# Patient Record
Sex: FEMALE | Race: WHITE | NOT HISPANIC OR LATINO | URBAN - METROPOLITAN AREA
[De-identification: names, ages, dates, MRNs, and addresses within clinical notes are randomized per-mention and may not be internally consistent; named-entity substitution may affect disease eponyms.]

---

## 2017-02-06 ENCOUNTER — INPATIENT (INPATIENT)
Facility: HOSPITAL | Age: 82
LOS: 23 days | Discharge: NURSING, REHAB OR LONG-TERM CARE | DRG: 335 | End: 2017-03-02
Attending: INTERNAL MEDICINE | Admitting: INTERNAL MEDICINE
Payer: MEDICARE

## 2017-02-06 VITALS
RESPIRATION RATE: 18 BRPM | HEART RATE: 72 BPM | TEMPERATURE: 98 F | DIASTOLIC BLOOD PRESSURE: 56 MMHG | OXYGEN SATURATION: 98 % | SYSTOLIC BLOOD PRESSURE: 104 MMHG

## 2017-02-06 DIAGNOSIS — K21.9 GASTRO-ESOPHAGEAL REFLUX DISEASE WITHOUT ESOPHAGITIS: ICD-10-CM

## 2017-02-06 DIAGNOSIS — K56.5 INTESTINAL ADHESIONS [BANDS] WITH OBSTRUCTION (POSTINFECTION): ICD-10-CM

## 2017-02-06 DIAGNOSIS — E78.5 HYPERLIPIDEMIA, UNSPECIFIED: ICD-10-CM

## 2017-02-06 DIAGNOSIS — J44.9 CHRONIC OBSTRUCTIVE PULMONARY DISEASE, UNSPECIFIED: ICD-10-CM

## 2017-02-06 DIAGNOSIS — I63.40 CEREBRAL INFARCTION DUE TO EMBOLISM OF UNSPECIFIED CEREBRAL ARTERY: ICD-10-CM

## 2017-02-06 DIAGNOSIS — K56.60 UNSPECIFIED INTESTINAL OBSTRUCTION: ICD-10-CM

## 2017-02-06 LAB
ALBUMIN SERPL ELPH-MCNC: 3.7 G/DL — SIGNIFICANT CHANGE UP (ref 3.3–5)
ALP SERPL-CCNC: 70 U/L — SIGNIFICANT CHANGE UP (ref 40–120)
ALT FLD-CCNC: 20 U/L RC — SIGNIFICANT CHANGE UP (ref 10–45)
ANION GAP SERPL CALC-SCNC: 15 MMOL/L — SIGNIFICANT CHANGE UP (ref 5–17)
APPEARANCE UR: CLEAR — SIGNIFICANT CHANGE UP
AST SERPL-CCNC: 40 U/L — SIGNIFICANT CHANGE UP (ref 10–40)
BASOPHILS # BLD AUTO: 0 K/UL — SIGNIFICANT CHANGE UP (ref 0–0.2)
BASOPHILS NFR BLD AUTO: 0 % — SIGNIFICANT CHANGE UP (ref 0–2)
BILIRUB SERPL-MCNC: 2.9 MG/DL — HIGH (ref 0.2–1.2)
BILIRUB UR-MCNC: NEGATIVE — SIGNIFICANT CHANGE UP
BUN SERPL-MCNC: 20 MG/DL — SIGNIFICANT CHANGE UP (ref 7–23)
CALCIUM SERPL-MCNC: 9.3 MG/DL — SIGNIFICANT CHANGE UP (ref 8.4–10.5)
CHLORIDE SERPL-SCNC: 105 MMOL/L — SIGNIFICANT CHANGE UP (ref 96–108)
CO2 SERPL-SCNC: 20 MMOL/L — LOW (ref 22–31)
COLOR SPEC: YELLOW — SIGNIFICANT CHANGE UP
CREAT SERPL-MCNC: 0.78 MG/DL — SIGNIFICANT CHANGE UP (ref 0.5–1.3)
DIFF PNL FLD: NEGATIVE — SIGNIFICANT CHANGE UP
EOSINOPHIL # BLD AUTO: 0 K/UL — SIGNIFICANT CHANGE UP (ref 0–0.5)
EOSINOPHIL NFR BLD AUTO: 0.2 % — SIGNIFICANT CHANGE UP (ref 0–6)
GAS PNL BLDV: SIGNIFICANT CHANGE UP
GLUCOSE SERPL-MCNC: 204 MG/DL — HIGH (ref 70–99)
GLUCOSE UR QL: NEGATIVE — SIGNIFICANT CHANGE UP
HCT VFR BLD CALC: 46.5 % — HIGH (ref 34.5–45)
HGB BLD-MCNC: 16.4 G/DL — HIGH (ref 11.5–15.5)
KETONES UR-MCNC: ABNORMAL
LEUKOCYTE ESTERASE UR-ACNC: NEGATIVE — SIGNIFICANT CHANGE UP
LIDOCAIN IGE QN: 23 U/L — SIGNIFICANT CHANGE UP (ref 7–60)
LYMPHOCYTES # BLD AUTO: 0.5 K/UL — LOW (ref 1–3.3)
LYMPHOCYTES # BLD AUTO: 5 % — LOW (ref 13–44)
MCHC RBC-ENTMCNC: 34.9 PG — HIGH (ref 27–34)
MCHC RBC-ENTMCNC: 35.2 GM/DL — SIGNIFICANT CHANGE UP (ref 32–36)
MCV RBC AUTO: 99.1 FL — SIGNIFICANT CHANGE UP (ref 80–100)
MONOCYTES # BLD AUTO: 0.3 K/UL — SIGNIFICANT CHANGE UP (ref 0–0.9)
MONOCYTES NFR BLD AUTO: 2.5 % — SIGNIFICANT CHANGE UP (ref 2–14)
NEUTROPHILS # BLD AUTO: 10.1 K/UL — HIGH (ref 1.8–7.4)
NEUTROPHILS NFR BLD AUTO: 92.4 % — HIGH (ref 43–77)
NITRITE UR-MCNC: NEGATIVE — SIGNIFICANT CHANGE UP
PH UR: 6 — SIGNIFICANT CHANGE UP (ref 4.8–8)
PLATELET # BLD AUTO: 144 K/UL — LOW (ref 150–400)
POTASSIUM SERPL-MCNC: 5.6 MMOL/L — HIGH (ref 3.5–5.3)
POTASSIUM SERPL-SCNC: 5.6 MMOL/L — HIGH (ref 3.5–5.3)
PROT SERPL-MCNC: 7.2 G/DL — SIGNIFICANT CHANGE UP (ref 6–8.3)
PROT UR-MCNC: 30 MG/DL
RBC # BLD: 4.69 M/UL — SIGNIFICANT CHANGE UP (ref 3.8–5.2)
RBC # FLD: 12.9 % — SIGNIFICANT CHANGE UP (ref 10.3–14.5)
SODIUM SERPL-SCNC: 140 MMOL/L — SIGNIFICANT CHANGE UP (ref 135–145)
SP GR SPEC: 1.03 — HIGH (ref 1.01–1.02)
UROBILINOGEN FLD QL: NEGATIVE — SIGNIFICANT CHANGE UP
WBC # BLD: 11 K/UL — HIGH (ref 3.8–10.5)
WBC # FLD AUTO: 11 K/UL — HIGH (ref 3.8–10.5)

## 2017-02-06 PROCEDURE — 93010 ELECTROCARDIOGRAM REPORT: CPT

## 2017-02-06 PROCEDURE — 99291 CRITICAL CARE FIRST HOUR: CPT | Mod: 25,GC

## 2017-02-06 PROCEDURE — 99223 1ST HOSP IP/OBS HIGH 75: CPT | Mod: GC

## 2017-02-06 PROCEDURE — 74177 CT ABD & PELVIS W/CONTRAST: CPT | Mod: 26

## 2017-02-06 PROCEDURE — 71010: CPT | Mod: 26,76

## 2017-02-06 PROCEDURE — 71010: CPT | Mod: 26,77

## 2017-02-06 RX ORDER — BRIMONIDINE TARTRATE 2 MG/MG
1 SOLUTION/ DROPS OPHTHALMIC
Qty: 0 | Refills: 0 | Status: DISCONTINUED | OUTPATIENT
Start: 2017-02-06 | End: 2017-02-15

## 2017-02-06 RX ORDER — DEXAMETHASONE 0.5 MG/5ML
4 ELIXIR ORAL DAILY
Qty: 0 | Refills: 0 | Status: DISCONTINUED | OUTPATIENT
Start: 2017-02-06 | End: 2017-02-15

## 2017-02-06 RX ORDER — RIVAROXABAN 15 MG-20MG
15 KIT ORAL DAILY
Qty: 0 | Refills: 0 | Status: DISCONTINUED | OUTPATIENT
Start: 2017-02-06 | End: 2017-02-11

## 2017-02-06 RX ORDER — OCTREOTIDE ACETATE 200 UG/ML
10 INJECTION, SOLUTION INTRAVENOUS; SUBCUTANEOUS
Qty: 500 | Refills: 0 | Status: DISCONTINUED | OUTPATIENT
Start: 2017-02-06 | End: 2017-02-15

## 2017-02-06 RX ORDER — SODIUM CHLORIDE 9 MG/ML
1000 INJECTION INTRAMUSCULAR; INTRAVENOUS; SUBCUTANEOUS ONCE
Qty: 0 | Refills: 0 | Status: COMPLETED | OUTPATIENT
Start: 2017-02-06 | End: 2017-02-06

## 2017-02-06 RX ORDER — ATORVASTATIN CALCIUM 80 MG/1
40 TABLET, FILM COATED ORAL AT BEDTIME
Qty: 0 | Refills: 0 | Status: DISCONTINUED | OUTPATIENT
Start: 2017-02-06 | End: 2017-02-15

## 2017-02-06 RX ORDER — MIDAZOLAM HYDROCHLORIDE 1 MG/ML
1 INJECTION, SOLUTION INTRAMUSCULAR; INTRAVENOUS ONCE
Qty: 0 | Refills: 0 | Status: DISCONTINUED | OUTPATIENT
Start: 2017-02-06 | End: 2017-02-06

## 2017-02-06 RX ORDER — HALOPERIDOL DECANOATE 100 MG/ML
0.5 INJECTION INTRAMUSCULAR EVERY 6 HOURS
Qty: 0 | Refills: 0 | Status: DISCONTINUED | OUTPATIENT
Start: 2017-02-06 | End: 2017-02-09

## 2017-02-06 RX ORDER — ONDANSETRON 8 MG/1
4 TABLET, FILM COATED ORAL EVERY 6 HOURS
Qty: 0 | Refills: 0 | Status: DISCONTINUED | OUTPATIENT
Start: 2017-02-06 | End: 2017-02-06

## 2017-02-06 RX ORDER — HYDROMORPHONE HYDROCHLORIDE 2 MG/ML
0.2 INJECTION INTRAMUSCULAR; INTRAVENOUS; SUBCUTANEOUS EVERY 4 HOURS
Qty: 0 | Refills: 0 | Status: DISCONTINUED | OUTPATIENT
Start: 2017-02-06 | End: 2017-02-13

## 2017-02-06 RX ORDER — PANTOPRAZOLE SODIUM 20 MG/1
40 TABLET, DELAYED RELEASE ORAL ONCE
Qty: 0 | Refills: 0 | Status: COMPLETED | OUTPATIENT
Start: 2017-02-06 | End: 2017-02-06

## 2017-02-06 RX ORDER — PANTOPRAZOLE SODIUM 20 MG/1
40 TABLET, DELAYED RELEASE ORAL EVERY 24 HOURS
Qty: 0 | Refills: 0 | Status: DISCONTINUED | OUTPATIENT
Start: 2017-02-06 | End: 2017-02-15

## 2017-02-06 RX ORDER — ONDANSETRON 8 MG/1
4 TABLET, FILM COATED ORAL ONCE
Qty: 0 | Refills: 0 | Status: COMPLETED | OUTPATIENT
Start: 2017-02-06 | End: 2017-02-06

## 2017-02-06 RX ORDER — SODIUM CHLORIDE 9 MG/ML
3 INJECTION INTRAMUSCULAR; INTRAVENOUS; SUBCUTANEOUS ONCE
Qty: 0 | Refills: 0 | Status: COMPLETED | OUTPATIENT
Start: 2017-02-06 | End: 2017-02-06

## 2017-02-06 RX ORDER — SODIUM CHLORIDE 9 MG/ML
1000 INJECTION INTRAMUSCULAR; INTRAVENOUS; SUBCUTANEOUS
Qty: 0 | Refills: 0 | Status: DISCONTINUED | OUTPATIENT
Start: 2017-02-06 | End: 2017-02-11

## 2017-02-06 RX ORDER — ONDANSETRON 8 MG/1
4 TABLET, FILM COATED ORAL EVERY 6 HOURS
Qty: 0 | Refills: 0 | Status: DISCONTINUED | OUTPATIENT
Start: 2017-02-06 | End: 2017-02-15

## 2017-02-06 RX ADMIN — RIVAROXABAN 15 MILLIGRAM(S): KIT at 22:27

## 2017-02-06 RX ADMIN — ONDANSETRON 4 MILLIGRAM(S): 8 TABLET, FILM COATED ORAL at 04:10

## 2017-02-06 RX ADMIN — BRIMONIDINE TARTRATE 1 DROP(S): 2 SOLUTION/ DROPS OPHTHALMIC at 22:28

## 2017-02-06 RX ADMIN — Medication 4 MILLIGRAM(S): at 17:34

## 2017-02-06 RX ADMIN — SODIUM CHLORIDE 1000 MILLILITER(S): 9 INJECTION INTRAMUSCULAR; INTRAVENOUS; SUBCUTANEOUS at 04:08

## 2017-02-06 RX ADMIN — SODIUM CHLORIDE 100 MILLILITER(S): 9 INJECTION INTRAMUSCULAR; INTRAVENOUS; SUBCUTANEOUS at 08:24

## 2017-02-06 RX ADMIN — OCTREOTIDE ACETATE 2 MICROGRAM(S)/HR: 200 INJECTION, SOLUTION INTRAVENOUS; SUBCUTANEOUS at 18:38

## 2017-02-06 RX ADMIN — SODIUM CHLORIDE 3 MILLILITER(S): 9 INJECTION INTRAMUSCULAR; INTRAVENOUS; SUBCUTANEOUS at 04:17

## 2017-02-06 RX ADMIN — MIDAZOLAM HYDROCHLORIDE 1 MILLIGRAM(S): 1 INJECTION, SOLUTION INTRAMUSCULAR; INTRAVENOUS at 06:48

## 2017-02-06 RX ADMIN — PANTOPRAZOLE SODIUM 40 MILLIGRAM(S): 20 TABLET, DELAYED RELEASE ORAL at 04:09

## 2017-02-06 RX ADMIN — ATORVASTATIN CALCIUM 40 MILLIGRAM(S): 80 TABLET, FILM COATED ORAL at 22:28

## 2017-02-06 RX ADMIN — SODIUM CHLORIDE 100 MILLILITER(S): 9 INJECTION INTRAMUSCULAR; INTRAVENOUS; SUBCUTANEOUS at 11:45

## 2017-02-06 RX ADMIN — PANTOPRAZOLE SODIUM 40 MILLIGRAM(S): 20 TABLET, DELAYED RELEASE ORAL at 17:34

## 2017-02-06 NOTE — PATIENT PROFILE ADULT. - ABILITY TO HEAR (WITH HEARING AID OR HEARING APPLIANCE IF NORMALLY USED):
difficulty hearing from left ear./Mildly to Moderately Impaired: difficulty hearing in some environments or speaker may need to increase volume or speak distinctly

## 2017-02-06 NOTE — ED PROVIDER NOTE - CRITICAL CARE INDICATION, MLM
patient was critically ill... Patient was critically ill with a high probability of imminent or life threatening deterioration tachycardic with closed loop bowel obstruction requiring emergent surgical intervention

## 2017-02-06 NOTE — ED ADULT NURSE NOTE - OBJECTIVE STATEMENT
89 y.o. Female presents to the ED via EMS from [facility] [place] c/o abdominal pain and vomiting. pt is A&Ox1. Hx Afib and GI bleed. EMS reports pt was c/o abdominal pain in b/l upper quadrants and vomited x3. Denies any blood, diarrhea, CP, SOB, urinary/bowel complications, fever/chills. Pt is in no current distress. Comfort and safety provided. 89 y.o. Female presents to the ED via EMS from [facility] c/o abdominal pain and vomiting. pt is A&Ox1. Hx Afib and GI bleed. EMS reports pt was c/o abdominal pain in b/l upper quadrants and vomited x3. Blanchable redness noted on sacral area. Denies any blood, diarrhea, CP, SOB, urinary/bowel complications, fever/chills. Pt is in no current distress. Comfort and safety provided. 89 y.o. Female presents to the ED via EMS from [facility] c/o abdominal pain and vomiting. pt is A&Ox1. Hx Afib and GI bleed. EMS reports pt was c/o abdominal pain in b/l upper quadrants and vomited x3. Tender in lower abdomen. Blanchable redness noted on sacral area. Denies any blood, diarrhea, CP, SOB, urinary/bowel complications, fever/chills. Pt is in no current distress. Comfort and safety provided.

## 2017-02-06 NOTE — H&P ADULT. - ASSESSMENT
89yM h/o dementia (baseline oriented x2) Anemia, HTN, HLD, CAD, s/p MI, s/p PPM/AICD, DVT/PE, (previously on coumadin, now with IVC filter), COPD, BPH and colitis brought in by daughter for increased agitation yesterday and increased fatigue/low temp today. Resident of [facility]. Not compliant with pureed diet and has had recurrent aspiration PNA. Daughter reports increased cough.

## 2017-02-06 NOTE — ED ADULT NURSE REASSESSMENT NOTE - NS ED NURSE REASSESS COMMENT FT1
Received report from night RN Erica. Patient sheets changed, patient repositioned. Patient pulled out her own NG tube with tube intact. No epistaxis. No appearance of discomfort after removal of NG tube. ED resident Dr Julio aware. Patient admitted, RTM timer clicked Received report from night RN Erica. Patient sheets changed, patient repositioned. Patient pulled out her own NG tube with tube intact. No epistaxis. No appearance of discomfort after removal of NG tube. ED resident Dr Julio aware. Awaiting dispo

## 2017-02-06 NOTE — ED PROCEDURE NOTE - CPROC ED GASTRIC INTUB DETAIL1
Gastric tube connected to low continuous suction./The nasogastric tube (see size above) was inserted via the anatomic location.

## 2017-02-06 NOTE — ED ADULT NURSE NOTE - PMH
COPD (chronic obstructive pulmonary disease)    CVA (cerebral infarction)    GERD (gastroesophageal reflux disease)    GI bleed    Glaucoma    Hyperlipemia    PUD (peptic ulcer disease)

## 2017-02-06 NOTE — ED PROVIDER NOTE - OBJECTIVE STATEMENT
89yoF pmh CAD, CVA, dementia, PUD/GIB, A fib on Rivaroxaban, COPD (no home 02), GERD bibems from nursing home after noted 1x episode of +n/v and c/o of diffuse abd pain.   In Ed pt reports continued diffsue bad pan, +n.  Pt reports no f/c, no diarrhea, non bloody brown stools, no cp/sob, no h/a, no dizzy.

## 2017-02-06 NOTE — ED PROVIDER NOTE - ATTENDING CONTRIBUTION TO CARE
Patient poor historian.  From nursing home, reporting abdominal pain and nausea.  Unknown if prior surgeries.    On exam patient tachycardic VS otherwise WNL, NAD, irregularly irregular, lungs clear, abdomen diffusely TTP.    DDx:  obstruction, cholecystitis, pancreatitis, gastritis, ischemic colitis - labs, lactate, CXR, EKG, IVF, CT A/P (possible angio if significantly elevated lactate), likely admission.

## 2017-02-06 NOTE — ED PROVIDER NOTE - CRITICAL CARE PROVIDED
direct patient care (not related to procedure)/documentation/interpretation of diagnostic studies/consultation with other physicians/additional history taking

## 2017-02-07 LAB
ANION GAP SERPL CALC-SCNC: 13 MMOL/L — SIGNIFICANT CHANGE UP (ref 5–17)
BUN SERPL-MCNC: 22 MG/DL — SIGNIFICANT CHANGE UP (ref 7–23)
CALCIUM SERPL-MCNC: 8.4 MG/DL — SIGNIFICANT CHANGE UP (ref 8.4–10.5)
CHLORIDE SERPL-SCNC: 111 MMOL/L — HIGH (ref 96–108)
CO2 SERPL-SCNC: 21 MMOL/L — LOW (ref 22–31)
CREAT SERPL-MCNC: 0.61 MG/DL — SIGNIFICANT CHANGE UP (ref 0.5–1.3)
CULTURE RESULTS: NO GROWTH — SIGNIFICANT CHANGE UP
FOLATE SERPL-MCNC: 14.1 NG/ML — SIGNIFICANT CHANGE UP (ref 4.8–24.2)
GLUCOSE SERPL-MCNC: 151 MG/DL — HIGH (ref 70–99)
HCT VFR BLD CALC: 39.3 % — SIGNIFICANT CHANGE UP (ref 34.5–45)
HGB BLD-MCNC: 14 G/DL — SIGNIFICANT CHANGE UP (ref 11.5–15.5)
MAGNESIUM SERPL-MCNC: 2.2 MG/DL — SIGNIFICANT CHANGE UP (ref 1.6–2.6)
MCHC RBC-ENTMCNC: 35.5 GM/DL — SIGNIFICANT CHANGE UP (ref 32–36)
MCHC RBC-ENTMCNC: 35.6 PG — HIGH (ref 27–34)
MCV RBC AUTO: 100 FL — SIGNIFICANT CHANGE UP (ref 80–100)
PHOSPHATE SERPL-MCNC: 3.7 MG/DL — SIGNIFICANT CHANGE UP (ref 2.5–4.5)
PLATELET # BLD AUTO: 124 K/UL — LOW (ref 150–400)
POTASSIUM SERPL-MCNC: 4.3 MMOL/L — SIGNIFICANT CHANGE UP (ref 3.5–5.3)
POTASSIUM SERPL-SCNC: 4.3 MMOL/L — SIGNIFICANT CHANGE UP (ref 3.5–5.3)
RBC # BLD: 3.92 M/UL — SIGNIFICANT CHANGE UP (ref 3.8–5.2)
RBC # FLD: 12.8 % — SIGNIFICANT CHANGE UP (ref 10.3–14.5)
SODIUM SERPL-SCNC: 145 MMOL/L — SIGNIFICANT CHANGE UP (ref 135–145)
SPECIMEN SOURCE: SIGNIFICANT CHANGE UP
TSH SERPL-MCNC: 0.65 UIU/ML — SIGNIFICANT CHANGE UP (ref 0.27–4.2)
VIT B12 SERPL-MCNC: 297 PG/ML — SIGNIFICANT CHANGE UP (ref 243–894)
WBC # BLD: 8.9 K/UL — SIGNIFICANT CHANGE UP (ref 3.8–10.5)
WBC # FLD AUTO: 8.9 K/UL — SIGNIFICANT CHANGE UP (ref 3.8–10.5)

## 2017-02-07 PROCEDURE — 99233 SBSQ HOSP IP/OBS HIGH 50: CPT | Mod: GC

## 2017-02-07 PROCEDURE — 71010: CPT | Mod: 26

## 2017-02-07 RX ADMIN — BRIMONIDINE TARTRATE 1 DROP(S): 2 SOLUTION/ DROPS OPHTHALMIC at 12:24

## 2017-02-07 RX ADMIN — Medication 4 MILLIGRAM(S): at 05:24

## 2017-02-07 RX ADMIN — PANTOPRAZOLE SODIUM 40 MILLIGRAM(S): 20 TABLET, DELAYED RELEASE ORAL at 17:39

## 2017-02-07 RX ADMIN — RIVAROXABAN 15 MILLIGRAM(S): KIT at 12:24

## 2017-02-07 RX ADMIN — ATORVASTATIN CALCIUM 40 MILLIGRAM(S): 80 TABLET, FILM COATED ORAL at 23:27

## 2017-02-07 RX ADMIN — BRIMONIDINE TARTRATE 1 DROP(S): 2 SOLUTION/ DROPS OPHTHALMIC at 17:39

## 2017-02-07 RX ADMIN — SODIUM CHLORIDE 100 MILLILITER(S): 9 INJECTION INTRAMUSCULAR; INTRAVENOUS; SUBCUTANEOUS at 23:27

## 2017-02-07 NOTE — PROVIDER CONTACT NOTE (OTHER) - ACTION/TREATMENT ORDERED:
As per Alvaro Grimm NP, bladder scan patient for residual urine. pt safety maintained will continue to monitor.

## 2017-02-07 NOTE — PROVIDER CONTACT NOTE (OTHER) - ACTION/TREATMENT ORDERED:
As per Alvaro Grimm, straight cath pt for residual urine. Pt safety maintained, will continue to monitor.

## 2017-02-07 NOTE — PROVIDER CONTACT NOTE (OTHER) - ACTION/TREATMENT ORDERED:
Hernandez placed in pt, patent draining clear yellow urine, Failed attempt to place NGT.  Will try again.PP/RN

## 2017-02-07 NOTE — PROVIDER CONTACT NOTE (OTHER) - ACTION/TREATMENT ORDERED:
As per Alvaro Grimm NP, continue to monitor pt, no further interventions at this time. Pt safety maintained. Will continue to monitor.

## 2017-02-08 LAB
ANION GAP SERPL CALC-SCNC: 14 MMOL/L — SIGNIFICANT CHANGE UP (ref 5–17)
BUN SERPL-MCNC: 24 MG/DL — HIGH (ref 7–23)
CALCIUM SERPL-MCNC: 7.8 MG/DL — LOW (ref 8.4–10.5)
CHLORIDE SERPL-SCNC: 109 MMOL/L — HIGH (ref 96–108)
CK MB BLD-MCNC: 2.1 % — SIGNIFICANT CHANGE UP (ref 0–3.5)
CK MB CFR SERPL CALC: 2.1 NG/ML — SIGNIFICANT CHANGE UP (ref 0–3.8)
CK SERPL-CCNC: 100 U/L — SIGNIFICANT CHANGE UP (ref 25–170)
CO2 SERPL-SCNC: 22 MMOL/L — SIGNIFICANT CHANGE UP (ref 22–31)
CREAT SERPL-MCNC: 0.53 MG/DL — SIGNIFICANT CHANGE UP (ref 0.5–1.3)
GLUCOSE SERPL-MCNC: 134 MG/DL — HIGH (ref 70–99)
HCT VFR BLD CALC: 39.4 % — SIGNIFICANT CHANGE UP (ref 34.5–45)
HGB BLD-MCNC: 13.4 G/DL — SIGNIFICANT CHANGE UP (ref 11.5–15.5)
MAGNESIUM SERPL-MCNC: 2 MG/DL — SIGNIFICANT CHANGE UP (ref 1.6–2.6)
MAGNESIUM SERPL-MCNC: 2.1 MG/DL — SIGNIFICANT CHANGE UP (ref 1.6–2.6)
MCHC RBC-ENTMCNC: 33.9 PG — SIGNIFICANT CHANGE UP (ref 27–34)
MCHC RBC-ENTMCNC: 34 GM/DL — SIGNIFICANT CHANGE UP (ref 32–36)
MCV RBC AUTO: 99.8 FL — SIGNIFICANT CHANGE UP (ref 80–100)
PHOSPHATE SERPL-MCNC: 2.7 MG/DL — SIGNIFICANT CHANGE UP (ref 2.5–4.5)
PHOSPHATE SERPL-MCNC: 2.7 MG/DL — SIGNIFICANT CHANGE UP (ref 2.5–4.5)
PLATELET # BLD AUTO: 129 K/UL — LOW (ref 150–400)
POTASSIUM SERPL-MCNC: 3.7 MMOL/L — SIGNIFICANT CHANGE UP (ref 3.5–5.3)
POTASSIUM SERPL-SCNC: 3.7 MMOL/L — SIGNIFICANT CHANGE UP (ref 3.5–5.3)
RBC # BLD: 3.95 M/UL — SIGNIFICANT CHANGE UP (ref 3.8–5.2)
RBC # FLD: 13 % — SIGNIFICANT CHANGE UP (ref 10.3–14.5)
SODIUM SERPL-SCNC: 145 MMOL/L — SIGNIFICANT CHANGE UP (ref 135–145)
TROPONIN T SERPL-MCNC: <0.01 NG/ML — SIGNIFICANT CHANGE UP (ref 0–0.06)
WBC # BLD: 10.4 K/UL — SIGNIFICANT CHANGE UP (ref 3.8–10.5)
WBC # FLD AUTO: 10.4 K/UL — SIGNIFICANT CHANGE UP (ref 3.8–10.5)

## 2017-02-08 PROCEDURE — 99233 SBSQ HOSP IP/OBS HIGH 50: CPT | Mod: GC

## 2017-02-08 PROCEDURE — 93010 ELECTROCARDIOGRAM REPORT: CPT

## 2017-02-08 RX ORDER — METOPROLOL TARTRATE 50 MG
5 TABLET ORAL EVERY 6 HOURS
Qty: 0 | Refills: 0 | Status: DISCONTINUED | OUTPATIENT
Start: 2017-02-08 | End: 2017-02-09

## 2017-02-08 RX ORDER — METOPROLOL TARTRATE 50 MG
5 TABLET ORAL ONCE
Qty: 0 | Refills: 0 | Status: COMPLETED | OUTPATIENT
Start: 2017-02-08 | End: 2017-02-08

## 2017-02-08 RX ORDER — TOBRAMYCIN 0.3 %
1 DROPS OPHTHALMIC (EYE)
Qty: 0 | Refills: 0 | Status: DISCONTINUED | OUTPATIENT
Start: 2017-02-08 | End: 2017-02-15

## 2017-02-08 RX ORDER — PREGABALIN 225 MG/1
1000 CAPSULE ORAL DAILY
Qty: 0 | Refills: 0 | Status: COMPLETED | OUTPATIENT
Start: 2017-02-08 | End: 2017-02-10

## 2017-02-08 RX ADMIN — HALOPERIDOL DECANOATE 0.5 MILLIGRAM(S): 100 INJECTION INTRAMUSCULAR at 16:46

## 2017-02-08 RX ADMIN — Medication 1 DROP(S): at 11:49

## 2017-02-08 RX ADMIN — Medication 4 MILLIGRAM(S): at 05:30

## 2017-02-08 RX ADMIN — PREGABALIN 1000 MICROGRAM(S): 225 CAPSULE ORAL at 11:49

## 2017-02-08 RX ADMIN — OCTREOTIDE ACETATE 2 MICROGRAM(S)/HR: 200 INJECTION, SOLUTION INTRAVENOUS; SUBCUTANEOUS at 14:35

## 2017-02-08 RX ADMIN — HYDROMORPHONE HYDROCHLORIDE 0.2 MILLIGRAM(S): 2 INJECTION INTRAMUSCULAR; INTRAVENOUS; SUBCUTANEOUS at 23:24

## 2017-02-08 RX ADMIN — BRIMONIDINE TARTRATE 1 DROP(S): 2 SOLUTION/ DROPS OPHTHALMIC at 19:01

## 2017-02-08 RX ADMIN — SODIUM CHLORIDE 100 MILLILITER(S): 9 INJECTION INTRAMUSCULAR; INTRAVENOUS; SUBCUTANEOUS at 23:24

## 2017-02-08 RX ADMIN — PANTOPRAZOLE SODIUM 40 MILLIGRAM(S): 20 TABLET, DELAYED RELEASE ORAL at 16:47

## 2017-02-08 RX ADMIN — Medication 5 MILLIGRAM(S): at 19:00

## 2017-02-08 RX ADMIN — HYDROMORPHONE HYDROCHLORIDE 0.2 MILLIGRAM(S): 2 INJECTION INTRAMUSCULAR; INTRAVENOUS; SUBCUTANEOUS at 23:48

## 2017-02-08 RX ADMIN — Medication 1 DROP(S): at 19:01

## 2017-02-08 RX ADMIN — Medication 1 DROP(S): at 23:25

## 2017-02-08 RX ADMIN — RIVAROXABAN 15 MILLIGRAM(S): KIT at 11:03

## 2017-02-08 RX ADMIN — Medication 5 MILLIGRAM(S): at 23:26

## 2017-02-08 RX ADMIN — BRIMONIDINE TARTRATE 1 DROP(S): 2 SOLUTION/ DROPS OPHTHALMIC at 05:30

## 2017-02-08 RX ADMIN — Medication 5 MILLIGRAM(S): at 18:37

## 2017-02-08 NOTE — PROVIDER CONTACT NOTE (OTHER) - ACTION/TREATMENT ORDERED:
Stat EKG, Cardiac Enzymes done, Stat dose Metopolol given x 1.  Pt. to be transferred to Tele floor.  Report given to Receiving Nurse - RN/Nae. PP/RN

## 2017-02-09 LAB
ANION GAP SERPL CALC-SCNC: 15 MMOL/L — SIGNIFICANT CHANGE UP (ref 5–17)
BUN SERPL-MCNC: 22 MG/DL — SIGNIFICANT CHANGE UP (ref 7–23)
CALCIUM SERPL-MCNC: 8.1 MG/DL — LOW (ref 8.4–10.5)
CHLORIDE SERPL-SCNC: 108 MMOL/L — SIGNIFICANT CHANGE UP (ref 96–108)
CK MB BLD-MCNC: 2.4 % — SIGNIFICANT CHANGE UP (ref 0–3.5)
CK MB CFR SERPL CALC: 2.4 NG/ML — SIGNIFICANT CHANGE UP (ref 0–3.8)
CK SERPL-CCNC: 101 U/L — SIGNIFICANT CHANGE UP (ref 25–170)
CO2 SERPL-SCNC: 19 MMOL/L — LOW (ref 22–31)
CREAT SERPL-MCNC: 0.53 MG/DL — SIGNIFICANT CHANGE UP (ref 0.5–1.3)
GLUCOSE SERPL-MCNC: 87 MG/DL — SIGNIFICANT CHANGE UP (ref 70–99)
HCT VFR BLD CALC: 40.4 % — SIGNIFICANT CHANGE UP (ref 34.5–45)
HGB BLD-MCNC: 14.1 G/DL — SIGNIFICANT CHANGE UP (ref 11.5–15.5)
MAGNESIUM SERPL-MCNC: 2 MG/DL — SIGNIFICANT CHANGE UP (ref 1.6–2.6)
MCHC RBC-ENTMCNC: 34.8 GM/DL — SIGNIFICANT CHANGE UP (ref 32–36)
MCHC RBC-ENTMCNC: 35 PG — HIGH (ref 27–34)
MCV RBC AUTO: 100 FL — SIGNIFICANT CHANGE UP (ref 80–100)
PHOSPHATE SERPL-MCNC: 2.2 MG/DL — LOW (ref 2.5–4.5)
PLATELET # BLD AUTO: 138 K/UL — LOW (ref 150–400)
POTASSIUM SERPL-MCNC: 3.5 MMOL/L — SIGNIFICANT CHANGE UP (ref 3.5–5.3)
POTASSIUM SERPL-SCNC: 3.5 MMOL/L — SIGNIFICANT CHANGE UP (ref 3.5–5.3)
RBC # BLD: 4.03 M/UL — SIGNIFICANT CHANGE UP (ref 3.8–5.2)
RBC # FLD: 13.2 % — SIGNIFICANT CHANGE UP (ref 10.3–14.5)
SODIUM SERPL-SCNC: 142 MMOL/L — SIGNIFICANT CHANGE UP (ref 135–145)
TROPONIN T SERPL-MCNC: <0.01 NG/ML — SIGNIFICANT CHANGE UP (ref 0–0.06)
WBC # BLD: 7.8 K/UL — SIGNIFICANT CHANGE UP (ref 3.8–10.5)
WBC # FLD AUTO: 7.8 K/UL — SIGNIFICANT CHANGE UP (ref 3.8–10.5)

## 2017-02-09 PROCEDURE — 74000: CPT | Mod: 26

## 2017-02-09 PROCEDURE — 74176 CT ABD & PELVIS W/O CONTRAST: CPT | Mod: 26

## 2017-02-09 PROCEDURE — 71010: CPT | Mod: 26

## 2017-02-09 RX ORDER — METOPROLOL TARTRATE 50 MG
2.5 TABLET ORAL EVERY 6 HOURS
Qty: 0 | Refills: 0 | Status: DISCONTINUED | OUTPATIENT
Start: 2017-02-09 | End: 2017-02-15

## 2017-02-09 RX ORDER — HALOPERIDOL DECANOATE 100 MG/ML
0.5 INJECTION INTRAMUSCULAR EVERY 6 HOURS
Qty: 0 | Refills: 0 | Status: DISCONTINUED | OUTPATIENT
Start: 2017-02-09 | End: 2017-02-15

## 2017-02-09 RX ORDER — POTASSIUM PHOSPHATE, MONOBASIC POTASSIUM PHOSPHATE, DIBASIC 236; 224 MG/ML; MG/ML
15 INJECTION, SOLUTION INTRAVENOUS ONCE
Qty: 0 | Refills: 0 | Status: COMPLETED | OUTPATIENT
Start: 2017-02-09 | End: 2017-02-09

## 2017-02-09 RX ORDER — METOPROLOL TARTRATE 50 MG
2.5 TABLET ORAL EVERY 6 HOURS
Qty: 0 | Refills: 0 | Status: DISCONTINUED | OUTPATIENT
Start: 2017-02-09 | End: 2017-02-09

## 2017-02-09 RX ADMIN — Medication 1 DROP(S): at 17:30

## 2017-02-09 RX ADMIN — ATORVASTATIN CALCIUM 40 MILLIGRAM(S): 80 TABLET, FILM COATED ORAL at 23:07

## 2017-02-09 RX ADMIN — BRIMONIDINE TARTRATE 1 DROP(S): 2 SOLUTION/ DROPS OPHTHALMIC at 05:36

## 2017-02-09 RX ADMIN — Medication 1 DROP(S): at 05:36

## 2017-02-09 RX ADMIN — RIVAROXABAN 15 MILLIGRAM(S): KIT at 11:42

## 2017-02-09 RX ADMIN — POTASSIUM PHOSPHATE, MONOBASIC POTASSIUM PHOSPHATE, DIBASIC 63.75 MILLIMOLE(S): 236; 224 INJECTION, SOLUTION INTRAVENOUS at 11:35

## 2017-02-09 RX ADMIN — BRIMONIDINE TARTRATE 1 DROP(S): 2 SOLUTION/ DROPS OPHTHALMIC at 17:30

## 2017-02-09 RX ADMIN — PANTOPRAZOLE SODIUM 40 MILLIGRAM(S): 20 TABLET, DELAYED RELEASE ORAL at 17:30

## 2017-02-09 RX ADMIN — PREGABALIN 1000 MICROGRAM(S): 225 CAPSULE ORAL at 12:50

## 2017-02-09 RX ADMIN — Medication 1 DROP(S): at 12:50

## 2017-02-09 RX ADMIN — Medication 4 MILLIGRAM(S): at 05:36

## 2017-02-09 RX ADMIN — Medication 1 DROP(S): at 23:08

## 2017-02-09 NOTE — PHYSICAL THERAPY INITIAL EVALUATION ADULT - PHYSICAL ASSIST/NONPHYSICAL ASSIST: GAIT, REHAB EVAL
vc's and tc's to improve L sided step length/nonverbal cues (demo/gestures)/1 person assist/verbal cues

## 2017-02-09 NOTE — PHYSICAL THERAPY INITIAL EVALUATION ADULT - ADDITIONAL COMMENTS
pt unable to provide prior level of function or social hx.  as per chart pt is a resident at the [facility], independent living.

## 2017-02-10 LAB
ANION GAP SERPL CALC-SCNC: 19 MMOL/L — HIGH (ref 5–17)
BUN SERPL-MCNC: 28 MG/DL — HIGH (ref 7–23)
CALCIUM SERPL-MCNC: 7.8 MG/DL — LOW (ref 8.4–10.5)
CHLORIDE SERPL-SCNC: 110 MMOL/L — HIGH (ref 96–108)
CO2 SERPL-SCNC: 15 MMOL/L — LOW (ref 22–31)
CREAT SERPL-MCNC: 0.55 MG/DL — SIGNIFICANT CHANGE UP (ref 0.5–1.3)
GLUCOSE SERPL-MCNC: 111 MG/DL — HIGH (ref 70–99)
HCT VFR BLD CALC: 39.4 % — SIGNIFICANT CHANGE UP (ref 34.5–45)
HGB BLD-MCNC: 13.9 G/DL — SIGNIFICANT CHANGE UP (ref 11.5–15.5)
MAGNESIUM SERPL-MCNC: 2 MG/DL — SIGNIFICANT CHANGE UP (ref 1.6–2.6)
MCHC RBC-ENTMCNC: 35 PG — HIGH (ref 27–34)
MCHC RBC-ENTMCNC: 35.2 GM/DL — SIGNIFICANT CHANGE UP (ref 32–36)
MCV RBC AUTO: 99.4 FL — SIGNIFICANT CHANGE UP (ref 80–100)
PHOSPHATE SERPL-MCNC: 2.9 MG/DL — SIGNIFICANT CHANGE UP (ref 2.5–4.5)
PLATELET # BLD AUTO: 143 K/UL — LOW (ref 150–400)
POTASSIUM SERPL-MCNC: 3.7 MMOL/L — SIGNIFICANT CHANGE UP (ref 3.5–5.3)
POTASSIUM SERPL-SCNC: 3.7 MMOL/L — SIGNIFICANT CHANGE UP (ref 3.5–5.3)
RBC # BLD: 3.96 M/UL — SIGNIFICANT CHANGE UP (ref 3.8–5.2)
RBC # FLD: 12.3 % — SIGNIFICANT CHANGE UP (ref 10.3–14.5)
SODIUM SERPL-SCNC: 144 MMOL/L — SIGNIFICANT CHANGE UP (ref 135–145)
WBC # BLD: 6.9 K/UL — SIGNIFICANT CHANGE UP (ref 3.8–10.5)
WBC # FLD AUTO: 6.9 K/UL — SIGNIFICANT CHANGE UP (ref 3.8–10.5)

## 2017-02-10 PROCEDURE — 99233 SBSQ HOSP IP/OBS HIGH 50: CPT | Mod: GC

## 2017-02-10 RX ADMIN — OCTREOTIDE ACETATE 2 MICROGRAM(S)/HR: 200 INJECTION, SOLUTION INTRAVENOUS; SUBCUTANEOUS at 07:01

## 2017-02-10 RX ADMIN — Medication 1 DROP(S): at 17:32

## 2017-02-10 RX ADMIN — BRIMONIDINE TARTRATE 1 DROP(S): 2 SOLUTION/ DROPS OPHTHALMIC at 17:32

## 2017-02-10 RX ADMIN — PREGABALIN 1000 MICROGRAM(S): 225 CAPSULE ORAL at 12:00

## 2017-02-10 RX ADMIN — ATORVASTATIN CALCIUM 40 MILLIGRAM(S): 80 TABLET, FILM COATED ORAL at 22:22

## 2017-02-10 RX ADMIN — Medication 1 DROP(S): at 06:41

## 2017-02-10 RX ADMIN — RIVAROXABAN 15 MILLIGRAM(S): KIT at 12:00

## 2017-02-10 RX ADMIN — PANTOPRAZOLE SODIUM 40 MILLIGRAM(S): 20 TABLET, DELAYED RELEASE ORAL at 17:32

## 2017-02-10 RX ADMIN — OCTREOTIDE ACETATE 2 MICROGRAM(S)/HR: 200 INJECTION, SOLUTION INTRAVENOUS; SUBCUTANEOUS at 06:42

## 2017-02-10 RX ADMIN — SODIUM CHLORIDE 100 MILLILITER(S): 9 INJECTION INTRAMUSCULAR; INTRAVENOUS; SUBCUTANEOUS at 07:00

## 2017-02-10 RX ADMIN — Medication 1 DROP(S): at 12:00

## 2017-02-10 RX ADMIN — Medication 2.5 MILLIGRAM(S): at 06:38

## 2017-02-10 RX ADMIN — Medication 2.5 MILLIGRAM(S): at 11:59

## 2017-02-10 RX ADMIN — BRIMONIDINE TARTRATE 1 DROP(S): 2 SOLUTION/ DROPS OPHTHALMIC at 06:39

## 2017-02-10 RX ADMIN — SODIUM CHLORIDE 100 MILLILITER(S): 9 INJECTION INTRAMUSCULAR; INTRAVENOUS; SUBCUTANEOUS at 06:43

## 2017-02-10 RX ADMIN — Medication 1 DROP(S): at 23:55

## 2017-02-10 RX ADMIN — Medication 4 MILLIGRAM(S): at 06:40

## 2017-02-10 RX ADMIN — Medication 2.5 MILLIGRAM(S): at 17:32

## 2017-02-10 NOTE — PROVIDER CONTACT NOTE (OTHER) - ASSESSMENT
patient asymptomatic. denies cp/sob. Lopressor on hold for SB 50's on tele monitor. BP patient asymptomatic. denies cp/sob. Lopressor on hold for SB 50's on tele monitor last night. /80, HR 72, pox 96%. SB 50-70's on tele monitor the pt.

## 2017-02-11 LAB
ANION GAP SERPL CALC-SCNC: 22 MMOL/L — HIGH (ref 5–17)
BUN SERPL-MCNC: 27 MG/DL — HIGH (ref 7–23)
CALCIUM SERPL-MCNC: 7.8 MG/DL — LOW (ref 8.4–10.5)
CHLORIDE SERPL-SCNC: 110 MMOL/L — HIGH (ref 96–108)
CK MB BLD-MCNC: 2.5 % — SIGNIFICANT CHANGE UP (ref 0–3.5)
CK MB CFR SERPL CALC: 2.8 NG/ML — SIGNIFICANT CHANGE UP (ref 0–3.8)
CK SERPL-CCNC: 111 U/L — SIGNIFICANT CHANGE UP (ref 25–170)
CO2 SERPL-SCNC: 12 MMOL/L — LOW (ref 22–31)
CREAT SERPL-MCNC: 0.54 MG/DL — SIGNIFICANT CHANGE UP (ref 0.5–1.3)
GLUCOSE SERPL-MCNC: 111 MG/DL — HIGH (ref 70–99)
HCT VFR BLD CALC: 42.4 % — SIGNIFICANT CHANGE UP (ref 34.5–45)
HGB BLD-MCNC: 14.3 G/DL — SIGNIFICANT CHANGE UP (ref 11.5–15.5)
MAGNESIUM SERPL-MCNC: 1.8 MG/DL — SIGNIFICANT CHANGE UP (ref 1.6–2.6)
MCHC RBC-ENTMCNC: 33.7 GM/DL — SIGNIFICANT CHANGE UP (ref 32–36)
MCHC RBC-ENTMCNC: 33.8 PG — SIGNIFICANT CHANGE UP (ref 27–34)
MCV RBC AUTO: 100 FL — SIGNIFICANT CHANGE UP (ref 80–100)
PHOSPHATE SERPL-MCNC: 2.4 MG/DL — LOW (ref 2.5–4.5)
PLATELET # BLD AUTO: 140 K/UL — LOW (ref 150–400)
POTASSIUM SERPL-MCNC: 3.8 MMOL/L — SIGNIFICANT CHANGE UP (ref 3.5–5.3)
POTASSIUM SERPL-SCNC: 3.8 MMOL/L — SIGNIFICANT CHANGE UP (ref 3.5–5.3)
RBC # BLD: 4.22 M/UL — SIGNIFICANT CHANGE UP (ref 3.8–5.2)
RBC # FLD: 13 % — SIGNIFICANT CHANGE UP (ref 10.3–14.5)
SODIUM SERPL-SCNC: 144 MMOL/L — SIGNIFICANT CHANGE UP (ref 135–145)
TROPONIN T SERPL-MCNC: <0.01 NG/ML — SIGNIFICANT CHANGE UP (ref 0–0.06)
WBC # BLD: 3 K/UL — LOW (ref 3.8–10.5)
WBC # FLD AUTO: 3 K/UL — LOW (ref 3.8–10.5)

## 2017-02-11 PROCEDURE — 71010: CPT | Mod: 26

## 2017-02-11 PROCEDURE — 93010 ELECTROCARDIOGRAM REPORT: CPT

## 2017-02-11 RX ORDER — SODIUM CHLORIDE 9 MG/ML
1000 INJECTION INTRAMUSCULAR; INTRAVENOUS; SUBCUTANEOUS
Qty: 0 | Refills: 0 | Status: DISCONTINUED | OUTPATIENT
Start: 2017-02-11 | End: 2017-02-12

## 2017-02-11 RX ORDER — METOPROLOL TARTRATE 50 MG
5 TABLET ORAL ONCE
Qty: 0 | Refills: 0 | Status: COMPLETED | OUTPATIENT
Start: 2017-02-11 | End: 2017-02-11

## 2017-02-11 RX ORDER — ENOXAPARIN SODIUM 100 MG/ML
50 INJECTION SUBCUTANEOUS
Qty: 0 | Refills: 0 | Status: DISCONTINUED | OUTPATIENT
Start: 2017-02-11 | End: 2017-02-15

## 2017-02-11 RX ORDER — POTASSIUM PHOSPHATE, MONOBASIC POTASSIUM PHOSPHATE, DIBASIC 236; 224 MG/ML; MG/ML
15 INJECTION, SOLUTION INTRAVENOUS ONCE
Qty: 0 | Refills: 0 | Status: COMPLETED | OUTPATIENT
Start: 2017-02-11 | End: 2017-02-11

## 2017-02-11 RX ADMIN — Medication 1 DROP(S): at 17:13

## 2017-02-11 RX ADMIN — Medication 5 MILLIGRAM(S): at 04:46

## 2017-02-11 RX ADMIN — ENOXAPARIN SODIUM 50 MILLIGRAM(S): 100 INJECTION SUBCUTANEOUS at 05:54

## 2017-02-11 RX ADMIN — Medication 1 DROP(S): at 23:11

## 2017-02-11 RX ADMIN — POTASSIUM PHOSPHATE, MONOBASIC POTASSIUM PHOSPHATE, DIBASIC 63.75 MILLIMOLE(S): 236; 224 INJECTION, SOLUTION INTRAVENOUS at 09:29

## 2017-02-11 RX ADMIN — Medication 2.5 MILLIGRAM(S): at 17:13

## 2017-02-11 RX ADMIN — Medication 4 MILLIGRAM(S): at 05:39

## 2017-02-11 RX ADMIN — Medication 1 DROP(S): at 05:36

## 2017-02-11 RX ADMIN — ATORVASTATIN CALCIUM 40 MILLIGRAM(S): 80 TABLET, FILM COATED ORAL at 23:10

## 2017-02-11 RX ADMIN — PANTOPRAZOLE SODIUM 40 MILLIGRAM(S): 20 TABLET, DELAYED RELEASE ORAL at 17:14

## 2017-02-11 RX ADMIN — ENOXAPARIN SODIUM 50 MILLIGRAM(S): 100 INJECTION SUBCUTANEOUS at 17:14

## 2017-02-11 RX ADMIN — Medication 1 DROP(S): at 12:24

## 2017-02-11 RX ADMIN — Medication 2.5 MILLIGRAM(S): at 23:17

## 2017-02-11 RX ADMIN — BRIMONIDINE TARTRATE 1 DROP(S): 2 SOLUTION/ DROPS OPHTHALMIC at 17:13

## 2017-02-11 RX ADMIN — OCTREOTIDE ACETATE 2 MICROGRAM(S)/HR: 200 INJECTION, SOLUTION INTRAVENOUS; SUBCUTANEOUS at 23:10

## 2017-02-11 RX ADMIN — BRIMONIDINE TARTRATE 1 DROP(S): 2 SOLUTION/ DROPS OPHTHALMIC at 05:35

## 2017-02-11 RX ADMIN — Medication 2.5 MILLIGRAM(S): at 05:40

## 2017-02-11 RX ADMIN — Medication 2.5 MILLIGRAM(S): at 12:24

## 2017-02-11 RX ADMIN — OCTREOTIDE ACETATE 2 MICROGRAM(S)/HR: 200 INJECTION, SOLUTION INTRAVENOUS; SUBCUTANEOUS at 05:40

## 2017-02-11 RX ADMIN — SODIUM CHLORIDE 75 MILLILITER(S): 9 INJECTION INTRAMUSCULAR; INTRAVENOUS; SUBCUTANEOUS at 05:55

## 2017-02-11 RX ADMIN — SODIUM CHLORIDE 75 MILLILITER(S): 9 INJECTION INTRAMUSCULAR; INTRAVENOUS; SUBCUTANEOUS at 23:23

## 2017-02-11 NOTE — PROVIDER CONTACT NOTE (OTHER) - ASSESSMENT
patient restless at times. patient asymptomatic. patient had small BM this am. patient confuse . constant observation continued for safety.

## 2017-02-11 NOTE — PROVIDER CONTACT NOTE (OTHER) - ASSESSMENT
Pt Alert and confuse. restless at times.  patient asymptomatic. /85, , pox 94% RA, T 97.& F. Aflutter 130's on tele monitor.

## 2017-02-12 LAB
ANION GAP SERPL CALC-SCNC: 21 MMOL/L — HIGH (ref 5–17)
BUN SERPL-MCNC: 29 MG/DL — HIGH (ref 7–23)
CALCIUM SERPL-MCNC: 7.5 MG/DL — LOW (ref 8.4–10.5)
CHLORIDE SERPL-SCNC: 113 MMOL/L — HIGH (ref 96–108)
CO2 SERPL-SCNC: 13 MMOL/L — LOW (ref 22–31)
CREAT SERPL-MCNC: 0.58 MG/DL — SIGNIFICANT CHANGE UP (ref 0.5–1.3)
GLUCOSE SERPL-MCNC: 115 MG/DL — HIGH (ref 70–99)
POTASSIUM SERPL-MCNC: 3.9 MMOL/L — SIGNIFICANT CHANGE UP (ref 3.5–5.3)
POTASSIUM SERPL-SCNC: 3.9 MMOL/L — SIGNIFICANT CHANGE UP (ref 3.5–5.3)
SODIUM SERPL-SCNC: 147 MMOL/L — HIGH (ref 135–145)

## 2017-02-12 PROCEDURE — 70450 CT HEAD/BRAIN W/O DYE: CPT | Mod: 26

## 2017-02-12 PROCEDURE — 74000: CPT | Mod: 26

## 2017-02-12 RX ORDER — SODIUM CHLORIDE 9 MG/ML
1000 INJECTION, SOLUTION INTRAVENOUS
Qty: 0 | Refills: 0 | Status: DISCONTINUED | OUTPATIENT
Start: 2017-02-12 | End: 2017-02-15

## 2017-02-12 RX ADMIN — Medication 2.5 MILLIGRAM(S): at 13:40

## 2017-02-12 RX ADMIN — Medication 1 DROP(S): at 14:40

## 2017-02-12 RX ADMIN — ENOXAPARIN SODIUM 50 MILLIGRAM(S): 100 INJECTION SUBCUTANEOUS at 18:03

## 2017-02-12 RX ADMIN — Medication 2.5 MILLIGRAM(S): at 06:03

## 2017-02-12 RX ADMIN — Medication 4 MILLIGRAM(S): at 06:03

## 2017-02-12 RX ADMIN — BRIMONIDINE TARTRATE 1 DROP(S): 2 SOLUTION/ DROPS OPHTHALMIC at 18:04

## 2017-02-12 RX ADMIN — ENOXAPARIN SODIUM 50 MILLIGRAM(S): 100 INJECTION SUBCUTANEOUS at 06:33

## 2017-02-12 RX ADMIN — PANTOPRAZOLE SODIUM 40 MILLIGRAM(S): 20 TABLET, DELAYED RELEASE ORAL at 18:03

## 2017-02-12 RX ADMIN — Medication 1 DROP(S): at 06:03

## 2017-02-12 RX ADMIN — BRIMONIDINE TARTRATE 1 DROP(S): 2 SOLUTION/ DROPS OPHTHALMIC at 06:03

## 2017-02-12 RX ADMIN — Medication 1 DROP(S): at 18:03

## 2017-02-12 RX ADMIN — ATORVASTATIN CALCIUM 40 MILLIGRAM(S): 80 TABLET, FILM COATED ORAL at 22:07

## 2017-02-12 RX ADMIN — Medication 2.5 MILLIGRAM(S): at 18:03

## 2017-02-13 LAB
ALBUMIN SERPL ELPH-MCNC: 2.4 G/DL — LOW (ref 3.3–5)
ALP SERPL-CCNC: 35 U/L — LOW (ref 40–120)
ALT FLD-CCNC: 18 U/L RC — SIGNIFICANT CHANGE UP (ref 10–45)
ANION GAP SERPL CALC-SCNC: 17 MMOL/L — SIGNIFICANT CHANGE UP (ref 5–17)
AST SERPL-CCNC: 15 U/L — SIGNIFICANT CHANGE UP (ref 10–40)
BILIRUB DIRECT SERPL-MCNC: 0.8 MG/DL — HIGH (ref 0–0.2)
BILIRUB INDIRECT FLD-MCNC: 1.8 MG/DL — HIGH (ref 0.2–1)
BILIRUB SERPL-MCNC: 2.6 MG/DL — HIGH (ref 0.2–1.2)
BUN SERPL-MCNC: 30 MG/DL — HIGH (ref 7–23)
CALCIUM SERPL-MCNC: 7.5 MG/DL — LOW (ref 8.4–10.5)
CHLORIDE SERPL-SCNC: 114 MMOL/L — HIGH (ref 96–108)
CO2 SERPL-SCNC: 15 MMOL/L — LOW (ref 22–31)
CREAT SERPL-MCNC: 0.61 MG/DL — SIGNIFICANT CHANGE UP (ref 0.5–1.3)
GLUCOSE SERPL-MCNC: 108 MG/DL — HIGH (ref 70–99)
HCT VFR BLD CALC: 38.9 % — SIGNIFICANT CHANGE UP (ref 34.5–45)
HGB BLD-MCNC: 12 G/DL — SIGNIFICANT CHANGE UP (ref 11.5–15.5)
MAGNESIUM SERPL-MCNC: 2 MG/DL — SIGNIFICANT CHANGE UP (ref 1.6–2.6)
MCHC RBC-ENTMCNC: 30.8 GM/DL — LOW (ref 32–36)
MCHC RBC-ENTMCNC: 32.5 PG — SIGNIFICANT CHANGE UP (ref 27–34)
MCV RBC AUTO: 105 FL — HIGH (ref 80–100)
PHOSPHATE SERPL-MCNC: 2.6 MG/DL — SIGNIFICANT CHANGE UP (ref 2.5–4.5)
PLATELET # BLD AUTO: 135 K/UL — LOW (ref 150–400)
POTASSIUM SERPL-MCNC: 4.4 MMOL/L — SIGNIFICANT CHANGE UP (ref 3.5–5.3)
POTASSIUM SERPL-SCNC: 4.4 MMOL/L — SIGNIFICANT CHANGE UP (ref 3.5–5.3)
PROT SERPL-MCNC: 4.3 G/DL — LOW (ref 6–8.3)
RBC # BLD: 3.69 M/UL — LOW (ref 3.8–5.2)
RBC # FLD: 13.5 % — SIGNIFICANT CHANGE UP (ref 10.3–14.5)
SODIUM SERPL-SCNC: 146 MMOL/L — HIGH (ref 135–145)
WBC # BLD: 5.6 K/UL — SIGNIFICANT CHANGE UP (ref 3.8–10.5)
WBC # FLD AUTO: 5.6 K/UL — SIGNIFICANT CHANGE UP (ref 3.8–10.5)

## 2017-02-13 PROCEDURE — 99233 SBSQ HOSP IP/OBS HIGH 50: CPT

## 2017-02-13 RX ADMIN — Medication 4 MILLIGRAM(S): at 05:42

## 2017-02-13 RX ADMIN — ATORVASTATIN CALCIUM 40 MILLIGRAM(S): 80 TABLET, FILM COATED ORAL at 22:21

## 2017-02-13 RX ADMIN — OCTREOTIDE ACETATE 2 MICROGRAM(S)/HR: 200 INJECTION, SOLUTION INTRAVENOUS; SUBCUTANEOUS at 22:22

## 2017-02-13 RX ADMIN — Medication 2.5 MILLIGRAM(S): at 17:40

## 2017-02-13 RX ADMIN — PANTOPRAZOLE SODIUM 40 MILLIGRAM(S): 20 TABLET, DELAYED RELEASE ORAL at 17:40

## 2017-02-13 RX ADMIN — BRIMONIDINE TARTRATE 1 DROP(S): 2 SOLUTION/ DROPS OPHTHALMIC at 05:41

## 2017-02-13 RX ADMIN — Medication 1 DROP(S): at 14:44

## 2017-02-13 RX ADMIN — Medication 1 DROP(S): at 23:51

## 2017-02-13 RX ADMIN — Medication 1 DROP(S): at 05:42

## 2017-02-13 RX ADMIN — Medication 1 DROP(S): at 00:00

## 2017-02-13 RX ADMIN — BRIMONIDINE TARTRATE 1 DROP(S): 2 SOLUTION/ DROPS OPHTHALMIC at 17:41

## 2017-02-13 RX ADMIN — ENOXAPARIN SODIUM 50 MILLIGRAM(S): 100 INJECTION SUBCUTANEOUS at 05:42

## 2017-02-13 RX ADMIN — Medication 2.5 MILLIGRAM(S): at 05:49

## 2017-02-13 RX ADMIN — Medication 2.5 MILLIGRAM(S): at 13:44

## 2017-02-13 RX ADMIN — Medication 1 DROP(S): at 17:41

## 2017-02-13 RX ADMIN — ENOXAPARIN SODIUM 50 MILLIGRAM(S): 100 INJECTION SUBCUTANEOUS at 17:40

## 2017-02-13 RX ADMIN — SODIUM CHLORIDE 75 MILLILITER(S): 9 INJECTION, SOLUTION INTRAVENOUS at 00:01

## 2017-02-14 ENCOUNTER — RESULT REVIEW (OUTPATIENT)
Age: 82
End: 2017-02-14

## 2017-02-14 LAB
ANION GAP SERPL CALC-SCNC: 15 MMOL/L — SIGNIFICANT CHANGE UP (ref 5–17)
APTT BLD: 30.2 SEC — SIGNIFICANT CHANGE UP (ref 27.5–37.4)
BUN SERPL-MCNC: 23 MG/DL — SIGNIFICANT CHANGE UP (ref 7–23)
CALCIUM SERPL-MCNC: 7.2 MG/DL — LOW (ref 8.4–10.5)
CHLORIDE SERPL-SCNC: 111 MMOL/L — HIGH (ref 96–108)
CO2 SERPL-SCNC: 19 MMOL/L — LOW (ref 22–31)
CREAT SERPL-MCNC: 0.46 MG/DL — LOW (ref 0.5–1.3)
GLUCOSE SERPL-MCNC: 102 MG/DL — HIGH (ref 70–99)
HCT VFR BLD CALC: 37.3 % — SIGNIFICANT CHANGE UP (ref 34.5–45)
HGB BLD-MCNC: 13.2 G/DL — SIGNIFICANT CHANGE UP (ref 11.5–15.5)
INR BLD: 1.15 RATIO — SIGNIFICANT CHANGE UP (ref 0.88–1.16)
MAGNESIUM SERPL-MCNC: 1.9 MG/DL — SIGNIFICANT CHANGE UP (ref 1.6–2.6)
MCHC RBC-ENTMCNC: 34.8 PG — HIGH (ref 27–34)
MCHC RBC-ENTMCNC: 35.3 GM/DL — SIGNIFICANT CHANGE UP (ref 32–36)
MCV RBC AUTO: 98.5 FL — SIGNIFICANT CHANGE UP (ref 80–100)
PHOSPHATE SERPL-MCNC: 2.1 MG/DL — LOW (ref 2.5–4.5)
PLATELET # BLD AUTO: 164 K/UL — SIGNIFICANT CHANGE UP (ref 150–400)
POTASSIUM SERPL-MCNC: 3.3 MMOL/L — LOW (ref 3.5–5.3)
POTASSIUM SERPL-SCNC: 3.3 MMOL/L — LOW (ref 3.5–5.3)
PROTHROM AB SERPL-ACNC: 12.6 SEC — SIGNIFICANT CHANGE UP (ref 10–13.1)
RBC # BLD: 3.78 M/UL — LOW (ref 3.8–5.2)
RBC # FLD: 12.7 % — SIGNIFICANT CHANGE UP (ref 10.3–14.5)
SODIUM SERPL-SCNC: 145 MMOL/L — SIGNIFICANT CHANGE UP (ref 135–145)
WBC # BLD: 11.8 K/UL — HIGH (ref 3.8–10.5)
WBC # FLD AUTO: 11.8 K/UL — HIGH (ref 3.8–10.5)

## 2017-02-14 PROCEDURE — 74176 CT ABD & PELVIS W/O CONTRAST: CPT | Mod: 26

## 2017-02-14 RX ORDER — MAGNESIUM SULFATE 500 MG/ML
1 VIAL (ML) INJECTION ONCE
Qty: 0 | Refills: 0 | Status: COMPLETED | OUTPATIENT
Start: 2017-02-14 | End: 2017-02-14

## 2017-02-14 RX ORDER — POTASSIUM PHOSPHATE, MONOBASIC POTASSIUM PHOSPHATE, DIBASIC 236; 224 MG/ML; MG/ML
15 INJECTION, SOLUTION INTRAVENOUS ONCE
Qty: 0 | Refills: 0 | Status: COMPLETED | OUTPATIENT
Start: 2017-02-14 | End: 2017-02-14

## 2017-02-14 RX ORDER — POTASSIUM CHLORIDE 20 MEQ
10 PACKET (EA) ORAL
Qty: 0 | Refills: 0 | Status: COMPLETED | OUTPATIENT
Start: 2017-02-14 | End: 2017-02-14

## 2017-02-14 RX ADMIN — Medication 100 GRAM(S): at 16:45

## 2017-02-14 RX ADMIN — BRIMONIDINE TARTRATE 1 DROP(S): 2 SOLUTION/ DROPS OPHTHALMIC at 18:29

## 2017-02-14 RX ADMIN — Medication 2.5 MILLIGRAM(S): at 06:40

## 2017-02-14 RX ADMIN — Medication 2.5 MILLIGRAM(S): at 18:30

## 2017-02-14 RX ADMIN — ENOXAPARIN SODIUM 50 MILLIGRAM(S): 100 INJECTION SUBCUTANEOUS at 06:40

## 2017-02-14 RX ADMIN — Medication 1 DROP(S): at 18:30

## 2017-02-14 RX ADMIN — Medication 4 MILLIGRAM(S): at 06:41

## 2017-02-14 RX ADMIN — POTASSIUM PHOSPHATE, MONOBASIC POTASSIUM PHOSPHATE, DIBASIC 63.75 MILLIMOLE(S): 236; 224 INJECTION, SOLUTION INTRAVENOUS at 18:29

## 2017-02-14 RX ADMIN — Medication 1 DROP(S): at 06:42

## 2017-02-14 RX ADMIN — Medication 100 MILLIEQUIVALENT(S): at 20:10

## 2017-02-14 RX ADMIN — Medication 100 MILLIEQUIVALENT(S): at 16:44

## 2017-02-14 RX ADMIN — Medication 2.5 MILLIGRAM(S): at 23:37

## 2017-02-14 RX ADMIN — PANTOPRAZOLE SODIUM 40 MILLIGRAM(S): 20 TABLET, DELAYED RELEASE ORAL at 18:29

## 2017-02-14 RX ADMIN — Medication 100 MILLIEQUIVALENT(S): at 18:29

## 2017-02-14 RX ADMIN — ENOXAPARIN SODIUM 50 MILLIGRAM(S): 100 INJECTION SUBCUTANEOUS at 18:29

## 2017-02-14 RX ADMIN — ATORVASTATIN CALCIUM 40 MILLIGRAM(S): 80 TABLET, FILM COATED ORAL at 21:05

## 2017-02-14 RX ADMIN — Medication 1 DROP(S): at 14:32

## 2017-02-14 RX ADMIN — SODIUM CHLORIDE 75 MILLILITER(S): 9 INJECTION, SOLUTION INTRAVENOUS at 06:40

## 2017-02-14 RX ADMIN — BRIMONIDINE TARTRATE 1 DROP(S): 2 SOLUTION/ DROPS OPHTHALMIC at 06:41

## 2017-02-14 RX ADMIN — Medication 2.5 MILLIGRAM(S): at 14:31

## 2017-02-14 RX ADMIN — Medication 1 DROP(S): at 23:37

## 2017-02-14 NOTE — PROVIDER CONTACT NOTE (OTHER) - ASSESSMENT
Pt. is non verbal. VSS. No Non verbal indicator of pain/discomfort. Pt. is non verbal. VSS. Non verbal indicator of pain/discomfort not present

## 2017-02-15 ENCOUNTER — TRANSCRIPTION ENCOUNTER (OUTPATIENT)
Age: 82
End: 2017-02-15

## 2017-02-15 LAB
ANION GAP SERPL CALC-SCNC: 14 MMOL/L — SIGNIFICANT CHANGE UP (ref 5–17)
BLD GP AB SCN SERPL QL: NEGATIVE — SIGNIFICANT CHANGE UP
BUN SERPL-MCNC: 21 MG/DL — SIGNIFICANT CHANGE UP (ref 7–23)
CALCIUM SERPL-MCNC: 6.7 MG/DL — LOW (ref 8.4–10.5)
CHLORIDE SERPL-SCNC: 106 MMOL/L — SIGNIFICANT CHANGE UP (ref 96–108)
CO2 SERPL-SCNC: 15 MMOL/L — LOW (ref 22–31)
CREAT SERPL-MCNC: 0.45 MG/DL — LOW (ref 0.5–1.3)
GLUCOSE SERPL-MCNC: 92 MG/DL — SIGNIFICANT CHANGE UP (ref 70–99)
HCT VFR BLD CALC: 37.6 % — SIGNIFICANT CHANGE UP (ref 34.5–45)
HGB BLD-MCNC: 13.3 G/DL — SIGNIFICANT CHANGE UP (ref 11.5–15.5)
MAGNESIUM SERPL-MCNC: 2.1 MG/DL — SIGNIFICANT CHANGE UP (ref 1.6–2.6)
MCHC RBC-ENTMCNC: 35.1 PG — HIGH (ref 27–34)
MCHC RBC-ENTMCNC: 35.4 GM/DL — SIGNIFICANT CHANGE UP (ref 32–36)
MCV RBC AUTO: 99.1 FL — SIGNIFICANT CHANGE UP (ref 80–100)
PHOSPHATE SERPL-MCNC: 2.7 MG/DL — SIGNIFICANT CHANGE UP (ref 2.5–4.5)
PLATELET # BLD AUTO: 152 K/UL — SIGNIFICANT CHANGE UP (ref 150–400)
POTASSIUM SERPL-MCNC: 4.3 MMOL/L — SIGNIFICANT CHANGE UP (ref 3.5–5.3)
POTASSIUM SERPL-SCNC: 4.3 MMOL/L — SIGNIFICANT CHANGE UP (ref 3.5–5.3)
RBC # BLD: 3.8 M/UL — SIGNIFICANT CHANGE UP (ref 3.8–5.2)
RBC # FLD: 13.3 % — SIGNIFICANT CHANGE UP (ref 10.3–14.5)
RH IG SCN BLD-IMP: POSITIVE — SIGNIFICANT CHANGE UP
SODIUM SERPL-SCNC: 135 MMOL/L — SIGNIFICANT CHANGE UP (ref 135–145)
WBC # BLD: 6.9 K/UL — SIGNIFICANT CHANGE UP (ref 3.8–10.5)
WBC # FLD AUTO: 6.9 K/UL — SIGNIFICANT CHANGE UP (ref 3.8–10.5)

## 2017-02-15 PROCEDURE — 88302 TISSUE EXAM BY PATHOLOGIST: CPT | Mod: 26

## 2017-02-15 PROCEDURE — 99223 1ST HOSP IP/OBS HIGH 75: CPT

## 2017-02-15 RX ORDER — SODIUM CHLORIDE 9 MG/ML
1000 INJECTION, SOLUTION INTRAVENOUS
Qty: 0 | Refills: 0 | Status: DISCONTINUED | OUTPATIENT
Start: 2017-02-15 | End: 2017-02-16

## 2017-02-15 RX ORDER — PHENYLEPHRINE HYDROCHLORIDE 10 MG/ML
0.4 INJECTION INTRAVENOUS
Qty: 80 | Refills: 0 | Status: DISCONTINUED | OUTPATIENT
Start: 2017-02-15 | End: 2017-02-15

## 2017-02-15 RX ORDER — CHLORHEXIDINE GLUCONATE 213 G/1000ML
15 SOLUTION TOPICAL EVERY 8 HOURS
Qty: 0 | Refills: 0 | Status: DISCONTINUED | OUTPATIENT
Start: 2017-02-15 | End: 2017-02-16

## 2017-02-15 RX ORDER — HYDROMORPHONE HYDROCHLORIDE 2 MG/ML
0.2 INJECTION INTRAMUSCULAR; INTRAVENOUS; SUBCUTANEOUS EVERY 4 HOURS
Qty: 0 | Refills: 0 | Status: DISCONTINUED | OUTPATIENT
Start: 2017-02-15 | End: 2017-02-18

## 2017-02-15 RX ORDER — CIPROFLOXACIN LACTATE 400MG/40ML
400 VIAL (ML) INTRAVENOUS EVERY 12 HOURS
Qty: 0 | Refills: 0 | Status: COMPLETED | OUTPATIENT
Start: 2017-02-15 | End: 2017-02-16

## 2017-02-15 RX ORDER — BUDESONIDE, MICRONIZED 100 %
0.5 POWDER (GRAM) MISCELLANEOUS EVERY 12 HOURS
Qty: 0 | Refills: 0 | Status: DISCONTINUED | OUTPATIENT
Start: 2017-02-15 | End: 2017-02-19

## 2017-02-15 RX ORDER — ENOXAPARIN SODIUM 100 MG/ML
40 INJECTION SUBCUTANEOUS DAILY
Qty: 0 | Refills: 0 | Status: DISCONTINUED | OUTPATIENT
Start: 2017-02-15 | End: 2017-02-17

## 2017-02-15 RX ORDER — METRONIDAZOLE 500 MG
500 TABLET ORAL EVERY 8 HOURS
Qty: 0 | Refills: 0 | Status: COMPLETED | OUTPATIENT
Start: 2017-02-15 | End: 2017-02-16

## 2017-02-15 RX ORDER — PANTOPRAZOLE SODIUM 20 MG/1
40 TABLET, DELAYED RELEASE ORAL DAILY
Qty: 0 | Refills: 0 | Status: DISCONTINUED | OUTPATIENT
Start: 2017-02-15 | End: 2017-02-19

## 2017-02-15 RX ORDER — BRIMONIDINE TARTRATE 2 MG/MG
1 SOLUTION/ DROPS OPHTHALMIC
Qty: 0 | Refills: 0 | Status: DISCONTINUED | OUTPATIENT
Start: 2017-02-15 | End: 2017-03-02

## 2017-02-15 RX ORDER — IPRATROPIUM/ALBUTEROL SULFATE 18-103MCG
3 AEROSOL WITH ADAPTER (GRAM) INHALATION EVERY 6 HOURS
Qty: 0 | Refills: 0 | Status: DISCONTINUED | OUTPATIENT
Start: 2017-02-15 | End: 2017-02-19

## 2017-02-15 RX ADMIN — BRIMONIDINE TARTRATE 1 DROP(S): 2 SOLUTION/ DROPS OPHTHALMIC at 17:37

## 2017-02-15 RX ADMIN — ENOXAPARIN SODIUM 50 MILLIGRAM(S): 100 INJECTION SUBCUTANEOUS at 05:44

## 2017-02-15 RX ADMIN — BRIMONIDINE TARTRATE 1 DROP(S): 2 SOLUTION/ DROPS OPHTHALMIC at 05:44

## 2017-02-15 RX ADMIN — Medication 1 DROP(S): at 17:37

## 2017-02-15 RX ADMIN — Medication 0.5 MILLIGRAM(S): at 23:55

## 2017-02-15 RX ADMIN — SODIUM CHLORIDE 75 MILLILITER(S): 9 INJECTION, SOLUTION INTRAVENOUS at 09:16

## 2017-02-15 RX ADMIN — Medication 2.5 MILLIGRAM(S): at 05:43

## 2017-02-15 RX ADMIN — Medication 1 DROP(S): at 13:46

## 2017-02-15 RX ADMIN — Medication 3 MILLILITER(S): at 23:50

## 2017-02-15 RX ADMIN — Medication 4 MILLIGRAM(S): at 05:44

## 2017-02-15 RX ADMIN — SODIUM CHLORIDE 75 MILLILITER(S): 9 INJECTION, SOLUTION INTRAVENOUS at 06:04

## 2017-02-15 RX ADMIN — OCTREOTIDE ACETATE 2 MICROGRAM(S)/HR: 200 INJECTION, SOLUTION INTRAVENOUS; SUBCUTANEOUS at 09:16

## 2017-02-15 RX ADMIN — PANTOPRAZOLE SODIUM 40 MILLIGRAM(S): 20 TABLET, DELAYED RELEASE ORAL at 17:37

## 2017-02-15 RX ADMIN — Medication 2.5 MILLIGRAM(S): at 17:38

## 2017-02-15 RX ADMIN — Medication 2.5 MILLIGRAM(S): at 13:46

## 2017-02-15 RX ADMIN — Medication 1 DROP(S): at 05:44

## 2017-02-15 NOTE — DIETITIAN INITIAL EVALUATION ADULT. - OTHER INFO
Unknown wt history. In house pt admitted at 105.8 lbs (2/6), wt uptrended to 108.2lbs (2/13), weight from today 139.1lbs (2/15 ? accuracy). Pt with recurrent aspiration pneumonias, non compliant with pureed diet per H&P-no documentation of swallow eval. Pt c no complaints of N+V per 1:1, BM today, NGT output 2/14: 160 mL, 2/15: 30 mL thus far. In house pt has been NPO with NGT since 2/6 has pulled NGT several times, with 1:1 now.

## 2017-02-15 NOTE — PROVIDER CONTACT NOTE (OTHER) - ASSESSMENT
Orientation is unknown as pt refused to speak to staff. VSS. No other signs of bleeding noted. Dark green drainage was noted in collecting can prior to CT.

## 2017-02-15 NOTE — DIETITIAN INITIAL EVALUATION ADULT. - NS AS NUTRI INTERV COLLABORAT
Collaboration with other providers/Malnutrition sticker placed in chart, medical team informed of need to sign

## 2017-02-15 NOTE — DIETITIAN INITIAL EVALUATION ADULT. - ENERGY NEEDS
Pt seen for length of stay/inadequate diet in house. Pt with PMH including CVA, dementia, CAD S/P MI PPM/ AICD now admitted with N+V and diffuse abdominal pain, found to have closed loop small bowel obstruction, pending family decision regarding surgery vs comfort measures-had refused previously, per NP pt is not a candidate for TPN-surgical team and GI following.     Ht:5'1" , Wt:105.8 lbs, BMI:19.8 kg/m2, IBW:105 lbs +/- 10%, %IBW: 100%  2+ gab ankle/foot edema, Skin free of pressure ulcer

## 2017-02-15 NOTE — PROVIDER CONTACT NOTE (OTHER) - ACTION/TREATMENT ORDERED:
MD Arora recommends to reattach NGT to LWS, continue w/Protonix IVP, & irrigate NGT w/NS.MD Spoke with JAYSON Flowers in regards to his recommendations.NGT was irrigated w 50ccNS. Well tolerated.

## 2017-02-15 NOTE — DIETITIAN INITIAL EVALUATION ADULT. - NS AS NUTRI INTERV MEALS SNACK
1. Diet deferred to medical team-currently NPO c NGT, per NP pt not a candidate for TPN, 2. Monitor GOC discussions-Per NP no aggressive nutrition intervention likely appropriate at this time, 3. RD to remain available for further intervention as needed

## 2017-02-16 LAB
ALBUMIN SERPL ELPH-MCNC: 2.3 G/DL — LOW (ref 3.3–5)
ALP SERPL-CCNC: 41 U/L — SIGNIFICANT CHANGE UP (ref 40–120)
ALT FLD-CCNC: 19 U/L RC — SIGNIFICANT CHANGE UP (ref 10–45)
ANION GAP SERPL CALC-SCNC: 13 MMOL/L — SIGNIFICANT CHANGE UP (ref 5–17)
ANION GAP SERPL CALC-SCNC: 21 MMOL/L — HIGH (ref 5–17)
APTT BLD: 29.1 SEC — SIGNIFICANT CHANGE UP (ref 27.5–37.4)
AST SERPL-CCNC: 26 U/L — SIGNIFICANT CHANGE UP (ref 10–40)
BILIRUB SERPL-MCNC: 2.1 MG/DL — HIGH (ref 0.2–1.2)
BUN SERPL-MCNC: 16 MG/DL — SIGNIFICANT CHANGE UP (ref 7–23)
BUN SERPL-MCNC: 17 MG/DL — SIGNIFICANT CHANGE UP (ref 7–23)
CALCIUM SERPL-MCNC: 6.6 MG/DL — LOW (ref 8.4–10.5)
CALCIUM SERPL-MCNC: 7.2 MG/DL — LOW (ref 8.4–10.5)
CHLORIDE SERPL-SCNC: 103 MMOL/L — SIGNIFICANT CHANGE UP (ref 96–108)
CHLORIDE SERPL-SCNC: 107 MMOL/L — SIGNIFICANT CHANGE UP (ref 96–108)
CO2 SERPL-SCNC: 16 MMOL/L — LOW (ref 22–31)
CO2 SERPL-SCNC: 18 MMOL/L — LOW (ref 22–31)
CREAT SERPL-MCNC: 0.31 MG/DL — LOW (ref 0.5–1.3)
CREAT SERPL-MCNC: 0.39 MG/DL — LOW (ref 0.5–1.3)
GAS PNL BLDA: SIGNIFICANT CHANGE UP
GLUCOSE SERPL-MCNC: 124 MG/DL — HIGH (ref 70–99)
GLUCOSE SERPL-MCNC: 151 MG/DL — HIGH (ref 70–99)
HCT VFR BLD CALC: 35.5 % — SIGNIFICANT CHANGE UP (ref 34.5–45)
HCT VFR BLD CALC: 40 % — SIGNIFICANT CHANGE UP (ref 34.5–45)
HGB BLD-MCNC: 13 G/DL — SIGNIFICANT CHANGE UP (ref 11.5–15.5)
HGB BLD-MCNC: 14.3 G/DL — SIGNIFICANT CHANGE UP (ref 11.5–15.5)
INR BLD: 1.15 RATIO — SIGNIFICANT CHANGE UP (ref 0.88–1.16)
MAGNESIUM SERPL-MCNC: 2.2 MG/DL — SIGNIFICANT CHANGE UP (ref 1.6–2.6)
MCHC RBC-ENTMCNC: 35 PG — HIGH (ref 27–34)
MCHC RBC-ENTMCNC: 35.4 PG — HIGH (ref 27–34)
MCHC RBC-ENTMCNC: 35.7 GM/DL — SIGNIFICANT CHANGE UP (ref 32–36)
MCHC RBC-ENTMCNC: 36.5 GM/DL — HIGH (ref 32–36)
MCV RBC AUTO: 97 FL — SIGNIFICANT CHANGE UP (ref 80–100)
MCV RBC AUTO: 98.1 FL — SIGNIFICANT CHANGE UP (ref 80–100)
PHOSPHATE SERPL-MCNC: 3.1 MG/DL — SIGNIFICANT CHANGE UP (ref 2.5–4.5)
PLATELET # BLD AUTO: 163 K/UL — SIGNIFICANT CHANGE UP (ref 150–400)
PLATELET # BLD AUTO: 245 K/UL — SIGNIFICANT CHANGE UP (ref 150–400)
POTASSIUM SERPL-MCNC: 3.2 MMOL/L — LOW (ref 3.5–5.3)
POTASSIUM SERPL-MCNC: 4.5 MMOL/L — SIGNIFICANT CHANGE UP (ref 3.5–5.3)
POTASSIUM SERPL-SCNC: 3.2 MMOL/L — LOW (ref 3.5–5.3)
POTASSIUM SERPL-SCNC: 4.5 MMOL/L — SIGNIFICANT CHANGE UP (ref 3.5–5.3)
PROT SERPL-MCNC: 4.2 G/DL — LOW (ref 6–8.3)
PROTHROM AB SERPL-ACNC: 12.4 SEC — SIGNIFICANT CHANGE UP (ref 10–13.1)
RBC # BLD: 3.66 M/UL — LOW (ref 3.8–5.2)
RBC # BLD: 4.08 M/UL — SIGNIFICANT CHANGE UP (ref 3.8–5.2)
RBC # FLD: 12.6 % — SIGNIFICANT CHANGE UP (ref 10.3–14.5)
RBC # FLD: 13.3 % — SIGNIFICANT CHANGE UP (ref 10.3–14.5)
SODIUM SERPL-SCNC: 138 MMOL/L — SIGNIFICANT CHANGE UP (ref 135–145)
SODIUM SERPL-SCNC: 140 MMOL/L — SIGNIFICANT CHANGE UP (ref 135–145)
WBC # BLD: 11.4 K/UL — HIGH (ref 3.8–10.5)
WBC # BLD: 12 K/UL — HIGH (ref 3.8–10.5)
WBC # FLD AUTO: 11.4 K/UL — HIGH (ref 3.8–10.5)
WBC # FLD AUTO: 12 K/UL — HIGH (ref 3.8–10.5)

## 2017-02-16 PROCEDURE — 99233 SBSQ HOSP IP/OBS HIGH 50: CPT | Mod: GC

## 2017-02-16 PROCEDURE — 71010: CPT | Mod: 26

## 2017-02-16 PROCEDURE — 99291 CRITICAL CARE FIRST HOUR: CPT

## 2017-02-16 RX ORDER — POTASSIUM CHLORIDE 20 MEQ
20 PACKET (EA) ORAL
Qty: 0 | Refills: 0 | Status: COMPLETED | OUTPATIENT
Start: 2017-02-16 | End: 2017-02-16

## 2017-02-16 RX ORDER — SODIUM CHLORIDE 9 MG/ML
1000 INJECTION, SOLUTION INTRAVENOUS
Qty: 0 | Refills: 0 | Status: DISCONTINUED | OUTPATIENT
Start: 2017-02-16 | End: 2017-02-17

## 2017-02-16 RX ORDER — BRIMONIDINE TARTRATE 2 MG/MG
1 SOLUTION/ DROPS OPHTHALMIC
Qty: 0 | Refills: 0 | Status: DISCONTINUED | OUTPATIENT
Start: 2017-02-16 | End: 2017-02-16

## 2017-02-16 RX ORDER — CALCIUM GLUCONATE 100 MG/ML
2 VIAL (ML) INTRAVENOUS ONCE
Qty: 0 | Refills: 0 | Status: COMPLETED | OUTPATIENT
Start: 2017-02-16 | End: 2017-02-16

## 2017-02-16 RX ORDER — FUROSEMIDE 40 MG
20 TABLET ORAL ONCE
Qty: 0 | Refills: 0 | Status: DISCONTINUED | OUTPATIENT
Start: 2017-02-16 | End: 2017-02-16

## 2017-02-16 RX ADMIN — Medication 0.5 MILLIGRAM(S): at 17:31

## 2017-02-16 RX ADMIN — CHLORHEXIDINE GLUCONATE 15 MILLILITER(S): 213 SOLUTION TOPICAL at 06:54

## 2017-02-16 RX ADMIN — Medication 100 MILLIGRAM(S): at 22:56

## 2017-02-16 RX ADMIN — Medication 50 MILLIEQUIVALENT(S): at 03:27

## 2017-02-16 RX ADMIN — ENOXAPARIN SODIUM 40 MILLIGRAM(S): 100 INJECTION SUBCUTANEOUS at 12:15

## 2017-02-16 RX ADMIN — Medication 0.5 MILLIGRAM(S): at 06:09

## 2017-02-16 RX ADMIN — Medication 200 GRAM(S): at 04:29

## 2017-02-16 RX ADMIN — Medication 50 MILLIEQUIVALENT(S): at 04:29

## 2017-02-16 RX ADMIN — BRIMONIDINE TARTRATE 1 DROP(S): 2 SOLUTION/ DROPS OPHTHALMIC at 18:00

## 2017-02-16 RX ADMIN — Medication 50 MILLIEQUIVALENT(S): at 06:55

## 2017-02-16 RX ADMIN — Medication 100 MILLIGRAM(S): at 14:00

## 2017-02-16 RX ADMIN — Medication 3 MILLILITER(S): at 11:11

## 2017-02-16 RX ADMIN — Medication 3 MILLILITER(S): at 06:09

## 2017-02-16 RX ADMIN — PANTOPRAZOLE SODIUM 40 MILLIGRAM(S): 20 TABLET, DELAYED RELEASE ORAL at 12:14

## 2017-02-16 RX ADMIN — HYDROMORPHONE HYDROCHLORIDE 0.2 MILLIGRAM(S): 2 INJECTION INTRAMUSCULAR; INTRAVENOUS; SUBCUTANEOUS at 04:29

## 2017-02-16 RX ADMIN — Medication 3 MILLILITER(S): at 17:26

## 2017-02-16 RX ADMIN — SODIUM CHLORIDE 50 MILLILITER(S): 9 INJECTION, SOLUTION INTRAVENOUS at 12:44

## 2017-02-16 RX ADMIN — Medication 3 MILLILITER(S): at 23:37

## 2017-02-16 RX ADMIN — Medication 200 MILLIGRAM(S): at 06:54

## 2017-02-16 RX ADMIN — Medication 200 MILLIGRAM(S): at 18:00

## 2017-02-16 RX ADMIN — Medication 100 MILLIGRAM(S): at 06:56

## 2017-02-16 RX ADMIN — BRIMONIDINE TARTRATE 1 DROP(S): 2 SOLUTION/ DROPS OPHTHALMIC at 06:55

## 2017-02-16 RX ADMIN — SODIUM CHLORIDE 50 MILLILITER(S): 9 INJECTION, SOLUTION INTRAVENOUS at 11:45

## 2017-02-16 NOTE — BRIEF OPERATIVE NOTE - OPERATION/FINDINGS
The abdomen was entered from a supraumbilical incision without difficulty; however, diagnostic laparoscopy revealed extensive adhesions to the anterior abdominal wall with no free area to place a second trocar. It was decided to proceed via a laparotomy incision. Moderate serosanguinous ascites was suctioned from the abdomen, and minimal lysis of adhesions was performed. The bowel appeared viable but mildly dusky. The ligament of Treitz was identified and the small bowel was examined. A small band was encountered in the distal jejunum/prox ileum, and after lysis, the bowel appeared to become less dusky. We waited a period of several minutes, after which time, the bowel was visibly improved. Pulses were noted in the mesentery of the small bowel. The abdomen was then closed with continuous PDS suture, and the skin was stapled.

## 2017-02-17 LAB
ANION GAP SERPL CALC-SCNC: 15 MMOL/L — SIGNIFICANT CHANGE UP (ref 5–17)
ANION GAP SERPL CALC-SCNC: 18 MMOL/L — HIGH (ref 5–17)
BUN SERPL-MCNC: 12 MG/DL — SIGNIFICANT CHANGE UP (ref 7–23)
BUN SERPL-MCNC: 13 MG/DL — SIGNIFICANT CHANGE UP (ref 7–23)
CA-I BLD-SCNC: 1.04 MMOL/L — LOW (ref 1.05–1.34)
CALCIUM SERPL-MCNC: 7.1 MG/DL — LOW (ref 8.4–10.5)
CALCIUM SERPL-MCNC: 7.1 MG/DL — LOW (ref 8.4–10.5)
CHLORIDE SERPL-SCNC: 104 MMOL/L — SIGNIFICANT CHANGE UP (ref 96–108)
CHLORIDE SERPL-SCNC: 99 MMOL/L — SIGNIFICANT CHANGE UP (ref 96–108)
CO2 SERPL-SCNC: 21 MMOL/L — LOW (ref 22–31)
CO2 SERPL-SCNC: 21 MMOL/L — LOW (ref 22–31)
CREAT SERPL-MCNC: 0.4 MG/DL — LOW (ref 0.5–1.3)
CREAT SERPL-MCNC: 0.43 MG/DL — LOW (ref 0.5–1.3)
GLUCOSE SERPL-MCNC: 115 MG/DL — HIGH (ref 70–99)
GLUCOSE SERPL-MCNC: 144 MG/DL — HIGH (ref 70–99)
HCT VFR BLD CALC: 34.2 % — LOW (ref 34.5–45)
HGB BLD-MCNC: 12 G/DL — SIGNIFICANT CHANGE UP (ref 11.5–15.5)
MAGNESIUM SERPL-MCNC: 1.7 MG/DL — SIGNIFICANT CHANGE UP (ref 1.6–2.6)
MAGNESIUM SERPL-MCNC: 2.2 MG/DL — SIGNIFICANT CHANGE UP (ref 1.6–2.6)
MCHC RBC-ENTMCNC: 34.3 PG — HIGH (ref 27–34)
MCHC RBC-ENTMCNC: 35 GM/DL — SIGNIFICANT CHANGE UP (ref 32–36)
MCV RBC AUTO: 98.2 FL — SIGNIFICANT CHANGE UP (ref 80–100)
PHOSPHATE SERPL-MCNC: 1.7 MG/DL — LOW (ref 2.5–4.5)
PHOSPHATE SERPL-MCNC: 3.6 MG/DL — SIGNIFICANT CHANGE UP (ref 2.5–4.5)
PLATELET # BLD AUTO: 156 K/UL — SIGNIFICANT CHANGE UP (ref 150–400)
POTASSIUM SERPL-MCNC: 3.9 MMOL/L — SIGNIFICANT CHANGE UP (ref 3.5–5.3)
POTASSIUM SERPL-MCNC: 4.1 MMOL/L — SIGNIFICANT CHANGE UP (ref 3.5–5.3)
POTASSIUM SERPL-SCNC: 3.9 MMOL/L — SIGNIFICANT CHANGE UP (ref 3.5–5.3)
POTASSIUM SERPL-SCNC: 4.1 MMOL/L — SIGNIFICANT CHANGE UP (ref 3.5–5.3)
RBC # BLD: 3.49 M/UL — LOW (ref 3.8–5.2)
RBC # FLD: 12.9 % — SIGNIFICANT CHANGE UP (ref 10.3–14.5)
SODIUM SERPL-SCNC: 138 MMOL/L — SIGNIFICANT CHANGE UP (ref 135–145)
SODIUM SERPL-SCNC: 140 MMOL/L — SIGNIFICANT CHANGE UP (ref 135–145)
WBC # BLD: 13 K/UL — HIGH (ref 3.8–10.5)
WBC # FLD AUTO: 13 K/UL — HIGH (ref 3.8–10.5)

## 2017-02-17 PROCEDURE — 71010: CPT | Mod: 26

## 2017-02-17 PROCEDURE — 99232 SBSQ HOSP IP/OBS MODERATE 35: CPT

## 2017-02-17 RX ORDER — FUROSEMIDE 40 MG
10 TABLET ORAL ONCE
Qty: 0 | Refills: 0 | Status: COMPLETED | OUTPATIENT
Start: 2017-02-17 | End: 2017-02-17

## 2017-02-17 RX ORDER — MAGNESIUM SULFATE 500 MG/ML
2 VIAL (ML) INJECTION ONCE
Qty: 0 | Refills: 0 | Status: COMPLETED | OUTPATIENT
Start: 2017-02-17 | End: 2017-02-17

## 2017-02-17 RX ORDER — ENOXAPARIN SODIUM 100 MG/ML
50 INJECTION SUBCUTANEOUS EVERY 12 HOURS
Qty: 0 | Refills: 0 | Status: DISCONTINUED | OUTPATIENT
Start: 2017-02-17 | End: 2017-02-20

## 2017-02-17 RX ORDER — DEXTROSE MONOHYDRATE, SODIUM CHLORIDE, AND POTASSIUM CHLORIDE 50; .745; 4.5 G/1000ML; G/1000ML; G/1000ML
1000 INJECTION, SOLUTION INTRAVENOUS
Qty: 0 | Refills: 0 | Status: DISCONTINUED | OUTPATIENT
Start: 2017-02-17 | End: 2017-02-17

## 2017-02-17 RX ORDER — POTASSIUM PHOSPHATE, MONOBASIC POTASSIUM PHOSPHATE, DIBASIC 236; 224 MG/ML; MG/ML
15 INJECTION, SOLUTION INTRAVENOUS ONCE
Qty: 0 | Refills: 0 | Status: COMPLETED | OUTPATIENT
Start: 2017-02-17 | End: 2017-02-17

## 2017-02-17 RX ORDER — FUROSEMIDE 40 MG
20 TABLET ORAL ONCE
Qty: 0 | Refills: 0 | Status: DISCONTINUED | OUTPATIENT
Start: 2017-02-17 | End: 2017-02-17

## 2017-02-17 RX ADMIN — Medication 50 GRAM(S): at 08:24

## 2017-02-17 RX ADMIN — Medication 3 MILLILITER(S): at 17:49

## 2017-02-17 RX ADMIN — Medication 10 MILLIGRAM(S): at 18:02

## 2017-02-17 RX ADMIN — HYDROMORPHONE HYDROCHLORIDE 0.2 MILLIGRAM(S): 2 INJECTION INTRAMUSCULAR; INTRAVENOUS; SUBCUTANEOUS at 04:59

## 2017-02-17 RX ADMIN — Medication 255 MILLIMOLE(S): at 11:54

## 2017-02-17 RX ADMIN — Medication 3 MILLILITER(S): at 05:32

## 2017-02-17 RX ADMIN — Medication 10 MILLIGRAM(S): at 09:50

## 2017-02-17 RX ADMIN — BRIMONIDINE TARTRATE 1 DROP(S): 2 SOLUTION/ DROPS OPHTHALMIC at 06:14

## 2017-02-17 RX ADMIN — ENOXAPARIN SODIUM 40 MILLIGRAM(S): 100 INJECTION SUBCUTANEOUS at 11:54

## 2017-02-17 RX ADMIN — Medication 0.5 MILLIGRAM(S): at 17:49

## 2017-02-17 RX ADMIN — Medication 0.5 MILLIGRAM(S): at 05:32

## 2017-02-17 RX ADMIN — Medication 3 MILLILITER(S): at 11:14

## 2017-02-17 RX ADMIN — PANTOPRAZOLE SODIUM 40 MILLIGRAM(S): 20 TABLET, DELAYED RELEASE ORAL at 11:54

## 2017-02-17 RX ADMIN — POTASSIUM PHOSPHATE, MONOBASIC POTASSIUM PHOSPHATE, DIBASIC 62.5 MILLIMOLE(S): 236; 224 INJECTION, SOLUTION INTRAVENOUS at 08:24

## 2017-02-17 RX ADMIN — DEXTROSE MONOHYDRATE, SODIUM CHLORIDE, AND POTASSIUM CHLORIDE 50 MILLILITER(S): 50; .745; 4.5 INJECTION, SOLUTION INTRAVENOUS at 08:31

## 2017-02-17 RX ADMIN — BRIMONIDINE TARTRATE 1 DROP(S): 2 SOLUTION/ DROPS OPHTHALMIC at 18:02

## 2017-02-17 RX ADMIN — Medication 3 MILLILITER(S): at 23:43

## 2017-02-17 NOTE — PHYSICAL THERAPY INITIAL EVALUATION ADULT - ACTIVE RANGE OF MOTION EXAMINATION, REHAB EVAL
WFL all 4 extremities
bilateral upper extremity Active ROM was WFL (within functional limits)/bilateral  lower extremity Active ROM was WFL (within functional limits)

## 2017-02-17 NOTE — PHYSICAL THERAPY INITIAL EVALUATION ADULT - PERTINENT HX OF CURRENT PROBLEM, REHAB EVAL
Pt is a 89yo F nonverbal a/ox2 hx of dementia, afib, CVA and abdominal surgery now presents with nausea, vomiting and increased agitation.  Pt found to have a SBO secondary to adhesions. pt is not a surgical candidate.  as per chart pt pulled out her NGT x3. on 2/8 pt transferred to telemetry secondary to afib, now converted back to NSR.
Pt is a 89 y/o female nonverbal a/ox2 hx of dementia, afib, CVA and abdominal surgery admitted to [facility] On 2/6/17 presents with nausea, vomiting and increased agitation.  Pt found to have a SBO secondary to adhesions. Pt is now s/p ex-lap to open JOSE J on 2/16/17.

## 2017-02-17 NOTE — PHYSICAL THERAPY INITIAL EVALUATION ADULT - CRITERIA FOR SKILLED THERAPEUTIC INTERVENTIONS
functional limitations in following categories/impairments found
risk reduction/prevention/rehab potential/functional limitations in following categories/impairments found

## 2017-02-17 NOTE — PHYSICAL THERAPY INITIAL EVALUATION ADULT - ADDITIONAL COMMENTS
pt unable to provide prior level of function or social hx.  as per chart pt is a resident at the [facility], independent living.

## 2017-02-17 NOTE — PHYSICAL THERAPY INITIAL EVALUATION ADULT - PLANNED THERAPY INTERVENTIONS, PT EVAL
transfer training/gait training/bed mobility training
bed mobility training/ROM/balance training/transfer training/strengthening/gait training

## 2017-02-17 NOTE — PHYSICAL THERAPY INITIAL EVALUATION ADULT - IMPAIRED TRANSFERS: SIT/STAND, REHAB EVAL
impaired balance/decreased strength
decreased strength/impaired balance/impaired postural control/narrow base of support

## 2017-02-17 NOTE — PHYSICAL THERAPY INITIAL EVALUATION ADULT - IMPAIRMENTS CONTRIBUTING IMPAIRED BED MOBILITY, REHAB EVAL
decreased strength
impaired balance/cognition/impaired postural control/impaired coordination/decreased strength

## 2017-02-17 NOTE — PHYSICAL THERAPY INITIAL EVALUATION ADULT - MANUAL MUSCLE TESTING RESULTS, REHAB EVAL
grossly assessed during function to be 3+/5 t/o
grossly assessed due to/pt not fully following commands. pt moving BUE/BLE ad debra in bed

## 2017-02-17 NOTE — PHYSICAL THERAPY INITIAL EVALUATION ADULT - IMPAIRMENTS FOUND, PT EVAL
gait, locomotion, and balance/aerobic capacity/endurance
gait, locomotion, and balance/aerobic capacity/endurance/muscle strength/posture/ROM

## 2017-02-18 LAB
ANION GAP SERPL CALC-SCNC: 14 MMOL/L — SIGNIFICANT CHANGE UP (ref 5–17)
BUN SERPL-MCNC: 9 MG/DL — SIGNIFICANT CHANGE UP (ref 7–23)
CA-I BLD-SCNC: 0.97 MMOL/L — LOW (ref 1.05–1.34)
CALCIUM SERPL-MCNC: 6.6 MG/DL — LOW (ref 8.4–10.5)
CHLORIDE SERPL-SCNC: 99 MMOL/L — SIGNIFICANT CHANGE UP (ref 96–108)
CO2 SERPL-SCNC: 24 MMOL/L — SIGNIFICANT CHANGE UP (ref 22–31)
CREAT SERPL-MCNC: 0.42 MG/DL — LOW (ref 0.5–1.3)
GLUCOSE SERPL-MCNC: 114 MG/DL — HIGH (ref 70–99)
HCT VFR BLD CALC: 34 % — LOW (ref 34.5–45)
HGB BLD-MCNC: 12.2 G/DL — SIGNIFICANT CHANGE UP (ref 11.5–15.5)
MAGNESIUM SERPL-MCNC: 1.9 MG/DL — SIGNIFICANT CHANGE UP (ref 1.6–2.6)
MCHC RBC-ENTMCNC: 35 PG — HIGH (ref 27–34)
MCHC RBC-ENTMCNC: 35.8 GM/DL — SIGNIFICANT CHANGE UP (ref 32–36)
MCV RBC AUTO: 97.6 FL — SIGNIFICANT CHANGE UP (ref 80–100)
PHOSPHATE SERPL-MCNC: 2.5 MG/DL — SIGNIFICANT CHANGE UP (ref 2.5–4.5)
PLATELET # BLD AUTO: 153 K/UL — SIGNIFICANT CHANGE UP (ref 150–400)
POTASSIUM SERPL-MCNC: 3.4 MMOL/L — LOW (ref 3.5–5.3)
POTASSIUM SERPL-SCNC: 3.4 MMOL/L — LOW (ref 3.5–5.3)
RBC # BLD: 3.49 M/UL — LOW (ref 3.8–5.2)
RBC # FLD: 13.5 % — SIGNIFICANT CHANGE UP (ref 10.3–14.5)
SODIUM SERPL-SCNC: 137 MMOL/L — SIGNIFICANT CHANGE UP (ref 135–145)
WBC # BLD: 12.6 K/UL — HIGH (ref 3.8–10.5)
WBC # FLD AUTO: 12.6 K/UL — HIGH (ref 3.8–10.5)

## 2017-02-18 PROCEDURE — 99232 SBSQ HOSP IP/OBS MODERATE 35: CPT

## 2017-02-18 PROCEDURE — 71010: CPT | Mod: 26

## 2017-02-18 RX ORDER — POTASSIUM CHLORIDE 20 MEQ
40 PACKET (EA) ORAL ONCE
Qty: 0 | Refills: 0 | Status: COMPLETED | OUTPATIENT
Start: 2017-02-18 | End: 2017-02-18

## 2017-02-18 RX ORDER — FUROSEMIDE 40 MG
10 TABLET ORAL ONCE
Qty: 0 | Refills: 0 | Status: COMPLETED | OUTPATIENT
Start: 2017-02-18 | End: 2017-02-18

## 2017-02-18 RX ORDER — POTASSIUM PHOSPHATE, MONOBASIC POTASSIUM PHOSPHATE, DIBASIC 236; 224 MG/ML; MG/ML
15 INJECTION, SOLUTION INTRAVENOUS ONCE
Qty: 0 | Refills: 0 | Status: COMPLETED | OUTPATIENT
Start: 2017-02-18 | End: 2017-02-18

## 2017-02-18 RX ORDER — CALCIUM GLUCONATE 100 MG/ML
1 VIAL (ML) INTRAVENOUS ONCE
Qty: 0 | Refills: 0 | Status: COMPLETED | OUTPATIENT
Start: 2017-02-18 | End: 2017-02-18

## 2017-02-18 RX ORDER — ACETAMINOPHEN 500 MG
1000 TABLET ORAL ONCE
Qty: 0 | Refills: 0 | Status: COMPLETED | OUTPATIENT
Start: 2017-02-18 | End: 2017-02-18

## 2017-02-18 RX ORDER — POTASSIUM CHLORIDE 20 MEQ
40 PACKET (EA) ORAL ONCE
Qty: 0 | Refills: 0 | Status: DISCONTINUED | OUTPATIENT
Start: 2017-02-18 | End: 2017-02-18

## 2017-02-18 RX ORDER — POTASSIUM CHLORIDE 20 MEQ
10 PACKET (EA) ORAL
Qty: 0 | Refills: 0 | Status: COMPLETED | OUTPATIENT
Start: 2017-02-18 | End: 2017-02-18

## 2017-02-18 RX ADMIN — Medication 100 MILLIEQUIVALENT(S): at 05:00

## 2017-02-18 RX ADMIN — Medication 3 MILLILITER(S): at 11:33

## 2017-02-18 RX ADMIN — Medication 3 MILLILITER(S): at 17:27

## 2017-02-18 RX ADMIN — Medication 0.5 MILLIGRAM(S): at 17:27

## 2017-02-18 RX ADMIN — Medication 0.5 MILLIGRAM(S): at 05:22

## 2017-02-18 RX ADMIN — Medication 100 MILLIEQUIVALENT(S): at 04:18

## 2017-02-18 RX ADMIN — PANTOPRAZOLE SODIUM 40 MILLIGRAM(S): 20 TABLET, DELAYED RELEASE ORAL at 12:53

## 2017-02-18 RX ADMIN — POTASSIUM PHOSPHATE, MONOBASIC POTASSIUM PHOSPHATE, DIBASIC 62.5 MILLIMOLE(S): 236; 224 INJECTION, SOLUTION INTRAVENOUS at 06:06

## 2017-02-18 RX ADMIN — Medication 1000 MILLIGRAM(S): at 17:30

## 2017-02-18 RX ADMIN — BRIMONIDINE TARTRATE 1 DROP(S): 2 SOLUTION/ DROPS OPHTHALMIC at 18:38

## 2017-02-18 RX ADMIN — BRIMONIDINE TARTRATE 1 DROP(S): 2 SOLUTION/ DROPS OPHTHALMIC at 05:00

## 2017-02-18 RX ADMIN — Medication 3 MILLILITER(S): at 23:47

## 2017-02-18 RX ADMIN — ENOXAPARIN SODIUM 50 MILLIGRAM(S): 100 INJECTION SUBCUTANEOUS at 18:31

## 2017-02-18 RX ADMIN — Medication 200 GRAM(S): at 06:33

## 2017-02-18 RX ADMIN — ENOXAPARIN SODIUM 50 MILLIGRAM(S): 100 INJECTION SUBCUTANEOUS at 05:00

## 2017-02-18 RX ADMIN — Medication 10 MILLIGRAM(S): at 18:31

## 2017-02-18 RX ADMIN — Medication 3 MILLILITER(S): at 05:21

## 2017-02-18 RX ADMIN — Medication 400 MILLIGRAM(S): at 17:15

## 2017-02-18 RX ADMIN — Medication 10 MILLIGRAM(S): at 08:04

## 2017-02-18 RX ADMIN — Medication 100 MILLIEQUIVALENT(S): at 03:30

## 2017-02-18 RX ADMIN — Medication 40 MILLIEQUIVALENT(S): at 17:17

## 2017-02-19 LAB
ANION GAP SERPL CALC-SCNC: 12 MMOL/L — SIGNIFICANT CHANGE UP (ref 5–17)
APPEARANCE UR: ABNORMAL
BILIRUB UR-MCNC: NEGATIVE — SIGNIFICANT CHANGE UP
BUN SERPL-MCNC: 7 MG/DL — SIGNIFICANT CHANGE UP (ref 7–23)
CA-I BLD-SCNC: 1.01 MMOL/L — LOW (ref 1.05–1.34)
CALCIUM SERPL-MCNC: 6.9 MG/DL — LOW (ref 8.4–10.5)
CHLORIDE SERPL-SCNC: 98 MMOL/L — SIGNIFICANT CHANGE UP (ref 96–108)
CO2 SERPL-SCNC: 22 MMOL/L — SIGNIFICANT CHANGE UP (ref 22–31)
COLOR SPEC: YELLOW — SIGNIFICANT CHANGE UP
CREAT SERPL-MCNC: 0.36 MG/DL — LOW (ref 0.5–1.3)
DIFF PNL FLD: ABNORMAL
GLUCOSE SERPL-MCNC: 151 MG/DL — HIGH (ref 70–99)
GLUCOSE UR QL: NEGATIVE — SIGNIFICANT CHANGE UP
HCT VFR BLD CALC: 32.9 % — LOW (ref 34.5–45)
HGB BLD-MCNC: 11.4 G/DL — LOW (ref 11.5–15.5)
KETONES UR-MCNC: ABNORMAL
LEUKOCYTE ESTERASE UR-ACNC: ABNORMAL
MAGNESIUM SERPL-MCNC: 1.8 MG/DL — SIGNIFICANT CHANGE UP (ref 1.6–2.6)
MCHC RBC-ENTMCNC: 33.9 PG — SIGNIFICANT CHANGE UP (ref 27–34)
MCHC RBC-ENTMCNC: 34.6 GM/DL — SIGNIFICANT CHANGE UP (ref 32–36)
MCV RBC AUTO: 97.9 FL — SIGNIFICANT CHANGE UP (ref 80–100)
NITRITE UR-MCNC: POSITIVE
PH UR: 7.5 — SIGNIFICANT CHANGE UP (ref 4.8–8)
PHOSPHATE SERPL-MCNC: 2.4 MG/DL — LOW (ref 2.5–4.5)
PLATELET # BLD AUTO: 141 K/UL — LOW (ref 150–400)
POTASSIUM SERPL-MCNC: 4.1 MMOL/L — SIGNIFICANT CHANGE UP (ref 3.5–5.3)
POTASSIUM SERPL-SCNC: 4.1 MMOL/L — SIGNIFICANT CHANGE UP (ref 3.5–5.3)
PROT UR-MCNC: SIGNIFICANT CHANGE UP
RBC # BLD: 3.36 M/UL — LOW (ref 3.8–5.2)
RBC # FLD: 13.8 % — SIGNIFICANT CHANGE UP (ref 10.3–14.5)
SODIUM SERPL-SCNC: 132 MMOL/L — LOW (ref 135–145)
SP GR SPEC: 1.01 — SIGNIFICANT CHANGE UP (ref 1.01–1.02)
UROBILINOGEN FLD QL: NEGATIVE — SIGNIFICANT CHANGE UP
WBC # BLD: 7.9 K/UL — SIGNIFICANT CHANGE UP (ref 3.8–10.5)
WBC # FLD AUTO: 7.9 K/UL — SIGNIFICANT CHANGE UP (ref 3.8–10.5)

## 2017-02-19 PROCEDURE — 99232 SBSQ HOSP IP/OBS MODERATE 35: CPT

## 2017-02-19 PROCEDURE — 93306 TTE W/DOPPLER COMPLETE: CPT | Mod: 26

## 2017-02-19 RX ORDER — PANTOPRAZOLE SODIUM 20 MG/1
40 TABLET, DELAYED RELEASE ORAL
Qty: 0 | Refills: 0 | Status: DISCONTINUED | OUTPATIENT
Start: 2017-02-19 | End: 2017-02-19

## 2017-02-19 RX ORDER — TIMOLOL 0.5 %
1 DROPS OPHTHALMIC (EYE) EVERY 12 HOURS
Qty: 0 | Refills: 0 | Status: DISCONTINUED | OUTPATIENT
Start: 2017-02-19 | End: 2017-03-02

## 2017-02-19 RX ORDER — CIPROFLOXACIN LACTATE 400MG/40ML
VIAL (ML) INTRAVENOUS
Qty: 0 | Refills: 0 | Status: DISCONTINUED | OUTPATIENT
Start: 2017-02-19 | End: 2017-02-19

## 2017-02-19 RX ORDER — CIPROFLOXACIN LACTATE 400MG/40ML
400 VIAL (ML) INTRAVENOUS EVERY 12 HOURS
Qty: 0 | Refills: 0 | Status: DISCONTINUED | OUTPATIENT
Start: 2017-02-19 | End: 2017-02-19

## 2017-02-19 RX ORDER — FUROSEMIDE 40 MG
10 TABLET ORAL EVERY 12 HOURS
Qty: 0 | Refills: 0 | Status: DISCONTINUED | OUTPATIENT
Start: 2017-02-19 | End: 2017-02-19

## 2017-02-19 RX ORDER — ATORVASTATIN CALCIUM 80 MG/1
40 TABLET, FILM COATED ORAL AT BEDTIME
Qty: 0 | Refills: 0 | Status: DISCONTINUED | OUTPATIENT
Start: 2017-02-19 | End: 2017-03-02

## 2017-02-19 RX ORDER — MAGNESIUM SULFATE 500 MG/ML
2 VIAL (ML) INJECTION ONCE
Qty: 0 | Refills: 0 | Status: COMPLETED | OUTPATIENT
Start: 2017-02-19 | End: 2017-02-19

## 2017-02-19 RX ORDER — CALCIUM GLUCONATE 100 MG/ML
1 VIAL (ML) INTRAVENOUS ONCE
Qty: 0 | Refills: 0 | Status: COMPLETED | OUTPATIENT
Start: 2017-02-19 | End: 2017-02-19

## 2017-02-19 RX ORDER — FUROSEMIDE 40 MG
10 TABLET ORAL EVERY 12 HOURS
Qty: 0 | Refills: 0 | Status: COMPLETED | OUTPATIENT
Start: 2017-02-19 | End: 2017-02-19

## 2017-02-19 RX ORDER — CIPROFLOXACIN LACTATE 400MG/40ML
400 VIAL (ML) INTRAVENOUS ONCE
Qty: 0 | Refills: 0 | Status: COMPLETED | OUTPATIENT
Start: 2017-02-19 | End: 2017-02-19

## 2017-02-19 RX ORDER — CIPROFLOXACIN LACTATE 400MG/40ML
200 VIAL (ML) INTRAVENOUS EVERY 12 HOURS
Qty: 0 | Refills: 0 | Status: DISCONTINUED | OUTPATIENT
Start: 2017-02-19 | End: 2017-02-21

## 2017-02-19 RX ORDER — PANTOPRAZOLE SODIUM 20 MG/1
40 TABLET, DELAYED RELEASE ORAL
Qty: 0 | Refills: 0 | Status: DISCONTINUED | OUTPATIENT
Start: 2017-02-19 | End: 2017-02-20

## 2017-02-19 RX ADMIN — Medication 10 MILLIGRAM(S): at 22:46

## 2017-02-19 RX ADMIN — Medication 63.75 MILLIMOLE(S): at 14:27

## 2017-02-19 RX ADMIN — Medication 200 MILLIGRAM(S): at 02:22

## 2017-02-19 RX ADMIN — Medication 3 MILLILITER(S): at 05:18

## 2017-02-19 RX ADMIN — Medication 200 GRAM(S): at 05:34

## 2017-02-19 RX ADMIN — Medication 0.5 MILLIGRAM(S): at 05:20

## 2017-02-19 RX ADMIN — Medication 1 DROP(S): at 17:40

## 2017-02-19 RX ADMIN — BRIMONIDINE TARTRATE 1 DROP(S): 2 SOLUTION/ DROPS OPHTHALMIC at 17:40

## 2017-02-19 RX ADMIN — Medication 50 GRAM(S): at 04:48

## 2017-02-19 RX ADMIN — Medication 63.75 MILLIMOLE(S): at 05:35

## 2017-02-19 RX ADMIN — ATORVASTATIN CALCIUM 40 MILLIGRAM(S): 80 TABLET, FILM COATED ORAL at 22:46

## 2017-02-19 RX ADMIN — ENOXAPARIN SODIUM 50 MILLIGRAM(S): 100 INJECTION SUBCUTANEOUS at 05:02

## 2017-02-19 RX ADMIN — BRIMONIDINE TARTRATE 1 DROP(S): 2 SOLUTION/ DROPS OPHTHALMIC at 05:01

## 2017-02-19 RX ADMIN — Medication 10 MILLIGRAM(S): at 13:06

## 2017-02-19 RX ADMIN — ENOXAPARIN SODIUM 50 MILLIGRAM(S): 100 INJECTION SUBCUTANEOUS at 17:39

## 2017-02-20 LAB
ANION GAP SERPL CALC-SCNC: 15 MMOL/L — SIGNIFICANT CHANGE UP (ref 5–17)
BUN SERPL-MCNC: 7 MG/DL — SIGNIFICANT CHANGE UP (ref 7–23)
CA-I BLD-SCNC: 1.04 MMOL/L — LOW (ref 1.05–1.34)
CALCIUM SERPL-MCNC: 7.6 MG/DL — LOW (ref 8.4–10.5)
CHLORIDE SERPL-SCNC: 100 MMOL/L — SIGNIFICANT CHANGE UP (ref 96–108)
CO2 SERPL-SCNC: 22 MMOL/L — SIGNIFICANT CHANGE UP (ref 22–31)
CREAT SERPL-MCNC: 0.31 MG/DL — LOW (ref 0.5–1.3)
GLUCOSE SERPL-MCNC: 97 MG/DL — SIGNIFICANT CHANGE UP (ref 70–99)
HCT VFR BLD CALC: 35 % — SIGNIFICANT CHANGE UP (ref 34.5–45)
HGB BLD-MCNC: 12.2 G/DL — SIGNIFICANT CHANGE UP (ref 11.5–15.5)
MAGNESIUM SERPL-MCNC: 2.2 MG/DL — SIGNIFICANT CHANGE UP (ref 1.6–2.6)
MCHC RBC-ENTMCNC: 34.2 PG — HIGH (ref 27–34)
MCHC RBC-ENTMCNC: 35 GM/DL — SIGNIFICANT CHANGE UP (ref 32–36)
MCV RBC AUTO: 97.7 FL — SIGNIFICANT CHANGE UP (ref 80–100)
NT-PROBNP SERPL-SCNC: 484 PG/ML — HIGH (ref 0–300)
PHOSPHATE SERPL-MCNC: 3.1 MG/DL — SIGNIFICANT CHANGE UP (ref 2.5–4.5)
PLATELET # BLD AUTO: 155 K/UL — SIGNIFICANT CHANGE UP (ref 150–400)
POTASSIUM SERPL-MCNC: 3.6 MMOL/L — SIGNIFICANT CHANGE UP (ref 3.5–5.3)
POTASSIUM SERPL-SCNC: 3.6 MMOL/L — SIGNIFICANT CHANGE UP (ref 3.5–5.3)
RBC # BLD: 3.58 M/UL — LOW (ref 3.8–5.2)
RBC # FLD: 13.2 % — SIGNIFICANT CHANGE UP (ref 10.3–14.5)
SODIUM SERPL-SCNC: 137 MMOL/L — SIGNIFICANT CHANGE UP (ref 135–145)
WBC # BLD: 8.6 K/UL — SIGNIFICANT CHANGE UP (ref 3.8–10.5)
WBC # FLD AUTO: 8.6 K/UL — SIGNIFICANT CHANGE UP (ref 3.8–10.5)

## 2017-02-20 PROCEDURE — 71010: CPT | Mod: 26

## 2017-02-20 RX ORDER — DEXTROSE MONOHYDRATE, SODIUM CHLORIDE, AND POTASSIUM CHLORIDE 50; .745; 4.5 G/1000ML; G/1000ML; G/1000ML
1000 INJECTION, SOLUTION INTRAVENOUS
Qty: 0 | Refills: 0 | Status: DISCONTINUED | OUTPATIENT
Start: 2017-02-20 | End: 2017-02-20

## 2017-02-20 RX ORDER — PANTOPRAZOLE SODIUM 20 MG/1
40 TABLET, DELAYED RELEASE ORAL DAILY
Qty: 0 | Refills: 0 | Status: DISCONTINUED | OUTPATIENT
Start: 2017-02-20 | End: 2017-03-01

## 2017-02-20 RX ORDER — POTASSIUM CHLORIDE 20 MEQ
10 PACKET (EA) ORAL
Qty: 0 | Refills: 0 | Status: COMPLETED | OUTPATIENT
Start: 2017-02-20 | End: 2017-02-20

## 2017-02-20 RX ORDER — RIVAROXABAN 15 MG-20MG
15 KIT ORAL DAILY
Qty: 0 | Refills: 0 | Status: DISCONTINUED | OUTPATIENT
Start: 2017-02-20 | End: 2017-03-02

## 2017-02-20 RX ORDER — SODIUM CHLORIDE 9 MG/ML
1000 INJECTION, SOLUTION INTRAVENOUS
Qty: 0 | Refills: 0 | Status: DISCONTINUED | OUTPATIENT
Start: 2017-02-20 | End: 2017-02-21

## 2017-02-20 RX ADMIN — Medication 100 MILLIEQUIVALENT(S): at 15:31

## 2017-02-20 RX ADMIN — Medication 100 MILLIEQUIVALENT(S): at 21:29

## 2017-02-20 RX ADMIN — DEXTROSE MONOHYDRATE, SODIUM CHLORIDE, AND POTASSIUM CHLORIDE 50 MILLILITER(S): 50; .745; 4.5 INJECTION, SOLUTION INTRAVENOUS at 14:40

## 2017-02-20 RX ADMIN — PANTOPRAZOLE SODIUM 40 MILLIGRAM(S): 20 TABLET, DELAYED RELEASE ORAL at 11:39

## 2017-02-20 RX ADMIN — Medication 1 DROP(S): at 18:32

## 2017-02-20 RX ADMIN — Medication 1 DROP(S): at 05:45

## 2017-02-20 RX ADMIN — BRIMONIDINE TARTRATE 1 DROP(S): 2 SOLUTION/ DROPS OPHTHALMIC at 18:32

## 2017-02-20 RX ADMIN — Medication 100 MILLIEQUIVALENT(S): at 14:40

## 2017-02-20 RX ADMIN — RIVAROXABAN 15 MILLIGRAM(S): KIT at 18:31

## 2017-02-20 RX ADMIN — ATORVASTATIN CALCIUM 40 MILLIGRAM(S): 80 TABLET, FILM COATED ORAL at 21:22

## 2017-02-20 RX ADMIN — BRIMONIDINE TARTRATE 1 DROP(S): 2 SOLUTION/ DROPS OPHTHALMIC at 05:45

## 2017-02-20 RX ADMIN — ENOXAPARIN SODIUM 50 MILLIGRAM(S): 100 INJECTION SUBCUTANEOUS at 05:55

## 2017-02-20 RX ADMIN — Medication 100 MILLIGRAM(S): at 18:31

## 2017-02-20 RX ADMIN — Medication 100 MILLIGRAM(S): at 05:44

## 2017-02-20 NOTE — PROVIDER CONTACT NOTE (OTHER) - SITUATION
pt had 4 loose BM's today that were oderous. Dr. Suarez suggested sending [facility] sample --sent. Pt alos was DTV 2pm and at 3pm was showing 350 in bladder and had not voided yet

## 2017-02-21 LAB
-  AMIKACIN: SIGNIFICANT CHANGE UP
-  AMPICILLIN/SULBACTAM: SIGNIFICANT CHANGE UP
-  AMPICILLIN: SIGNIFICANT CHANGE UP
-  AZTREONAM: SIGNIFICANT CHANGE UP
-  CEFAZOLIN: SIGNIFICANT CHANGE UP
-  CEFEPIME: SIGNIFICANT CHANGE UP
-  CEFOXITIN: SIGNIFICANT CHANGE UP
-  CEFTAZIDIME: SIGNIFICANT CHANGE UP
-  CEFTRIAXONE: SIGNIFICANT CHANGE UP
-  CIPROFLOXACIN: SIGNIFICANT CHANGE UP
-  ERTAPENEM: SIGNIFICANT CHANGE UP
-  GENTAMICIN: SIGNIFICANT CHANGE UP
-  IMIPENEM: SIGNIFICANT CHANGE UP
-  LEVOFLOXACIN: SIGNIFICANT CHANGE UP
-  MEROPENEM: SIGNIFICANT CHANGE UP
-  NITROFURANTOIN: SIGNIFICANT CHANGE UP
-  PIPERACILLIN/TAZOBACTAM: SIGNIFICANT CHANGE UP
-  TOBRAMYCIN: SIGNIFICANT CHANGE UP
-  TRIMETHOPRIM/SULFAMETHOXAZOLE: SIGNIFICANT CHANGE UP
ANION GAP SERPL CALC-SCNC: 10 MMOL/L — SIGNIFICANT CHANGE UP (ref 5–17)
BUN SERPL-MCNC: 8 MG/DL — SIGNIFICANT CHANGE UP (ref 7–23)
C DIFF BY PCR RESULT: DETECTED
C DIFF TOX GENS STL QL NAA+PROBE: SIGNIFICANT CHANGE UP
CALCIUM SERPL-MCNC: 7.1 MG/DL — LOW (ref 8.4–10.5)
CHLORIDE SERPL-SCNC: 102 MMOL/L — SIGNIFICANT CHANGE UP (ref 96–108)
CO2 SERPL-SCNC: 22 MMOL/L — SIGNIFICANT CHANGE UP (ref 22–31)
CREAT SERPL-MCNC: 0.38 MG/DL — LOW (ref 0.5–1.3)
CULTURE RESULTS: SIGNIFICANT CHANGE UP
GLUCOSE SERPL-MCNC: 145 MG/DL — HIGH (ref 70–99)
HCT VFR BLD CALC: 30.8 % — LOW (ref 34.5–45)
HGB BLD-MCNC: 11 G/DL — LOW (ref 11.5–15.5)
MCHC RBC-ENTMCNC: 34.7 PG — HIGH (ref 27–34)
MCHC RBC-ENTMCNC: 35.7 GM/DL — SIGNIFICANT CHANGE UP (ref 32–36)
MCV RBC AUTO: 97.1 FL — SIGNIFICANT CHANGE UP (ref 80–100)
METHOD TYPE: SIGNIFICANT CHANGE UP
ORGANISM # SPEC MICROSCOPIC CNT: SIGNIFICANT CHANGE UP
ORGANISM # SPEC MICROSCOPIC CNT: SIGNIFICANT CHANGE UP
PLATELET # BLD AUTO: 118 K/UL — LOW (ref 150–400)
POTASSIUM SERPL-MCNC: 4.2 MMOL/L — SIGNIFICANT CHANGE UP (ref 3.5–5.3)
POTASSIUM SERPL-SCNC: 4.2 MMOL/L — SIGNIFICANT CHANGE UP (ref 3.5–5.3)
RBC # BLD: 3.18 M/UL — LOW (ref 3.8–5.2)
RBC # FLD: 13.7 % — SIGNIFICANT CHANGE UP (ref 10.3–14.5)
SODIUM SERPL-SCNC: 134 MMOL/L — LOW (ref 135–145)
SPECIMEN SOURCE: SIGNIFICANT CHANGE UP
WBC # BLD: 5.8 K/UL — SIGNIFICANT CHANGE UP (ref 3.8–10.5)
WBC # FLD AUTO: 5.8 K/UL — SIGNIFICANT CHANGE UP (ref 3.8–10.5)

## 2017-02-21 PROCEDURE — 99222 1ST HOSP IP/OBS MODERATE 55: CPT

## 2017-02-21 RX ORDER — VANCOMYCIN HCL 1 G
125 VIAL (EA) INTRAVENOUS EVERY 6 HOURS
Qty: 0 | Refills: 0 | Status: DISCONTINUED | OUTPATIENT
Start: 2017-02-21 | End: 2017-02-21

## 2017-02-21 RX ORDER — VANCOMYCIN HCL 1 G
125 VIAL (EA) INTRAVENOUS EVERY 6 HOURS
Qty: 0 | Refills: 0 | Status: DISCONTINUED | OUTPATIENT
Start: 2017-02-21 | End: 2017-03-02

## 2017-02-21 RX ADMIN — PANTOPRAZOLE SODIUM 40 MILLIGRAM(S): 20 TABLET, DELAYED RELEASE ORAL at 11:16

## 2017-02-21 RX ADMIN — Medication 1 DROP(S): at 16:59

## 2017-02-21 RX ADMIN — BRIMONIDINE TARTRATE 1 DROP(S): 2 SOLUTION/ DROPS OPHTHALMIC at 05:26

## 2017-02-21 RX ADMIN — Medication 125 MILLIGRAM(S): at 17:03

## 2017-02-21 RX ADMIN — Medication 100 MILLIGRAM(S): at 05:26

## 2017-02-21 RX ADMIN — Medication 1 DROP(S): at 05:26

## 2017-02-21 RX ADMIN — RIVAROXABAN 15 MILLIGRAM(S): KIT at 11:34

## 2017-02-21 RX ADMIN — Medication 125 MILLIGRAM(S): at 23:45

## 2017-02-21 RX ADMIN — Medication 125 MILLIGRAM(S): at 11:18

## 2017-02-21 RX ADMIN — BRIMONIDINE TARTRATE 1 DROP(S): 2 SOLUTION/ DROPS OPHTHALMIC at 17:00

## 2017-02-21 RX ADMIN — ATORVASTATIN CALCIUM 40 MILLIGRAM(S): 80 TABLET, FILM COATED ORAL at 22:14

## 2017-02-22 ENCOUNTER — TRANSCRIPTION ENCOUNTER (OUTPATIENT)
Age: 82
End: 2017-02-22

## 2017-02-22 LAB
ANION GAP SERPL CALC-SCNC: 10 MMOL/L — SIGNIFICANT CHANGE UP (ref 5–17)
ANION GAP SERPL CALC-SCNC: 8 MMOL/L — SIGNIFICANT CHANGE UP (ref 5–17)
BUN SERPL-MCNC: 10 MG/DL — SIGNIFICANT CHANGE UP (ref 7–23)
BUN SERPL-MCNC: 11 MG/DL — SIGNIFICANT CHANGE UP (ref 7–23)
CALCIUM SERPL-MCNC: 7.7 MG/DL — LOW (ref 8.4–10.5)
CALCIUM SERPL-MCNC: 7.8 MG/DL — LOW (ref 8.4–10.5)
CHLORIDE SERPL-SCNC: 102 MMOL/L — SIGNIFICANT CHANGE UP (ref 96–108)
CHLORIDE SERPL-SCNC: 105 MMOL/L — SIGNIFICANT CHANGE UP (ref 96–108)
CO2 SERPL-SCNC: 22 MMOL/L — SIGNIFICANT CHANGE UP (ref 22–31)
CO2 SERPL-SCNC: 25 MMOL/L — SIGNIFICANT CHANGE UP (ref 22–31)
CREAT SERPL-MCNC: 0.32 MG/DL — LOW (ref 0.5–1.3)
CREAT SERPL-MCNC: 0.43 MG/DL — LOW (ref 0.5–1.3)
GLUCOSE SERPL-MCNC: 120 MG/DL — HIGH (ref 70–99)
GLUCOSE SERPL-MCNC: 123 MG/DL — HIGH (ref 70–99)
HCT VFR BLD CALC: 32.7 % — LOW (ref 34.5–45)
HCT VFR BLD CALC: 34.7 % — SIGNIFICANT CHANGE UP (ref 34.5–45)
HGB BLD-MCNC: 11.4 G/DL — LOW (ref 11.5–15.5)
HGB BLD-MCNC: 12.5 G/DL — SIGNIFICANT CHANGE UP (ref 11.5–15.5)
MCHC RBC-ENTMCNC: 33.9 PG — SIGNIFICANT CHANGE UP (ref 27–34)
MCHC RBC-ENTMCNC: 34.9 GM/DL — SIGNIFICANT CHANGE UP (ref 32–36)
MCHC RBC-ENTMCNC: 34.9 PG — HIGH (ref 27–34)
MCHC RBC-ENTMCNC: 35.9 GM/DL — SIGNIFICANT CHANGE UP (ref 32–36)
MCV RBC AUTO: 97.1 FL — SIGNIFICANT CHANGE UP (ref 80–100)
MCV RBC AUTO: 97.3 FL — SIGNIFICANT CHANGE UP (ref 80–100)
PLATELET # BLD AUTO: 120 K/UL — LOW (ref 150–400)
PLATELET # BLD AUTO: 127 K/UL — LOW (ref 150–400)
POTASSIUM SERPL-MCNC: 4.2 MMOL/L — SIGNIFICANT CHANGE UP (ref 3.5–5.3)
POTASSIUM SERPL-MCNC: 4.3 MMOL/L — SIGNIFICANT CHANGE UP (ref 3.5–5.3)
POTASSIUM SERPL-SCNC: 4.2 MMOL/L — SIGNIFICANT CHANGE UP (ref 3.5–5.3)
POTASSIUM SERPL-SCNC: 4.3 MMOL/L — SIGNIFICANT CHANGE UP (ref 3.5–5.3)
RBC # BLD: 3.37 M/UL — LOW (ref 3.8–5.2)
RBC # BLD: 3.57 M/UL — LOW (ref 3.8–5.2)
RBC # FLD: 13.8 % — SIGNIFICANT CHANGE UP (ref 10.3–14.5)
RBC # FLD: 14.3 % — SIGNIFICANT CHANGE UP (ref 10.3–14.5)
SODIUM SERPL-SCNC: 135 MMOL/L — SIGNIFICANT CHANGE UP (ref 135–145)
SODIUM SERPL-SCNC: 137 MMOL/L — SIGNIFICANT CHANGE UP (ref 135–145)
SURGICAL PATHOLOGY STUDY: SIGNIFICANT CHANGE UP
WBC # BLD: 6.2 K/UL — SIGNIFICANT CHANGE UP (ref 3.8–10.5)
WBC # BLD: 8.4 K/UL — SIGNIFICANT CHANGE UP (ref 3.8–10.5)
WBC # FLD AUTO: 6.2 K/UL — SIGNIFICANT CHANGE UP (ref 3.8–10.5)
WBC # FLD AUTO: 8.4 K/UL — SIGNIFICANT CHANGE UP (ref 3.8–10.5)

## 2017-02-22 RX ORDER — TAMSULOSIN HYDROCHLORIDE 0.4 MG/1
0.4 CAPSULE ORAL AT BEDTIME
Qty: 0 | Refills: 0 | Status: DISCONTINUED | OUTPATIENT
Start: 2017-02-22 | End: 2017-02-26

## 2017-02-22 RX ADMIN — RIVAROXABAN 15 MILLIGRAM(S): KIT at 15:14

## 2017-02-22 RX ADMIN — Medication 125 MILLIGRAM(S): at 18:57

## 2017-02-22 RX ADMIN — BRIMONIDINE TARTRATE 1 DROP(S): 2 SOLUTION/ DROPS OPHTHALMIC at 05:44

## 2017-02-22 RX ADMIN — BRIMONIDINE TARTRATE 1 DROP(S): 2 SOLUTION/ DROPS OPHTHALMIC at 18:57

## 2017-02-22 RX ADMIN — PANTOPRAZOLE SODIUM 40 MILLIGRAM(S): 20 TABLET, DELAYED RELEASE ORAL at 15:14

## 2017-02-22 RX ADMIN — Medication 1 DROP(S): at 18:57

## 2017-02-22 RX ADMIN — Medication 1 DROP(S): at 05:44

## 2017-02-22 RX ADMIN — Medication 125 MILLIGRAM(S): at 05:44

## 2017-02-22 RX ADMIN — ATORVASTATIN CALCIUM 40 MILLIGRAM(S): 80 TABLET, FILM COATED ORAL at 21:38

## 2017-02-22 RX ADMIN — TAMSULOSIN HYDROCHLORIDE 0.4 MILLIGRAM(S): 0.4 CAPSULE ORAL at 21:38

## 2017-02-22 RX ADMIN — Medication 125 MILLIGRAM(S): at 15:15

## 2017-02-22 NOTE — DISCHARGE NOTE ADULT - MEDICATION SUMMARY - MEDICATIONS TO TAKE
I will START or STAY ON the medications listed below when I get home from the hospital:    rivaroxaban 15 mg oral tablet  -- 1 tab(s) by mouth once a day (in the evening)  -- Indication: For Atrial fibrillation, unspecified type    Lipitor 40 mg oral tablet  -- 1 tab(s) by mouth once a day (at bedtime)  -- Indication: For CAD    metoprolol  -- 12.5 milligram(s) by mouth 2 times a day  -- Indication: For Atrial fibrillation, unspecified type    vancomycin 250 mg/5 mL oral solution  -- 2.5 milliliter(s) by mouth every 6 hours  for a total of 10 days last dose is on 3/2/17  -- Indication: For C-diff    brimonidine ophthalmic 0.2% ophthalmic solution  --  to each affected eye 2 times a day  -- Indication: For Eye drops     levobunolol ophthalmic 0.5% ophthalmic solution  -- 2 drop(s) to each affected eye once a day  -- Indication: For Chronic obstructive pulmonary disease, unspecified COPD type    NexIUM 20 mg oral delayed release capsule  -- 1 cap(s) by mouth once a day  -- Indication: For GERD I will START or STAY ON the medications listed below when I get home from the hospital:    rivaroxaban 15 mg oral tablet  -- 1 tab(s) by mouth once a day (in the evening)  -- Indication: For Atrial fibrillation, unspecified type    Lipitor 40 mg oral tablet  -- 1 tab(s) by mouth once a day (at bedtime)  -- Indication: For CAD    metoprolol  -- 12.5 milligram(s) by mouth 2 times a day  -- Indication: For Atrial fibrillation, unspecified type    vancomycin 250 mg/5 mL oral solution  -- 2.5 milliliter(s) by mouth every 6 hours  for 5 more days through 3/7/17  -- Indication: For Cdiff    brimonidine ophthalmic 0.2% ophthalmic solution  --  to each affected eye 2 times a day  -- Indication: For Eye drops     levobunolol ophthalmic 0.5% ophthalmic solution  -- 2 drop(s) to each affected eye once a day  -- Indication: For Chronic obstructive pulmonary disease, unspecified COPD type    NexIUM 20 mg oral delayed release capsule  -- 1 cap(s) by mouth once a day  -- Indication: For GERD

## 2017-02-22 NOTE — DISCHARGE NOTE ADULT - CARE PROVIDERS DIRECT ADDRESSES
,[email or web address],[email or web address],DirectAddress_Unknown

## 2017-02-22 NOTE — DISCHARGE NOTE ADULT - HOSPITAL COURSE
The patient is a 89yF who presented to [facility] with diffuse abdominal pain and was found to have a closed loop bowel obstruction on CT. After subsequent discussions with the family, the decision was made for the patient to be taken to the OR where she required an exploratory laparotomy and lysis of adhesions.Post operatively, the patient was sent to the PACU, and thereafter sent to the SICU for hemodynamic monitoring. While in the SICU, the patient was extubated, and given lasix for poor saturation on nasal cannula. The pt remained hemodynamically stable and afebrile, with a normal WBC. She was advanced to a dysphagia diet and tolerated. She was subsequently transferred to the floor for further management. She was restarted on her home anticoagulation for a fib. Her hospital course was complicated by a UTI and c diff positive stool for which she was started on antibiotics. ID was consulted and advised that the UTI was most likely from colonization and therefore treatment was only continued for c difficile positive stool. During her hospital stay the patient also failed to pass multiple trial of voids after quinones removal which required reinsertion. Urology was consulted and the patient will follow up with them as an outpatient for management of quinones. The patient was seen by physical therapy, who recommended subacute rehab, for which the patient will be discharged to.     At the time of discharge, the patient was hemodynamically stable, was tolerating PO diet, was voiding urine and passing stool, was ambulating, and was comfortable with adequate pain control. The patient is a 89yF who presented to [facility] with diffuse abdominal pain and was found to have a closed loop bowel obstruction on CT. After subsequent discussions with the family, the decision was made for the patient to be taken to the OR where she required an exploratory laparotomy and lysis of adhesions.Post operatively, the patient was sent to the PACU, and thereafter sent to the SICU for hemodynamic monitoring. While in the SICU, the patient was extubated, and given lasix for poor saturation on nasal cannula. The pt remained hemodynamically stable and afebrile, with a normal WBC. She was advanced to a dysphagia diet and tolerated. She was subsequently transferred to the floor for further management. She was restarted on her home anticoagulation for a fib. Her hospital course was complicated by a UTI and c diff positive stool for which she was started on antibiotics. ID was consulted and advised that the UTI was most likely from colonization and therefore treatment was only continued for c difficile positive stool. During her hospital stay the patient also failed to pass multiple trial of voids after quinones removal which required reinsertion. Urology was consulted and the patient will follow up with them as an outpatient for management of quinones. The patient was seen by physical therapy, who recommended subacute rehab, for which the patient will be discharged to. She will follow-up with Dr. Suarez and Dr. Medina within 1-2 weeks.  She was also advised to follow-up with her PMD within 1-2 weeks.  She will complete a course of oral antibiotics as prescribed.

## 2017-02-22 NOTE — DISCHARGE NOTE ADULT - NS AS ACTIVITY OBS
No Heavy lifting/straining/Showering allowed/Do not make important decisions/Do not drive or operate machinery

## 2017-02-22 NOTE — DISCHARGE NOTE ADULT - PATIENT PORTAL LINK FT
“You can access the Follow[facility] Patient Portal, offered by [facility], by registering with the following website: [email or web address]”

## 2017-02-22 NOTE — DISCHARGE NOTE ADULT - PROVIDER TOKENS
TOKEN:'[id number]' TOKEN:'[id number]',TOKEN:'[id number]' TOKEN:'[id number]',TOKEN:'[id number]',TOKEN:'[id number]',TOKEN:'[id number]'

## 2017-02-22 NOTE — DISCHARGE NOTE ADULT - CARE PROVIDER_API CALL
Sánchez Suarez (MD), Surgery  [address]  [place] [address]  Phone: [phone]  Fax: [phone] Sánchez Suarez), Surgery  [address]  [place] [address]  Phone: [phone]  Fax: [phone]    Hoenig, David M (MD), Urology  [address] [place] [address]  Phone: [phone]  Fax: [phone] Sánchez Suarez (MD), Surgery  [address]  [place] [address]  Phone: [phone]  Fax: [phone]    Hoenig, David M (MD), Urology  [address] [place] [address]  Phone: [phone]  Fax: [phone]    Yaakov Montero (DO), Gastroenterology; Internal Medicine  [address] [place], [place] [id number]  [place] [address]  Phone: [phone]  Fax: [phone]    Tiffanie Gray), Internal Medicine  [address] [place] [address]  Phone: [phone]  Fax: [phone]

## 2017-02-22 NOTE — DISCHARGE NOTE ADULT - PLAN OF CARE
wound care WOUND CARE: May apply clean gauze and paper tape over wound site as needed.    BATHING: Please do not submerge wound underwater. You may shower and/or sponge bathe.   ACTIVITY: No heavy lifting or straining. Otherwise, you may return to your usual level of physical activity. If you are taking narcotic pain medication (such as Percocet) DO NOT drive a car, operate machinery or make important decisions.  DIET: Resume a dysphagia 1 pureed-honey consistency fluid w/ supplemental glucerna shake cans  NOTIFY YOUR SURGEON IF: You have any bleeding that does not stop, any pus draining from your wound(s), any fever (over 100.4 F) or chills, persistent nausea/vomiting, persistent diarrhea, or if your pain is not controlled on your discharge pain medications.  FOLLOW-UP: Please follow up with your primary care physician in one week regarding your hospitalization. Please follow up with Dr. Suarez within 1-2 weeks after discharge from the hospital. You may call [phone] to schedule an appointment. appropriate follow up Please follow up with Dr. Hoenig as an outpatient for management of your quinones. You may schedule an appointment at [phone] Continue current antibiotic treatment, follow up with Dr. Montero after reheb Atrial fibrillation is the most common heart rhythm problem.  The condition puts you at risk for has stroke and heart attack  It helps if you control your blood pressure, not drink more than 1-2 alcohol drinks per day, cut down on caffeine, getting treatment for over active thyroid gland, and get regular exercise  Call your doctor if you feel your heart racing or beating unusually, chest tightness or pain, lightheaded, faint, shortness of breath especially with exercise  It is important to take your heart medication as prescribed  You may be on anticoagulation which is very important to take as directed - you may need blood work to monitor drug levels Continue current diet, follow up with PMD for management Continue current antibiotic treatment, follow up with Dr. Montero after rehab. Complete 5 more days of antibiotics

## 2017-02-22 NOTE — DISCHARGE NOTE ADULT - CARE PLAN
Principal Discharge DX:	Intestinal adhesions with obstruction  Goal:	wound care  Instructions for follow-up, activity and diet:	WOUND CARE: May apply clean gauze and paper tape over wound site as needed.    BATHING: Please do not submerge wound underwater. You may shower and/or sponge bathe.   ACTIVITY: No heavy lifting or straining. Otherwise, you may return to your usual level of physical activity. If you are taking narcotic pain medication (such as Percocet) DO NOT drive a car, operate machinery or make important decisions.  DIET: Resume a dysphagia 1 pureed-honey consistency fluid w/ supplemental glucerna shake cans  NOTIFY YOUR SURGEON IF: You have any bleeding that does not stop, any pus draining from your wound(s), any fever (over 100.4 F) or chills, persistent nausea/vomiting, persistent diarrhea, or if your pain is not controlled on your discharge pain medications.  FOLLOW-UP: Please follow up with your primary care physician in one week regarding your hospitalization. Please follow up with Dr. Suarez within 1-2 weeks after discharge from the hospital. You may call [phone] to schedule an appointment. Principal Discharge DX:	Intestinal adhesions with obstruction  Goal:	wound care  Instructions for follow-up, activity and diet:	WOUND CARE: May apply clean gauze and paper tape over wound site as needed.    BATHING: Please do not submerge wound underwater. You may shower and/or sponge bathe.   ACTIVITY: No heavy lifting or straining. Otherwise, you may return to your usual level of physical activity. If you are taking narcotic pain medication (such as Percocet) DO NOT drive a car, operate machinery or make important decisions.  DIET: Resume a dysphagia 1 pureed-honey consistency fluid w/ supplemental glucerna shake cans  NOTIFY YOUR SURGEON IF: You have any bleeding that does not stop, any pus draining from your wound(s), any fever (over 100.4 F) or chills, persistent nausea/vomiting, persistent diarrhea, or if your pain is not controlled on your discharge pain medications.  FOLLOW-UP: Please follow up with your primary care physician in one week regarding your hospitalization. Please follow up with Dr. Suarez within 1-2 weeks after discharge from the hospital. You may call [phone] to schedule an appointment. Principal Discharge DX:	Intestinal adhesions with obstruction  Goal:	wound care  Instructions for follow-up, activity and diet:	WOUND CARE: May apply clean gauze and paper tape over wound site as needed.    BATHING: Please do not submerge wound underwater. You may shower and/or sponge bathe.   ACTIVITY: No heavy lifting or straining. Otherwise, you may return to your usual level of physical activity. If you are taking narcotic pain medication (such as Percocet) DO NOT drive a car, operate machinery or make important decisions.  DIET: Resume a dysphagia 1 pureed-honey consistency fluid w/ supplemental glucerna shake cans  NOTIFY YOUR SURGEON IF: You have any bleeding that does not stop, any pus draining from your wound(s), any fever (over 100.4 F) or chills, persistent nausea/vomiting, persistent diarrhea, or if your pain is not controlled on your discharge pain medications.  FOLLOW-UP: Please follow up with your primary care physician in one week regarding your hospitalization. Please follow up with Dr. Suarez within 1-2 weeks after discharge from the hospital. You may call [phone] to schedule an appointment.  Secondary Diagnosis:	Urinary retention  Goal:	appropriate follow up  Instructions for follow-up, activity and diet:	Please follow up with Dr. Hoenig as an outpatient for management of your quinones. You may schedule an appointment at [phone] Principal Discharge DX:	Intestinal adhesions with obstruction  Goal:	wound care  Instructions for follow-up, activity and diet:	WOUND CARE: May apply clean gauze and paper tape over wound site as needed.    BATHING: Please do not submerge wound underwater. You may shower and/or sponge bathe.   ACTIVITY: No heavy lifting or straining. Otherwise, you may return to your usual level of physical activity. If you are taking narcotic pain medication (such as Percocet) DO NOT drive a car, operate machinery or make important decisions.  DIET: Resume a dysphagia 1 pureed-honey consistency fluid w/ supplemental glucerna shake cans  NOTIFY YOUR SURGEON IF: You have any bleeding that does not stop, any pus draining from your wound(s), any fever (over 100.4 F) or chills, persistent nausea/vomiting, persistent diarrhea, or if your pain is not controlled on your discharge pain medications.  FOLLOW-UP: Please follow up with your primary care physician in one week regarding your hospitalization. Please follow up with Dr. Suarez within 1-2 weeks after discharge from the hospital. You may call [phone] to schedule an appointment.  Secondary Diagnosis:	Urinary retention  Goal:	appropriate follow up  Instructions for follow-up, activity and diet:	Please follow up with Dr. Hoenig as an outpatient for management of your quinones. You may schedule an appointment at [phone] Principal Discharge DX:	Intestinal adhesions with obstruction  Goal:	wound care  Instructions for follow-up, activity and diet:	WOUND CARE: May apply clean gauze and paper tape over wound site as needed.    BATHING: Please do not submerge wound underwater. You may shower and/or sponge bathe.   ACTIVITY: No heavy lifting or straining. Otherwise, you may return to your usual level of physical activity. If you are taking narcotic pain medication (such as Percocet) DO NOT drive a car, operate machinery or make important decisions.  DIET: Resume a dysphagia 1 pureed-honey consistency fluid w/ supplemental glucerna shake cans  NOTIFY YOUR SURGEON IF: You have any bleeding that does not stop, any pus draining from your wound(s), any fever (over 100.4 F) or chills, persistent nausea/vomiting, persistent diarrhea, or if your pain is not controlled on your discharge pain medications.  FOLLOW-UP: Please follow up with your primary care physician in one week regarding your hospitalization. Please follow up with Dr. Suarez within 1-2 weeks after discharge from the hospital. You may call [phone] to schedule an appointment.  Secondary Diagnosis:	Urinary retention  Goal:	appropriate follow up  Instructions for follow-up, activity and diet:	Please follow up with Dr. Hoenig as an outpatient for management of your quinones. You may schedule an appointment at [phone] Principal Discharge DX:	Intestinal adhesions with obstruction  Goal:	wound care  Instructions for follow-up, activity and diet:	WOUND CARE: May apply clean gauze and paper tape over wound site as needed.    BATHING: Please do not submerge wound underwater. You may shower and/or sponge bathe.   ACTIVITY: No heavy lifting or straining. Otherwise, you may return to your usual level of physical activity. If you are taking narcotic pain medication (such as Percocet) DO NOT drive a car, operate machinery or make important decisions.  DIET: Resume a dysphagia 1 pureed-honey consistency fluid w/ supplemental glucerna shake cans  NOTIFY YOUR SURGEON IF: You have any bleeding that does not stop, any pus draining from your wound(s), any fever (over 100.4 F) or chills, persistent nausea/vomiting, persistent diarrhea, or if your pain is not controlled on your discharge pain medications.  FOLLOW-UP: Please follow up with your primary care physician in one week regarding your hospitalization. Please follow up with Dr. Suarez within 1-2 weeks after discharge from the hospital. You may call [phone] to schedule an appointment.  Secondary Diagnosis:	Urinary retention  Goal:	appropriate follow up  Instructions for follow-up, activity and diet:	Please follow up with Dr. Hoenig as an outpatient for management of your quinones. You may schedule an appointment at [phone] Principal Discharge DX:	Intestinal adhesions with obstruction  Goal:	wound care  Instructions for follow-up, activity and diet:	WOUND CARE: May apply clean gauze and paper tape over wound site as needed.    BATHING: Please do not submerge wound underwater. You may shower and/or sponge bathe.   ACTIVITY: No heavy lifting or straining. Otherwise, you may return to your usual level of physical activity. If you are taking narcotic pain medication (such as Percocet) DO NOT drive a car, operate machinery or make important decisions.  DIET: Resume a dysphagia 1 pureed-honey consistency fluid w/ supplemental glucerna shake cans  NOTIFY YOUR SURGEON IF: You have any bleeding that does not stop, any pus draining from your wound(s), any fever (over 100.4 F) or chills, persistent nausea/vomiting, persistent diarrhea, or if your pain is not controlled on your discharge pain medications.  FOLLOW-UP: Please follow up with your primary care physician in one week regarding your hospitalization. Please follow up with Dr. Suarez within 1-2 weeks after discharge from the hospital. You may call [phone] to schedule an appointment.  Secondary Diagnosis:	Urinary retention  Goal:	appropriate follow up  Instructions for follow-up, activity and diet:	Please follow up with Dr. Hoenig as an outpatient for management of your quinones. You may schedule an appointment at [phone]  Secondary Diagnosis:	Clostridium difficile diarrhea  Instructions for follow-up, activity and diet:	Continue current antibiotic treatment, follow up with Dr. Montero after reheb  Secondary Diagnosis:	Atrial fibrillation, unspecified type  Instructions for follow-up, activity and diet:	Atrial fibrillation is the most common heart rhythm problem.  The condition puts you at risk for has stroke and heart attack  It helps if you control your blood pressure, not drink more than 1-2 alcohol drinks per day, cut down on caffeine, getting treatment for over active thyroid gland, and get regular exercise  Call your doctor if you feel your heart racing or beating unusually, chest tightness or pain, lightheaded, faint, shortness of breath especially with exercise  It is important to take your heart medication as prescribed  You may be on anticoagulation which is very important to take as directed - you may need blood work to monitor drug levels  Secondary Diagnosis:	Dysphagia  Instructions for follow-up, activity and diet:	Continue current diet, follow up with PMD for management Principal Discharge DX:	Intestinal adhesions with obstruction  Goal:	wound care  Instructions for follow-up, activity and diet:	WOUND CARE: May apply clean gauze and paper tape over wound site as needed.    BATHING: Please do not submerge wound underwater. You may shower and/or sponge bathe.   ACTIVITY: No heavy lifting or straining. Otherwise, you may return to your usual level of physical activity. If you are taking narcotic pain medication (such as Percocet) DO NOT drive a car, operate machinery or make important decisions.  DIET: Resume a dysphagia 1 pureed-honey consistency fluid w/ supplemental glucerna shake cans  NOTIFY YOUR SURGEON IF: You have any bleeding that does not stop, any pus draining from your wound(s), any fever (over 100.4 F) or chills, persistent nausea/vomiting, persistent diarrhea, or if your pain is not controlled on your discharge pain medications.  FOLLOW-UP: Please follow up with your primary care physician in one week regarding your hospitalization. Please follow up with Dr. Suarez within 1-2 weeks after discharge from the hospital. You may call [phone] to schedule an appointment.  Secondary Diagnosis:	Urinary retention  Goal:	appropriate follow up  Instructions for follow-up, activity and diet:	Please follow up with Dr. Hoenig as an outpatient for management of your quinones. You may schedule an appointment at [phone]  Secondary Diagnosis:	Clostridium difficile diarrhea  Instructions for follow-up, activity and diet:	Continue current antibiotic treatment, follow up with Dr. Montero after rehab. Complete 5 more days of antibiotics  Secondary Diagnosis:	Atrial fibrillation, unspecified type  Instructions for follow-up, activity and diet:	Atrial fibrillation is the most common heart rhythm problem.  The condition puts you at risk for has stroke and heart attack  It helps if you control your blood pressure, not drink more than 1-2 alcohol drinks per day, cut down on caffeine, getting treatment for over active thyroid gland, and get regular exercise  Call your doctor if you feel your heart racing or beating unusually, chest tightness or pain, lightheaded, faint, shortness of breath especially with exercise  It is important to take your heart medication as prescribed  You may be on anticoagulation which is very important to take as directed - you may need blood work to monitor drug levels  Secondary Diagnosis:	Dysphagia  Instructions for follow-up, activity and diet:	Continue current diet, follow up with PMD for management Principal Discharge DX:	Intestinal adhesions with obstruction  Goal:	wound care  Instructions for follow-up, activity and diet:	WOUND CARE: May apply clean gauze and paper tape over wound site as needed.    BATHING: Please do not submerge wound underwater. You may shower and/or sponge bathe.   ACTIVITY: No heavy lifting or straining. Otherwise, you may return to your usual level of physical activity. If you are taking narcotic pain medication (such as Percocet) DO NOT drive a car, operate machinery or make important decisions.  DIET: Resume a dysphagia 1 pureed-honey consistency fluid w/ supplemental glucerna shake cans  NOTIFY YOUR SURGEON IF: You have any bleeding that does not stop, any pus draining from your wound(s), any fever (over 100.4 F) or chills, persistent nausea/vomiting, persistent diarrhea, or if your pain is not controlled on your discharge pain medications.  FOLLOW-UP: Please follow up with your primary care physician in one week regarding your hospitalization. Please follow up with Dr. Suarez within 1-2 weeks after discharge from the hospital. You may call [phone] to schedule an appointment.  Secondary Diagnosis:	Urinary retention  Goal:	appropriate follow up  Instructions for follow-up, activity and diet:	Please follow up with Dr. Hoenig as an outpatient for management of your quinones. You may schedule an appointment at [phone]  Secondary Diagnosis:	Clostridium difficile diarrhea  Instructions for follow-up, activity and diet:	Continue current antibiotic treatment, follow up with Dr. Montero after rehab. Complete 5 more days of antibiotics  Secondary Diagnosis:	Atrial fibrillation, unspecified type  Instructions for follow-up, activity and diet:	Atrial fibrillation is the most common heart rhythm problem.  The condition puts you at risk for has stroke and heart attack  It helps if you control your blood pressure, not drink more than 1-2 alcohol drinks per day, cut down on caffeine, getting treatment for over active thyroid gland, and get regular exercise  Call your doctor if you feel your heart racing or beating unusually, chest tightness or pain, lightheaded, faint, shortness of breath especially with exercise  It is important to take your heart medication as prescribed  You may be on anticoagulation which is very important to take as directed - you may need blood work to monitor drug levels  Secondary Diagnosis:	Dysphagia  Instructions for follow-up, activity and diet:	Continue current diet, follow up with PMD for management

## 2017-02-22 NOTE — DISCHARGE NOTE ADULT - ADDITIONAL INSTRUCTIONS
Please follow up with Dr. Suarez within 1-2 weeks after discharge from the hospital. You may call [phone] to schedule an appointment.   Please follow up with Dr. Hoenig as an outpatient for management of your quinones. You may schedule an appointment at [phone]

## 2017-02-23 LAB
ANION GAP SERPL CALC-SCNC: 8 MMOL/L — SIGNIFICANT CHANGE UP (ref 5–17)
BUN SERPL-MCNC: 11 MG/DL — SIGNIFICANT CHANGE UP (ref 7–23)
CALCIUM SERPL-MCNC: 7.6 MG/DL — LOW (ref 8.4–10.5)
CHLORIDE SERPL-SCNC: 105 MMOL/L — SIGNIFICANT CHANGE UP (ref 96–108)
CO2 SERPL-SCNC: 25 MMOL/L — SIGNIFICANT CHANGE UP (ref 22–31)
CREAT SERPL-MCNC: 0.34 MG/DL — LOW (ref 0.5–1.3)
GLUCOSE SERPL-MCNC: 99 MG/DL — SIGNIFICANT CHANGE UP (ref 70–99)
HCT VFR BLD CALC: 30.8 % — LOW (ref 34.5–45)
HGB BLD-MCNC: 11 G/DL — LOW (ref 11.5–15.5)
MAGNESIUM SERPL-MCNC: 2 MG/DL — SIGNIFICANT CHANGE UP (ref 1.6–2.6)
MCHC RBC-ENTMCNC: 35 PG — HIGH (ref 27–34)
MCHC RBC-ENTMCNC: 35.7 GM/DL — SIGNIFICANT CHANGE UP (ref 32–36)
MCV RBC AUTO: 98 FL — SIGNIFICANT CHANGE UP (ref 80–100)
PHOSPHATE SERPL-MCNC: 2.7 MG/DL — SIGNIFICANT CHANGE UP (ref 2.5–4.5)
PLATELET # BLD AUTO: 96 K/UL — LOW (ref 150–400)
POTASSIUM SERPL-MCNC: 4.4 MMOL/L — SIGNIFICANT CHANGE UP (ref 3.5–5.3)
POTASSIUM SERPL-SCNC: 4.4 MMOL/L — SIGNIFICANT CHANGE UP (ref 3.5–5.3)
RBC # BLD: 3.14 M/UL — LOW (ref 3.8–5.2)
RBC # FLD: 13.7 % — SIGNIFICANT CHANGE UP (ref 10.3–14.5)
SODIUM SERPL-SCNC: 138 MMOL/L — SIGNIFICANT CHANGE UP (ref 135–145)
WBC # BLD: 6.7 K/UL — SIGNIFICANT CHANGE UP (ref 3.8–10.5)
WBC # FLD AUTO: 6.7 K/UL — SIGNIFICANT CHANGE UP (ref 3.8–10.5)

## 2017-02-23 RX ADMIN — Medication 125 MILLIGRAM(S): at 17:16

## 2017-02-23 RX ADMIN — ATORVASTATIN CALCIUM 40 MILLIGRAM(S): 80 TABLET, FILM COATED ORAL at 22:11

## 2017-02-23 RX ADMIN — Medication 125 MILLIGRAM(S): at 23:51

## 2017-02-23 RX ADMIN — RIVAROXABAN 15 MILLIGRAM(S): KIT at 13:04

## 2017-02-23 RX ADMIN — Medication 125 MILLIGRAM(S): at 05:09

## 2017-02-23 RX ADMIN — Medication 125 MILLIGRAM(S): at 13:04

## 2017-02-23 RX ADMIN — BRIMONIDINE TARTRATE 1 DROP(S): 2 SOLUTION/ DROPS OPHTHALMIC at 17:16

## 2017-02-23 RX ADMIN — Medication 125 MILLIGRAM(S): at 00:03

## 2017-02-23 RX ADMIN — TAMSULOSIN HYDROCHLORIDE 0.4 MILLIGRAM(S): 0.4 CAPSULE ORAL at 22:11

## 2017-02-23 RX ADMIN — PANTOPRAZOLE SODIUM 40 MILLIGRAM(S): 20 TABLET, DELAYED RELEASE ORAL at 13:04

## 2017-02-23 RX ADMIN — Medication 63.75 MILLIMOLE(S): at 13:03

## 2017-02-23 RX ADMIN — Medication 1 DROP(S): at 17:16

## 2017-02-24 LAB
ANION GAP SERPL CALC-SCNC: 14 MMOL/L — SIGNIFICANT CHANGE UP (ref 5–17)
APTT BLD: 25.8 SEC — LOW (ref 27.5–37.4)
BILIRUB DIRECT SERPL-MCNC: 0.3 MG/DL — HIGH (ref 0–0.2)
BILIRUB INDIRECT FLD-MCNC: 1.9 MG/DL — HIGH (ref 0.2–1)
BILIRUB SERPL-MCNC: 2.2 MG/DL — HIGH (ref 0.2–1.2)
BLD GP AB SCN SERPL QL: NEGATIVE — SIGNIFICANT CHANGE UP
BUN SERPL-MCNC: 9 MG/DL — SIGNIFICANT CHANGE UP (ref 7–23)
CALCIUM SERPL-MCNC: 7.9 MG/DL — LOW (ref 8.4–10.5)
CHLORIDE SERPL-SCNC: 103 MMOL/L — SIGNIFICANT CHANGE UP (ref 96–108)
CO2 SERPL-SCNC: 20 MMOL/L — LOW (ref 22–31)
CREAT SERPL-MCNC: 0.43 MG/DL — LOW (ref 0.5–1.3)
DAT POLY-SP REAG RBC QL: NEGATIVE — SIGNIFICANT CHANGE UP
FERRITIN SERPL-MCNC: 369.8 NG/ML — HIGH (ref 15–150)
FIBRINOGEN PPP-MCNC: 499 MG/DL — SIGNIFICANT CHANGE UP (ref 255–510)
GLUCOSE SERPL-MCNC: 93 MG/DL — SIGNIFICANT CHANGE UP (ref 70–99)
HAPTOGLOB SERPL-MCNC: 61 MG/DL — SIGNIFICANT CHANGE UP (ref 34–200)
HCT VFR BLD CALC: 30.4 % — LOW (ref 34.5–45)
HGB BLD-MCNC: 10.6 G/DL — LOW (ref 11.5–15.5)
INR BLD: 1.01 RATIO — SIGNIFICANT CHANGE UP (ref 0.88–1.16)
IRON SATN MFR SERPL: 51 % — HIGH (ref 14–50)
IRON SATN MFR SERPL: 89 UG/DL — SIGNIFICANT CHANGE UP (ref 30–160)
LDH SERPL L TO P-CCNC: 384 U/L — HIGH (ref 50–242)
MAGNESIUM SERPL-MCNC: 1.9 MG/DL — SIGNIFICANT CHANGE UP (ref 1.6–2.6)
MCHC RBC-ENTMCNC: 33.3 PG — SIGNIFICANT CHANGE UP (ref 27–34)
MCHC RBC-ENTMCNC: 34.9 GM/DL — SIGNIFICANT CHANGE UP (ref 32–36)
MCV RBC AUTO: 95.6 FL — SIGNIFICANT CHANGE UP (ref 80–100)
PHOSPHATE SERPL-MCNC: 3.5 MG/DL — SIGNIFICANT CHANGE UP (ref 2.5–4.5)
PLATELET # BLD AUTO: 114 K/UL — LOW (ref 150–400)
POTASSIUM SERPL-MCNC: 4 MMOL/L — SIGNIFICANT CHANGE UP (ref 3.5–5.3)
POTASSIUM SERPL-SCNC: 4 MMOL/L — SIGNIFICANT CHANGE UP (ref 3.5–5.3)
PROTHROM AB SERPL-ACNC: 11.4 SEC — SIGNIFICANT CHANGE UP (ref 10–13.1)
RBC # BLD: 3.18 M/UL — LOW (ref 3.8–5.2)
RBC # BLD: 3.18 M/UL — LOW (ref 3.8–5.2)
RBC # FLD: 15.2 % — HIGH (ref 10.3–14.5)
RETICS #: 146.6 K/UL — HIGH (ref 25–125)
RETICS/RBC NFR: 4.6 % — HIGH (ref 0.5–2.5)
RH IG SCN BLD-IMP: POSITIVE — SIGNIFICANT CHANGE UP
SODIUM SERPL-SCNC: 137 MMOL/L — SIGNIFICANT CHANGE UP (ref 135–145)
TIBC SERPL-MCNC: 173 UG/DL — LOW (ref 220–430)
UIBC SERPL-MCNC: 84 UG/DL — LOW (ref 110–370)
WBC # BLD: 7.57 K/UL — SIGNIFICANT CHANGE UP (ref 3.8–10.5)
WBC # FLD AUTO: 7.57 K/UL — SIGNIFICANT CHANGE UP (ref 3.8–10.5)

## 2017-02-24 RX ADMIN — Medication 125 MILLIGRAM(S): at 11:56

## 2017-02-24 RX ADMIN — PANTOPRAZOLE SODIUM 40 MILLIGRAM(S): 20 TABLET, DELAYED RELEASE ORAL at 11:56

## 2017-02-24 RX ADMIN — Medication 1 DROP(S): at 05:36

## 2017-02-24 RX ADMIN — Medication 1 DROP(S): at 18:39

## 2017-02-24 RX ADMIN — Medication 125 MILLIGRAM(S): at 18:39

## 2017-02-24 RX ADMIN — BRIMONIDINE TARTRATE 1 DROP(S): 2 SOLUTION/ DROPS OPHTHALMIC at 18:39

## 2017-02-24 RX ADMIN — BRIMONIDINE TARTRATE 1 DROP(S): 2 SOLUTION/ DROPS OPHTHALMIC at 05:36

## 2017-02-24 RX ADMIN — Medication 125 MILLIGRAM(S): at 05:35

## 2017-02-24 RX ADMIN — RIVAROXABAN 15 MILLIGRAM(S): KIT at 11:56

## 2017-02-24 RX ADMIN — ATORVASTATIN CALCIUM 40 MILLIGRAM(S): 80 TABLET, FILM COATED ORAL at 22:05

## 2017-02-25 LAB
ANION GAP SERPL CALC-SCNC: 13 MMOL/L — SIGNIFICANT CHANGE UP (ref 5–17)
BUN SERPL-MCNC: 10 MG/DL — SIGNIFICANT CHANGE UP (ref 7–23)
CALCIUM SERPL-MCNC: 7.9 MG/DL — LOW (ref 8.4–10.5)
CHLORIDE SERPL-SCNC: 107 MMOL/L — SIGNIFICANT CHANGE UP (ref 96–108)
CO2 SERPL-SCNC: 18 MMOL/L — LOW (ref 22–31)
CREAT SERPL-MCNC: 0.38 MG/DL — LOW (ref 0.5–1.3)
GLUCOSE SERPL-MCNC: 92 MG/DL — SIGNIFICANT CHANGE UP (ref 70–99)
HCT VFR BLD CALC: 29.7 % — LOW (ref 34.5–45)
HGB BLD-MCNC: 10 G/DL — LOW (ref 11.5–15.5)
MAGNESIUM SERPL-MCNC: 1.8 MG/DL — SIGNIFICANT CHANGE UP (ref 1.6–2.6)
MCHC RBC-ENTMCNC: 33.3 PG — SIGNIFICANT CHANGE UP (ref 27–34)
MCHC RBC-ENTMCNC: 33.7 GM/DL — SIGNIFICANT CHANGE UP (ref 32–36)
MCV RBC AUTO: 99 FL — SIGNIFICANT CHANGE UP (ref 80–100)
PHOSPHATE SERPL-MCNC: 3.2 MG/DL — SIGNIFICANT CHANGE UP (ref 2.5–4.5)
PLATELET # BLD AUTO: 103 K/UL — LOW (ref 150–400)
POTASSIUM SERPL-MCNC: 4.2 MMOL/L — SIGNIFICANT CHANGE UP (ref 3.5–5.3)
POTASSIUM SERPL-SCNC: 4.2 MMOL/L — SIGNIFICANT CHANGE UP (ref 3.5–5.3)
RBC # BLD: 3 M/UL — LOW (ref 3.8–5.2)
RBC # FLD: 16 % — HIGH (ref 10.3–14.5)
SODIUM SERPL-SCNC: 138 MMOL/L — SIGNIFICANT CHANGE UP (ref 135–145)
WBC # BLD: 10.68 K/UL — HIGH (ref 3.8–10.5)
WBC # FLD AUTO: 10.68 K/UL — HIGH (ref 3.8–10.5)

## 2017-02-25 RX ORDER — MAGNESIUM SULFATE 500 MG/ML
2 VIAL (ML) INJECTION ONCE
Qty: 0 | Refills: 0 | Status: COMPLETED | OUTPATIENT
Start: 2017-02-25 | End: 2017-02-25

## 2017-02-25 RX ADMIN — BRIMONIDINE TARTRATE 1 DROP(S): 2 SOLUTION/ DROPS OPHTHALMIC at 05:20

## 2017-02-25 RX ADMIN — RIVAROXABAN 15 MILLIGRAM(S): KIT at 12:09

## 2017-02-25 RX ADMIN — Medication 1 DROP(S): at 18:10

## 2017-02-25 RX ADMIN — Medication 50 GRAM(S): at 12:08

## 2017-02-25 RX ADMIN — Medication 125 MILLIGRAM(S): at 12:09

## 2017-02-25 RX ADMIN — Medication 125 MILLIGRAM(S): at 05:19

## 2017-02-25 RX ADMIN — ATORVASTATIN CALCIUM 40 MILLIGRAM(S): 80 TABLET, FILM COATED ORAL at 21:30

## 2017-02-25 RX ADMIN — BRIMONIDINE TARTRATE 1 DROP(S): 2 SOLUTION/ DROPS OPHTHALMIC at 18:10

## 2017-02-25 RX ADMIN — Medication 125 MILLIGRAM(S): at 18:10

## 2017-02-25 RX ADMIN — PANTOPRAZOLE SODIUM 40 MILLIGRAM(S): 20 TABLET, DELAYED RELEASE ORAL at 12:09

## 2017-02-25 RX ADMIN — Medication 125 MILLIGRAM(S): at 01:04

## 2017-02-25 RX ADMIN — Medication 1 DROP(S): at 05:19

## 2017-02-26 LAB
ANION GAP SERPL CALC-SCNC: 13 MMOL/L — SIGNIFICANT CHANGE UP (ref 5–17)
BUN SERPL-MCNC: 8 MG/DL — SIGNIFICANT CHANGE UP (ref 7–23)
CALCIUM SERPL-MCNC: 8.4 MG/DL — SIGNIFICANT CHANGE UP (ref 8.4–10.5)
CHLORIDE SERPL-SCNC: 105 MMOL/L — SIGNIFICANT CHANGE UP (ref 96–108)
CO2 SERPL-SCNC: 20 MMOL/L — LOW (ref 22–31)
CREAT SERPL-MCNC: 0.34 MG/DL — LOW (ref 0.5–1.3)
GLUCOSE SERPL-MCNC: 80 MG/DL — SIGNIFICANT CHANGE UP (ref 70–99)
HCT VFR BLD CALC: 30.8 % — LOW (ref 34.5–45)
HGB BLD-MCNC: 10.8 G/DL — LOW (ref 11.5–15.5)
MAGNESIUM SERPL-MCNC: 2 MG/DL — SIGNIFICANT CHANGE UP (ref 1.6–2.6)
MCHC RBC-ENTMCNC: 34.3 PG — HIGH (ref 27–34)
MCHC RBC-ENTMCNC: 35.1 GM/DL — SIGNIFICANT CHANGE UP (ref 32–36)
MCV RBC AUTO: 97.8 FL — SIGNIFICANT CHANGE UP (ref 80–100)
PHOSPHATE SERPL-MCNC: 3 MG/DL — SIGNIFICANT CHANGE UP (ref 2.5–4.5)
PLATELET # BLD AUTO: 107 K/UL — LOW (ref 150–400)
POTASSIUM SERPL-MCNC: 4.3 MMOL/L — SIGNIFICANT CHANGE UP (ref 3.5–5.3)
POTASSIUM SERPL-SCNC: 4.3 MMOL/L — SIGNIFICANT CHANGE UP (ref 3.5–5.3)
RBC # BLD: 3.15 M/UL — LOW (ref 3.8–5.2)
RBC # FLD: 16.2 % — HIGH (ref 10.3–14.5)
SODIUM SERPL-SCNC: 138 MMOL/L — SIGNIFICANT CHANGE UP (ref 135–145)
WBC # BLD: 8.74 K/UL — SIGNIFICANT CHANGE UP (ref 3.8–10.5)
WBC # FLD AUTO: 8.74 K/UL — SIGNIFICANT CHANGE UP (ref 3.8–10.5)

## 2017-02-26 RX ADMIN — Medication 1 DROP(S): at 17:17

## 2017-02-26 RX ADMIN — BRIMONIDINE TARTRATE 1 DROP(S): 2 SOLUTION/ DROPS OPHTHALMIC at 17:15

## 2017-02-26 RX ADMIN — Medication 125 MILLIGRAM(S): at 17:33

## 2017-02-26 RX ADMIN — PANTOPRAZOLE SODIUM 40 MILLIGRAM(S): 20 TABLET, DELAYED RELEASE ORAL at 11:43

## 2017-02-26 RX ADMIN — BRIMONIDINE TARTRATE 1 DROP(S): 2 SOLUTION/ DROPS OPHTHALMIC at 05:06

## 2017-02-26 RX ADMIN — Medication 125 MILLIGRAM(S): at 11:43

## 2017-02-26 RX ADMIN — Medication 125 MILLIGRAM(S): at 05:06

## 2017-02-26 RX ADMIN — Medication 125 MILLIGRAM(S): at 01:09

## 2017-02-26 RX ADMIN — RIVAROXABAN 15 MILLIGRAM(S): KIT at 11:43

## 2017-02-26 RX ADMIN — Medication 1 DROP(S): at 05:06

## 2017-02-26 RX ADMIN — ATORVASTATIN CALCIUM 40 MILLIGRAM(S): 80 TABLET, FILM COATED ORAL at 21:23

## 2017-02-27 LAB
ANION GAP SERPL CALC-SCNC: 11 MMOL/L — SIGNIFICANT CHANGE UP (ref 5–17)
ANION GAP SERPL CALC-SCNC: 13 MMOL/L — SIGNIFICANT CHANGE UP (ref 5–17)
BUN SERPL-MCNC: 10 MG/DL — SIGNIFICANT CHANGE UP (ref 7–23)
BUN SERPL-MCNC: 10 MG/DL — SIGNIFICANT CHANGE UP (ref 7–23)
CALCIUM SERPL-MCNC: 7.8 MG/DL — LOW (ref 8.4–10.5)
CALCIUM SERPL-MCNC: 8.1 MG/DL — LOW (ref 8.4–10.5)
CHLORIDE SERPL-SCNC: 105 MMOL/L — SIGNIFICANT CHANGE UP (ref 96–108)
CHLORIDE SERPL-SCNC: 107 MMOL/L — SIGNIFICANT CHANGE UP (ref 96–108)
CO2 SERPL-SCNC: 20 MMOL/L — LOW (ref 22–31)
CO2 SERPL-SCNC: 21 MMOL/L — LOW (ref 22–31)
CREAT SERPL-MCNC: 0.35 MG/DL — LOW (ref 0.5–1.3)
CREAT SERPL-MCNC: 0.38 MG/DL — LOW (ref 0.5–1.3)
GLUCOSE SERPL-MCNC: 101 MG/DL — HIGH (ref 70–99)
GLUCOSE SERPL-MCNC: 121 MG/DL — HIGH (ref 70–99)
HCT VFR BLD CALC: 27.3 % — LOW (ref 34.5–45)
HCT VFR BLD CALC: 30.4 % — LOW (ref 34.5–45)
HGB BLD-MCNC: 10 G/DL — LOW (ref 11.5–15.5)
HGB BLD-MCNC: 10.8 G/DL — LOW (ref 11.5–15.5)
MAGNESIUM SERPL-MCNC: 1.6 MG/DL — SIGNIFICANT CHANGE UP (ref 1.6–2.6)
MAGNESIUM SERPL-MCNC: 1.8 MG/DL — SIGNIFICANT CHANGE UP (ref 1.6–2.6)
MCHC RBC-ENTMCNC: 34.3 PG — HIGH (ref 27–34)
MCHC RBC-ENTMCNC: 35.5 GM/DL — SIGNIFICANT CHANGE UP (ref 32–36)
MCHC RBC-ENTMCNC: 35.9 PG — HIGH (ref 27–34)
MCHC RBC-ENTMCNC: 36.7 GM/DL — HIGH (ref 32–36)
MCV RBC AUTO: 96.5 FL — SIGNIFICANT CHANGE UP (ref 80–100)
MCV RBC AUTO: 97.9 FL — SIGNIFICANT CHANGE UP (ref 80–100)
PHOSPHATE SERPL-MCNC: 2.6 MG/DL — SIGNIFICANT CHANGE UP (ref 2.5–4.5)
PHOSPHATE SERPL-MCNC: 3 MG/DL — SIGNIFICANT CHANGE UP (ref 2.5–4.5)
PLATELET # BLD AUTO: 123 K/UL — LOW (ref 150–400)
PLATELET # BLD AUTO: 133 K/UL — LOW (ref 150–400)
POTASSIUM SERPL-MCNC: 3.9 MMOL/L — SIGNIFICANT CHANGE UP (ref 3.5–5.3)
POTASSIUM SERPL-MCNC: 4 MMOL/L — SIGNIFICANT CHANGE UP (ref 3.5–5.3)
POTASSIUM SERPL-SCNC: 3.9 MMOL/L — SIGNIFICANT CHANGE UP (ref 3.5–5.3)
POTASSIUM SERPL-SCNC: 4 MMOL/L — SIGNIFICANT CHANGE UP (ref 3.5–5.3)
RBC # BLD: 2.79 M/UL — LOW (ref 3.8–5.2)
RBC # BLD: 3.15 M/UL — LOW (ref 3.8–5.2)
RBC # FLD: 16.1 % — HIGH (ref 10.3–14.5)
RBC # FLD: 16.8 % — HIGH (ref 10.3–14.5)
SODIUM SERPL-SCNC: 138 MMOL/L — SIGNIFICANT CHANGE UP (ref 135–145)
SODIUM SERPL-SCNC: 139 MMOL/L — SIGNIFICANT CHANGE UP (ref 135–145)
TSH SERPL-MCNC: 2.99 UIU/ML — SIGNIFICANT CHANGE UP (ref 0.27–4.2)
WBC # BLD: 8.4 K/UL — SIGNIFICANT CHANGE UP (ref 3.8–10.5)
WBC # BLD: 8.88 K/UL — SIGNIFICANT CHANGE UP (ref 3.8–10.5)
WBC # FLD AUTO: 8.4 K/UL — SIGNIFICANT CHANGE UP (ref 3.8–10.5)
WBC # FLD AUTO: 8.88 K/UL — SIGNIFICANT CHANGE UP (ref 3.8–10.5)

## 2017-02-27 PROCEDURE — 93010 ELECTROCARDIOGRAM REPORT: CPT | Mod: 76

## 2017-02-27 RX ORDER — METOPROLOL TARTRATE 50 MG
5 TABLET ORAL ONCE
Qty: 0 | Refills: 0 | Status: COMPLETED | OUTPATIENT
Start: 2017-02-27 | End: 2017-02-27

## 2017-02-27 RX ORDER — METOPROLOL TARTRATE 50 MG
25 TABLET ORAL
Qty: 0 | Refills: 0 | Status: DISCONTINUED | OUTPATIENT
Start: 2017-02-27 | End: 2017-03-01

## 2017-02-27 RX ORDER — SODIUM CHLORIDE 9 MG/ML
1000 INJECTION, SOLUTION INTRAVENOUS ONCE
Qty: 0 | Refills: 0 | Status: COMPLETED | OUTPATIENT
Start: 2017-02-27 | End: 2017-02-27

## 2017-02-27 RX ORDER — METOPROLOL TARTRATE 50 MG
5 TABLET ORAL ONCE
Qty: 0 | Refills: 0 | Status: DISCONTINUED | OUTPATIENT
Start: 2017-02-27 | End: 2017-02-27

## 2017-02-27 RX ADMIN — Medication 5 MILLIGRAM(S): at 13:34

## 2017-02-27 RX ADMIN — Medication 125 MILLIGRAM(S): at 23:14

## 2017-02-27 RX ADMIN — Medication 125 MILLIGRAM(S): at 05:36

## 2017-02-27 RX ADMIN — Medication 125 MILLIGRAM(S): at 00:16

## 2017-02-27 RX ADMIN — RIVAROXABAN 15 MILLIGRAM(S): KIT at 12:19

## 2017-02-27 RX ADMIN — SODIUM CHLORIDE 1000 MILLILITER(S): 9 INJECTION, SOLUTION INTRAVENOUS at 12:57

## 2017-02-27 RX ADMIN — Medication 1 DROP(S): at 05:37

## 2017-02-27 RX ADMIN — Medication 125 MILLIGRAM(S): at 12:19

## 2017-02-27 RX ADMIN — Medication 25 MILLIGRAM(S): at 18:00

## 2017-02-27 RX ADMIN — PANTOPRAZOLE SODIUM 40 MILLIGRAM(S): 20 TABLET, DELAYED RELEASE ORAL at 12:19

## 2017-02-27 RX ADMIN — BRIMONIDINE TARTRATE 1 DROP(S): 2 SOLUTION/ DROPS OPHTHALMIC at 18:32

## 2017-02-27 RX ADMIN — Medication 125 MILLIGRAM(S): at 18:31

## 2017-02-27 RX ADMIN — Medication 1 DROP(S): at 18:32

## 2017-02-27 RX ADMIN — ATORVASTATIN CALCIUM 40 MILLIGRAM(S): 80 TABLET, FILM COATED ORAL at 22:21

## 2017-02-27 RX ADMIN — BRIMONIDINE TARTRATE 1 DROP(S): 2 SOLUTION/ DROPS OPHTHALMIC at 05:37

## 2017-02-27 NOTE — PROVIDER CONTACT NOTE (OTHER) - RECOMMENDATIONS
Contact provider. bladder scan?
Md to assess pt.
NGT is currently clamped. Please come evaluate pt.
Review electrolytes?
contact provider as ordered
contact provider.
none
straight cath pt and if more that 300 drains keep quinones in. 375 drained quinones left in for now
will re-evaluate for Lopressor at 6 am.

## 2017-02-28 LAB
ANION GAP SERPL CALC-SCNC: 11 MMOL/L — SIGNIFICANT CHANGE UP (ref 5–17)
BUN SERPL-MCNC: 11 MG/DL — SIGNIFICANT CHANGE UP (ref 7–23)
CALCIUM SERPL-MCNC: 7.7 MG/DL — LOW (ref 8.4–10.5)
CHLORIDE SERPL-SCNC: 108 MMOL/L — SIGNIFICANT CHANGE UP (ref 96–108)
CO2 SERPL-SCNC: 21 MMOL/L — LOW (ref 22–31)
CREAT SERPL-MCNC: 0.45 MG/DL — LOW (ref 0.5–1.3)
GLUCOSE SERPL-MCNC: 97 MG/DL — SIGNIFICANT CHANGE UP (ref 70–99)
HCT VFR BLD CALC: 26.1 % — LOW (ref 34.5–45)
HGB BLD-MCNC: 9 G/DL — LOW (ref 11.5–15.5)
MAGNESIUM SERPL-MCNC: 1.7 MG/DL — SIGNIFICANT CHANGE UP (ref 1.6–2.6)
MCHC RBC-ENTMCNC: 34 PG — SIGNIFICANT CHANGE UP (ref 27–34)
MCHC RBC-ENTMCNC: 34.5 GM/DL — SIGNIFICANT CHANGE UP (ref 32–36)
MCV RBC AUTO: 98.5 FL — SIGNIFICANT CHANGE UP (ref 80–100)
PHOSPHATE SERPL-MCNC: 3.2 MG/DL — SIGNIFICANT CHANGE UP (ref 2.5–4.5)
PLATELET # BLD AUTO: 126 K/UL — LOW (ref 150–400)
POTASSIUM SERPL-MCNC: 3.9 MMOL/L — SIGNIFICANT CHANGE UP (ref 3.5–5.3)
POTASSIUM SERPL-SCNC: 3.9 MMOL/L — SIGNIFICANT CHANGE UP (ref 3.5–5.3)
RBC # BLD: 2.65 M/UL — LOW (ref 3.8–5.2)
RBC # FLD: 17.6 % — HIGH (ref 10.3–14.5)
SODIUM SERPL-SCNC: 140 MMOL/L — SIGNIFICANT CHANGE UP (ref 135–145)
WBC # BLD: 6.28 K/UL — SIGNIFICANT CHANGE UP (ref 3.8–10.5)
WBC # FLD AUTO: 6.28 K/UL — SIGNIFICANT CHANGE UP (ref 3.8–10.5)

## 2017-02-28 RX ADMIN — Medication 25 MILLIGRAM(S): at 07:00

## 2017-02-28 RX ADMIN — Medication 1 DROP(S): at 07:00

## 2017-02-28 RX ADMIN — Medication 125 MILLIGRAM(S): at 11:34

## 2017-02-28 RX ADMIN — Medication 25 MILLIGRAM(S): at 17:45

## 2017-02-28 RX ADMIN — BRIMONIDINE TARTRATE 1 DROP(S): 2 SOLUTION/ DROPS OPHTHALMIC at 17:35

## 2017-02-28 RX ADMIN — PANTOPRAZOLE SODIUM 40 MILLIGRAM(S): 20 TABLET, DELAYED RELEASE ORAL at 11:34

## 2017-02-28 RX ADMIN — BRIMONIDINE TARTRATE 1 DROP(S): 2 SOLUTION/ DROPS OPHTHALMIC at 07:00

## 2017-02-28 RX ADMIN — Medication 125 MILLIGRAM(S): at 07:01

## 2017-02-28 RX ADMIN — Medication 125 MILLIGRAM(S): at 23:10

## 2017-02-28 RX ADMIN — RIVAROXABAN 15 MILLIGRAM(S): KIT at 11:34

## 2017-02-28 RX ADMIN — ATORVASTATIN CALCIUM 40 MILLIGRAM(S): 80 TABLET, FILM COATED ORAL at 23:10

## 2017-02-28 RX ADMIN — Medication 125 MILLIGRAM(S): at 17:36

## 2017-02-28 RX ADMIN — Medication 1 DROP(S): at 17:36

## 2017-03-01 LAB
ANION GAP SERPL CALC-SCNC: 13 MMOL/L — SIGNIFICANT CHANGE UP (ref 5–17)
BUN SERPL-MCNC: 14 MG/DL — SIGNIFICANT CHANGE UP (ref 7–23)
CALCIUM SERPL-MCNC: 8.3 MG/DL — LOW (ref 8.4–10.5)
CHLORIDE SERPL-SCNC: 105 MMOL/L — SIGNIFICANT CHANGE UP (ref 96–108)
CO2 SERPL-SCNC: 23 MMOL/L — SIGNIFICANT CHANGE UP (ref 22–31)
CREAT SERPL-MCNC: 0.46 MG/DL — LOW (ref 0.5–1.3)
GLUCOSE SERPL-MCNC: 102 MG/DL — HIGH (ref 70–99)
HCT VFR BLD CALC: 28 % — LOW (ref 34.5–45)
HGB BLD-MCNC: 9.6 G/DL — LOW (ref 11.5–15.5)
MCHC RBC-ENTMCNC: 34 PG — SIGNIFICANT CHANGE UP (ref 27–34)
MCHC RBC-ENTMCNC: 34.3 GM/DL — SIGNIFICANT CHANGE UP (ref 32–36)
MCV RBC AUTO: 99.3 FL — SIGNIFICANT CHANGE UP (ref 80–100)
METHYLMALONATE SERPL-SCNC: 79 NMOL/L — SIGNIFICANT CHANGE UP (ref 0–378)
PLATELET # BLD AUTO: 107 K/UL — LOW (ref 150–400)
POTASSIUM SERPL-MCNC: 3.7 MMOL/L — SIGNIFICANT CHANGE UP (ref 3.5–5.3)
POTASSIUM SERPL-SCNC: 3.7 MMOL/L — SIGNIFICANT CHANGE UP (ref 3.5–5.3)
RBC # BLD: 2.82 M/UL — LOW (ref 3.8–5.2)
RBC # FLD: 17.8 % — HIGH (ref 10.3–14.5)
SODIUM SERPL-SCNC: 141 MMOL/L — SIGNIFICANT CHANGE UP (ref 135–145)
WBC # BLD: 5.95 K/UL — SIGNIFICANT CHANGE UP (ref 3.8–10.5)
WBC # FLD AUTO: 5.95 K/UL — SIGNIFICANT CHANGE UP (ref 3.8–10.5)

## 2017-03-01 RX ORDER — VANCOMYCIN HCL 1 G
2.5 VIAL (EA) INTRAVENOUS
Qty: 0 | Refills: 0 | COMMUNITY
Start: 2017-03-01

## 2017-03-01 RX ORDER — METOPROLOL TARTRATE 50 MG
1 TABLET ORAL
Qty: 0 | Refills: 0 | COMMUNITY
Start: 2017-03-01

## 2017-03-01 RX ORDER — METOPROLOL TARTRATE 50 MG
12.5 TABLET ORAL
Qty: 0 | Refills: 0 | COMMUNITY
Start: 2017-03-01

## 2017-03-01 RX ORDER — PANTOPRAZOLE SODIUM 20 MG/1
40 TABLET, DELAYED RELEASE ORAL
Qty: 0 | Refills: 0 | Status: DISCONTINUED | OUTPATIENT
Start: 2017-03-01 | End: 2017-03-02

## 2017-03-01 RX ORDER — METOPROLOL TARTRATE 50 MG
12.5 TABLET ORAL
Qty: 0 | Refills: 0 | Status: DISCONTINUED | OUTPATIENT
Start: 2017-03-01 | End: 2017-03-02

## 2017-03-01 RX ORDER — ESOMEPRAZOLE MAGNESIUM 40 MG/1
1 CAPSULE, DELAYED RELEASE ORAL
Qty: 0 | Refills: 0 | COMMUNITY

## 2017-03-01 RX ADMIN — Medication 125 MILLIGRAM(S): at 18:04

## 2017-03-01 RX ADMIN — Medication 125 MILLIGRAM(S): at 06:51

## 2017-03-01 RX ADMIN — BRIMONIDINE TARTRATE 1 DROP(S): 2 SOLUTION/ DROPS OPHTHALMIC at 06:50

## 2017-03-01 RX ADMIN — ATORVASTATIN CALCIUM 40 MILLIGRAM(S): 80 TABLET, FILM COATED ORAL at 22:47

## 2017-03-01 RX ADMIN — RIVAROXABAN 15 MILLIGRAM(S): KIT at 11:21

## 2017-03-01 RX ADMIN — Medication 125 MILLIGRAM(S): at 11:21

## 2017-03-01 RX ADMIN — BRIMONIDINE TARTRATE 1 DROP(S): 2 SOLUTION/ DROPS OPHTHALMIC at 18:03

## 2017-03-01 RX ADMIN — Medication 12.5 MILLIGRAM(S): at 18:10

## 2017-03-01 RX ADMIN — Medication 1 DROP(S): at 18:03

## 2017-03-01 RX ADMIN — Medication 25 MILLIGRAM(S): at 06:51

## 2017-03-01 RX ADMIN — Medication 1 DROP(S): at 06:50

## 2017-03-01 NOTE — PROVIDER CONTACT NOTE (OTHER) - ACTION/TREATMENT ORDERED:
NP will order BUN/creatinine blood work and possibly IV fluids depending on results. No further orders at this time. Continue to monitor the pt.

## 2017-03-02 VITALS
TEMPERATURE: 98 F | RESPIRATION RATE: 18 BRPM | HEART RATE: 68 BPM | DIASTOLIC BLOOD PRESSURE: 55 MMHG | SYSTOLIC BLOOD PRESSURE: 120 MMHG | OXYGEN SATURATION: 96 %

## 2017-03-02 LAB
ANION GAP SERPL CALC-SCNC: 10 MMOL/L — SIGNIFICANT CHANGE UP (ref 5–17)
BUN SERPL-MCNC: 12 MG/DL — SIGNIFICANT CHANGE UP (ref 7–23)
CALCIUM SERPL-MCNC: 7.9 MG/DL — LOW (ref 8.4–10.5)
CHLORIDE SERPL-SCNC: 107 MMOL/L — SIGNIFICANT CHANGE UP (ref 96–108)
CO2 SERPL-SCNC: 22 MMOL/L — SIGNIFICANT CHANGE UP (ref 22–31)
CREAT SERPL-MCNC: 0.4 MG/DL — LOW (ref 0.5–1.3)
GLUCOSE SERPL-MCNC: 83 MG/DL — SIGNIFICANT CHANGE UP (ref 70–99)
HCT VFR BLD CALC: 26.3 % — LOW (ref 34.5–45)
HCT VFR BLD CALC: 28.1 % — LOW (ref 34.5–45)
HGB BLD-MCNC: 8.9 G/DL — LOW (ref 11.5–15.5)
HGB BLD-MCNC: 9.9 G/DL — LOW (ref 11.5–15.5)
MAGNESIUM SERPL-MCNC: 1.7 MG/DL — SIGNIFICANT CHANGE UP (ref 1.6–2.6)
MCHC RBC-ENTMCNC: 33.3 PG — SIGNIFICANT CHANGE UP (ref 27–34)
MCHC RBC-ENTMCNC: 33.8 GM/DL — SIGNIFICANT CHANGE UP (ref 32–36)
MCHC RBC-ENTMCNC: 35.3 GM/DL — SIGNIFICANT CHANGE UP (ref 32–36)
MCHC RBC-ENTMCNC: 35.4 PG — HIGH (ref 27–34)
MCV RBC AUTO: 100 FL — SIGNIFICANT CHANGE UP (ref 80–100)
MCV RBC AUTO: 98.5 FL — SIGNIFICANT CHANGE UP (ref 80–100)
PHOSPHATE SERPL-MCNC: 3.2 MG/DL — SIGNIFICANT CHANGE UP (ref 2.5–4.5)
PLATELET # BLD AUTO: 125 K/UL — LOW (ref 150–400)
PLATELET # BLD AUTO: 130 K/UL — LOW (ref 150–400)
POTASSIUM SERPL-MCNC: 3.8 MMOL/L — SIGNIFICANT CHANGE UP (ref 3.5–5.3)
POTASSIUM SERPL-SCNC: 3.8 MMOL/L — SIGNIFICANT CHANGE UP (ref 3.5–5.3)
RBC # BLD: 2.67 M/UL — LOW (ref 3.8–5.2)
RBC # BLD: 2.8 M/UL — LOW (ref 3.8–5.2)
RBC # FLD: 16.1 % — HIGH (ref 10.3–14.5)
RBC # FLD: 18.1 % — HIGH (ref 10.3–14.5)
SODIUM SERPL-SCNC: 139 MMOL/L — SIGNIFICANT CHANGE UP (ref 135–145)
WBC # BLD: 5.54 K/UL — SIGNIFICANT CHANGE UP (ref 3.8–10.5)
WBC # BLD: 6.2 K/UL — SIGNIFICANT CHANGE UP (ref 3.8–10.5)
WBC # FLD AUTO: 5.54 K/UL — SIGNIFICANT CHANGE UP (ref 3.8–10.5)
WBC # FLD AUTO: 6.2 K/UL — SIGNIFICANT CHANGE UP (ref 3.8–10.5)

## 2017-03-02 PROCEDURE — 74018 RADEX ABDOMEN 1 VIEW: CPT

## 2017-03-02 PROCEDURE — 85610 PROTHROMBIN TIME: CPT

## 2017-03-02 PROCEDURE — 80076 HEPATIC FUNCTION PANEL: CPT

## 2017-03-02 PROCEDURE — 82803 BLOOD GASES ANY COMBINATION: CPT

## 2017-03-02 PROCEDURE — 97116 GAIT TRAINING THERAPY: CPT

## 2017-03-02 PROCEDURE — 87493 C DIFF AMPLIFIED PROBE: CPT

## 2017-03-02 PROCEDURE — 99285 EMERGENCY DEPT VISIT HI MDM: CPT | Mod: 25

## 2017-03-02 PROCEDURE — 85014 HEMATOCRIT: CPT

## 2017-03-02 PROCEDURE — 85027 COMPLETE CBC AUTOMATED: CPT

## 2017-03-02 PROCEDURE — 93306 TTE W/DOPPLER COMPLETE: CPT

## 2017-03-02 PROCEDURE — 86901 BLOOD TYPING SEROLOGIC RH(D): CPT

## 2017-03-02 PROCEDURE — 85730 THROMBOPLASTIN TIME PARTIAL: CPT

## 2017-03-02 PROCEDURE — 86850 RBC ANTIBODY SCREEN: CPT

## 2017-03-02 PROCEDURE — 82553 CREATINE MB FRACTION: CPT

## 2017-03-02 PROCEDURE — 97530 THERAPEUTIC ACTIVITIES: CPT

## 2017-03-02 PROCEDURE — 82435 ASSAY OF BLOOD CHLORIDE: CPT

## 2017-03-02 PROCEDURE — 96375 TX/PRO/DX INJ NEW DRUG ADDON: CPT | Mod: XU

## 2017-03-02 PROCEDURE — 82947 ASSAY GLUCOSE BLOOD QUANT: CPT

## 2017-03-02 PROCEDURE — 83615 LACTATE (LD) (LDH) ENZYME: CPT

## 2017-03-02 PROCEDURE — 96374 THER/PROPH/DIAG INJ IV PUSH: CPT | Mod: XU

## 2017-03-02 PROCEDURE — 94002 VENT MGMT INPAT INIT DAY: CPT

## 2017-03-02 PROCEDURE — 97110 THERAPEUTIC EXERCISES: CPT

## 2017-03-02 PROCEDURE — 82330 ASSAY OF CALCIUM: CPT

## 2017-03-02 PROCEDURE — 87086 URINE CULTURE/COLONY COUNT: CPT

## 2017-03-02 PROCEDURE — 74177 CT ABD & PELVIS W/CONTRAST: CPT

## 2017-03-02 PROCEDURE — 81001 URINALYSIS AUTO W/SCOPE: CPT

## 2017-03-02 PROCEDURE — 80053 COMPREHEN METABOLIC PANEL: CPT

## 2017-03-02 PROCEDURE — 93005 ELECTROCARDIOGRAM TRACING: CPT

## 2017-03-02 PROCEDURE — 82728 ASSAY OF FERRITIN: CPT

## 2017-03-02 PROCEDURE — 84132 ASSAY OF SERUM POTASSIUM: CPT

## 2017-03-02 PROCEDURE — 84484 ASSAY OF TROPONIN QUANT: CPT

## 2017-03-02 PROCEDURE — 84295 ASSAY OF SERUM SODIUM: CPT

## 2017-03-02 PROCEDURE — 71045 X-RAY EXAM CHEST 1 VIEW: CPT

## 2017-03-02 PROCEDURE — 94003 VENT MGMT INPAT SUBQ DAY: CPT

## 2017-03-02 PROCEDURE — 86880 COOMBS TEST DIRECT: CPT

## 2017-03-02 PROCEDURE — 83735 ASSAY OF MAGNESIUM: CPT

## 2017-03-02 PROCEDURE — 82248 BILIRUBIN DIRECT: CPT

## 2017-03-02 PROCEDURE — 83690 ASSAY OF LIPASE: CPT

## 2017-03-02 PROCEDURE — 83921 ORGANIC ACID SINGLE QUANT: CPT

## 2017-03-02 PROCEDURE — 70450 CT HEAD/BRAIN W/O DYE: CPT

## 2017-03-02 PROCEDURE — 97162 PT EVAL MOD COMPLEX 30 MIN: CPT

## 2017-03-02 PROCEDURE — 74176 CT ABD & PELVIS W/O CONTRAST: CPT

## 2017-03-02 PROCEDURE — 87186 SC STD MICRODIL/AGAR DIL: CPT

## 2017-03-02 PROCEDURE — 86900 BLOOD TYPING SEROLOGIC ABO: CPT

## 2017-03-02 PROCEDURE — 94640 AIRWAY INHALATION TREATMENT: CPT

## 2017-03-02 PROCEDURE — 84443 ASSAY THYROID STIM HORMONE: CPT

## 2017-03-02 PROCEDURE — 84100 ASSAY OF PHOSPHORUS: CPT

## 2017-03-02 PROCEDURE — 80048 BASIC METABOLIC PNL TOTAL CA: CPT

## 2017-03-02 PROCEDURE — 82746 ASSAY OF FOLIC ACID SERUM: CPT

## 2017-03-02 PROCEDURE — 85384 FIBRINOGEN ACTIVITY: CPT

## 2017-03-02 PROCEDURE — 82550 ASSAY OF CK (CPK): CPT

## 2017-03-02 PROCEDURE — 83605 ASSAY OF LACTIC ACID: CPT

## 2017-03-02 PROCEDURE — 82607 VITAMIN B-12: CPT

## 2017-03-02 PROCEDURE — 83550 IRON BINDING TEST: CPT

## 2017-03-02 PROCEDURE — 83010 ASSAY OF HAPTOGLOBIN QUANT: CPT

## 2017-03-02 PROCEDURE — 82247 BILIRUBIN TOTAL: CPT

## 2017-03-02 PROCEDURE — 83880 ASSAY OF NATRIURETIC PEPTIDE: CPT

## 2017-03-02 PROCEDURE — 85045 AUTOMATED RETICULOCYTE COUNT: CPT

## 2017-03-02 PROCEDURE — 88302 TISSUE EXAM BY PATHOLOGIST: CPT

## 2017-03-02 RX ADMIN — Medication 125 MILLIGRAM(S): at 00:01

## 2017-03-02 RX ADMIN — Medication 125 MILLIGRAM(S): at 06:47

## 2017-03-02 RX ADMIN — Medication 125 MILLIGRAM(S): at 11:12

## 2017-03-02 RX ADMIN — BRIMONIDINE TARTRATE 1 DROP(S): 2 SOLUTION/ DROPS OPHTHALMIC at 06:58

## 2017-03-02 RX ADMIN — Medication 12.5 MILLIGRAM(S): at 06:57

## 2017-03-02 RX ADMIN — Medication 1 DROP(S): at 06:58

## 2017-03-02 RX ADMIN — PANTOPRAZOLE SODIUM 40 MILLIGRAM(S): 20 TABLET, DELAYED RELEASE ORAL at 06:57

## 2017-03-02 RX ADMIN — RIVAROXABAN 15 MILLIGRAM(S): KIT at 11:12

## 2017-03-02 NOTE — PROVIDER CONTACT NOTE (OTHER) - SITUATION
Pt had a 2.7 second pause and HR drop to 38. Pt is sinus clarita on tele. Pt HR drop to 42. Pt is sinus clarita on tele.

## 2017-03-02 NOTE — PROVIDER CONTACT NOTE (OTHER) - ACTION/TREATMENT ORDERED:
NP will discontinue Metoprolol PO 12.5mg order. No further orders at this time. Continue to monitor the pt. No further orders at this time. Continue to monitor the pt.

## 2017-03-02 NOTE — PROVIDER CONTACT NOTE (OTHER) - ASSESSMENT
Pt was asleep during time of pause and HR drop to 38. Pt is asymptomatic. HR:54. BP: 96/55. K: 3.7 (3/1). Pt received Metoprolol 12.5mg at 6am on 3/2. Pt was asleep during time of HR drop to 42. Pt is asymptomatic. HR:54. BP: 96/55. K: 3.7 (3/1). Pt received Metoprolol 12.5mg at 6am on 3/2.

## 2017-03-02 NOTE — PROVIDER CONTACT NOTE (OTHER) - BACKGROUND
2/15- exploratory lap with lysis of adhesions
2/15-exploratory lap with lysis of adhesions
2/6- admitted with abdominal pain - small bowel obstruction  2/15 exploratory lap with lysis of adhesions  2/20 Cdiff positive
Admitted with SBO.Pt is on Octreotide drip and Protonix IVP.Pt came back from CT Abdomen. NGT reconnected to low wall suction; bright red drainage noted in tube.
Pt. was admitted with intestinal obstruction, history of afib. Was on Xaralto.Pt is currently in SR on tele monitor. Metropolol 2.5 ml was held at midnight for HR of 54 as per parameter
SBO xlap  shayan, + UTI + CDIFF
SBO, rapid A fib
Several attempts made to bladder scan pt., Pt. became agitated and pulled out NGT
VS - 136/84 - , 124/71 - HR - 133
admitted for SBO
patient on 1:1 observation for restlessness/safety  admitted for SBO
pt had 2 quinones prior for retention. pt is s/p JOSE J
s/p small bowel obstruction and NGT placement
s/p small bowel obstruction. NGT placement
s/p small bowel obstruction. NGT placement
admitted for SBO and rapid Afib.

## 2017-03-21 ENCOUNTER — APPOINTMENT (OUTPATIENT)
Dept: UROLOGY | Facility: CLINIC | Age: 82
End: 2017-03-21

## 2017-03-21 VITALS — SYSTOLIC BLOOD PRESSURE: 104 MMHG | HEART RATE: 86 BPM | DIASTOLIC BLOOD PRESSURE: 64 MMHG | RESPIRATION RATE: 20 BRPM

## 2017-03-21 DIAGNOSIS — Z87.19 PERSONAL HISTORY OF OTHER DISEASES OF THE DIGESTIVE SYSTEM: ICD-10-CM

## 2017-03-21 DIAGNOSIS — I48.91 UNSPECIFIED ATRIAL FIBRILLATION: ICD-10-CM

## 2017-03-21 DIAGNOSIS — Z86.69 PERSONAL HISTORY OF OTHER DISEASES OF THE NERVOUS SYSTEM AND SENSE ORGANS: ICD-10-CM

## 2017-03-21 DIAGNOSIS — R33.9 RETENTION OF URINE, UNSPECIFIED: ICD-10-CM

## 2017-03-21 DIAGNOSIS — Z78.9 OTHER SPECIFIED HEALTH STATUS: ICD-10-CM

## 2017-03-21 RX ORDER — SILVER SULFADIAZINE 10 MG/G
1 CREAM TOPICAL
Refills: 0 | Status: ACTIVE | COMMUNITY

## 2017-03-21 RX ORDER — BRIMONIDINE TARTRATE 2 MG/MG
0.2 SOLUTION/ DROPS OPHTHALMIC
Refills: 0 | Status: ACTIVE | COMMUNITY

## 2017-03-21 RX ORDER — SULFAMETHOXAZOLE AND TRIMETHOPRIM 800; 160 MG/1; MG/1
800-160 TABLET ORAL TWICE DAILY
Qty: 6 | Refills: 0 | Status: ACTIVE | COMMUNITY
Start: 2017-03-21 | End: 1900-01-01

## 2017-03-21 RX ORDER — NYSTATIN 100000 1/G
POWDER TOPICAL
Refills: 0 | Status: ACTIVE | COMMUNITY

## 2017-03-21 RX ORDER — METOPROLOL TARTRATE 25 MG/1
25 TABLET, FILM COATED ORAL
Refills: 0 | Status: ACTIVE | COMMUNITY

## 2017-03-21 RX ORDER — LEVOBUNOLOL HYDROCHLORIDE 5 MG/ML
0.5 SOLUTION/ DROPS OPHTHALMIC
Refills: 0 | Status: ACTIVE | COMMUNITY

## 2017-03-21 RX ORDER — PANTOPRAZOLE SODIUM 40 MG/1
40 TABLET, DELAYED RELEASE ORAL
Refills: 0 | Status: ACTIVE | COMMUNITY

## 2017-04-06 ENCOUNTER — INPATIENT (INPATIENT)
Facility: HOSPITAL | Age: 82
LOS: 4 days | Discharge: HOME | DRG: 604 | End: 2017-04-11
Attending: INTERNAL MEDICINE | Admitting: INTERNAL MEDICINE
Payer: MEDICARE

## 2017-04-06 VITALS
SYSTOLIC BLOOD PRESSURE: 126 MMHG | TEMPERATURE: 98 F | HEART RATE: 76 BPM | DIASTOLIC BLOOD PRESSURE: 72 MMHG | OXYGEN SATURATION: 100 % | RESPIRATION RATE: 18 BRPM
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
## 2017-04-06 LAB
ALBUMIN SERPL ELPH-MCNC: 3 G/DL — LOW (ref 3.3–5)
ALP SERPL-CCNC: 63 U/L — SIGNIFICANT CHANGE UP (ref 40–120)
ALT FLD-CCNC: 20 U/L RC — SIGNIFICANT CHANGE UP (ref 10–45)
ANION GAP SERPL CALC-SCNC: 13 MMOL/L — SIGNIFICANT CHANGE UP (ref 5–17)
APTT BLD: 31.6 SEC — SIGNIFICANT CHANGE UP (ref 27.5–37.4)
AST SERPL-CCNC: 55 U/L — HIGH (ref 10–40)
BASOPHILS # BLD AUTO: 0.1 K/UL — SIGNIFICANT CHANGE UP (ref 0–0.2)
BASOPHILS NFR BLD AUTO: 0.9 % — SIGNIFICANT CHANGE UP (ref 0–2)
BILIRUB SERPL-MCNC: 0.8 MG/DL — SIGNIFICANT CHANGE UP (ref 0.2–1.2)
BUN SERPL-MCNC: 21 MG/DL — SIGNIFICANT CHANGE UP (ref 7–23)
CALCIUM SERPL-MCNC: 8.2 MG/DL — LOW (ref 8.4–10.5)
CHLORIDE SERPL-SCNC: 104 MMOL/L — SIGNIFICANT CHANGE UP (ref 96–108)
CO2 SERPL-SCNC: 22 MMOL/L — SIGNIFICANT CHANGE UP (ref 22–31)
CREAT SERPL-MCNC: 0.59 MG/DL — SIGNIFICANT CHANGE UP (ref 0.5–1.3)
EOSINOPHIL # BLD AUTO: 0.1 K/UL — SIGNIFICANT CHANGE UP (ref 0–0.5)
EOSINOPHIL NFR BLD AUTO: 1.4 % — SIGNIFICANT CHANGE UP (ref 0–6)
GLUCOSE SERPL-MCNC: 88 MG/DL — SIGNIFICANT CHANGE UP (ref 70–99)
HCT VFR BLD CALC: 32.3 % — LOW (ref 34.5–45)
HGB BLD-MCNC: 10.8 G/DL — LOW (ref 11.5–15.5)
INR BLD: 2 RATIO — HIGH (ref 0.88–1.16)
LYMPHOCYTES # BLD AUTO: 1.6 K/UL — SIGNIFICANT CHANGE UP (ref 1–3.3)
LYMPHOCYTES # BLD AUTO: 25.3 % — SIGNIFICANT CHANGE UP (ref 13–44)
MCHC RBC-ENTMCNC: 33.5 GM/DL — SIGNIFICANT CHANGE UP (ref 32–36)
MCHC RBC-ENTMCNC: 35.5 PG — HIGH (ref 27–34)
MCV RBC AUTO: 106 FL — HIGH (ref 80–100)
MONOCYTES # BLD AUTO: 0.5 K/UL — SIGNIFICANT CHANGE UP (ref 0–0.9)
MONOCYTES NFR BLD AUTO: 8.2 % — SIGNIFICANT CHANGE UP (ref 2–14)
NEUTROPHILS # BLD AUTO: 4 K/UL — SIGNIFICANT CHANGE UP (ref 1.8–7.4)
NEUTROPHILS NFR BLD AUTO: 64.2 % — SIGNIFICANT CHANGE UP (ref 43–77)
PLATELET # BLD AUTO: 172 K/UL — SIGNIFICANT CHANGE UP (ref 150–400)
POTASSIUM SERPL-MCNC: 6.8 MMOL/L — CRITICAL HIGH (ref 3.5–5.3)
POTASSIUM SERPL-SCNC: 6.8 MMOL/L — CRITICAL HIGH (ref 3.5–5.3)
PROT SERPL-MCNC: 5.7 G/DL — LOW (ref 6–8.3)
PROTHROM AB SERPL-ACNC: 22.1 SEC — HIGH (ref 9.8–12.7)
RBC # BLD: 3.05 M/UL — LOW (ref 3.8–5.2)
RBC # FLD: 13.8 % — SIGNIFICANT CHANGE UP (ref 10.3–14.5)
SODIUM SERPL-SCNC: 139 MMOL/L — SIGNIFICANT CHANGE UP (ref 135–145)
WBC # BLD: 6.3 K/UL — SIGNIFICANT CHANGE UP (ref 3.8–10.5)
WBC # FLD AUTO: 6.3 K/UL — SIGNIFICANT CHANGE UP (ref 3.8–10.5)

## 2017-04-06 PROCEDURE — 93010 ELECTROCARDIOGRAM REPORT: CPT

## 2017-04-06 PROCEDURE — 70450 CT HEAD/BRAIN W/O DYE: CPT | Mod: 26

## 2017-04-06 PROCEDURE — 99285 EMERGENCY DEPT VISIT HI MDM: CPT | Mod: 25,GC

## 2017-04-06 PROCEDURE — 71010: CPT | Mod: 26

## 2017-04-06 PROCEDURE — 70486 CT MAXILLOFACIAL W/O DYE: CPT | Mod: 26

## 2017-04-06 PROCEDURE — 73502 X-RAY EXAM HIP UNI 2-3 VIEWS: CPT | Mod: 26,RT

## 2017-04-06 PROCEDURE — 72100 X-RAY EXAM L-S SPINE 2/3 VWS: CPT | Mod: 26

## 2017-04-06 PROCEDURE — 72125 CT NECK SPINE W/O DYE: CPT | Mod: 26

## 2017-04-06 RX ORDER — TETANUS TOXOID, REDUCED DIPHTHERIA TOXOID AND ACELLULAR PERTUSSIS VACCINE, ADSORBED 5; 2.5; 8; 8; 2.5 [IU]/.5ML; [IU]/.5ML; UG/.5ML; UG/.5ML; UG/.5ML
0.5 SUSPENSION INTRAMUSCULAR ONCE
Qty: 0 | Refills: 0 | Status: COMPLETED | OUTPATIENT
Start: 2017-04-06 | End: 2017-04-06

## 2017-04-06 RX ADMIN — TETANUS TOXOID, REDUCED DIPHTHERIA TOXOID AND ACELLULAR PERTUSSIS VACCINE, ADSORBED 0.5 MILLILITER(S): 5; 2.5; 8; 8; 2.5 SUSPENSION INTRAMUSCULAR at 19:04

## 2017-04-06 NOTE — ED ADULT NURSE REASSESSMENT NOTE - NS ED NURSE REASSESS COMMENT FT1
1900 - report taken from [facility] Kate. Pt. A&Ox3, lac on left forehead - bleeding controlled. Hematoma on back of the head. Full ROM in all extremities, equal sensation in all extremities. Bilateral pitting edema bilaterally to the feet, +3. MD aware. Lung sounds clear bilaterally. Unable to assess pt.'s pupil size - pt. refuses. VSS. 1900 - report taken from [facility] Kate. Pt. A&Ox3, lac on left forehead - bleeding controlled. Hematoma on back of the head. Full strength and ROM in all extremities, equal sensation in all extremities. Bilateral pitting edema bilaterally to the feet, +3. MD aware. Lung sounds clear bilaterally. Unable to assess pt.'s pupil size - pt. refuses. VSS.

## 2017-04-06 NOTE — ED PROVIDER NOTE - NEUROLOGICAL, MLM
Alert and oriented, no focal deficits, no motor or sensory deficits.  Follows commands, verbal, no aphasia, moves both extremities to command.  No appreciable drift of bilateral upper extremities.

## 2017-04-06 NOTE — ED PROVIDER NOTE - HEAD, MLM
Head shape is symmetrical.  Punctate wound to L temple ~1/2 cm, bleeding well controlled, no surrounding tenderness/bruising. Head shape is symmetrical.  Punctate wound to L temple ~1/2 cm, bleeding well controlled, no surrounding tenderness/bruising.  ~5 cm boggy hematoma to posterior occiput without lacs.

## 2017-04-06 NOTE — ED PROVIDER NOTE - MUSCULOSKELETAL MINIMAL EXAM
FROM of bilateral LE's with 3+ pitting edema to ankle bilaterally, FROM bilateral hips, knees, ankles.  Mild erythema over R hip.  Pulses, motor and sensation intact in bilateral distal extremities upper and lower.

## 2017-04-06 NOTE — ED PROVIDER NOTE - PROGRESS NOTE DETAILS
[employer]:  Pt's C-spine cleared clinically and pt has ambulated with son present.  Pt is very unsteady on her feet and is a 2-person assist with inability to ambulate more than several steps.  She does not consistently recall reason for fall and there is concern for syncope at this time as she continues to change her hx regarding etiology of fall.  Son in agreement with plan to admit overnight to be seen by PT in AM, monitor and cycle troponins. [employer]:  Pt's C-spine cleared clinically and pt has ambulated with son present.  Pt is very unsteady on her feet and is a 2-person assist with inability to ambulate more than several steps.  She does not consistently recall reason for fall and there is concern for syncope at this time as she continues to change her hx regarding etiology of fall.  Son in agreement with plan to admit overnight to be seen by PT in AM, monitor and cycle troponins.  Pt also endorses lower back pain at this time however continues to have no midline TTP/stepoffs.  Lumbar XR ordered to assess for compression fx however diffuse lumbar back pain is more c/w having been in bed for 5 hours while in department.  No midline lumbar TTP/stepoffs were present on arrival.

## 2017-04-06 NOTE — ED PROVIDER NOTE - CARE PLAN
Principal Discharge DX:	Fall from standing, initial encounter Principal Discharge DX:	Fall from standing, initial encounter  Secondary Diagnosis:	Syncope and collapse

## 2017-04-06 NOTE — ED PROVIDER NOTE - PMH
Atrial fibrillation, unspecified type  on rivaroxaban  COPD (chronic obstructive pulmonary disease)    CVA (cerebral infarction)    GERD (gastroesophageal reflux disease)    GI bleed    Glaucoma    Hyperlipemia    PUD (peptic ulcer disease)

## 2017-04-06 NOTE — ED PROVIDER NOTE - OBJECTIVE STATEMENT
Pt is a 89 yr old female presenting to ED with complaint of fall. PT was walking and tripped and fell on a chair.  Unwitnessed fall but pt reports no LOC.  Pt presents from nursing home.  Pt is anticoagulated.  Pt is A & O x 3 per EMS but doesn't know the day or year in ED.  She also has inconsistent memory of what caused her fall.  Pt was found on her R side and had not been moved by staff.  Pt endorses R sided hip pain.  O2 sat was 89 and started on O2 by EMS.  Hx Afib.  Pt is alert on arrival.  Pt endorses feeling 'cold'. Rivaroxaban AC.  Allergy to penicillin and ASA.  Sent from [facility].  Able to range R leg, motor, sensory intact.  No shortening on arrival. Mildly erythematous R hip.  Small lack to L temple with well controlled bleeding.     Denies CP, abdominal pain.  Mild neck discomfort but no palpable stepoffs or focal midline TTP.  Pt is in C-collar on arrival.

## 2017-04-07 DIAGNOSIS — R55 SYNCOPE AND COLLAPSE: ICD-10-CM

## 2017-04-07 DIAGNOSIS — J44.9 CHRONIC OBSTRUCTIVE PULMONARY DISEASE, UNSPECIFIED: ICD-10-CM

## 2017-04-07 DIAGNOSIS — I48.0 PAROXYSMAL ATRIAL FIBRILLATION: ICD-10-CM

## 2017-04-07 DIAGNOSIS — H40.9 UNSPECIFIED GLAUCOMA: ICD-10-CM

## 2017-04-07 LAB
ANION GAP SERPL CALC-SCNC: 11 MMOL/L — SIGNIFICANT CHANGE UP (ref 5–17)
ANION GAP SERPL CALC-SCNC: 8 MMOL/L — SIGNIFICANT CHANGE UP (ref 5–17)
ANISOCYTOSIS BLD QL: SLIGHT — SIGNIFICANT CHANGE UP
APTT BLD: 35.5 SEC — SIGNIFICANT CHANGE UP (ref 27.5–37.4)
BASOPHILS # BLD AUTO: 0.01 K/UL — SIGNIFICANT CHANGE UP (ref 0–0.2)
BASOPHILS NFR BLD AUTO: 0.2 % — SIGNIFICANT CHANGE UP (ref 0–2)
BUN SERPL-MCNC: 17 MG/DL — SIGNIFICANT CHANGE UP (ref 7–23)
BUN SERPL-MCNC: 19 MG/DL — SIGNIFICANT CHANGE UP (ref 7–23)
CALCIUM SERPL-MCNC: 8.1 MG/DL — LOW (ref 8.4–10.5)
CALCIUM SERPL-MCNC: 8.5 MG/DL — SIGNIFICANT CHANGE UP (ref 8.4–10.5)
CHLORIDE SERPL-SCNC: 102 MMOL/L — SIGNIFICANT CHANGE UP (ref 96–108)
CHLORIDE SERPL-SCNC: 106 MMOL/L — SIGNIFICANT CHANGE UP (ref 96–108)
CK MB CFR SERPL CALC: 2.9 NG/ML — SIGNIFICANT CHANGE UP (ref 0–3.8)
CK MB CFR SERPL CALC: 3.3 NG/ML — SIGNIFICANT CHANGE UP (ref 0–3.8)
CK SERPL-CCNC: 77 U/L — SIGNIFICANT CHANGE UP (ref 25–170)
CK SERPL-CCNC: 79 U/L — SIGNIFICANT CHANGE UP (ref 25–170)
CO2 SERPL-SCNC: 23 MMOL/L — SIGNIFICANT CHANGE UP (ref 22–31)
CO2 SERPL-SCNC: 26 MMOL/L — SIGNIFICANT CHANGE UP (ref 22–31)
CREAT SERPL-MCNC: 0.57 MG/DL — SIGNIFICANT CHANGE UP (ref 0.5–1.3)
CREAT SERPL-MCNC: 0.57 MG/DL — SIGNIFICANT CHANGE UP (ref 0.5–1.3)
EOSINOPHIL # BLD AUTO: 0.04 K/UL — SIGNIFICANT CHANGE UP (ref 0–0.5)
EOSINOPHIL NFR BLD AUTO: 0.7 % — SIGNIFICANT CHANGE UP (ref 0–6)
GLUCOSE SERPL-MCNC: 102 MG/DL — HIGH (ref 70–99)
GLUCOSE SERPL-MCNC: 103 MG/DL — HIGH (ref 70–99)
HCT VFR BLD CALC: 33.1 % — LOW (ref 34.5–45)
HGB BLD-MCNC: 10.7 G/DL — LOW (ref 11.5–15.5)
HYPOCHROMIA BLD QL: SLIGHT — SIGNIFICANT CHANGE UP
IMM GRANULOCYTES NFR BLD AUTO: 0.2 % — SIGNIFICANT CHANGE UP (ref 0–1.5)
INR BLD: 1.45 RATIO — HIGH (ref 0.88–1.16)
LYMPHOCYTES # BLD AUTO: 1.21 K/UL — SIGNIFICANT CHANGE UP (ref 1–3.3)
LYMPHOCYTES # BLD AUTO: 21.9 % — SIGNIFICANT CHANGE UP (ref 13–44)
MACROCYTES BLD QL: SLIGHT — SIGNIFICANT CHANGE UP
MAGNESIUM SERPL-MCNC: 2.2 MG/DL — SIGNIFICANT CHANGE UP (ref 1.6–2.6)
MANUAL SMEAR VERIFICATION: SIGNIFICANT CHANGE UP
MCHC RBC-ENTMCNC: 32.3 GM/DL — SIGNIFICANT CHANGE UP (ref 32–36)
MCHC RBC-ENTMCNC: 34.3 PG — HIGH (ref 27–34)
MCV RBC AUTO: 106.1 FL — HIGH (ref 80–100)
MONOCYTES # BLD AUTO: 0.52 K/UL — SIGNIFICANT CHANGE UP (ref 0–0.9)
MONOCYTES NFR BLD AUTO: 9.4 % — SIGNIFICANT CHANGE UP (ref 2–14)
NEUTROPHILS # BLD AUTO: 3.73 K/UL — SIGNIFICANT CHANGE UP (ref 1.8–7.4)
NEUTROPHILS NFR BLD AUTO: 67.6 % — SIGNIFICANT CHANGE UP (ref 43–77)
PHOSPHATE SERPL-MCNC: 4 MG/DL — SIGNIFICANT CHANGE UP (ref 2.5–4.5)
PLAT MORPH BLD: NORMAL — SIGNIFICANT CHANGE UP
PLATELET # BLD AUTO: 156 K/UL — SIGNIFICANT CHANGE UP (ref 150–400)
POTASSIUM SERPL-MCNC: 4.3 MMOL/L — SIGNIFICANT CHANGE UP (ref 3.5–5.3)
POTASSIUM SERPL-MCNC: 4.5 MMOL/L — SIGNIFICANT CHANGE UP (ref 3.5–5.3)
POTASSIUM SERPL-MCNC: 8.2 MMOL/L — CRITICAL HIGH (ref 3.5–5.3)
POTASSIUM SERPL-SCNC: 4.3 MMOL/L — SIGNIFICANT CHANGE UP (ref 3.5–5.3)
POTASSIUM SERPL-SCNC: 4.5 MMOL/L — SIGNIFICANT CHANGE UP (ref 3.5–5.3)
POTASSIUM SERPL-SCNC: 8.2 MMOL/L — CRITICAL HIGH (ref 3.5–5.3)
PROTHROM AB SERPL-ACNC: 16.5 SEC — HIGH (ref 10–13.1)
RBC # BLD: 3.12 M/UL — LOW (ref 3.8–5.2)
RBC # FLD: 15.3 % — HIGH (ref 10.3–14.5)
RBC BLD AUTO: ABNORMAL
SODIUM SERPL-SCNC: 136 MMOL/L — SIGNIFICANT CHANGE UP (ref 135–145)
SODIUM SERPL-SCNC: 140 MMOL/L — SIGNIFICANT CHANGE UP (ref 135–145)
TROPONIN T SERPL-MCNC: <0.01 NG/ML — SIGNIFICANT CHANGE UP (ref 0–0.06)
TROPONIN T SERPL-MCNC: <0.01 NG/ML — SIGNIFICANT CHANGE UP (ref 0–0.06)
WBC # BLD: 5.52 K/UL — SIGNIFICANT CHANGE UP (ref 3.8–10.5)
WBC # FLD AUTO: 5.52 K/UL — SIGNIFICANT CHANGE UP (ref 3.8–10.5)

## 2017-04-07 PROCEDURE — 74176 CT ABD & PELVIS W/O CONTRAST: CPT | Mod: 26

## 2017-04-07 PROCEDURE — 71250 CT THORAX DX C-: CPT | Mod: 26

## 2017-04-07 PROCEDURE — 99223 1ST HOSP IP/OBS HIGH 75: CPT

## 2017-04-07 RX ORDER — BRIMONIDINE TARTRATE 2 MG/MG
1 SOLUTION/ DROPS OPHTHALMIC
Qty: 0 | Refills: 0 | Status: DISCONTINUED | OUTPATIENT
Start: 2017-04-07 | End: 2017-04-11

## 2017-04-07 RX ORDER — APIXABAN 2.5 MG/1
2.5 TABLET, FILM COATED ORAL
Qty: 0 | Refills: 0 | Status: DISCONTINUED | OUTPATIENT
Start: 2017-04-07 | End: 2017-04-07

## 2017-04-07 RX ORDER — PANTOPRAZOLE SODIUM 20 MG/1
40 TABLET, DELAYED RELEASE ORAL
Qty: 0 | Refills: 0 | Status: DISCONTINUED | OUTPATIENT
Start: 2017-04-07 | End: 2017-04-11

## 2017-04-07 RX ORDER — ATORVASTATIN CALCIUM 80 MG/1
40 TABLET, FILM COATED ORAL AT BEDTIME
Qty: 0 | Refills: 0 | Status: DISCONTINUED | OUTPATIENT
Start: 2017-04-07 | End: 2017-04-11

## 2017-04-07 RX ORDER — METOPROLOL TARTRATE 50 MG
12.5 TABLET ORAL
Qty: 0 | Refills: 0 | Status: DISCONTINUED | OUTPATIENT
Start: 2017-04-07 | End: 2017-04-11

## 2017-04-07 RX ORDER — LEVOBUNOLOL HCL 0.5 %
2 DROPS OPHTHALMIC (EYE) DAILY
Qty: 0 | Refills: 0 | Status: DISCONTINUED | OUTPATIENT
Start: 2017-04-07 | End: 2017-04-11

## 2017-04-07 RX ADMIN — BRIMONIDINE TARTRATE 1 DROP(S): 2 SOLUTION/ DROPS OPHTHALMIC at 17:06

## 2017-04-07 RX ADMIN — Medication 12.5 MILLIGRAM(S): at 06:34

## 2017-04-07 RX ADMIN — ATORVASTATIN CALCIUM 40 MILLIGRAM(S): 80 TABLET, FILM COATED ORAL at 22:21

## 2017-04-07 RX ADMIN — Medication 2 DROP(S): at 14:00

## 2017-04-07 RX ADMIN — Medication 12.5 MILLIGRAM(S): at 17:06

## 2017-04-07 RX ADMIN — PANTOPRAZOLE SODIUM 40 MILLIGRAM(S): 20 TABLET, DELAYED RELEASE ORAL at 06:34

## 2017-04-07 RX ADMIN — BRIMONIDINE TARTRATE 1 DROP(S): 2 SOLUTION/ DROPS OPHTHALMIC at 06:34

## 2017-04-07 NOTE — H&P ADULT. - LAB RESULTS AND INTERPRETATION
K+ hemolyzed x 2>>STAT K+ ordered.  Cr 0.5.  random glucose of 88.  Alb 3.0.  Troponin negative x 1.

## 2017-04-07 NOTE — H&P ADULT. - HISTORY OF PRESENT ILLNESS
NIGHT HOSPITALIST:  Patient UNKNOWN to me previously, assigned to me via the ER to admit this 89 y/o F--son/daughter not in attendance and could not be presently reached by phone to corroborate history nor medications (Medex from [facility] reviewed from 3/2/17)--patient with a history of NIGHT HOSPITALIST:  Patient UNKNOWN to me previously, assigned to me via the ER and Dr. Hernandez to admit this 89 y/o F--son/daughter not in attendance and could not be presently reached by phone to corroborate history nor medications (Medex from [facility] reviewed from 3/2/17)--patient with a history of PAF on Xarelto, COPD, cerebrovascular disease, reported GERD, CAD, cognitive dysfunction with an admission to [facility] in 2/6/17 for abdominal pain and s/P ex lap for a closed loop bowel obstruction with course complicated by C. difficile enteritis but discharged 3/2/17, with the patient referred to [facility] S/P unwitnessed fall with the patient by ER report with a presumed mechanical trip from a chair at home.  Patient denies LOC but is not clear on the circumstances of the fall. NIGHT HOSPITALIST:  Patient UNKNOWN to me previously, assigned to me via the ER and Dr. Hernandez to admit this 89 y/o F--son/daughter not in attendance and could not be presently reached by phone to corroborate history nor medications (Medex from [facility] reviewed from 3/2/17)--patient with a history of PAF on Xarelto, COPD, cerebrovascular disease, reported GERD, CAD, cognitive dysfunction with an admission to [facility] in 2/6/17 for abdominal pain and s/P ex lap for a closed loop bowel obstruction with course complicated by C. difficile enteritis but discharged 3/2/17, with the patient referred to [facility] S/P unwitnessed fall with the patient by ER report with a presumed mechanical trip from a chair at home.  Patient denies LOC but is not clear on the circumstances of the fall.  Patient denies HA, focal weakness.  No chest pain/pressure.  No dyspnea.  No abdominal pain, no red blood per rectum.  No back pain, no tearing back pain.  No extremity or hip pain.  Remaining review of systems not contributory. NIGHT HOSPITALIST:  Patient UNKNOWN to me previously, assigned to me via the ER and Dr. Hernandez to admit this 89 y/o F--son/daughter not in attendance and could not be presently reached by phone to corroborate history nor medications (Medex from [facility] reviewed from 3/2/17 and paperwork from [facility])--patient with a history of PAF on Xarelto, COPD, cerebrovascular disease, reported GERD, CAD, cognitive dysfunction with an admission to [facility] in 2/6/17 for abdominal pain and s/P ex lap for a closed loop bowel obstruction with course complicated by C. difficile enteritis but discharged 3/2/17, with the patient referred to [facility] S/P unwitnessed fall with the patient by ER report with a presumed mechanical trip from a chair at home.  Patient denies LOC but is not clear on the circumstances of the fall.  Patient denies HA, focal weakness.  No chest pain/pressure.  No dyspnea.  No abdominal pain, no red blood per rectum.  No back pain, no tearing back pain.  No extremity or hip pain.  Remaining review of systems not contributory.

## 2017-04-07 NOTE — DIETITIAN INITIAL EVALUATION ADULT. - FACTORS AFF FOOD INTAKE
No noted swallow evaluations, Pt currently on a Dysphagia 1 honey thick diet; will review with team./change in mental status/difficulty swallowing

## 2017-04-07 NOTE — DIETITIAN INITIAL EVALUATION ADULT. - PHYSICAL APPEARANCE
Nutrition physical exam: severe muscle wasting at temporals, clavicles, deltoids. Severe fat wasting at ribs and triceps/underweight

## 2017-04-07 NOTE — DIETITIAN INITIAL EVALUATION ADULT. - ORAL INTAKE PTA
Pt reports a good PO intake. Breakfast: juice, eggs, toast. Lunch and dinner: are hamburgers or chicken. drinks milk/good

## 2017-04-07 NOTE — DIETITIAN INITIAL EVALUATION ADULT. - ENERGY NEEDS
Ht: 5'3", Wt: 96.5lbs, BMI: 17.09kg/m2, IBW: 115lbs(+/-10%), 82%IBW  Pertinent information: Pt admitted from SNF s/p fall. Per chart, Pt with recent Ex lap for bowel obstruction c/b C-Diff. CT and X-ray all negative   +2 gab foot edema. Skin intact

## 2017-04-07 NOTE — H&P ADULT. - PROBLEM SELECTOR PLAN 2
On Xarelto but now TEMPORARILY HELD due to fall with extracranial hematoma>>CTT trunk ordered to exclude occult bleed.  Will defer to Dr. Hernandez on further considerations on resumption of AC.

## 2017-04-07 NOTE — DIETITIAN INITIAL EVALUATION ADULT. - ADHERENCE
Per SNF transfer records, Pt was following a Ground thin liquid diet with Ensure Plus x3 and Pro states x2 daily to supplements PO intake.

## 2017-04-07 NOTE — DIETITIAN INITIAL EVALUATION ADULT. - SOURCE
RN, medical record, Previous RD note from feb 2017, NP, SNF transfer records./family/significant other/other (specify)/patient

## 2017-04-07 NOTE — H&P ADULT. - GASTROINTESTINAL DETAILS
bowel sounds normal/no guarding/no rebound tenderness/soft/no bruit/nontender/no rigidity/no masses palpable/no distention

## 2017-04-07 NOTE — DIETITIAN INITIAL EVALUATION ADULT. - OTHER INFO
Nutrition consult received for BMI >18kg/m2. Pt reports a good PO intake, however lunch tray witnessed untouched. Pt denies having tried Ensure products in the past (although orded at SNF), Pt is willing to try Ensure Enlive x3 to supplement PO intake. pt denies GI distress at this time however on contact for history of C-Diff. Pt denies chewing/swallowing difficulty. Per Pt, she was taking MVI and vit B complex ( noted seen on transfer records). JENNIFERFA

## 2017-04-07 NOTE — H&P ADULT. - RADIOLOGY RESULTS AND INTERPRETATION
Head and neck CTT non-dx--extra calvarial RIGHT hematoma.  No fx, no ICH.  Neck CTT neg.  Chest radiograph reviewed with no infiltrate or effusion.  Pelvis radiograph negative. Head and neck CTT non-dx--extra calvarial RIGHT hematoma.  No fx, no ICH.  Neck CTT neg.  Chest radiograph reviewed with no infiltrate or effusion.  Pelvis radiograph negative.  CTT trunk >>negative for bleed.

## 2017-04-07 NOTE — DIETITIAN INITIAL EVALUATION ADULT. - NS AS NUTRI INTERV COLLABORAT
Collaboration with other providers/Consider swallow evaluation, Malnutrition sticker placed in chart. Discussed with NP. RD remains available to monitor PO intake, wt, labs and diet education review

## 2017-04-07 NOTE — DIETITIAN INITIAL EVALUATION ADULT. - SIGNS/SYMPTOMS
Severe fat and muscle wasting, 8% wt loss x2 months, recent Ex lap procedure Severe fat and muscle wasting, 8% wt loss x2 months, recent Ex lap procedure, BMI:17.09kg/m2

## 2017-04-07 NOTE — DIETITIAN INITIAL EVALUATION ADULT. - FEEDING SKILL
total assistance/Per son, Pt requires total feeding assistance as she has no self motivation to self feed.

## 2017-04-07 NOTE — DIETITIAN INITIAL EVALUATION ADULT. - NS AS NUTRI INTERV FEED ASSISTANCE
1. Provide food preferences as requested by Pt/family within diet restrictions  2. Encourage PO intake during meal times 3. Provide total reeding assistance/Feeding Assistance/Other (specify)

## 2017-04-07 NOTE — H&P ADULT. - RS GEN PE MLT RESP DETAILS PC
no rhonchi/no subcutaneous emphysema/no intercostal retractions/good air movement/no wheezes/breath sounds equal/respirations non-labored/no rales/airway patent/no chest wall tenderness/clear to auscultation bilaterally

## 2017-04-07 NOTE — DIETITIAN INITIAL EVALUATION ADULT. - NS FNS REASON FOR WEIGHT CHANG
suspected low PO intake PTA as well as increased nutrient needs. Per Son, Pt went as low as 89lbs since February admission. Pt was taking Marinol while helped to increase PO intake. Son states 4/6 per body Wt was 94lbs./other (specify)

## 2017-04-07 NOTE — H&P ADULT. - MENTAL STATUS
Alert to self/hospital.  Poor short term memory recall.  Interactive with examiner with occasional prompting.  Follows simple commands.

## 2017-04-07 NOTE — H&P ADULT. - SOURCE OF INFORMATION, PROFILE
No family in attendance and unable to reach son/daughter via telephone to confirm history nor medications--Medex from [place] reviewed from [facility] on 3/2/17/patient/chart(s)

## 2017-04-08 LAB
ANION GAP SERPL CALC-SCNC: 16 MMOL/L — SIGNIFICANT CHANGE UP (ref 5–17)
BASOPHILS # BLD AUTO: 0.01 K/UL — SIGNIFICANT CHANGE UP (ref 0–0.2)
BASOPHILS NFR BLD AUTO: 0.2 % — SIGNIFICANT CHANGE UP (ref 0–2)
BUN SERPL-MCNC: 13 MG/DL — SIGNIFICANT CHANGE UP (ref 7–23)
CALCIUM SERPL-MCNC: 8.5 MG/DL — SIGNIFICANT CHANGE UP (ref 8.4–10.5)
CHLORIDE SERPL-SCNC: 109 MMOL/L — HIGH (ref 96–108)
CO2 SERPL-SCNC: 19 MMOL/L — LOW (ref 22–31)
CREAT SERPL-MCNC: 0.56 MG/DL — SIGNIFICANT CHANGE UP (ref 0.5–1.3)
EOSINOPHIL # BLD AUTO: 0.12 K/UL — SIGNIFICANT CHANGE UP (ref 0–0.5)
EOSINOPHIL NFR BLD AUTO: 2.3 % — SIGNIFICANT CHANGE UP (ref 0–6)
GLUCOSE SERPL-MCNC: 88 MG/DL — SIGNIFICANT CHANGE UP (ref 70–99)
HCT VFR BLD CALC: 28.9 % — LOW (ref 34.5–45)
HGB BLD-MCNC: 9.2 G/DL — LOW (ref 11.5–15.5)
IMM GRANULOCYTES NFR BLD AUTO: 0 % — SIGNIFICANT CHANGE UP (ref 0–1.5)
LYMPHOCYTES # BLD AUTO: 1.5 K/UL — SIGNIFICANT CHANGE UP (ref 1–3.3)
LYMPHOCYTES # BLD AUTO: 29.4 % — SIGNIFICANT CHANGE UP (ref 13–44)
MCHC RBC-ENTMCNC: 31.8 GM/DL — LOW (ref 32–36)
MCHC RBC-ENTMCNC: 33.6 PG — SIGNIFICANT CHANGE UP (ref 27–34)
MCV RBC AUTO: 105.5 FL — HIGH (ref 80–100)
MONOCYTES # BLD AUTO: 0.58 K/UL — SIGNIFICANT CHANGE UP (ref 0–0.9)
MONOCYTES NFR BLD AUTO: 11.4 % — SIGNIFICANT CHANGE UP (ref 2–14)
NEUTROPHILS # BLD AUTO: 2.9 K/UL — SIGNIFICANT CHANGE UP (ref 1.8–7.4)
NEUTROPHILS NFR BLD AUTO: 56.7 % — SIGNIFICANT CHANGE UP (ref 43–77)
PLATELET # BLD AUTO: 149 K/UL — LOW (ref 150–400)
POTASSIUM SERPL-MCNC: 4.2 MMOL/L — SIGNIFICANT CHANGE UP (ref 3.5–5.3)
POTASSIUM SERPL-SCNC: 4.2 MMOL/L — SIGNIFICANT CHANGE UP (ref 3.5–5.3)
RBC # BLD: 2.74 M/UL — LOW (ref 3.8–5.2)
RBC # FLD: 15.2 % — HIGH (ref 10.3–14.5)
SODIUM SERPL-SCNC: 144 MMOL/L — SIGNIFICANT CHANGE UP (ref 135–145)
WBC # BLD: 5.11 K/UL — SIGNIFICANT CHANGE UP (ref 3.8–10.5)
WBC # FLD AUTO: 5.11 K/UL — SIGNIFICANT CHANGE UP (ref 3.8–10.5)

## 2017-04-08 RX ADMIN — BRIMONIDINE TARTRATE 1 DROP(S): 2 SOLUTION/ DROPS OPHTHALMIC at 17:37

## 2017-04-08 RX ADMIN — Medication 12.5 MILLIGRAM(S): at 06:06

## 2017-04-08 RX ADMIN — Medication 12.5 MILLIGRAM(S): at 17:39

## 2017-04-08 RX ADMIN — PANTOPRAZOLE SODIUM 40 MILLIGRAM(S): 20 TABLET, DELAYED RELEASE ORAL at 06:06

## 2017-04-08 RX ADMIN — Medication 2 DROP(S): at 14:00

## 2017-04-08 RX ADMIN — ATORVASTATIN CALCIUM 40 MILLIGRAM(S): 80 TABLET, FILM COATED ORAL at 21:36

## 2017-04-08 RX ADMIN — BRIMONIDINE TARTRATE 1 DROP(S): 2 SOLUTION/ DROPS OPHTHALMIC at 06:06

## 2017-04-08 NOTE — PHYSICAL THERAPY INITIAL EVALUATION ADULT - DISCHARGE DISPOSITION, PT EVAL
subacute rehab for strengthening, bed mob, transfer, gait and balance, endurance training/rehabilitation facility

## 2017-04-08 NOTE — PHYSICAL THERAPY INITIAL EVALUATION ADULT - ADDITIONAL COMMENTS
cont; CT chest: Small bilateral pleural effusions. CT hip: No acute fracture or dislocation. cont; CT chest: Small bilateral pleural effusions. CT hip: No acute fracture or dislocation.    pt confused, tried to called son Present, Mina [phone], not answer, called DTR Present, Kd [phone], as per daughter, pt resides in [place], need assist for ADLs, amb with RW, daughter does not know how long the pt can walk and stated have not been seen the pt for long time

## 2017-04-08 NOTE — PHYSICAL THERAPY INITIAL EVALUATION ADULT - PERTINENT HX OF CURRENT PROBLEM, REHAB EVAL
89F with PMHx of Xarelto, COPD, cerebrovascular disease, reported GERD, CAD, cognitive dysfunction with an admission to [facility] in 2/6/17 for abdominal pain and s/P ex lap for a closed loop bowel obstruction with course complicated by C. difficile enteritis but discharged 3/2/17, with the patient referred to [facility] S/P unwitnessed fall with the patient by ER report with a presumed mechanical trip from a chair at home. Result shows decreased h/h, increased INR,

## 2017-04-09 LAB
HCT VFR BLD CALC: 30.4 % — LOW (ref 34.5–45)
HGB BLD-MCNC: 9.9 G/DL — LOW (ref 11.5–15.5)
MCHC RBC-ENTMCNC: 32.6 GM/DL — SIGNIFICANT CHANGE UP (ref 32–36)
MCHC RBC-ENTMCNC: 33.8 PG — SIGNIFICANT CHANGE UP (ref 27–34)
MCV RBC AUTO: 103.8 FL — HIGH (ref 80–100)
PLATELET # BLD AUTO: 138 K/UL — LOW (ref 150–400)
RBC # BLD: 2.93 M/UL — LOW (ref 3.8–5.2)
RBC # FLD: 14.7 % — HIGH (ref 10.3–14.5)
WBC # BLD: 5.15 K/UL — SIGNIFICANT CHANGE UP (ref 3.8–10.5)
WBC # FLD AUTO: 5.15 K/UL — SIGNIFICANT CHANGE UP (ref 3.8–10.5)

## 2017-04-09 RX ADMIN — PANTOPRAZOLE SODIUM 40 MILLIGRAM(S): 20 TABLET, DELAYED RELEASE ORAL at 06:26

## 2017-04-09 RX ADMIN — BRIMONIDINE TARTRATE 1 DROP(S): 2 SOLUTION/ DROPS OPHTHALMIC at 17:05

## 2017-04-09 RX ADMIN — BRIMONIDINE TARTRATE 1 DROP(S): 2 SOLUTION/ DROPS OPHTHALMIC at 06:26

## 2017-04-09 RX ADMIN — ATORVASTATIN CALCIUM 40 MILLIGRAM(S): 80 TABLET, FILM COATED ORAL at 21:08

## 2017-04-09 RX ADMIN — Medication 12.5 MILLIGRAM(S): at 06:26

## 2017-04-09 RX ADMIN — Medication 2 DROP(S): at 11:07

## 2017-04-09 RX ADMIN — Medication 12.5 MILLIGRAM(S): at 17:12

## 2017-04-10 LAB
HCT VFR BLD CALC: 30.2 % — LOW (ref 34.5–45)
HGB BLD-MCNC: 9.9 G/DL — LOW (ref 11.5–15.5)
MCHC RBC-ENTMCNC: 32.8 GM/DL — SIGNIFICANT CHANGE UP (ref 32–36)
MCHC RBC-ENTMCNC: 33.9 PG — SIGNIFICANT CHANGE UP (ref 27–34)
MCV RBC AUTO: 103.4 FL — HIGH (ref 80–100)
PLATELET # BLD AUTO: 143 K/UL — LOW (ref 150–400)
RBC # BLD: 2.92 M/UL — LOW (ref 3.8–5.2)
RBC # FLD: 14.6 % — HIGH (ref 10.3–14.5)
WBC # BLD: 5.89 K/UL — SIGNIFICANT CHANGE UP (ref 3.8–10.5)
WBC # FLD AUTO: 5.89 K/UL — SIGNIFICANT CHANGE UP (ref 3.8–10.5)

## 2017-04-10 RX ADMIN — Medication 12.5 MILLIGRAM(S): at 17:24

## 2017-04-10 RX ADMIN — BRIMONIDINE TARTRATE 1 DROP(S): 2 SOLUTION/ DROPS OPHTHALMIC at 17:24

## 2017-04-10 RX ADMIN — Medication 12.5 MILLIGRAM(S): at 06:30

## 2017-04-10 RX ADMIN — Medication 2 DROP(S): at 12:00

## 2017-04-10 RX ADMIN — BRIMONIDINE TARTRATE 1 DROP(S): 2 SOLUTION/ DROPS OPHTHALMIC at 06:30

## 2017-04-10 RX ADMIN — PANTOPRAZOLE SODIUM 40 MILLIGRAM(S): 20 TABLET, DELAYED RELEASE ORAL at 06:30

## 2017-04-10 RX ADMIN — ATORVASTATIN CALCIUM 40 MILLIGRAM(S): 80 TABLET, FILM COATED ORAL at 21:11

## 2017-04-11 ENCOUNTER — TRANSCRIPTION ENCOUNTER (OUTPATIENT)
Age: 82
End: 2017-04-11

## 2017-04-11 VITALS
DIASTOLIC BLOOD PRESSURE: 68 MMHG | RESPIRATION RATE: 18 BRPM | HEART RATE: 68 BPM | TEMPERATURE: 97 F | SYSTOLIC BLOOD PRESSURE: 102 MMHG | OXYGEN SATURATION: 98 %

## 2017-04-11 LAB
ANION GAP SERPL CALC-SCNC: 13 MMOL/L — SIGNIFICANT CHANGE UP (ref 5–17)
APTT BLD: 25.8 SEC — LOW (ref 27.5–37.4)
BUN SERPL-MCNC: 23 MG/DL — SIGNIFICANT CHANGE UP (ref 7–23)
CALCIUM SERPL-MCNC: 8.5 MG/DL — SIGNIFICANT CHANGE UP (ref 8.4–10.5)
CHLORIDE SERPL-SCNC: 103 MMOL/L — SIGNIFICANT CHANGE UP (ref 96–108)
CO2 SERPL-SCNC: 24 MMOL/L — SIGNIFICANT CHANGE UP (ref 22–31)
CREAT SERPL-MCNC: 0.58 MG/DL — SIGNIFICANT CHANGE UP (ref 0.5–1.3)
GLUCOSE SERPL-MCNC: 80 MG/DL — SIGNIFICANT CHANGE UP (ref 70–99)
HCT VFR BLD CALC: 31.1 % — LOW (ref 34.5–45)
HGB BLD-MCNC: 10 G/DL — LOW (ref 11.5–15.5)
INR BLD: 1.1 RATIO — SIGNIFICANT CHANGE UP (ref 0.88–1.16)
MCHC RBC-ENTMCNC: 32.2 GM/DL — SIGNIFICANT CHANGE UP (ref 32–36)
MCHC RBC-ENTMCNC: 33.7 PG — SIGNIFICANT CHANGE UP (ref 27–34)
MCV RBC AUTO: 104.7 FL — HIGH (ref 80–100)
PLATELET # BLD AUTO: 152 K/UL — SIGNIFICANT CHANGE UP (ref 150–400)
POTASSIUM SERPL-MCNC: 4 MMOL/L — SIGNIFICANT CHANGE UP (ref 3.5–5.3)
POTASSIUM SERPL-SCNC: 4 MMOL/L — SIGNIFICANT CHANGE UP (ref 3.5–5.3)
PROTHROM AB SERPL-ACNC: 11.9 SEC — SIGNIFICANT CHANGE UP (ref 9.8–12.7)
RBC # BLD: 2.97 M/UL — LOW (ref 3.8–5.2)
RBC # FLD: 14.5 % — SIGNIFICANT CHANGE UP (ref 10.3–14.5)
SODIUM SERPL-SCNC: 140 MMOL/L — SIGNIFICANT CHANGE UP (ref 135–145)
WBC # BLD: 5.16 K/UL — SIGNIFICANT CHANGE UP (ref 3.8–10.5)
WBC # FLD AUTO: 5.16 K/UL — SIGNIFICANT CHANGE UP (ref 3.8–10.5)

## 2017-04-11 PROCEDURE — 71250 CT THORAX DX C-: CPT

## 2017-04-11 PROCEDURE — 97110 THERAPEUTIC EXERCISES: CPT

## 2017-04-11 PROCEDURE — 97530 THERAPEUTIC ACTIVITIES: CPT

## 2017-04-11 PROCEDURE — 72100 X-RAY EXAM L-S SPINE 2/3 VWS: CPT

## 2017-04-11 PROCEDURE — 99285 EMERGENCY DEPT VISIT HI MDM: CPT | Mod: 25

## 2017-04-11 PROCEDURE — 85610 PROTHROMBIN TIME: CPT

## 2017-04-11 PROCEDURE — 90715 TDAP VACCINE 7 YRS/> IM: CPT

## 2017-04-11 PROCEDURE — 93005 ELECTROCARDIOGRAM TRACING: CPT

## 2017-04-11 PROCEDURE — 84100 ASSAY OF PHOSPHORUS: CPT

## 2017-04-11 PROCEDURE — 80053 COMPREHEN METABOLIC PANEL: CPT

## 2017-04-11 PROCEDURE — 70450 CT HEAD/BRAIN W/O DYE: CPT

## 2017-04-11 PROCEDURE — 85730 THROMBOPLASTIN TIME PARTIAL: CPT

## 2017-04-11 PROCEDURE — 83735 ASSAY OF MAGNESIUM: CPT

## 2017-04-11 PROCEDURE — 84484 ASSAY OF TROPONIN QUANT: CPT

## 2017-04-11 PROCEDURE — 73502 X-RAY EXAM HIP UNI 2-3 VIEWS: CPT

## 2017-04-11 PROCEDURE — 82550 ASSAY OF CK (CPK): CPT

## 2017-04-11 PROCEDURE — 72125 CT NECK SPINE W/O DYE: CPT

## 2017-04-11 PROCEDURE — 80048 BASIC METABOLIC PNL TOTAL CA: CPT

## 2017-04-11 PROCEDURE — 84132 ASSAY OF SERUM POTASSIUM: CPT

## 2017-04-11 PROCEDURE — 85027 COMPLETE CBC AUTOMATED: CPT

## 2017-04-11 PROCEDURE — 97162 PT EVAL MOD COMPLEX 30 MIN: CPT

## 2017-04-11 PROCEDURE — 90471 IMMUNIZATION ADMIN: CPT

## 2017-04-11 PROCEDURE — 74176 CT ABD & PELVIS W/O CONTRAST: CPT

## 2017-04-11 PROCEDURE — 70486 CT MAXILLOFACIAL W/O DYE: CPT

## 2017-04-11 PROCEDURE — 71045 X-RAY EXAM CHEST 1 VIEW: CPT

## 2017-04-11 PROCEDURE — 82553 CREATINE MB FRACTION: CPT

## 2017-04-11 RX ORDER — ATORVASTATIN CALCIUM 80 MG/1
1 TABLET, FILM COATED ORAL
Qty: 0 | Refills: 0 | COMMUNITY
Start: 2017-04-11

## 2017-04-11 RX ORDER — METOPROLOL TARTRATE 50 MG
12.5 TABLET ORAL
Qty: 0 | Refills: 0 | COMMUNITY
Start: 2017-04-11

## 2017-04-11 RX ORDER — BRIMONIDINE TARTRATE 2 MG/MG
1 SOLUTION/ DROPS OPHTHALMIC
Qty: 0 | Refills: 0 | COMMUNITY
Start: 2017-04-11

## 2017-04-11 RX ORDER — LEVOBUNOLOL HCL 0.5 %
2 DROPS OPHTHALMIC (EYE)
Qty: 0 | Refills: 0 | COMMUNITY
Start: 2017-04-11

## 2017-04-11 RX ORDER — PANTOPRAZOLE SODIUM 20 MG/1
1 TABLET, DELAYED RELEASE ORAL
Qty: 0 | Refills: 0 | COMMUNITY
Start: 2017-04-11

## 2017-04-11 RX ADMIN — PANTOPRAZOLE SODIUM 40 MILLIGRAM(S): 20 TABLET, DELAYED RELEASE ORAL at 05:08

## 2017-04-11 RX ADMIN — Medication 12.5 MILLIGRAM(S): at 05:07

## 2017-04-11 RX ADMIN — Medication 2 DROP(S): at 11:50

## 2017-04-11 RX ADMIN — BRIMONIDINE TARTRATE 1 DROP(S): 2 SOLUTION/ DROPS OPHTHALMIC at 05:08

## 2017-04-11 NOTE — DISCHARGE NOTE ADULT - MEDICATION SUMMARY - MEDICATIONS TO STOP TAKING
I will STOP taking the medications listed below when I get home from the hospital:    rivaroxaban 15 mg oral tablet  -- 1 tab(s) by mouth once a day (in the evening)

## 2017-04-11 NOTE — DISCHARGE NOTE ADULT - CARE PLAN
Principal Discharge DX:	Fall from standing, initial encounter  Goal:	Resolved  Instructions for follow-up, activity and diet:	Causes of fall may be due to illness, change in the medicines you take, unsafe or unfamiliar setting and conditions that affect eyesight, hearing, muscle strength, or balance.                                                            Certain medicines used for sleep, anxiety, or depression can cause falls. Adding new medicines, or changing doses of some medicines, can also affect your risk of falling. Your doctor might switch you to a different medicine.                                        Prevent falls by make your home safer – To avoid falling at home, get rid of things that might make you trip or slip. This might include furniture, electrical cords, clutter, and loose rugs. Keep your home well lit to avoid falls or accidents. Avoid storing things in high places so you don't have to reach or climb.                             Wear sturdy shoes that fit well – Wearing shoes with high heels or slippery soles, or shoes that are too loose, can lead to falls.                                                                                                                       Take vitamin D pills which might lower the risk of falls in older people. This is because vitamin D helps make bones and muscles stronger.                                                                                                                   Use a cane, walker, and other safety devices as these devices help you avoid falling, too. These include grab bars or a sturdy seat for the shower, non-slip bath mats, and hand rails or treads for the stairs (to prevent slipping). If you worry that you could fall, there are also alarm buttons that let you call for help if you fall and can't get up.   It is important to tell your doctor about any times you have fallen or almost fallen.  Seek immediate help for pain or injury after a fall.  Secondary Diagnosis:	COPD (chronic obstructive pulmonary disease)  Instructions for follow-up, activity and diet:	Call your Health Care provider upon arrival home to make a follow up appointment within one week.  Take all inhalers as prescribed by your Health Care Provider.  Take steroids as prescribed by your Health Care Provider.  If your cough increases infrequency and severity and/or you have shortness of breath or increased shortness of breath call your Health Care Provider.  If you develop fever, chills, night sweats, malaise, and/or change in mental status call your Health care Provider.  Nutrition is very important.  Eat small frequent meals.  Increase your activity as tolerated.  Do not stay in bed all day  Secondary Diagnosis:	Paroxysmal atrial fibrillation  Instructions for follow-up, activity and diet:	Atrial fibrillation is the most common heart rhythm problem.  The condition puts you at risk for has stroke and heart attack  It helps if you control your blood pressure, not drink more than 1-2 alcohol drinks per day, cut down on caffeine, getting treatment for over active thyroid gland, and get regular exercise  Call your doctor if you feel your heart racing or beating unusually, chest tightness or pain, lightheaded, faint, shortness of breath especially with exercise  It is important to take your heart medication as prescribed  You may be on anticoagulation which is very important to take as directed - you may need blood work to monitor drug levels

## 2017-04-11 NOTE — DISCHARGE NOTE ADULT - MEDICATION SUMMARY - MEDICATIONS TO TAKE
I will START or STAY ON the medications listed below when I get home from the hospital:    atorvastatin 40 mg oral tablet  -- 1 tab(s) by mouth once a day (at bedtime)  -- Indication: For Antihyperlipidemic    metoprolol  -- 12.5 milligram(s) by mouth 2 times a day  -- Indication: For High Blood Pressure    brimonidine 0.2% ophthalmic solution  -- 1 drop(s) to each affected eye 2 times a day  -- Indication: For Ophthalmic    levobunolol 0.5% ophthalmic solution  -- 2 drop(s) to each affected eye once a day  -- Indication: For Ophthalmic    pantoprazole 40 mg oral delayed release tablet  -- 1 tab(s) by mouth once a day (before a meal)  -- Indication: For Gastrointestinal

## 2017-04-11 NOTE — DISCHARGE NOTE ADULT - ADDITIONAL INSTRUCTIONS
Please call and make an appointment with your primary medical doctor within a week after rehabilitation facility.  I

## 2017-04-11 NOTE — DISCHARGE NOTE ADULT - ABILITY TO HEAR (WITH HEARING AID OR HEARING APPLIANCE IF NORMALLY USED):
Mildly to Moderately Impaired: difficulty hearing in some environments or speaker may need to increase volume or speak distinctly/difficulty hearing from left ear.

## 2017-04-11 NOTE — DISCHARGE NOTE ADULT - PATIENT PORTAL LINK FT
“You can access the Follow[facility] Patient Portal, offered by [facility], by registering with the following website: [email or web address]”

## 2017-04-11 NOTE — DISCHARGE NOTE ADULT - HOSPITAL COURSE
To be completed by attending 89F with PMHx of Xarelto, COPD, cerebrovascular disease, reported GERD, CAD, cognitive dysfunction with an admission to [facility] in 2/6/17 for abdominal pain and s/P ex lap for a closed loop bowel obstruction with course complicated by C. difficile enteritis but discharged 3/2/17, with the patient referred to [facility] S/P unwitnessed fall with the patient by ER report with a presumed mechanical trip from a chair at home.      Dx:  Syncope/Fall - Right parietal extracalvarial soft tissue hematoma   PAF - AC on hold sec to hematoma - to reassess as outpt on restarting AC   COPD Exac    Head CT /Neck CT / Pelvic XR all negative  ecchymosis temporal - next to left eye from fall  - readdress resuming Xarelto in am   4/7 cardio started eliquis 2.5 mg bid (Dr. Abraham ).  04/07 Night PA: [facility] PVC on Tele. Pt asymptomatic.   - K/Mg/ PO3: 4.5/2.2/4.0.   - TTE: 02/19/17: LVEF: 71%, Mild- Moderate MR, TR.   - c/w Metoprolol 12.5mg bid.   D/C to [facility]

## 2017-04-11 NOTE — DISCHARGE NOTE ADULT - PLAN OF CARE
Resolved Causes of fall may be due to illness, change in the medicines you take, unsafe or unfamiliar setting and conditions that affect eyesight, hearing, muscle strength, or balance.                                                            Certain medicines used for sleep, anxiety, or depression can cause falls. Adding new medicines, or changing doses of some medicines, can also affect your risk of falling. Your doctor might switch you to a different medicine.                                        Prevent falls by make your home safer – To avoid falling at home, get rid of things that might make you trip or slip. This might include furniture, electrical cords, clutter, and loose rugs. Keep your home well lit to avoid falls or accidents. Avoid storing things in high places so you don't have to reach or climb.                             Wear sturdy shoes that fit well – Wearing shoes with high heels or slippery soles, or shoes that are too loose, can lead to falls.                                                                                                                       Take vitamin D pills which might lower the risk of falls in older people. This is because vitamin D helps make bones and muscles stronger.                                                                                                                   Use a cane, walker, and other safety devices as these devices help you avoid falling, too. These include grab bars or a sturdy seat for the shower, non-slip bath mats, and hand rails or treads for the stairs (to prevent slipping). If you worry that you could fall, there are also alarm buttons that let you call for help if you fall and can't get up.   It is important to tell your doctor about any times you have fallen or almost fallen.  Seek immediate help for pain or injury after a fall. Call your Health Care provider upon arrival home to make a follow up appointment within one week.  Take all inhalers as prescribed by your Health Care Provider.  Take steroids as prescribed by your Health Care Provider.  If your cough increases infrequency and severity and/or you have shortness of breath or increased shortness of breath call your Health Care Provider.  If you develop fever, chills, night sweats, malaise, and/or change in mental status call your Health care Provider.  Nutrition is very important.  Eat small frequent meals.  Increase your activity as tolerated.  Do not stay in bed all day Atrial fibrillation is the most common heart rhythm problem.  The condition puts you at risk for has stroke and heart attack  It helps if you control your blood pressure, not drink more than 1-2 alcohol drinks per day, cut down on caffeine, getting treatment for over active thyroid gland, and get regular exercise  Call your doctor if you feel your heart racing or beating unusually, chest tightness or pain, lightheaded, faint, shortness of breath especially with exercise  It is important to take your heart medication as prescribed  You may be on anticoagulation which is very important to take as directed - you may need blood work to monitor drug levels

## 2017-07-06 ENCOUNTER — EMERGENCY (EMERGENCY)
Facility: HOSPITAL | Age: 82
LOS: 1 days | Discharge: HOME | End: 2017-07-06
Attending: EMERGENCY MEDICINE | Admitting: EMERGENCY MEDICINE
Payer: MEDICARE

## 2017-07-06 VITALS
HEART RATE: 58 BPM | OXYGEN SATURATION: 99 % | DIASTOLIC BLOOD PRESSURE: 81 MMHG | RESPIRATION RATE: 16 BRPM | SYSTOLIC BLOOD PRESSURE: 149 MMHG | TEMPERATURE: 98 F

## 2017-07-06 PROCEDURE — 99283 EMERGENCY DEPT VISIT LOW MDM: CPT

## 2017-07-06 RX ADMIN — Medication 1 ENEMA: at 20:50

## 2017-07-06 NOTE — ED PROVIDER NOTE - OBJECTIVE STATEMENT
DELTA from [facility] for constipation. Pt has not had a bowel movement for 4-5 days despite bowel regimen.   Patient reported with her baseline dementia by EMS. Pt does not know why she is in the hospital. Denies any symptoms.

## 2017-07-06 NOTE — ED PROVIDER NOTE - ATTENDING CONTRIBUTION TO CARE
patient without bm x 4 days. no other concerns as per patient's son. abd soft non tender. patient disimpacted. will dc back to long-term.

## 2017-07-06 NOTE — DOWNTIME INTERRUPTION NOTE - WHICH MANUAL FORMS INITIATED?
Downtime from 7a-9p see chart for previous documentations and notes. Downtime from 7a-9p see paper chart for previous documentations and notes.

## 2017-07-06 NOTE — ED PROVIDER NOTE - PROGRESS NOTE DETAILS
Chapin Medley MD (resident): SW came to assess patient and set up ambulance ride back to care home, and called [facility] to confirm that they are able to take the pt back to their facility.

## 2017-07-06 NOTE — ED PROVIDER NOTE - MEDICAL DECISION MAKING DETAILS
Chapin Medley MD (resident): 89 F w/ AFib who was BIBEMS for constipation for 4-5 days, found to have a benign abd exam, and rectal w/ impacted stool that was disimpacted, and given Fleet enema afterwards. Stable for transfer back to retirement.

## 2017-07-06 NOTE — ED PROVIDER NOTE - PHYSICAL EXAMINATION
Physical Exam: elderly F who is in NAD, AAOx1 (herself), cooperative with examination, non-labored breathing, normal heart rate, abdomen is soft and nontender to palpation, no rigidity or guarding, normal peripheral perfusion, No focal motor or sensory deficits. Rectal (Erica Moncada RN as chaperone) with impacted stool that was disimpacted.~ Chapin Medley MD

## 2017-07-26 ENCOUNTER — EMERGENCY (EMERGENCY)
Facility: HOSPITAL | Age: 82
LOS: 1 days | Discharge: HOME | End: 2017-07-26
Attending: EMERGENCY MEDICINE | Admitting: EMERGENCY MEDICINE
Payer: MEDICARE

## 2017-07-26 VITALS
RESPIRATION RATE: 18 BRPM | TEMPERATURE: 98 F | DIASTOLIC BLOOD PRESSURE: 76 MMHG | SYSTOLIC BLOOD PRESSURE: 133 MMHG | HEART RATE: 58 BPM | OXYGEN SATURATION: 100 %

## 2017-07-26 VITALS
OXYGEN SATURATION: 100 % | SYSTOLIC BLOOD PRESSURE: 113 MMHG | RESPIRATION RATE: 15 BRPM | DIASTOLIC BLOOD PRESSURE: 54 MMHG | HEART RATE: 65 BPM

## 2017-07-26 LAB
APPEARANCE UR: ABNORMAL
BACTERIA # UR AUTO: ABNORMAL /HPF
BILIRUB UR-MCNC: NEGATIVE — SIGNIFICANT CHANGE UP
COLOR SPEC: YELLOW — SIGNIFICANT CHANGE UP
DIFF PNL FLD: ABNORMAL
GLUCOSE UR QL: NEGATIVE — SIGNIFICANT CHANGE UP
KETONES UR-MCNC: NEGATIVE — SIGNIFICANT CHANGE UP
LEUKOCYTE ESTERASE UR-ACNC: ABNORMAL
NITRITE UR-MCNC: NEGATIVE — SIGNIFICANT CHANGE UP
PH UR: 7 — SIGNIFICANT CHANGE UP (ref 5–8)
PROT UR-MCNC: 30 MG/DL
SP GR SPEC: 1.01 — SIGNIFICANT CHANGE UP (ref 1.01–1.02)
UROBILINOGEN FLD QL: NEGATIVE — SIGNIFICANT CHANGE UP
WBC UR QL: >50 /HPF (ref 0–5)

## 2017-07-26 PROCEDURE — 72125 CT NECK SPINE W/O DYE: CPT | Mod: 26

## 2017-07-26 PROCEDURE — 87086 URINE CULTURE/COLONY COUNT: CPT

## 2017-07-26 PROCEDURE — 70450 CT HEAD/BRAIN W/O DYE: CPT

## 2017-07-26 PROCEDURE — 72125 CT NECK SPINE W/O DYE: CPT

## 2017-07-26 PROCEDURE — 72170 X-RAY EXAM OF PELVIS: CPT | Mod: 26

## 2017-07-26 PROCEDURE — 72170 X-RAY EXAM OF PELVIS: CPT

## 2017-07-26 PROCEDURE — 96374 THER/PROPH/DIAG INJ IV PUSH: CPT

## 2017-07-26 PROCEDURE — 99284 EMERGENCY DEPT VISIT MOD MDM: CPT

## 2017-07-26 PROCEDURE — 70450 CT HEAD/BRAIN W/O DYE: CPT | Mod: 26

## 2017-07-26 PROCEDURE — 81001 URINALYSIS AUTO W/SCOPE: CPT

## 2017-07-26 PROCEDURE — 99284 EMERGENCY DEPT VISIT MOD MDM: CPT | Mod: 25

## 2017-07-26 PROCEDURE — 71010: CPT | Mod: 26

## 2017-07-26 PROCEDURE — 71045 X-RAY EXAM CHEST 1 VIEW: CPT

## 2017-07-26 PROCEDURE — 87186 SC STD MICRODIL/AGAR DIL: CPT

## 2017-07-26 RX ORDER — CEFPODOXIME PROXETIL 100 MG
1 TABLET ORAL
Qty: 14 | Refills: 0 | OUTPATIENT
Start: 2017-07-26 | End: 2017-08-02

## 2017-07-26 RX ORDER — SODIUM CHLORIDE 9 MG/ML
500 INJECTION INTRAMUSCULAR; INTRAVENOUS; SUBCUTANEOUS ONCE
Qty: 0 | Refills: 0 | Status: COMPLETED | OUTPATIENT
Start: 2017-07-26 | End: 2017-07-26

## 2017-07-26 RX ORDER — CEFTRIAXONE 500 MG/1
1 INJECTION, POWDER, FOR SOLUTION INTRAMUSCULAR; INTRAVENOUS ONCE
Qty: 0 | Refills: 0 | Status: COMPLETED | OUTPATIENT
Start: 2017-07-26 | End: 2017-07-26

## 2017-07-26 RX ADMIN — SODIUM CHLORIDE 500 MILLILITER(S): 9 INJECTION INTRAMUSCULAR; INTRAVENOUS; SUBCUTANEOUS at 14:24

## 2017-07-26 RX ADMIN — CEFTRIAXONE 100 GRAM(S): 500 INJECTION, POWDER, FOR SOLUTION INTRAMUSCULAR; INTRAVENOUS at 15:16

## 2017-07-26 NOTE — ED PROVIDER NOTE - CARE PLAN
Principal Discharge DX:	Fall  Instructions for follow-up, activity and diet:	1.  Stay Hydrated  2.  Take Tylenol 650mgs every 4-6 hrs as needed for pain  3.  Take cefpodoxime 200mgs every 12 hrs x 7 days  4.  Follow up with your PMD in 2-3 days  5.  Return to the ER for worsening pain, headache, fevers/chills or any other concerning symptoms  Secondary Diagnosis:	Urinary tract infection

## 2017-07-26 NOTE — ED PROVIDER NOTE - MEDICAL DECISION MAKING DETAILS
Geoffrey: 89 year old female with afib on xarelto sent from NH for s/p fall. will get ct head/c-spine, check ua, reassess.

## 2017-07-26 NOTE — ED ADULT NURSE NOTE - OBJECTIVE STATEMENT
89y female presents to the ER s/p fall. Pt is alert and oriented x 2 and speaking coherently. Pt has periods of silence and will only answer some questions. Pt states she fell walking into her room, pt states she hit her head, denies loc. Pt has "head pain"- not specifically a headache. Pt denies vi 89y female presents to the ER s/p fall. Pt is alert and oriented x 2 and speaking coherently. Pt has periods of silence and will only answer some questions. Pt states she fell walking into her room, pt states she hit her head, denies loc. fall was unwitnessed- pt was last seen at 10 this am by aides at assisted living. Pt has "head pain"- not specifically a headache. Pt denies vision changes or dizziness. No work of breathing noted. stage 1 pressure ulcer noted to sacrum. Pt denies cp,sob,n/v/d, fevers or chills. Pt is resting comfortably in bed. Will continue to reassess.

## 2017-07-26 NOTE — ED PROVIDER NOTE - PLAN OF CARE
1.  Stay Hydrated  2.  Take Tylenol 650mgs every 4-6 hrs as needed for pain  3.  Take cefpodoxime 200mgs every 12 hrs x 7 days  4.  Follow up with your PMD in 2-3 days  5.  Return to the ER for worsening pain, headache, fevers/chills or any other concerning symptoms

## 2017-07-26 NOTE — ED ADULT NURSE REASSESSMENT NOTE - NS ED NURSE REASSESS COMMENT FT1
Pt refused vs, denies any pain at this time. A&Ox2 to self & plac only. Continue monitor.
Received report from [facility]. Pt asleep on assigned stretcher shows no signs of any distress. Safety maintained, awaiting transport back to assisted living at this time. Continue monitor.

## 2017-07-26 NOTE — ED PROVIDER NOTE - OBJECTIVE STATEMENT
89 yo female with PMHx of Afib on xarelto, COPD, CVA, GERD, dementia p/w fall from NH.  The patient reports that she was walking in her facility when she tripped on the carpeting and fell.  Patient endorses head trauma, but denies LOC.  Fall was unwitnessed and patient cannot recall specifics of events.  Currently denies headache, blurry vision, CP, SOB, abd pain, NVDC, weakness, numbness/tingling.

## 2017-07-26 NOTE — ED PROVIDER NOTE - PHYSICAL EXAMINATION
Gen: AAO x 3, NAD  Skin: No rashes or lesions  HEENT: NC/AT, PERRLA, EOMI, MMM  Resp: unlabored CTAB  Cardiac: rrr s1s2, no murmurs, rubs or gallops  GI: ND, +BS, Soft, NT  Ext: no pedal edema, FROM in all extremities  MSK: pelvis stable, no snuff box TTP, no cspine ttp, compartments soft.  Neuro: no focal deficits

## 2017-10-19 NOTE — H&P ADULT. - ASSESSMENT
negative... NIGHT HOSPITALIST:  S/P presumed mechanical fall in the setting of patient with mild to moderate cognitive dysfunction>>no family in attendance and no family could be presently reached by phone--patient with a history of CAD with non-dx EKG and initial cardiac assays.  CTT trunk ordered to exclude occult bleed on Xarelto>> will HOLD AC for now and will defer resumption as such to the DAY HOSPITALIST.  Serum potassium repeated STAT.  Patient with moderately severe hypoalbuminemia in the setting of severe sarcopenia.  Will assume aspiration risk fo r now.  Prognosis guarded.  Emotional support provided to patient.

## 2017-12-02 ENCOUNTER — INPATIENT (INPATIENT)
Facility: HOSPITAL | Age: 82
LOS: 1 days | Discharge: NURSING, REHAB OR LONG-TERM CARE | DRG: 563 | End: 2017-12-04
Attending: HOSPITALIST | Admitting: INTERNAL MEDICINE
Payer: MEDICARE

## 2017-12-02 VITALS
TEMPERATURE: 98 F | OXYGEN SATURATION: 98 % | RESPIRATION RATE: 18 BRPM | DIASTOLIC BLOOD PRESSURE: 75 MMHG | HEART RATE: 49 BPM | SYSTOLIC BLOOD PRESSURE: 127 MMHG
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
## 2017-12-02 DIAGNOSIS — H40.9 UNSPECIFIED GLAUCOMA: ICD-10-CM

## 2017-12-02 DIAGNOSIS — R55 SYNCOPE AND COLLAPSE: ICD-10-CM

## 2017-12-02 DIAGNOSIS — F03.90 UNSPECIFIED DEMENTIA WITHOUT BEHAVIORAL DISTURBANCE: ICD-10-CM

## 2017-12-02 DIAGNOSIS — I48.91 UNSPECIFIED ATRIAL FIBRILLATION: ICD-10-CM

## 2017-12-02 DIAGNOSIS — K27.9 PEPTIC ULCER, SITE UNSPECIFIED, UNSPECIFIED AS ACUTE OR CHRONIC, WITHOUT HEMORRHAGE OR PERFORATION: ICD-10-CM

## 2017-12-02 DIAGNOSIS — E78.5 HYPERLIPIDEMIA, UNSPECIFIED: ICD-10-CM

## 2017-12-02 DIAGNOSIS — Z29.9 ENCOUNTER FOR PROPHYLACTIC MEASURES, UNSPECIFIED: ICD-10-CM

## 2017-12-02 LAB
APTT BLD: 26.8 SEC — LOW (ref 27.5–37.4)
BASOPHILS # BLD AUTO: 0 K/UL — SIGNIFICANT CHANGE UP (ref 0–0.2)
BASOPHILS NFR BLD AUTO: 0.6 % — SIGNIFICANT CHANGE UP (ref 0–2)
CK MB BLD-MCNC: 2.7 % — SIGNIFICANT CHANGE UP (ref 0–3.5)
CK MB CFR SERPL CALC: 1.6 NG/ML — SIGNIFICANT CHANGE UP (ref 0–3.8)
CK SERPL-CCNC: 60 U/L — SIGNIFICANT CHANGE UP (ref 25–170)
EOSINOPHIL # BLD AUTO: 0.1 K/UL — SIGNIFICANT CHANGE UP (ref 0–0.5)
EOSINOPHIL NFR BLD AUTO: 1.2 % — SIGNIFICANT CHANGE UP (ref 0–6)
GAS PNL BLDV: SIGNIFICANT CHANGE UP
HCT VFR BLD CALC: 37.4 % — SIGNIFICANT CHANGE UP (ref 34.5–45)
HGB BLD-MCNC: 12.9 G/DL — SIGNIFICANT CHANGE UP (ref 11.5–15.5)
INR BLD: 1.05 RATIO — SIGNIFICANT CHANGE UP (ref 0.88–1.16)
LYMPHOCYTES # BLD AUTO: 1.8 K/UL — SIGNIFICANT CHANGE UP (ref 1–3.3)
LYMPHOCYTES # BLD AUTO: 32.7 % — SIGNIFICANT CHANGE UP (ref 13–44)
MAGNESIUM SERPL-MCNC: 2.3 MG/DL — SIGNIFICANT CHANGE UP (ref 1.6–2.6)
MCHC RBC-ENTMCNC: 34.5 GM/DL — SIGNIFICANT CHANGE UP (ref 32–36)
MCHC RBC-ENTMCNC: 35 PG — HIGH (ref 27–34)
MCV RBC AUTO: 102 FL — HIGH (ref 80–100)
MONOCYTES # BLD AUTO: 0.3 K/UL — SIGNIFICANT CHANGE UP (ref 0–0.9)
MONOCYTES NFR BLD AUTO: 6.2 % — SIGNIFICANT CHANGE UP (ref 2–14)
NEUTROPHILS # BLD AUTO: 3.2 K/UL — SIGNIFICANT CHANGE UP (ref 1.8–7.4)
NEUTROPHILS NFR BLD AUTO: 59.2 % — SIGNIFICANT CHANGE UP (ref 43–77)
PLATELET # BLD AUTO: 125 K/UL — LOW (ref 150–400)
PROTHROM AB SERPL-ACNC: 11.3 SEC — SIGNIFICANT CHANGE UP (ref 9.8–12.7)
RBC # BLD: 3.68 M/UL — LOW (ref 3.8–5.2)
RBC # FLD: 12.7 % — SIGNIFICANT CHANGE UP (ref 10.3–14.5)
TROPONIN T SERPL-MCNC: <0.01 NG/ML — SIGNIFICANT CHANGE UP (ref 0–0.06)
WBC # BLD: 5.4 K/UL — SIGNIFICANT CHANGE UP (ref 3.8–10.5)
WBC # FLD AUTO: 5.4 K/UL — SIGNIFICANT CHANGE UP (ref 3.8–10.5)

## 2017-12-02 PROCEDURE — 70450 CT HEAD/BRAIN W/O DYE: CPT | Mod: 26

## 2017-12-02 PROCEDURE — 73080 X-RAY EXAM OF ELBOW: CPT | Mod: 26,RT

## 2017-12-02 PROCEDURE — 73502 X-RAY EXAM HIP UNI 2-3 VIEWS: CPT | Mod: 26,RT

## 2017-12-02 PROCEDURE — 73552 X-RAY EXAM OF FEMUR 2/>: CPT | Mod: 26,RT

## 2017-12-02 PROCEDURE — 93010 ELECTROCARDIOGRAM REPORT: CPT

## 2017-12-02 PROCEDURE — 99223 1ST HOSP IP/OBS HIGH 75: CPT

## 2017-12-02 PROCEDURE — 73030 X-RAY EXAM OF SHOULDER: CPT | Mod: 26,RT

## 2017-12-02 PROCEDURE — 73110 X-RAY EXAM OF WRIST: CPT | Mod: 26,RT

## 2017-12-02 PROCEDURE — 72125 CT NECK SPINE W/O DYE: CPT | Mod: 26

## 2017-12-02 PROCEDURE — 99285 EMERGENCY DEPT VISIT HI MDM: CPT | Mod: 25,GC

## 2017-12-02 PROCEDURE — 73090 X-RAY EXAM OF FOREARM: CPT | Mod: 26,LT

## 2017-12-02 PROCEDURE — 71010: CPT | Mod: 26

## 2017-12-02 RX ORDER — TIMOLOL 0.5 %
1 DROPS OPHTHALMIC (EYE)
Qty: 0 | Refills: 0 | Status: DISCONTINUED | OUTPATIENT
Start: 2017-12-02 | End: 2017-12-04

## 2017-12-02 RX ORDER — METOPROLOL TARTRATE 50 MG
12.5 TABLET ORAL ONCE
Qty: 0 | Refills: 0 | Status: COMPLETED | OUTPATIENT
Start: 2017-12-02 | End: 2017-12-02

## 2017-12-02 RX ORDER — ACETAMINOPHEN 500 MG
1000 TABLET ORAL ONCE
Qty: 0 | Refills: 0 | Status: COMPLETED | OUTPATIENT
Start: 2017-12-02 | End: 2017-12-02

## 2017-12-02 RX ORDER — BRIMONIDINE TARTRATE 2 MG/MG
1 SOLUTION/ DROPS OPHTHALMIC
Qty: 0 | Refills: 0 | Status: DISCONTINUED | OUTPATIENT
Start: 2017-12-02 | End: 2017-12-04

## 2017-12-02 RX ORDER — ACETAMINOPHEN 500 MG
650 TABLET ORAL EVERY 6 HOURS
Qty: 0 | Refills: 0 | Status: DISCONTINUED | OUTPATIENT
Start: 2017-12-02 | End: 2017-12-03

## 2017-12-02 RX ORDER — METOPROLOL TARTRATE 50 MG
12.5 TABLET ORAL
Qty: 0 | Refills: 0 | Status: DISCONTINUED | OUTPATIENT
Start: 2017-12-02 | End: 2017-12-04

## 2017-12-02 RX ORDER — ATORVASTATIN CALCIUM 80 MG/1
40 TABLET, FILM COATED ORAL AT BEDTIME
Qty: 0 | Refills: 0 | Status: DISCONTINUED | OUTPATIENT
Start: 2017-12-02 | End: 2017-12-04

## 2017-12-02 RX ORDER — DOCUSATE SODIUM 100 MG
300 CAPSULE ORAL DAILY
Qty: 0 | Refills: 0 | Status: DISCONTINUED | OUTPATIENT
Start: 2017-12-02 | End: 2017-12-04

## 2017-12-02 RX ORDER — RIVAROXABAN 15 MG-20MG
15 KIT ORAL EVERY 24 HOURS
Qty: 0 | Refills: 0 | Status: DISCONTINUED | OUTPATIENT
Start: 2017-12-02 | End: 2017-12-04

## 2017-12-02 RX ORDER — PANTOPRAZOLE SODIUM 20 MG/1
40 TABLET, DELAYED RELEASE ORAL
Qty: 0 | Refills: 0 | Status: DISCONTINUED | OUTPATIENT
Start: 2017-12-02 | End: 2017-12-04

## 2017-12-02 RX ADMIN — Medication 1 DROP(S): at 17:42

## 2017-12-02 RX ADMIN — RIVAROXABAN 15 MILLIGRAM(S): KIT at 17:42

## 2017-12-02 RX ADMIN — BRIMONIDINE TARTRATE 1 DROP(S): 2 SOLUTION/ DROPS OPHTHALMIC at 17:43

## 2017-12-02 RX ADMIN — Medication 12.5 MILLIGRAM(S): at 10:54

## 2017-12-02 RX ADMIN — ATORVASTATIN CALCIUM 40 MILLIGRAM(S): 80 TABLET, FILM COATED ORAL at 21:24

## 2017-12-02 RX ADMIN — Medication 1000 MILLIGRAM(S): at 08:09

## 2017-12-02 RX ADMIN — Medication 650 MILLIGRAM(S): at 21:24

## 2017-12-02 RX ADMIN — Medication 650 MILLIGRAM(S): at 22:13

## 2017-12-02 RX ADMIN — Medication 400 MILLIGRAM(S): at 04:32

## 2017-12-02 RX ADMIN — Medication 12.5 MILLIGRAM(S): at 21:24

## 2017-12-02 NOTE — H&P ADULT - NSHPLABSRESULTS_GEN_ALL_CORE
Labs reviewed : no leukocytosis, renal function and LFTs are within normal limits    CXR  personally reviewed : Clear lungs.  Proximal humeral fracture,    Right shoulder XR  personally reviewed : acute, minimally comminuted and impacted fracture of the   surgical neck of the humerus. There is no glenohumeral dissociation.     CT BRAIN: No acute intracranial hemorrhage or calvarial fracture.    CT CERVICAL SPINE: No acute cervical spine fracture or evidence of   traumatic malalignment.     EKG personally reviewed : NSR, bradycardia 48 bpm, no acute ischemic changes    Previous TTE FEB 2017  EF 71%  1. Mitral annular calcification, otherwise normal mitral  valve. Mild-moderate mitral regurgitation.  2. Endocardium not well visualized; grossly normal left  ventricular systolic function.  3. Mild diastolic dysfunction (Stage I).  4. Normal right ventricular size and function.  5. Normal tricuspid valve. Mild-moderate tricuspid  regurgitation.  6. Estimated pulmonary artery systolic pressure equals 38  mm Hg, assuming right atrial pressure equals 8 mm Hg,  consistent with borderline pulmonary pressures.

## 2017-12-02 NOTE — H&P ADULT - PROBLEM SELECTOR PLAN 1
mechanical fall vs syncope  Telemetry monitoring  Fall precautions  check TTE  PT evaluation  Pt's son would like her to be discharged back to the [facility] as soon as possible because it is a familiar environment. She is being transferred to the Dementia unit there.  Case management to facilitate early disposition pending PT evaluation  and TTE result.

## 2017-12-02 NOTE — ED PROVIDER NOTE - ATTENDING CONTRIBUTION TO CARE
Attending MD Boone: I personally have seen and examined this patient.  Resident note reviewed and agree on plan of care and except where noted.  See below for details.     89F with PMH including dementia, AFib on Xarelto, CVA, COPD, GERD sent in from nursing home s/p fall.  Reports that she was walking on carpeting when she fell and hit her head.  Denies LOC.  Patient unable to recall details of fall, unsure if tripped.  Details of stories are inconsistent with retelling.  Uses walker at baseline. Denies preceding dizziness, weakness, sensory changes.  Denies chest pain, shortness of breath, palpitations. Denies abdominal pain, nausea, vomiting, diarrhea, blood in stools.  Reports R shoulder pain.  On exam, NAD, head NCAT, CN 2-12 grossly intact, PERRL, FROM at neck, no tenderness to palpation or stepoffs along length of spine, lungs CTAB with good inspiratory effort, +S1S2, bradycardic, no m/r/g, abdomen soft with +BS, NT, ND, no CVAT, R shoulder tenderness to palpation, holding in adduction and elbow flexion, diffuse R sided tenderness to palpation no gross deformity, +2 radials, good and equal  strength bilaterally; A/P: 89F with fall, unclear if mechanical or syncope, now with R shoulder pain, suspected R humeral fracture, will obtain XR R humerus, XR R shoulder, CT Head, CT Cspine, CXR, EKG, pain control, monitor, admit

## 2017-12-02 NOTE — ED ADULT NURSE REASSESSMENT NOTE - NS ED NURSE REASSESS COMMENT FT1
pt resting/sleeping comfortably in stretcher. VS stable. tba for syncope and r. humerus fx. no nonverbal s/s of pain or discomfort present upon assessment. safety and fall precautions maintained. call bell at the bedside.

## 2017-12-02 NOTE — H&P ADULT - NSHPSOCIALHISTORY_GEN_ALL_CORE
Lives in [facility]  Ambulates with a walker  Denies tobacco/Etoh/Illicit drug use

## 2017-12-02 NOTE — ED ADULT TRIAGE NOTE - ARRIVAL INFO ADDITIONAL COMMENTS
pt BIBA from [facility], was found on floor. no obvious injuries, complaining of R shoulder pain at this time.

## 2017-12-02 NOTE — ED PROVIDER NOTE - MEDICAL DECISION MAKING DETAILS
Eun Mcqueen, DO: 89F with syncope vs. mechanical fall with poor story. Pt poor historian & hard of hearing. Eun Mcqueen, DO: 89F with syncope vs. mechanical fall with poor story. Pt poor historian & hard of hearing. Suspect humeral fracture.

## 2017-12-02 NOTE — ED PROVIDER NOTE - OBJECTIVE STATEMENT
Eun Mcqueen, DO: 89 yo female with PMHx of Afib on xarelto, COPD, CVA, GERD, dementia p/w fall from NH. Pt reports she was walking on the carpeting and fell.  Patient endorses head trauma, but denies LOC.  Fall was unwitnessed and patient cannot recall specifics of events & keeps changing story.

## 2017-12-02 NOTE — H&P ADULT - HISTORY OF PRESENT ILLNESS
89F  with h/o Afib on xarelto, COPD, CVA, GERD, Dementia who presents from SNF s/p fall. Pt  reports that she was walking on the carpeting when she fell. She is unsure if she tripped. She reports hitting her head but denies LOC. She denies dizziness, nausea, palpitations prior to the fall. No CP or SOB. She does report right shoulder pain after the fall.  Of note she was admitted to [facility] 4/6/17 to 4/11/17 for mechanical fall and was subsequently discharged to subacute rehab.    VS : 127/75 49  19 O2 98% on room air T 97.7%  Labs : wbc 5.4  h/h  12.5/37.4  plt 125  bun/Cr 19/0.83  trop < 0.01 CT head : negative  Right shoulder XR : acute, minimally comminuted and impacted fracture of the   surgical neck of the humerus  Treatment : Tylenol 1g IVPB x 1  Seen by Ortho, sling placed on [facility] 89F with h/o Afib on xarelto, COPD, CVA, GERD, Dementia who presents from [facility] s/p unwitnessed fall. Pt  reports that she was walking on the carpeting when she fell. She is unsure if she tripped. She reports hitting her head but denies LOC. She denies dizziness, nausea, palpitations prior to the fall. No CP or SOB. She does c/o  right shoulder pain.  Of note she was admitted to [facility] 4/6/17 to 4/11/17 for mechanical fall and was subsequently discharged to subacute rehab. She ambulates with a walker at baseline.    ED course  VS : 127/75 49  19 O2 98% on room air T 97.7%  Labs : wbc 5.4  h/h  12.5/37.4  plt 125  bun/Cr 19/0.83  trop < 0.01 CT head : negative  Right shoulder XR : acute, minimally comminuted and impacted fracture of the   surgical neck of the humerus  Treatment : Tylenol 1g IVPB x 1  Seen by Ortho, sling placed on E

## 2017-12-02 NOTE — ED PROVIDER NOTE - PHYSICAL EXAMINATION
Eun Mcqueen, DO:  Gen: Well appearing, NAD  Head: NCAT  HEENT: PERRL, MMM, normal conjunctiva, anicteric, neck supple  Lung: CTAB, no adventitious sounds  CV: RRR, no murmurs, rubs or gallops  Abd: soft, NTND, no rebound or guarding, no CVAT  MSK: significant pain with ROM of R shoulder, moving LUE freely, no gross deformities, no pain on axial loading of b/l LE  Neuro: No focal neurologic deficits. CN II-XII grossly intact. 5/5 strength and normal sensation in all extremities.  Skin: Warm and dry, no evidence of rash  Psych: normal mood and affect

## 2017-12-02 NOTE — ED ADULT NURSE NOTE - OBJECTIVE STATEMENT
89y female with hx of a-fib, glaucoma, HLD presents to the ER s/p fall. Pt is A&Ox2, pt takes long pauses to respond to nurses questions. As per EMS pt was ambulating to bathroom when she had an unwitnessed fall. unknown how long pt was down for. Pt denies loc. Pt appears calm at the bedside. Pain when palpating right shoulder. skin warm and dry, positive pulses noted x 4 extremities. Pt in NAD- respirations equal and regular. EKG completed, pt placed on CM. MD kirk completed. Safety maintained. Pt placed in nurses view. will reassess.

## 2017-12-02 NOTE — H&P ADULT - ASSESSMENT
89F with h/o Afib on xarelto, COPD, CVA, GERD, Dementia who presents from [facility] s/p unwitnessed fall. Pt denies CP,SOB ,palpitations prior to the fall. No LOC reported. Labs are unremarkable. Right shoulder XR shows an acute comminuted fracture of the surgical humeral neck. Brain CT is negative for acute intracranial hemorrhage. EKG shows no acute ischemic changes.

## 2017-12-03 DIAGNOSIS — S42.224D 2-PART NONDISPLACED FRACTURE OF SURGICAL NECK OF RIGHT HUMERUS, SUBSEQUENT ENCOUNTER FOR FRACTURE WITH ROUTINE HEALING: ICD-10-CM

## 2017-12-03 LAB
ANION GAP SERPL CALC-SCNC: 13 MMOL/L — SIGNIFICANT CHANGE UP (ref 5–17)
BUN SERPL-MCNC: 35 MG/DL — HIGH (ref 7–23)
CALCIUM SERPL-MCNC: 8.9 MG/DL — SIGNIFICANT CHANGE UP (ref 8.4–10.5)
CHLORIDE SERPL-SCNC: 104 MMOL/L — SIGNIFICANT CHANGE UP (ref 96–108)
CO2 SERPL-SCNC: 25 MMOL/L — SIGNIFICANT CHANGE UP (ref 22–31)
CREAT SERPL-MCNC: 0.84 MG/DL — SIGNIFICANT CHANGE UP (ref 0.5–1.3)
GLUCOSE SERPL-MCNC: 123 MG/DL — HIGH (ref 70–99)
HCT VFR BLD CALC: 33.1 % — LOW (ref 34.5–45)
HGB BLD-MCNC: 10.7 G/DL — LOW (ref 11.5–15.5)
MCHC RBC-ENTMCNC: 32.3 GM/DL — SIGNIFICANT CHANGE UP (ref 32–36)
MCHC RBC-ENTMCNC: 32.6 PG — SIGNIFICANT CHANGE UP (ref 27–34)
MCV RBC AUTO: 100.9 FL — HIGH (ref 80–100)
PLATELET # BLD AUTO: 116 K/UL — LOW (ref 150–400)
POTASSIUM SERPL-MCNC: 4.3 MMOL/L — SIGNIFICANT CHANGE UP (ref 3.5–5.3)
POTASSIUM SERPL-SCNC: 4.3 MMOL/L — SIGNIFICANT CHANGE UP (ref 3.5–5.3)
RBC # BLD: 3.28 M/UL — LOW (ref 3.8–5.2)
RBC # FLD: 14.2 % — SIGNIFICANT CHANGE UP (ref 10.3–14.5)
SODIUM SERPL-SCNC: 142 MMOL/L — SIGNIFICANT CHANGE UP (ref 135–145)
WBC # BLD: 8.97 K/UL — SIGNIFICANT CHANGE UP (ref 3.8–10.5)
WBC # FLD AUTO: 8.97 K/UL — SIGNIFICANT CHANGE UP (ref 3.8–10.5)

## 2017-12-03 PROCEDURE — 99233 SBSQ HOSP IP/OBS HIGH 50: CPT | Mod: GC

## 2017-12-03 RX ORDER — ACETAMINOPHEN 500 MG
1000 TABLET ORAL EVERY 6 HOURS
Qty: 0 | Refills: 0 | Status: DISCONTINUED | OUTPATIENT
Start: 2017-12-03 | End: 2017-12-04

## 2017-12-03 RX ADMIN — Medication 1000 MILLIGRAM(S): at 11:57

## 2017-12-03 RX ADMIN — BRIMONIDINE TARTRATE 1 DROP(S): 2 SOLUTION/ DROPS OPHTHALMIC at 18:18

## 2017-12-03 RX ADMIN — Medication 1000 MILLIGRAM(S): at 19:01

## 2017-12-03 RX ADMIN — Medication 12.5 MILLIGRAM(S): at 18:19

## 2017-12-03 RX ADMIN — Medication 1000 MILLIGRAM(S): at 13:00

## 2017-12-03 RX ADMIN — ATORVASTATIN CALCIUM 40 MILLIGRAM(S): 80 TABLET, FILM COATED ORAL at 23:28

## 2017-12-03 RX ADMIN — Medication 1 DROP(S): at 18:19

## 2017-12-03 RX ADMIN — Medication 1 DROP(S): at 05:57

## 2017-12-03 RX ADMIN — RIVAROXABAN 15 MILLIGRAM(S): KIT at 18:19

## 2017-12-03 RX ADMIN — Medication 300 MILLIGRAM(S): at 11:57

## 2017-12-03 RX ADMIN — BRIMONIDINE TARTRATE 1 DROP(S): 2 SOLUTION/ DROPS OPHTHALMIC at 05:56

## 2017-12-03 RX ADMIN — Medication 1000 MILLIGRAM(S): at 18:18

## 2017-12-03 RX ADMIN — Medication 1000 MILLIGRAM(S): at 23:26

## 2017-12-03 RX ADMIN — PANTOPRAZOLE SODIUM 40 MILLIGRAM(S): 20 TABLET, DELAYED RELEASE ORAL at 05:56

## 2017-12-03 NOTE — PHYSICAL THERAPY INITIAL EVALUATION ADULT - PERTINENT HX OF CURRENT PROBLEM, REHAB EVAL
89F  with h/o Afib on xarelto, COPD, CVA, GERD, Dementia who presents from SNF s/p fall. Pt  reports that she was walking on the carpeting when she fell. She is unsure if she tripped. She reports hitting her head but denies LOC. Of note she was admitted to [facility] 4/6/17 to 4/11/17 for mechanical fall and was subsequently discharged to subacute rehab.

## 2017-12-03 NOTE — PROGRESS NOTE ADULT - PROBLEM SELECTOR PLAN 1
mechanical fall vs syncope  c/w Telemetry monitoring  Fall precautions  awaiting TTE to eval for Structural Cardiac abnormalities.  PT evaluation  Pt's son would like her to be discharged back to the [facility] as soon as possible because it is a familiar environment. She is being transferred to the Dementia unit there.  Case management to facilitate early disposition pending PT evaluation  and TTE result. mechanical fall vs syncope  c/w Telemetry monitoring  Fall precautions  will check a Vitamin D  awaiting TTE to eval for Structural Cardiac abnormalities.  PT evaluation  Pt's son would like her to be discharged back to the [facility] as soon as possible because it is a familiar environment. She is being transferred to the Dementia unit there.  Case management to facilitate early disposition pending PT evaluation  and TTE result.

## 2017-12-03 NOTE — PROGRESS NOTE ADULT - PROBLEM SELECTOR PROBLEM 8
Closed 2-part nondisplaced fracture of surgical neck of right humerus with routine healing, subsequent encounter

## 2017-12-03 NOTE — PHYSICAL THERAPY INITIAL EVALUATION ADULT - ADDITIONAL COMMENTS
12/2 Xray shoulder- There is an acute, minimally comminuted and impacted fracture of the surgical neck of the humerus. There is no glenohumeral dissociation. There is moderate AC joint arthrosis. Visualized portions of the right lung are unremarkable.  12/2- Xray wrist- The bones are demineralized. There is no acute fracture of the right forearm.   12/2- Xray Pelvis w hip-No acute fracture or dislocation of the pelvis or right femur. Severe   bilateral hip joint arthrosis. Degenerative changes of the lumbar spine and bilateral sacroiliac joints noted.

## 2017-12-03 NOTE — PROGRESS NOTE ADULT - SUBJECTIVE AND OBJECTIVE BOX
Patient is a 89y old  Female who presents with a chief complaint of fall (02 Dec 2017 08:50)      SUBJECTIVE / OVERNIGHT EVENTS:    No acute overnight events.  ROS: ( - ) Fever, ( - )Chills,  ( - )Nausea/Vomiting, ( - ) Cough, ( - )Shortness of breath, ( - )Chest Pain    MEDICATIONS  (STANDING):  acetaminophen    Suspension. 1000 milliGRAM(s) Oral every 6 hours  atorvastatin 40 milliGRAM(s) Oral at bedtime  brimonidine 0.2% Ophthalmic Solution 1 Drop(s) Both EYES two times a day  docusate sodium Liquid 300 milliGRAM(s) Oral daily  metoprolol     tartrate 12.5 milliGRAM(s) Oral two times a day  pantoprazole    Tablet 40 milliGRAM(s) Oral before breakfast  rivaroxaban 15 milliGRAM(s) Oral every 24 hours  timolol 0.5% Solution 1 Drop(s) Both EYES two times a day    MEDICATIONS  (PRN):      T(C): 36.8 (12-03 @ 11:36), Max: 36.8 (12-03 @ 11:36)   HR: 61   BP: 110/68   RR: 17   SpO2: 96%    PHYSICAL EXAM:  GENERAL: NAD, well-developed  HEAD:  Atraumatic, Normocephalic  CHEST/LUNG: Clear to auscultation bilaterally; No wheeze  HEART: Regular rate and rhythm; No murmurs, rubs, or gallops  ABDOMEN: Soft, Nontender, Nondistended; Bowel sounds present  EXTREMITIES:  2+ Peripheral Pulses, No clubbing, cyanosis, or edema, Right arm with ecchymosis.   NEUROLOGY: non-focal    LABS:                        10.7   8.97  )-----------( 116      ( 03 Dec 2017 07:15 )             33.1      12-03    142  |  104  |  35<H>  ----------------------------<  123<H>  4.3   |  25  |  0.84    Ca    8.9      03 Dec 2017 07:11  Mg     2.3     12-02    TPro  6.2  /  Alb  3.6  /  TBili  1.5<H>  /  DBili  x   /  AST  24  /  ALT  15  /  AlkPhos  75  12-02       CAPILLARY BLOOD GLUCOSE      POCT Blood Glucose.: 119 mg/dL (03 Dec 2017 11:35)      RADIOLOGY & ADDITIONAL TESTS:    Imaging Personally Reviewed:  Consultant(s) Notes Reviewed:    Care Discussed with Consultants/Other Providers: Patient is a 89y old  Female who presents with a chief complaint of fall (02 Dec 2017 08:50)      SUBJECTIVE / OVERNIGHT EVENTS:    No acute overnight events.  Tele: Sinus 50-70  ROS: ( - ) Fever, ( - )Chills,  ( - )Nausea/Vomiting, ( - ) Cough, ( - )Shortness of breath, ( - )Chest Pain    MEDICATIONS  (STANDING):  acetaminophen    Suspension. 1000 milliGRAM(s) Oral every 6 hours  atorvastatin 40 milliGRAM(s) Oral at bedtime  brimonidine 0.2% Ophthalmic Solution 1 Drop(s) Both EYES two times a day  docusate sodium Liquid 300 milliGRAM(s) Oral daily  metoprolol     tartrate 12.5 milliGRAM(s) Oral two times a day  pantoprazole    Tablet 40 milliGRAM(s) Oral before breakfast  rivaroxaban 15 milliGRAM(s) Oral every 24 hours  timolol 0.5% Solution 1 Drop(s) Both EYES two times a day    MEDICATIONS  (PRN):      T(C): 36.8 (12-03 @ 11:36), Max: 36.8 (12-03 @ 11:36)   HR: 61   BP: 110/68   RR: 17   SpO2: 96%    PHYSICAL EXAM:  GENERAL: NAD, well-developed  HEAD:  Atraumatic, Normocephalic  CHEST/LUNG: Clear to auscultation bilaterally; No wheeze  HEART: Regular rate and rhythm; No murmurs, rubs, or gallops  ABDOMEN: Soft, Nontender, Nondistended; Bowel sounds present  EXTREMITIES:  2+ Peripheral Pulses, No clubbing, cyanosis, or edema, Right arm with ecchymosis.   NEUROLOGY: non-focal    LABS:                        10.7   8.97  )-----------( 116      ( 03 Dec 2017 07:15 )             33.1      12-03    142  |  104  |  35<H>  ----------------------------<  123<H>  4.3   |  25  |  0.84    Ca    8.9      03 Dec 2017 07:11  Mg     2.3     12-02    TPro  6.2  /  Alb  3.6  /  TBili  1.5<H>  /  DBili  x   /  AST  24  /  ALT  15  /  AlkPhos  75  12-02       CAPILLARY BLOOD GLUCOSE      POCT Blood Glucose.: 119 mg/dL (03 Dec 2017 11:35)      RADIOLOGY & ADDITIONAL TESTS:    Imaging Personally Reviewed:  Consultant(s) Notes Reviewed:    Care Discussed with Consultants/Other Providers:

## 2017-12-04 ENCOUNTER — TRANSCRIPTION ENCOUNTER (OUTPATIENT)
Age: 82
End: 2017-12-04

## 2017-12-04 VITALS
OXYGEN SATURATION: 99 % | DIASTOLIC BLOOD PRESSURE: 70 MMHG | SYSTOLIC BLOOD PRESSURE: 115 MMHG | TEMPERATURE: 98 F | RESPIRATION RATE: 18 BRPM | HEART RATE: 60 BPM

## 2017-12-04 LAB
24R-OH-CALCIDIOL SERPL-MCNC: 20.5 NG/ML — LOW (ref 30–80)
ANION GAP SERPL CALC-SCNC: 15 MMOL/L — SIGNIFICANT CHANGE UP (ref 5–17)
BUN SERPL-MCNC: 40 MG/DL — HIGH (ref 7–23)
CALCIUM SERPL-MCNC: 8.7 MG/DL — SIGNIFICANT CHANGE UP (ref 8.4–10.5)
CHLORIDE SERPL-SCNC: 105 MMOL/L — SIGNIFICANT CHANGE UP (ref 96–108)
CO2 SERPL-SCNC: 23 MMOL/L — SIGNIFICANT CHANGE UP (ref 22–31)
CREAT SERPL-MCNC: 0.81 MG/DL — SIGNIFICANT CHANGE UP (ref 0.5–1.3)
GLUCOSE SERPL-MCNC: 89 MG/DL — SIGNIFICANT CHANGE UP (ref 70–99)
HCT VFR BLD CALC: 30.3 % — LOW (ref 34.5–45)
HGB BLD-MCNC: 10 G/DL — LOW (ref 11.5–15.5)
MCHC RBC-ENTMCNC: 33 GM/DL — SIGNIFICANT CHANGE UP (ref 32–36)
MCHC RBC-ENTMCNC: 33.2 PG — SIGNIFICANT CHANGE UP (ref 27–34)
MCV RBC AUTO: 100.7 FL — HIGH (ref 80–100)
PLATELET # BLD AUTO: 98 K/UL — LOW (ref 150–400)
POTASSIUM SERPL-MCNC: 4.3 MMOL/L — SIGNIFICANT CHANGE UP (ref 3.5–5.3)
POTASSIUM SERPL-SCNC: 4.3 MMOL/L — SIGNIFICANT CHANGE UP (ref 3.5–5.3)
RBC # BLD: 3.01 M/UL — LOW (ref 3.8–5.2)
RBC # FLD: 14.3 % — SIGNIFICANT CHANGE UP (ref 10.3–14.5)
SODIUM SERPL-SCNC: 143 MMOL/L — SIGNIFICANT CHANGE UP (ref 135–145)
WBC # BLD: 7.83 K/UL — SIGNIFICANT CHANGE UP (ref 3.8–10.5)
WBC # FLD AUTO: 7.83 K/UL — SIGNIFICANT CHANGE UP (ref 3.8–10.5)

## 2017-12-04 PROCEDURE — 99239 HOSP IP/OBS DSCHRG MGMT >30: CPT

## 2017-12-04 RX ORDER — METOPROLOL TARTRATE 50 MG
0.5 TABLET ORAL
Qty: 30 | Refills: 0 | OUTPATIENT
Start: 2017-12-04 | End: 2018-01-03

## 2017-12-04 RX ORDER — METOPROLOL TARTRATE 50 MG
0.5 TABLET ORAL
Qty: 30 | Refills: 0 | OUTPATIENT
Start: 2017-12-04

## 2017-12-04 RX ORDER — ACETAMINOPHEN 500 MG
31.25 TABLET ORAL
Qty: 0 | Refills: 0 | COMMUNITY
Start: 2017-12-04

## 2017-12-04 RX ORDER — ACETAMINOPHEN 500 MG
2 TABLET ORAL
Qty: 0 | Refills: 0 | COMMUNITY
Start: 2017-12-04

## 2017-12-04 RX ADMIN — Medication 1000 MILLIGRAM(S): at 05:57

## 2017-12-04 RX ADMIN — BRIMONIDINE TARTRATE 1 DROP(S): 2 SOLUTION/ DROPS OPHTHALMIC at 05:55

## 2017-12-04 RX ADMIN — Medication 12.5 MILLIGRAM(S): at 17:02

## 2017-12-04 RX ADMIN — Medication 1 DROP(S): at 05:57

## 2017-12-04 RX ADMIN — Medication 1000 MILLIGRAM(S): at 17:02

## 2017-12-04 RX ADMIN — BRIMONIDINE TARTRATE 1 DROP(S): 2 SOLUTION/ DROPS OPHTHALMIC at 17:02

## 2017-12-04 RX ADMIN — RIVAROXABAN 15 MILLIGRAM(S): KIT at 17:02

## 2017-12-04 RX ADMIN — Medication 12.5 MILLIGRAM(S): at 05:57

## 2017-12-04 RX ADMIN — Medication 1000 MILLIGRAM(S): at 18:00

## 2017-12-04 RX ADMIN — PANTOPRAZOLE SODIUM 40 MILLIGRAM(S): 20 TABLET, DELAYED RELEASE ORAL at 05:57

## 2017-12-04 RX ADMIN — Medication 1000 MILLIGRAM(S): at 11:06

## 2017-12-04 RX ADMIN — Medication 300 MILLIGRAM(S): at 11:06

## 2017-12-04 RX ADMIN — Medication 1 DROP(S): at 17:02

## 2017-12-04 RX ADMIN — Medication 1000 MILLIGRAM(S): at 12:00

## 2017-12-04 RX ADMIN — Medication 1000 MILLIGRAM(S): at 07:00

## 2017-12-04 RX ADMIN — Medication 1000 MILLIGRAM(S): at 00:40

## 2017-12-04 NOTE — CHART NOTE - NSCHARTNOTEFT_GEN_A_CORE
Upon Nutritional Assessment by the Registered Dietitian your patient was determined to meet criteria / has evidence of the following diagnosis/diagnoses:          [ ]  Mild Protein Calorie Malnutrition        [ ]  Moderate Protein Calorie Malnutrition        [ X] Severe Protein Calorie Malnutrition        [ ] Unspecified Protein Calorie Malnutrition        [ ] Underweight / BMI <19        [ ] Morbid Obesity / BMI > 40      Findings as based on:  [ X] Comprehensive nutrition assessment: Poor PO intake in house   [ X] Nutrition Focused Physical Exam: Severe fat and muscle wasting  [ X] Other: Stage 1 sacral pressure ulcer       Nutrition Plan/Recommendations:      Continue Ensure enlive x3 (thickened appropriately) and No carb  Pro source x1   Consider swallow evaluation     PROVIDER Section:     By signing this assessment you are acknowledging and agree with the diagnosis/diagnoses assigned by the Registered Dietitian    Comments:

## 2017-12-04 NOTE — DISCHARGE NOTE ADULT - MEDICATION SUMMARY - MEDICATIONS TO TAKE
I will START or STAY ON the medications listed below when I get home from the hospital:    acetaminophen 160 mg/5 mL oral suspension  -- 31.25 milliliter(s) by mouth every 6 hours  -- Indication: For Discomfort    Xarelto 15 mg oral tablet  -- 1 tab(s) by mouth once a day (in the evening)  -- Indication: For Anticoagulation    atorvastatin 40 mg oral tablet  -- 1 tab(s) by mouth once a day (at bedtime)  -- Indication: For Cholesterol management    metoprolol tartrate 25 mg oral tablet  -- 0.5 tab(s) (12.5 mg) by mouth 2 times a day   -- It is very important that you take or use this exactly as directed.  Do not skip doses or discontinue unless directed by your doctor.  May cause drowsiness.  Alcohol may intensify this effect.  Use care when operating dangerous machinery.  Some non-prescription drugs may aggravate your condition.  Read all labels carefully.  If a warning appears, check with your doctor before taking.  Take with food or milk.  This drug may impair the ability to drive or operate machinery.  Use care until you become familiar with its effects.    -- Indication: For Atrial fibrillation, unspecified type    Colace 10 mg/mL oral liquid  -- 30 milliliter(s) by mouth once a day (in the evening)  -- Indication: For laxative    timolol maleate 0.5% ophthalmic gel forming solution  -- 1 drop(s) to each eye 2 times a day  -- Indication: For eye drops    brimonidine 0.2% ophthalmic solution  -- 1 drop(s) to each eye 2 times a day  -- Indication: For eye drops

## 2017-12-04 NOTE — DIETITIAN INITIAL EVALUATION ADULT. - ENERGY NEEDS
Ht: 5'2", Wt: 111.1lbs, BMI: 20.3kg/m2, IBW: 110lbs(+/-10%), 100%IBW  Pertinent information: Pt admitted s/p fall with non displaced Rf of right humerus. Pt with dementia, Afib.   +1 liberalized and +4 right upper extremity, Stage 1 sacral pressure ulcer

## 2017-12-04 NOTE — PROGRESS NOTE ADULT - ATTENDING COMMENTS
D/c planning back to assisted living if accepted.   PT rec rehab but Son would like her back at [facility].   to contact [facility].   D/c time 40 mins

## 2017-12-04 NOTE — PROGRESS NOTE ADULT - PROBLEM SELECTOR PLAN 1
mechanical fall vs syncope  No events on tele.   Fall precautions  PT evaluation noted for rehab.   d/w Son over the phone and in person. Wants to get her back to [facility] assisted [facility] to dementia unit. understands patients now new balance issues with right humeral fracture. will d/w assisted living re: future care.  D/w  re:discharge planning

## 2017-12-04 NOTE — PROGRESS NOTE ADULT - PROBLEM SELECTOR PROBLEM 6
Glaucoma, unspecified glaucoma type, unspecified laterality
Glaucoma, unspecified glaucoma type, unspecified laterality

## 2017-12-04 NOTE — PROGRESS NOTE ADULT - ASSESSMENT
89F with h/o Afib on xarelto, COPD, CVA, GERD, Dementia who presents from [facility] s/p unwitnessed fall. Pt denies CP,SOB ,palpitations prior to the fall. No LOC reported. Labs are unremarkable. Right shoulder XR shows an acute comminuted fracture of the surgical humeral neck. Brain CT is negative for acute intracranial hemorrhage. EKG shows no acute ischemic changes.
89F with h/o Afib on xarelto, COPD, CVA, GERD, Dementia who presents from [facility] s/p unwitnessed fall. Pt denies CP,SOB ,palpitations prior to the fall. No LOC reported. Labs are unremarkable. Right shoulder XR shows an acute comminuted fracture of the surgical humeral neck. Brain CT is negative for acute intracranial hemorrhage. EKG shows no acute ischemic changes.

## 2017-12-04 NOTE — DIETITIAN INITIAL EVALUATION ADULT. - NS AS NUTRI INTERV MEDICAL AND FOOD SUPPLEMENTS
Commercial beverage/Recommend to continue Ensure enlive x3(thickened appropriately) and 1 no carb pro source daily

## 2017-12-04 NOTE — DIETITIAN INITIAL EVALUATION ADULT. - NUTRITION INTERVENTION
Meals and Snack/Collaboration and Referral of Nutrition Care/Medical Food Supplements/Feeding Assistance

## 2017-12-04 NOTE — DIETITIAN INITIAL EVALUATION ADULT. - DIET TYPE
supplement (specify)/Ensure enlive x3 and 1 no carb pro source/dysphagia 1, pureed, honey consistency fluid

## 2017-12-04 NOTE — DISCHARGE NOTE ADULT - PLAN OF CARE
no further incidents physical therapy  assistance  maintain safe environment healed Follow up with Dr Greer in 1-2 weeks  Wear arm sling for comfort

## 2017-12-04 NOTE — DISCHARGE NOTE ADULT - CARE PLAN
Principal Discharge DX:	Fall  Goal:	no further incidents  Instructions for follow-up, activity and diet:	physical therapy  assistance  maintain safe environment  Secondary Diagnosis:	Closed 2-part nondisplaced fracture of surgical neck of right humerus with routine healing, subsequent encounter  Goal:	healed  Instructions for follow-up, activity and diet:	Follow up with Dr Greer in 1-2 weeks  Wear arm sling for comfort

## 2017-12-04 NOTE — DISCHARGE NOTE ADULT - HOSPITAL COURSE
89F with h/o Afib on Xarelto, COPD, CVA, GERD, Dementia who presents from [facility] following an unwitnessed fall. Pt denies Chest pain, shortness of breath or palpitations prior to the fall. No Loss of consciousness was reported. Labs were unremarkable. Right shoulder xray showed an acute comminuted fracture of the surgical humeral neck. Brain CT is negative for acute intracranial hemorrhage. EKG shows no acute ischemic   Ortho contacted for shoulder injury - recommended sling and out- patient follow up.  All other work - up was negative  Cleared for discharge to subacute rehab - Family prefers back to assisted living facility with home assistance and home physical therapy 89F with h/o Afib on Xarelto, COPD, CVA, GERD, Dementia who presents from [facility] following an unwitnessed fall. Pt denies Chest pain, shortness of breath or palpitations prior to the fall. No Loss of consciousness was reported. Labs were unremarkable. Right shoulder xray showed an acute comminuted fracture of the surgical humeral neck. Brain CT is negative for acute intracranial hemorrhage. EKG shows no acute ischemic   Ortho contacted for shoulder injury - recommended sling and out- patient follow up. All other work - up was negative  Physical therapy recommended [facility] however due to patient's overall mental status/dementia son requested patient to be discharged back to assisted living facility (dementia unit). Care coordinated with assisted living facility who accepted patient back with current physical dysfunction. d/w son at length about risk and benefits who understood.

## 2017-12-04 NOTE — DIETITIAN INITIAL EVALUATION ADULT. - PHYSICAL APPEARANCE
underweight/other (specify)/Limited nutrition physical focused exam in the setting of right arm Fx. Pt noted wit severe muscle wasting at clavicles, temporals, interosseus region. Severe fat wasting at ribs and orbitals.

## 2017-12-04 NOTE — DIETITIAN INITIAL EVALUATION ADULT. - NS AS NUTRI INTERV FEED ASSISTANCE
Feeding Assistance/1. Provide food preferences as requested by Pt/family within diet restrictions  2. Encourage PO intake during meal times 3. Provide feeding assistance/Other (specify)

## 2017-12-04 NOTE — DISCHARGE NOTE ADULT - ADDITIONAL INSTRUCTIONS
Make appointments to follow up with your physicians  See the orthopedic doctor (Dr Greer) 1 - 2 weeks after discharge  Bring all discharge paperwork including discharge medication list to follow up appointments

## 2017-12-04 NOTE — DIETITIAN INITIAL EVALUATION ADULT. - PT NOT SOURCE
Pt with dementia, non communicative at time of RD interview. No family at bedside, limited subjective information collected at this time./other (specify)

## 2017-12-04 NOTE — PROGRESS NOTE ADULT - PROBLEM SELECTOR PLAN 2
c/w Xarelto 15 mg PO qpm  c/w Metoprolol 12.5 mg PO bid  Telemetry monitoring
c/w Xarelto 15 mg PO qpm  c/w Metoprolol 12.5 mg PO bid  Telemetry monitoring

## 2017-12-04 NOTE — DISCHARGE NOTE ADULT - PATIENT PORTAL LINK FT
“You can access the Follow[facility] Patient Portal, offered by [facility], by registering with the following website: [email or web address]”

## 2017-12-04 NOTE — DIETITIAN INITIAL EVALUATION ADULT. - OTHER INFO
Nutrition consult received for BMI >18kg/m2; however BMI is documented at 20.2kg/m2. Pt seen siting in bed drinking honey thick Ensure Enlive. Per RN, he mixed in no carb pro source. RN states Pt has limited food acceptance and prefers to sip on Ensure enlive daily. Pt currently ordered for Dysphagia 1 honey thick diet, however no documentation of swallowing difficulty; will review with team. Per RN, no GI distress, no micronutrient supplementation PTA. NKFA

## 2017-12-04 NOTE — DIETITIAN INITIAL EVALUATION ADULT. - NS FNS REASON FOR WEIGHT CHANG
other (specify)/unknown. Per previous RD note Pt was 96.5lbs as a results of a 9lb wt loss x2 months. Pt is admitted now at 111lbs. ? positive weight gain vs accuracy of weights obtained.

## 2017-12-04 NOTE — DIETITIAN INITIAL EVALUATION ADULT. - NS AS NUTRI INTERV COLLABORAT
Malnutrition sticker placed in chart, consider swallow evaluation. RD remains available to monitor PO intake, wt, labs/Collaboration with other providers

## 2017-12-04 NOTE — PROGRESS NOTE ADULT - PROBLEM SELECTOR PLAN 8
Awaiting ortho input. As per med documentation rec : sling, immobilization but no consult. will contact for official eval and follow up plans.

## 2017-12-04 NOTE — PROGRESS NOTE ADULT - SUBJECTIVE AND OBJECTIVE BOX
Patient is a 89y old  Female who presents with a chief complaint of fall (02 Dec 2017 08:50)      SUBJECTIVE / OVERNIGHT EVENTS:  Arousable but minimally verbal. no acute issues overnight.   Tele, SR 40-80s.     MEDICATIONS  (STANDING):  acetaminophen    Suspension. 1000 milliGRAM(s) Oral every 6 hours  atorvastatin 40 milliGRAM(s) Oral at bedtime  brimonidine 0.2% Ophthalmic Solution 1 Drop(s) Both EYES two times a day  docusate sodium Liquid 300 milliGRAM(s) Oral daily  metoprolol     tartrate 12.5 milliGRAM(s) Oral two times a day  pantoprazole    Tablet 40 milliGRAM(s) Oral before breakfast  rivaroxaban 15 milliGRAM(s) Oral every 24 hours  timolol 0.5% Solution 1 Drop(s) Both EYES two times a day    MEDICATIONS  (PRN):      Vital Signs Last 24 Hrs  T(C): 36.6 (04 Dec 2017 11:41), Max: 36.9 (03 Dec 2017 17:09)  T(F): 97.9 (04 Dec 2017 11:41), Max: 98.5 (03 Dec 2017 17:09)  HR: 58 (04 Dec 2017 11:41) (58 - 61)  BP: 111/68 (04 Dec 2017 11:41) (108/62 - 139/66)  BP(mean): --  RR: 18 (04 Dec 2017 11:41) (17 - 18)  SpO2: 100% (04 Dec 2017 11:41) (97% - 100%)  CAPILLARY BLOOD GLUCOSE        I&O's Summary    03 Dec 2017 07:01  -  04 Dec 2017 07:00  --------------------------------------------------------  IN: 700 mL / OUT: 0 mL / NET: 700 mL          PHYSICAL EXAM  GENERAL: NAD, well-developed  HEAD:  Atraumatic, Normocephalic  EYES: EOMI, PERRLA, conjunctiva and sclera clear  NECK: Supple, No JVD  CHEST/LUNG: Clear to auscultation bilaterally; No wheeze  HEART: Regular rate and rhythm, no systolic murmur  ABDOMEN: Soft, mild tenderness on deep palpation, Nondistended; Bowel sounds present  EXTREMITIES:  2+ Peripheral Pulses, No clubbing, cyanosis, or edema. right arm ecchymosis,swelling. sling.   PSYCH: AAOx0  SKIN: No rashes or lesions    LABS:                        10.0   7.83  )-----------( 98       ( 04 Dec 2017 07:19 )             30.3     12-04    143  |  105  |  40<H>  ----------------------------<  89  4.3   |  23  |  0.81    Ca    8.7      04 Dec 2017 07:27                  RADIOLOGY & ADDITIONAL TESTS:    Imaging Personally Reviewed:  Consultant(s) Notes Reviewed:    Care Discussed with Consultants/Other Providers:

## 2017-12-04 NOTE — DISCHARGE NOTE ADULT - CARE PROVIDER_API CALL
Brandan Beal), Orthopaedic Surgery  [address] [place] [address]  Phone: [phone]  Fax: [phone]

## 2017-12-19 PROCEDURE — 84132 ASSAY OF SERUM POTASSIUM: CPT

## 2017-12-19 PROCEDURE — 73030 X-RAY EXAM OF SHOULDER: CPT

## 2017-12-19 PROCEDURE — 73110 X-RAY EXAM OF WRIST: CPT

## 2017-12-19 PROCEDURE — 73080 X-RAY EXAM OF ELBOW: CPT

## 2017-12-19 PROCEDURE — 82435 ASSAY OF BLOOD CHLORIDE: CPT

## 2017-12-19 PROCEDURE — 82330 ASSAY OF CALCIUM: CPT

## 2017-12-19 PROCEDURE — 72125 CT NECK SPINE W/O DYE: CPT

## 2017-12-19 PROCEDURE — 85027 COMPLETE CBC AUTOMATED: CPT

## 2017-12-19 PROCEDURE — 85014 HEMATOCRIT: CPT

## 2017-12-19 PROCEDURE — 83605 ASSAY OF LACTIC ACID: CPT

## 2017-12-19 PROCEDURE — 97530 THERAPEUTIC ACTIVITIES: CPT

## 2017-12-19 PROCEDURE — 82962 GLUCOSE BLOOD TEST: CPT

## 2017-12-19 PROCEDURE — 70450 CT HEAD/BRAIN W/O DYE: CPT

## 2017-12-19 PROCEDURE — 82947 ASSAY GLUCOSE BLOOD QUANT: CPT

## 2017-12-19 PROCEDURE — 80053 COMPREHEN METABOLIC PANEL: CPT

## 2017-12-19 PROCEDURE — 82306 VITAMIN D 25 HYDROXY: CPT

## 2017-12-19 PROCEDURE — 80048 BASIC METABOLIC PNL TOTAL CA: CPT

## 2017-12-19 PROCEDURE — 73090 X-RAY EXAM OF FOREARM: CPT

## 2017-12-19 PROCEDURE — 84295 ASSAY OF SERUM SODIUM: CPT

## 2017-12-19 PROCEDURE — 85730 THROMBOPLASTIN TIME PARTIAL: CPT

## 2017-12-19 PROCEDURE — 71045 X-RAY EXAM CHEST 1 VIEW: CPT

## 2017-12-19 PROCEDURE — 82553 CREATINE MB FRACTION: CPT

## 2017-12-19 PROCEDURE — 84484 ASSAY OF TROPONIN QUANT: CPT

## 2017-12-19 PROCEDURE — 96374 THER/PROPH/DIAG INJ IV PUSH: CPT

## 2017-12-19 PROCEDURE — 82803 BLOOD GASES ANY COMBINATION: CPT

## 2017-12-19 PROCEDURE — 93005 ELECTROCARDIOGRAM TRACING: CPT

## 2017-12-19 PROCEDURE — 99285 EMERGENCY DEPT VISIT HI MDM: CPT | Mod: 25

## 2017-12-19 PROCEDURE — 83735 ASSAY OF MAGNESIUM: CPT

## 2017-12-19 PROCEDURE — 82550 ASSAY OF CK (CPK): CPT

## 2017-12-19 PROCEDURE — 85610 PROTHROMBIN TIME: CPT

## 2017-12-19 PROCEDURE — 73502 X-RAY EXAM HIP UNI 2-3 VIEWS: CPT

## 2017-12-19 PROCEDURE — 97163 PT EVAL HIGH COMPLEX 45 MIN: CPT

## 2017-12-19 PROCEDURE — 97116 GAIT TRAINING THERAPY: CPT

## 2017-12-19 PROCEDURE — 73552 X-RAY EXAM OF FEMUR 2/>: CPT

## 2018-02-08 ENCOUNTER — INPATIENT (INPATIENT)
Facility: HOSPITAL | Age: 83
LOS: 13 days | Discharge: NURSING, REHAB OR LONG-TERM CARE | DRG: 469 | End: 2018-02-22
Attending: ORTHOPAEDIC SURGERY | Admitting: ORTHOPAEDIC SURGERY
Payer: MEDICARE

## 2018-02-08 VITALS
DIASTOLIC BLOOD PRESSURE: 82 MMHG | HEART RATE: 62 BPM | SYSTOLIC BLOOD PRESSURE: 177 MMHG | OXYGEN SATURATION: 95 % | RESPIRATION RATE: 19 BRPM

## 2018-02-08 DIAGNOSIS — J44.9 CHRONIC OBSTRUCTIVE PULMONARY DISEASE, UNSPECIFIED: ICD-10-CM

## 2018-02-08 DIAGNOSIS — S72.002A FRACTURE OF UNSPECIFIED PART OF NECK OF LEFT FEMUR, INITIAL ENCOUNTER FOR CLOSED FRACTURE: ICD-10-CM

## 2018-02-08 DIAGNOSIS — I48.91 UNSPECIFIED ATRIAL FIBRILLATION: ICD-10-CM

## 2018-02-08 DIAGNOSIS — I63.9 CEREBRAL INFARCTION, UNSPECIFIED: ICD-10-CM

## 2018-02-08 DIAGNOSIS — K21.9 GASTRO-ESOPHAGEAL REFLUX DISEASE WITHOUT ESOPHAGITIS: ICD-10-CM

## 2018-02-08 LAB
ALBUMIN SERPL ELPH-MCNC: 3.7 G/DL — SIGNIFICANT CHANGE UP (ref 3.3–5)
ALP SERPL-CCNC: 83 U/L — SIGNIFICANT CHANGE UP (ref 40–120)
ALT FLD-CCNC: 28 U/L RC — SIGNIFICANT CHANGE UP (ref 10–45)
ANION GAP SERPL CALC-SCNC: 12 MMOL/L — SIGNIFICANT CHANGE UP (ref 5–17)
ANION GAP SERPL CALC-SCNC: 19 MMOL/L — HIGH (ref 5–17)
APPEARANCE UR: CLEAR — SIGNIFICANT CHANGE UP
APTT BLD: 29 SEC — SIGNIFICANT CHANGE UP (ref 27.5–37.4)
AST SERPL-CCNC: 57 U/L — HIGH (ref 10–40)
BASOPHILS # BLD AUTO: 0 K/UL — SIGNIFICANT CHANGE UP (ref 0–0.2)
BASOPHILS NFR BLD AUTO: 0.2 % — SIGNIFICANT CHANGE UP (ref 0–2)
BILIRUB SERPL-MCNC: 1.3 MG/DL — HIGH (ref 0.2–1.2)
BILIRUB UR-MCNC: NEGATIVE — SIGNIFICANT CHANGE UP
BLD GP AB SCN SERPL QL: NEGATIVE — SIGNIFICANT CHANGE UP
BUN SERPL-MCNC: 18 MG/DL — SIGNIFICANT CHANGE UP (ref 7–23)
BUN SERPL-MCNC: 20 MG/DL — SIGNIFICANT CHANGE UP (ref 7–23)
CALCIUM SERPL-MCNC: 8.6 MG/DL — SIGNIFICANT CHANGE UP (ref 8.4–10.5)
CALCIUM SERPL-MCNC: 9 MG/DL — SIGNIFICANT CHANGE UP (ref 8.4–10.5)
CHLORIDE SERPL-SCNC: 103 MMOL/L — SIGNIFICANT CHANGE UP (ref 96–108)
CHLORIDE SERPL-SCNC: 98 MMOL/L — SIGNIFICANT CHANGE UP (ref 96–108)
CO2 SERPL-SCNC: 21 MMOL/L — LOW (ref 22–31)
CO2 SERPL-SCNC: 23 MMOL/L — SIGNIFICANT CHANGE UP (ref 22–31)
COLOR SPEC: SIGNIFICANT CHANGE UP
CREAT SERPL-MCNC: 0.73 MG/DL — SIGNIFICANT CHANGE UP (ref 0.5–1.3)
CREAT SERPL-MCNC: 0.78 MG/DL — SIGNIFICANT CHANGE UP (ref 0.5–1.3)
DIFF PNL FLD: ABNORMAL
EOSINOPHIL # BLD AUTO: 0.1 K/UL — SIGNIFICANT CHANGE UP (ref 0–0.5)
EOSINOPHIL NFR BLD AUTO: 1.3 % — SIGNIFICANT CHANGE UP (ref 0–6)
EPI CELLS # UR: SIGNIFICANT CHANGE UP /HPF
GLUCOSE SERPL-MCNC: 123 MG/DL — HIGH (ref 70–99)
GLUCOSE SERPL-MCNC: 143 MG/DL — HIGH (ref 70–99)
GLUCOSE UR QL: NEGATIVE — SIGNIFICANT CHANGE UP
HCT VFR BLD CALC: 36 % — SIGNIFICANT CHANGE UP (ref 34.5–45)
HGB BLD-MCNC: 13 G/DL — SIGNIFICANT CHANGE UP (ref 11.5–15.5)
INR BLD: 1.01 RATIO — SIGNIFICANT CHANGE UP (ref 0.88–1.16)
KETONES UR-MCNC: NEGATIVE — SIGNIFICANT CHANGE UP
LEUKOCYTE ESTERASE UR-ACNC: NEGATIVE — SIGNIFICANT CHANGE UP
LYMPHOCYTES # BLD AUTO: 1.1 K/UL — SIGNIFICANT CHANGE UP (ref 1–3.3)
LYMPHOCYTES # BLD AUTO: 16.8 % — SIGNIFICANT CHANGE UP (ref 13–44)
MCHC RBC-ENTMCNC: 36.2 GM/DL — HIGH (ref 32–36)
MCHC RBC-ENTMCNC: 37.4 PG — HIGH (ref 27–34)
MCV RBC AUTO: 103 FL — HIGH (ref 80–100)
MONOCYTES # BLD AUTO: 0.3 K/UL — SIGNIFICANT CHANGE UP (ref 0–0.9)
MONOCYTES NFR BLD AUTO: 5.1 % — SIGNIFICANT CHANGE UP (ref 2–14)
NEUTROPHILS # BLD AUTO: 5.2 K/UL — SIGNIFICANT CHANGE UP (ref 1.8–7.4)
NEUTROPHILS NFR BLD AUTO: 76.7 % — SIGNIFICANT CHANGE UP (ref 43–77)
NITRITE UR-MCNC: NEGATIVE — SIGNIFICANT CHANGE UP
PH UR: 7 — SIGNIFICANT CHANGE UP (ref 5–8)
PLATELET # BLD AUTO: 103 K/UL — LOW (ref 150–400)
POTASSIUM SERPL-MCNC: 4.2 MMOL/L — SIGNIFICANT CHANGE UP (ref 3.5–5.3)
POTASSIUM SERPL-MCNC: 6 MMOL/L — HIGH (ref 3.5–5.3)
POTASSIUM SERPL-SCNC: 4.2 MMOL/L — SIGNIFICANT CHANGE UP (ref 3.5–5.3)
POTASSIUM SERPL-SCNC: 6 MMOL/L — HIGH (ref 3.5–5.3)
PROT SERPL-MCNC: 7.5 G/DL — SIGNIFICANT CHANGE UP (ref 6–8.3)
PROT UR-MCNC: NEGATIVE — SIGNIFICANT CHANGE UP
PROTHROM AB SERPL-ACNC: 11 SEC — SIGNIFICANT CHANGE UP (ref 9.8–12.7)
RBC # BLD: 3.49 M/UL — LOW (ref 3.8–5.2)
RBC # FLD: 12.9 % — SIGNIFICANT CHANGE UP (ref 10.3–14.5)
RBC CASTS # UR COMP ASSIST: SIGNIFICANT CHANGE UP /HPF (ref 0–2)
RH IG SCN BLD-IMP: POSITIVE — SIGNIFICANT CHANGE UP
SODIUM SERPL-SCNC: 138 MMOL/L — SIGNIFICANT CHANGE UP (ref 135–145)
SODIUM SERPL-SCNC: 138 MMOL/L — SIGNIFICANT CHANGE UP (ref 135–145)
SP GR SPEC: 1.01 — SIGNIFICANT CHANGE UP (ref 1.01–1.02)
UROBILINOGEN FLD QL: NEGATIVE — SIGNIFICANT CHANGE UP
WBC # BLD: 6.8 K/UL — SIGNIFICANT CHANGE UP (ref 3.8–10.5)
WBC # FLD AUTO: 6.8 K/UL — SIGNIFICANT CHANGE UP (ref 3.8–10.5)
WBC UR QL: SIGNIFICANT CHANGE UP /HPF (ref 0–5)

## 2018-02-08 PROCEDURE — 99285 EMERGENCY DEPT VISIT HI MDM: CPT | Mod: 25

## 2018-02-08 PROCEDURE — 73552 X-RAY EXAM OF FEMUR 2/>: CPT | Mod: 26,LT

## 2018-02-08 PROCEDURE — 71045 X-RAY EXAM CHEST 1 VIEW: CPT | Mod: 26

## 2018-02-08 PROCEDURE — 93010 ELECTROCARDIOGRAM REPORT: CPT

## 2018-02-08 PROCEDURE — 73502 X-RAY EXAM HIP UNI 2-3 VIEWS: CPT | Mod: 26,LT

## 2018-02-08 RX ORDER — ACETAMINOPHEN 500 MG
650 TABLET ORAL EVERY 6 HOURS
Qty: 0 | Refills: 0 | Status: DISCONTINUED | OUTPATIENT
Start: 2018-02-08 | End: 2018-02-09

## 2018-02-08 RX ORDER — SODIUM CHLORIDE 9 MG/ML
1000 INJECTION INTRAMUSCULAR; INTRAVENOUS; SUBCUTANEOUS ONCE
Qty: 0 | Refills: 0 | Status: COMPLETED | OUTPATIENT
Start: 2018-02-08 | End: 2018-02-08

## 2018-02-08 RX ORDER — SODIUM CHLORIDE 9 MG/ML
1000 INJECTION INTRAMUSCULAR; INTRAVENOUS; SUBCUTANEOUS
Qty: 0 | Refills: 0 | Status: DISCONTINUED | OUTPATIENT
Start: 2018-02-08 | End: 2018-02-09

## 2018-02-08 RX ORDER — OXYCODONE HYDROCHLORIDE 5 MG/1
5 TABLET ORAL EVERY 4 HOURS
Qty: 0 | Refills: 0 | Status: DISCONTINUED | OUTPATIENT
Start: 2018-02-08 | End: 2018-02-09

## 2018-02-08 RX ORDER — BRIMONIDINE TARTRATE 2 MG/MG
1 SOLUTION/ DROPS OPHTHALMIC
Qty: 0 | Refills: 0 | Status: DISCONTINUED | OUTPATIENT
Start: 2018-02-08 | End: 2018-02-09

## 2018-02-08 RX ORDER — TIMOLOL 0.5 %
1 DROPS OPHTHALMIC (EYE)
Qty: 0 | Refills: 0 | Status: DISCONTINUED | OUTPATIENT
Start: 2018-02-08 | End: 2018-02-09

## 2018-02-08 RX ORDER — MORPHINE SULFATE 50 MG/1
4 CAPSULE, EXTENDED RELEASE ORAL EVERY 4 HOURS
Qty: 0 | Refills: 0 | Status: DISCONTINUED | OUTPATIENT
Start: 2018-02-08 | End: 2018-02-09

## 2018-02-08 RX ORDER — MORPHINE SULFATE 50 MG/1
2 CAPSULE, EXTENDED RELEASE ORAL ONCE
Qty: 0 | Refills: 0 | Status: DISCONTINUED | OUTPATIENT
Start: 2018-02-08 | End: 2018-02-08

## 2018-02-08 RX ORDER — ONDANSETRON 8 MG/1
4 TABLET, FILM COATED ORAL ONCE
Qty: 0 | Refills: 0 | Status: COMPLETED | OUTPATIENT
Start: 2018-02-08 | End: 2018-02-08

## 2018-02-08 RX ORDER — OXYCODONE HYDROCHLORIDE 5 MG/1
10 TABLET ORAL EVERY 4 HOURS
Qty: 0 | Refills: 0 | Status: DISCONTINUED | OUTPATIENT
Start: 2018-02-08 | End: 2018-02-09

## 2018-02-08 RX ORDER — DOCUSATE SODIUM 100 MG
100 CAPSULE ORAL THREE TIMES A DAY
Qty: 0 | Refills: 0 | Status: DISCONTINUED | OUTPATIENT
Start: 2018-02-08 | End: 2018-02-09

## 2018-02-08 RX ORDER — SODIUM CHLORIDE 9 MG/ML
1000 INJECTION, SOLUTION INTRAVENOUS
Qty: 0 | Refills: 0 | Status: DISCONTINUED | OUTPATIENT
Start: 2018-02-08 | End: 2018-02-08

## 2018-02-08 RX ORDER — METOPROLOL TARTRATE 50 MG
12.5 TABLET ORAL
Qty: 0 | Refills: 0 | Status: DISCONTINUED | OUTPATIENT
Start: 2018-02-08 | End: 2018-02-09

## 2018-02-08 RX ORDER — SODIUM CHLORIDE 9 MG/ML
3 INJECTION INTRAMUSCULAR; INTRAVENOUS; SUBCUTANEOUS ONCE
Qty: 0 | Refills: 0 | Status: COMPLETED | OUTPATIENT
Start: 2018-02-08 | End: 2018-02-08

## 2018-02-08 RX ORDER — ACETAMINOPHEN 500 MG
1000 TABLET ORAL ONCE
Qty: 0 | Refills: 0 | Status: COMPLETED | OUTPATIENT
Start: 2018-02-08 | End: 2018-02-08

## 2018-02-08 RX ORDER — ATORVASTATIN CALCIUM 80 MG/1
40 TABLET, FILM COATED ORAL AT BEDTIME
Qty: 0 | Refills: 0 | Status: DISCONTINUED | OUTPATIENT
Start: 2018-02-08 | End: 2018-02-09

## 2018-02-08 RX ADMIN — MORPHINE SULFATE 2 MILLIGRAM(S): 50 CAPSULE, EXTENDED RELEASE ORAL at 21:18

## 2018-02-08 RX ADMIN — Medication 1000 MILLIGRAM(S): at 18:30

## 2018-02-08 RX ADMIN — ONDANSETRON 4 MILLIGRAM(S): 8 TABLET, FILM COATED ORAL at 18:06

## 2018-02-08 RX ADMIN — Medication 400 MILLIGRAM(S): at 18:05

## 2018-02-08 RX ADMIN — MORPHINE SULFATE 2 MILLIGRAM(S): 50 CAPSULE, EXTENDED RELEASE ORAL at 21:48

## 2018-02-08 RX ADMIN — SODIUM CHLORIDE 3 MILLILITER(S): 9 INJECTION INTRAMUSCULAR; INTRAVENOUS; SUBCUTANEOUS at 18:06

## 2018-02-08 RX ADMIN — SODIUM CHLORIDE 1000 MILLILITER(S): 9 INJECTION INTRAMUSCULAR; INTRAVENOUS; SUBCUTANEOUS at 18:06

## 2018-02-08 NOTE — ED PROVIDER NOTE - OBJECTIVE STATEMENT
89 year old female with pmhx of Afib, COPD, CVA, GERD, GI Bleed, glaucoma, hyperlipidemia, PUD and pshx of abdominal surgery presents s/p mechanical trip and fall today. Per EMS, patient was getting up from her wheelchair to get her walker and tripped. Patient now complains of pain to left hip and nausea.  No LOC. No head injury.  PMD- Joanne 89 year old female with pmhx of Afib, COPD, CVA, GERD, GI Bleed, glaucoma, hyperlipidemia, PUD and pshx of abdominal surgery presents s/p mechanical trip and fall today. Per EMS, patient was getting up from her wheelchair to get her walker and tripped. Patient now complains of pain to left hip and nausea.  No LOC. No head injury.  PMD- hati (admit to Hospitalist)

## 2018-02-08 NOTE — CHART NOTE - NSCHARTNOTEFT_GEN_A_CORE
Ortho Preop Note    Dx: L femoral neck fracture  Tx: [facility]  MD: Patricia    Labs:                         13.0   6.8   )-----------( 103      ( 08 Feb 2018 18:11 )             36.0     02-08    138  |  98  |  20  ----------------------------<  123<H>  6.0<H>   |  21<L>  |  0.78    Ca    9.0      08 Feb 2018 18:11    TPro  7.5  /  Alb  3.7  /  TBili  1.3<H>  /  DBili  x   /  AST  57<H>  /  ALT  28  /  AlkPhos  83  02-08    PT/INR - ( 08 Feb 2018 18:11 )   PT: 11.0 sec;   INR: 1.01 ratio         PTT - ( 08 Feb 2018 18:11 )  PTT:29.0 sec    T&S: Done  UA: neg  ECG: in chart  CXR: clear lungs    Plan:  89F with L femoral neck fx  - NWB  -NPO/IVF  - Preop labs  - OR 2/9  - Med clearance

## 2018-02-08 NOTE — H&P ADULT - ASSESSMENT
89F with L femoral neck fracture  - Admit to ortho Dr. Gomez  - SHUKRI LLE  - Hernandez  - Bedrest  - Pain control  - NPOpMN/IVF  - Hold chemical DVT ppx  - OR 2/9  - Medical clearance  - Preop labs

## 2018-02-08 NOTE — ED PROVIDER NOTE - MEDICAL DECISION MAKING DETAILS
89 year old female s/p mechanical trip and fall today c/o tenderness to left hip. Plan: XR to r/o hip, pelvic, femur fracture.

## 2018-02-08 NOTE — ED PROVIDER NOTE - SHIFT CHANGE DETAILS
Breana Shafer MD - Attending Physician: Pt here with fall at [facility]. Pt with hip pain, unable to ambulate. Awaiting imaging for dispo.

## 2018-02-08 NOTE — ED PROVIDER NOTE - PROGRESS NOTE DETAILS
D/w Son over phone ([phone]). Pt at baseline unsteady on her feet, uses a walker to walk but requires assistance getting up and down. Has significant memory issues at baseline. Son to come to ED later tonight I Corbin Fitzpatrick attest that this documentation has been prepared under the direction and in the presence of Doctor Shafer. Spoke with Ortho. Admit to Ortho. Repeat BMP, NPO after midnight

## 2018-02-08 NOTE — ED PROVIDER NOTE - SECONDARY DIAGNOSIS.
Cerebral infarction, unspecified mechanism Gastroesophageal reflux disease, esophagitis presence not specified

## 2018-02-08 NOTE — ED PROVIDER NOTE - CARE PLAN
Principal Discharge DX:	Closed fracture of neck of left femur, initial encounter  Secondary Diagnosis:	Cerebral infarction, unspecified mechanism  Secondary Diagnosis:	Gastroesophageal reflux disease, esophagitis presence not specified

## 2018-02-08 NOTE — H&P ADULT - HISTORY OF PRESENT ILLNESS
89F hx of dementia, afib (Xarelto), COPD, CVA, GERD, GIB, glaucoma, HLD, PUD, s/p mech fall with L hip pain. She states she was getting up from wheelchair to get her walker and tripped. falling onto her left side. She was unable to ambulate afterwards. No other apparent injuries. Denies headstrike or LOC. No n/v, no numnbess/tingling.

## 2018-02-08 NOTE — CONSULT NOTE ADULT - SUBJECTIVE AND OBJECTIVE BOX
Patient is a 89y old  Female who presents with a chief complaint of L hip pain . As per the patients daughter, Patient is a resident at the [facility] with a hx of dementia. Patient was getting up froma table when she slipped annd fell. Patient could not stand or ambulate. Brought to [facility] where she was found to have a left hip Fx. Patient scheduled for ORIF.       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 1000 milliLiter(s) (75 mL/Hr) IV Continuous <Continuous>  timolol 0.5% Solution 1 Drop(s) Both EYES two times a day    MEDICATIONS  (PRN):  acetaminophen   Tablet 650 milliGRAM(s) Oral every 6 hours PRN For Temp greater than 38 C (100.4 F)  acetaminophen   Tablet. 650 milliGRAM(s) Oral every 6 hours PRN Mild Pain (1 - 3)  morphine  - Injectable 4 milliGRAM(s) IV Push every 4 hours PRN Breakthrough pain  oxyCODONE    IR 10 milliGRAM(s) Oral every 4 hours PRN Severe Pain (7 - 10)  oxyCODONE    IR 5 milliGRAM(s) Oral every 4 hours PRN Moderate Pain (4 - 6)    SOC Hx:  Tobacco Neg  ETOH: Neg  Drugs: Neg    Family Hx: Non contributory as per Pts daughter    ROS  Unable    INTERVAL HPI/OVERNIGHT EVENTS:  T(C): 37.1 (18 @ 22:39), Max: 37.1 (18 @ 22:39)  HR: 65 (18 @ 22:39) (62 - 84)  BP: 139/64 (18 @ 22:39) (135/78 - 177/82)  RR: 18 (18 @ 22:39) (17 - 19)  SpO2: 96% (18 @ 22:39) (92% - 97%)  Wt(kg): --  I&O's Summary      PHYSICAL EXAM:  GENERAL: NAD, well-groomed, well-developed  HEAD:  Atraumatic, Normocephalic  EYES: EOMI, PERRLA, conjunctiva and sclera clear  ENMT: No tonsillar erythema, exudates, or enlargement; Moist mucous membranes, Good dentition, No lesions  NECK: Supple, No JVD, Normal thyroid  NERVOUS SYSTEM: confused    CHEST/LUNG: Clear to percussion bilaterally; No rales, rhonchi, wheezing, or rubs  HEART: Regular rate and rhythm; No murmurs, rubs, or gallops  ABDOMEN: Soft, Nontender, Nondistended; Bowel sounds present  EXTREMITIES:  2+ Peripheral Pulses, No clubbing, cyanosis, or edema  LYMPH: No lymphadenopathy noted  SKIN: No rashes or lesions        LABS:                        13.0   6.8   )-----------( 103      ( 2018 18:11 )             36.0     02-    138  |  103  |  18  ----------------------------<  143<H>  4.2   |  23  |  0.73    Ca    8.6      2018 22:12    TPro  7.5  /  Alb  3.7  /  TBili  1.3<H>  /  DBili  x   /  AST  57<H>  /  ALT  28  /  AlkPhos  83  02-08    PT/INR - ( 2018 18:11 )   PT: 11.0 sec;   INR: 1.01 ratio         PTT - ( 2018 18:11 )  PTT:29.0 sec  Urinalysis Basic - ( 2018 20:55 )    Color: PL Yellow / Appearance: Clear / S.010 / pH: x  Gluc: x / Ketone: Negative  / Bili: Negative / Urobili: Negative   Blood: x / Protein: Negative / Nitrite: Negative   Leuk Esterase: Negative / RBC: 3-5 /HPF / WBC 0-2 /HPF   Sq Epi: x / Non Sq Epi: OCC /HPF / Bacteria: x      CAPILLARY BLOOD GLUCOSE            Urinalysis Basic - ( 2018 20:55 )    Color: PL Yellow / Appearance: Clear / S.010 / pH: x  Gluc: x / Ketone: Negative  / Bili: Negative / Urobili: Negative   Blood: x / Protein: Negative / Nitrite: Negative   Leuk Esterase: Negative / RBC: 3-5 /HPF / WBC 0-2 /HPF   Sq Epi: x / Non Sq Epi: OCC /HPF / Bacteria: x        Radiology reports: Patient is a 89y old  Female who presents with a chief complaint of L hip pain . As per the patients daughter, Patient is a resident at the [facility] with a hx of dementia. Patient was getting up froma table when she slipped annd fell. Patient could not stand or ambulate. Brought to [facility] where she was found to have a left hip Fx. Patient scheduled for ORIF.       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 1000 milliLiter(s) (75 mL/Hr) IV Continuous <Continuous>  timolol 0.5% Solution 1 Drop(s) Both EYES two times a day    MEDICATIONS  (PRN):  acetaminophen   Tablet 650 milliGRAM(s) Oral every 6 hours PRN For Temp greater than 38 C (100.4 F)  acetaminophen   Tablet. 650 milliGRAM(s) Oral every 6 hours PRN Mild Pain (1 - 3)  morphine  - Injectable 4 milliGRAM(s) IV Push every 4 hours PRN Breakthrough pain  oxyCODONE    IR 10 milliGRAM(s) Oral every 4 hours PRN Severe Pain (7 - 10)  oxyCODONE    IR 5 milliGRAM(s) Oral every 4 hours PRN Moderate Pain (4 - 6)    SOC Hx:  Tobacco Neg  ETOH: Neg  Drugs: Neg    Family Hx: Non contributory as per Pts daughter    ROS  Unable    INTERVAL HPI/OVERNIGHT EVENTS:  T(C): 37.1 (18 @ 22:39), Max: 37.1 (18 @ 22:39)  HR: 65 (18 @ 22:39) (62 - 84)  BP: 139/64 (18 @ 22:39) (135/78 - 177/82)  RR: 18 (18 @ 22:39) (17 - 19)  SpO2: 96% (18 @ 22:39) (92% - 97%)  Wt(kg): --  I&O's Summary      PHYSICAL EXAM:  GENERAL: NAD, well-groomed, well-developed  HEAD:  Atraumatic, Normocephalic  EYES: EOMI, PERRLA, conjunctiva and sclera clear  ENMT: No tonsillar erythema, exudates, or enlargement; Moist mucous membranes, Good dentition, No lesions  NECK: Supple, No JVD, Normal thyroid  NERVOUS SYSTEM: confused    CHEST/LUNG: Clear to percussion bilaterally; No rales, rhonchi, wheezing, or rubs  HEART: Regular rate and rhythm; No murmurs, rubs, or gallops  ABDOMEN: Soft, Nontender, Nondistended; Bowel sounds present  EXTREMITIES:  2+ Peripheral Pulses, No clubbing, cyanosis, or edema  LYMPH: No lymphadenopathy noted  SKIN: No rashes or lesions        LABS:                        13.0   6.8   )-----------( 103      ( 2018 18:11 )             36.0     02-    138  |  103  |  18  ----------------------------<  143<H>  4.2   |  23  |  0.73    Ca    8.6      2018 22:12    TPro  7.5  /  Alb  3.7  /  TBili  1.3<H>  /  DBili  x   /  AST  57<H>  /  ALT  28  /  AlkPhos  83  02-08    PT/INR - ( 2018 18:11 )   PT: 11.0 sec;   INR: 1.01 ratio         PTT - ( 2018 18:11 )  PTT:29.0 sec  Urinalysis Basic - ( 2018 20:55 )    Color: PL Yellow / Appearance: Clear / S.010 / pH: x  Gluc: x / Ketone: Negative  / Bili: Negative / Urobili: Negative   Blood: x / Protein: Negative / Nitrite: Negative   Leuk Esterase: Negative / RBC: 3-5 /HPF / WBC 0-2 /HPF   Sq Epi: x / Non Sq Epi: OCC /HPF / Bacteria: x      EKG: NSR @ 77             Urinalysis Basic - ( 2018 20:55 )    Color: PL Yellow / Appearance: Clear / S.010 / pH: x  Gluc: x / Ketone: Negative  / Bili: Negative / Urobili: Negative   Blood: x / Protein: Negative / Nitrite: Negative   Leuk Esterase: Negative / RBC: 3-5 /HPF / WBC 0-2 /HPF   Sq Epi: x / Non Sq Epi: OCC /HPF / Bacteria: x        Radiology reports:

## 2018-02-08 NOTE — ED PROVIDER NOTE - MUSCULOSKELETAL MINIMAL EXAM
full ROM of R hip, L hip in flex position, pain on attempt on ROM, tenderness to palpation to the L greater trochanter

## 2018-02-08 NOTE — ED ADULT NURSE NOTE - OBJECTIVE STATEMENT
89 y/o female [facility] c/o mechanical trip and fall. Per EMS, patient was getting out of chair to walker and tripped on chair. Patient c/o L hip pain s/p fall. Per EMS, fall was witnessed and patient had no LOC and did not hit head. Patient's L hip tender to touch with limited ROM, no visible deformities or abrasions. Patient A&Ox2, breathing spontaneously, airway patent, b/l clear lungs, +pulses, cap refill <2seconds, abdomen soft and round. Patient resting in bed. Side rails up, call bell within reach.

## 2018-02-09 ENCOUNTER — RESULT REVIEW (OUTPATIENT)
Age: 83
End: 2018-02-09

## 2018-02-09 LAB
ANION GAP SERPL CALC-SCNC: 13 MMOL/L — SIGNIFICANT CHANGE UP (ref 5–17)
ANION GAP SERPL CALC-SCNC: 14 MMOL/L — SIGNIFICANT CHANGE UP (ref 5–17)
BLD GP AB SCN SERPL QL: NEGATIVE — SIGNIFICANT CHANGE UP
BUN SERPL-MCNC: 15 MG/DL — SIGNIFICANT CHANGE UP (ref 7–23)
BUN SERPL-MCNC: 16 MG/DL — SIGNIFICANT CHANGE UP (ref 7–23)
CALCIUM SERPL-MCNC: 7.4 MG/DL — LOW (ref 8.4–10.5)
CALCIUM SERPL-MCNC: 8.7 MG/DL — SIGNIFICANT CHANGE UP (ref 8.4–10.5)
CHLORIDE SERPL-SCNC: 103 MMOL/L — SIGNIFICANT CHANGE UP (ref 96–108)
CHLORIDE SERPL-SCNC: 99 MMOL/L — SIGNIFICANT CHANGE UP (ref 96–108)
CO2 SERPL-SCNC: 20 MMOL/L — LOW (ref 22–31)
CO2 SERPL-SCNC: 24 MMOL/L — SIGNIFICANT CHANGE UP (ref 22–31)
CREAT SERPL-MCNC: 0.57 MG/DL — SIGNIFICANT CHANGE UP (ref 0.5–1.3)
CREAT SERPL-MCNC: 0.67 MG/DL — SIGNIFICANT CHANGE UP (ref 0.5–1.3)
GAS PNL BLDA: SIGNIFICANT CHANGE UP
GAS PNL BLDA: SIGNIFICANT CHANGE UP
GLUCOSE SERPL-MCNC: 149 MG/DL — HIGH (ref 70–99)
GLUCOSE SERPL-MCNC: 163 MG/DL — HIGH (ref 70–99)
HCT VFR BLD CALC: 26.9 % — LOW (ref 34.5–45)
HCT VFR BLD CALC: 28.2 % — LOW (ref 34.5–45)
HCT VFR BLD CALC: 29 % — LOW (ref 34.5–45)
HCT VFR BLD CALC: 29.2 % — LOW (ref 34.5–45)
HCT VFR BLD CALC: 32.8 % — LOW (ref 34.5–45)
HCT VFR BLD CALC: 38 % — SIGNIFICANT CHANGE UP (ref 34.5–45)
HGB BLD-MCNC: 10.1 G/DL — LOW (ref 11.5–15.5)
HGB BLD-MCNC: 10.6 G/DL — LOW (ref 11.5–15.5)
HGB BLD-MCNC: 10.7 G/DL — LOW (ref 11.5–15.5)
HGB BLD-MCNC: 11.7 G/DL — SIGNIFICANT CHANGE UP (ref 11.5–15.5)
HGB BLD-MCNC: 13.4 G/DL — SIGNIFICANT CHANGE UP (ref 11.5–15.5)
HGB BLD-MCNC: 9.4 G/DL — LOW (ref 11.5–15.5)
MCHC RBC-ENTMCNC: 35 PG — HIGH (ref 27–34)
MCHC RBC-ENTMCNC: 35.1 GM/DL — SIGNIFICANT CHANGE UP (ref 32–36)
MCHC RBC-ENTMCNC: 35.3 GM/DL — SIGNIFICANT CHANGE UP (ref 32–36)
MCHC RBC-ENTMCNC: 35.6 PG — HIGH (ref 27–34)
MCHC RBC-ENTMCNC: 35.7 GM/DL — SIGNIFICANT CHANGE UP (ref 32–36)
MCHC RBC-ENTMCNC: 35.7 PG — HIGH (ref 27–34)
MCHC RBC-ENTMCNC: 35.8 GM/DL — SIGNIFICANT CHANGE UP (ref 32–36)
MCHC RBC-ENTMCNC: 36.2 PG — HIGH (ref 27–34)
MCHC RBC-ENTMCNC: 36.2 PG — HIGH (ref 27–34)
MCHC RBC-ENTMCNC: 36.5 GM/DL — HIGH (ref 32–36)
MCHC RBC-ENTMCNC: 36.6 GM/DL — HIGH (ref 32–36)
MCHC RBC-ENTMCNC: 36.7 PG — HIGH (ref 27–34)
MCV RBC AUTO: 103 FL — HIGH (ref 80–100)
MCV RBC AUTO: 97.6 FL — SIGNIFICANT CHANGE UP (ref 80–100)
MCV RBC AUTO: 97.7 FL — SIGNIFICANT CHANGE UP (ref 80–100)
MCV RBC AUTO: 98 FL — SIGNIFICANT CHANGE UP (ref 80–100)
PLATELET # BLD AUTO: 70 K/UL — LOW (ref 150–400)
PLATELET # BLD AUTO: 71 K/UL — LOW (ref 150–400)
PLATELET # BLD AUTO: 87 K/UL — LOW (ref 150–400)
PLATELET # BLD AUTO: 90 K/UL — LOW (ref 150–400)
PLATELET # BLD AUTO: 96 K/UL — LOW (ref 150–400)
PLATELET # BLD AUTO: 99 K/UL — LOW (ref 150–400)
POTASSIUM SERPL-MCNC: 4.2 MMOL/L — SIGNIFICANT CHANGE UP (ref 3.5–5.3)
POTASSIUM SERPL-MCNC: 4.7 MMOL/L — SIGNIFICANT CHANGE UP (ref 3.5–5.3)
POTASSIUM SERPL-SCNC: 4.2 MMOL/L — SIGNIFICANT CHANGE UP (ref 3.5–5.3)
POTASSIUM SERPL-SCNC: 4.7 MMOL/L — SIGNIFICANT CHANGE UP (ref 3.5–5.3)
RBC # BLD: 2.61 M/UL — LOW (ref 3.8–5.2)
RBC # BLD: 2.74 M/UL — LOW (ref 3.8–5.2)
RBC # BLD: 2.97 M/UL — LOW (ref 3.8–5.2)
RBC # BLD: 2.99 M/UL — LOW (ref 3.8–5.2)
RBC # BLD: 3.35 M/UL — LOW (ref 3.8–5.2)
RBC # BLD: 3.7 M/UL — LOW (ref 3.8–5.2)
RBC # FLD: 12.5 % — SIGNIFICANT CHANGE UP (ref 10.3–14.5)
RBC # FLD: 12.7 % — SIGNIFICANT CHANGE UP (ref 10.3–14.5)
RBC # FLD: 12.7 % — SIGNIFICANT CHANGE UP (ref 10.3–14.5)
RBC # FLD: 14.1 % — SIGNIFICANT CHANGE UP (ref 10.3–14.5)
RBC # FLD: 14.3 % — SIGNIFICANT CHANGE UP (ref 10.3–14.5)
RBC # FLD: 14.4 % — SIGNIFICANT CHANGE UP (ref 10.3–14.5)
RH IG SCN BLD-IMP: POSITIVE — SIGNIFICANT CHANGE UP
SODIUM SERPL-SCNC: 136 MMOL/L — SIGNIFICANT CHANGE UP (ref 135–145)
SODIUM SERPL-SCNC: 137 MMOL/L — SIGNIFICANT CHANGE UP (ref 135–145)
WBC # BLD: 11 K/UL — HIGH (ref 3.8–10.5)
WBC # BLD: 12.2 K/UL — HIGH (ref 3.8–10.5)
WBC # BLD: 12.4 K/UL — HIGH (ref 3.8–10.5)
WBC # BLD: 13.2 K/UL — HIGH (ref 3.8–10.5)
WBC # BLD: 14.8 K/UL — HIGH (ref 3.8–10.5)
WBC # BLD: 16.8 K/UL — HIGH (ref 3.8–10.5)
WBC # FLD AUTO: 11 K/UL — HIGH (ref 3.8–10.5)
WBC # FLD AUTO: 12.2 K/UL — HIGH (ref 3.8–10.5)
WBC # FLD AUTO: 12.4 K/UL — HIGH (ref 3.8–10.5)
WBC # FLD AUTO: 13.2 K/UL — HIGH (ref 3.8–10.5)
WBC # FLD AUTO: 14.8 K/UL — HIGH (ref 3.8–10.5)
WBC # FLD AUTO: 16.8 K/UL — HIGH (ref 3.8–10.5)

## 2018-02-09 PROCEDURE — 73502 X-RAY EXAM HIP UNI 2-3 VIEWS: CPT | Mod: 26,LT

## 2018-02-09 PROCEDURE — 88311 DECALCIFY TISSUE: CPT | Mod: 26

## 2018-02-09 PROCEDURE — 88305 TISSUE EXAM BY PATHOLOGIST: CPT | Mod: 26

## 2018-02-09 RX ORDER — ONDANSETRON 8 MG/1
4 TABLET, FILM COATED ORAL EVERY 4 HOURS
Qty: 0 | Refills: 0 | Status: DISCONTINUED | OUTPATIENT
Start: 2018-02-09 | End: 2018-02-22

## 2018-02-09 RX ORDER — ACETAMINOPHEN 500 MG
650 TABLET ORAL EVERY 6 HOURS
Qty: 0 | Refills: 0 | Status: DISCONTINUED | OUTPATIENT
Start: 2018-02-09 | End: 2018-02-10

## 2018-02-09 RX ORDER — MAGNESIUM HYDROXIDE 400 MG/1
30 TABLET, CHEWABLE ORAL DAILY
Qty: 0 | Refills: 0 | Status: DISCONTINUED | OUTPATIENT
Start: 2018-02-09 | End: 2018-02-10

## 2018-02-09 RX ORDER — DIPHENHYDRAMINE HCL 50 MG
25 CAPSULE ORAL EVERY 4 HOURS
Qty: 0 | Refills: 0 | Status: DISCONTINUED | OUTPATIENT
Start: 2018-02-09 | End: 2018-02-09

## 2018-02-09 RX ORDER — METOPROLOL TARTRATE 50 MG
12.5 TABLET ORAL
Qty: 0 | Refills: 0 | Status: DISCONTINUED | OUTPATIENT
Start: 2018-02-09 | End: 2018-02-09

## 2018-02-09 RX ORDER — PHENYLEPHRINE HYDROCHLORIDE 10 MG/ML
0.3 INJECTION INTRAVENOUS
Qty: 80 | Refills: 0 | Status: DISCONTINUED | OUTPATIENT
Start: 2018-02-09 | End: 2018-02-10

## 2018-02-09 RX ORDER — CALCIUM GLUCONATE 100 MG/ML
1 VIAL (ML) INTRAVENOUS ONCE
Qty: 0 | Refills: 0 | Status: COMPLETED | OUTPATIENT
Start: 2018-02-09 | End: 2018-02-09

## 2018-02-09 RX ORDER — SODIUM CHLORIDE 9 MG/ML
500 INJECTION, SOLUTION INTRAVENOUS ONCE
Qty: 0 | Refills: 0 | Status: COMPLETED | OUTPATIENT
Start: 2018-02-09 | End: 2018-02-09

## 2018-02-09 RX ORDER — PANTOPRAZOLE SODIUM 20 MG/1
40 TABLET, DELAYED RELEASE ORAL
Qty: 0 | Refills: 0 | Status: DISCONTINUED | OUTPATIENT
Start: 2018-02-09 | End: 2018-02-09

## 2018-02-09 RX ORDER — SIMETHICONE 80 MG/1
80 TABLET, CHEWABLE ORAL EVERY 8 HOURS
Qty: 0 | Refills: 0 | Status: DISCONTINUED | OUTPATIENT
Start: 2018-02-09 | End: 2018-02-10

## 2018-02-09 RX ORDER — FOLIC ACID 0.8 MG
1 TABLET ORAL DAILY
Qty: 0 | Refills: 0 | Status: DISCONTINUED | OUTPATIENT
Start: 2018-02-09 | End: 2018-02-10

## 2018-02-09 RX ORDER — SENNA PLUS 8.6 MG/1
2 TABLET ORAL AT BEDTIME
Qty: 0 | Refills: 0 | Status: DISCONTINUED | OUTPATIENT
Start: 2018-02-09 | End: 2018-02-10

## 2018-02-09 RX ORDER — DOCUSATE SODIUM 100 MG
100 CAPSULE ORAL THREE TIMES A DAY
Qty: 0 | Refills: 0 | Status: DISCONTINUED | OUTPATIENT
Start: 2018-02-09 | End: 2018-02-12

## 2018-02-09 RX ORDER — ONDANSETRON 8 MG/1
4 TABLET, FILM COATED ORAL ONCE
Qty: 0 | Refills: 0 | Status: DISCONTINUED | OUTPATIENT
Start: 2018-02-09 | End: 2018-02-10

## 2018-02-09 RX ORDER — FERROUS SULFATE 325(65) MG
325 TABLET ORAL
Qty: 0 | Refills: 0 | Status: DISCONTINUED | OUTPATIENT
Start: 2018-02-09 | End: 2018-02-10

## 2018-02-09 RX ORDER — RIVAROXABAN 15 MG-20MG
15 KIT ORAL EVERY 24 HOURS
Qty: 0 | Refills: 0 | Status: DISCONTINUED | OUTPATIENT
Start: 2018-02-10 | End: 2018-02-10

## 2018-02-09 RX ORDER — SODIUM CHLORIDE 9 MG/ML
1000 INJECTION, SOLUTION INTRAVENOUS
Qty: 0 | Refills: 0 | Status: DISCONTINUED | OUTPATIENT
Start: 2018-02-09 | End: 2018-02-11

## 2018-02-09 RX ORDER — OXYCODONE HYDROCHLORIDE 5 MG/1
5 TABLET ORAL EVERY 4 HOURS
Qty: 0 | Refills: 0 | Status: DISCONTINUED | OUTPATIENT
Start: 2018-02-09 | End: 2018-02-10

## 2018-02-09 RX ORDER — ATORVASTATIN CALCIUM 80 MG/1
40 TABLET, FILM COATED ORAL AT BEDTIME
Qty: 0 | Refills: 0 | Status: DISCONTINUED | OUTPATIENT
Start: 2018-02-09 | End: 2018-02-12

## 2018-02-09 RX ORDER — OXYCODONE HYDROCHLORIDE 5 MG/1
10 TABLET ORAL EVERY 4 HOURS
Qty: 0 | Refills: 0 | Status: DISCONTINUED | OUTPATIENT
Start: 2018-02-09 | End: 2018-02-09

## 2018-02-09 RX ORDER — ALBUMIN HUMAN 25 %
250 VIAL (ML) INTRAVENOUS ONCE
Qty: 0 | Refills: 0 | Status: COMPLETED | OUTPATIENT
Start: 2018-02-09 | End: 2018-02-10

## 2018-02-09 RX ORDER — POLYETHYLENE GLYCOL 3350 17 G/17G
17 POWDER, FOR SOLUTION ORAL DAILY
Qty: 0 | Refills: 0 | Status: DISCONTINUED | OUTPATIENT
Start: 2018-02-09 | End: 2018-02-12

## 2018-02-09 RX ORDER — HYDROMORPHONE HYDROCHLORIDE 2 MG/ML
0.25 INJECTION INTRAMUSCULAR; INTRAVENOUS; SUBCUTANEOUS
Qty: 0 | Refills: 0 | Status: DISCONTINUED | OUTPATIENT
Start: 2018-02-09 | End: 2018-02-10

## 2018-02-09 RX ORDER — PANTOPRAZOLE SODIUM 20 MG/1
40 TABLET, DELAYED RELEASE ORAL DAILY
Qty: 0 | Refills: 0 | Status: DISCONTINUED | OUTPATIENT
Start: 2018-02-09 | End: 2018-02-12

## 2018-02-09 RX ORDER — DIPHENHYDRAMINE HCL 50 MG
25 CAPSULE ORAL AT BEDTIME
Qty: 0 | Refills: 0 | Status: DISCONTINUED | OUTPATIENT
Start: 2018-02-09 | End: 2018-02-09

## 2018-02-09 RX ORDER — BRIMONIDINE TARTRATE 2 MG/MG
1 SOLUTION/ DROPS OPHTHALMIC
Qty: 0 | Refills: 0 | Status: DISCONTINUED | OUTPATIENT
Start: 2018-02-09 | End: 2018-02-22

## 2018-02-09 RX ORDER — TRAMADOL HYDROCHLORIDE 50 MG/1
50 TABLET ORAL EVERY 6 HOURS
Qty: 0 | Refills: 0 | Status: DISCONTINUED | OUTPATIENT
Start: 2018-02-09 | End: 2018-02-09

## 2018-02-09 RX ORDER — ASCORBIC ACID 60 MG
500 TABLET,CHEWABLE ORAL
Qty: 0 | Refills: 0 | Status: DISCONTINUED | OUTPATIENT
Start: 2018-02-09 | End: 2018-02-10

## 2018-02-09 RX ORDER — VANCOMYCIN HCL 1 G
500 VIAL (EA) INTRAVENOUS ONCE
Qty: 0 | Refills: 0 | Status: COMPLETED | OUTPATIENT
Start: 2018-02-09 | End: 2018-02-09

## 2018-02-09 RX ORDER — BENZOCAINE AND MENTHOL 5; 1 G/100ML; G/100ML
1 LIQUID ORAL
Qty: 0 | Refills: 0 | Status: DISCONTINUED | OUTPATIENT
Start: 2018-02-09 | End: 2018-02-10

## 2018-02-09 RX ORDER — TIMOLOL 0.5 %
1 DROPS OPHTHALMIC (EYE)
Qty: 0 | Refills: 0 | Status: DISCONTINUED | OUTPATIENT
Start: 2018-02-09 | End: 2018-02-15

## 2018-02-09 RX ADMIN — Medication 100 MILLIGRAM(S): at 05:23

## 2018-02-09 RX ADMIN — SODIUM CHLORIDE 9999 MILLILITER(S): 9 INJECTION, SOLUTION INTRAVENOUS at 19:24

## 2018-02-09 RX ADMIN — SODIUM CHLORIDE 75 MILLILITER(S): 9 INJECTION INTRAMUSCULAR; INTRAVENOUS; SUBCUTANEOUS at 05:33

## 2018-02-09 RX ADMIN — SODIUM CHLORIDE 75 MILLILITER(S): 9 INJECTION, SOLUTION INTRAVENOUS at 11:54

## 2018-02-09 RX ADMIN — Medication 100 MILLIGRAM(S): at 23:30

## 2018-02-09 RX ADMIN — Medication 200 GRAM(S): at 14:17

## 2018-02-09 RX ADMIN — SODIUM CHLORIDE 1000 MILLILITER(S): 9 INJECTION, SOLUTION INTRAVENOUS at 14:56

## 2018-02-09 RX ADMIN — SODIUM CHLORIDE 3000 MILLILITER(S): 9 INJECTION, SOLUTION INTRAVENOUS at 22:20

## 2018-02-09 RX ADMIN — Medication 200 GRAM(S): at 18:40

## 2018-02-09 RX ADMIN — PANTOPRAZOLE SODIUM 40 MILLIGRAM(S): 20 TABLET, DELAYED RELEASE ORAL at 05:24

## 2018-02-09 RX ADMIN — PHENYLEPHRINE HYDROCHLORIDE 5.36 MICROGRAM(S)/KG/MIN: 10 INJECTION INTRAVENOUS at 11:29

## 2018-02-09 RX ADMIN — BRIMONIDINE TARTRATE 1 DROP(S): 2 SOLUTION/ DROPS OPHTHALMIC at 05:23

## 2018-02-09 RX ADMIN — Medication 12.5 MILLIGRAM(S): at 05:23

## 2018-02-09 RX ADMIN — Medication 1 DROP(S): at 05:23

## 2018-02-09 NOTE — BRIEF OPERATIVE NOTE - PROCEDURE
<<-----Click on this checkbox to enter Procedure Bipolar hemiarthroplasty of left hip  02/09/2018    Active  ESTAPLETON

## 2018-02-09 NOTE — CHART NOTE - NSCHARTNOTEFT_GEN_A_CORE
02-09-18 @ 15:07    Pt comfortable.  Pain well controlled.  Pt confused, but follows some commands.    T(C): 36.5 (02-09-18 @ 10:45), Max: 37.1 (02-08-18 @ 22:39)  HR: 88 (02-09-18 @ 14:30) (62 - 88)  BP: 121/56 (02-09-18 @ 14:30) (87/45 - 177/82)  RR: 16 (02-09-18 @ 14:30) (15 - 19)  SpO2: 96% (02-09-18 @ 14:30) (92% - 100%)    Left hip dressing clean/dry/intact.  +DF/PF.  +Distal Pulses.   Motor/Sensory function grossly intact.  Calves soft/NT with venodynes  intact.                            9.4    16.8  )-----------( 99       ( 09 Feb 2018 13:46 )             26.9   Currently receiving 1U PRBCs for acute postop blood loss anemia.    02-09    137  |  103  |  15  ----------------------------<  163<H>  4.2   |  20<L>  |  0.57    Ca    7.4<L>      09 Feb 2018 11:39    TPro  7.5  /  Alb  3.7  /  TBili  1.3<H>  /  DBili  x   /  AST  57<H>  /  ALT  28  /  AlkPhos  83  02-08    Pt s/p L Hip Hemiarthroplasty  -Analgesia  -Cont to Monitor  -DVT Prophylaxis  -Watch BPs - On Pressors Now  -FU Post Transfusion Hgb/Hct    CIARA Rivera PA-C  Orthopaedic Surgery  [id number]

## 2018-02-09 NOTE — CONSULT NOTE ADULT - ASSESSMENT
89 yo woman hx of fall with left hip fx scheduled for ORIF
ASSESSMENT:  89y Female s/p left hip hemiarthroplasty    PLAN:   Neurologic: Postoperative pain.  - Pain control as needed    Respiratory: No acute issues     Cardiovascular: BP requiring minimal doses of miroslava.   - Maintain MAP > 65  - Wean miroslava  - Given  1L NS, will give additional 250 5% albumin     Gastrointestinal/Nutrition: No acute issues.   - Will keep NPO for now. May restart diet once cleared for floors     Renal/Genitourinary: No acute issues. Making appropriate urine.     Hematologic: Appeared to have responded to 1 unit of pRBCs. Low suspicion for hemorrhagic shock.  - Next CBC is AM labs  - Transfuse PRN    Infectious Disease: No acute issues    Lines/Tubes: PIV, arterial line    Endocrine: No acute issues    Disposition: PACU. Stable for floor once off pressors.

## 2018-02-09 NOTE — BRIEF OPERATIVE NOTE - OPERATION/FINDINGS
left hip femoral neck fracture, bipolar hip hemiarthroplasty caleb, posterior approach capsule repair

## 2018-02-09 NOTE — CONSULT NOTE ADULT - SUBJECTIVE AND OBJECTIVE BOX
SICU Consultation Note  =====================================================  HPI: 89y female with dementia, afib (Xarelto), COPD, CVA, GERD, GIB, glaucoma, HLD, PUD, s/p mech fall with L hip fracture, now s/p bipolar hemiarthroplasty. SICU consulted for hypotension requiring pressors in the PACU. Patient was given 500 mL plasmalyte boluses x 2 and 1 unit pRBCs before SICU evaluation. Her lactate was 3. Hematocrit before the transfusion was 27, and increased to 33, then 29. 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    Home Meds:   Allergies: aspirin (Unknown)  penicillin (Unknown)    Soc:   Advanced Directives: Presumed Full Code     ROS:    General: Non-Contributory  Skin/Breast: Non-Contributory  Ophthalmologic: Non-Contributory  ENMT: Non-Contributory  Respiratory and Thorax: Non-Contributory  Cardiovascular: Non-Contributory  Gastrointestinal: Non-Contributory  Genitourinary: Non-Contributory  Musculoskeletal: Non-Contributory  Neurological: Non-Contributory  Psychiatric: Non-Contributory  Hematology/Lymphatics: Non-Contributory  Endocrine: Non-Contributory  Allergic/Immunologic: Non-Contributory    CURRENT MEDICATIONS:   --------------------------------------------------------------------------------------  Neurologic Medications  acetaminophen   Tablet 650 milliGRAM(s) Oral every 6 hours PRN For Temp greater than 38 C (100.4 F)  acetaminophen   Tablet. 650 milliGRAM(s) Oral every 6 hours PRN headache  HYDROmorphone  Injectable 0.25 milliGRAM(s) IV Push every 10 minutes PRN Moderate Pain (4 - 6)  ondansetron Injectable 4 milliGRAM(s) IV Push once PRN Nausea and/or Vomiting  ondansetron Injectable 4 milliGRAM(s) IV Push every 4 hours PRN Nausea and/or Vomiting  oxyCODONE    IR 5 milliGRAM(s) Oral every 4 hours PRN Mild Pain    Respiratory Medications    Cardiovascular Medications  phenylephrine    Infusion 0.3 MICROgram(s)/kG/Min IV Continuous <Continuous>    Gastrointestinal Medications  albumin human  5% IVPB 250 milliLiter(s) IV Intermittent once  aluminum hydroxide/magnesium hydroxide/simethicone Suspension 30 milliLiter(s) Oral four times a day PRN Indigestion  ascorbic acid 500 milliGRAM(s) Oral two times a day  calcium carbonate 1250 mG + Vitamin D (OsCal 500 + D) 1 Tablet(s) Oral three times a day  docusate sodium 100 milliGRAM(s) Oral three times a day  ferrous    sulfate 325 milliGRAM(s) Oral three times a day with meals  folic acid 1 milliGRAM(s) Oral daily  lactated ringers. 1000 milliLiter(s) IV Continuous <Continuous>  magnesium hydroxide Suspension 30 milliLiter(s) Oral daily PRN Constipation  multivitamin 1 Tablet(s) Oral daily  pantoprazole    Tablet 40 milliGRAM(s) Oral daily  polyethylene glycol 3350 17 Gram(s) Oral daily  senna 2 Tablet(s) Oral at bedtime PRN Constipation  simethicone 80 milliGRAM(s) Chew every 8 hours PRN gas/indigestion    Genitourinary Medications    Hematologic/Oncologic Medications    Antimicrobial/Immunologic Medications    Endocrine/Metabolic Medications  atorvastatin 40 milliGRAM(s) Oral at bedtime    Topical/Other Medications  benzocaine 15 mG/menthol 3.6 mG Lozenge 1 Lozenge Oral every 3 hours PRN Sore Throat  brimonidine 0.2% Ophthalmic Solution 1 Drop(s) Both EYES two times a day  timolol 0.5% Solution 1 Drop(s) Both EYES two times a day    --------------------------------------------------------------------------------------    VITAL SIGNS, INS/OUTS (last 24 hours):  --------------------------------------------------------------------------------------  ((Insert SICU Vitals / Is+Os here)) ***  --------------------------------------------------------------------------------------    EXAM:  General/Neuro  RASS:   GCS:   Exam: Normal, NAD, alert, oriented x 3, no focal deficits. PERRLA  ***    Respiratory  Exam: Lungs clear to auscultation, Normal expansion/effort.  ***  [] Tracheostomy   [] Intubated  Mechanical Ventilation:     Cardiovascular  Exam: S1, S2.  Regular rate and rhythm.  Peripheral edema  ***  Cardiac Rhythm: Normal Sinus Rhythm  ECHO:     GI  Exam: Abdomen soft, Non-tender, Non-distended.  Gastrostomy / Jejunostomy tube in place.  Nasogastric tube in place.  Colostomy / Ileostomy.  ***  Wound:   ***  Current Diet:  NPO***      Tubes/Lines/Drains  ***  [x] Peripheral IV  [] Central Venous Line     	[] R	[] L	[] IJ	[] Fem	[] SC        Type:	    Date Placed:   [] Arterial Line		[] R	[] L	[] Fem	[] Rad	[] Ax	Date Placed:   [] PICC:         	[] Midline		[] Mediport           [] Urinary Catheter		Date Placed:     Extremities  Exam: Extremities warm, pink, well-perfused.        Derm:  Exam: Good skin turgor, no skin breakdown.      :   Exam: Hernandez catheter in place.     LABS  --------------------------------------------------------------------------------------  ((Insert SICU Labs here))***  --------------------------------------------------------------------------------------    OTHER LABS    IMAGING RESULTS      ASSESSMENT:  89y Female ***    PLAN:   Neurologic:   Respiratory:   Cardiovascular:   Gastrointestinal/Nutrition:   Renal/Genitourinary:   Hematologic:   Infectious Disease:   Lines/Tubes:  Endocrine:   Disposition:     --------------------------------------------------------------------------------------    Critical Care Diagnoses: SICU Consultation Note  =====================================================  HPI: 89y female with dementia, afib (Xarelto), last ECHO 2017 EF 71%, COPD, CVA, GERD, GIB, glaucoma, HLD, PUD, s/p mech fall with L hip fracture, now s/p bipolar hemiarthroplasty. SICU consulted for hypotension requiring pressors in the PACU. Patient was given 500 mL plasmalyte boluses x 2 and 1 unit pRBCs before SICU evaluation. Her lactate was 3 after the resuscitation. Hematocrit before the transfusion was 27, and increased to 33, then 29. Patient was able to be weaned down to 0.1 of miroslava, but has required pressors to maintain her BP during her 10-hour stay in the PACU.     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    Home Meds:   · 	metoprolol tartrate 25 mg oral tablet: 0.5 tab(s) (12.5 mg) orally 2 times a day   · 	acetaminophen 160 mg/5 mL oral suspension: 31.25 milliliter(s) orally every 6 hours  · 	atorvastatin 40 mg oral tablet: 1 tab(s) orally once a day (at bedtime)  · 	brimonidine 0.2% ophthalmic solution: 1 drop(s) to each eye 2 times a day  · 	Colace 10 mg/mL oral liquid: 30 milliliter(s) orally once a day (in the evening)  · 	timolol maleate 0.5% ophthalmic gel forming solution: 1 drop(s) to each eye 2 times a day          · 	Xarelto 15 mg oral tablet: 1 tab(s) orally once a day (in the evening)    Allergies: aspirin (Unknown)  penicillin (Unknown)    Soc: Denies smoking, ETOH use.  Advanced Directives: Presumed Full Code     ROS:    General: Non-Contributory  Skin/Breast: Non-Contributory  Ophthalmologic: Non-Contributory  ENMT: Non-Contributory  Respiratory and Thorax: Non-Contributory  Cardiovascular: Non-Contributory  Gastrointestinal: Non-Contributory  Genitourinary: Non-Contributory  Musculoskeletal: Non-Contributory  Neurological: Non-Contributory  Psychiatric: Non-Contributory  Hematology/Lymphatics: Non-Contributory  Endocrine: Non-Contributory  Allergic/Immunologic: Non-Contributory    CURRENT MEDICATIONS:   --------------------------------------------------------------------------------------  Neurologic Medications  acetaminophen   Tablet 650 milliGRAM(s) Oral every 6 hours PRN For Temp greater than 38 C (100.4 F)  acetaminophen   Tablet. 650 milliGRAM(s) Oral every 6 hours PRN headache  HYDROmorphone  Injectable 0.25 milliGRAM(s) IV Push every 10 minutes PRN Moderate Pain (4 - 6)  ondansetron Injectable 4 milliGRAM(s) IV Push once PRN Nausea and/or Vomiting  ondansetron Injectable 4 milliGRAM(s) IV Push every 4 hours PRN Nausea and/or Vomiting  oxyCODONE    IR 5 milliGRAM(s) Oral every 4 hours PRN Mild Pain    Respiratory Medications    Cardiovascular Medications  phenylephrine    Infusion 0.3 MICROgram(s)/kG/Min IV Continuous <Continuous>    Gastrointestinal Medications  albumin human  5% IVPB 250 milliLiter(s) IV Intermittent once  aluminum hydroxide/magnesium hydroxide/simethicone Suspension 30 milliLiter(s) Oral four times a day PRN Indigestion  ascorbic acid 500 milliGRAM(s) Oral two times a day  calcium carbonate 1250 mG + Vitamin D (OsCal 500 + D) 1 Tablet(s) Oral three times a day  docusate sodium 100 milliGRAM(s) Oral three times a day  ferrous    sulfate 325 milliGRAM(s) Oral three times a day with meals  folic acid 1 milliGRAM(s) Oral daily  lactated ringers. 1000 milliLiter(s) IV Continuous <Continuous>  magnesium hydroxide Suspension 30 milliLiter(s) Oral daily PRN Constipation  multivitamin 1 Tablet(s) Oral daily  pantoprazole    Tablet 40 milliGRAM(s) Oral daily  polyethylene glycol 3350 17 Gram(s) Oral daily  senna 2 Tablet(s) Oral at bedtime PRN Constipation  simethicone 80 milliGRAM(s) Chew every 8 hours PRN gas/indigestion    Genitourinary Medications    Hematologic/Oncologic Medications    Antimicrobial/Immunologic Medications    Endocrine/Metabolic Medications  atorvastatin 40 milliGRAM(s) Oral at bedtime    Topical/Other Medications  benzocaine 15 mG/menthol 3.6 mG Lozenge 1 Lozenge Oral every 3 hours PRN Sore Throat  brimonidine 0.2% Ophthalmic Solution 1 Drop(s) Both EYES two times a day  timolol 0.5% Solution 1 Drop(s) Both EYES two times a day    --------------------------------------------------------------------------------------    VITAL SIGNS, INS/OUTS (last 24 hours):  --------------------------------------------------------------------------------------  Vital Signs Last 24 Hrs  T(C): 37 (2018 23:00), Max: 37 (2018 23:00)  T(F): 98.6 (2018 23:00), Max: 98.6 (2018 23:00)  HR: 84 (2018 23:00) (70 - 99)  BP: 100/49 (2018 23:00) (87/45 - 156/68)  BP(mean): 70 (2018 23:00) (62 - 90)  RR: 16 (:00) (15 - 18)  SpO2: 100% (:00) (90% - 100%)  --------------------------------------------------------------------------------------    EXAM:  General/Neuro  Exam: Normal, NAD, alert, oriented x 1, no focal deficits.     Respiratory  Exam: Lungs clear to auscultation, Normal expansion/effort.      Cardiovascular  Exam: S1, S2.  Regular rate and rhythm.   Cardiac Rhythm: Normal Sinus Rhythm    GI  Exam: Abdomen soft, Non-tender, Non-distended.      Tubes/Lines/Drains    [x] Peripheral IV  [] Central Venous Line     	[] R	[] L	[] IJ	[] Fem	[] SC        Type:	    Date Placed:   [x] Arterial Line		[] R	[] L	[] Fem	[] Rad	[] Ax	Date Placed:   [] PICC:         	[] Midline		[] Mediport           [x] Urinary Catheter		Date Placed:     Extremities  Exam: Extremities warm, pink, well-perfused.      Derm:  Exam: Good skin turgor, no skin breakdown.      :   Exam: Hernandez catheter in place.     LABS  --------------------------------------------------------------------------------------  CBC Full  -  ( 2018 21:16 )  WBC Count : 11.0 K/uL  Hemoglobin : 10.6 g/dL  Hematocrit : 29.0 %  Platelet Count - Automated : 70 K/uL  Mean Cell Volume : 97.6 fl  Mean Cell Hemoglobin : 35.7 pg  Mean Cell Hemoglobin Concentration : 36.6 gm/dL        137  |  103  |  15  ----------------------------<  163<H>  4.2   |  20<L>  |  0.57    Ca    7.4<L>      2018 11:39    TPro  7.5  /  Alb  3.7  /  TBili  1.3<H>  /  DBili  x   /  AST  57<H>  /  ALT  28  /  AlkPhos  83  02-08    LIVER FUNCTIONS - ( 2018 18:11 )  Alb: 3.7 g/dL / Pro: 7.5 g/dL / ALK PHOS: 83 U/L / ALT: 28 U/L RC / AST: 57 U/L / GGT: x           PT/INR - ( 2018 18:11 )   PT: 11.0 sec;   INR: 1.01 ratio         PTT - ( 2018 18:11 )  PTT:29.0 sec  Urinalysis Basic - ( 2018 20:55 )    Color: PL Yellow / Appearance: Clear / S.010 / pH: x  Gluc: x / Ketone: Negative  / Bili: Negative / Urobili: Negative   Blood: x / Protein: Negative / Nitrite: Negative   Leuk Esterase: Negative / RBC: 3-5 /HPF / WBC 0-2 /HPF   Sq Epi: x / Non Sq Epi: OCC /HPF / Bacteria: x      --------------------------------------------------------------------------------------

## 2018-02-09 NOTE — PROGRESS NOTE ADULT - SUBJECTIVE AND OBJECTIVE BOX
Patient is a 89y old  Female who presents with a chief complaint of L hip pain (08 Feb 2018 22:22)      HPI:  Patient seen in PACU  Afebrile. Pain 4/10 No sob or chest pain.    Diagnosis:    Pre-Op Diagnosis:  Femoral neck fracture  02/09/2018    Active  Mark Romero.     Post-Op Dx:  Femoral neck fracture  02/09/2018    Active  aMrk Romero.    Procedure:    Procedure:  Bipolar hemiarthroplasty of left hip  02/09/2018    Active  ESTAPLETON.       Operative Findings:  · Operative Findings	left hip femoral neck fracture, bipolar hip hemiarthroplasty caleb, posterior approach capsule repair	      MEDICATIONS  (STANDING):  ascorbic acid 500 milliGRAM(s) Oral two times a day  atorvastatin 40 milliGRAM(s) Oral at bedtime  brimonidine 0.2% Ophthalmic Solution 1 Drop(s) Both EYES two times a day  calcium carbonate 1250 mG + Vitamin D (OsCal 500 + D) 1 Tablet(s) Oral three times a day  docusate sodium 100 milliGRAM(s) Oral three times a day  ferrous    sulfate 325 milliGRAM(s) Oral three times a day with meals  folic acid 1 milliGRAM(s) Oral daily  lactated ringers. 1000 milliLiter(s) (75 mL/Hr) IV Continuous <Continuous>  metoprolol     tartrate 12.5 milliGRAM(s) Oral two times a day  multivitamin 1 Tablet(s) Oral daily  pantoprazole    Tablet 40 milliGRAM(s) Oral daily  phenylephrine    Infusion 0.3 MICROgram(s)/kG/Min (5.358 mL/Hr) IV Continuous <Continuous>  polyethylene glycol 3350 17 Gram(s) Oral daily  timolol 0.5% Solution 1 Drop(s) Both EYES two times a day  vancomycin  IVPB 500 milliGRAM(s) IV Intermittent once    MEDICATIONS  (PRN):  acetaminophen   Tablet 650 milliGRAM(s) Oral every 6 hours PRN For Temp greater than 38 C (100.4 F)  acetaminophen   Tablet. 650 milliGRAM(s) Oral every 6 hours PRN headache  aluminum hydroxide/magnesium hydroxide/simethicone Suspension 30 milliLiter(s) Oral four times a day PRN Indigestion  benzocaine 15 mG/menthol 3.6 mG Lozenge 1 Lozenge Oral every 3 hours PRN Sore Throat  diphenhydrAMINE   Capsule 25 milliGRAM(s) Oral every 4 hours PRN Rash and/or Itching  diphenhydrAMINE   Capsule 25 milliGRAM(s) Oral at bedtime PRN Insomnia  HYDROmorphone  Injectable 0.25 milliGRAM(s) IV Push every 10 minutes PRN Moderate Pain (4 - 6)  magnesium hydroxide Suspension 30 milliLiter(s) Oral daily PRN Constipation  ondansetron Injectable 4 milliGRAM(s) IV Push once PRN Nausea and/or Vomiting  ondansetron Injectable 4 milliGRAM(s) IV Push every 4 hours PRN Nausea and/or Vomiting  oxyCODONE    IR 5 milliGRAM(s) Oral every 4 hours PRN Mild Pain  oxyCODONE    IR 10 milliGRAM(s) Oral every 4 hours PRN Moderate Pain  senna 2 Tablet(s) Oral at bedtime PRN Constipation  simethicone 80 milliGRAM(s) Chew every 8 hours PRN gas/indigestion  traMADol 50 milliGRAM(s) Oral every 6 hours PRN Severe Pain (7 - 10)      Allergies    aspirin (Unknown)  penicillin (Unknown)    Intolerances      VITALS:   T(C): 36.4 (02-09-18 @ 16:30), Max: 37.1 (02-08-18 @ 22:39)  HR: 84 (02-09-18 @ 17:30) (65 - 88)  BP: 99/56 (02-09-18 @ 17:30) (87/45 - 169/57)  RR: 16 (02-09-18 @ 17:30) (15 - 18)  SpO2: 100% (02-09-18 @ 17:30) (93% - 100%)  Wt(kg): --    02-08 @ 07:01  -  02-09 @ 07:00  --------------------------------------------------------  IN: 0 mL / OUT: 400 mL / NET: -400 mL    02-09 @ 07:01  -  02-09 @ 17:59  --------------------------------------------------------  IN: 1347 mL / OUT: 300 mL / NET: 1047 mL        PHYSICAL EXAM:  GENERAL: NAD, well nourished and conversant  HEAD:  Atraumatic  EYES: EOM, PERRLA, conjunctiva pink and sclera white  ENT: No tonsillar erythema, exudates, or enlargement, moist mucous membranes, good dentition, no lesions  NECK: Supple, No JVD, normal thyroid, carotids with normal upstrokes and no bruits  CHEST/LUNG: Clear to auscultation bilaterally, No rales, rhonchi, wheezing, or rubs  HEART: Regular rate and rhythm, No murmurs, rubs, or gallops  ABDOMEN: Soft, nondistended, no masses, guarding, tenderness or rebound, bowel sounds present  EXTREMITIES:  2+ Peripheral Pulses, No clubbing, cyanosis, or edema.  (+) Bipolar hemiarthroplasty  LYMPH: No lymphadenopathy noted  SKIN: No rashes or lesions    LABS:                          11.7   14.8  )-----------( 90       ( 09 Feb 2018 16:17 )             32.8     02-09    137  |  103  |  15  ----------------------------<  163<H>  4.2   |  20<L>  |  0.57  02-09    136  |  99  |  16  ----------------------------<  149<H>  4.7   |  24  |  0.67  02-08    138  |  103  |  18  ----------------------------<  143<H>  4.2   |  23  |  0.73    Ca    7.4<L>      09 Feb 2018 11:39  Ca    8.7      09 Feb 2018 05:04  Ca    8.6      08 Feb 2018 22:12    TPro  7.5  /  Alb  3.7  /  TBili  1.3<H>  /  DBili  x   /  AST  57<H>  /  ALT  28  /  AlkPhos  83  02-08    CAPILLARY BLOOD GLUCOSE          RADIOLOGY & ADDITIONAL TESTS:      Consultant(s):    Care Discussed with Consultants/Other Providers [ ] YES  [ ] NO

## 2018-02-10 ENCOUNTER — TRANSCRIPTION ENCOUNTER (OUTPATIENT)
Age: 83
End: 2018-02-10

## 2018-02-10 LAB
ANION GAP SERPL CALC-SCNC: 11 MMOL/L — SIGNIFICANT CHANGE UP (ref 5–17)
BUN SERPL-MCNC: 13 MG/DL — SIGNIFICANT CHANGE UP (ref 7–23)
CALCIUM SERPL-MCNC: 8.6 MG/DL — SIGNIFICANT CHANGE UP (ref 8.4–10.5)
CHLORIDE SERPL-SCNC: 102 MMOL/L — SIGNIFICANT CHANGE UP (ref 96–108)
CO2 SERPL-SCNC: 24 MMOL/L — SIGNIFICANT CHANGE UP (ref 22–31)
CREAT SERPL-MCNC: 0.62 MG/DL — SIGNIFICANT CHANGE UP (ref 0.5–1.3)
GAS PNL BLDA: SIGNIFICANT CHANGE UP
GLUCOSE SERPL-MCNC: 128 MG/DL — HIGH (ref 70–99)
HCT VFR BLD CALC: 23.5 % — LOW (ref 34.5–45)
HCT VFR BLD CALC: 29 % — LOW (ref 34.5–45)
HCT VFR BLD CALC: 29 % — LOW (ref 34.5–45)
HCT VFR BLD CALC: 29.8 % — LOW (ref 34.5–45)
HGB BLD-MCNC: 10.4 G/DL — LOW (ref 11.5–15.5)
HGB BLD-MCNC: 10.6 G/DL — LOW (ref 11.5–15.5)
HGB BLD-MCNC: 10.8 G/DL — LOW (ref 11.5–15.5)
HGB BLD-MCNC: 8.6 G/DL — LOW (ref 11.5–15.5)
MCHC RBC-ENTMCNC: 34.4 PG — HIGH (ref 27–34)
MCHC RBC-ENTMCNC: 34.5 PG — HIGH (ref 27–34)
MCHC RBC-ENTMCNC: 34.6 PG — HIGH (ref 27–34)
MCHC RBC-ENTMCNC: 35.6 PG — HIGH (ref 27–34)
MCHC RBC-ENTMCNC: 36 GM/DL — SIGNIFICANT CHANGE UP (ref 32–36)
MCHC RBC-ENTMCNC: 36.2 GM/DL — HIGH (ref 32–36)
MCHC RBC-ENTMCNC: 36.5 GM/DL — HIGH (ref 32–36)
MCHC RBC-ENTMCNC: 36.5 GM/DL — HIGH (ref 32–36)
MCV RBC AUTO: 94.9 FL — SIGNIFICANT CHANGE UP (ref 80–100)
MCV RBC AUTO: 95 FL — SIGNIFICANT CHANGE UP (ref 80–100)
MCV RBC AUTO: 95.9 FL — SIGNIFICANT CHANGE UP (ref 80–100)
MCV RBC AUTO: 97.6 FL — SIGNIFICANT CHANGE UP (ref 80–100)
PLATELET # BLD AUTO: 46 K/UL — LOW (ref 150–400)
PLATELET # BLD AUTO: 50 K/UL — LOW (ref 150–400)
PLATELET # BLD AUTO: 57 K/UL — LOW (ref 150–400)
PLATELET # BLD AUTO: 64 K/UL — LOW (ref 150–400)
POTASSIUM SERPL-MCNC: 4.3 MMOL/L — SIGNIFICANT CHANGE UP (ref 3.5–5.3)
POTASSIUM SERPL-SCNC: 4.3 MMOL/L — SIGNIFICANT CHANGE UP (ref 3.5–5.3)
RBC # BLD: 2.41 M/UL — LOW (ref 3.8–5.2)
RBC # BLD: 3.02 M/UL — LOW (ref 3.8–5.2)
RBC # BLD: 3.05 M/UL — LOW (ref 3.8–5.2)
RBC # BLD: 3.13 M/UL — LOW (ref 3.8–5.2)
RBC # FLD: 14.4 % — SIGNIFICANT CHANGE UP (ref 10.3–14.5)
RBC # FLD: 14.6 % — HIGH (ref 10.3–14.5)
RBC # FLD: 14.6 % — HIGH (ref 10.3–14.5)
RBC # FLD: 14.8 % — HIGH (ref 10.3–14.5)
SODIUM SERPL-SCNC: 137 MMOL/L — SIGNIFICANT CHANGE UP (ref 135–145)
WBC # BLD: 10.5 K/UL — SIGNIFICANT CHANGE UP (ref 3.8–10.5)
WBC # BLD: 7.8 K/UL — SIGNIFICANT CHANGE UP (ref 3.8–10.5)
WBC # BLD: 8.2 K/UL — SIGNIFICANT CHANGE UP (ref 3.8–10.5)
WBC # BLD: 9.7 K/UL — SIGNIFICANT CHANGE UP (ref 3.8–10.5)
WBC # FLD AUTO: 10.5 K/UL — SIGNIFICANT CHANGE UP (ref 3.8–10.5)
WBC # FLD AUTO: 7.8 K/UL — SIGNIFICANT CHANGE UP (ref 3.8–10.5)
WBC # FLD AUTO: 8.2 K/UL — SIGNIFICANT CHANGE UP (ref 3.8–10.5)
WBC # FLD AUTO: 9.7 K/UL — SIGNIFICANT CHANGE UP (ref 3.8–10.5)

## 2018-02-10 PROCEDURE — 93010 ELECTROCARDIOGRAM REPORT: CPT | Mod: 76

## 2018-02-10 RX ORDER — DIGOXIN 250 MCG
0.5 TABLET ORAL ONCE
Qty: 0 | Refills: 0 | Status: DISCONTINUED | OUTPATIENT
Start: 2018-02-10 | End: 2018-02-10

## 2018-02-10 RX ORDER — HYDROMORPHONE HYDROCHLORIDE 2 MG/ML
0.25 INJECTION INTRAMUSCULAR; INTRAVENOUS; SUBCUTANEOUS
Qty: 0 | Refills: 0 | Status: DISCONTINUED | OUTPATIENT
Start: 2018-02-10 | End: 2018-02-10

## 2018-02-10 RX ORDER — METOPROLOL TARTRATE 50 MG
25 TABLET ORAL
Qty: 0 | Refills: 0 | Status: DISCONTINUED | OUTPATIENT
Start: 2018-02-10 | End: 2018-02-10

## 2018-02-10 RX ORDER — SODIUM CHLORIDE 9 MG/ML
1000 INJECTION INTRAMUSCULAR; INTRAVENOUS; SUBCUTANEOUS ONCE
Qty: 0 | Refills: 0 | Status: COMPLETED | OUTPATIENT
Start: 2018-02-10 | End: 2018-02-10

## 2018-02-10 RX ORDER — METOPROLOL TARTRATE 50 MG
2.5 TABLET ORAL EVERY 6 HOURS
Qty: 0 | Refills: 0 | Status: DISCONTINUED | OUTPATIENT
Start: 2018-02-10 | End: 2018-02-10

## 2018-02-10 RX ORDER — OXYCODONE HYDROCHLORIDE 5 MG/1
10 TABLET ORAL EVERY 4 HOURS
Qty: 0 | Refills: 0 | Status: DISCONTINUED | OUTPATIENT
Start: 2018-02-10 | End: 2018-02-11

## 2018-02-10 RX ORDER — METOPROLOL TARTRATE 50 MG
12.5 TABLET ORAL EVERY 12 HOURS
Qty: 0 | Refills: 0 | Status: DISCONTINUED | OUTPATIENT
Start: 2018-02-10 | End: 2018-02-10

## 2018-02-10 RX ORDER — MAGNESIUM SULFATE 500 MG/ML
2 VIAL (ML) INJECTION ONCE
Qty: 0 | Refills: 0 | Status: COMPLETED | OUTPATIENT
Start: 2018-02-10 | End: 2018-02-10

## 2018-02-10 RX ORDER — HYDROMORPHONE HYDROCHLORIDE 2 MG/ML
0.25 INJECTION INTRAMUSCULAR; INTRAVENOUS; SUBCUTANEOUS
Qty: 0 | Refills: 0 | Status: DISCONTINUED | OUTPATIENT
Start: 2018-02-10 | End: 2018-02-11

## 2018-02-10 RX ORDER — CALCIUM GLUCONATE 100 MG/ML
2 VIAL (ML) INTRAVENOUS ONCE
Qty: 0 | Refills: 0 | Status: COMPLETED | OUTPATIENT
Start: 2018-02-10 | End: 2018-02-10

## 2018-02-10 RX ORDER — SENNA PLUS 8.6 MG/1
2 TABLET ORAL AT BEDTIME
Qty: 0 | Refills: 0 | Status: DISCONTINUED | OUTPATIENT
Start: 2018-02-10 | End: 2018-02-12

## 2018-02-10 RX ORDER — ACETAMINOPHEN 500 MG
975 TABLET ORAL EVERY 6 HOURS
Qty: 0 | Refills: 0 | Status: DISCONTINUED | OUTPATIENT
Start: 2018-02-10 | End: 2018-02-12

## 2018-02-10 RX ORDER — OXYCODONE HYDROCHLORIDE 5 MG/1
5 TABLET ORAL EVERY 4 HOURS
Qty: 0 | Refills: 0 | Status: DISCONTINUED | OUTPATIENT
Start: 2018-02-10 | End: 2018-02-11

## 2018-02-10 RX ORDER — METOPROLOL TARTRATE 50 MG
2.5 TABLET ORAL ONCE
Qty: 0 | Refills: 0 | Status: COMPLETED | OUTPATIENT
Start: 2018-02-10 | End: 2018-02-10

## 2018-02-10 RX ORDER — ACETAMINOPHEN 500 MG
750 TABLET ORAL ONCE
Qty: 0 | Refills: 0 | Status: DISCONTINUED | OUTPATIENT
Start: 2018-02-10 | End: 2018-02-10

## 2018-02-10 RX ORDER — METOPROLOL TARTRATE 50 MG
2.5 TABLET ORAL EVERY 4 HOURS
Qty: 0 | Refills: 0 | Status: DISCONTINUED | OUTPATIENT
Start: 2018-02-10 | End: 2018-02-11

## 2018-02-10 RX ORDER — ENOXAPARIN SODIUM 100 MG/ML
40 INJECTION SUBCUTANEOUS DAILY
Qty: 0 | Refills: 0 | Status: DISCONTINUED | OUTPATIENT
Start: 2018-02-10 | End: 2018-02-12

## 2018-02-10 RX ORDER — PHENYLEPHRINE HYDROCHLORIDE 10 MG/ML
0.02 INJECTION INTRAVENOUS
Qty: 80 | Refills: 0 | Status: DISCONTINUED | OUTPATIENT
Start: 2018-02-10 | End: 2018-02-11

## 2018-02-10 RX ADMIN — PHENYLEPHRINE HYDROCHLORIDE 0.36 MICROGRAM(S)/KG/MIN: 10 INJECTION INTRAVENOUS at 23:03

## 2018-02-10 RX ADMIN — OXYCODONE HYDROCHLORIDE 5 MILLIGRAM(S): 5 TABLET ORAL at 23:20

## 2018-02-10 RX ADMIN — Medication 125 MILLILITER(S): at 00:45

## 2018-02-10 RX ADMIN — SODIUM CHLORIDE 75 MILLILITER(S): 9 INJECTION, SOLUTION INTRAVENOUS at 01:30

## 2018-02-10 RX ADMIN — ONDANSETRON 4 MILLIGRAM(S): 8 TABLET, FILM COATED ORAL at 23:09

## 2018-02-10 RX ADMIN — HYDROMORPHONE HYDROCHLORIDE 0.25 MILLIGRAM(S): 2 INJECTION INTRAMUSCULAR; INTRAVENOUS; SUBCUTANEOUS at 10:45

## 2018-02-10 RX ADMIN — Medication 1 DROP(S): at 05:14

## 2018-02-10 RX ADMIN — PHENYLEPHRINE HYDROCHLORIDE 0.36 MICROGRAM(S)/KG/MIN: 10 INJECTION INTRAVENOUS at 17:26

## 2018-02-10 RX ADMIN — SODIUM CHLORIDE 30 MILLILITER(S): 9 INJECTION, SOLUTION INTRAVENOUS at 23:03

## 2018-02-10 RX ADMIN — Medication 1 DROP(S): at 01:30

## 2018-02-10 RX ADMIN — SODIUM CHLORIDE 75 MILLILITER(S): 9 INJECTION, SOLUTION INTRAVENOUS at 17:24

## 2018-02-10 RX ADMIN — Medication 1 DROP(S): at 17:24

## 2018-02-10 RX ADMIN — BRIMONIDINE TARTRATE 1 DROP(S): 2 SOLUTION/ DROPS OPHTHALMIC at 17:24

## 2018-02-10 RX ADMIN — Medication 325 MILLIGRAM(S): at 11:56

## 2018-02-10 RX ADMIN — Medication 50 GRAM(S): at 18:07

## 2018-02-10 RX ADMIN — Medication 2.5 MILLIGRAM(S): at 18:55

## 2018-02-10 RX ADMIN — SODIUM CHLORIDE 75 MILLILITER(S): 9 INJECTION, SOLUTION INTRAVENOUS at 20:43

## 2018-02-10 RX ADMIN — Medication 200 GRAM(S): at 04:20

## 2018-02-10 RX ADMIN — ATORVASTATIN CALCIUM 40 MILLIGRAM(S): 80 TABLET, FILM COATED ORAL at 23:02

## 2018-02-10 RX ADMIN — OXYCODONE HYDROCHLORIDE 5 MILLIGRAM(S): 5 TABLET ORAL at 10:46

## 2018-02-10 RX ADMIN — SODIUM CHLORIDE 4000 MILLILITER(S): 9 INJECTION INTRAMUSCULAR; INTRAVENOUS; SUBCUTANEOUS at 17:25

## 2018-02-10 RX ADMIN — Medication 12.5 MILLIGRAM(S): at 16:00

## 2018-02-10 RX ADMIN — OXYCODONE HYDROCHLORIDE 5 MILLIGRAM(S): 5 TABLET ORAL at 22:50

## 2018-02-10 RX ADMIN — HYDROMORPHONE HYDROCHLORIDE 0.25 MILLIGRAM(S): 2 INJECTION INTRAMUSCULAR; INTRAVENOUS; SUBCUTANEOUS at 10:15

## 2018-02-10 RX ADMIN — OXYCODONE HYDROCHLORIDE 5 MILLIGRAM(S): 5 TABLET ORAL at 10:51

## 2018-02-10 RX ADMIN — SODIUM CHLORIDE 75 MILLILITER(S): 9 INJECTION, SOLUTION INTRAVENOUS at 11:56

## 2018-02-10 RX ADMIN — Medication 2.5 MILLIGRAM(S): at 20:44

## 2018-02-10 RX ADMIN — BRIMONIDINE TARTRATE 1 DROP(S): 2 SOLUTION/ DROPS OPHTHALMIC at 06:34

## 2018-02-10 RX ADMIN — Medication 1 MILLIGRAM(S): at 11:57

## 2018-02-10 RX ADMIN — SENNA PLUS 2 TABLET(S): 8.6 TABLET ORAL at 23:02

## 2018-02-10 NOTE — PROGRESS NOTE ADULT - SUBJECTIVE AND OBJECTIVE BOX
HISTORY OF PRESENT ILLNESS  89y female with dementia, afib (Xarelto), last ECHO 2/2017 EF 71%, COPD, CVA, GERD, GIB, glaucoma, HLD, PUD, s/p mech fall with L hip fracture, now s/p bipolar hemiarthroplasty. SICU consulted for hypotension requiring pressors in the PACU. Patient was given 500 mL plasmalyte boluses x 2 and 1 unit pRBCs before SICU evaluation. Her lactate was 3 after the resuscitation. Hematocrit before the transfusion was 27, and increased to 33, then 29. Patient was able to be weaned down to 0.1 of miroslava, but has required pressors to maintain her BP during her 10-hour stay in the PACU.    24 HOUR EVENTS:  Patient went into atrial fibrillation with RVR so she was given Cardizem 5 mg IV x1 with response in HR to the 80s.    HISTORY  89y Female    24 HOUR EVENTS:    SUBJECTIVE/ROS:  [ ] A ten-point review of systems was otherwise negative except as noted.  [ ] Due to altered mental status/intubation, subjective information were not able to be obtained from the patient. History was obtained, to the extent possible, from review of the chart and collateral sources of information.      NEURO  RASS:     GCS:     CAM ICU:  Exam:   Meds: acetaminophen   Tablet. 975 milliGRAM(s) Oral every 6 hours PRN Mild Pain (1 - 3)  HYDROmorphone  Injectable 0.25 milliGRAM(s) IV Push every 3 hours PRN severe breakthrough pain  ondansetron Injectable 4 milliGRAM(s) IV Push every 4 hours PRN Nausea and/or Vomiting  oxyCODONE    IR 5 milliGRAM(s) Oral every 4 hours PRN Moderate Pain (4 - 6)  oxyCODONE    IR 10 milliGRAM(s) Oral every 4 hours PRN Severe Pain (7 - 10)    [x] Adequacy of sedation and pain control has been assessed and adjusted      RESPIRATORY  RR: 18 (02-10-18 @ 15:00) (14 - 23)  SpO2: 100% (02-10-18 @ 15:00) (90% - 100%)  Wt(kg): --  Exam:   Mechanical Ventilation:   ABG - ( 10 Feb 2018 10:35 )  pH: 7.42  /  pCO2: 40    /  pO2: 112   / HCO3: 26    / Base Excess: 1.4   /  SaO2: 99      Lactate: x                [ ] Extubation Readiness Assessed  Meds:       CARDIOVASCULAR  HR: 123 (02-10-18 @ 15:00) (71 - 128)  BP: 113/64 (02-10-18 @ 12:45) (86/42 - 141/63)  BP(mean): 86 (02-10-18 @ 12:45) (61 - 90)  ABP: 106/59 (02-10-18 @ 15:00) (73/35 - 155/232)  ABP(mean): 78 (02-10-18 @ 15:00) (48 - 250)  Wt(kg): --  CVP(cm H2O): --      Exam:  Cardiac Rhythm:  Perfusion     [ ]Adequate   [ ]Inadequate  Mentation   [ ]Normal       [ ]Reduced  Extremities  [ ]Warm         [ ]Cool  Volume Status [ ]Hypervolemic [ ]Euvolemic [ ]Hypovolemic  Meds: metoprolol     tartrate 12.5 milliGRAM(s) Oral every 12 hours  phenylephrine    Infusion 0.02 MICROgram(s)/kG/Min IV Continuous <Continuous>        GI/NUTRITION  Exam:  Diet:  Meds: docusate sodium 100 milliGRAM(s) Oral three times a day  pantoprazole    Tablet 40 milliGRAM(s) Oral daily  polyethylene glycol 3350 17 Gram(s) Oral daily  senna 2 Tablet(s) Oral at bedtime      GENITOURINARY  I&O's Detail    02-09 @ 07:01  -  02-10 @ 07:00  --------------------------------------------------------  IN:    IV PiggyBack: 1600 mL    lactated ringers.: 1500 mL    Packed Red Blood Cells: 250 mL    phenylephrine   Infusion: 67.8 mL  Total IN: 3417.8 mL    OUT:    Indwelling Catheter - Urethral: 1225 mL  Total OUT: 1225 mL    Total NET: 2192.8 mL      02-10 @ 07:01  -  02-10 @ 16:12  --------------------------------------------------------  IN:    lactated ringers.: 600 mL    phenylephrine   Infusion: 32.1 mL  Total IN: 632.1 mL    OUT:    Indwelling Catheter - Urethral: 830 mL  Total OUT: 830 mL    Total NET: -197.9 mL          02-10    137  |  102  |  13  ----------------------------<  128<H>  4.3   |  24  |  0.62    Ca    8.6      10 Feb 2018 06:08    TPro  7.5  /  Alb  3.7  /  TBili  1.3<H>  /  DBili  x   /  AST  57<H>  /  ALT  28  /  AlkPhos  83  02-08    [ ] Hernandez catheter, indication:   Meds: lactated ringers. 1000 milliLiter(s) IV Continuous <Continuous>  multivitamin 1 Tablet(s) Oral daily        HEMATOLOGIC  Meds:   [x] VTE Prophylaxis                        10.8   9.7   )-----------( 57       ( 10 Feb 2018 10:50 )             29.8     PT/INR - ( 08 Feb 2018 18:11 )   PT: 11.0 sec;   INR: 1.01 ratio         PTT - ( 08 Feb 2018 18:11 )  PTT:29.0 sec  Transfusion     [ ] PRBC   [ ] Platelets   [ ] FFP   [ ] Cryoprecipitate      INFECTIOUS DISEASES  T(C): 36.7 (02-10-18 @ 12:45), Max: 37.1 (02-10-18 @ 02:45)  Wt(kg): --  WBC Count: 9.7 K/uL (02-10 @ 10:50)  WBC Count: 7.8 K/uL (02-10 @ 06:08)  WBC Count: 8.2 K/uL (02-10 @ 01:28)  WBC Count: 11.0 K/uL (02-09 @ 21:16)  WBC Count: 12.2 K/uL (02-09 @ 18:22)  WBC Count: 14.8 K/uL (02-09 @ 16:17)    Recent Cultures:    Meds:       ENDOCRINE  Capillary Blood Glucose    Meds: atorvastatin 40 milliGRAM(s) Oral at bedtime        ACCESS DEVICES:  [ ] Peripheral IV  [ ] Central Venous Line	[ ] R	[ ] L	[ ] IJ	[ ] Fem	[ ] SC	Placed:   [ ] Arterial Line		[ ] R	[ ] L	[ ] Fem	[ ] Rad	[ ] Ax	Placed:   [ ] PICC:					[ ] Mediport  [ ] Urinary Catheter, Date Placed:   [ ] Necessity of urinary, arterial, and venous catheters discussed    OTHER MEDICATIONS:  brimonidine 0.2% Ophthalmic Solution 1 Drop(s) Both EYES two times a day  timolol 0.5% Solution 1 Drop(s) Both EYES two times a day      CODE STATUS:     IMAGING: HISTORY OF PRESENT ILLNESS  89y female with dementia, afib (Xarelto), last ECHO 2/2017 EF 71%, COPD, CVA, GERD, GIB, glaucoma, HLD, PUD, s/p mech fall with L hip fracture, now s/p bipolar hemiarthroplasty. SICU consulted for hypotension requiring pressors in the PACU. Patient was given 500 mL plasmalyte boluses x 2 and 1 unit pRBCs before SICU evaluation. Her lactate was 3 after the resuscitation. Hematocrit before the transfusion was 27, and increased to 33, then 29. Patient was able to be weaned down to 0.1 of miroslava, but has required pressors to maintain her BP during her 10-hour stay in the PACU.    24 HOUR EVENTS:  Patient went into atrial fibrillation with RVR so she was given Cardizem 5 mg IV x1 with response in HR to the 80s.    SUBJECTIVE/ROS:  [ ] A ten-point review of systems was otherwise negative except as noted.  [x] Due to altered mental status/intubation, subjective information were not able to be obtained from the patient. History was obtained, to the extent possible, from review of the chart and collateral sources of information.    NEURO  CAM ICU: unable to assess  Exam: awake, only responds "no" to all questions  Meds:  ·	acetaminophen   Tablet. 975 milliGRAM(s) Oral every 6 hours PRN Mild Pain (1 - 3)  ·	HYDROmorphone  Injectable 0.25 milliGRAM(s) IV Push every 3 hours PRN severe breakthrough pain  ·	ondansetron Injectable 4 milliGRAM(s) IV Push every 4 hours PRN Nausea and/or Vomiting  ·	oxyCODONE    IR 5 milliGRAM(s) Oral every 4 hours PRN Moderate Pain (4 - 6)  ·	oxyCODONE    IR 10 milliGRAM(s) Oral every 4 hours PRN Severe Pain (7 - 10)  [x] Adequacy of sedation and pain control has been assessed and adjusted    RESPIRATORY  RR: 18 (02-10-18 @ 15:00) (14 - 23)  SpO2: 100% (02-10-18 @ 15:00) (90% - 100%)  Exam: clear to auscultation bilaterally  Mechanical Ventilation: no  [N/A] Extubation Readiness Assessed  ABG - ( 10 Feb 2018 10:35 )  pH: 7.42  /  pCO2: 40    /  pO2: 112   / HCO3: 26    / Base Excess: 1.4   /  SaO2: 99    /     Lactate: 1.1  Meds: none    CARDIOVASCULAR  HR: 123 (02-10-18 @ 15:00) (71 - 128)  BP: 113/64 (02-10-18 @ 12:45) (86/42 - 141/63)  BP(mean): 86 (02-10-18 @ 12:45) (61 - 90)  ABP: 106/59 (02-10-18 @ 15:00) (73/35 - 155/232)  ABP(mean): 78 (02-10-18 @ 15:00) (48 - 250)  Exam: irregularly irregular  Cardiac Rhythm: atrial fibrillation  Perfusion    [x]Adequate   [ ]Inadequate  Mentation   [ ]Normal       [x]Reduced  Extremities  [x]Warm         [ ]Cool  Volume Status [ ]Hypervolemic [x]Euvolemic [ ]Hypovolemic  Meds:  ·	metoprolol     tartrate 12.5 milliGRAM(s) Oral every 12 hours  ·	phenylephrine    Infusion 0.02 MICROgram(s)/kG/Min IV Continuous <Continuous>  ·	atorvastatin 40 milliGRAM(s) Oral at bedtime    GI/NUTRITION  Exam: soft, nontender, nondistended  Diet: mechanical soft  Meds:  ·	docusate sodium 100 milliGRAM(s) Oral three times a day  ·	pantoprazole    Tablet 40 milliGRAM(s) Oral daily  ·	polyethylene glycol 3350 17 Gram(s) Oral daily  ·	senna 2 Tablet(s) Oral at bedtime    GENITOURINARY  I&O's Detail    02-09 @ 07:01  -  02-10 @ 07:00  --------------------------------------------------------  IN:    IV PiggyBack: 1600 mL    lactated ringers.: 1500 mL    Packed Red Blood Cells: 250 mL    phenylephrine   Infusion: 67.8 mL  Total IN: 3417.8 mL    OUT:    Indwelling Catheter - Urethral: 1225 mL  Total OUT: 1225 mL    Total NET: 2192.8 mL    137  |  102  |  13  ----------------------------<  128<H>  4.3   |  24  |  0.62    Ca    8.6      10 Feb 2018 06:08  TPro  7.5  /  Alb  3.7  /  TBili  1.3<H>  /  DBili  x   /  AST  57<H>  /  ALT  28  /  AlkPhos  83  02-08    [x] Hernandez catheter, indication: urine output monitoring in the critically ill  Meds:  ·	lactated ringers infuse at 75 mL/hr  ·	multivitamin 1 Tablet(s) Oral daily    HEMATOLOGIC  Meds: none  [x] VTE Prophylaxis                        10.8   9.7   )-----------( 57       ( 10 Feb 2018 10:50 )             29.8     INFECTIOUS DISEASES  T(C): 36.7 (02-10-18 @ 12:45), Max: 37.1 (02-10-18 @ 02:45)  WBC Count: 9.7 K/uL (02-10 @ 10:50)  WBC Count: 7.8 K/uL (02-10 @ 06:08)  WBC Count: 8.2 K/uL (02-10 @ 01:28)  WBC Count: 11.0 K/uL (02-09 @ 21:16)  WBC Count: 12.2 K/uL (02-09 @ 18:22)  WBC Count: 14.8 K/uL (02-09 @ 16:17)    Recent Cultures: none  Meds: none    ENDOCRINE  Capillary Blood Glucose: none  Meds: none    ACCESS DEVICES:  [x] Peripheral IV  [ ] Central Venous Line	[ ] R	[ ] L	[ ] IJ	[ ] Fem	[ ] SC	Placed:   [x] Arterial Line		[ ] R	[ ] L	[ ] Fem	[ ] Rad	[ ] Ax	Placed:   [ ] PICC:					[ ] Mediport  [x] Urinary Catheter, Date Placed:   [x] Necessity of urinary, arterial, and venous catheters discussed    OTHER MEDICATIONS:  brimonidine 0.2% Ophthalmic Solution 1 Drop(s) Both EYES two times a day  timolol 0.5% Solution 1 Drop(s) Both EYES two times a day    CODE STATUS: Full code.    IMAGING:

## 2018-02-10 NOTE — PHYSICAL THERAPY INITIAL EVALUATION ADULT - PERTINENT HX OF CURRENT PROBLEM, REHAB EVAL
89F hx of dementia, afib (Xarelto), COPD, CVA, GERD, GIB, glaucoma, HLD, PUD, s/p mech fall with L hip pain. She states she was getting up from wheelchair to get her walker and tripped. falling onto her left side. 89F hx of dementia, afib (Xarelto), COPD, CVA, GERD, GIB, glaucoma, HLD, PUD, s/p mech fall with L hip pain. She states she was getting up from wheelchair to get her walker and tripped. falling onto her left side. X-ray L femur (2/8): mildly displaced L femoral neck fx. CXR (2/8): partially visualized deformity R proximal humerus likely chronic

## 2018-02-10 NOTE — PHYSICAL THERAPY INITIAL EVALUATION ADULT - ADDITIONAL COMMENTS
Social hx obtained from  documentation: Patients lives at [facility] in the dementia unit. PTA, ambulating with device and assist.

## 2018-02-10 NOTE — PROGRESS NOTE ADULT - ASSESSMENT
ASSESSMENT:  89y Female s/p left hip hemiarthroplasty    PLAN:   Neurologic: Postoperative pain.  - Pain control as needed    Respiratory: No acute issues     Cardiovascular: BP requiring minimal doses of miroslava.   - Maintain MAP > 65  - Wean miroslava  - Given  1L NS, will give additional 250 5% albumin     Gastrointestinal/Nutrition: No acute issues.   - Will keep NPO for now. May restart diet once cleared for floors     Renal/Genitourinary: No acute issues. Making appropriate urine.     Hematologic: Appeared to have responded to 1 unit of pRBCs. Low suspicion for hemorrhagic shock.  - q4hr CBC  - Transfuse as needed  - Holding chemical VTE prophylaxis    Infectious Disease: No acute issues    Lines/Tubes: PIV, arterial line    Endocrine: No acute issues

## 2018-02-10 NOTE — PHYSICAL THERAPY INITIAL EVALUATION ADULT - ACTIVE RANGE OF MOTION EXAMINATION, REHAB EVAL
bilateral upper extremity Active ROM was WFL (within functional limits)/Right LE Active ROM was WFL (within functional limits)/flexes left knee in supine to approx 30 deg

## 2018-02-10 NOTE — PROGRESS NOTE ADULT - SUBJECTIVE AND OBJECTIVE BOX
Patient is a 89y old  Female s/p orif of hip hx of dementia. admitted to the SICU due to hypotension and rapid afib        MEDICATIONS  (STANDING):  atorvastatin 40 milliGRAM(s) Oral at bedtime  brimonidine 0.2% Ophthalmic Solution 1 Drop(s) Both EYES two times a day  docusate sodium 100 milliGRAM(s) Oral three times a day  enoxaparin Injectable 40 milliGRAM(s) SubCutaneous daily  lactated ringers. 1000 milliLiter(s) (30 mL/Hr) IV Continuous <Continuous>  metoprolol    tartrate Injectable 2.5 milliGRAM(s) IV Push every 4 hours  multivitamin 1 Tablet(s) Oral daily  pantoprazole    Tablet 40 milliGRAM(s) Oral daily  phenylephrine    Infusion 0.02 MICROgram(s)/kG/Min (0.357 mL/Hr) IV Continuous <Continuous>  polyethylene glycol 3350 17 Gram(s) Oral daily  senna 2 Tablet(s) Oral at bedtime  timolol 0.5% Solution 1 Drop(s) Both EYES two times a day    MEDICATIONS  (PRN):  acetaminophen   Tablet. 975 milliGRAM(s) Oral every 6 hours PRN Mild Pain (1 - 3)  HYDROmorphone  Injectable 0.25 milliGRAM(s) IV Push every 3 hours PRN severe breakthrough pain  ondansetron Injectable 4 milliGRAM(s) IV Push every 4 hours PRN Nausea and/or Vomiting  oxyCODONE    IR 5 milliGRAM(s) Oral every 4 hours PRN Moderate Pain (4 - 6)  oxyCODONE    IR 10 milliGRAM(s) Oral every 4 hours PRN Severe Pain (7 - 10)          VITALS:   T(C): 37.1 (02-10-18 @ 23:00), Max: 37.3 (02-10-18 @ 17:00)  HR: 69 (02-11-18 @ 01:00) (69 - 132)  BP: 103/55 (02-10-18 @ 19:00) (86/42 - 132/62)  RR: 16 (02-11-18 @ 01:00) (14 - 24)  SpO2: 100% (02-11-18 @ 01:00) (85% - 100%)  Wt(kg): --    PHYSICAL EXAM:  GENERAL: NAD, well nourished and conversant  HEAD:  Atraumatic  EYES: EOM, PERRLA, conjunctiva pink and sclera white  ENT: No tonsillar erythema, exudates, or enlargement, moist mucous membranes, good dentition, no lesions  NECK: Supple, No JVD, normal thyroid, carotids with normal upstrokes and no bruits  CHEST/LUNG: Clear to auscultation bilaterally, No rales, rhonchi, wheezing, or rubs  HEART: Regular rate and rhythm, No murmurs, rubs, or gallops  ABDOMEN: Soft, nondistended, no masses, guarding, tenderness or rebound, bowel sounds present  EXTREMITIES:  2+ Peripheral Pulses, No clubbing, cyanosis, or edema.  (+) Bipolar hemiarthroplasty  LYMPH: No lymphadenopathy noted  SKIN: No rashes or lesions    LABS:  ABG - ( 10 Feb 2018 10:35 )  pH: 7.42  /  pCO2: 40    /  pO2: 112   / HCO3: 26    / Base Excess: 1.4   /  SaO2: 99                    CBC Full  -  ( 10 Feb 2018 18:01 )  WBC Count : 10.5 K/uL  Hemoglobin : 10.4 g/dL  Hematocrit : 29.0 %  Platelet Count - Automated : 64 K/uL  Mean Cell Volume : 95.9 fl  Mean Cell Hemoglobin : 34.5 pg  Mean Cell Hemoglobin Concentration : 36.0 gm/dL  Auto Neutrophil # : x  Auto Lymphocyte # : x  Auto Monocyte # : x  Auto Eosinophil # : x  Auto Basophil # : x  Auto Neutrophil % : x  Auto Lymphocyte % : x  Auto Monocyte % : x  Auto Eosinophil % : x  Auto Basophil % : x    02-10    137  |  102  |  13  ----------------------------<  128<H>  4.3   |  24  |  0.62    Ca    8.6      10 Feb 2018 06:08            CAPILLARY BLOOD GLUCOSE          RADIOLOGY & ADDITIONAL TESTS: Patient is a 89y old  Female s/p orif of hip hx of dementia. admitted to the SICU due to hypotension and rapid afib        MEDICATIONS  (STANDING):  atorvastatin 40 milliGRAM(s) Oral at bedtime  brimonidine 0.2% Ophthalmic Solution 1 Drop(s) Both EYES two times a day  docusate sodium 100 milliGRAM(s) Oral three times a day  enoxaparin Injectable 40 milliGRAM(s) SubCutaneous daily  lactated ringers. 1000 milliLiter(s) (30 mL/Hr) IV Continuous <Continuous>  metoprolol    tartrate Injectable 2.5 milliGRAM(s) IV Push every 4 hours  multivitamin 1 Tablet(s) Oral daily  pantoprazole    Tablet 40 milliGRAM(s) Oral daily  phenylephrine    Infusion 0.02 MICROgram(s)/kG/Min (0.357 mL/Hr) IV Continuous <Continuous>  polyethylene glycol 3350 17 Gram(s) Oral daily  senna 2 Tablet(s) Oral at bedtime  timolol 0.5% Solution 1 Drop(s) Both EYES two times a day    MEDICATIONS  (PRN):  acetaminophen   Tablet. 975 milliGRAM(s) Oral every 6 hours PRN Mild Pain (1 - 3)  HYDROmorphone  Injectable 0.25 milliGRAM(s) IV Push every 3 hours PRN severe breakthrough pain  ondansetron Injectable 4 milliGRAM(s) IV Push every 4 hours PRN Nausea and/or Vomiting  oxyCODONE    IR 5 milliGRAM(s) Oral every 4 hours PRN Moderate Pain (4 - 6)  oxyCODONE    IR 10 milliGRAM(s) Oral every 4 hours PRN Severe Pain (7 - 10)          VITALS:   T(C): 37.1 (02-10-18 @ 23:00), Max: 37.3 (02-10-18 @ 17:00)  HR: 69 (02-11-18 @ 01:00) (69 - 132)  BP: 103/55 (02-10-18 @ 19:00) (86/42 - 132/62)  RR: 16 (02-11-18 @ 01:00) (14 - 24)  SpO2: 100% (02-11-18 @ 01:00) (85% - 100%)  Wt(kg): --    PHYSICAL EXAM:  GENERAL: NAD, well nourished  HEAD:  Atraumatic  EYES: EOM, PERRLA, conjunctiva pink and sclera white  ENT: No tonsillar erythema, exudates, or enlargement, moist mucous membranes, good dentition, no lesions  NECK: Supple, No JVD, normal thyroid, carotids with normal upstrokes and no bruits  CHEST/LUNG: Clear to auscultation bilaterally, No rales, rhonchi, wheezing, or rubs  HEART: Regular rate and rhythm, No murmurs, rubs, or gallops  ABDOMEN: Soft, nondistended, no masses, guarding, tenderness or rebound, bowel sounds present  EXTREMITIES:  2+ Peripheral Pulses, No clubbing, cyanosis, or edema.  (+) Bipolar hemiarthroplasty  LYMPH: No lymphadenopathy noted  SKIN: No rashes or lesions    LABS:  ABG - ( 10 Feb 2018 10:35 )  pH: 7.42  /  pCO2: 40    /  pO2: 112   / HCO3: 26    / Base Excess: 1.4   /  SaO2: 99                    CBC Full  -  ( 10 Feb 2018 18:01 )  WBC Count : 10.5 K/uL  Hemoglobin : 10.4 g/dL  Hematocrit : 29.0 %  Platelet Count - Automated : 64 K/uL  Mean Cell Volume : 95.9 fl  Mean Cell Hemoglobin : 34.5 pg  Mean Cell Hemoglobin Concentration : 36.0 gm/dL  Auto Neutrophil # : x  Auto Lymphocyte # : x  Auto Monocyte # : x  Auto Eosinophil # : x  Auto Basophil # : x  Auto Neutrophil % : x  Auto Lymphocyte % : x  Auto Monocyte % : x  Auto Eosinophil % : x  Auto Basophil % : x    02-10    137  |  102  |  13  ----------------------------<  128<H>  4.3   |  24  |  0.62    Ca    8.6      10 Feb 2018 06:08            CAPILLARY BLOOD GLUCOSE          RADIOLOGY & ADDITIONAL TESTS:

## 2018-02-11 LAB
ANION GAP SERPL CALC-SCNC: 10 MMOL/L — SIGNIFICANT CHANGE UP (ref 5–17)
ANION GAP SERPL CALC-SCNC: 13 MMOL/L — SIGNIFICANT CHANGE UP (ref 5–17)
APTT BLD: 26 SEC — LOW (ref 27.5–37.4)
BUN SERPL-MCNC: 13 MG/DL — SIGNIFICANT CHANGE UP (ref 7–23)
BUN SERPL-MCNC: 16 MG/DL — SIGNIFICANT CHANGE UP (ref 7–23)
CA-I BLD-SCNC: 1.08 MMOL/L — LOW (ref 1.12–1.3)
CALCIUM SERPL-MCNC: 7.4 MG/DL — LOW (ref 8.4–10.5)
CALCIUM SERPL-MCNC: 7.7 MG/DL — LOW (ref 8.4–10.5)
CHLORIDE SERPL-SCNC: 106 MMOL/L — SIGNIFICANT CHANGE UP (ref 96–108)
CHLORIDE SERPL-SCNC: 107 MMOL/L — SIGNIFICANT CHANGE UP (ref 96–108)
CK MB BLD-MCNC: 0.9 % — SIGNIFICANT CHANGE UP (ref 0–3.5)
CK MB BLD-MCNC: 1.2 % — SIGNIFICANT CHANGE UP (ref 0–3.5)
CK MB CFR SERPL CALC: 1.2 NG/ML — SIGNIFICANT CHANGE UP (ref 0–3.8)
CK MB CFR SERPL CALC: 1.3 NG/ML — SIGNIFICANT CHANGE UP (ref 0–3.8)
CK SERPL-CCNC: 109 U/L — SIGNIFICANT CHANGE UP (ref 25–170)
CK SERPL-CCNC: 136 U/L — SIGNIFICANT CHANGE UP (ref 25–170)
CO2 SERPL-SCNC: 22 MMOL/L — SIGNIFICANT CHANGE UP (ref 22–31)
CO2 SERPL-SCNC: 24 MMOL/L — SIGNIFICANT CHANGE UP (ref 22–31)
CREAT SERPL-MCNC: 0.63 MG/DL — SIGNIFICANT CHANGE UP (ref 0.5–1.3)
CREAT SERPL-MCNC: 0.67 MG/DL — SIGNIFICANT CHANGE UP (ref 0.5–1.3)
GAS PNL BLDA: SIGNIFICANT CHANGE UP
GAS PNL BLDA: SIGNIFICANT CHANGE UP
GLUCOSE SERPL-MCNC: 100 MG/DL — HIGH (ref 70–99)
GLUCOSE SERPL-MCNC: 96 MG/DL — SIGNIFICANT CHANGE UP (ref 70–99)
HCT VFR BLD CALC: 28 % — LOW (ref 34.5–45)
HGB BLD-MCNC: 9.8 G/DL — LOW (ref 11.5–15.5)
INR BLD: 1.11 RATIO — SIGNIFICANT CHANGE UP (ref 0.88–1.16)
MAGNESIUM SERPL-MCNC: 2.1 MG/DL — SIGNIFICANT CHANGE UP (ref 1.6–2.6)
MAGNESIUM SERPL-MCNC: 2.2 MG/DL — SIGNIFICANT CHANGE UP (ref 1.6–2.6)
MCHC RBC-ENTMCNC: 33.8 PG — SIGNIFICANT CHANGE UP (ref 27–34)
MCHC RBC-ENTMCNC: 35.1 GM/DL — SIGNIFICANT CHANGE UP (ref 32–36)
MCV RBC AUTO: 96 FL — SIGNIFICANT CHANGE UP (ref 80–100)
PHOSPHATE SERPL-MCNC: 1.8 MG/DL — LOW (ref 2.5–4.5)
PHOSPHATE SERPL-MCNC: 3.7 MG/DL — SIGNIFICANT CHANGE UP (ref 2.5–4.5)
PLATELET # BLD AUTO: 75 K/UL — LOW (ref 150–400)
POTASSIUM SERPL-MCNC: 3.8 MMOL/L — SIGNIFICANT CHANGE UP (ref 3.5–5.3)
POTASSIUM SERPL-MCNC: 4.1 MMOL/L — SIGNIFICANT CHANGE UP (ref 3.5–5.3)
POTASSIUM SERPL-SCNC: 3.8 MMOL/L — SIGNIFICANT CHANGE UP (ref 3.5–5.3)
POTASSIUM SERPL-SCNC: 4.1 MMOL/L — SIGNIFICANT CHANGE UP (ref 3.5–5.3)
PROTHROM AB SERPL-ACNC: 12.1 SEC — SIGNIFICANT CHANGE UP (ref 9.8–12.7)
RBC # BLD: 2.92 M/UL — LOW (ref 3.8–5.2)
RBC # FLD: 14.8 % — HIGH (ref 10.3–14.5)
SODIUM SERPL-SCNC: 141 MMOL/L — SIGNIFICANT CHANGE UP (ref 135–145)
SODIUM SERPL-SCNC: 141 MMOL/L — SIGNIFICANT CHANGE UP (ref 135–145)
TROPONIN T SERPL-MCNC: 0.02 NG/ML — SIGNIFICANT CHANGE UP (ref 0–0.06)
TROPONIN T SERPL-MCNC: <0.01 NG/ML — SIGNIFICANT CHANGE UP (ref 0–0.06)
WBC # BLD: 10 K/UL — SIGNIFICANT CHANGE UP (ref 3.8–10.5)
WBC # FLD AUTO: 10 K/UL — SIGNIFICANT CHANGE UP (ref 3.8–10.5)

## 2018-02-11 PROCEDURE — 71045 X-RAY EXAM CHEST 1 VIEW: CPT | Mod: 26

## 2018-02-11 PROCEDURE — 93306 TTE W/DOPPLER COMPLETE: CPT | Mod: 26

## 2018-02-11 PROCEDURE — 93010 ELECTROCARDIOGRAM REPORT: CPT

## 2018-02-11 PROCEDURE — 70450 CT HEAD/BRAIN W/O DYE: CPT | Mod: 26

## 2018-02-11 PROCEDURE — 99291 CRITICAL CARE FIRST HOUR: CPT

## 2018-02-11 RX ORDER — ACETAMINOPHEN 500 MG
750 TABLET ORAL ONCE
Qty: 0 | Refills: 0 | Status: COMPLETED | OUTPATIENT
Start: 2018-02-11 | End: 2018-02-11

## 2018-02-11 RX ORDER — POTASSIUM PHOSPHATE, MONOBASIC POTASSIUM PHOSPHATE, DIBASIC 236; 224 MG/ML; MG/ML
15 INJECTION, SOLUTION INTRAVENOUS ONCE
Qty: 0 | Refills: 0 | Status: COMPLETED | OUTPATIENT
Start: 2018-02-11 | End: 2018-02-11

## 2018-02-11 RX ORDER — DOPAMINE HYDROCHLORIDE 40 MG/ML
5 INJECTION, SOLUTION, CONCENTRATE INTRAVENOUS
Qty: 400 | Refills: 0 | Status: DISCONTINUED | OUTPATIENT
Start: 2018-02-11 | End: 2018-02-12

## 2018-02-11 RX ORDER — CALCIUM GLUCONATE 100 MG/ML
2 VIAL (ML) INTRAVENOUS ONCE
Qty: 0 | Refills: 0 | Status: COMPLETED | OUTPATIENT
Start: 2018-02-11 | End: 2018-02-11

## 2018-02-11 RX ORDER — IPRATROPIUM/ALBUTEROL SULFATE 18-103MCG
3 AEROSOL WITH ADAPTER (GRAM) INHALATION EVERY 6 HOURS
Qty: 0 | Refills: 0 | Status: DISCONTINUED | OUTPATIENT
Start: 2018-02-11 | End: 2018-02-22

## 2018-02-11 RX ORDER — MAGNESIUM SULFATE 500 MG/ML
2 VIAL (ML) INJECTION ONCE
Qty: 0 | Refills: 0 | Status: COMPLETED | OUTPATIENT
Start: 2018-02-11 | End: 2018-02-11

## 2018-02-11 RX ORDER — FUROSEMIDE 40 MG
10 TABLET ORAL ONCE
Qty: 0 | Refills: 0 | Status: COMPLETED | OUTPATIENT
Start: 2018-02-11 | End: 2018-02-11

## 2018-02-11 RX ADMIN — BRIMONIDINE TARTRATE 1 DROP(S): 2 SOLUTION/ DROPS OPHTHALMIC at 05:41

## 2018-02-11 RX ADMIN — Medication 300 MILLIGRAM(S): at 22:30

## 2018-02-11 RX ADMIN — PANTOPRAZOLE SODIUM 40 MILLIGRAM(S): 20 TABLET, DELAYED RELEASE ORAL at 13:25

## 2018-02-11 RX ADMIN — Medication 1 DROP(S): at 05:41

## 2018-02-11 RX ADMIN — Medication 50 GRAM(S): at 17:56

## 2018-02-11 RX ADMIN — DOPAMINE HYDROCHLORIDE 0.89 MICROGRAM(S)/KG/MIN: 40 INJECTION, SOLUTION, CONCENTRATE INTRAVENOUS at 16:52

## 2018-02-11 RX ADMIN — Medication 2.5 MILLIGRAM(S): at 10:57

## 2018-02-11 RX ADMIN — Medication 2.5 MILLIGRAM(S): at 05:41

## 2018-02-11 RX ADMIN — Medication 100 MILLIGRAM(S): at 13:26

## 2018-02-11 RX ADMIN — BRIMONIDINE TARTRATE 1 DROP(S): 2 SOLUTION/ DROPS OPHTHALMIC at 13:26

## 2018-02-11 RX ADMIN — POLYETHYLENE GLYCOL 3350 17 GRAM(S): 17 POWDER, FOR SOLUTION ORAL at 13:25

## 2018-02-11 RX ADMIN — BRIMONIDINE TARTRATE 1 DROP(S): 2 SOLUTION/ DROPS OPHTHALMIC at 17:38

## 2018-02-11 RX ADMIN — Medication 750 MILLIGRAM(S): at 22:50

## 2018-02-11 RX ADMIN — POTASSIUM PHOSPHATE, MONOBASIC POTASSIUM PHOSPHATE, DIBASIC 62.5 MILLIMOLE(S): 236; 224 INJECTION, SOLUTION INTRAVENOUS at 10:42

## 2018-02-11 RX ADMIN — Medication 1 TABLET(S): at 13:25

## 2018-02-11 RX ADMIN — POTASSIUM PHOSPHATE, MONOBASIC POTASSIUM PHOSPHATE, DIBASIC 62.5 MILLIMOLE(S): 236; 224 INJECTION, SOLUTION INTRAVENOUS at 05:41

## 2018-02-11 RX ADMIN — Medication 1 DROP(S): at 17:38

## 2018-02-11 RX ADMIN — Medication 400 GRAM(S): at 22:30

## 2018-02-11 RX ADMIN — Medication 10 MILLIGRAM(S): at 10:42

## 2018-02-11 RX ADMIN — ENOXAPARIN SODIUM 40 MILLIGRAM(S): 100 INJECTION SUBCUTANEOUS at 13:25

## 2018-02-11 NOTE — PROGRESS NOTE ADULT - SUBJECTIVE AND OBJECTIVE BOX
Pt S/E at bedside, no acute events overnight, pain controlled, patient remains on pressors                          9.8    10.0  )-----------( 75       ( 11 Feb 2018 03:35 )             28.0     11 Feb 2018 03:30    141    |  107    |  13     ----------------------------<  100    3.8     |  24     |  0.67     Ca    7.7        11 Feb 2018 03:30  Phos  1.8       11 Feb 2018 03:30  Mg     2.2       11 Feb 2018 03:30      PT/INR - ( 11 Feb 2018 03:23 )   PT: 12.1 sec;   INR: 1.11 ratio         PTT - ( 11 Feb 2018 03:23 )  PTT:26.0 sec  Vital Signs Last 24 Hrs  T(C): 37 (02-11-18 @ 03:00), Max: 37.3 (02-10-18 @ 17:00)  T(F): 98.6 (02-11-18 @ 03:00), Max: 99.2 (02-10-18 @ 17:00)  HR: 67 (02-11-18 @ 06:15) (65 - 132)  BP: 103/55 (02-10-18 @ 19:00) (86/42 - 132/62)  BP(mean): 76 (02-10-18 @ 19:00) (61 - 89)  RR: 17 (02-11-18 @ 06:15) (14 - 28)  SpO2: 94% (02-11-18 @ 06:15) (85% - 100%)    Gen: NAD,     Left Lower Extremity:  Dressing clean dry intact  +EHL/FHL/TA/GS  SILT L3-S1  +DP/PT Pulses  Compartments soft  No calf TTP B/L

## 2018-02-11 NOTE — PROGRESS NOTE ADULT - ATTENDING COMMENTS
Patient seen and examined and agree with above. Patient is POD #2 after left hip femoral neck fracture s/p bipolar hip hemiarthroplasty caleb.   Mental status remains poor as patient appears lethargic.   Patient with chronic atrial fibrillation- controlled with iv metoprolol.   Patient with continued hypotension and appears adequately resuscitated. Unclear etiology of hypotension as does not appear to be hypovolemic or cardiogenic in shock. Patient has been having bradycardic episodes and will stop phenyephrine and start dopamine.   Thrombocytopenia stable and will recheck; no active signs of bleeding  Hypophosphatemia repleted and will repeat.   CXR with bilateral pleural effusions- will give lasix 10 mg.     Patient needs continued monitoring.  CC time: 32 min

## 2018-02-11 NOTE — PROGRESS NOTE ADULT - ASSESSMENT
89 yo woman hx of fall with left hip bipolar hemiarthroplasty.  episode of tachycardia and hypotension post op

## 2018-02-11 NOTE — PROGRESS NOTE ADULT - SUBJECTIVE AND OBJECTIVE BOX
Patient is a 89y old  Female s/p orif of hip hx of dementia. admitted to the SICU due to hypotension and rapid afib    MEDICATIONS  (STANDING):  atorvastatin 40 milliGRAM(s) Oral at bedtime  brimonidine 0.2% Ophthalmic Solution 1 Drop(s) Both EYES two times a day  calcium gluconate IVPB 2 Gram(s) IV Intermittent once  docusate sodium 100 milliGRAM(s) Oral three times a day  DOPamine Infusion 0.5 MICROgram(s)/kG/Min (0.893 mL/Hr) IV Continuous <Continuous>  enoxaparin Injectable 40 milliGRAM(s) SubCutaneous daily  multivitamin 1 Tablet(s) Oral daily  pantoprazole    Tablet 40 milliGRAM(s) Oral daily  polyethylene glycol 3350 17 Gram(s) Oral daily  senna 2 Tablet(s) Oral at bedtime  timolol 0.5% Solution 1 Drop(s) Both EYES two times a day    MEDICATIONS  (PRN):  acetaminophen   Tablet. 975 milliGRAM(s) Oral every 6 hours PRN Mild Pain (1 - 3)  ALBUTerol/ipratropium for Nebulization 3 milliLiter(s) Nebulizer every 6 hours PRN Shortness of Breath and/or Wheezing  ondansetron Injectable 4 milliGRAM(s) IV Push every 4 hours PRN Nausea and/or Vomiting          VITALS:   T(C): 36.6 (02-11-18 @ 15:00), Max: 37.1 (02-10-18 @ 23:00)  HR: 78 (02-11-18 @ 20:00) (48 - 111)  BP: 114/57 (02-11-18 @ 15:30) (114/57 - 114/57)  RR: 20 (02-11-18 @ 20:00) (14 - 30)  SpO2: 95% (02-11-18 @ 20:00) (90% - 100%)  Wt(kg): --    PHYSICAL EXAM:  GENERAL: NAD, well nourished and conversant  HEAD:  Atraumatic  EYES: EOM, PERRLA, conjunctiva pink and sclera white  ENT: No tonsillar erythema, exudates, or enlargement, moist mucous membranes, good dentition, no lesions  NECK: Supple, No JVD, normal thyroid, carotids with normal upstrokes and no bruits  CHEST/LUNG: Clear to auscultation bilaterally, No rales, rhonchi, wheezing, or rubs  HEART: Regular rate and rhythm, No murmurs, rubs, or gallops  ABDOMEN: Soft, nondistended, no masses, guarding, tenderness or rebound, bowel sounds present  EXTREMITIES:  2+ Peripheral Pulses, No clubbing, cyanosis, or edema.  (+) Bipolar hemiarthroplasty  LYMPH: No lymphadenopathy noted  SKIN: No rashes or lesions    LABS:  ABG - ( 11 Feb 2018 20:37 )  pH: 7.42  /  pCO2: 36    /  pO2: 112   / HCO3: 23    / Base Excess: -.4   /  SaO2: 99                CARDIAC MARKERS ( 11 Feb 2018 16:27 )  x     / <0.01 ng/mL / 109 U/L / x     / 1.3 ng/mL  CARDIAC MARKERS ( 11 Feb 2018 03:30 )  x     / 0.02 ng/mL / 136 U/L / x     / 1.2 ng/mL      CBC Full  -  ( 11 Feb 2018 03:35 )  WBC Count : 10.0 K/uL  Hemoglobin : 9.8 g/dL  Hematocrit : 28.0 %  Platelet Count - Automated : 75 K/uL  Mean Cell Volume : 96.0 fl  Mean Cell Hemoglobin : 33.8 pg  Mean Cell Hemoglobin Concentration : 35.1 gm/dL  Auto Neutrophil # : x  Auto Lymphocyte # : x  Auto Monocyte # : x  Auto Eosinophil # : x  Auto Basophil # : x  Auto Neutrophil % : x  Auto Lymphocyte % : x  Auto Monocyte % : x  Auto Eosinophil % : x  Auto Basophil % : x    02-11    141  |  106  |  16  ----------------------------<  96  4.1   |  22  |  0.63    Ca    7.4<L>      11 Feb 2018 16:27  Phos  3.7     02-11  Mg     2.1     02-11        PT/INR - ( 11 Feb 2018 03:23 )   PT: 12.1 sec;   INR: 1.11 ratio         PTT - ( 11 Feb 2018 03:23 )  PTT:26.0 sec    CAPILLARY BLOOD GLUCOSE          RADIOLOGY & ADDITIONAL TESTS: Patient is a 89y old  Female s/p orif of hip hx of dementia. admitted to the SICU due to hypotension and rapid afib    MEDICATIONS  (STANDING):  atorvastatin 40 milliGRAM(s) Oral at bedtime  brimonidine 0.2% Ophthalmic Solution 1 Drop(s) Both EYES two times a day  calcium gluconate IVPB 2 Gram(s) IV Intermittent once  docusate sodium 100 milliGRAM(s) Oral three times a day  DOPamine Infusion 0.5 MICROgram(s)/kG/Min (0.893 mL/Hr) IV Continuous <Continuous>  enoxaparin Injectable 40 milliGRAM(s) SubCutaneous daily  multivitamin 1 Tablet(s) Oral daily  pantoprazole    Tablet 40 milliGRAM(s) Oral daily  polyethylene glycol 3350 17 Gram(s) Oral daily  senna 2 Tablet(s) Oral at bedtime  timolol 0.5% Solution 1 Drop(s) Both EYES two times a day    MEDICATIONS  (PRN):  acetaminophen   Tablet. 975 milliGRAM(s) Oral every 6 hours PRN Mild Pain (1 - 3)  ALBUTerol/ipratropium for Nebulization 3 milliLiter(s) Nebulizer every 6 hours PRN Shortness of Breath and/or Wheezing  ondansetron Injectable 4 milliGRAM(s) IV Push every 4 hours PRN Nausea and/or Vomiting          VITALS:   T(C): 36.6 (02-11-18 @ 15:00), Max: 37.1 (02-10-18 @ 23:00)  HR: 78 (02-11-18 @ 20:00) (48 - 111)  BP: 114/57 (02-11-18 @ 15:30) (114/57 - 114/57)  RR: 20 (02-11-18 @ 20:00) (14 - 30)  SpO2: 95% (02-11-18 @ 20:00) (90% - 100%)  Wt(kg): --    PHYSICAL EXAM:  GENERAL: NAD, well nourished   HEAD:  Atraumatic  EYES: EOM, PERRLA, conjunctiva pink and sclera white  ENT: No tonsillar erythema, exudates, or enlargement, moist mucous membranes, good dentition, no lesions  NECK: Supple, No JVD, normal thyroid, carotids with normal upstrokes and no bruits  CHEST/LUNG: Clear to auscultation bilaterally, No rales, rhonchi, wheezing, or rubs  HEART: Regular rate and rhythm, No murmurs, rubs, or gallops  ABDOMEN: Soft, nondistended, no masses, guarding, tenderness or rebound, bowel sounds present  EXTREMITIES:  2+ Peripheral Pulses, No clubbing, cyanosis, or edema.  (+) Bipolar hemiarthroplasty  LYMPH: No lymphadenopathy noted  SKIN: No rashes or lesions    LABS:  ABG - ( 11 Feb 2018 20:37 )  pH: 7.42  /  pCO2: 36    /  pO2: 112   / HCO3: 23    / Base Excess: -.4   /  SaO2: 99                CARDIAC MARKERS ( 11 Feb 2018 16:27 )  x     / <0.01 ng/mL / 109 U/L / x     / 1.3 ng/mL  CARDIAC MARKERS ( 11 Feb 2018 03:30 )  x     / 0.02 ng/mL / 136 U/L / x     / 1.2 ng/mL      CBC Full  -  ( 11 Feb 2018 03:35 )  WBC Count : 10.0 K/uL  Hemoglobin : 9.8 g/dL  Hematocrit : 28.0 %  Platelet Count - Automated : 75 K/uL  Mean Cell Volume : 96.0 fl  Mean Cell Hemoglobin : 33.8 pg  Mean Cell Hemoglobin Concentration : 35.1 gm/dL  Auto Neutrophil # : x  Auto Lymphocyte # : x  Auto Monocyte # : x  Auto Eosinophil # : x  Auto Basophil # : x  Auto Neutrophil % : x  Auto Lymphocyte % : x  Auto Monocyte % : x  Auto Eosinophil % : x  Auto Basophil % : x    02-11    141  |  106  |  16  ----------------------------<  96  4.1   |  22  |  0.63    Ca    7.4<L>      11 Feb 2018 16:27  Phos  3.7     02-11  Mg     2.1     02-11        PT/INR - ( 11 Feb 2018 03:23 )   PT: 12.1 sec;   INR: 1.11 ratio         PTT - ( 11 Feb 2018 03:23 )  PTT:26.0 sec    CAPILLARY BLOOD GLUCOSE          RADIOLOGY & ADDITIONAL TESTS:

## 2018-02-11 NOTE — PROGRESS NOTE ADULT - ASSESSMENT
A/P: 89F s/p L Hip Hemiarthroplasty POD 2  Pain Control  DVT ppx  WBAT  PT/OT  Incentive Spirometry  appreciate continued SICU management  DC planning

## 2018-02-11 NOTE — PROGRESS NOTE ADULT - ASSESSMENT
89y Female s/p left hip hemiarthroplasty    PLAN:   Neurologic: Postoperative pain.  - Pain control as needed  -demented at baseline     Respiratory: No acute issues     Cardiovascular: BP requiring minimal doses of miroslava.   - Maintain MAP > 65  - Wean miroslava  -continue with metoprolol pending HR     Gastrointestinal/Nutrition: No acute issues.   - mechanical soft diet      Renal/Genitourinary: No acute issues. Making appropriate urine.     Hematologic: Appeared to have responded to 1 unit of pRBCs. Low suspicion for hemorrhagic shock.  - Transfuse as needed  - started DVT prophylaxis  -consider restarting therapeutic a/c in am     Infectious Disease: No acute issues    Lines/Tubes: PIV, arterial line    Endocrine: No acute issues

## 2018-02-11 NOTE — PROGRESS NOTE ADULT - SUBJECTIVE AND OBJECTIVE BOX
HISTORY  89y female with dementia, afib (Xarelto), last ECHO 2/2017 EF 71%, COPD, CVA, GERD, GIB, glaucoma, HLD, PUD, s/p mech fall with L hip fracture, now s/p bipolar hemiarthroplasty. SICU consulted for hypotension requiring pressors in the PACU. Patient was given 500 mL plasmalyte boluses x 2 and 1 unit pRBCs before SICU evaluation. Her lactate was 3 after the resuscitation. Hematocrit before the transfusion was 27, and increased to 33, then 29. Patient was able to be weaned down to 0.1 of miroslava, but has required pressors to maintain her BP during her 10-hour stay in the PACU.    24 HOUR EVENTS: Patient had an episode of atrial fibrillation with RVR in the PACU yesterday am and was given 5 of cardizem. She subsequently required steady dose of phenylephrine that could not be weaned, and so was brought to the SICU. Home metoprolol 12.5 PO was started, but patient was still tachycardic, and so transitioned to 2.5 IV metoprolol q 4 hours.  Later in the night, she seemed to transition to sinus tachycardia. Required .7 of phenylephrine throughout the night.     SUBJECTIVE/ROS:  [ ] A ten-point review of systems was otherwise negative except as noted.  [x ] Due to altered mental status/intubation, subjective information were not able to be obtained from the patient. History was obtained, to the extent possible, from review of the chart and collateral sources of information.      NEURO  RASS:     GCS:     CAM ICU:  Exam: awake, opens eyes to voice  Meds: acetaminophen   Tablet. 975 milliGRAM(s) Oral every 6 hours PRN Mild Pain (1 - 3)  HYDROmorphone  Injectable 0.25 milliGRAM(s) IV Push every 3 hours PRN severe breakthrough pain  ondansetron Injectable 4 milliGRAM(s) IV Push every 4 hours PRN Nausea and/or Vomiting  oxyCODONE    IR 5 milliGRAM(s) Oral every 4 hours PRN Moderate Pain (4 - 6)  oxyCODONE    IR 10 milliGRAM(s) Oral every 4 hours PRN Severe Pain (7 - 10)    [x] Adequacy of sedation and pain control has been assessed and adjusted      RESPIRATORY  RR: 15 (02-11-18 @ 00:15) (14 - 24)  SpO2: 100% (02-11-18 @ 00:15) (85% - 100%)  Wt(kg): --  Exam: unlabored, clear to auscultation bilaterally  Mechanical Ventilation: no  ABG - ( 10 Feb 2018 10:35 )  pH: 7.42  /  pCO2: 40    /  pO2: 112   / HCO3: 26    / Base Excess: 1.4   /  SaO2: 99      Lactate: x              CARDIOVASCULAR  HR: 71 (02-11-18 @ 00:15) (71 - 132)  BP: 103/55 (02-10-18 @ 19:00) (86/42 - 132/62)  BP(mean): 76 (02-10-18 @ 19:00) (61 - 89)  ABP: 97/46 (02-11-18 @ 00:15) (73/35 - 143/53)  ABP(mean): 66 (02-11-18 @ 00:15) (48 - 91)  Wt(kg): --  CVP(cm H2O): --      Exam:  Cardiac Rhythm:  Perfusion     [x ]Adequate   [ ]Inadequate  Mentation   [ ]Normal       [x ]Reduced  Extremities  [x ]Warm         [ ]Cool  Volume Status [ ]Hypervolemic [x ]Euvolemic [ ]Hypovolemic  Meds: metoprolol    tartrate Injectable 2.5 milliGRAM(s) IV Push every 4 hours  phenylephrine    Infusion 0.02 MICROgram(s)/kG/Min IV Continuous <Continuous>        GI/NUTRITION  Exam: soft, NT, ND  Diet: mechanical soft  Meds: docusate sodium 100 milliGRAM(s) Oral three times a day  pantoprazole    Tablet 40 milliGRAM(s) Oral daily  polyethylene glycol 3350 17 Gram(s) Oral daily  senna 2 Tablet(s) Oral at bedtime      GENITOURINARY  I&O's Detail    02-09 @ 07:01  -  02-10 @ 07:00  --------------------------------------------------------  IN:    IV PiggyBack: 1600 mL    lactated ringers.: 1500 mL    Packed Red Blood Cells: 250 mL    phenylephrine   Infusion: 67.8 mL  Total IN: 3417.8 mL    OUT:    Indwelling Catheter - Urethral: 1225 mL  Total OUT: 1225 mL    Total NET: 2192.8 mL      02-10 @ 07:01  -  02-11 @ 00:27  --------------------------------------------------------  IN:    lactated ringers.: 1185 mL    phenylephrine   Infusion: 135.6 mL    Sodium Chloride 0.9% IV Bolus: 1000 mL  Total IN: 2320.6 mL    OUT:    Indwelling Catheter - Urethral: 1565 mL  Total OUT: 1565 mL    Total NET: 755.6 mL          02-10    137  |  102  |  13  ----------------------------<  128<H>  4.3   |  24  |  0.62    Ca    8.6      10 Feb 2018 06:08      [ ] Hernandez catheter, indication: N/A  Meds: lactated ringers. 1000 milliLiter(s) IV Continuous <Continuous>  multivitamin 1 Tablet(s) Oral daily        HEMATOLOGIC  Meds: enoxaparin Injectable 40 milliGRAM(s) SubCutaneous daily    [x] VTE Prophylaxis                        10.4   10.5  )-----------( 64       ( 10 Feb 2018 18:01 )             29.0       Transfusion     [ ] PRBC   [ ] Platelets   [ ] FFP   [ ] Cryoprecipitate      INFECTIOUS DISEASES  T(C): 37.1 (02-10-18 @ 23:00), Max: 37.3 (02-10-18 @ 17:00)  Wt(kg): --  WBC Count: 10.5 K/uL (02-10 @ 18:01)  WBC Count: 9.7 K/uL (02-10 @ 10:50)  WBC Count: 7.8 K/uL (02-10 @ 06:08)  WBC Count: 8.2 K/uL (02-10 @ 01:28)    Recent Cultures:    Meds:       ENDOCRINE  Capillary Blood Glucose    Meds: atorvastatin 40 milliGRAM(s) Oral at bedtime        ACCESS DEVICES:  [x ] Peripheral IV  [ ] Central Venous Line	[ ] R	[ ] L	[ ] IJ	[ ] Fem	[ ] SC	Placed:   [x ] Arterial Line		[ ] R	[ ] L	[ ] Fem	[ ] Rad	[ ] Ax	Placed:   [ ] PICC:					[ ] Mediport  [x ] Urinary Catheter, Date Placed:   [ ] Necessity of urinary, arterial, and venous catheters discussed    OTHER MEDICATIONS:  brimonidine 0.2% Ophthalmic Solution 1 Drop(s) Both EYES two times a day  timolol 0.5% Solution 1 Drop(s) Both EYES two times a day      CODE STATUS:     IMAGING:

## 2018-02-12 LAB
ALBUMIN SERPL ELPH-MCNC: 2.4 G/DL — LOW (ref 3.3–5)
ALP SERPL-CCNC: 60 U/L — SIGNIFICANT CHANGE UP (ref 40–120)
ALT FLD-CCNC: 16 U/L RC — SIGNIFICANT CHANGE UP (ref 10–45)
ANION GAP SERPL CALC-SCNC: 12 MMOL/L — SIGNIFICANT CHANGE UP (ref 5–17)
AST SERPL-CCNC: 18 U/L — SIGNIFICANT CHANGE UP (ref 10–40)
BILIRUB DIRECT SERPL-MCNC: 0.4 MG/DL — HIGH (ref 0–0.2)
BILIRUB INDIRECT FLD-MCNC: 2.6 MG/DL — HIGH (ref 0.2–1)
BILIRUB SERPL-MCNC: 3 MG/DL — HIGH (ref 0.2–1.2)
BUN SERPL-MCNC: 18 MG/DL — SIGNIFICANT CHANGE UP (ref 7–23)
CALCIUM SERPL-MCNC: 7.4 MG/DL — LOW (ref 8.4–10.5)
CHLORIDE SERPL-SCNC: 105 MMOL/L — SIGNIFICANT CHANGE UP (ref 96–108)
CO2 SERPL-SCNC: 23 MMOL/L — SIGNIFICANT CHANGE UP (ref 22–31)
CREAT SERPL-MCNC: 0.58 MG/DL — SIGNIFICANT CHANGE UP (ref 0.5–1.3)
GLUCOSE SERPL-MCNC: 103 MG/DL — HIGH (ref 70–99)
HCT VFR BLD CALC: 25.3 % — LOW (ref 34.5–45)
HGB BLD-MCNC: 9.1 G/DL — LOW (ref 11.5–15.5)
MAGNESIUM SERPL-MCNC: 2.4 MG/DL — SIGNIFICANT CHANGE UP (ref 1.6–2.6)
MCHC RBC-ENTMCNC: 34.2 PG — HIGH (ref 27–34)
MCHC RBC-ENTMCNC: 35.8 GM/DL — SIGNIFICANT CHANGE UP (ref 32–36)
MCV RBC AUTO: 95.4 FL — SIGNIFICANT CHANGE UP (ref 80–100)
NT-PROBNP SERPL-SCNC: 4308 PG/ML — HIGH (ref 0–300)
PHOSPHATE SERPL-MCNC: 2.9 MG/DL — SIGNIFICANT CHANGE UP (ref 2.5–4.5)
PLATELET # BLD AUTO: 65 K/UL — LOW (ref 150–400)
POTASSIUM SERPL-MCNC: 3.8 MMOL/L — SIGNIFICANT CHANGE UP (ref 3.5–5.3)
POTASSIUM SERPL-SCNC: 3.8 MMOL/L — SIGNIFICANT CHANGE UP (ref 3.5–5.3)
PROT SERPL-MCNC: 4.7 G/DL — LOW (ref 6–8.3)
RBC # BLD: 2.65 M/UL — LOW (ref 3.8–5.2)
RBC # FLD: 14.3 % — SIGNIFICANT CHANGE UP (ref 10.3–14.5)
SODIUM SERPL-SCNC: 140 MMOL/L — SIGNIFICANT CHANGE UP (ref 135–145)
WBC # BLD: 7 K/UL — SIGNIFICANT CHANGE UP (ref 3.8–10.5)
WBC # FLD AUTO: 7 K/UL — SIGNIFICANT CHANGE UP (ref 3.8–10.5)

## 2018-02-12 PROCEDURE — 93010 ELECTROCARDIOGRAM REPORT: CPT

## 2018-02-12 PROCEDURE — 99291 CRITICAL CARE FIRST HOUR: CPT

## 2018-02-12 PROCEDURE — 71045 X-RAY EXAM CHEST 1 VIEW: CPT | Mod: 26

## 2018-02-12 RX ORDER — AMIODARONE HYDROCHLORIDE 400 MG/1
1 TABLET ORAL
Qty: 900 | Refills: 0 | Status: DISCONTINUED | OUTPATIENT
Start: 2018-02-12 | End: 2018-02-13

## 2018-02-12 RX ORDER — AMIODARONE HYDROCHLORIDE 400 MG/1
0.5 TABLET ORAL
Qty: 900 | Refills: 0 | Status: DISCONTINUED | OUTPATIENT
Start: 2018-02-13 | End: 2018-02-13

## 2018-02-12 RX ORDER — FUROSEMIDE 40 MG
10 TABLET ORAL ONCE
Qty: 0 | Refills: 0 | Status: COMPLETED | OUTPATIENT
Start: 2018-02-12 | End: 2018-02-12

## 2018-02-12 RX ORDER — POTASSIUM CHLORIDE 20 MEQ
20 PACKET (EA) ORAL ONCE
Qty: 0 | Refills: 0 | Status: COMPLETED | OUTPATIENT
Start: 2018-02-12 | End: 2018-02-12

## 2018-02-12 RX ORDER — AMIODARONE HYDROCHLORIDE 400 MG/1
150 TABLET ORAL ONCE
Qty: 0 | Refills: 0 | Status: COMPLETED | OUTPATIENT
Start: 2018-02-12 | End: 2018-02-12

## 2018-02-12 RX ORDER — RIVAROXABAN 15 MG-20MG
20 KIT ORAL EVERY 24 HOURS
Qty: 0 | Refills: 0 | Status: DISCONTINUED | OUTPATIENT
Start: 2018-02-12 | End: 2018-02-12

## 2018-02-12 RX ORDER — POTASSIUM PHOSPHATE, MONOBASIC POTASSIUM PHOSPHATE, DIBASIC 236; 224 MG/ML; MG/ML
15 INJECTION, SOLUTION INTRAVENOUS ONCE
Qty: 0 | Refills: 0 | Status: COMPLETED | OUTPATIENT
Start: 2018-02-12 | End: 2018-02-12

## 2018-02-12 RX ORDER — ENOXAPARIN SODIUM 100 MG/ML
50 INJECTION SUBCUTANEOUS EVERY 12 HOURS
Qty: 0 | Refills: 0 | Status: DISCONTINUED | OUTPATIENT
Start: 2018-02-12 | End: 2018-02-14

## 2018-02-12 RX ORDER — PANTOPRAZOLE SODIUM 20 MG/1
40 TABLET, DELAYED RELEASE ORAL EVERY 24 HOURS
Qty: 0 | Refills: 0 | Status: DISCONTINUED | OUTPATIENT
Start: 2018-02-12 | End: 2018-02-13

## 2018-02-12 RX ORDER — PHENYLEPHRINE HYDROCHLORIDE 10 MG/ML
0.04 INJECTION INTRAVENOUS
Qty: 80 | Refills: 0 | Status: DISCONTINUED | OUTPATIENT
Start: 2018-02-12 | End: 2018-02-13

## 2018-02-12 RX ADMIN — ENOXAPARIN SODIUM 50 MILLIGRAM(S): 100 INJECTION SUBCUTANEOUS at 17:29

## 2018-02-12 RX ADMIN — Medication 10 MILLIGRAM(S): at 16:30

## 2018-02-12 RX ADMIN — PANTOPRAZOLE SODIUM 40 MILLIGRAM(S): 20 TABLET, DELAYED RELEASE ORAL at 13:23

## 2018-02-12 RX ADMIN — Medication 1 DROP(S): at 17:30

## 2018-02-12 RX ADMIN — AMIODARONE HYDROCHLORIDE 33.33 MG/MIN: 400 TABLET ORAL at 20:12

## 2018-02-12 RX ADMIN — BRIMONIDINE TARTRATE 1 DROP(S): 2 SOLUTION/ DROPS OPHTHALMIC at 07:26

## 2018-02-12 RX ADMIN — Medication 20 MILLIEQUIVALENT(S): at 13:25

## 2018-02-12 RX ADMIN — POTASSIUM PHOSPHATE, MONOBASIC POTASSIUM PHOSPHATE, DIBASIC 62.5 MILLIMOLE(S): 236; 224 INJECTION, SOLUTION INTRAVENOUS at 07:26

## 2018-02-12 RX ADMIN — Medication 1 DROP(S): at 07:27

## 2018-02-12 RX ADMIN — AMIODARONE HYDROCHLORIDE 600 MILLIGRAM(S): 400 TABLET ORAL at 19:36

## 2018-02-12 RX ADMIN — Medication 1 TABLET(S): at 13:23

## 2018-02-12 RX ADMIN — POLYETHYLENE GLYCOL 3350 17 GRAM(S): 17 POWDER, FOR SOLUTION ORAL at 13:23

## 2018-02-12 RX ADMIN — BRIMONIDINE TARTRATE 1 DROP(S): 2 SOLUTION/ DROPS OPHTHALMIC at 17:30

## 2018-02-12 RX ADMIN — Medication 100 MILLIGRAM(S): at 13:25

## 2018-02-12 NOTE — DIETITIAN INITIAL EVALUATION ADULT. - FACTORS AFF FOOD INTAKE
change in mental status/Pt observed to be asleep/unarousable with breakfast tray untouched. Nursing flow sheets note pt didn't feel well encough to eat. Fecal incontinence noted 2/10.

## 2018-02-12 NOTE — PROGRESS NOTE ADULT - SUBJECTIVE AND OBJECTIVE BOX
SICU Daily Progress Note  =====================================================  Interval/Overnight Events:  Continues to have lethargy and minimal response to painful stimulus. Labile heart rate and blood pressure with little improvement after beta blockage and phenylephrine. Patient responded well to dopamine. Received head CT for continuing somnolence despite no narcotic or sedation.    POD #      4    	SICU Day #    3    HPI:  female with dementia, afib (Xarelto), last ECHO 2017 EF 71%, COPD, CVA, GERD, GIB, glaucoma, HLD, PUD, s/p mech fall with L hip fracture, now s/p bipolar hemiarthroplasty. SICU consulted for hypotension requiring pressors in the PACU. Patient was given 500 mL plasmalyte boluses x 2 and 1 unit pRBCs before SICU evaluation. Her lactate was 3 after the resuscitation. Hematocrit before the transfusion was 27, and increased to 33, then 29. Patient was able to be weaned down to 0.1 of miroslava, but has required pressors to maintain her BP during her 10-hour stay in the PACU. She was transferred to SICU for continued hypotension.     PMH:     Allergies: aspirin (Unknown)  penicillin (Unknown)      MEDICATIONS:   --------------------------------------------------------------------------------------  Neurologic Medications  acetaminophen   Tablet. 975 milliGRAM(s) Oral every 6 hours PRN Mild Pain (1 - 3)  ondansetron Injectable 4 milliGRAM(s) IV Push every 4 hours PRN Nausea and/or Vomiting    Respiratory Medications  ALBUTerol/ipratropium for Nebulization 3 milliLiter(s) Nebulizer every 6 hours PRN Shortness of Breath and/or Wheezing    Cardiovascular Medications  DOPamine Infusion 5 MICROgram(s)/kG/Min IV Continuous <Continuous>    Gastrointestinal Medications  docusate sodium 100 milliGRAM(s) Oral three times a day  multivitamin 1 Tablet(s) Oral daily  pantoprazole    Tablet 40 milliGRAM(s) Oral daily  polyethylene glycol 3350 17 Gram(s) Oral daily  potassium chloride   Solution 20 milliEquivalent(s) Oral once  potassium phosphate IVPB 15 milliMole(s) IV Intermittent once  senna 2 Tablet(s) Oral at bedtime    Genitourinary Medications    Hematologic/Oncologic Medications  enoxaparin Injectable 40 milliGRAM(s) SubCutaneous daily    Antimicrobial/Immunologic Medications    Endocrine/Metabolic Medications  atorvastatin 40 milliGRAM(s) Oral at bedtime    Topical/Other Medications  brimonidine 0.2% Ophthalmic Solution 1 Drop(s) Both EYES two times a day  timolol 0.5% Solution 1 Drop(s) Both EYES two times a day    --------------------------------------------------------------------------------------    VITAL SIGNS, INS/OUTS (last 24 hours):  --------------------------------------------------------------------------------------  T(C): 36.6 (18 @ 15:00), Max: 37 (18 @ 11:00)  HR: 76 (18 @ 01:45) (48 - 86)  BP: 114/57 (18 @ 15:30) (114/57 - 114/57)  ABP: 109/51 (18 @ 01:45) (83/42 - 157/65)  ABP(mean): 73 (18 @ 01:45) (51 - 102)  RR: 12 (18 01:45) (6 - 30)  SpO2: 100% (18 @ 01:45) (90% - 100%)  Wt(kg): --  CVP(mm Hg): --      02-10 @ 07: @ 07:00  --------------------------------------------------------  IN:    IV PiggyBack: 125 mL    lactated ringers.: 1545 mL    phenylephrine   Infusion: 221.3 mL    Sodium Chloride 0.9% IV Bolus: 1000 mL  Total IN: 2891.3 mL    OUT:    Indwelling Catheter - Urethral: 1695 mL  Total OUT: 1695 mL    Total NET: 1196.3 mL       @ 07: @ 05:39  --------------------------------------------------------  IN:    DOPamine Infusion: 75 mL    DOPamine Infusion: 46.4 mL    IV PiggyBack: 425 mL    lactated ringers.: 150 mL    phenylephrine   Infusion: 98.4 mL  Total IN: 794.8 mL    OUT:    Indwelling Catheter - Urethral: 1570 mL  Total OUT: 1570 mL    Total NET: -775.2 mL        --------------------------------------------------------------------------------------    EXAM  NEUROLOGY  RASS: -2  	GCS:    Exam: requires significant painful stimuli for arousal. demonstrates no alertness or orientation     HEENT  Exam: Normocephalic, atraumatic, EOMI.      RESPIRATORY  Exam: Minimal rales at bases      CARDIOVASCULAR  Exam: S1, S2.  Regular rate and rhythm.      GI/NUTRITION  Exam: Abdomen soft, Non-tender, Non-distended.   Wound:   Current Diet:  NPO    VASCULAR  Exam: Extremities warm, pink, well-perfused.    MUSCULOSKELETAL  Exam: All extremities moving spontaneously without limitations.     SKIN  Exam: Good skin turgor, no skin breakdown.     METABOLIC/FLUIDS/ELECTROLYTES  multivitamin 1 Tablet(s) Oral daily  potassium chloride   Solution 20 milliEquivalent(s) Oral once  potassium phosphate IVPB 15 milliMole(s) IV Intermittent once      HEMATOLOGIC  [x] VTE Prophylaxis: enoxaparin Injectable 40 milliGRAM(s) SubCutaneous daily    Transfusions:	[] PRBC	[] Platelets		[] FFP	[] Cryoprecipitate    INFECTIOUS DISEASE  Antimicrobials/Immunologic Medications: None    Tubes/Lines/Drains   [x] Peripheral IV  [] Central Venous Line     	[] R	[] L	[] IJ	[] Fem	[] SC	Date Placed:   [] Arterial Line		[] R	[] L	[] Fem	[] Rad	[] Ax	Date Placed:   [] PICC		[] Midline		[] Mediport  [] Urinary Catheter		Date Placed:   [x] Necessity of urinary, arterial, and venous catheters discussed    LABS  --------------------------------------------------------------------------------------  CBC ( @ 03:01)                          9.1<L>                   7.0     )--------------(  65<L>      --    % Neuts, --    % Lymphs, ANC: --                              25.3<L>  CBC ( @ 03:35)                          9.8<L>                   10.0    )--------------(  75<L>      --    % Neuts, --    % Lymphs, ANC: --                              28.0<L>    BMP ( @ 03:01)       140     |  105     |  18    			Ca++ --      Ca 7.4<L>       ---------------------------------( 103<H>		Mg 2.4          3.8     |  23      |  0.58  			Ph 2.9     BMP ( @ 16:28)       --      |  --      |  --    			Ca++ 1.08<L>  Ca --           ---------------------------------( --    		Mg --           --      |  --      |  --    			Ph --          Coags ( @ 03:23)  aPTT 26.0<L> / INR 1.11 / PT 12.1    Cardiac Markers ( @ 16:27)     Trop: <0.01 -- / CKMB: -- / CK: 109  Cardiac Markers ( @ 03:30)     Trop: 0.02 -- / CKMB: -- / CK: 136    ABG ( @ 20:37)     7.42 / 36 / 112<H> / 23 / -.4 / 99<H>%     Lactate:    ABG ( @ 03:22)     7.42 / 39 / 88 / 25 / .9 / 98<H>%     Lactate:        Urinalysis ( @ 20:55):     Color: PL Yellow / Appearance: Clear / S.010 / pH: 7.0 / Gluc: Negative / Ketones: Negative / Bili: Negative / Urobili: Negative / Protein :Negative / Nitrites: Negative / Leuk.Est: Negative / RBC: 3-5 / WBC: 0-2 / Sq Epi:  / Non Sq Epi: OCC / Bacteria          --------------------------------------------------------------------------------------    OTHER LABORATORY:     IMAGING STUDIES:   CXR:     ASSESSMENT:  89y Female s/p left hip hemiarthroplasty with altered mental status and labile hemodynamics responsive to dopamine    PLAN:   Neurologic:  -Must clarify with family baseline mental status to ensure status change to guide further intervention  Respiratory: No acute issues  Cardiovascular: continue dopamine for now, will continue to workup cardiac status. may need EP intervention  Gastrointestinal/Nutrition: Mech soft diet  Renal/Genitourinary: No acute issues  Hematologic: No concern for bleeding at this time, ppx lovenox  Infectious Disease: NTD  Tubes/Lines/Drains:   Endocrine: NTD  Disposition: SICU    --------------------------------------------------------------------------------------    Critical Care Diagnoses:

## 2018-02-12 NOTE — PROGRESS NOTE ADULT - SUBJECTIVE AND OBJECTIVE BOX
Patient is a 89y old  Female s/p orif of hip hx of dementia. admitted to the SICU due to hypotension and rapid afib          MEDICATIONS  (STANDING):  amiodarone Infusion 1 mG/Min (33.333 mL/Hr) IV Continuous <Continuous>  brimonidine 0.2% Ophthalmic Solution 1 Drop(s) Both EYES two times a day  enoxaparin Injectable 50 milliGRAM(s) SubCutaneous every 12 hours  pantoprazole  Injectable 40 milliGRAM(s) IV Push every 24 hours  phenylephrine    Infusion 0.04 MICROgram(s)/kG/Min (0.714 mL/Hr) IV Continuous <Continuous>  timolol 0.5% Solution 1 Drop(s) Both EYES two times a day    MEDICATIONS  (PRN):  ALBUTerol/ipratropium for Nebulization 3 milliLiter(s) Nebulizer every 6 hours PRN Shortness of Breath and/or Wheezing  ondansetron Injectable 4 milliGRAM(s) IV Push every 4 hours PRN Nausea and/or Vomiting          VITALS:   T(C): 36.6 (02-12-18 @ 23:00), Max: 36.9 (02-12-18 @ 15:00)  HR: 90 (02-12-18 @ 23:45) (70 - 137)  BP: 98/54 (02-12-18 @ 20:00) (98/54 - 98/65)  RR: 21 (02-12-18 @ 23:45) (4 - 30)  SpO2: 100% (02-12-18 @ 23:45) (82% - 100%)  Wt(kg): --      PHYSICAL EXAM:   HEAD:  Atraumatic  EYES: EOM, PERRLA, conjunctiva pink and sclera white  ENT: No tonsillar erythema, exudates, or enlargement, moist mucous membranes, good dentition, no lesions  NECK: Supple, No JVD, normal thyroid, carotids with normal upstrokes and no bruits  CHEST/LUNG: Clear to auscultation bilaterally, No rales, rhonchi, wheezing, or rubs  HEART: Regular rate and rhythm, No murmurs, rubs, or gallops  ABDOMEN: Soft, nondistended, no masses, guarding, tenderness or rebound, bowel sounds present  EXTREMITIES:  2+ Peripheral Pulses, No clubbing, cyanosis, or edema.  (+) Bipolar hemiarthroplasty  LYMPH: No lymphadenopathy noted  SKIN: No rashes or lesions      LABS:  ABG - ( 11 Feb 2018 20:37 )  pH: 7.42  /  pCO2: 36    /  pO2: 112   / HCO3: 23    / Base Excess: -.4   /  SaO2: 99                CARDIAC MARKERS ( 11 Feb 2018 16:27 )  x     / <0.01 ng/mL / 109 U/L / x     / 1.3 ng/mL  CARDIAC MARKERS ( 11 Feb 2018 03:30 )  x     / 0.02 ng/mL / 136 U/L / x     / 1.2 ng/mL      CBC Full  -  ( 12 Feb 2018 03:01 )  WBC Count : 7.0 K/uL  Hemoglobin : 9.1 g/dL  Hematocrit : 25.3 %  Platelet Count - Automated : 65 K/uL  Mean Cell Volume : 95.4 fl  Mean Cell Hemoglobin : 34.2 pg  Mean Cell Hemoglobin Concentration : 35.8 gm/dL  Auto Neutrophil # : x  Auto Lymphocyte # : x  Auto Monocyte # : x  Auto Eosinophil # : x  Auto Basophil # : x  Auto Neutrophil % : x  Auto Lymphocyte % : x  Auto Monocyte % : x  Auto Eosinophil % : x  Auto Basophil % : x    02-12    140  |  105  |  18  ----------------------------<  103<H>  3.8   |  23  |  0.58    Ca    7.4<L>      12 Feb 2018 03:01  Phos  2.9     02-12  Mg     2.4     02-12    TPro  4.7<L>  /  Alb  2.4<L>  /  TBili  3.0<H>  /  DBili  0.4<H>  /  AST  18  /  ALT  16  /  AlkPhos  60  02-12    LIVER FUNCTIONS - ( 12 Feb 2018 03:01 )  Alb: 2.4 g/dL / Pro: 4.7 g/dL / ALK PHOS: 60 U/L / ALT: 16 U/L RC / AST: 18 U/L / GGT: x           PT/INR - ( 11 Feb 2018 03:23 )   PT: 12.1 sec;   INR: 1.11 ratio         PTT - ( 11 Feb 2018 03:23 )  PTT:26.0 sec    CAPILLARY BLOOD GLUCOSE          RADIOLOGY & ADDITIONAL TESTS:

## 2018-02-12 NOTE — DIETITIAN INITIAL EVALUATION ADULT. - ETIOLOGY
inability to consume sufficient protein-energy secondary to dementia, injury and increased needs for illness

## 2018-02-12 NOTE — DIETITIAN INITIAL EVALUATION ADULT. - NS FNS WEIGHT CHANGE REASON
Pt noted with h/o weight loss and regain last year: 106 pounds on 2/15/17, 96 pounds on 4/7/17, 111 pounds on 12/4/17. Pt currently at 119 pounds with 1+ edema vs dosing wt 105 pounds is comparable to wt 1 year ago./unintentional

## 2018-02-12 NOTE — CHART NOTE - NSCHARTNOTEFT_GEN_A_CORE
Upon Nutritional Assessment by the Registered Dietitian your patient was determined to meet criteria / has evidence of the following diagnosis/diagnoses:          [ ]  Mild Protein Calorie Malnutrition        [X ]  Moderate Protein Calorie Malnutrition        [ ] Severe Protein Calorie Malnutrition        [ ] Unspecified Protein Calorie Malnutrition        [ X] Underweight / BMI <19        [ ] Morbid Obesity / BMI > 40      Findings as based on:  [X ] Comprehensive nutrition assessment  [ X] Nutrition Focused Physical Exam; muscle depletion of temples, calves  [X ] Other: BMI<19,  h/o chronic poor po intake x 1 year, po intake <50% x 5 days      Nutrition Plan/Recommendations:    1. Continue Mechanical Soft diet with feeding assistance; monitor for signs of chewing/swallowing difficulty  2. Add Ensure Enlive (provides 350cal, 20Gm protein per 8oz serving) bid      PROVIDER Section:     By signing this assessment you are acknowledging and agree with the diagnosis/diagnoses assigned by the Registered Dietitian    Comments:

## 2018-02-12 NOTE — DIETITIAN INITIAL EVALUATION ADULT. - NS AS NUTRI INTERV MEDICAL AND FOOD SUPPLEMENTS
Modified food/Recommend add Ensure Enlive (provides 350cal, 20Gm protein per 8oz serving) bid. RD will add Magic Cup to meal trays to help meet nutrient needs/Commercial beverage

## 2018-02-12 NOTE — PROGRESS NOTE ADULT - ASSESSMENT
89F s/p L hip hemiarthroplasty POD 3  Analgesia  DVT ppx  WBAT  PT/OT  Incentive spirometry  Cont med management per SICU  DC planning

## 2018-02-12 NOTE — PROGRESS NOTE ADULT - SUBJECTIVE AND OBJECTIVE BOX
Pt S/E at bedside, no acute events overnight, pain controlled. Pt still on pressors in SICU.                           9.1    7.0   )-----------( 65       ( 12 Feb 2018 03:01 )             25.3     02-12    140  |  105  |  18  ----------------------------<  103<H>  3.8   |  23  |  0.58    Ca    7.4<L>      12 Feb 2018 03:01  Phos  2.9     02-12  Mg     2.4     02-12    Vital Signs Last 24 Hrs  T(C): 36.6 (11 Feb 2018 15:00), Max: 37 (11 Feb 2018 11:00)  T(F): 97.9 (11 Feb 2018 15:00), Max: 98.6 (11 Feb 2018 11:00)  HR: 76 (12 Feb 2018 01:45) (48 - 86)  BP: 114/57 (11 Feb 2018 15:30) (114/57 - 114/57)  BP(mean): 82 (11 Feb 2018 15:30) (82 - 82)  RR: 12 (12 Feb 2018 01:45) (6 - 30)  SpO2: 100% (12 Feb 2018 01:45) (90% - 100%)    Gen: NAD, lethargic, arousable    Left Lower Extremity:  Dressing clean dry intact  +EHL/FHL/TA/GS  SILT L3-S1  +DP/PT Pulses  Compartments soft  No calf TTP B/L

## 2018-02-12 NOTE — DIETITIAN INITIAL EVALUATION ADULT. - OTHER INFO
Nutrition Assessment warranted for length of stay in SICU. Per chart, pt noted with dementia, afib, COPD, CVA, GERD, GIB, glaucoma, HLD, PUD, S/P mech fall with L hip fracture, now S/P bipolar hemiarthroplasty on 2/9.

## 2018-02-12 NOTE — DIETITIAN INITIAL EVALUATION ADULT. - NS AS NUTRI INTERV MEALS SNACK
Texture-modified diet/Continue Mechanical Soft diet; provide feeding assistance and monitor for chewing/swallowing difficulty

## 2018-02-12 NOTE — DIETITIAN INITIAL EVALUATION ADULT. - PHYSICAL APPEARANCE
BMI 18.6Kg/m2 based on dosing wt 47.6Kg. Unable to perform full Nutrition Focused Physical Assessment however severe muscle depletion noted in temples and lower limbs; unclear etiology of advanced age vs malnutrition. Pt noted with h/o malnutiriton on past admits, however weight has remained within prior noted ranges./underweight/other (specify)

## 2018-02-12 NOTE — PROGRESS NOTE ADULT - ATTENDING COMMENTS
More awake, CT head neg for bleed or infarct  Hypotensive bradycardiac on Dopamine, flipped to a fib with RVR, will change to Norsynephrine again  CXR pul edema pattern, small dose diuretic as tolerated  Full dose AC for a fib, HCT stable  Not on abx

## 2018-02-12 NOTE — DIETITIAN INITIAL EVALUATION ADULT. - ENERGY NEEDS
ht: 5 feet 3 inches, dosing wt: 105 pounds, BMI: 18.6 Kg/m2, IBW: 115 pounds (+/- 10%), 91% IBW. Edema: 1+ generalized (2/11); Skin: no pressure injuries.

## 2018-02-12 NOTE — DIETITIAN INITIAL EVALUATION ADULT. - ORAL INTAKE PTA
Pt resides at [facility]. Per prior RD notes, pt was ordered for DYS1HC diet two months ago, however swallowing difficulty is not confirmed currently. Family previously reported pt with poor po intake, preferring to drink several Ensure daily. Per chart review, pt takes no nutrition supplements, reports NKFA.

## 2018-02-13 LAB
ALBUMIN SERPL ELPH-MCNC: 2.4 G/DL — LOW (ref 3.3–5)
ALP SERPL-CCNC: 67 U/L — SIGNIFICANT CHANGE UP (ref 40–120)
ALT FLD-CCNC: 16 U/L RC — SIGNIFICANT CHANGE UP (ref 10–45)
ANION GAP SERPL CALC-SCNC: 11 MMOL/L — SIGNIFICANT CHANGE UP (ref 5–17)
AST SERPL-CCNC: 17 U/L — SIGNIFICANT CHANGE UP (ref 10–40)
BILIRUB DIRECT SERPL-MCNC: 0.7 MG/DL — HIGH (ref 0–0.2)
BILIRUB INDIRECT FLD-MCNC: 2.8 MG/DL — HIGH (ref 0.2–1)
BILIRUB SERPL-MCNC: 3.5 MG/DL — HIGH (ref 0.2–1.2)
BUN SERPL-MCNC: 17 MG/DL — SIGNIFICANT CHANGE UP (ref 7–23)
CALCIUM SERPL-MCNC: 7.9 MG/DL — LOW (ref 8.4–10.5)
CHLORIDE SERPL-SCNC: 103 MMOL/L — SIGNIFICANT CHANGE UP (ref 96–108)
CO2 SERPL-SCNC: 26 MMOL/L — SIGNIFICANT CHANGE UP (ref 22–31)
CREAT SERPL-MCNC: 0.56 MG/DL — SIGNIFICANT CHANGE UP (ref 0.5–1.3)
GLUCOSE SERPL-MCNC: 120 MG/DL — HIGH (ref 70–99)
HAPTOGLOB SERPL-MCNC: 186 MG/DL — SIGNIFICANT CHANGE UP (ref 34–200)
HCT VFR BLD CALC: 27.7 % — LOW (ref 34.5–45)
HGB BLD-MCNC: 9.8 G/DL — LOW (ref 11.5–15.5)
LDH SERPL L TO P-CCNC: 223 U/L — SIGNIFICANT CHANGE UP (ref 50–242)
MAGNESIUM SERPL-MCNC: 1.9 MG/DL — SIGNIFICANT CHANGE UP (ref 1.6–2.6)
MCHC RBC-ENTMCNC: 33.9 PG — SIGNIFICANT CHANGE UP (ref 27–34)
MCHC RBC-ENTMCNC: 35.4 GM/DL — SIGNIFICANT CHANGE UP (ref 32–36)
MCV RBC AUTO: 95.9 FL — SIGNIFICANT CHANGE UP (ref 80–100)
NT-PROBNP SERPL-SCNC: 6935 PG/ML — HIGH (ref 0–300)
PHOSPHATE SERPL-MCNC: 2.8 MG/DL — SIGNIFICANT CHANGE UP (ref 2.5–4.5)
PLATELET # BLD AUTO: 96 K/UL — LOW (ref 150–400)
POTASSIUM SERPL-MCNC: 3.8 MMOL/L — SIGNIFICANT CHANGE UP (ref 3.5–5.3)
POTASSIUM SERPL-SCNC: 3.8 MMOL/L — SIGNIFICANT CHANGE UP (ref 3.5–5.3)
PROT SERPL-MCNC: 5.5 G/DL — LOW (ref 6–8.3)
RBC # BLD: 2.89 M/UL — LOW (ref 3.8–5.2)
RBC # FLD: 14.8 % — HIGH (ref 10.3–14.5)
SODIUM SERPL-SCNC: 140 MMOL/L — SIGNIFICANT CHANGE UP (ref 135–145)
WBC # BLD: 7.6 K/UL — SIGNIFICANT CHANGE UP (ref 3.8–10.5)
WBC # FLD AUTO: 7.6 K/UL — SIGNIFICANT CHANGE UP (ref 3.8–10.5)

## 2018-02-13 PROCEDURE — 99291 CRITICAL CARE FIRST HOUR: CPT

## 2018-02-13 PROCEDURE — 71045 X-RAY EXAM CHEST 1 VIEW: CPT | Mod: 26

## 2018-02-13 RX ORDER — POTASSIUM PHOSPHATE, MONOBASIC POTASSIUM PHOSPHATE, DIBASIC 236; 224 MG/ML; MG/ML
15 INJECTION, SOLUTION INTRAVENOUS ONCE
Qty: 0 | Refills: 0 | Status: COMPLETED | OUTPATIENT
Start: 2018-02-13 | End: 2018-02-13

## 2018-02-13 RX ORDER — DOPAMINE HYDROCHLORIDE 40 MG/ML
5 INJECTION, SOLUTION, CONCENTRATE INTRAVENOUS
Qty: 400 | Refills: 0 | Status: DISCONTINUED | OUTPATIENT
Start: 2018-02-13 | End: 2018-02-15

## 2018-02-13 RX ORDER — SODIUM CHLORIDE 9 MG/ML
250 INJECTION INTRAMUSCULAR; INTRAVENOUS; SUBCUTANEOUS ONCE
Qty: 0 | Refills: 0 | Status: COMPLETED | OUTPATIENT
Start: 2018-02-13 | End: 2018-02-13

## 2018-02-13 RX ORDER — PANTOPRAZOLE SODIUM 20 MG/1
40 TABLET, DELAYED RELEASE ORAL
Qty: 0 | Refills: 0 | Status: DISCONTINUED | OUTPATIENT
Start: 2018-02-13 | End: 2018-02-13

## 2018-02-13 RX ORDER — MAGNESIUM SULFATE 500 MG/ML
2 VIAL (ML) INJECTION ONCE
Qty: 0 | Refills: 0 | Status: COMPLETED | OUTPATIENT
Start: 2018-02-13 | End: 2018-02-13

## 2018-02-13 RX ORDER — SODIUM CHLORIDE 9 MG/ML
1000 INJECTION, SOLUTION INTRAVENOUS
Qty: 0 | Refills: 0 | Status: DISCONTINUED | OUTPATIENT
Start: 2018-02-13 | End: 2018-02-13

## 2018-02-13 RX ORDER — POTASSIUM CHLORIDE 20 MEQ
10 PACKET (EA) ORAL ONCE
Qty: 0 | Refills: 0 | Status: COMPLETED | OUTPATIENT
Start: 2018-02-13 | End: 2018-02-13

## 2018-02-13 RX ADMIN — Medication 100 MILLIEQUIVALENT(S): at 04:52

## 2018-02-13 RX ADMIN — POTASSIUM PHOSPHATE, MONOBASIC POTASSIUM PHOSPHATE, DIBASIC 62.5 MILLIMOLE(S): 236; 224 INJECTION, SOLUTION INTRAVENOUS at 07:04

## 2018-02-13 RX ADMIN — BRIMONIDINE TARTRATE 1 DROP(S): 2 SOLUTION/ DROPS OPHTHALMIC at 05:03

## 2018-02-13 RX ADMIN — BRIMONIDINE TARTRATE 1 DROP(S): 2 SOLUTION/ DROPS OPHTHALMIC at 18:40

## 2018-02-13 RX ADMIN — PANTOPRAZOLE SODIUM 40 MILLIGRAM(S): 20 TABLET, DELAYED RELEASE ORAL at 05:02

## 2018-02-13 RX ADMIN — DOPAMINE HYDROCHLORIDE 8.93 MICROGRAM(S)/KG/MIN: 40 INJECTION, SOLUTION, CONCENTRATE INTRAVENOUS at 05:04

## 2018-02-13 RX ADMIN — ENOXAPARIN SODIUM 50 MILLIGRAM(S): 100 INJECTION SUBCUTANEOUS at 18:39

## 2018-02-13 RX ADMIN — Medication 1 DROP(S): at 18:40

## 2018-02-13 RX ADMIN — Medication 1 DROP(S): at 05:04

## 2018-02-13 RX ADMIN — Medication 50 GRAM(S): at 03:48

## 2018-02-13 RX ADMIN — SODIUM CHLORIDE 1500 MILLILITER(S): 9 INJECTION INTRAMUSCULAR; INTRAVENOUS; SUBCUTANEOUS at 09:30

## 2018-02-13 RX ADMIN — AMIODARONE HYDROCHLORIDE 16.67 MG/MIN: 400 TABLET ORAL at 05:05

## 2018-02-13 RX ADMIN — ENOXAPARIN SODIUM 50 MILLIGRAM(S): 100 INJECTION SUBCUTANEOUS at 05:02

## 2018-02-13 NOTE — PROGRESS NOTE ADULT - ASSESSMENT
89 yo woman hx of fall with left hip bipolar hemiarthroplasty.  Episode of tachycardia and hypotension post op. More alert today, but remains dopamine dependent.

## 2018-02-13 NOTE — PROGRESS NOTE ADULT - SUBJECTIVE AND OBJECTIVE BOX
HISTORY  89yF w/ dementia, afib (Xarelto), last ECHO 2/2017 EF 71%, COPD, CVA, GERD, GIB, glaucoma, HLD, PUD, s/p mech fall with L hip fracture, now s/p bipolar hemiarthroplasty. SICU consulted for hypotension requiring pressors in the PACU. Patient was given 500 mL plasmalyte boluses x 2 and 1 unit pRBCs before SICU evaluation. Her lactate was 3 after the resuscitation. Hematocrit before the transfusion was 27, and increased to 33, then 29. Patient was able to be weaned down to 0.1 of elvis, but required pressors to maintain her BP during her 10-hour stay in the PACU. She was transferred to SICU for continued hypotension. Was placed on dopamine gtt, but then became tachycardic so switched to elvis gtt again. Started on amio gtt for Afib, but again bradycardic so dopamine gtt restarted.    24 HOUR EVENTS: Dopamine gtt stopped in AM and switched to Elvis gtt. Lasix 10mg IVP once. Started on therapeutic Lovenox for A fib. Amio load and then Amio gtt started. Pt became bradycardic so Elvis held and dopamine gtt restarted. Mentation greatly improved. Able to state her name, birth year, and knew she was in hospital. Per son, much closer to baseline.    SUBJECTIVE/ROS:  [ ] A ten-point review of systems was otherwise negative except as noted.  [x] Due to altered mental status/intubation, subjective information were not able to be obtained from the patient. History was obtained, to the extent possible, from review of the chart and collateral sources of information.      NEURO  RASS: 0    GCS: 14   Exam: Alert and oriented to self and location  Meds: ondansetron Injectable 4 milliGRAM(s) IV Push every 4 hours PRN Nausea and/or Vomiting    [x] Adequacy of sedation and pain control has been assessed and adjusted      RESPIRATORY  RR: 18 (02-13-18 @ 00:45) (4 - 30)  SpO2: 98% (02-13-18 @ 00:45) (82% - 100%)  Wt(kg): --  Exam: unlabored, clear to auscultation bilaterally  ABG - ( 11 Feb 2018 20:37 )  pH: 7.42  /  pCO2: 36    /  pO2: 112   / HCO3: 23    / Base Excess: -.4   /  SaO2: 99      Lactate: x            Meds: ALBUTerol/ipratropium for Nebulization 3 milliLiter(s) Nebulizer every 6 hours PRN Shortness of Breath and/or Wheezing        CARDIOVASCULAR  HR: 83 (02-13-18 @ 00:45) (47 - 137)  BP: 88/44 (02-13-18 @ 00:00) (88/44 - 98/65)  BP(mean): 63 (02-13-18 @ 00:00) (63 - 78)  ABP: 125/52 (02-13-18 @ 00:45) (77/47 - 158/84)  ABP(mean): 80 (02-13-18 @ 00:45) (50 - 114)  Wt(kg): --  CVP(cm H2O): --      Exam: Irregularly irregular, S1/S2 present  Cardiac Rhythm: A fib  Perfusion     [x]Adequate   [ ]Inadequate  Mentation   [ ]Normal       [x]Reduced  Extremities  [x]Warm         [ ]Cool  Volume Status [ ]Hypervolemic [x]Euvolemic [ ]Hypovolemic  Meds: amiodarone Infusion 1 mG/Min IV Continuous <Continuous>  amiodarone Infusion 0.5 mG/Min IV Continuous <Continuous>  DOPamine Infusion 5 MICROgram(s)/kG/Min IV Continuous <Continuous>  phenylephrine    Infusion 0.04 MICROgram(s)/kG/Min IV Continuous <Continuous>        GI/NUTRITION  Exam: soft, nontender, nondistended, incision C/D/I  Diet: Soft  Meds: pantoprazole  Injectable 40 milliGRAM(s) IV Push every 24 hours      GENITOURINARY  I&O's Detail    02-11 @ 07:01  -  02-12 @ 07:00  --------------------------------------------------------  IN:    DOPamine Infusion: 75 mL    DOPamine Infusion: 87.9 mL    IV PiggyBack: 425 mL    lactated ringers.: 150 mL    phenylephrine   Infusion: 98.4 mL  Total IN: 836.3 mL    OUT:    Indwelling Catheter - Urethral: 1885 mL  Total OUT: 1885 mL    Total NET: -1048.7 mL      02-12 @ 07:01  -  02-13 @ 01:10  --------------------------------------------------------  IN:    amiodarone Infusion: 199.8 mL    DOPamine Infusion: 24 mL    phenylephrine   Infusion: 123.2 mL  Total IN: 347 mL    OUT:    Indwelling Catheter - Urethral: 1320 mL  Total OUT: 1320 mL    Total NET: -973 mL          02-12    140  |  105  |  18  ----------------------------<  103<H>  3.8   |  23  |  0.58    Ca    7.4<L>      12 Feb 2018 03:01  Phos  2.9     02-12  Mg     2.4     02-12    TPro  4.7<L>  /  Alb  2.4<L>  /  TBili  3.0<H>  /  DBili  0.4<H>  /  AST  18  /  ALT  16  /  AlkPhos  60  02-12    [ ] Hernandez catheter, indication: N/A  Meds:       HEMATOLOGIC  Meds: enoxaparin Injectable 50 milliGRAM(s) SubCutaneous every 12 hours                         9.1    7.0   )-----------( 65       ( 12 Feb 2018 03:01 )             25.3     PT/INR - ( 11 Feb 2018 03:23 )   PT: 12.1 sec;   INR: 1.11 ratio         PTT - ( 11 Feb 2018 03:23 )  PTT:26.0 sec  Transfusion     [ ] PRBC   [ ] Platelets   [ ] FFP   [ ] Cryoprecipitate      INFECTIOUS DISEASES  WBC Count: 7.0 K/uL (02-12 @ 03:01)    RECENT CULTURES:    Meds:       ENDOCRINE  CAPILLARY BLOOD GLUCOSE        Meds:       ACCESS DEVICES:  [x] Peripheral IV  [ ] Central Venous Line	[ ] R	[ ] L	[ ] IJ	[ ] Fem	[ ] SC	Placed:   [ ] Arterial Line		[ ] R	[ ] L	[ ] Fem	[ ] Rad	[ ] Ax	Placed:   [ ] PICC:					[ ] Mediport  [x] Urinary Catheter, Date Placed:   [x] Necessity of urinary, arterial, and venous catheters discussed    OTHER MEDICATIONS:  brimonidine 0.2% Ophthalmic Solution 1 Drop(s) Both EYES two times a day  timolol 0.5% Solution 1 Drop(s) Both EYES two times a day      CODE STATUS:  Full code    IMAGING:

## 2018-02-13 NOTE — PROGRESS NOTE ADULT - ASSESSMENT
89yF s/p left hip hemiarthroplasty with altered mental status and labile hemodynamics    PLAN:   Neurologic:  - Monitor mental staus  - Not c/o pain    Respiratory:  - Monitor resp status  - On 2L NC  - Duoneb PRN    Cardiovascular:   - Highly labile HR and BP  - C/w Amio gtt  - Dopamine gtt for bradycardia and hypotension    Gastrointestinal/Nutrition:  - Mech soft diet  - Protonix for GERD    Renal/Genitourinary:  - Hernandez cath for UOP monitoring  - Monitor BMP  - Replete lytes PRN    Hematologic:  - T lovenox for A fib  - Monitor CBC    Infectious Disease:  - No acute infectious concerns    Endocrine:  - No active issues  - Monitor BG on BMP    Disposition:  SICU    Code Status:  Full code

## 2018-02-13 NOTE — PROGRESS NOTE ADULT - SUBJECTIVE AND OBJECTIVE BOX
Patient is a 89y old  Female s/p orif of hip hx of dementia.  Admitted to the SICU due to hypotension and rapid afib. Now out of bed to chair and talking. Still on Dopamine for BP support with no tachy or bradycardia. No pain post op ORIF hip fracture.    MEDICATIONS  (STANDING):  brimonidine 0.2% Ophthalmic Solution 1 Drop(s) Both EYES two times a day  DOPamine Infusion 5 MICROgram(s)/kG/Min (8.93 mL/Hr) IV Continuous <Continuous>  enoxaparin Injectable 50 milliGRAM(s) SubCutaneous every 12 hours  timolol 0.5% Solution 1 Drop(s) Both EYES two times a day    MEDICATIONS  (PRN):  ALBUTerol/ipratropium for Nebulization 3 milliLiter(s) Nebulizer every 6 hours PRN Shortness of Breath and/or Wheezing  ondansetron Injectable 4 milliGRAM(s) IV Push every 4 hours PRN Nausea and/or Vomiting    ICU Vital Signs Last 24 Hrs  T(C): 36.9 (13 Feb 2018 11:00), Max: 36.9 (13 Feb 2018 11:00)  T(F): 98.4 (13 Feb 2018 11:00), Max: 98.4 (13 Feb 2018 11:00)  HR: 78 (13 Feb 2018 16:15) (47 - 130)  BP: 78/41 (13 Feb 2018 08:45) (78/41 - 98/54)  BP(mean): 56 (13 Feb 2018 08:45) (56 - 71)  ABP: 130/52 (13 Feb 2018 16:15) (69/37 - 163/87)  ABP(mean): 82 (13 Feb 2018 16:15) (50 - 114)  RR: 17 (13 Feb 2018 16:15) (14 - 25)  SpO2: 100% (13 Feb 2018 16:15) (63% - 100%)      PHYSICAL EXAM:   HEAD:  Atraumatic  EYES: EOM, PERRLA, conjunctiva pink and sclera white  ENT: No tonsillar erythema, exudates, or enlargement, moist mucous membranes, good dentition, no lesions  NECK: Supple, No JVD, normal thyroid, carotids with normal upstrokes and no bruits  CHEST/LUNG: Clear to auscultation bilaterally, No rales, rhonchi, wheezing, or rubs  HEART: Regular rate and rhythm, No murmurs, rubs, or gallops  ABDOMEN: Soft, nondistended, no masses, guarding, tenderness or rebound, bowel sounds present  EXTREMITIES:  Left hip with + edema.  (+) Bipolar hemiarthroplasty  LYMPH: No lymphadenopathy noted  SKIN: No rashes or lesions    ABG - ( 11 Feb 2018 20:37 )  pH: 7.42  /  pCO2: 36    /  pO2: 112   / HCO3: 23    / Base Excess: -.4   /  SaO2: 99          CBC Full  -  ( 13 Feb 2018 02:47 )  WBC Count : 7.6 K/uL  Hemoglobin : 9.8 g/dL  Hematocrit : 27.7 %  Platelet Count - Automated : 96 K/uL  Mean Cell Volume : 95.9 fl  Mean Cell Hemoglobin : 33.9 pg  Mean Cell Hemoglobin Concentration : 35.4 gm/dL  Auto Neutrophil # : x  Auto Lymphocyte # : x  Auto Monocyte # : x  Auto Eosinophil # : x  Auto Basophil # : x  Auto Neutrophil % : x  Auto Lymphocyte % : x  Auto Monocyte % : x  Auto Eosinophil % : x  Auto Basophil % : x    02-13    140  |  103  |  17  ----------------------------<  120<H>  3.8   |  26  |  0.56    Ca    7.9<L>      13 Feb 2018 02:47  Phos  2.8     02-13  Mg     1.9     02-13    TPro  5.5<L>  /  Alb  2.4<L>  /  TBili  3.5<H>  /  DBili  0.7<H>  /  AST  17  /  ALT  16  /  AlkPhos  67  02-13    LIVER FUNCTIONS - ( 13 Feb 2018 02:47 )  Alb: 2.4 g/dL / Pro: 5.5 g/dL / ALK PHOS: 67 U/L / ALT: 16 U/L RC / AST: 17 U/L / GGT: x                       RADIOLOGY & ADDITIONAL TESTS:

## 2018-02-13 NOTE — PROGRESS NOTE ADULT - ATTENDING COMMENTS
Pt evaluated in AM round, continued to be evaluated  Bradycardia hypotension on high dose of Dopamine  DC Amiodarone, small fluid bolus, wean down dopamine, hold lasix  Dc IVF  Po with assistance  Mobilization

## 2018-02-13 NOTE — PROGRESS NOTE ADULT - ASSESSMENT
89F s/p L hip hemiarthroplasty POD 4    Analgesia  DVT ppx  WBAT  PT/OT  Incentive spirometry  Cont med management per SICU  DC planning

## 2018-02-13 NOTE — PROGRESS NOTE ADULT - SUBJECTIVE AND OBJECTIVE BOX
Pt S/E at bedside, no acute events overnight, pain controlled. Pt still on pressors in SICU.                CBC Full  -  ( 13 Feb 2018 02:47 )  WBC Count : 7.6 K/uL  Hemoglobin : 9.8 g/dL  Hematocrit : 27.7 %  Platelet Count - Automated : 96 K/uL  Mean Cell Volume : 95.9 fl  Mean Cell Hemoglobin : 33.9 pg  Mean Cell Hemoglobin Concentration : 35.4 gm/dL  Auto Neutrophil # : x  Auto Lymphocyte # : x  Auto Monocyte # : x  Auto Eosinophil # : x  Auto Basophil # : x  Auto Neutrophil % : x  Auto Lymphocyte % : x  Auto Monocyte % : x  Auto Eosinophil % : x  Auto Basophil % : x    02-13    140  |  103  |  17  ----------------------------<  120<H>  3.8   |  26  |  0.56    Ca    7.9<L>      13 Feb 2018 02:47  Phos  2.8     02-13  Mg     1.9     02-13    TPro  5.5<L>  /  Alb  2.4<L>  /  TBili  3.5<H>  /  DBili  0.7<H>  /  AST  17  /  ALT  16  /  AlkPhos  67  02-13    Vital Signs Last 24 Hrs  T(C): 36.7 (02-13-18 @ 03:00), Max: 36.9 (02-12-18 @ 15:00)  T(F): 98.1 (02-13-18 @ 03:00), Max: 98.4 (02-12-18 @ 15:00)  HR: 69 (02-13-18 @ 06:45) (47 - 137)  BP: 93/46 (02-13-18 @ 04:30) (88/44 - 98/65)  BP(mean): 67 (02-13-18 @ 04:30) (63 - 78)  RR: 15 (02-13-18 @ 06:45) (9 - 30)  SpO2: 99% (02-13-18 @ 06:45) (82% - 100%)    Gen: NAD, lethargic, arousable    Left Lower Extremity:  Dressing clean dry intact  +EHL/FHL/TA/GS  SILT L3-S1  +DP/PT Pulses  Compartments soft  No calf TTP B/L

## 2018-02-13 NOTE — PROGRESS NOTE ADULT - ATTENDING COMMENTS
No medical complications post-op to date and to proceed with physical therapy, as tolerated. Continues pulmonary toilet to lessen atelectasis, leg exercises as taught to lessen the risk of DVT and supervised pain medications for post-op pain control. The patient continues to require around the clock cardiopulmonary and neurologic monitoring for critical illness, as cited above.

## 2018-02-14 LAB
ALBUMIN SERPL ELPH-MCNC: 2.3 G/DL — LOW (ref 3.3–5)
ALP SERPL-CCNC: 58 U/L — SIGNIFICANT CHANGE UP (ref 40–120)
ALT FLD-CCNC: 10 U/L RC — SIGNIFICANT CHANGE UP (ref 10–45)
ANION GAP SERPL CALC-SCNC: 12 MMOL/L — SIGNIFICANT CHANGE UP (ref 5–17)
APTT BLD: 28.3 SEC — SIGNIFICANT CHANGE UP (ref 27.5–37.4)
AST SERPL-CCNC: 13 U/L — SIGNIFICANT CHANGE UP (ref 10–40)
BILIRUB DIRECT SERPL-MCNC: 0.5 MG/DL — HIGH (ref 0–0.2)
BILIRUB INDIRECT FLD-MCNC: 2.7 MG/DL — HIGH (ref 0.2–1)
BILIRUB SERPL-MCNC: 3.2 MG/DL — HIGH (ref 0.2–1.2)
BLD GP AB SCN SERPL QL: NEGATIVE — SIGNIFICANT CHANGE UP
BUN SERPL-MCNC: 19 MG/DL — SIGNIFICANT CHANGE UP (ref 7–23)
CA-I BLD-SCNC: 1.08 MMOL/L — LOW (ref 1.12–1.3)
CALCIUM SERPL-MCNC: 7.5 MG/DL — LOW (ref 8.4–10.5)
CHLORIDE SERPL-SCNC: 104 MMOL/L — SIGNIFICANT CHANGE UP (ref 96–108)
CO2 SERPL-SCNC: 23 MMOL/L — SIGNIFICANT CHANGE UP (ref 22–31)
CREAT SERPL-MCNC: 0.54 MG/DL — SIGNIFICANT CHANGE UP (ref 0.5–1.3)
GAS PNL BLDA: SIGNIFICANT CHANGE UP
GLUCOSE SERPL-MCNC: 113 MG/DL — HIGH (ref 70–99)
HCT VFR BLD CALC: 22.7 % — LOW (ref 34.5–45)
HCT VFR BLD CALC: 23.4 % — LOW (ref 34.5–45)
HGB BLD-MCNC: 8 G/DL — LOW (ref 11.5–15.5)
HGB BLD-MCNC: 8.4 G/DL — LOW (ref 11.5–15.5)
INR BLD: 1.09 RATIO — SIGNIFICANT CHANGE UP (ref 0.88–1.16)
MAGNESIUM SERPL-MCNC: 2.1 MG/DL — SIGNIFICANT CHANGE UP (ref 1.6–2.6)
MCHC RBC-ENTMCNC: 33.8 PG — SIGNIFICANT CHANGE UP (ref 27–34)
MCHC RBC-ENTMCNC: 34.4 PG — HIGH (ref 27–34)
MCHC RBC-ENTMCNC: 35.2 GM/DL — SIGNIFICANT CHANGE UP (ref 32–36)
MCHC RBC-ENTMCNC: 36 GM/DL — SIGNIFICANT CHANGE UP (ref 32–36)
MCV RBC AUTO: 95.5 FL — SIGNIFICANT CHANGE UP (ref 80–100)
MCV RBC AUTO: 96.1 FL — SIGNIFICANT CHANGE UP (ref 80–100)
PHOSPHATE SERPL-MCNC: 2.4 MG/DL — LOW (ref 2.5–4.5)
PLATELET # BLD AUTO: 110 K/UL — LOW (ref 150–400)
PLATELET # BLD AUTO: 96 K/UL — LOW (ref 150–400)
POTASSIUM SERPL-MCNC: 4.1 MMOL/L — SIGNIFICANT CHANGE UP (ref 3.5–5.3)
POTASSIUM SERPL-SCNC: 4.1 MMOL/L — SIGNIFICANT CHANGE UP (ref 3.5–5.3)
PROT SERPL-MCNC: 5 G/DL — LOW (ref 6–8.3)
PROTHROM AB SERPL-ACNC: 11.8 SEC — SIGNIFICANT CHANGE UP (ref 9.8–12.7)
RBC # BLD: 2.36 M/UL — LOW (ref 3.8–5.2)
RBC # BLD: 2.45 M/UL — LOW (ref 3.8–5.2)
RBC # FLD: 14.7 % — HIGH (ref 10.3–14.5)
RBC # FLD: 14.9 % — HIGH (ref 10.3–14.5)
RH IG SCN BLD-IMP: POSITIVE — SIGNIFICANT CHANGE UP
SODIUM SERPL-SCNC: 139 MMOL/L — SIGNIFICANT CHANGE UP (ref 135–145)
WBC # BLD: 5.7 K/UL — SIGNIFICANT CHANGE UP (ref 3.8–10.5)
WBC # BLD: 6.9 K/UL — SIGNIFICANT CHANGE UP (ref 3.8–10.5)
WBC # FLD AUTO: 5.7 K/UL — SIGNIFICANT CHANGE UP (ref 3.8–10.5)
WBC # FLD AUTO: 6.9 K/UL — SIGNIFICANT CHANGE UP (ref 3.8–10.5)

## 2018-02-14 PROCEDURE — 99291 CRITICAL CARE FIRST HOUR: CPT

## 2018-02-14 PROCEDURE — 71045 X-RAY EXAM CHEST 1 VIEW: CPT | Mod: 26

## 2018-02-14 RX ORDER — DOCUSATE SODIUM 100 MG
200 CAPSULE ORAL
Qty: 0 | Refills: 0 | Status: DISCONTINUED | OUTPATIENT
Start: 2018-02-14 | End: 2018-02-22

## 2018-02-14 RX ORDER — LACTULOSE 10 G/15ML
20 SOLUTION ORAL EVERY 12 HOURS
Qty: 0 | Refills: 0 | Status: DISCONTINUED | OUTPATIENT
Start: 2018-02-14 | End: 2018-02-18

## 2018-02-14 RX ORDER — SENNA PLUS 8.6 MG/1
5 TABLET ORAL DAILY
Qty: 0 | Refills: 0 | Status: DISCONTINUED | OUTPATIENT
Start: 2018-02-14 | End: 2018-02-22

## 2018-02-14 RX ORDER — MIDODRINE HYDROCHLORIDE 2.5 MG/1
5 TABLET ORAL EVERY 8 HOURS
Qty: 0 | Refills: 0 | Status: DISCONTINUED | OUTPATIENT
Start: 2018-02-14 | End: 2018-02-16

## 2018-02-14 RX ORDER — CALCIUM GLUCONATE 100 MG/ML
2 VIAL (ML) INTRAVENOUS ONCE
Qty: 0 | Refills: 0 | Status: COMPLETED | OUTPATIENT
Start: 2018-02-14 | End: 2018-02-14

## 2018-02-14 RX ORDER — RIVAROXABAN 15 MG-20MG
20 KIT ORAL EVERY 24 HOURS
Qty: 0 | Refills: 0 | Status: DISCONTINUED | OUTPATIENT
Start: 2018-02-14 | End: 2018-02-14

## 2018-02-14 RX ORDER — RIVAROXABAN 15 MG-20MG
15 KIT ORAL EVERY 24 HOURS
Qty: 0 | Refills: 0 | Status: DISCONTINUED | OUTPATIENT
Start: 2018-02-14 | End: 2018-02-22

## 2018-02-14 RX ADMIN — MIDODRINE HYDROCHLORIDE 5 MILLIGRAM(S): 2.5 TABLET ORAL at 18:43

## 2018-02-14 RX ADMIN — Medication 1 DROP(S): at 18:44

## 2018-02-14 RX ADMIN — LACTULOSE 20 GRAM(S): 10 SOLUTION ORAL at 22:58

## 2018-02-14 RX ADMIN — ENOXAPARIN SODIUM 50 MILLIGRAM(S): 100 INJECTION SUBCUTANEOUS at 06:10

## 2018-02-14 RX ADMIN — RIVAROXABAN 15 MILLIGRAM(S): KIT at 18:43

## 2018-02-14 RX ADMIN — BRIMONIDINE TARTRATE 1 DROP(S): 2 SOLUTION/ DROPS OPHTHALMIC at 06:11

## 2018-02-14 RX ADMIN — BRIMONIDINE TARTRATE 1 DROP(S): 2 SOLUTION/ DROPS OPHTHALMIC at 18:44

## 2018-02-14 RX ADMIN — DOPAMINE HYDROCHLORIDE 8.93 MICROGRAM(S)/KG/MIN: 40 INJECTION, SOLUTION, CONCENTRATE INTRAVENOUS at 06:27

## 2018-02-14 RX ADMIN — DOPAMINE HYDROCHLORIDE 8.93 MICROGRAM(S)/KG/MIN: 40 INJECTION, SOLUTION, CONCENTRATE INTRAVENOUS at 22:58

## 2018-02-14 RX ADMIN — Medication 1 DROP(S): at 06:11

## 2018-02-14 NOTE — PROGRESS NOTE ADULT - SUBJECTIVE AND OBJECTIVE BOX
Orthopaedic Surgery Progress Note    Subjective:   Patient seen and examined. No acute events overnight. Amiodarone gtt was stopped and dopamine weaned 0.4. Tolerating a soft diet, pain controlled    Objective:  T(C): 36.5 (02-14-18 @ 03:00), Max: 36.9 (02-13-18 @ 11:00)  HR: 81 (02-14-18 @ 06:15) (63 - 92)  BP: 135/59 (02-13-18 @ 19:30) (78/41 - 135/59)  RR: 22 (02-14-18 @ 06:15) (14 - 33)  SpO2: 95% (02-14-18 @ 06:15) (63% - 100%)  Wt(kg): --    02-12 @ 07:01  -  02-13 @ 07:00  --------------------------------------------------------  IN: 541 mL / OUT: 1665 mL / NET: -1124 mL    02-13 @ 07:01  -  02-14 @ 06:19  --------------------------------------------------------  IN: 635.6 mL / OUT: 910 mL / NET: -274.4 mL        PE    Left Lower Extremity:  Dressing clean dry intact  +EHL/FHL/TA/GS  SILT L3-S1  +DP/PT Pulses  Compartments soft  No calf TTP B/L                         8.0    5.7   )-----------( 96       ( 14 Feb 2018 02:56 )             22.7     02-14    139  |  104  |  19  ----------------------------<  113<H>  4.1   |  23  |  0.54    Ca    7.5<L>      14 Feb 2018 02:56  Phos  2.4     02-14  Mg     2.1     02-14    TPro  5.0<L>  /  Alb  2.3<L>  /  TBili  3.2<H>  /  DBili  0.5<H>  /  AST  13  /  ALT  10  /  AlkPhos  58  02-14    PT/INR - ( 14 Feb 2018 02:56 )   PT: 11.8 sec;   INR: 1.09 ratio         PTT - ( 14 Feb 2018 02:56 )  PTT:28.3 sec      89F s/p L hip hemiarthroplasty POD 5    Analgesia  DVT ppx  WBAT  PT/OT  Incentive spirometry  Cont med management per SICU  DC planning

## 2018-02-14 NOTE — PROGRESS NOTE ADULT - ASSESSMENT
89yF s/p left hip hemiarthroplasty with altered mental status and labile hemodynamics    PLAN:   Neurologic:  - Monitor mental staus  - Not c/o pain    Respiratory:  - Monitor resp status  - On 2L NC  - Duoneb PRN    Cardiovascular:   - Highly labile HR and BP  - Dopamine gtt for bradycardia and hypotension, wean as tolerated    Gastrointestinal/Nutrition:  - Mech soft diet    Renal/Genitourinary:  - Hernandez cath for UOP monitoring  - Monitor BMP  - Replete lytes PRN    Hematologic:  - T lovenox for A fib  - Monitor CBC    Infectious Disease:  - No acute infectious concerns    Endocrine:  - No active issues  - Monitor BG on BMP    Disposition:  SICU    Code Status: Full 89yF s/p left hip hemiarthroplasty with altered mental status and labile hemodynamics    PLAN:   Neurologic:  - Monitor mental staus  - Not c/o pain    Respiratory:  - Monitor resp status  - Off NC  - Duoneb PRN    Cardiovascular:   - Highly labile HR and BP  - Dopamine gtt for bradycardia and hypotension, wean as tolerated  - Midodrine started    Gastrointestinal/Nutrition:  - Mech soft diet  - Colace, Senna, Miralax    Renal/Genitourinary:  - Hernandez cath for UOP monitoring  - Monitor BMP  - Replete lytes PRN      Hematologic:  - restart xarelto  - Monitor CBC    Infectious Disease:  - No acute infectious concerns    Endocrine:  - No active issues  - Monitor BG on BMP    Disposition:  SICU    Code Status: Full 89yF s/p left hip hemiarthroplasty with altered mental status and labile hemodynamics    PLAN:   Neurologic:  - Monitor mental staus  - Tylenol for pain control    Respiratory:  - Monitor resp status  - Off NC  - Duoneb PRN    Cardiovascular:   - Highly labile HR and BP  - Dopamine gtt for bradycardia and hypotension, wean as tolerated  - Midodrine started    Gastrointestinal/Nutrition:  - [facility] soft diet  - Start lactulose  - Colace, Senna, Miralax    Renal/Genitourinary:  - D/C quinones and TOV  - IV locked  - Monitor BMP  - Replete lytes PRN      Hematologic:  - restart xarelto 15 mg today  - Monitor CBC    Infectious Disease:  - No acute infectious concerns    Endocrine:  - No active issues  - Monitor BG on BMP    Disposition:  SICU    Code Status: Full

## 2018-02-14 NOTE — PROGRESS NOTE ADULT - ATTENDING COMMENTS
More awake  Resolving  hypotension, wean off dopamine, HR stable, will add midodrine to stabilize  CXR not in fluid overload, will hold off Lasix, decrease IVF rate  Drop in HCT, no active bleed, will repeat, on [id number] mg  for A fib  PT  Po with assistance

## 2018-02-14 NOTE — PROGRESS NOTE ADULT - SUBJECTIVE AND OBJECTIVE BOX
Patient is a 89y old  Female who presents with a chief complaint of L hip pain (08 Feb 2018 22:22)      HPI:  89F hx of dementia, afib (Xarelto), COPD, CVA, GERD, GIB, glaucoma, HLD, PUD, s/p mech fall with L hip pain. She states she was getting up from wheelchair to get her walker and tripped. falling onto her left side. She was unable to ambulate afterwards. No other apparent injuries. Denies headstrike or LOC. No n/v, no numbness and  tingling.   89 yo woman hx of fall with left hip bipolar hemiarthroplasty.  Episode of tachycardia and hypotension post op. More alert today, but remains dopamine dependent on and off.      MEDICATIONS  (STANDING):  brimonidine 0.2% Ophthalmic Solution 1 Drop(s) Both EYES two times a day  calcium gluconate IVPB 2 Gram(s) IV Intermittent once  docusate sodium Liquid 200 milliGRAM(s) Oral two times a day  DOPamine Infusion 5 MICROgram(s)/kG/Min (8.93 mL/Hr) IV Continuous <Continuous>  lactulose Syrup 20 Gram(s) Oral every 12 hours  midodrine 5 milliGRAM(s) Oral every 8 hours  rivaroxaban 15 milliGRAM(s) Oral every 24 hours  senna Syrup 5 milliLiter(s) Oral daily  sodium phosphate IVPB 30 milliMole(s) IV Intermittent once  timolol 0.5% Solution 1 Drop(s) Both EYES two times a day    MEDICATIONS  (PRN):  ALBUTerol/ipratropium for Nebulization 3 milliLiter(s) Nebulizer every 6 hours PRN Shortness of Breath and/or Wheezing  ondansetron Injectable 4 milliGRAM(s) IV Push every 4 hours PRN Nausea and/or Vomiting      Allergies    aspirin (Unknown)  penicillin (Unknown)    Intolerances      PAST MEDICAL HISTORY:  Atrial fibrillation, unspecified type  GERD (gastroesophageal reflux disease)  COPD (chronic obstructive pulmonary disease)  GI bleed  Hyperlipemia  PUD (peptic ulcer disease)  CVA (cerebral infarction)  Glaucoma    PAST SURGICAL HISTORY:  H/O abdominal surgery          VITALS:  Vital Signs Last 24 Hrs  T(C): 37.7 (14 Feb 2018 19:00), Max: 37.7 (14 Feb 2018 19:00)  T(F): 99.9 (14 Feb 2018 19:00), Max: 99.9 (14 Feb 2018 19:00)  HR: 77 (14 Feb 2018 20:00) (76 - 90)  BP: 98/48 (14 Feb 2018 19:00) (98/48 - 154/67)  BP(mean): 69 (14 Feb 2018 19:00) (69 - 97)  RR: 20 (14 Feb 2018 20:00) (17 - 39)  SpO2: 95% (14 Feb 2018 20:00) (91% - 99%)  I&O's Summary    13 Feb 2018 07:01  -  14 Feb 2018 07:00  --------------------------------------------------------  IN: 643.6 mL / OUT: 960 mL / NET: -316.4 mL    14 Feb 2018 07:01  -  14 Feb 2018 20:05  --------------------------------------------------------  IN: 149.5 mL / OUT: 335 mL / NET: -185.5 mL        PHYSICAL EXAM:  GENERAL: NAD, well nourished and conversant  HEAD:  Atraumatic  EYES: EOM, PERRLA, conjunctiva pink and sclera white  ENT: No tonsillar erythema, exudates, or enlargement, moist mucous membranes, good dentition, no lesions  NECK: Supple, No JVD, normal thyroid, carotids with normal upstrokes and no bruits  CHEST/LUNG: Clear to auscultation bilaterally, No rales, rhonchi, wheezing, or rubs  HEART: Regular rate and rhythm, No murmurs, rubs, or gallops  ABDOMEN: Soft, nondistended, no masses, guarding, tenderness or rebound, bowel sounds present  EXTREMITIES:  2+ Peripheral Pulses, No clubbing, cyanosis, or edema.  (+) LEFT BIPOLAR HEMIARATHOPLASTY  LYMPH: No lymphadenopathy noted  SKIN: No rashes or lesions  NERVOUS SYSTEM:  Alert & Oriented X3, normal cognitive function. Motor Strength 5/5 right upper and right lower.  5/5 left upper and left lower extremities, DTRs 2+ intact and symmetric    LABS:  ABG - ( 14 Feb 2018 02:40 )  pH: 7.47  /  pCO2: 34    /  pO2: 68    / HCO3: 24    / Base Excess: 1.4   /  SaO2: 96                    CBC Full  -  ( 14 Feb 2018 10:17 )  WBC Count : 6.9 K/uL  Hemoglobin : 8.4 g/dL  Hematocrit : 23.4 %  Platelet Count - Automated : 110 K/uL  Mean Cell Volume : 95.5 fl  Mean Cell Hemoglobin : 34.4 pg  Mean Cell Hemoglobin Concentration : 36.0 gm/dL  Auto Neutrophil # : x  Auto Lymphocyte # : x  Auto Monocyte # : x  Auto Eosinophil # : x  Auto Basophil # : x  Auto Neutrophil % : x  Auto Lymphocyte % : x  Auto Monocyte % : x  Auto Eosinophil % : x  Auto Basophil % : x    02-14    139  |  104  |  19  ----------------------------<  113<H>  4.1   |  23  |  0.54    Ca    7.5<L>      14 Feb 2018 02:56  Phos  2.4     02-14  Mg     2.1     02-14    TPro  5.0<L>  /  Alb  2.3<L>  /  TBili  3.2<H>  /  DBili  0.5<H>  /  AST  13  /  ALT  10  /  AlkPhos  58  02-14    LIVER FUNCTIONS - ( 14 Feb 2018 02:56 )  Alb: 2.3 g/dL / Pro: 5.0 g/dL / ALK PHOS: 58 U/L / ALT: 10 U/L RC / AST: 13 U/L / GGT: x           PT/INR - ( 14 Feb 2018 02:56 )   PT: 11.8 sec;   INR: 1.09 ratio         PTT - ( 14 Feb 2018 02:56 )  PTT:28.3 sec

## 2018-02-14 NOTE — PROGRESS NOTE ADULT - SUBJECTIVE AND OBJECTIVE BOX
HISTORY  89yF w/ dementia, afib (Xarelto), last ECHO 2/2017 EF 71%, COPD, CVA, GERD, GIB, glaucoma, HLD, PUD, s/p mech fall with L hip fracture, now s/p bipolar hemiarthroplasty. SICU consulted for hypotension requiring pressors in the PACU. Patient was given 500 mL plasmalyte boluses x 2 and 1 unit pRBCs before SICU evaluation. Her lactate was 3 after the resuscitation. Hematocrit before the transfusion was 27, and increased to 33, then 29. Patient was able to be weaned down to 0.1 of miroslava, but required pressors to maintain her BP during her 10-hour stay in the PACU. She was transferred to SICU for continued hypotension. Was placed on dopamine gtt, but then became tachycardic so switched to miroslava gtt again. Started on amio gtt for Afib, but again bradycardic so dopamine gtt restarted.  24 HOUR EVENTS:    SUBJECTIVE/ROS:  [ ] A ten-point review of systems was otherwise negative except as noted.  [ ] Due to altered mental status/intubation, subjective information were not able to be obtained from the patient. History was obtained, to the extent possible, from review of the chart and collateral sources of information.      NEURO  Exam:   Meds: ondansetron Injectable 4 milliGRAM(s) IV Push every 4 hours PRN Nausea and/or Vomiting    [x] Adequacy of sedation and pain control has been assessed and adjusted      RESPIRATORY  RR: 20 (02-14-18 @ 04:00) (14 - 33)  SpO2: 97% (02-14-18 @ 04:00) (63% - 100%)  Wt(kg): --  Exam: unlabored, clear to auscultation bilaterally  Mechanical Ventilation:   ABG - ( 14 Feb 2018 02:40 )  pH: 7.47  /  pCO2: 34    /  pO2: 68    / HCO3: 24    / Base Excess: 1.4   /  SaO2: 96      Lactate: x                [ ] Extubation Readiness Assessed  Meds: ALBUTerol/ipratropium for Nebulization 3 milliLiter(s) Nebulizer every 6 hours PRN Shortness of Breath and/or Wheezing        CARDIOVASCULAR  HR: 81 (02-14-18 @ 04:00) (63 - 92)  BP: 135/59 (02-13-18 @ 19:30) (78/41 - 135/59)  BP(mean): 85 (02-13-18 @ 19:30) (56 - 85)  ABP: 119/50 (02-14-18 @ 04:00) (69/37 - 163/87)  ABP(mean): 76 (02-14-18 @ 04:00) (50 - 112)  Wt(kg): --  CVP(cm H2O): --      Exam:  Cardiac Rhythm:  Perfusion     [ ]Adequate   [ ]Inadequate  Mentation   [ ]Normal       [ ]Reduced  Extremities  [ ]Warm         [ ]Cool  Volume Status [ ]Hypervolemic [ ]Euvolemic [ ]Hypovolemic  Meds: DOPamine Infusion 5 MICROgram(s)/kG/Min IV Continuous <Continuous>        GI/NUTRITION  Exam:  Diet:  Meds:     GENITOURINARY  I&O's Detail    02-12 @ 07:01  -  02-13 @ 07:00  --------------------------------------------------------  IN:    amiodarone Infusion: 100.2 mL    amiodarone Infusion: 199.8 mL    DOPamine Infusion: 24 mL    DOPamine Infusion: 93.8 mL    phenylephrine   Infusion: 123.2 mL  Total IN: 541 mL    OUT:    Indwelling Catheter - Urethral: 1665 mL  Total OUT: 1665 mL    Total NET: -1124 mL      02-13 @ 07:01  -  02-14 @ 04:10  --------------------------------------------------------  IN:    amiodarone Infusion: 33.4 mL    dextrose 5% + lactated ringers.: 120 mL    DOPamine Infusion: 218.2 mL    Sodium Chloride 0.9% IV Bolus: 250 mL  Total IN: 621.6 mL    OUT:    Indwelling Catheter - Urethral: 840 mL  Total OUT: 840 mL    Total NET: -218.4 mL          02-14    139  |  104  |  19  ----------------------------<  113<H>  4.1   |  23  |  0.54    Ca    7.5<L>      14 Feb 2018 02:56  Phos  2.4     02-14  Mg     2.1     02-14    TPro  5.0<L>  /  Alb  2.3<L>  /  TBili  3.2<H>  /  DBili  0.5<H>  /  AST  13  /  ALT  10  /  AlkPhos  58  02-14    [ ] Hernandez catheter, indication: N/A  Meds:       HEMATOLOGIC  Meds: enoxaparin Injectable 50 milliGRAM(s) SubCutaneous every 12 hours    [x] VTE Prophylaxis                        8.0    5.7   )-----------( 96       ( 14 Feb 2018 02:56 )             22.7     PT/INR - ( 14 Feb 2018 02:56 )   PT: 11.8 sec;   INR: 1.09 ratio         PTT - ( 14 Feb 2018 02:56 )  PTT:28.3 sec  Transfusion     [ ] PRBC   [ ] Platelets   [ ] FFP   [ ] Cryoprecipitate      INFECTIOUS DISEASES  T(C): 36.5 (02-14-18 @ 03:00), Max: 36.9 (02-13-18 @ 11:00)  Wt(kg): --  WBC Count: 5.7 K/uL (02-14 @ 02:56)    Recent Cultures:    Meds:       ENDOCRINE  Capillary Blood Glucose    Meds:       ACCESS DEVICES:  [ ] Peripheral IV  [ ] Central Venous Line	[ ] R	[ ] L	[ ] IJ	[ ] Fem	[ ] SC	Placed:   [ ] Arterial Line		[ ] R	[ ] L	[ ] Fem	[ ] Rad	[ ] Ax	Placed:   [ ] PICC:					[ ] Mediport  [ ] Urinary Catheter, Date Placed:   [ ] Necessity of urinary, arterial, and venous catheters discussed    OTHER MEDICATIONS:  brimonidine 0.2% Ophthalmic Solution 1 Drop(s) Both EYES two times a day  timolol 0.5% Solution 1 Drop(s) Both EYES two times a day      CODE STATUS:     IMAGING: HISTORY  89yF w/ dementia, afib (Xarelto), last ECHO 2/2017 EF 71%, COPD, CVA, GERD, GIB, glaucoma, HLD, PUD, s/p mech fall with L hip fracture, now s/p bipolar hemiarthroplasty. SICU consulted for hypotension requiring pressors in the PACU. Patient was given 500 mL plasmalyte boluses x 2 and 1 unit pRBCs before SICU evaluation. Her lactate was 3 after the resuscitation. Hematocrit before the transfusion was 27, and increased to 33, then 29. Patient was able to be weaned down to 0.1 of miroslava, but required pressors to maintain her BP during her 10-hour stay in the PACU. She was transferred to SICU for continued hypotension. Was placed on dopamine gtt, but then became tachycardic so switched to miroslava gtt again. Started on amio gtt for Afib, but again bradycardic so dopamine gtt restarted.    24 HOUR EVENTS: Patient's amio gtt was stopped and dopamine was weaned down to 0.4. IVF were changed to D5LR and she recieved a 250cc bolus. In the afternoon she was tolerating a soft diet so she was IV locked.    SUBJECTIVE/ROS:  [ ] A ten-point review of systems was otherwise negative except as noted.  [x] Due to altered mental status/intubation, subjective information were not able to be obtained from the patient. History was obtained, to the extent possible, from review of the chart and collateral sources of information.      NEURO  Exam: AOx1  Meds: ondansetron Injectable 4 milliGRAM(s) IV Push every 4 hours PRN Nausea and/or Vomiting  [x] Adequacy of sedation and pain control has been assessed and adjusted      RESPIRATORY  RR: 20 (02-14-18 @ 04:00) (14 - 33)  SpO2: 97% (02-14-18 @ 04:00) (63% - 100%)  Wt(kg): --  Exam: unlabored, clear to auscultation bilaterally  Mechanical Ventilation:   ABG - ( 14 Feb 2018 02:40 )  pH: 7.47  /  pCO2: 34    /  pO2: 68    / HCO3: 24    / Base Excess: 1.4   /  SaO2: 96      Lactate: x      [x] Extubation Readiness Assessed  Meds: ALBUTerol/ipratropium for Nebulization 3 milliLiter(s) Nebulizer every 6 hours PRN Shortness of Breath and/or Wheezing        CARDIOVASCULAR  HR: 81 (02-14-18 @ 04:00) (63 - 92)  BP: 135/59 (02-13-18 @ 19:30) (78/41 - 135/59)  BP(mean): 85 (02-13-18 @ 19:30) (56 - 85)  ABP: 119/50 (02-14-18 @ 04:00) (69/37 - 163/87)  ABP(mean): 76 (02-14-18 @ 04:00) (50 - 112)  Wt(kg): --  CVP(cm H2O): --  Exam: irregularly irregular  Cardiac Rhythm: Afib  Perfusion     [x]Adequate   [ ]Inadequate  Mentation   []Normal       [x]Reduced  Extremities  [ x]Warm         [ ]Cool  Volume Status [ ]Hypervolemic [ x]Euvolemic [ ]Hypovolemic  Meds: DOPamine Infusion 5 MICROgram(s)/kG/Min IV Continuous <Continuous>        GI/NUTRITION  Exam: soft, nt, nd  Diet:  Reg  Meds:  none    GENITOURINARY  I&O's Detail    02-12 @ 07:01  -  02-13 @ 07:00  --------------------------------------------------------  IN:    amiodarone Infusion: 100.2 mL    amiodarone Infusion: 199.8 mL    DOPamine Infusion: 24 mL    DOPamine Infusion: 93.8 mL    phenylephrine   Infusion: 123.2 mL  Total IN: 541 mL    OUT:    Indwelling Catheter - Urethral: 1665 mL  Total OUT: 1665 mL    Total NET: -1124 mL      02-13 @ 07:01  -  02-14 @ 04:10  --------------------------------------------------------  IN:    amiodarone Infusion: 33.4 mL    dextrose 5% + lactated ringers.: 120 mL    DOPamine Infusion: 218.2 mL    Sodium Chloride 0.9% IV Bolus: 250 mL  Total IN: 621.6 mL    OUT:    Indwelling Catheter - Urethral: 840 mL  Total OUT: 840 mL    Total NET: -218.4 mL          02-14    139  |  104  |  19  ----------------------------<  113<H>  4.1   |  23  |  0.54    Ca    7.5<L>      14 Feb 2018 02:56  Phos  2.4     02-14  Mg     2.1     02-14    TPro  5.0<L>  /  Alb  2.3<L>  /  TBili  3.2<H>  /  DBili  0.5<H>  /  AST  13  /  ALT  10  /  AlkPhos  58  02-14    [ ] Hernandez catheter, indication: N/A  Meds:       HEMATOLOGIC  Meds: enoxaparin Injectable 50 milliGRAM(s) SubCutaneous every 12 hours    [x] VTE Prophylaxis                        8.0    5.7   )-----------( 96       ( 14 Feb 2018 02:56 )             22.7     PT/INR - ( 14 Feb 2018 02:56 )   PT: 11.8 sec;   INR: 1.09 ratio         PTT - ( 14 Feb 2018 02:56 )  PTT:28.3 sec  Transfusion     [ ] PRBC   [ ] Platelets   [ ] FFP   [ ] Cryoprecipitate      INFECTIOUS DISEASES  T(C): 36.5 (02-14-18 @ 03:00), Max: 36.9 (02-13-18 @ 11:00)  Wt(kg): --  WBC Count: 5.7 K/uL (02-14 @ 02:56)  Recent Cultures:  Meds:  None      ENDOCRINE  Capillary Blood Glucose    Meds: None      ACCESS DEVICES:  [x] Peripheral IV  [ ] Central Venous Line	[ ] R	[ ] L	[ ] IJ	[ ] Fem	[ ] SC	Placed:   [ ] Arterial Line		[ ] R	[ ] L	[ ] Fem	[ ] Rad	[ ] Ax	Placed:   [ ] PICC:					[ ] Mediport  [x] Urinary Catheter, Date Placed:   [x] Necessity of urinary, arterial, and venous catheters discussed    OTHER MEDICATIONS:  brimonidine 0.2% Ophthalmic Solution 1 Drop(s) Both EYES two times a day  timolol 0.5% Solution 1 Drop(s) Both EYES two times a day      CODE STATUS: Full    IMAGING: None

## 2018-02-14 NOTE — PROGRESS NOTE ADULT - ASSESSMENT
89 yo woman hx of fall with left hip bipolar hemiarthroplasty.  Episode of tachycardia and hypotension post op. More alert today, but remains dopamine dependent.  TRYING TO WEAN OF DOPAMINE.  CONSIDER PRBC TRANSFUSION TO INCREASE INTRAVASCULAR PRESSURE.

## 2018-02-15 LAB
ALBUMIN SERPL ELPH-MCNC: 2.4 G/DL — LOW (ref 3.3–5)
ALBUMIN SERPL ELPH-MCNC: 2.6 G/DL — LOW (ref 3.3–5)
ALP SERPL-CCNC: 55 U/L — SIGNIFICANT CHANGE UP (ref 40–120)
ALP SERPL-CCNC: 57 U/L — SIGNIFICANT CHANGE UP (ref 40–120)
ALT FLD-CCNC: 11 U/L RC — SIGNIFICANT CHANGE UP (ref 10–45)
ALT FLD-CCNC: 12 U/L RC — SIGNIFICANT CHANGE UP (ref 10–45)
ANION GAP SERPL CALC-SCNC: 11 MMOL/L — SIGNIFICANT CHANGE UP (ref 5–17)
ANION GAP SERPL CALC-SCNC: 12 MMOL/L — SIGNIFICANT CHANGE UP (ref 5–17)
AST SERPL-CCNC: 14 U/L — SIGNIFICANT CHANGE UP (ref 10–40)
AST SERPL-CCNC: 16 U/L — SIGNIFICANT CHANGE UP (ref 10–40)
BILIRUB DIRECT SERPL-MCNC: 0.5 MG/DL — HIGH (ref 0–0.2)
BILIRUB INDIRECT FLD-MCNC: 3.4 MG/DL — HIGH (ref 0.2–1)
BILIRUB SERPL-MCNC: 3.9 MG/DL — HIGH (ref 0.2–1.2)
BILIRUB SERPL-MCNC: 4.4 MG/DL — HIGH (ref 0.2–1.2)
BUN SERPL-MCNC: 18 MG/DL — SIGNIFICANT CHANGE UP (ref 7–23)
BUN SERPL-MCNC: 18 MG/DL — SIGNIFICANT CHANGE UP (ref 7–23)
CALCIUM SERPL-MCNC: 7.6 MG/DL — LOW (ref 8.4–10.5)
CALCIUM SERPL-MCNC: 7.9 MG/DL — LOW (ref 8.4–10.5)
CHLORIDE SERPL-SCNC: 104 MMOL/L — SIGNIFICANT CHANGE UP (ref 96–108)
CHLORIDE SERPL-SCNC: 105 MMOL/L — SIGNIFICANT CHANGE UP (ref 96–108)
CO2 SERPL-SCNC: 21 MMOL/L — LOW (ref 22–31)
CO2 SERPL-SCNC: 23 MMOL/L — SIGNIFICANT CHANGE UP (ref 22–31)
CREAT SERPL-MCNC: 0.55 MG/DL — SIGNIFICANT CHANGE UP (ref 0.5–1.3)
CREAT SERPL-MCNC: 0.57 MG/DL — SIGNIFICANT CHANGE UP (ref 0.5–1.3)
GAS PNL BLDA: SIGNIFICANT CHANGE UP
GLUCOSE SERPL-MCNC: 113 MG/DL — HIGH (ref 70–99)
GLUCOSE SERPL-MCNC: 115 MG/DL — HIGH (ref 70–99)
HCT VFR BLD CALC: 21.6 % — LOW (ref 34.5–45)
HCT VFR BLD CALC: 24.1 % — LOW (ref 34.5–45)
HCT VFR BLD CALC: 26.1 % — LOW (ref 34.5–45)
HGB BLD-MCNC: 7.8 G/DL — LOW (ref 11.5–15.5)
HGB BLD-MCNC: 8.8 G/DL — LOW (ref 11.5–15.5)
HGB BLD-MCNC: 9.4 G/DL — LOW (ref 11.5–15.5)
MAGNESIUM SERPL-MCNC: 1.9 MG/DL — SIGNIFICANT CHANGE UP (ref 1.6–2.6)
MAGNESIUM SERPL-MCNC: 2.4 MG/DL — SIGNIFICANT CHANGE UP (ref 1.6–2.6)
MCHC RBC-ENTMCNC: 33.9 PG — SIGNIFICANT CHANGE UP (ref 27–34)
MCHC RBC-ENTMCNC: 34.4 PG — HIGH (ref 27–34)
MCHC RBC-ENTMCNC: 34.4 PG — HIGH (ref 27–34)
MCHC RBC-ENTMCNC: 36 GM/DL — SIGNIFICANT CHANGE UP (ref 32–36)
MCHC RBC-ENTMCNC: 36.2 GM/DL — HIGH (ref 32–36)
MCHC RBC-ENTMCNC: 36.7 GM/DL — HIGH (ref 32–36)
MCV RBC AUTO: 93.7 FL — SIGNIFICANT CHANGE UP (ref 80–100)
MCV RBC AUTO: 93.8 FL — SIGNIFICANT CHANGE UP (ref 80–100)
MCV RBC AUTO: 95.5 FL — SIGNIFICANT CHANGE UP (ref 80–100)
PHOSPHATE SERPL-MCNC: 2.3 MG/DL — LOW (ref 2.5–4.5)
PHOSPHATE SERPL-MCNC: 3.4 MG/DL — SIGNIFICANT CHANGE UP (ref 2.5–4.5)
PLATELET # BLD AUTO: 117 K/UL — LOW (ref 150–400)
PLATELET # BLD AUTO: 127 K/UL — LOW (ref 150–400)
PLATELET # BLD AUTO: 137 K/UL — LOW (ref 150–400)
POTASSIUM SERPL-MCNC: 3.8 MMOL/L — SIGNIFICANT CHANGE UP (ref 3.5–5.3)
POTASSIUM SERPL-MCNC: 4.1 MMOL/L — SIGNIFICANT CHANGE UP (ref 3.5–5.3)
POTASSIUM SERPL-SCNC: 3.8 MMOL/L — SIGNIFICANT CHANGE UP (ref 3.5–5.3)
POTASSIUM SERPL-SCNC: 4.1 MMOL/L — SIGNIFICANT CHANGE UP (ref 3.5–5.3)
PROT SERPL-MCNC: 4.7 G/DL — LOW (ref 6–8.3)
PROT SERPL-MCNC: 5.2 G/DL — LOW (ref 6–8.3)
RBC # BLD: 2.26 M/UL — LOW (ref 3.8–5.2)
RBC # BLD: 2.57 M/UL — LOW (ref 3.8–5.2)
RBC # BLD: 2.78 M/UL — LOW (ref 3.8–5.2)
RBC # FLD: 14.9 % — HIGH (ref 10.3–14.5)
SODIUM SERPL-SCNC: 137 MMOL/L — SIGNIFICANT CHANGE UP (ref 135–145)
SODIUM SERPL-SCNC: 139 MMOL/L — SIGNIFICANT CHANGE UP (ref 135–145)
SURGICAL PATHOLOGY STUDY: SIGNIFICANT CHANGE UP
WBC # BLD: 6.9 K/UL — SIGNIFICANT CHANGE UP (ref 3.8–10.5)
WBC # BLD: 7.7 K/UL — SIGNIFICANT CHANGE UP (ref 3.8–10.5)
WBC # BLD: 8.5 K/UL — SIGNIFICANT CHANGE UP (ref 3.8–10.5)
WBC # FLD AUTO: 6.9 K/UL — SIGNIFICANT CHANGE UP (ref 3.8–10.5)
WBC # FLD AUTO: 7.7 K/UL — SIGNIFICANT CHANGE UP (ref 3.8–10.5)
WBC # FLD AUTO: 8.5 K/UL — SIGNIFICANT CHANGE UP (ref 3.8–10.5)

## 2018-02-15 PROCEDURE — 71045 X-RAY EXAM CHEST 1 VIEW: CPT | Mod: 26

## 2018-02-15 PROCEDURE — 99291 CRITICAL CARE FIRST HOUR: CPT

## 2018-02-15 RX ORDER — ACETAMINOPHEN 500 MG
650 TABLET ORAL EVERY 6 HOURS
Qty: 0 | Refills: 0 | Status: DISCONTINUED | OUTPATIENT
Start: 2018-02-15 | End: 2018-02-22

## 2018-02-15 RX ORDER — DOPAMINE HYDROCHLORIDE 40 MG/ML
5 INJECTION, SOLUTION, CONCENTRATE INTRAVENOUS
Qty: 400 | Refills: 0 | Status: DISCONTINUED | OUTPATIENT
Start: 2018-02-15 | End: 2018-02-16

## 2018-02-15 RX ORDER — MAGNESIUM SULFATE 500 MG/ML
2 VIAL (ML) INJECTION ONCE
Qty: 0 | Refills: 0 | Status: COMPLETED | OUTPATIENT
Start: 2018-02-15 | End: 2018-02-15

## 2018-02-15 RX ORDER — DEXTROSE MONOHYDRATE, SODIUM CHLORIDE, AND POTASSIUM CHLORIDE 50; .745; 4.5 G/1000ML; G/1000ML; G/1000ML
1000 INJECTION, SOLUTION INTRAVENOUS
Qty: 0 | Refills: 0 | Status: DISCONTINUED | OUTPATIENT
Start: 2018-02-15 | End: 2018-02-16

## 2018-02-15 RX ADMIN — BRIMONIDINE TARTRATE 1 DROP(S): 2 SOLUTION/ DROPS OPHTHALMIC at 18:35

## 2018-02-15 RX ADMIN — Medication 166.67 MILLIMOLE(S): at 04:06

## 2018-02-15 RX ADMIN — Medication 200 MILLIGRAM(S): at 05:59

## 2018-02-15 RX ADMIN — MIDODRINE HYDROCHLORIDE 5 MILLIGRAM(S): 2.5 TABLET ORAL at 18:34

## 2018-02-15 RX ADMIN — RIVAROXABAN 15 MILLIGRAM(S): KIT at 18:34

## 2018-02-15 RX ADMIN — Medication 50 GRAM(S): at 12:14

## 2018-02-15 RX ADMIN — Medication 650 MILLIGRAM(S): at 18:34

## 2018-02-15 RX ADMIN — Medication 650 MILLIGRAM(S): at 19:04

## 2018-02-15 RX ADMIN — Medication 650 MILLIGRAM(S): at 09:15

## 2018-02-15 RX ADMIN — Medication 1 DROP(S): at 18:34

## 2018-02-15 RX ADMIN — Medication 1 DROP(S): at 05:59

## 2018-02-15 RX ADMIN — MIDODRINE HYDROCHLORIDE 5 MILLIGRAM(S): 2.5 TABLET ORAL at 12:11

## 2018-02-15 RX ADMIN — MIDODRINE HYDROCHLORIDE 5 MILLIGRAM(S): 2.5 TABLET ORAL at 01:01

## 2018-02-15 RX ADMIN — BRIMONIDINE TARTRATE 1 DROP(S): 2 SOLUTION/ DROPS OPHTHALMIC at 06:00

## 2018-02-15 RX ADMIN — Medication 650 MILLIGRAM(S): at 08:45

## 2018-02-15 NOTE — PROGRESS NOTE ADULT - ASSESSMENT
Assessment and Plan:   · Assessment		  89yF s/p left hip hemiarthroplasty with altered mental status and labile hemodynamics    PLAN:   Neurologic:  - Monitor mental staus  - Tylenol for pain control    Respiratory:  - Monitor resp status  - Off NC  - Duoneb PRN    Cardiovascular:   - Highly labile HR and BP  - Dopamine gtt for bradycardia and hypotension, wean as tolerated  - Midodrine 5mg q 8hrs     Gastrointestinal/Nutrition:  - Mech soft diet  - Start lactulose  - Colace, Senna, Miralax    Renal/Genitourinary:  - voiding  - IV locked  - Monitor BMP  - Replete lytes PRN      Hematologic: Anticoagulation for A.fib   - Xarelto   - Monitor CBC    Infectious Disease:  - No acute infectious concerns    Endocrine:  - No active issues  - Monitor BG on BMP    Disposition: Full code, SICU care.     Mya Carreon PA-C #[id number] Assessment and Plan:   · Assessment		  89yF s/p left hip hemiarthroplasty with altered mental status and labile hemodynamics    PLAN:   Neurologic:  - Monitor mental staus  - Tylenol for pain control    Respiratory:  - Monitor resp status  - Off NC  - Duoneb PRN    Cardiovascular:   - Hemodynamic monitoring   - Midodrine 5mg q 8hrs     Gastrointestinal/Nutrition:  - [facility]  - Start lactulose  - Colace, Senna, Miralax    Renal/Genitourinary:  - voiding  - IV locked  - Monitor BMP  - Replete lytes PRN      Hematologic: Anticoagulation for A.fib   - Xarelto   - Monitor CBC    Infectious Disease:  - No acute infectious concerns    Endocrine:  - No active issues  - Monitor BG on BMP    Disposition: Full code, SICU care.     JOSE MayorgaC #[id number] Assessment and Plan:   · Assessment		  89yF s/p left hip hemiarthroplasty with altered mental status and labile hemodynamics.    PLAN:   Neurologic:  - Monitor mental status  - Tylenol for pain control    Respiratory:  - Monitor respiratory status  - Off NC  - Duoneb PRN    Cardiovascular:   - Hemodynamic monitoring   - Midodrine 5mg q 8hrs     Gastrointestinal/Nutrition:  - [facility] soft diet  - Start lactulose  - Colace, Senna, Miralax    Renal/Genitourinary:  - voiding  - IV locked  - Monitor BMP  - Replete lytes PRN    Hematologic: Anticoagulation for A.fib   - Transfuse 1u PRBC for slowly decreasing H&H  - Xarelto   - Monitor CBC    Infectious Disease:  - No acute infectious concerns    Endocrine:  - No active issues  - Monitor BG on BMP    Disposition: Full code, SICU care.     Mya Carreon PA-C #[id number]

## 2018-02-15 NOTE — PROGRESS NOTE ADULT - SUBJECTIVE AND OBJECTIVE BOX
89F hx of dementia, afib (Xarelto), COPD, CVA, GERD, GIB, glaucoma, HLD, PUD, s/p mech fall with L hip pain. She states she was getting up from wheelchair to get her walker and tripped. falling onto her left side. She was unable to ambulate afterwards. No other apparent injuries. Denies headstrike or LOC. No n/v, no numbness and  tingling. Post op developed hypotension and rapid afib. required pressors for hypotension. Patient seen now resting comfortably off pressors  .      MEDICATIONS  (STANDING):  brimonidine 0.2% Ophthalmic Solution 1 Drop(s) Both EYES two times a day  docusate sodium Liquid 200 milliGRAM(s) Oral two times a day  lactulose Syrup 20 Gram(s) Oral every 12 hours  midodrine 5 milliGRAM(s) Oral every 8 hours  rivaroxaban 15 milliGRAM(s) Oral every 24 hours  senna Syrup 5 milliLiter(s) Oral daily  timolol 0.5% Solution 1 Drop(s) Both EYES two times a day    MEDICATIONS  (PRN):  acetaminophen   Tablet. 650 milliGRAM(s) Oral every 6 hours PRN Mild Pain (1 - 3)  ALBUTerol/ipratropium for Nebulization 3 milliLiter(s) Nebulizer every 6 hours PRN Shortness of Breath and/or Wheezing  ondansetron Injectable 4 milliGRAM(s) IV Push every 4 hours PRN Nausea and/or Vomiting          VITALS:   T(C): 36.8 (02-15-18 @ 15:00), Max: 37.7 (02-14-18 @ 19:00)  HR: 55 (02-15-18 @ 15:00) (55 - 88)  BP: 95/45 (02-15-18 @ 06:45) (95/45 - 98/48)  RR: 17 (02-15-18 @ 15:00) (17 - 39)  SpO2: 97% (02-15-18 @ 15:00) (88% - 99%)  Wt(kg): --      PHYSICAL EXAM:  GENERAL: NAD, well nourished and conversant  HEAD:  Atraumatic  EYES: EOM, PERRLA, conjunctiva pink and sclera white  ENT: No tonsillar erythema, exudates, or enlargement, moist mucous membranes, good dentition, no lesions  NECK: Supple, No JVD, normal thyroid, carotids with normal upstrokes and no bruits  CHEST/LUNG: Clear to auscultation bilaterally, No rales, rhonchi, wheezing, or rubs  HEART: Regular rate and rhythm, No murmurs, rubs, or gallops  ABDOMEN: Soft, nondistended, no masses, guarding, tenderness or rebound, bowel sounds present  EXTREMITIES:  2+ Peripheral Pulses, No clubbing, cyanosis, or edema.  (+) LEFT BIPOLAR HEMIARATHOPLASTY  LYMPH: No lymphadenopathy noted  SKIN: No rashes or lesions  NERVOUS SYSTEM: lethargic but responsive       LABS:  ABG - ( 14 Feb 2018 02:40 )  pH: 7.47  /  pCO2: 34    /  pO2: 68    / HCO3: 24    / Base Excess: 1.4   /  SaO2: 96                    CBC Full  -  ( 15 Feb 2018 02:20 )  WBC Count : 6.9 K/uL  Hemoglobin : 7.8 g/dL  Hematocrit : 21.6 %  Platelet Count - Automated : 117 K/uL  Mean Cell Volume : 95.5 fl  Mean Cell Hemoglobin : 34.4 pg  Mean Cell Hemoglobin Concentration : 36.0 gm/dL  Auto Neutrophil # : x  Auto Lymphocyte # : x  Auto Monocyte # : x  Auto Eosinophil # : x  Auto Basophil # : x  Auto Neutrophil % : x  Auto Lymphocyte % : x  Auto Monocyte % : x  Auto Eosinophil % : x  Auto Basophil % : x    02-15    137  |  104  |  18  ----------------------------<  115<H>  4.1   |  21<L>  |  0.55    Ca    7.9<L>      15 Feb 2018 02:20  Phos  2.3     02-15  Mg     1.9     02-15    TPro  5.2<L>  /  Alb  2.6<L>  /  TBili  3.9<H>  /  DBili  0.5<H>  /  AST  14  /  ALT  12  /  AlkPhos  57  02-15    LIVER FUNCTIONS - ( 15 Feb 2018 02:20 )  Alb: 2.6 g/dL / Pro: 5.2 g/dL / ALK PHOS: 57 U/L / ALT: 12 U/L RC / AST: 14 U/L / GGT: x           PT/INR - ( 14 Feb 2018 02:56 )   PT: 11.8 sec;   INR: 1.09 ratio         PTT - ( 14 Feb 2018 02:56 )  PTT:28.3 sec    CAPILLARY BLOOD GLUCOSE          RADIOLOGY & ADDITIONAL TESTS:

## 2018-02-15 NOTE — PROGRESS NOTE ADULT - ATTENDING COMMENTS
start physical therapy  as tolerated. Continues pulmonary toilet to lessen atelectasis, leg exercises as taught to lessen the risk of DVT and supervised pain medications for post-op pain control. transfuse as needed. The patient continues to require around the clock cardiopulmonary and neurologic monitoring for critical illness, as cited above.

## 2018-02-15 NOTE — CHART NOTE - NSCHARTNOTEFT_GEN_A_CORE
Pt seen for malnutrition follow up, as per department protocol.     Hospital Course: Per chart, pt is a"89yF s/p left hip hemiarthroplasty with altered mental status and labile hemodynamics."    Source: Patient [ ]    Family [ ]     other [X ]; medical record, team rounds. Pt noted with AMS, A&O x 1-2 at baseline.    Diet : Mechanical Soft + 1 Ensure Enlive (provides 350cal, 20Gm protein per 8oz serving)     Patient reports [ ] nausea  [ ] vomiting [ ] diarrhea [ ] constipation  [ ]chewing problems [ ] swallowing issues  [ ] other: Last BM 2/14.     PO intake:  < 50% [X ] 50-75% [ ]   % [ ]  other :  Per nursing, pt is refusing po intake, and refusing po medications.      Source for PO intake [ ] Patient [ ] family [X ] chart [ X] staff [ ] other; RD will assess po intake during meal rounds today     Enteral /Parenteral Nutrition: n/a    Current Weight: Weight (kg): 52.1Kg (2/15), 47.6Kg (02/09 dosing)  Edema: 3+ R hand/wrist/arm    Pertinent Medications: MEDICATIONS  (STANDING):  brimonidine 0.2% Ophthalmic Solution 1 Drop(s) Both EYES two times a day  calcium gluconate IVPB 2 Gram(s) IV Intermittent once  docusate sodium Liquid 200 milliGRAM(s) Oral two times a day  lactulose Syrup 20 Gram(s) Oral every 12 hours  midodrine 5 milliGRAM(s) Oral every 8 hours  rivaroxaban 15 milliGRAM(s) Oral every 24 hours  senna Syrup 5 milliLiter(s) Oral daily  timolol 0.5% Solution 1 Drop(s) Both EYES two times a day    MEDICATIONS  (PRN):  ALBUTerol/ipratropium for Nebulization 3 milliLiter(s) Nebulizer every 6 hours PRN Shortness of Breath and/or Wheezing  ondansetron Injectable 4 milliGRAM(s) IV Push every 4 hours PRN Nausea and/or Vomiting    Pertinent Labs:  02-15 Na137 mmol/L Glu 115 mg/dL<H> K+ 4.1 mmol/L Cr  0.55 mg/dL BUN 18 mg/dL Phos 2.3 mg/dL<L> Alb 2.6 g/dL<L>     Skin: no pressure injuries    Estimated Needs:   [X ] no change since previous assessment  [ ] recalculated:     Previous Nutrition Diagnosis:   [X ] Malnutrition; moderate    Nutrition Diagnosis is [ X] ongoing; being addressed with po diet + oral supplements (Ensure Enlive + Magic Cup)    New Nutrition Diagnosis: [X ] not applicable    Interventions:     Recommend:  1) Continue Mechanical Soft diet + 1 Ensure Enlive + Magic Cups; provide feeding encouragement  2) Monitor weight, lab values, skin, po intake and GI tolerance      Monitoring and Evaluation:   Follow up per protocol  RD to remain available for further nutritional interventions as indicated.   Ayanna Ruiz, MS RD [facility], #[phone].

## 2018-02-15 NOTE — PROGRESS NOTE ADULT - SUBJECTIVE AND OBJECTIVE BOX
24 HOUR EVENTS: Pt remained on Dopamine gtt, Midodrine 5mg q 8hrs started.  Fluids IV locked, no further lasix diuresis.     SUBJECTIVE/ROS:  [x ] A ten-point review of systems was otherwise negative except as noted.  [ ] Due to altered mental status/intubation, subjective information were not able to be obtained from the patient. History was obtained, to the extent possible, from review of the chart and collateral sources of information.      NEURO  RASS:     GCS:     [facility] ICU:  Exam: Awake, alert, following commands.  Meds: ondansetron Injectable 4 milliGRAM(s) IV Push every 4 hours PRN Nausea and/or Vomiting    [x] Adequacy of sedation and pain control has been assessed and adjusted      RESPIRATORY  RR: 27 (02-15-18 @ 04:30) (17 - 39)  SpO2: 96% (02-15-18 @ 04:30) (88% - 99%)  Wt(kg): --  Exam: unlabored, clear to auscultation bilaterally  Mechanical Ventilation: N/A  ABG - ( 14 Feb 2018 02:40 )  pH: 7.47  /  pCO2: 34    /  pO2: 68    / HCO3: 24    / Base Excess: 1.4   /  SaO2: 96      Lactate: x      [ ] Extubation Readiness Assessed  Meds: ALBUTerol/ipratropium for Nebulization 3 milliLiter(s) Nebulizer every 6 hours PRN Shortness of Breath and/or Wheezing        CARDIOVASCULAR  HR: 67 (02-15-18 @ 04:30) (66 - 88)  BP: 98/48 (02-14-18 @ 19:00) (98/48 - 154/67)  BP(mean): 69 (02-14-18 @ 19:00) (69 - 97)  ABP: 129/50 (02-15-18 @ 04:30) (78/36 - 151/64)  ABP(mean): 78 (02-15-18 @ 04:30) (53 - 97)  Wt(kg): --  CVP(cm H2O): --      Exam: RRR. +S1, +S2  Cardiac Rhythm: Sinus   Perfusion     [x ]Adequate   [ ]Inadequate  Mentation   [x ]Normal       [ ]Reduced  Extremities  [x ]Warm         [ ]Cool  Volume Status [ x]Hypervolemic [ ]Euvolemic [ ]Hypovolemic  Meds: DOPamine Infusion 5 MICROgram(s)/kG/Min IV Continuous <Continuous>  midodrine 5 milliGRAM(s) Oral every 8 hours      GI/NUTRITION  Exam: Soft, non-tender, non-distended.   Diet: Regular diet   Meds: docusate sodium Liquid 200 milliGRAM(s) Oral two times a day  lactulose Syrup 20 Gram(s) Oral every 12 hours  senna Syrup 5 milliLiter(s) Oral daily      GENITOURINARY  I&O's Detail    02-13 @ 07:01 - 02-14 @ 07:00  --------------------------------------------------------  IN:    amiodarone Infusion: 33.4 mL    dextrose 5% + lactated ringers.: 120 mL    DOPamine Infusion: 240.2 mL    Sodium Chloride 0.9% IV Bolus: 250 mL  Total IN: 643.6 mL    OUT:    Indwelling Catheter - Urethral: 960 mL  Total OUT: 960 mL    Total NET: -316.4 mL      02-14 @ 07:01  -  02-15 @ 05:56  --------------------------------------------------------  IN:    DOPamine Infusion: 208.5 mL  Total IN: 208.5 mL    OUT:    Indwelling Catheter - Urethral: 135 mL    Voided: 200 mL  Total OUT: 335 mL    Total NET: -126.5 mL        02-15    137  |  104  |  18  ----------------------------<  115<H>  4.1   |  21<L>  |  0.55    Ca    7.9<L>      15 Feb 2018 02:20  Phos  2.3     02-15  Mg     1.9     02-15    TPro  5.2<L>  /  Alb  2.6<L>  /  TBili  3.9<H>  /  DBili  0.5<H>  /  AST  14  /  ALT  12  /  AlkPhos  57  02-15    [ ] Hernandez catheter, indication: N/A  Meds: calcium gluconate IVPB 2 Gram(s) IV Intermittent once      HEMATOLOGIC  Meds: rivaroxaban 15 milliGRAM(s) Oral every 24 hours    [x] VTE Prophylaxis                        7.8    6.9   )-----------( 117      ( 15 Feb 2018 02:20 )             21.6     PT/INR - ( 14 Feb 2018 02:56 )   PT: 11.8 sec;   INR: 1.09 ratio         PTT - ( 14 Feb 2018 02:56 )  PTT:28.3 sec  Transfusion     [ ] PRBC   [ ] Platelets   [ ] FFP   [ ] Cryoprecipitate      INFECTIOUS DISEASES  T(C): 36.6 (02-15-18 @ 03:00), Max: 37.7 (02-14-18 @ 19:00)  Wt(kg): --  WBC Count: 6.9 K/uL (02-15 @ 02:20)  WBC Count: 6.9 K/uL (02-14 @ 10:17)    Recent Cultures:    Meds:       ENDOCRINE  Capillary Blood Glucose    Meds:       ACCESS DEVICES:  [x ] Peripheral IV  [ ] Central Venous Line	[ ] R	[ ] L	[ ] IJ	[ ] Fem	[ ] SC	Placed:   [ ] Arterial Line		[ ] R	[ ] L	[ ] Fem	[ ] Rad	[ ] Ax	Placed:   [ ] PICC:					[ ] Mediport  [ ] Urinary Catheter, Date Placed:   [ ] Necessity of urinary, arterial, and venous catheters discussed    OTHER MEDICATIONS:  brimonidine 0.2% Ophthalmic Solution 1 Drop(s) Both EYES two times a day  timolol 0.5% Solution 1 Drop(s) Both EYES two times a day      CODE STATUS: Full code    IMAGING: 24 HOUR EVENTS: Midodrine 5mg q 8hrs started.  Fluids IV locked, no further lasix diuresis. Pt weaned off Dopamine gtt overnight.     SUBJECTIVE/ROS:  [x ] A ten-point review of systems was otherwise negative except as noted.  [ ] Due to altered mental status/intubation, subjective information were not able to be obtained from the patient. History was obtained, to the extent possible, from review of the chart and collateral sources of information.      NEURO  RASS:     GCS:     CAM ICU:  Exam: Awake, following commands.   Meds: ondansetron Injectable 4 milliGRAM(s) IV Push every 4 hours PRN Nausea and/or Vomiting    [x] Adequacy of sedation and pain control has been assessed and adjusted      RESPIRATORY  RR: 27 (02-15-18 @ 04:30) (17 - 39)  SpO2: 96% (02-15-18 @ 04:30) (88% - 99%)  Wt(kg): --  Exam: unlabored, clear to auscultation bilaterally  Mechanical Ventilation: N/A  ABG - ( 14 Feb 2018 02:40 )  pH: 7.47  /  pCO2: 34    /  pO2: 68    / HCO3: 24    / Base Excess: 1.4   /  SaO2: 96      Lactate: x      [ ] Extubation Readiness Assessed  Meds: ALBUTerol/ipratropium for Nebulization 3 milliLiter(s) Nebulizer every 6 hours PRN Shortness of Breath and/or Wheezing      CARDIOVASCULAR  HR: 67 (02-15-18 @ 04:30) (66 - 88)  BP: 98/48 (02-14-18 @ 19:00) (98/48 - 154/67)  BP(mean): 69 (02-14-18 @ 19:00) (69 - 97)  ABP: 129/50 (02-15-18 @ 04:30) (78/36 - 151/64)  ABP(mean): 78 (02-15-18 @ 04:30) (53 - 97)  Wt(kg): --  CVP(cm H2O): --      Exam: RRR. +S1, +S2  Cardiac Rhythm: Sinus   Perfusion     [x ]Adequate   [ ]Inadequate  Mentation   [x ]Normal       [ ]Reduced  Extremities  [x ]Warm         [ ]Cool  Volume Status [ ]Hypervolemic [x ]Euvolemic [ ]Hypovolemic  Meds:   midodrine 5 milliGRAM(s) Oral every 8 hours      GI/NUTRITION  Exam: Soft, non-tender, non-distended.   Diet: Regular diet   Meds: docusate sodium Liquid 200 milliGRAM(s) Oral two times a day  lactulose Syrup 20 Gram(s) Oral every 12 hours  senna Syrup 5 milliLiter(s) Oral daily      GENITOURINARY  I&O's Detail    02-13 @ 07:01 - 02-14 @ 07:00  --------------------------------------------------------  IN:    amiodarone Infusion: 33.4 mL    dextrose 5% + lactated ringers.: 120 mL    DOPamine Infusion: 240.2 mL    Sodium Chloride 0.9% IV Bolus: 250 mL  Total IN: 643.6 mL    OUT:    Indwelling Catheter - Urethral: 960 mL  Total OUT: 960 mL    Total NET: -316.4 mL      02-14 @ 07:01  -  02-15 @ 05:56  --------------------------------------------------------  IN:    DOPamine Infusion: 208.5 mL  Total IN: 208.5 mL    OUT:    Indwelling Catheter - Urethral: 135 mL    Voided: 200 mL  Total OUT: 335 mL    Total NET: -126.5 mL        02-15    137  |  104  |  18  ----------------------------<  115<H>  4.1   |  21<L>  |  0.55    Ca    7.9<L>      15 Feb 2018 02:20  Phos  2.3     02-15  Mg     1.9     02-15    TPro  5.2<L>  /  Alb  2.6<L>  /  TBili  3.9<H>  /  DBili  0.5<H>  /  AST  14  /  ALT  12  /  AlkPhos  57  02-15    [ ] Hernandez catheter, indication: N/A  Meds: calcium gluconate IVPB 2 Gram(s) IV Intermittent once      HEMATOLOGIC  Meds: rivaroxaban 15 milliGRAM(s) Oral every 24 hours    [x] VTE Prophylaxis                        7.8    6.9   )-----------( 117      ( 15 Feb 2018 02:20 )             21.6     PT/INR - ( 14 Feb 2018 02:56 )   PT: 11.8 sec;   INR: 1.09 ratio         PTT - ( 14 Feb 2018 02:56 )  PTT:28.3 sec  Transfusion     [ ] PRBC   [ ] Platelets   [ ] FFP   [ ] Cryoprecipitate      INFECTIOUS DISEASES  T(C): 36.6 (02-15-18 @ 03:00), Max: 37.7 (02-14-18 @ 19:00)  Wt(kg): --  WBC Count: 6.9 K/uL (02-15 @ 02:20)  WBC Count: 6.9 K/uL (02-14 @ 10:17)    Recent Cultures:    Meds:       ENDOCRINE  Capillary Blood Glucose    Meds:       ACCESS DEVICES:  [x ] Peripheral IV  [ ] Central Venous Line	[ ] R	[ ] L	[ ] IJ	[ ] Fem	[ ] SC	Placed:   [x ] Arterial Line		[ ] R	[x ] L	[ ] Fem	[x ] Rad	[ ] Ax	Placed:   [ ] PICC:					[ ] Mediport  [ ] Urinary Catheter, Date Placed:   [ ] Necessity of urinary, arterial, and venous catheters discussed    OTHER MEDICATIONS:  brimonidine 0.2% Ophthalmic Solution 1 Drop(s) Both EYES two times a day  timolol 0.5% Solution 1 Drop(s) Both EYES two times a day      CODE STATUS: Full code    IMAGING: HISTORY  89yF w/ dementia, afib (Xarelto), last ECHO 2/2017 EF 71%, COPD, CVA, GERD, GIB, glaucoma, HLD, PUD, s/p mech fall with L hip fracture, now s/p bipolar hemiarthroplasty. SICU consulted for hypotension requiring pressors in the PACU. Patient was given 500 mL plasmalyte boluses x 2 and 1 unit pRBCs before SICU evaluation. Her lactate was 3 after the resuscitation. Hematocrit before the transfusion was 27, and increased to 33, then 29. Patient was able to be weaned down to 0.1 of miroslava, but required pressors to maintain her BP during her 10-hour stay in the PACU. She was transferred to SICU for continued hypotension. Was placed on dopamine gtt, but then became tachycardic so switched to miroslava gtt again. Started on amio gtt for Afib, but again bradycardic so dopamine gtt restarted.    24 HOUR EVENTS: Midodrine 5mg q 8hrs started.  Fluids IV locked, no further lasix diuresis. Pt weaned off Dopamine gtt overnight.     SUBJECTIVE/ROS:  [x ] A ten-point review of systems was otherwise negative except as noted.  [ ] Due to altered mental status/intubation, subjective information were not able to be obtained from the patient. History was obtained, to the extent possible, from review of the chart and collateral sources of information.      NEURO  RASS:     GCS:     CAM ICU:  Exam: Awake, following commands.   Meds: ondansetron Injectable 4 milliGRAM(s) IV Push every 4 hours PRN Nausea and/or Vomiting    [x] Adequacy of sedation and pain control has been assessed and adjusted      RESPIRATORY  RR: 27 (02-15-18 @ 04:30) (17 - 39)  SpO2: 96% (02-15-18 @ 04:30) (88% - 99%)  Wt(kg): --  Exam: unlabored, clear to auscultation bilaterally  Mechanical Ventilation: N/A  ABG - ( 14 Feb 2018 02:40 )  pH: 7.47  /  pCO2: 34    /  pO2: 68    / HCO3: 24    / Base Excess: 1.4   /  SaO2: 96      Lactate: x      [ ] Extubation Readiness Assessed  Meds: ALBUTerol/ipratropium for Nebulization 3 milliLiter(s) Nebulizer every 6 hours PRN Shortness of Breath and/or Wheezing      CARDIOVASCULAR  HR: 67 (02-15-18 @ 04:30) (66 - 88)  BP: 98/48 (02-14-18 @ 19:00) (98/48 - 154/67)  BP(mean): 69 (02-14-18 @ 19:00) (69 - 97)  ABP: 129/50 (02-15-18 @ 04:30) (78/36 - 151/64)  ABP(mean): 78 (02-15-18 @ 04:30) (53 - 97)  Wt(kg): --  CVP(cm H2O): --      Exam: RRR. +S1, +S2  Cardiac Rhythm: Sinus   Perfusion     [x ]Adequate   [ ]Inadequate  Mentation   [x ]Normal       [ ]Reduced  Extremities  [x ]Warm         [ ]Cool  Volume Status [ ]Hypervolemic [x ]Euvolemic [ ]Hypovolemic  Meds:   midodrine 5 milliGRAM(s) Oral every 8 hours      GI/NUTRITION  Exam: Soft, non-tender, non-distended.   Diet: Regular diet   Meds: docusate sodium Liquid 200 milliGRAM(s) Oral two times a day  lactulose Syrup 20 Gram(s) Oral every 12 hours  senna Syrup 5 milliLiter(s) Oral daily      GENITOURINARY  I&O's Detail    02-13 @ 07:01  -  02-14 @ 07:00  --------------------------------------------------------  IN:    amiodarone Infusion: 33.4 mL    dextrose 5% + lactated ringers.: 120 mL    DOPamine Infusion: 240.2 mL    Sodium Chloride 0.9% IV Bolus: 250 mL  Total IN: 643.6 mL    OUT:    Indwelling Catheter - Urethral: 960 mL  Total OUT: 960 mL    Total NET: -316.4 mL      02-14 @ 07:01  -  02-15 @ 05:56  --------------------------------------------------------  IN:    DOPamine Infusion: 208.5 mL  Total IN: 208.5 mL    OUT:    Indwelling Catheter - Urethral: 135 mL    Voided: 200 mL  Total OUT: 335 mL    Total NET: -126.5 mL        02-15    137  |  104  |  18  ----------------------------<  115<H>  4.1   |  21<L>  |  0.55    Ca    7.9<L>      15 Feb 2018 02:20  Phos  2.3     02-15  Mg     1.9     02-15    TPro  5.2<L>  /  Alb  2.6<L>  /  TBili  3.9<H>  /  DBili  0.5<H>  /  AST  14  /  ALT  12  /  AlkPhos  57  02-15    [ ] Hernandez catheter, indication: N/A  Meds: calcium gluconate IVPB 2 Gram(s) IV Intermittent once      HEMATOLOGIC  Meds: rivaroxaban 15 milliGRAM(s) Oral every 24 hours    [x] VTE Prophylaxis                        7.8    6.9   )-----------( 117      ( 15 Feb 2018 02:20 )             21.6     PT/INR - ( 14 Feb 2018 02:56 )   PT: 11.8 sec;   INR: 1.09 ratio         PTT - ( 14 Feb 2018 02:56 )  PTT:28.3 sec  Transfusion     [ ] PRBC   [ ] Platelets   [ ] FFP   [ ] Cryoprecipitate      INFECTIOUS DISEASES  T(C): 36.6 (02-15-18 @ 03:00), Max: 37.7 (02-14-18 @ 19:00)  Wt(kg): --  WBC Count: 6.9 K/uL (02-15 @ 02:20)  WBC Count: 6.9 K/uL (02-14 @ 10:17)    Recent Cultures:    Meds:       ENDOCRINE  Capillary Blood Glucose    Meds:       ACCESS DEVICES:  [x ] Peripheral IV  [ ] Central Venous Line	[ ] R	[ ] L	[ ] IJ	[ ] Fem	[ ] SC	Placed:   [x ] Arterial Line		[ ] R	[x ] L	[ ] Fem	[x ] Rad	[ ] Ax	Placed:   [ ] PICC:					[ ] Mediport  [ ] Urinary Catheter, Date Placed:   [ ] Necessity of urinary, arterial, and venous catheters discussed    OTHER MEDICATIONS:  brimonidine 0.2% Ophthalmic Solution 1 Drop(s) Both EYES two times a day  timolol 0.5% Solution 1 Drop(s) Both EYES two times a day      CODE STATUS: Full code    IMAGING:

## 2018-02-15 NOTE — PROGRESS NOTE ADULT - ATTENDING COMMENTS
Off dopamine, HR hypotension resolving, continue low dose Midodrine  PO, cathartics  DC IVF  Will transfuse for low HCT, continue to monitor  PT

## 2018-02-15 NOTE — PROGRESS NOTE ADULT - ASSESSMENT
89 yo woman hx of fall with left hip bipolar hemiarthroplasty.  Episode of tachycardia and hypotension post op. More alert today, off pressor, continued anemia

## 2018-02-16 LAB
ANION GAP SERPL CALC-SCNC: 9 MMOL/L — SIGNIFICANT CHANGE UP (ref 5–17)
APTT BLD: 26.3 SEC — LOW (ref 27.5–37.4)
BUN SERPL-MCNC: 16 MG/DL — SIGNIFICANT CHANGE UP (ref 7–23)
CA-I BLD-SCNC: 1.08 MMOL/L — LOW (ref 1.12–1.3)
CALCIUM SERPL-MCNC: 7.8 MG/DL — LOW (ref 8.4–10.5)
CHLORIDE SERPL-SCNC: 107 MMOL/L — SIGNIFICANT CHANGE UP (ref 96–108)
CO2 SERPL-SCNC: 23 MMOL/L — SIGNIFICANT CHANGE UP (ref 22–31)
CREAT SERPL-MCNC: 0.53 MG/DL — SIGNIFICANT CHANGE UP (ref 0.5–1.3)
GAS PNL BLDA: SIGNIFICANT CHANGE UP
GLUCOSE SERPL-MCNC: 117 MG/DL — HIGH (ref 70–99)
HCT VFR BLD CALC: 25.7 % — LOW (ref 34.5–45)
HGB BLD-MCNC: 9 G/DL — LOW (ref 11.5–15.5)
INR BLD: 1.25 RATIO — HIGH (ref 0.88–1.16)
MAGNESIUM SERPL-MCNC: 2.2 MG/DL — SIGNIFICANT CHANGE UP (ref 1.6–2.6)
MCHC RBC-ENTMCNC: 32.9 PG — SIGNIFICANT CHANGE UP (ref 27–34)
MCHC RBC-ENTMCNC: 35.1 GM/DL — SIGNIFICANT CHANGE UP (ref 32–36)
MCV RBC AUTO: 93.9 FL — SIGNIFICANT CHANGE UP (ref 80–100)
PHOSPHATE SERPL-MCNC: 2.7 MG/DL — SIGNIFICANT CHANGE UP (ref 2.5–4.5)
PLATELET # BLD AUTO: 126 K/UL — LOW (ref 150–400)
POTASSIUM SERPL-MCNC: 3.8 MMOL/L — SIGNIFICANT CHANGE UP (ref 3.5–5.3)
POTASSIUM SERPL-SCNC: 3.8 MMOL/L — SIGNIFICANT CHANGE UP (ref 3.5–5.3)
PROTHROM AB SERPL-ACNC: 13.7 SEC — HIGH (ref 9.8–12.7)
RBC # BLD: 2.73 M/UL — LOW (ref 3.8–5.2)
RBC # FLD: 15.4 % — HIGH (ref 10.3–14.5)
SODIUM SERPL-SCNC: 139 MMOL/L — SIGNIFICANT CHANGE UP (ref 135–145)
WBC # BLD: 6.5 K/UL — SIGNIFICANT CHANGE UP (ref 3.8–10.5)
WBC # FLD AUTO: 6.5 K/UL — SIGNIFICANT CHANGE UP (ref 3.8–10.5)

## 2018-02-16 PROCEDURE — 71045 X-RAY EXAM CHEST 1 VIEW: CPT | Mod: 26

## 2018-02-16 PROCEDURE — 99233 SBSQ HOSP IP/OBS HIGH 50: CPT

## 2018-02-16 RX ORDER — ACETAMINOPHEN 500 MG
700 TABLET ORAL ONCE
Qty: 0 | Refills: 0 | Status: COMPLETED | OUTPATIENT
Start: 2018-02-16 | End: 2018-02-16

## 2018-02-16 RX ORDER — TIMOLOL 0.5 %
1 DROPS OPHTHALMIC (EYE) EVERY 12 HOURS
Qty: 0 | Refills: 0 | Status: DISCONTINUED | OUTPATIENT
Start: 2018-02-16 | End: 2018-02-22

## 2018-02-16 RX ORDER — CALCIUM GLUCONATE 100 MG/ML
1 VIAL (ML) INTRAVENOUS ONCE
Qty: 0 | Refills: 0 | Status: COMPLETED | OUTPATIENT
Start: 2018-02-16 | End: 2018-02-16

## 2018-02-16 RX ORDER — ACETAMINOPHEN 500 MG
750 TABLET ORAL ONCE
Qty: 0 | Refills: 0 | Status: COMPLETED | OUTPATIENT
Start: 2018-02-16 | End: 2018-02-16

## 2018-02-16 RX ADMIN — DOPAMINE HYDROCHLORIDE 8.93 MICROGRAM(S)/KG/MIN: 40 INJECTION, SOLUTION, CONCENTRATE INTRAVENOUS at 05:49

## 2018-02-16 RX ADMIN — Medication 750 MILLIGRAM(S): at 15:15

## 2018-02-16 RX ADMIN — Medication 300 MILLIGRAM(S): at 14:59

## 2018-02-16 RX ADMIN — Medication 200 GRAM(S): at 05:49

## 2018-02-16 RX ADMIN — DEXTROSE MONOHYDRATE, SODIUM CHLORIDE, AND POTASSIUM CHLORIDE 50 MILLILITER(S): 50; .745; 4.5 INJECTION, SOLUTION INTRAVENOUS at 05:49

## 2018-02-16 RX ADMIN — Medication 700 MILLIGRAM(S): at 19:00

## 2018-02-16 RX ADMIN — Medication 280 MILLIGRAM(S): at 19:00

## 2018-02-16 RX ADMIN — Medication 650 MILLIGRAM(S): at 23:56

## 2018-02-16 NOTE — PROGRESS NOTE ADULT - ATTENDING COMMENTS
Episode of bradycardia requiring dopamine for about 6 hrs last night  Will DC Midodrine, DC IVF  PT  Fluctuating neurological function  Low HCT stable  PT

## 2018-02-16 NOTE — PROGRESS NOTE ADULT - ATTENDING COMMENTS
start physical therapy  as tolerated. Continues pulmonary toilet to lessen atelectasis, leg exercises as taught to lessen the risk of DVT and supervised pain medications for post-op pain control. transfuse as needed. The patient continues to require around the clock cardiopulmonary and neurologic monitoring for critical illness, as cited above. Continues cardiac monitoring in SICU for chronic atrial fibrillation with bradycardia. Now off all medications. suggest cardiology consultation and follow up care. The patient continues to require around the clock cardiopulmonary and neurologic monitoring for critical illness, as cited above.

## 2018-02-16 NOTE — PROGRESS NOTE ADULT - ASSESSMENT
89 yo woman hx of fall with left hip bipolar hemiarthroplasty.  Episode of tachycardia and hypotension post op. More alert today, off pressor, continued anemia 89 yo woman hx of fall with left hip bipolar hemiarthroplasty.  Episode of tachycardia and hypotension post op. More alert today, off pressor, continued anemia. bradycardic last night.

## 2018-02-16 NOTE — PROGRESS NOTE ADULT - SUBJECTIVE AND OBJECTIVE BOX
89F hx of dementia, afib (Xarelto), COPD, CVA, GERD, GIB, glaucoma, HLD, PUD, s/p mech fall with L hip pain. She states she was getting up from wheelchair to get her walker and tripped. falling onto her left side. She was unable to ambulate afterwards. No other apparent injuries. Denies headstrike or LOC. No n/v, no numbness and  tingling. Post op developed hypotension and rapid afib. required pressors for hypotension. Patient seen now resting comfortably off pressors    MEDICATIONS  (STANDING):  brimonidine 0.2% Ophthalmic Solution 1 Drop(s) Both EYES two times a day  docusate sodium Liquid 200 milliGRAM(s) Oral two times a day  lactulose Syrup 20 Gram(s) Oral every 12 hours  rivaroxaban 15 milliGRAM(s) Oral every 24 hours  senna Syrup 5 milliLiter(s) Oral daily  timolol 0.5% Solution 1 Drop(s) Both EYES every 12 hours    MEDICATIONS  (PRN):  acetaminophen   Tablet. 650 milliGRAM(s) Oral every 6 hours PRN Mild Pain (1 - 3)  ALBUTerol/ipratropium for Nebulization 3 milliLiter(s) Nebulizer every 6 hours PRN Shortness of Breath and/or Wheezing  ondansetron Injectable 4 milliGRAM(s) IV Push every 4 hours PRN Nausea and/or Vomiting    ICU Vital Signs Last 24 Hrs  T(C): 36.8 (16 Feb 2018 11:00), Max: 36.9 (15 Feb 2018 19:45)  T(F): 98.3 (16 Feb 2018 11:00), Max: 98.5 (15 Feb 2018 19:45)  HR: 56 (16 Feb 2018 11:00) (49 - 74)  BP: 151/68 (16 Feb 2018 07:15) (100/46 - 151/68)  BP(mean): 98 (16 Feb 2018 07:15) (67 - 98)  ABP: 118/37 (16 Feb 2018 11:00) (83/25 - 159/58)  ABP(mean): 65 (16 Feb 2018 11:00) (44 - 94)  RR: 22 (16 Feb 2018 11:00) (13 - 28)  SpO2: 100% (16 Feb 2018 11:00) (93% - 100%)          PHYSICAL EXAM:  GENERAL: NAD, well nourished and conversant  HEAD:  Atraumatic  EYES: EOM, PERRLA, conjunctiva pink and sclera white  ENT: No tonsillar erythema, exudates, or enlargement, moist mucous membranes, good dentition, no lesions  NECK: Supple, No JVD, normal thyroid, carotids with normal upstrokes and no bruits  CHEST/LUNG: Clear to auscultation bilaterally, No rales, rhonchi, wheezing, or rubs  HEART: Regular rate and rhythm, No murmurs, rubs, or gallops  ABDOMEN: Soft, nondistended, no masses, guarding, tenderness or rebound, bowel sounds present  EXTREMITIES:  2+ Peripheral Pulses, No clubbing, cyanosis, or edema.  (+) LEFT BIPOLAR HEMIARATHOPLASTY  LYMPH: No lymphadenopathy noted  SKIN: No rashes or lesions  NERVOUS SYSTEM: lethargic but responsive       ABG - ( 16 Feb 2018 02:25 )  pH: 7.48  /  pCO2: 31    /  pO2: 78    / HCO3: 23    / Base Excess: .4    /  SaO2: 97                    CBC Full  -  ( 16 Feb 2018 02:28 )  WBC Count : 6.5 K/uL  Hemoglobin : 9.0 g/dL  Hematocrit : 25.7 %  Platelet Count - Automated : 126 K/uL  Mean Cell Volume : 93.9 fl  Mean Cell Hemoglobin : 32.9 pg  Mean Cell Hemoglobin Concentration : 35.1 gm/dL  Auto Neutrophil # : x  Auto Lymphocyte # : x  Auto Monocyte # : x  Auto Eosinophil # : x  Auto Basophil # : x  Auto Neutrophil % : x  Auto Lymphocyte % : x  Auto Monocyte % : x  Auto Eosinophil % : x  Auto Basophil % : x    02-16    139  |  107  |  16  ----------------------------<  117<H>  3.8   |  23  |  0.53    Ca    7.8<L>      16 Feb 2018 02:28  Phos  2.7     02-16  Mg     2.2     02-16    TPro  4.7<L>  /  Alb  2.4<L>  /  TBili  4.4<H>  /  DBili  x   /  AST  16  /  ALT  11  /  AlkPhos  55  02-15    LIVER FUNCTIONS - ( 15 Feb 2018 20:11 )  Alb: 2.4 g/dL / Pro: 4.7 g/dL / ALK PHOS: 55 U/L / ALT: 11 U/L RC / AST: 16 U/L / GGT: x           PT/INR - ( 16 Feb 2018 02:28 )   PT: 13.7 sec;   INR: 1.25 ratio         PTT - ( 16 Feb 2018 02:28 )  PTT:26.3 sec 89F hx of dementia, afib (Xarelto), COPD, CVA, GERD, GIB, glaucoma, HLD, PUD, s/p mech fall with L hip pain. She states she was getting up from wheelchair to get her walker and tripped. falling onto her left side. She was unable to ambulate afterwards. No other apparent injuries. Denies headstrike or LOC. No n/v, no numbness and  tingling. Post op developed hypotension and rapid afib. required pressors for hypotension. Patient seen now resting comfortably off pressors. Chronic atrial fibrillation off antiarrhythmics on monitoring, with episodic clarita and tachycardia.    MEDICATIONS  (STANDING):  brimonidine 0.2% Ophthalmic Solution 1 Drop(s) Both EYES two times a day  docusate sodium Liquid 200 milliGRAM(s) Oral two times a day  lactulose Syrup 20 Gram(s) Oral every 12 hours  rivaroxaban 15 milliGRAM(s) Oral every 24 hours  senna Syrup 5 milliLiter(s) Oral daily  timolol 0.5% Solution 1 Drop(s) Both EYES every 12 hours    MEDICATIONS  (PRN):  acetaminophen   Tablet. 650 milliGRAM(s) Oral every 6 hours PRN Mild Pain (1 - 3)  ALBUTerol/ipratropium for Nebulization 3 milliLiter(s) Nebulizer every 6 hours PRN Shortness of Breath and/or Wheezing  ondansetron Injectable 4 milliGRAM(s) IV Push every 4 hours PRN Nausea and/or Vomiting    ICU Vital Signs Last 24 Hrs  T(C): 36.8 (16 Feb 2018 11:00), Max: 36.9 (15 Feb 2018 19:45)  T(F): 98.3 (16 Feb 2018 11:00), Max: 98.5 (15 Feb 2018 19:45)  HR: 56 (16 Feb 2018 11:00) (49 - 74)  BP: 151/68 (16 Feb 2018 07:15) (100/46 - 151/68)  BP(mean): 98 (16 Feb 2018 07:15) (67 - 98)  ABP: 118/37 (16 Feb 2018 11:00) (83/25 - 159/58)  ABP(mean): 65 (16 Feb 2018 11:00) (44 - 94)  RR: 22 (16 Feb 2018 11:00) (13 - 28)  SpO2: 100% (16 Feb 2018 11:00) (93% - 100%)          PHYSICAL EXAM:  GENERAL: NAD, well nourished and conversant  HEAD:  Atraumatic  EYES: EOM, PERRLA, conjunctiva pink and sclera white  ENT: No tonsillar erythema, exudates, or enlargement, moist mucous membranes, good dentition, no lesions  NECK: Supple, No JVD, normal thyroid, carotids with normal upstrokes and no bruits  CHEST/LUNG: Clear to auscultation bilaterally, No rales, rhonchi, wheezing, or rubs  HEART: Regular rate and rhythm, No murmurs, rubs, or gallops  ABDOMEN: Soft, nondistended, no masses, guarding, tenderness or rebound, bowel sounds present  EXTREMITIES:  2+ Peripheral Pulses, No clubbing, cyanosis, or edema.  (+) LEFT BIPOLAR HEMIARATHOPLASTY  LYMPH: No lymphadenopathy noted  SKIN: No rashes or lesions  NERVOUS SYSTEM: lethargic but responsive       ABG - ( 16 Feb 2018 02:25 )  pH: 7.48  /  pCO2: 31    /  pO2: 78    / HCO3: 23    / Base Excess: .4    /  SaO2: 97                    CBC Full  -  ( 16 Feb 2018 02:28 )  WBC Count : 6.5 K/uL  Hemoglobin : 9.0 g/dL  Hematocrit : 25.7 %  Platelet Count - Automated : 126 K/uL  Mean Cell Volume : 93.9 fl  Mean Cell Hemoglobin : 32.9 pg  Mean Cell Hemoglobin Concentration : 35.1 gm/dL  Auto Neutrophil # : x  Auto Lymphocyte # : x  Auto Monocyte # : x  Auto Eosinophil # : x  Auto Basophil # : x  Auto Neutrophil % : x  Auto Lymphocyte % : x  Auto Monocyte % : x  Auto Eosinophil % : x  Auto Basophil % : x    02-16    139  |  107  |  16  ----------------------------<  117<H>  3.8   |  23  |  0.53    Ca    7.8<L>      16 Feb 2018 02:28  Phos  2.7     02-16  Mg     2.2     02-16    TPro  4.7<L>  /  Alb  2.4<L>  /  TBili  4.4<H>  /  DBili  x   /  AST  16  /  ALT  11  /  AlkPhos  55  02-15    LIVER FUNCTIONS - ( 15 Feb 2018 20:11 )  Alb: 2.4 g/dL / Pro: 4.7 g/dL / ALK PHOS: 55 U/L / ALT: 11 U/L RC / AST: 16 U/L / GGT: x           PT/INR - ( 16 Feb 2018 02:28 )   PT: 13.7 sec;   INR: 1.25 ratio         PTT - ( 16 Feb 2018 02:28 )  PTT:26.3 sec

## 2018-02-16 NOTE — PROGRESS NOTE ADULT - ASSESSMENT
89yF s/p left hip hemiarthroplasty with altered mental status and labile hemodynamics.    PLAN:   Neurologic:  - Monitor mental status  - Tylenol for pain control    Respiratory:  - Monitor respiratory status  - Off NC  - Duoneb PRN    Cardiovascular:   - Hemodynamic monitoring   - Midodrine 5mg q 8hrs  - Wean dopamine gtt as tolerated    Gastrointestinal/Nutrition:  - Mech soft diet  - Start lactulose  - Colace, Senna, Miralax  - Elevated bilirubin (mostly indirect), likely secondary to absorption of thigh hematoma, continue to trend LFTs    Renal/Genitourinary:  - voiding  - D5 1/2 NS + 20 KCl @ 50 cc/hr  - Monitor BMP  - Replete lytes PRN    Hematologic: Anticoagulation for A.fib   - Xarelto   - Monitor CBC    Infectious Disease:  - No acute infectious concerns    Endocrine:  - No active issues  - Monitor BG on BMP    Disposition: Full code, SICU care.

## 2018-02-16 NOTE — PROGRESS NOTE ADULT - SUBJECTIVE AND OBJECTIVE BOX
Pt S/E at bedside, no acute events overnight, pain controlled. Pressors held this morning.    Vital Signs Last 24 Hrs  T(C): 36.8 (16 Feb 2018 03:00), Max: 36.9 (15 Feb 2018 19:45)  T(F): 98.2 (16 Feb 2018 03:00), Max: 98.5 (15 Feb 2018 19:45)  HR: 59 (16 Feb 2018 06:15) (49 - 74)  BP: 100/49 (15 Feb 2018 19:45) (95/45 - 100/49)  BP(mean): 70 (15 Feb 2018 19:45) (65 - 70)  RR: 19 (16 Feb 2018 06:15) (13 - 29)  SpO2: 100% (16 Feb 2018 06:15) (92% - 100%)    Gen: NAD, lethargic but arousable    Left Lower Extremity:  Dressing clean dry intact  +EHL/FHL/TA/GS  SILT L3-S1  +DP/PT Pulses  Compartments soft  No calf TTP B/L

## 2018-02-16 NOTE — PROGRESS NOTE ADULT - SUBJECTIVE AND OBJECTIVE BOX
HISTORY  89yF w/ dementia, afib (Xarelto), last ECHO 2/2017 EF 71%, COPD, CVA, GERD, GIB, glaucoma, HLD, PUD, s/p mech fall with L hip fracture, now s/p bipolar hemiarthroplasty. SICU consulted for hypotension requiring pressors in the PACU. Patient was given 500 mL plasmalyte boluses x 2 and 1 unit pRBCs before SICU evaluation. Her lactate was 3 after the resuscitation. Hematocrit before the transfusion was 27, and increased to 33, then 29. Patient was able to be weaned down to 0.1 of miroslava, but required pressors to maintain her BP during her 10-hour stay in the PACU. She was transferred to SICU for continued hypotension. Was placed on dopamine gtt, but then became tachycardic so switched to miroslava gtt again. Started on amio gtt for Afib, but again bradycardic so dopamine gtt restarted.    24 HOUR EVENTS:  Transfused 1U PRBC. Timolol d/c'd for concern for systemic absorption and contributing to bradycardia.    SUBJECTIVE/ROS:  [x] A ten-point review of systems was otherwise negative except as noted.  [ ] Due to altered mental status/intubation, subjective information were not able to be obtained from the patient. History was obtained, to the extent possible, from review of the chart and collateral sources of information.      NEURO  RASS: -1    GCS: 14  Exam: Arousable, responds to commands  Meds: acetaminophen   Tablet. 650 milliGRAM(s) Oral every 6 hours PRN Mild Pain (1 - 3)  ondansetron Injectable 4 milliGRAM(s) IV Push every 4 hours PRN Nausea and/or Vomiting    [x] Adequacy of sedation and pain control has been assessed and adjusted      RESPIRATORY  RR: 18 (02-16-18 @ 02:00) (13 - 34)  SpO2: 99% (02-16-18 @ 02:00) (92% - 100%)  Wt(kg): --  Exam: unlabored, clear to auscultation bilaterally  Mechanical Ventilation:   ABG - ( 15 Feb 2018 20:01 )  pH: 7.50  /  pCO2: 31    /  pO2: 79    / HCO3: 24    / Base Excess: 1.4   /  SaO2: 98      Lactate: x          [N/A] Extubation Readiness Assessed  Meds: ALBUTerol/ipratropium for Nebulization 3 milliLiter(s) Nebulizer every 6 hours PRN Shortness of Breath and/or Wheezing      CARDIOVASCULAR  HR: 65 (02-16-18 @ 02:00) (49 - 74)  BP: 100/49 (02-15-18 @ 19:45) (95/45 - 100/49)  BP(mean): 70 (02-15-18 @ 19:45) (65 - 70)  ABP: 125/43 (02-16-18 @ 02:00) (83/25 - 159/58)  ABP(mean): 72 (02-16-18 @ 02:00) (44 - 95)  Wt(kg): --  CVP(cm H2O): --      Exam: Irregularly irregular, bradycardic  Cardiac Rhythm: A fib  Perfusion     [x]Adequate   [ ]Inadequate  Mentation   [ ]Normal       [x]Reduced  Extremities  [x]Warm         [ ]Cool  Volume Status [ ]Hypervolemic [x]Euvolemic [ ]Hypovolemic  Meds: DOPamine Infusion 5 MICROgram(s)/kG/Min IV Continuous <Continuous>  midodrine 5 milliGRAM(s) Oral every 8 hours        GI/NUTRITION  Exam: soft, nontender, nondistended, incision C/D/I  Diet: Regular  Meds: docusate sodium Liquid 200 milliGRAM(s) Oral two times a day  lactulose Syrup 20 Gram(s) Oral every 12 hours  senna Syrup 5 milliLiter(s) Oral daily      GENITOURINARY  I&O's Detail    02-14 @ 07:01  -  02-15 @ 07:00  --------------------------------------------------------  IN:    DOPamine Infusion: 222.6 mL  Total IN: 222.6 mL    OUT:    Indwelling Catheter - Urethral: 135 mL    Voided: 200 mL  Total OUT: 335 mL    Total NET: -112.4 mL      02-15 @ 07:01  -  02-16 @ 02:23  --------------------------------------------------------  IN:    dextrose 5% + sodium chloride 0.45% with potassium chloride 20 mEq/L: 300 mL    DOPamine Infusion: 58.9 mL    Packed Red Blood Cells: 250 mL  Total IN: 608.9 mL    OUT:  Total OUT: 0 mL    Total NET: 608.9 mL          02-15    139  |  105  |  18  ----------------------------<  113<H>  3.8   |  23  |  0.57    Ca    7.6<L>      15 Feb 2018 20:11  Phos  3.4     02-15  Mg     2.4     02-15    TPro  4.7<L>  /  Alb  2.4<L>  /  TBili  4.4<H>  /  DBili  x   /  AST  16  /  ALT  11  /  AlkPhos  55  02-15      Meds: dextrose 5% + sodium chloride 0.45% with potassium chloride 20 mEq/L 1000 milliLiter(s) IV Continuous <Continuous>      HEMATOLOGIC  Meds: rivaroxaban 15 milliGRAM(s) Oral every 24 hours    [x] VTE Prophylaxis                        8.8    8.5   )-----------( 137      ( 15 Feb 2018 20:11 )             24.1     PT/INR - ( 14 Feb 2018 02:56 )   PT: 11.8 sec;   INR: 1.09 ratio         PTT - ( 14 Feb 2018 02:56 )  PTT:28.3 sec  Transfusion     [ ] PRBC   [ ] Platelets   [ ] FFP   [ ] Cryoprecipitate      INFECTIOUS DISEASES  WBC Count: 8.5 K/uL (02-15 @ 20:11)  WBC Count: 7.7 K/uL (02-15 @ 18:18)    RECENT CULTURES:    Meds:       ENDOCRINE  CAPILLARY BLOOD GLUCOSE        Meds:       ACCESS DEVICES:  [x] Peripheral IV  [ ] Central Venous Line	[ ] R	[ ] L	[ ] IJ	[ ] Fem	[ ] SC	Placed:   [ ] Arterial Line		[ ] R	[ ] L	[ ] Fem	[ ] Rad	[ ] Ax	Placed:   [ ] PICC:					[ ] Mediport  [ ] Urinary Catheter, Date Placed:   [x] Necessity of urinary, arterial, and venous catheters discussed    OTHER MEDICATIONS:  brimonidine 0.2% Ophthalmic Solution 1 Drop(s) Both EYES two times a day      CODE STATUS:  Full code      IMAGING:

## 2018-02-16 NOTE — PROGRESS NOTE ADULT - ASSESSMENT
89F s/p L hip hemiarthroplasty POD 7  Analgesia  DVT ppx  WBAT LLE with posterior hip precautions  PT/OT  Incentive spirometry  Cont SICU management   DC planning

## 2018-02-17 LAB
ANION GAP SERPL CALC-SCNC: 13 MMOL/L — SIGNIFICANT CHANGE UP (ref 5–17)
BUN SERPL-MCNC: 18 MG/DL — SIGNIFICANT CHANGE UP (ref 7–23)
CALCIUM SERPL-MCNC: 7.9 MG/DL — LOW (ref 8.4–10.5)
CHLORIDE SERPL-SCNC: 103 MMOL/L — SIGNIFICANT CHANGE UP (ref 96–108)
CO2 SERPL-SCNC: 19 MMOL/L — LOW (ref 22–31)
CREAT SERPL-MCNC: 0.58 MG/DL — SIGNIFICANT CHANGE UP (ref 0.5–1.3)
GLUCOSE SERPL-MCNC: 101 MG/DL — HIGH (ref 70–99)
HCT VFR BLD CALC: 25.8 % — LOW (ref 34.5–45)
HGB BLD-MCNC: 9 G/DL — LOW (ref 11.5–15.5)
MAGNESIUM SERPL-MCNC: 1.9 MG/DL — SIGNIFICANT CHANGE UP (ref 1.6–2.6)
MCHC RBC-ENTMCNC: 33.4 PG — SIGNIFICANT CHANGE UP (ref 27–34)
MCHC RBC-ENTMCNC: 35.1 GM/DL — SIGNIFICANT CHANGE UP (ref 32–36)
MCV RBC AUTO: 95.3 FL — SIGNIFICANT CHANGE UP (ref 80–100)
PHOSPHATE SERPL-MCNC: 2.6 MG/DL — SIGNIFICANT CHANGE UP (ref 2.5–4.5)
PLATELET # BLD AUTO: 155 K/UL — SIGNIFICANT CHANGE UP (ref 150–400)
POTASSIUM SERPL-MCNC: 3.9 MMOL/L — SIGNIFICANT CHANGE UP (ref 3.5–5.3)
POTASSIUM SERPL-SCNC: 3.9 MMOL/L — SIGNIFICANT CHANGE UP (ref 3.5–5.3)
RBC # BLD: 2.71 M/UL — LOW (ref 3.8–5.2)
RBC # FLD: 16 % — HIGH (ref 10.3–14.5)
SODIUM SERPL-SCNC: 135 MMOL/L — SIGNIFICANT CHANGE UP (ref 135–145)
WBC # BLD: 9.8 K/UL — SIGNIFICANT CHANGE UP (ref 3.8–10.5)
WBC # FLD AUTO: 9.8 K/UL — SIGNIFICANT CHANGE UP (ref 3.8–10.5)

## 2018-02-17 PROCEDURE — 99233 SBSQ HOSP IP/OBS HIGH 50: CPT

## 2018-02-17 RX ORDER — POTASSIUM PHOSPHATE, MONOBASIC POTASSIUM PHOSPHATE, DIBASIC 236; 224 MG/ML; MG/ML
15 INJECTION, SOLUTION INTRAVENOUS ONCE
Qty: 0 | Refills: 0 | Status: COMPLETED | OUTPATIENT
Start: 2018-02-17 | End: 2018-02-17

## 2018-02-17 RX ORDER — MAGNESIUM SULFATE 500 MG/ML
1 VIAL (ML) INJECTION ONCE
Qty: 0 | Refills: 0 | Status: COMPLETED | OUTPATIENT
Start: 2018-02-17 | End: 2018-02-17

## 2018-02-17 RX ADMIN — POTASSIUM PHOSPHATE, MONOBASIC POTASSIUM PHOSPHATE, DIBASIC 62.5 MILLIMOLE(S): 236; 224 INJECTION, SOLUTION INTRAVENOUS at 07:30

## 2018-02-17 RX ADMIN — BRIMONIDINE TARTRATE 1 DROP(S): 2 SOLUTION/ DROPS OPHTHALMIC at 06:00

## 2018-02-17 RX ADMIN — BRIMONIDINE TARTRATE 1 DROP(S): 2 SOLUTION/ DROPS OPHTHALMIC at 18:00

## 2018-02-17 RX ADMIN — Medication 1 DROP(S): at 18:00

## 2018-02-17 RX ADMIN — Medication 100 GRAM(S): at 06:20

## 2018-02-17 RX ADMIN — Medication 650 MILLIGRAM(S): at 21:42

## 2018-02-17 RX ADMIN — RIVAROXABAN 15 MILLIGRAM(S): KIT at 17:48

## 2018-02-17 RX ADMIN — LACTULOSE 20 GRAM(S): 10 SOLUTION ORAL at 21:49

## 2018-02-17 RX ADMIN — Medication 1 DROP(S): at 06:00

## 2018-02-17 NOTE — PROGRESS NOTE ADULT - SUBJECTIVE AND OBJECTIVE BOX
24 HOUR EVENTS: Midodrine and IVF d/jesenia.    SUBJECTIVE/ROS:  [ ] A ten-point review of systems was otherwise negative except as noted.  [ ] Due to altered mental status/intubation, subjective information were not able to be obtained from the patient. History was obtained, to the extent possible, from review of the chart and collateral sources of information.      NEURO  Exam: Aox3  Meds: acetaminophen   Tablet. 650 milliGRAM(s) Oral every 6 hours PRN Mild Pain (1 - 3)  ondansetron Injectable 4 milliGRAM(s) IV Push every 4 hours PRN Nausea and/or Vomiting    [x] Adequacy of sedation and pain control has been assessed and adjusted      RESPIRATORY  RR: 21 (02-17-18 @ 09:00) (19 - 46)  SpO2: 100% (02-17-18 @ 09:00) (100% - 100%)  Wt(kg): --  Exam: unlabored, clear to auscultation bilaterally  Mechanical Ventilation:   ABG - ( 16 Feb 2018 02:25 )  pH: 7.48  /  pCO2: 31    /  pO2: 78    / HCO3: 23    / Base Excess: .4    /  SaO2: 97      Lactate: x                [NA ] Extubation Readiness Assessed  Meds: ALBUTerol/ipratropium for Nebulization 3 milliLiter(s) Nebulizer every 6 hours PRN Shortness of Breath and/or Wheezing        CARDIOVASCULAR  HR: 69 (02-17-18 @ 09:00) (56 - 83)  BP: 123/53 (02-17-18 @ 09:00) (109/52 - 139/56)  BP(mean): 77 (02-17-18 @ 09:00) (67 - 81)  ABP: 129/46 (02-17-18 @ 05:00) (100/39 - 169/58)  ABP(mean): 77 (02-17-18 @ 05:00) (61 - 112)  Wt(kg): --  CVP(cm H2O): --      Exam: RRR  Cardiac Rhythm: Ns   Perfusion     [x ]Adequate   [ ]Inadequate  Mentation   [ x]Normal       [ ]Reduced  Extremities  [ x]Warm         [ ]Cool  Volume Status [ ]Hypervolemic [x ]Euvolemic [ ]Hypovolemic  Meds:       GI/NUTRITION  Exam: soft, nt, nd  Diet: Reg diet  Meds: docusate sodium Liquid 200 milliGRAM(s) Oral two times a day  lactulose Syrup 20 Gram(s) Oral every 12 hours  senna Syrup 5 milliLiter(s) Oral daily      GENITOURINARY  I&O's Detail    02-16 @ 07:01  -  02-17 @ 07:00  --------------------------------------------------------  IN:    dextrose 5% + sodium chloride 0.45% with potassium chloride 20 mEq/L: 100 mL    IV PiggyBack: 125 mL  Total IN: 225 mL    OUT:    Stool: 1 mL  Total OUT: 1 mL    Total NET: 224 mL      02-17 @ 07:01  -  02-17 @ 09:25  --------------------------------------------------------  IN:    IV PiggyBack: 125 mL  Total IN: 125 mL    OUT:  Total OUT: 0 mL    Total NET: 125 mL          02-17    135  |  103  |  18  ----------------------------<  101<H>  3.9   |  19<L>  |  0.58    Ca    7.9<L>      17 Feb 2018 03:50  Phos  2.6     02-17  Mg     1.9     02-17    TPro  4.7<L>  /  Alb  2.4<L>  /  TBili  4.4<H>  /  DBili  x   /  AST  16  /  ALT  11  /  AlkPhos  55  02-15    [ ] Hernandez catheter, indication: N/A  Meds:       HEMATOLOGIC  Meds: rivaroxaban 15 milliGRAM(s) Oral every 24 hours    [x] VTE Prophylaxis                        9.0    9.8   )-----------( 155      ( 17 Feb 2018 03:50 )             25.8     PT/INR - ( 16 Feb 2018 02:28 )   PT: 13.7 sec;   INR: 1.25 ratio         PTT - ( 16 Feb 2018 02:28 )  PTT:26.3 sec  Transfusion     [ ] PRBC   [ ] Platelets   [ ] FFP   [ ] Cryoprecipitate      INFECTIOUS DISEASES  T(C): 36.9 (02-17-18 @ 08:00), Max: 37.1 (02-16-18 @ 15:00)  Wt(kg): --  WBC Count: 9.8 K/uL (02-17 @ 03:50)    Recent Cultures: None    Meds: None      ENDOCRINE    Meds:       ACCESS DEVICES:  [x ] Peripheral IV  [ ] Central Venous Line	[ ] R	[ ] L	[ ] IJ	[ ] Fem	[ ] SC	Placed:   [ ] Arterial Line		[ ] R	[ ] L	[ ] Fem	[ ] Rad	[ ] Ax	Placed:   [ ] PICC:					[ ] Mediport  [ ] Urinary Catheter, Date Placed:   [ ] Necessity of urinary, arterial, and venous catheters discussed    OTHER MEDICATIONS:  brimonidine 0.2% Ophthalmic Solution 1 Drop(s) Both EYES two times a day  timolol 0.5% Solution 1 Drop(s) Both EYES every 12 hours      CODE STATUS: Full    IMAGING: 24 HOUR EVENTS: Midodrine and IVF d/jesenia.  More awake and alert but is not eating much.    SUBJECTIVE/ROS:  [ ] A ten-point review of systems was otherwise negative except as noted.  [ ] Due to altered mental status/intubation, subjective information were not able to be obtained from the patient. History was obtained, to the extent possible, from review of the chart and collateral sources of information.    NEURO  Exam: Aox3  Meds: acetaminophen   Tablet. 650 milliGRAM(s) Oral every 6 hours PRN Mild Pain (1 - 3)  ondansetron Injectable 4 milliGRAM(s) IV Push every 4 hours PRN Nausea and/or Vomiting    [x] Adequacy of sedation and pain control has been assessed and adjusted      RESPIRATORY  RR: 21 (02-17-18 @ 09:00) (19 - 46)  SpO2: 100% (02-17-18 @ 09:00) (100% - 100%)  Wt(kg): --  Exam: unlabored, clear to auscultation bilaterally  Mechanical Ventilation:   ABG - ( 16 Feb 2018 02:25 )  pH: 7.48  /  pCO2: 31    /  pO2: 78    / HCO3: 23    / Base Excess: .4    /  SaO2: 97      Lactate: x                [NA ] Extubation Readiness Assessed  Meds: ALBUTerol/ipratropium for Nebulization 3 milliLiter(s) Nebulizer every 6 hours PRN Shortness of Breath and/or Wheezing        CARDIOVASCULAR  HR: 69 (02-17-18 @ 09:00) (56 - 83)  BP: 123/53 (02-17-18 @ 09:00) (109/52 - 139/56)  BP(mean): 77 (02-17-18 @ 09:00) (67 - 81)  ABP: 129/46 (02-17-18 @ 05:00) (100/39 - 169/58)  ABP(mean): 77 (02-17-18 @ 05:00) (61 - 112)  Wt(kg): --  CVP(cm H2O): --      Exam: RRR  Cardiac Rhythm: Ns   Perfusion     [x ]Adequate   [ ]Inadequate  Mentation   [ x]Normal       [ ]Reduced  Extremities  [ x]Warm         [ ]Cool  Volume Status [ ]Hypervolemic [x ]Euvolemic [ ]Hypovolemic  Meds:       GI/NUTRITION  Exam: soft, nt, nd  Diet: Reg diet  Meds: docusate sodium Liquid 200 milliGRAM(s) Oral two times a day  lactulose Syrup 20 Gram(s) Oral every 12 hours  senna Syrup 5 milliLiter(s) Oral daily      GENITOURINARY  I&O's Detail    02-16 @ 07:01  -  02-17 @ 07:00  --------------------------------------------------------  IN:    dextrose 5% + sodium chloride 0.45% with potassium chloride 20 mEq/L: 100 mL    IV PiggyBack: 125 mL  Total IN: 225 mL    OUT:    Stool: 1 mL  Total OUT: 1 mL    Total NET: 224 mL      02-17 @ 07:01  -  02-17 @ 09:25  --------------------------------------------------------  IN:    IV PiggyBack: 125 mL  Total IN: 125 mL    OUT:  Total OUT: 0 mL    Total NET: 125 mL          02-17    135  |  103  |  18  ----------------------------<  101<H>  3.9   |  19<L>  |  0.58    Ca    7.9<L>      17 Feb 2018 03:50  Phos  2.6     02-17  Mg     1.9     02-17    TPro  4.7<L>  /  Alb  2.4<L>  /  TBili  4.4<H>  /  DBili  x   /  AST  16  /  ALT  11  /  AlkPhos  55  02-15    [ ] Hernandez catheter, indication: N/A  Meds:       HEMATOLOGIC  Meds: rivaroxaban 15 milliGRAM(s) Oral every 24 hours    [x] VTE Prophylaxis                        9.0    9.8   )-----------( 155      ( 17 Feb 2018 03:50 )             25.8     PT/INR - ( 16 Feb 2018 02:28 )   PT: 13.7 sec;   INR: 1.25 ratio         PTT - ( 16 Feb 2018 02:28 )  PTT:26.3 sec  Transfusion     [ ] PRBC   [ ] Platelets   [ ] FFP   [ ] Cryoprecipitate      INFECTIOUS DISEASES  T(C): 36.9 (02-17-18 @ 08:00), Max: 37.1 (02-16-18 @ 15:00)  Wt(kg): --  WBC Count: 9.8 K/uL (02-17 @ 03:50)    Recent Cultures: None    Meds: None      ENDOCRINE    Meds:       ACCESS DEVICES:  [x ] Peripheral IV  [ ] Central Venous Line	[ ] R	[ ] L	[ ] IJ	[ ] Fem	[ ] SC	Placed:   [ ] Arterial Line		[ ] R	[ ] L	[ ] Fem	[ ] Rad	[ ] Ax	Placed:   [ ] PICC:					[ ] Mediport  [ ] Urinary Catheter, Date Placed:   [ ] Necessity of urinary, arterial, and venous catheters discussed    OTHER MEDICATIONS:  brimonidine 0.2% Ophthalmic Solution 1 Drop(s) Both EYES two times a day  timolol 0.5% Solution 1 Drop(s) Both EYES every 12 hours      CODE STATUS: Full    IMAGING:

## 2018-02-17 NOTE — PROGRESS NOTE ADULT - ASSESSMENT
89yF s/p left hip hemiarthroplasty with altered mental status and labile hemodynamics, now largely resolved    PLAN:   Neurologic:  - Monitor mental status  - Tylenol for pain control    Respiratory:  - Monitor respiratory status  - Off NC  - Duoneb PRN    Cardiovascular:   - Hemodynamic monitoring       Gastrointestinal/Nutrition:  - Mech soft diet  - Start lactulose  - Colace, Senna, Miralax  - Elevated bilirubin (mostly indirect), likely secondary to absorption of thigh hematoma, continue to trend LFTs    Renal/Genitourinary:  - voiding  - Monitor BMP  - Replete lytes PRN    Hematologic: Anticoagulation for A.fib   - Xarelto   - Monitor CBC    Infectious Disease:  - No acute infectious concerns    Endocrine:  - No active issues  - Monitor BG on BMP    MARTHA Arenas MD PGY 2   Spectra: [id number] 89yF s/p left hip hemiarthroplasty with altered mental status and labile hemodynamics, now largely resolved    PLAN:   Neurologic:  - Monitor mental status  - Tylenol for pain control    Respiratory:  - Monitor respiratory status  - Off NC  - Duoneb PRN    Cardiovascular:   - Hemodynamic monitoring     Gastrointestinal/Nutrition:  - Mech soft diet  - Start lactulose  - Colace, Senna, Miralax  - Elevated bilirubin (mostly indirect), likely secondary to absorption of thigh hematoma, continue to trend LFTs    Renal/Genitourinary:  - voiding  - Monitor BMP  - Replete lytes PRN    Hematologic: Anticoagulation for A.fib   - Xarelto   - Monitor CBC    Infectious Disease:  - No acute infectious concerns    Endocrine:  - No active issues  - Monitor BG on BMP    MARTHA Arenas MD PGY 2   Spectra: [id number]

## 2018-02-17 NOTE — PROGRESS NOTE ADULT - ATTENDING COMMENTS
Hemodynamically improved, off dopamine, no arrhythmia in last 24 hrs  HCT stable, continue Rivaroxaban  PT  Transfer to tele

## 2018-02-17 NOTE — PROGRESS NOTE ADULT - SUBJECTIVE AND OBJECTIVE BOX
89F hx of dementia, afib (Xarelto), COPD, CVA, GERD, GIB, glaucoma, HLD, PUD, s/p mech fall with L hip pain. She states she was getting up from wheelchair to get her walker and tripped. falling onto her left side. She was unable to ambulate afterwards. No other apparent injuries. Denies headstrike or LOC. No n/v, no numbness and  tingling. Post op developed hypotension and rapid afib. required pressors for hypotension. Patient seen now resting comfortably off pressors. Chronic atrial fibrillation off antiarrhythmics on monitoring, with episodic clarita and tachycardia. Now off pressors and more alert. Still with poor oral intake.    MEDICATIONS  (STANDING):  brimonidine 0.2% Ophthalmic Solution 1 Drop(s) Both EYES two times a day  docusate sodium Liquid 200 milliGRAM(s) Oral two times a day  lactulose Syrup 20 Gram(s) Oral every 12 hours  rivaroxaban 15 milliGRAM(s) Oral every 24 hours  senna Syrup 5 milliLiter(s) Oral daily  timolol 0.5% Solution 1 Drop(s) Both EYES every 12 hours    MEDICATIONS  (PRN):  acetaminophen   Tablet. 650 milliGRAM(s) Oral every 6 hours PRN Mild Pain (1 - 3)  ALBUTerol/ipratropium for Nebulization 3 milliLiter(s) Nebulizer every 6 hours PRN Shortness of Breath and/or Wheezing  ondansetron Injectable 4 milliGRAM(s) IV Push every 4 hours PRN Nausea and/or Vomiting    ICU Vital Signs Last 24 Hrs  T(C): 36.8 (17 Feb 2018 12:00), Max: 37.1 (16 Feb 2018 15:00)  T(F): 98.2 (17 Feb 2018 12:00), Max: 98.8 (16 Feb 2018 19:00)  HR: 77 (17 Feb 2018 12:00) (64 - 83)  BP: 118/52 (17 Feb 2018 12:00) (109/52 - 139/56)  BP(mean): 75 (17 Feb 2018 12:00) (66 - 92)  ABP: 129/46 (17 Feb 2018 05:00) (100/39 - 169/58)  ABP(mean): 77 (17 Feb 2018 05:00) (61 - 112)  RR: 22 (17 Feb 2018 12:00) (19 - 46)  SpO2: 100% (17 Feb 2018 12:00) (100% - 100%)            PHYSICAL EXAM:  GENERAL: NAD, well nourished and conversant  HEAD:  Atraumatic  EYES: EOM, PERRLA, conjunctiva pink and sclera white  ENT: No tonsillar erythema, exudates, or enlargement, moist mucous membranes, good dentition, no lesions  NECK: Supple, No JVD, normal thyroid, carotids with normal upstrokes and no bruits  CHEST/LUNG: Clear to auscultation bilaterally, No rales, rhonchi, wheezing, or rubs  HEART: Irregular rate and rhythm, No murmurs, rubs, or gallops  ABDOMEN: Soft, nondistended, no masses, guarding, tenderness or rebound, bowel sounds present  EXTREMITIES:  2+ Peripheral Pulses, No clubbing, cyanosis, or edema.  (+) LEFT BIPOLAR HEMIARATHOPLASTY  LYMPH: No lymphadenopathy noted  SKIN: No rashes or lesions  NERVOUS SYSTEM: lethargic but responsive     LABS  ABG - ( 16 Feb 2018 02:25 )  pH: 7.48  /  pCO2: 31    /  pO2: 78    / HCO3: 23    / Base Excess: .4    /  SaO2: 97        ABG - ( 16 Feb 2018 02:25 )  pH: 7.48  /  pCO2: 31    /  pO2: 78    / HCO3: 23    / Base Excess: .4    /  SaO2: 97        CBC Full  -  ( 17 Feb 2018 03:50 )  WBC Count : 9.8 K/uL  Hemoglobin : 9.0 g/dL  Hematocrit : 25.8 %  Platelet Count - Automated : 155 K/uL  Mean Cell Volume : 95.3 fl  Mean Cell Hemoglobin : 33.4 pg  Mean Cell Hemoglobin Concentration : 35.1 gm/dL  Auto Neutrophil # : x  Auto Lymphocyte # : x  Auto Monocyte # : x  Auto Eosinophil # : x  Auto Basophil # : x  Auto Neutrophil % : x  Auto Lymphocyte % : x  Auto Monocyte % : x  Auto Eosinophil % : x  Auto Basophil % : x    02-17    135  |  103  |  18  ----------------------------<  101<H>  3.9   |  19<L>  |  0.58    Ca    7.9<L>      17 Feb 2018 03:50  Phos  2.6     02-17  Mg     1.9     02-17    TPro  4.7<L>  /  Alb  2.4<L>  /  TBili  4.4<H>  /  DBili  x   /  AST  16  /  ALT  11  /  AlkPhos  55  02-15    LIVER FUNCTIONS - ( 15 Feb 2018 20:11 )  Alb: 2.4 g/dL / Pro: 4.7 g/dL / ALK PHOS: 55 U/L / ALT: 11 U/L RC / AST: 16 U/L / GGT: x           PT/INR - ( 16 Feb 2018 02:28 )   PT: 13.7 sec;   INR: 1.25 ratio         PTT - ( 16 Feb 2018 02:28 )  PTT:26.3 sec

## 2018-02-17 NOTE — PROGRESS NOTE ADULT - ATTENDING COMMENTS
Continues cardiac monitoring in SICU for chronic atrial fibrillation with bradycardia. Now off all medications, including pressors. Suggest cardiology consultation for atrial fibrillation with tachy and bradycardia. Awaiting transfer from SICU to orthopedic service for routine post-op care.

## 2018-02-17 NOTE — PROGRESS NOTE ADULT - ASSESSMENT
89 yo woman hx of fall with left hip bipolar hemiarthroplasty.  Episode of tachycardia and hypotension post op. More alert today, off pressors, continued anemia, no bradycardia last night.

## 2018-02-18 LAB
ANION GAP SERPL CALC-SCNC: 14 MMOL/L — SIGNIFICANT CHANGE UP (ref 5–17)
BUN SERPL-MCNC: 20 MG/DL — SIGNIFICANT CHANGE UP (ref 7–23)
CALCIUM SERPL-MCNC: 7.9 MG/DL — LOW (ref 8.4–10.5)
CHLORIDE SERPL-SCNC: 108 MMOL/L — SIGNIFICANT CHANGE UP (ref 96–108)
CO2 SERPL-SCNC: 19 MMOL/L — LOW (ref 22–31)
CREAT SERPL-MCNC: 0.7 MG/DL — SIGNIFICANT CHANGE UP (ref 0.5–1.3)
GLUCOSE SERPL-MCNC: 113 MG/DL — HIGH (ref 70–99)
HCT VFR BLD CALC: 26.1 % — LOW (ref 34.5–45)
HGB BLD-MCNC: 9.4 G/DL — LOW (ref 11.5–15.5)
MAGNESIUM SERPL-MCNC: 2.1 MG/DL — SIGNIFICANT CHANGE UP (ref 1.6–2.6)
MCHC RBC-ENTMCNC: 34.7 PG — HIGH (ref 27–34)
MCHC RBC-ENTMCNC: 36 GM/DL — SIGNIFICANT CHANGE UP (ref 32–36)
MCV RBC AUTO: 96.5 FL — SIGNIFICANT CHANGE UP (ref 80–100)
PHOSPHATE SERPL-MCNC: 4.3 MG/DL — SIGNIFICANT CHANGE UP (ref 2.5–4.5)
PLATELET # BLD AUTO: 224 K/UL — SIGNIFICANT CHANGE UP (ref 150–400)
POTASSIUM SERPL-MCNC: 4.9 MMOL/L — SIGNIFICANT CHANGE UP (ref 3.5–5.3)
POTASSIUM SERPL-SCNC: 4.9 MMOL/L — SIGNIFICANT CHANGE UP (ref 3.5–5.3)
RBC # BLD: 2.71 M/UL — LOW (ref 3.8–5.2)
RBC # FLD: 16.6 % — HIGH (ref 10.3–14.5)
SODIUM SERPL-SCNC: 141 MMOL/L — SIGNIFICANT CHANGE UP (ref 135–145)
WBC # BLD: 8.6 K/UL — SIGNIFICANT CHANGE UP (ref 3.8–10.5)
WBC # FLD AUTO: 8.6 K/UL — SIGNIFICANT CHANGE UP (ref 3.8–10.5)

## 2018-02-18 PROCEDURE — 99233 SBSQ HOSP IP/OBS HIGH 50: CPT

## 2018-02-18 RX ORDER — MEGESTROL ACETATE 40 MG/ML
400 SUSPENSION ORAL DAILY
Qty: 0 | Refills: 0 | Status: DISCONTINUED | OUTPATIENT
Start: 2018-02-18 | End: 2018-02-22

## 2018-02-18 RX ADMIN — MEGESTROL ACETATE 400 MILLIGRAM(S): 40 SUSPENSION ORAL at 13:13

## 2018-02-18 RX ADMIN — BRIMONIDINE TARTRATE 1 DROP(S): 2 SOLUTION/ DROPS OPHTHALMIC at 06:17

## 2018-02-18 RX ADMIN — Medication 1 DROP(S): at 06:18

## 2018-02-18 RX ADMIN — RIVAROXABAN 15 MILLIGRAM(S): KIT at 18:16

## 2018-02-18 RX ADMIN — Medication 1 DROP(S): at 18:06

## 2018-02-18 RX ADMIN — BRIMONIDINE TARTRATE 1 DROP(S): 2 SOLUTION/ DROPS OPHTHALMIC at 18:06

## 2018-02-18 NOTE — PROGRESS NOTE ADULT - SUBJECTIVE AND OBJECTIVE BOX
HISTORY  89y Female PMHX dementia, afib (Xarelto), last ECHO 2/2017 EF 71%, COPD, CVA, GERD, GIB, glaucoma, HLD, PUD, s/p mech fall with L hip fracture, now s/p bipolar hemiarthroplasty. SICU consulted for hypotension requiring pressors in the PACU. Patient was given 500 mL plasmalyte boluses x 2 and 1 unit pRBCs before SICU evaluation. Her lactate was 3 after the resuscitation. Hematocrit before the transfusion was 27, and increased to 33, then 29. Patient was able to be weaned down to 0.1 of miroslava, but required pressors to maintain her BP during her 10-hour stay in the PACU. She was transferred to SICU for continued hypotension. Was placed on dopamine gtt, but then became tachycardic so switched to miroslava gtt again. Started on amio gtt for Afib, but again bradycardic so dopamine gtt restarted.        24 HOUR EVENTS: Patient off of vasopressors sating well on room air tolerating a mechanical soft diet. Patient listed for telemetry bed. No acute overnight events.     SUBJECTIVE/ROS:  [ ] A ten-point review of systems was otherwise negative except as noted.  [ ] Due to altered mental status/intubation, subjective information were not able to be obtained from the patient. History was obtained, to the extent possible, from review of the chart and collateral sources of information.      NEURO  Exam: awake, alert  Meds: acetaminophen   Tablet. 650 milliGRAM(s) Oral every 6 hours PRN Mild Pain (1 - 3)  ondansetron Injectable 4 milliGRAM(s) IV Push every 4 hours PRN Nausea and/or Vomiting    [x] Adequacy of sedation and pain control has been assessed and adjusted      RESPIRATORY  RR: 24 (02-18-18 @ 00:00) (19 - 28)  SpO2: 96% (02-18-18 @ 00:00) (96% - 100%)  Exam: unlabored, clear to auscultation bilaterally  Mechanical Ventilation: none    [N/A] Extubation Readiness Assessed  Meds: ALBUTerol/ipratropium for Nebulization 3 milliLiter(s) Nebulizer every 6 hours PRN Shortness of Breath and/or Wheezing        CARDIOVASCULAR  HR: 83 (02-18-18 @ 00:00) (65 - 83)  BP: 119/55 (02-18-18 @ 00:00) (109/52 - 137/60)  BP(mean): 79 (02-18-18 @ 00:00) (66 - 92)  ABP: 129/46 (02-17-18 @ 05:00) (114/47 - 129/46)  ABP(mean): 77 (02-17-18 @ 05:00) (73 - 77)  Exam: regular rate and rhythm  Cardiac Rhythm: sinus  Perfusion     [x]Adequate   [ ]Inadequate  Mentation   [x]Normal       [ ]Reduced  Extremities  [x]Warm         [ ]Cool  Volume Status [ ]Hypervolemic [x]Euvolemic [ ]Hypovolemic  Meds: none      GI/NUTRITION  Exam: soft, nontender, nondistended, incision C/D/I  Diet: mechanical soft   Meds: docusate sodium Liquid 200 milliGRAM(s) Oral two times a day  lactulose Syrup 20 Gram(s) Oral every 12 hours  senna Syrup 5 milliLiter(s) Oral daily      GENITOURINARY  I&O's Detail    02-16 @ 07:01  -  02-17 @ 07:00  --------------------------------------------------------  IN:    dextrose 5% + sodium chloride 0.45% with potassium chloride 20 mEq/L: 100 mL    IV PiggyBack: 125 mL  Total IN: 225 mL    OUT:    Stool: 1 mL  Total OUT: 1 mL    Total NET: 224 mL      02-17 @ 07:01  -  02-18 @ 03:48  --------------------------------------------------------  IN:    IV PiggyBack: 250 mL    Oral Fluid: 190 mL  Total IN: 440 mL    OUT:    Stool: 2 mL  Total OUT: 2 mL    Total NET: 438 mL          02-18    141  |  108  |  20  ----------------------------<  113<H>  4.9   |  19<L>  |  0.70    Ca    7.9<L>      18 Feb 2018 03:04  Phos  4.3     02-18  Mg     2.1     02-18      [ ] Hernandez catheter, indication: N/A  Meds: none      HEMATOLOGIC  Meds: rivaroxaban 15 milliGRAM(s) Oral every 24 hours    [x] VTE Prophylaxis                        9.4    8.6   )-----------( 224      ( 18 Feb 2018 03:04 )             26.1       Transfusion     [ ] PRBC   [ ] Platelets   [ ] FFP   [ ] Cryoprecipitate      INFECTIOUS DISEASES  WBC Count: 8.6 K/uL (02-18 @ 03:04)  WBC Count: 9.8 K/uL (02-17 @ 03:50)    RECENT CULTURES: none    Meds: none      ENDOCRINE  CAPILLARY BLOOD GLUCOSE        Meds: none      ACCESS DEVICES:  [ ] Peripheral IV  [ ] Central Venous Line	[ ] R	[ ] L	[ ] IJ	[ ] Fem	[ ] SC	Placed:   [ ] Arterial Line		[ ] R	[ ] L	[ ] Fem	[ ] Rad	[ ] Ax	Placed:   [ ] PICC:					[ ] Mediport  [ ] Urinary Catheter, Date Placed:   [x] Necessity of urinary, arterial, and venous catheters discussed    OTHER MEDICATIONS:  brimonidine 0.2% Ophthalmic Solution 1 Drop(s) Both EYES two times a day  timolol 0.5% Solution 1 Drop(s) Both EYES every 12 hours      CODE STATUS: full code       IMAGING: < from: CT Head No Cont (02.11.18 @ 20:24) >  FINDINGS:  There is no hydrocephalus, mass effect, midline shift, or   acute intracranial hemorrhage.  There is no CT evidence of acute   territorial infarct.  Redemonstration of chronic right superior temporal   gyrus infarct and tiny right superior cerebellar hemisphere infarct.   Redemonstration of mild to moderate white matter microvascular ischemic   disease.    The visualized paranasal sinuses and mastoid air cells are clear.    IMPRESSION: No acute intracranial hemorrhage, mass effect, or evidence of   acute territorial infarct.    Redemonstration of chronic right superior temporal gyrus and tiny right   superior cerebellar hemisphere infarcts.    < end of copied text >

## 2018-02-18 NOTE — PROGRESS NOTE ADULT - ASSESSMENT
89yF s/p left hip hemiarthroplasty with altered mental status and labile hemodynamics.    PLAN:   Neurologic:  - Monitor mental status  - Tylenol for pain control    Respiratory:  - Monitor respiratory status  - Off NC  - Duoneb PRN    Cardiovascular:   - Hemodynamic monitoring   - Midodrine 5mg q 8hrs    Gastrointestinal/Nutrition:  - Mech soft diet  - Start lactulose  - Colace, Senna, Miralax  - Elevated bilirubin (mostly indirect), likely secondary to absorption of thigh hematoma, continue to trend LFTs    Renal/Genitourinary:  - voiding  - D5 1/2 NS + 20 KCl @ 50 cc/hr  - Monitor BMP  - Replete lytes PRN    Hematologic: Anticoagulation for A.fib   - Xarelto   - Monitor CBC    Infectious Disease:  - No acute infectious concerns    Endocrine:  - No active issues  - Monitor BG on BMP    Disposition: Full code, Slisted for telemetry bed.     -Todd Barber PA-C

## 2018-02-18 NOTE — PROGRESS NOTE ADULT - ATTENDING COMMENTS
Neurologically unchanged  No arrhythmia  Encourage PO, add megace  PT  Transfer Neurologically unchanged  No arrhythmia  post operative acute pulmonary edema resolved  Encourage PO, add megace  PT  Transfer

## 2018-02-18 NOTE — PROGRESS NOTE ADULT - ASSESSMENT
89F s/p L hip hemiarthroplasty POD 9  Analgesia  DVT ppx  WBAT with posterior hip precautions  PT/OOB  Incentive spirometry  FU transfer to floor  DC planning

## 2018-02-18 NOTE — PROGRESS NOTE ADULT - ASSESSMENT
89 yo woman hx of fall with left hip bipolar hemiarthroplasty.  Episode of tachycardia and hypotension post op. More alert today, off pressors, continued anemia, no bradycardia last night. Now in normal sinus rhythm.

## 2018-02-18 NOTE — PROGRESS NOTE ADULT - SUBJECTIVE AND OBJECTIVE BOX
89F hx of dementia, afib (Xarelto), COPD, CVA, GERD, GIB, glaucoma, HLD, PUD, s/p mech fall with L hip pain. She states she was getting up from wheelchair to get her walker and tripped. falling onto her left side. She was unable to ambulate afterwards. No other apparent injuries. Denies headstrike or LOC. No n/v, no numbness and  tingling. Post op developed hypotension and rapid afib. required pressors for hypotension. Patient seen now resting comfortably off pressors. Now in NSR off antiarrhythmics on monitoring, with no episodic clarita or tachycardia. Now off pressors and more alert. Still with poor oral intake.    MEDICATIONS  (STANDING):  brimonidine 0.2% Ophthalmic Solution 1 Drop(s) Both EYES two times a day  docusate sodium Liquid 200 milliGRAM(s) Oral two times a day  megestrol Suspension 400 milliGRAM(s) Oral daily  rivaroxaban 15 milliGRAM(s) Oral every 24 hours  senna Syrup 5 milliLiter(s) Oral daily  timolol 0.5% Solution 1 Drop(s) Both EYES every 12 hours    MEDICATIONS  (PRN):  acetaminophen   Tablet. 650 milliGRAM(s) Oral every 6 hours PRN Mild Pain (1 - 3)  ALBUTerol/ipratropium for Nebulization 3 milliLiter(s) Nebulizer every 6 hours PRN Shortness of Breath and/or Wheezing  ondansetron Injectable 4 milliGRAM(s) IV Push every 4 hours PRN Nausea and/or Vomiting    ICU Vital Signs Last 24 Hrs  T(C): 37 (18 Feb 2018 12:00), Max: 37 (18 Feb 2018 12:00)  T(F): 98.6 (18 Feb 2018 12:00), Max: 98.6 (18 Feb 2018 12:00)  HR: 63 (18 Feb 2018 12:00) (56 - 83)  BP: 96/46 (18 Feb 2018 12:00) (96/46 - 137/60)  BP(mean): 66 (18 Feb 2018 12:00) (66 - 88)  ABP: --  ABP(mean): --  RR: 21 (18 Feb 2018 12:00) (19 - 37)  SpO2: 100% (18 Feb 2018 12:00) (96% - 100%)      PHYSICAL EXAM:  GENERAL: NAD, cachectic and conversant  HEAD:  Atraumatic  EYES: EOM, PERRLA, conjunctiva pink and sclera white  ENT: No tonsillar erythema, exudates, or enlargement, moist mucous membranes, good dentition, no lesions  NECK: Supple, No JVD, normal thyroid, carotids with normal upstrokes and no bruits  CHEST/LUNG: Clear to auscultation bilaterally, No rales, rhonchi, wheezing, or rubs  HEART: Irregular rate and rhythm, No murmurs, rubs, or gallops  ABDOMEN: Soft, nondistended, no masses, guarding, tenderness or rebound, bowel sounds present  EXTREMITIES:  2+ Peripheral Pulses, No clubbing, cyanosis, or edema.  (+) LEFT BIPOLAR HEMIARATHOPLASTY  LYMPH: No lymphadenopathy noted  SKIN: No rashes or lesions  NERVOUS SYSTEM: lethargic but responsive     LABS        CBC Full  -  ( 18 Feb 2018 03:04 )  WBC Count : 8.6 K/uL  Hemoglobin : 9.4 g/dL  Hematocrit : 26.1 %  Platelet Count - Automated : 224 K/uL  Mean Cell Volume : 96.5 fl  Mean Cell Hemoglobin : 34.7 pg  Mean Cell Hemoglobin Concentration : 36.0 gm/dL  Auto Neutrophil # : x  Auto Lymphocyte # : x  Auto Monocyte # : x  Auto Eosinophil # : x  Auto Basophil # : x  Auto Neutrophil % : x  Auto Lymphocyte % : x  Auto Monocyte % : x  Auto Eosinophil % : x  Auto Basophil % : x    02-18    141  |  108  |  20  ----------------------------<  113<H>  4.9   |  19<L>  |  0.70    Ca    7.9<L>      18 Feb 2018 03:04  Phos  4.3     02-18  Mg     2.1     02-18

## 2018-02-18 NOTE — PROGRESS NOTE ADULT - ATTENDING COMMENTS
Continues cardiac monitoring in SICU for chronic atrial fibrillation with bradycardia. now in NSR and off all medications, including pressors. Awaiting transfer from SICU to orthopedic service for routine post-op care.

## 2018-02-18 NOTE — PROGRESS NOTE ADULT - SUBJECTIVE AND OBJECTIVE BOX
Pt S/E at bedside, no acute events overnight, pain controlled. No complaints this AM. Pt listed for transfer to floor.    Vital Signs Last 24 Hrs  T(C): 36.7 (17 Feb 2018 16:00), Max: 36.9 (17 Feb 2018 08:00)  T(F): 98.1 (17 Feb 2018 16:00), Max: 98.4 (17 Feb 2018 08:00)  HR: 71 (18 Feb 2018 04:00) (65 - 83)  BP: 124/61 (18 Feb 2018 04:00) (109/52 - 137/60)  BP(mean): 88 (18 Feb 2018 04:00) (66 - 92)  RR: 37 (18 Feb 2018 04:00) (19 - 37)  SpO2: 97% (18 Feb 2018 04:00) (96% - 100%)    Gen: NAD, AAOx3    Left Lower Extremity:  Dressing clean dry intact, +abd pillow  +EHL/FHL/TA/GS  SILT L3-S1  +DP/PT Pulses  Compartments soft  No calf TTP B/L

## 2018-02-19 LAB
ALBUMIN SERPL ELPH-MCNC: 2.8 G/DL — LOW (ref 3.3–5)
ALP SERPL-CCNC: 105 U/L — SIGNIFICANT CHANGE UP (ref 40–120)
ALT FLD-CCNC: 10 U/L — SIGNIFICANT CHANGE UP (ref 10–45)
ANION GAP SERPL CALC-SCNC: 12 MMOL/L — SIGNIFICANT CHANGE UP (ref 5–17)
AST SERPL-CCNC: 15 U/L — SIGNIFICANT CHANGE UP (ref 10–40)
BILIRUB DIRECT SERPL-MCNC: 0.7 MG/DL — HIGH (ref 0–0.2)
BILIRUB INDIRECT FLD-MCNC: 2.1 MG/DL — HIGH (ref 0.2–1)
BILIRUB SERPL-MCNC: 2.8 MG/DL — HIGH (ref 0.2–1.2)
BUN SERPL-MCNC: 29 MG/DL — HIGH (ref 7–23)
CALCIUM SERPL-MCNC: 8.5 MG/DL — SIGNIFICANT CHANGE UP (ref 8.4–10.5)
CHLORIDE SERPL-SCNC: 109 MMOL/L — HIGH (ref 96–108)
CO2 SERPL-SCNC: 21 MMOL/L — LOW (ref 22–31)
CREAT SERPL-MCNC: 0.76 MG/DL — SIGNIFICANT CHANGE UP (ref 0.5–1.3)
GLUCOSE SERPL-MCNC: 101 MG/DL — HIGH (ref 70–99)
HCT VFR BLD CALC: 28.5 % — LOW (ref 34.5–45)
HGB BLD-MCNC: 9.2 G/DL — LOW (ref 11.5–15.5)
MAGNESIUM SERPL-MCNC: 2.2 MG/DL — SIGNIFICANT CHANGE UP (ref 1.6–2.6)
MCHC RBC-ENTMCNC: 32.3 GM/DL — SIGNIFICANT CHANGE UP (ref 32–36)
MCHC RBC-ENTMCNC: 32.3 PG — SIGNIFICANT CHANGE UP (ref 27–34)
MCV RBC AUTO: 100 FL — SIGNIFICANT CHANGE UP (ref 80–100)
PHOSPHATE SERPL-MCNC: 2.6 MG/DL — SIGNIFICANT CHANGE UP (ref 2.5–4.5)
PLATELET # BLD AUTO: 215 K/UL — SIGNIFICANT CHANGE UP (ref 150–400)
POTASSIUM SERPL-MCNC: 4.1 MMOL/L — SIGNIFICANT CHANGE UP (ref 3.5–5.3)
POTASSIUM SERPL-SCNC: 4.1 MMOL/L — SIGNIFICANT CHANGE UP (ref 3.5–5.3)
PROT SERPL-MCNC: 5.3 G/DL — LOW (ref 6–8.3)
RBC # BLD: 2.85 M/UL — LOW (ref 3.8–5.2)
RBC # FLD: 19.4 % — HIGH (ref 10.3–14.5)
SODIUM SERPL-SCNC: 142 MMOL/L — SIGNIFICANT CHANGE UP (ref 135–145)
WBC # BLD: 8.37 K/UL — SIGNIFICANT CHANGE UP (ref 3.8–10.5)
WBC # FLD AUTO: 8.37 K/UL — SIGNIFICANT CHANGE UP (ref 3.8–10.5)

## 2018-02-19 RX ADMIN — BRIMONIDINE TARTRATE 1 DROP(S): 2 SOLUTION/ DROPS OPHTHALMIC at 07:25

## 2018-02-19 RX ADMIN — Medication 1 DROP(S): at 17:11

## 2018-02-19 RX ADMIN — MEGESTROL ACETATE 400 MILLIGRAM(S): 40 SUSPENSION ORAL at 11:41

## 2018-02-19 RX ADMIN — Medication 1 DROP(S): at 07:25

## 2018-02-19 RX ADMIN — RIVAROXABAN 15 MILLIGRAM(S): KIT at 17:12

## 2018-02-19 RX ADMIN — BRIMONIDINE TARTRATE 1 DROP(S): 2 SOLUTION/ DROPS OPHTHALMIC at 17:11

## 2018-02-19 NOTE — PROGRESS NOTE ADULT - ATTENDING COMMENTS
Continues floor monitoring  with history of paroxysmal atrial fibrillation with bradycardia, now in NSR and off all medications, including pressors. Out of bed to chair and doing well. No medical complications post-op to date and to proceed with physical therapy, as tolerated. Continues pulmonary toilet to lessen atelectasis, leg exercises as taught to lessen the risk of DVT and supervised pain medications for post-op pain control. I anticipate transfer to rehabilitation to follow when cleared by orthopedics.

## 2018-02-19 NOTE — PROGRESS NOTE ADULT - ASSESSMENT
89 yo woman hx of fall with left hip bipolar hemiarthroplasty.  Episode of tachycardia and hypotension post op. More alert today, off pressors, continued anemia, no bradycardia last night. Now in normal sinus rhythm.

## 2018-02-19 NOTE — PROGRESS NOTE ADULT - SUBJECTIVE AND OBJECTIVE BOX
89F hx of dementia, afib (Xarelto), COPD, CVA, GERD, GIB, glaucoma, HLD, PUD, s/p mech fall with L hip pain. She states she was getting up from wheelchair to get her walker and tripped. falling onto her left side. She was unable to ambulate afterwards. No other apparent injuries. Denies headstrike or LOC. No n/v, no numbness and  tingling. Post op developed hypotension and rapid afib. required pressors for hypotension. Patient seen now resting comfortably off pressors. Now in NSR off antiarrhythmics on monitoring, with no episodic clarita or tachycardia. Transferred  to floor on telemetry.  To monitor for recurrent sick sinus syndrome. Now off pressors and more alert. Still with poor oral intake.    MEDICATIONS  (STANDING):  brimonidine 0.2% Ophthalmic Solution 1 Drop(s) Both EYES two times a day  docusate sodium Liquid 200 milliGRAM(s) Oral two times a day  megestrol Suspension 400 milliGRAM(s) Oral daily  rivaroxaban 15 milliGRAM(s) Oral every 24 hours  senna Syrup 5 milliLiter(s) Oral daily  timolol 0.5% Solution 1 Drop(s) Both EYES every 12 hours    MEDICATIONS  (PRN):  acetaminophen   Tablet. 650 milliGRAM(s) Oral every 6 hours PRN Mild Pain (1 - 3)  ALBUTerol/ipratropium for Nebulization 3 milliLiter(s) Nebulizer every 6 hours PRN Shortness of Breath and/or Wheezing  ondansetron Injectable 4 milliGRAM(s) IV Push every 4 hours PRN Nausea and/or Vomiting    Vital Signs Last 24 Hrs  T(C): 37 (19 Feb 2018 12:01), Max: 37 (19 Feb 2018 12:01)  T(F): 98.6 (19 Feb 2018 12:01), Max: 98.6 (19 Feb 2018 12:01)  HR: 61 (19 Feb 2018 12:01) (53 - 64)  BP: 111/70 (19 Feb 2018 12:01) (95/59 - 137/72)  BP(mean): 68 (18 Feb 2018 20:00) (68 - 68)  RR: 16 (19 Feb 2018 12:01) (15 - 17)  SpO2: 97% (19 Feb 2018 12:01) (96% - 99%)      PHYSICAL EXAM:  GENERAL: NAD, cachectic and conversant  HEAD:  Atraumatic  EYES: EOM, PERRLA, conjunctiva pink and sclera white  ENT: No tonsillar erythema, exudates, or enlargement, moist mucous membranes, good dentition, no lesions  NECK: Supple, No JVD, normal thyroid, carotids with normal upstrokes and no bruits  CHEST/LUNG: Clear to auscultation bilaterally, No rales, rhonchi, wheezing, or rubs  HEART: Irregular rate and rhythm, No murmurs, rubs, or gallops  ABDOMEN: Soft, nondistended, no masses, guarding, tenderness or rebound, bowel sounds present  EXTREMITIES:  2+ Peripheral Pulses, No clubbing, cyanosis, or edema.  (+) LEFT BIPOLAR HEMIARATHOPLASTY  LYMPH: No lymphadenopathy noted  SKIN: No rashes or lesions  NERVOUS SYSTEM: lethargic but responsive     LABS      CBC Full  -  ( 19 Feb 2018 07:50 )  WBC Count : 8.37 K/uL  Hemoglobin : 9.2 g/dL  Hematocrit : 28.5 %  Platelet Count - Automated : 215 K/uL  Mean Cell Volume : 100.0 fl  Mean Cell Hemoglobin : 32.3 pg  Mean Cell Hemoglobin Concentration : 32.3 gm/dL  Auto Neutrophil # : x  Auto Lymphocyte # : x  Auto Monocyte # : x  Auto Eosinophil # : x  Auto Basophil # : x  Auto Neutrophil % : x  Auto Lymphocyte % : x  Auto Monocyte % : x  Auto Eosinophil % : x  Auto Basophil % : x    02-19    142  |  109<H>  |  29<H>  ----------------------------<  101<H>  4.1   |  21<L>  |  0.76    Ca    8.5      19 Feb 2018 07:38  Phos  2.6     02-19  Mg     2.2     02-19    TPro  5.3<L>  /  Alb  2.8<L>  /  TBili  2.8<H>  /  DBili  0.7<H>  /  AST  15  /  ALT  10  /  AlkPhos  105  02-19    LIVER FUNCTIONS - ( 19 Feb 2018 07:38 )  Alb: 2.8 g/dL / Pro: 5.3 g/dL / ALK PHOS: 105 U/L / ALT: 10 U/L / AST: 15 U/L / GGT: x

## 2018-02-19 NOTE — PROGRESS NOTE ADULT - ASSESSMENT
89F s/p L hip hemiarthroplasty POD 10  Analgesia  DVT ppx  WBAT with posterior hip precautions  PT/OOB  Incentive spirometry  DC planning to rehab when bed available

## 2018-02-20 ENCOUNTER — TRANSCRIPTION ENCOUNTER (OUTPATIENT)
Age: 83
End: 2018-02-20

## 2018-02-20 RX ORDER — OXYCODONE AND ACETAMINOPHEN 5; 325 MG/1; MG/1
1 TABLET ORAL
Qty: 30 | Refills: 0
Start: 2018-02-20 | End: 2018-02-24

## 2018-02-20 RX ORDER — MEGESTROL ACETATE 40 MG/ML
10 SUSPENSION ORAL
Qty: 0 | Refills: 0 | COMMUNITY
Start: 2018-02-20

## 2018-02-20 RX ADMIN — Medication 200 MILLIGRAM(S): at 18:17

## 2018-02-20 RX ADMIN — MEGESTROL ACETATE 400 MILLIGRAM(S): 40 SUSPENSION ORAL at 13:48

## 2018-02-20 RX ADMIN — RIVAROXABAN 15 MILLIGRAM(S): KIT at 18:17

## 2018-02-20 RX ADMIN — Medication 1 DROP(S): at 18:17

## 2018-02-20 RX ADMIN — BRIMONIDINE TARTRATE 1 DROP(S): 2 SOLUTION/ DROPS OPHTHALMIC at 18:17

## 2018-02-20 RX ADMIN — SENNA PLUS 5 MILLILITER(S): 8.6 TABLET ORAL at 21:36

## 2018-02-20 NOTE — PROGRESS NOTE ADULT - SUBJECTIVE AND OBJECTIVE BOX
Pt seen and examined.  No complaints this am.  Pain controlled.  No acute events overnight.     Vital Signs Last 24 Hrs  T(C): 36.3 (20 Feb 2018 04:24), Max: 37 (19 Feb 2018 12:01)  T(F): 97.3 (20 Feb 2018 04:24), Max: 98.6 (19 Feb 2018 12:01)  HR: 60 (20 Feb 2018 04:24) (60 - 61)  BP: 129/78 (20 Feb 2018 04:24) (111/70 - 129/78)  BP(mean): --  RR: 16 (20 Feb 2018 04:24) (16 - 16)  SpO2: 99% (20 Feb 2018 04:24) (97% - 99%)    Gen: NAD, AAOx3  Left Lower Extremity:  Dressing clean dry intact, +abd pillow  motor and sensation grossly intact  +DP/PT Pulses  Compartments soft  No calf TTP B/L

## 2018-02-20 NOTE — DISCHARGE NOTE ADULT - MEDICATION SUMMARY - MEDICATIONS TO TAKE
I will START or STAY ON the medications listed below when I get home from the hospital:    acetaminophen 160 mg/5 mL oral suspension  -- 31.25 milliliter(s) by mouth every 6 hours  -- Indication: For Home med    acetaminophen-oxyCODONE 325 mg-5 mg oral tablet  -- 1 tab(s) by mouth every 4 hours, As Needed MDD:6 tabs  -- Caution federal law prohibits the transfer of this drug to any person other  than the person for whom it was prescribed.  May cause drowsiness.  Alcohol may intensify this effect.  Use care when operating dangerous machinery.  This prescription cannot be refilled.  This product contains acetaminophen.  Do not use  with any other product containing acetaminophen to prevent possible liver damage.  Using more of this medication than prescribed may cause serious breathing problems.    -- Indication: For Pain    Xarelto 15 mg oral tablet  -- 1 tab(s) by mouth once a day (in the evening)  -- Indication: For Home med    atorvastatin 40 mg oral tablet  -- 1 tab(s) by mouth once a day (at bedtime)  -- Indication: For Home med    megestrol 40 mg/mL oral suspension  -- 10 milliliter(s) by mouth once a day  -- Indication: For Appetite    metoprolol tartrate 25 mg oral tablet  -- 0.5 tab(s) (12.5 mg) by mouth 2 times a day   -- It is very important that you take or use this exactly as directed.  Do not skip doses or discontinue unless directed by your doctor.  May cause drowsiness.  Alcohol may intensify this effect.  Use care when operating dangerous machinery.  Some non-prescription drugs may aggravate your condition.  Read all labels carefully.  If a warning appears, check with your doctor before taking.  Take with food or milk.  This drug may impair the ability to drive or operate machinery.  Use care until you become familiar with its effects.    -- Indication: For Hypertension    Colace 10 mg/mL oral liquid  -- 30 milliliter(s) by mouth once a day (in the evening)  -- Indication: For Home med    timolol maleate 0.5% ophthalmic gel forming solution  -- 1 drop(s) to each eye 2 times a day  -- Indication: For Home med    brimonidine 0.2% ophthalmic solution  -- 1 drop(s) to each eye 2 times a day  -- Indication: For Home med

## 2018-02-20 NOTE — PROGRESS NOTE ADULT - ATTENDING COMMENTS
Continues floor monitoring  with history of paroxysmal atrial fibrillation with bradycardia, now in NSR and off all medications, including pressors. Out of bed to chair and doing well. More alert and conversant today. No medical complications post-op to date and to proceed with physical therapy, as tolerated. Continues pulmonary toilet to lessen atelectasis, leg exercises as taught to lessen the risk of DVT and supervised pain medications for post-op pain control. I anticipate transfer to rehabilitation to follow when bed is available.

## 2018-02-20 NOTE — DISCHARGE NOTE ADULT - CARE PLAN
Principal Discharge DX:	Closed fracture of neck of left femur, initial encounter  Goal:	Recovery from surgery  Assessment and plan of treatment:	You underwent a partial hip replacement for your left hip fracture. You are weightbearing as tolerated. Please observe posterior hip precautions, as instructed. Avoid flexion and internal rotation of your left hip.   Follow-up with Dr. Gomez 1-2 weeks after discharge, call for an appointment.

## 2018-02-20 NOTE — DISCHARGE NOTE ADULT - PATIENT PORTAL LINK FT
You can access the [facility] Patient Portal, offered by [facility], by registering with the following website: [email or web address]

## 2018-02-20 NOTE — CHART NOTE - NSCHARTNOTEFT_GEN_A_CORE
Source: Patient [ X]    Family [ ]     other [ X] RN, Medical record.    Pt seen for malnutrition follow up. pt seen laying in bed with breakfast tray untouched. Pt has limited participation in RD interview. RD offered to provide feeding assistance to Pt, pt refused all food but accepted Ensure enlive x1. Pt accepted 1 full ensure during interview. Pt requesting more. Per RN, no GI distress at this time     Per chart, Pt admitted s/p fall with left hip pain. Pt s/p left hip hemiarthroplasty POD #11. Pt with AMS.     Diet :  Mechanical soft, Ensure enlive x1       Patient reports no GI distress.      PO intake: 0% intake      Source for PO intake [ ] Patient [ ] family [ X] chart [ ] staff [ ] other      Current Weight: 121.4lbs. increase noted. ? accuracy as Pt with poor PO intake   % Weight Change    Pertinent Medications: MEDICATIONS  (STANDING):  brimonidine 0.2% Ophthalmic Solution 1 Drop(s) Both EYES two times a day  docusate sodium Liquid 200 milliGRAM(s) Oral two times a day  megestrol Suspension 400 milliGRAM(s) Oral daily  rivaroxaban 15 milliGRAM(s) Oral every 24 hours  senna Syrup 5 milliLiter(s) Oral daily  timolol 0.5% Solution 1 Drop(s) Both EYES every 12 hours    MEDICATIONS  (PRN):  acetaminophen   Tablet. 650 milliGRAM(s) Oral every 6 hours PRN Mild Pain (1 - 3)  ALBUTerol/ipratropium for Nebulization 3 milliLiter(s) Nebulizer every 6 hours PRN Shortness of Breath and/or Wheezing  ondansetron Injectable 4 milliGRAM(s) IV Push every 4 hours PRN Nausea and/or Vomiting    Pertinent Labs:  BUN: 29, Cr: 101, Bili: 2.8    Skin: intact, no edema at this time.     Estimated Needs:   [X ] no change since previous assessment  [ ] recalculated:       Previous Nutrition Diagnosis:   X ] Malnutrition          Nutrition Diagnosis is [X ] ongoing         New Nutrition Diagnosis: [X ] not applicable       Recommend    1. recommend to continue mechanical soft diet   2. recommend to increase Ensure enlive to 2x daily to further supplement PO intake   3. Provide meal time feeding assistance   4. Encourage adequate PO intake at all meal times     Monitoring and Evaluation:     [X ] PO intake [X ] Tolerance to diet prescription [ X] weights [X ] follow up per protocol    [X ] other: RD remains available Sarah Siegler RD. Pager #[phone]

## 2018-02-20 NOTE — DISCHARGE NOTE ADULT - CARE PROVIDER_API CALL
Sanjeev Gomez (MD), Orthopaedic Surgery  [address] [place] [id number]  Phone: [phone]  Fax: [phone]

## 2018-02-20 NOTE — PROGRESS NOTE ADULT - ASSESSMENT
89 yo woman hx of fall with left hip bipolar hemiarthroplasty.  Episode of tachycardia and hypotension post op. More alert today, off pressors, continued anemia, no bradycardia last night. Now in normal sinus rhythm. Alert, responsive and conversant today.

## 2018-02-20 NOTE — PROGRESS NOTE ADULT - ASSESSMENT
89F s/p L hip hemiarthroplasty POD 11  Analgesia  DVT ppx  WBAT with posterior hip precautions  PT/OOB  Incentive spirometry  DC planning to rehab when bed available

## 2018-02-20 NOTE — PROGRESS NOTE ADULT - SUBJECTIVE AND OBJECTIVE BOX
89F hx of dementia, afib (Xarelto), COPD, CVA, GERD, GIB, glaucoma, HLD, PUD, s/p mech fall with L hip pain. She states she was getting up from wheelchair to get her walker and tripped. falling onto her left side. She was unable to ambulate afterwards. No other apparent injuries. Denies headstrike or LOC. No n/v, no numbness and  tingling. Post op developed hypotension and rapid afib. required pressors for hypotension. Patient seen now resting comfortably off pressors. Now in NSR off antiarrhythmics on monitoring, with no episodic clarita or tachycardia. Transferred  to floor on telemetry and telemetry is normal. No recurrent sick sinus syndrome. Now off pressors and more alert. Still with poor oral intake, unless fed.    MEDICATIONS  (STANDING):  brimonidine 0.2% Ophthalmic Solution 1 Drop(s) Both EYES two times a day  docusate sodium Liquid 200 milliGRAM(s) Oral two times a day  megestrol Suspension 400 milliGRAM(s) Oral daily  rivaroxaban 15 milliGRAM(s) Oral every 24 hours  senna Syrup 5 milliLiter(s) Oral daily  timolol 0.5% Solution 1 Drop(s) Both EYES every 12 hours    MEDICATIONS  (PRN):  acetaminophen   Tablet. 650 milliGRAM(s) Oral every 6 hours PRN Mild Pain (1 - 3)  ALBUTerol/ipratropium for Nebulization 3 milliLiter(s) Nebulizer every 6 hours PRN Shortness of Breath and/or Wheezing  ondansetron Injectable 4 milliGRAM(s) IV Push every 4 hours PRN Nausea and/or Vomiting    Vital Signs Last 24 Hrs  T(C): 36.4 (20 Feb 2018 11:35), Max: 36.9 (19 Feb 2018 20:59)  T(F): 97.6 (20 Feb 2018 11:35), Max: 98.4 (19 Feb 2018 20:59)  HR: 65 (20 Feb 2018 11:35) (60 - 65)  BP: 147/50 (20 Feb 2018 11:35) (114/52 - 147/50)  BP(mean): --  RR: 16 (20 Feb 2018 11:35) (16 - 16)  SpO2: 100% (20 Feb 2018 11:35) (98% - 100%)      PHYSICAL EXAM:  GENERAL: NAD, cachectic and conversant  HEAD:  Atraumatic  EYES: EOM, PERRLA, conjunctiva pink and sclera white  ENT: No tonsillar erythema, exudates, or enlargement, moist mucous membranes, good dentition, no lesions  NECK: Supple, No JVD, normal thyroid, carotids with normal upstrokes and no bruits  CHEST/LUNG: Clear to auscultation bilaterally, No rales, rhonchi, wheezing, or rubs  HEART: Irregular rate and rhythm, No murmurs, rubs, or gallops  ABDOMEN: Soft, nondistended, no masses, guarding, tenderness or rebound, bowel sounds present  EXTREMITIES:  2+ Peripheral Pulses, No clubbing, cyanosis, or edema.  (+) LEFT BIPOLAR HEMIARATHOPLASTY  LYMPH: No lymphadenopathy noted  SKIN: No rashes or lesions  NERVOUS SYSTEM: lethargic but responsive     LABS          CBC Full  -  ( 19 Feb 2018 07:50 )  WBC Count : 8.37 K/uL  Hemoglobin : 9.2 g/dL  Hematocrit : 28.5 %  Platelet Count - Automated : 215 K/uL  Mean Cell Volume : 100.0 fl  Mean Cell Hemoglobin : 32.3 pg  Mean Cell Hemoglobin Concentration : 32.3 gm/dL  Auto Neutrophil # : x  Auto Lymphocyte # : x  Auto Monocyte # : x  Auto Eosinophil # : x  Auto Basophil # : x  Auto Neutrophil % : x  Auto Lymphocyte % : x  Auto Monocyte % : x  Auto Eosinophil % : x  Auto Basophil % : x    02-19    142  |  109<H>  |  29<H>  ----------------------------<  101<H>  4.1   |  21<L>  |  0.76    Ca    8.5      19 Feb 2018 07:38  Phos  2.6     02-19  Mg     2.2     02-19    TPro  5.3<L>  /  Alb  2.8<L>  /  TBili  2.8<H>  /  DBili  0.7<H>  /  AST  15  /  ALT  10  /  AlkPhos  105  02-19    LIVER FUNCTIONS - ( 19 Feb 2018 07:38 )  Alb: 2.8 g/dL / Pro: 5.3 g/dL / ALK PHOS: 105 U/L / ALT: 10 U/L / AST: 15 U/L / GGT: x

## 2018-02-20 NOTE — DISCHARGE NOTE ADULT - HOSPITAL COURSE
89F hx of dementia, afib (Xarelto), COPD, CVA, GERD, GIB, glaucoma, HLD, PUD, s/p mech fall with L hip pain. She states she was getting up from wheelchair to get her walker and tripped. falling onto her left side. She was unable to ambulate afterwards. No other apparent injuries. She was admitted to orthopedic surgery, evaluated by medicine and cleared for surgery. She was taken to the OR on 2/9 for L hip hemiarthroplasty for a displaced left femoral neck fracture. No intraoperative complications. Postoperative course complicated by hypotension requiring pressor support. SICU was consulted and patient was transferred to the SICU for resuscitation. Patient was given 500 mL plasmalyte boluses x 2 and 1 unit pRBCs before SICU evaluation. Her lactate was 3 after the resuscitation. Hematocrit before the transfusion was 27, and increased to 33, then 29. Patient was able to be weaned down to 0.1 of miroslava, but required pressors to maintain her BP during her 10-hour stay in the PACU. She was transferred to SICU for continued hypotension. Was placed on dopamine gtt, but then became tachycardic so switched to miroslava gtt again. Started on amio gtt for Afib, but again bradycardic so dopamine gtt restarted. Patient was subsequently weaned off pressors and stabilized. On 2/18, patient was deemed stable and was transferred to the floor. Patient did well on the floor. Physical therapy worked with the patient throughout her stay and recommended that she be discharged to rehab. Patient is instructed to follow-up with Dr. Gomez 1-2 weeks after discharge.

## 2018-02-20 NOTE — DISCHARGE NOTE ADULT - PLAN OF CARE
Recovery from surgery You underwent a partial hip replacement for your left hip fracture. You are weightbearing as tolerated. Please observe posterior hip precautions, as instructed. Avoid flexion and internal rotation of your left hip.   Follow-up with Dr. Gomez 1-2 weeks after discharge, call for an appointment.

## 2018-02-21 RX ADMIN — Medication 1 DROP(S): at 21:18

## 2018-02-21 RX ADMIN — RIVAROXABAN 15 MILLIGRAM(S): KIT at 21:18

## 2018-02-21 RX ADMIN — BRIMONIDINE TARTRATE 1 DROP(S): 2 SOLUTION/ DROPS OPHTHALMIC at 21:19

## 2018-02-21 RX ADMIN — Medication 200 MILLIGRAM(S): at 21:19

## 2018-02-21 RX ADMIN — Medication 200 MILLIGRAM(S): at 06:32

## 2018-02-21 RX ADMIN — SENNA PLUS 5 MILLILITER(S): 8.6 TABLET ORAL at 21:18

## 2018-02-21 RX ADMIN — BRIMONIDINE TARTRATE 1 DROP(S): 2 SOLUTION/ DROPS OPHTHALMIC at 06:32

## 2018-02-21 RX ADMIN — Medication 1 DROP(S): at 06:32

## 2018-02-21 RX ADMIN — MEGESTROL ACETATE 400 MILLIGRAM(S): 40 SUSPENSION ORAL at 12:48

## 2018-02-21 NOTE — PROGRESS NOTE ADULT - SUBJECTIVE AND OBJECTIVE BOX
Patient is a 89y old  Female who presents with a chief complaint of L hip fracture (20 Feb 2018 06:34)      HPI:   89F hx of dementia, afib (Xarelto), COPD, CVA, GERD, GIB, glaucoma, HLD, PUD, s/p mech fall with L hip pain. She states she was getting up from wheelchair to get her walker and tripped. falling onto her left side. She was unable to ambulate afterwards. No other apparent injuries. Denies headstrike or LOC. No n/v, no numbness and  tingling. Post op developed hypotension and rapid afib. required pressors for hypotension. Patient seen now resting comfortably off pressors. Now in NSR off antiarrhythmics on monitoring, with no episodic clarita or tachycardia. Transferred  to floor on telemetry and telemetry is normal. No recurrent sick sinus syndrome. Now off pressors and more alert. Appetuite poor but trying to eat more when encouraged.    MEDICATIONS  (STANDING):  brimonidine 0.2% Ophthalmic Solution 1 Drop(s) Both EYES two times a day  docusate sodium Liquid 200 milliGRAM(s) Oral two times a day  megestrol Suspension 400 milliGRAM(s) Oral daily  rivaroxaban 15 milliGRAM(s) Oral every 24 hours  senna Syrup 5 milliLiter(s) Oral daily  timolol 0.5% Solution 1 Drop(s) Both EYES every 12 hours    MEDICATIONS  (PRN):  acetaminophen   Tablet. 650 milliGRAM(s) Oral every 6 hours PRN Mild Pain (1 - 3)  ALBUTerol/ipratropium for Nebulization 3 milliLiter(s) Nebulizer every 6 hours PRN Shortness of Breath and/or Wheezing  ondansetron Injectable 4 milliGRAM(s) IV Push every 4 hours PRN Nausea and/or Vomiting      Allergies    aspirin (Unknown)  penicillin (Unknown)    Intolerances        VITALS:   T(C): 36.2 (02-21-18 @ 11:44), Max: 36.8 (02-21-18 @ 04:13)  HR: 79 (02-21-18 @ 11:44) (62 - 79)  BP: 119/53 (02-21-18 @ 11:44) (119/53 - 151/66)  RR: 18 (02-21-18 @ 11:44) (17 - 18)  SpO2: 98% (02-21-18 @ 11:44) (98% - 100%)  Wt(kg): --    02-20 @ 07:01  -  02-21 @ 07:00  --------------------------------------------------------  IN: 210 mL / OUT: 0 mL / NET: 210 mL    02-21 @ 07:01  -  02-21 @ 17:32  --------------------------------------------------------  IN: 600 mL / OUT: 0 mL / NET: 600 mL        PHYSICAL EXAM:    GENERAL: NAD, cachectic and conversant  HEAD:  Atraumatic  EYES: EOM, PERRLA, conjunctiva pink and sclera white  ENT: No tonsillar erythema, exudates, or enlargement, moist mucous membranes, good dentition, no lesions  NECK: Supple, No JVD, normal thyroid, carotids with normal upstrokes and no bruits  CHEST/LUNG: Clear to auscultation bilaterally, No rales, rhonchi, wheezing, or rubs  HEART: Irregular rate and rhythm, No murmurs, rubs, or gallops  ABDOMEN: Soft, nondistended, no masses, guarding, tenderness or rebound, bowel sounds present  EXTREMITIES:  2+ Peripheral Pulses, No clubbing, cyanosis, or edema.  (+) LEFT BIPOLAR Hemiarthroplasty  LYMPH: No lymphadenopathy noted  SKIN: No rashes or lesions  NERVOUS SYSTEM: confused , slightly more animated today  LABS:      02-19    142  |  109<H>  |  29<H>  ----------------------------<  101<H>  4.1   |  21<L>  |  0.76    Ca    8.5      19 Feb 2018 07:38  Phos  2.6     02-19  Mg     2.2     02-19    TPro  5.3<L>  /  Alb  2.8<L>  /  TBili  2.8<H>  /  DBili  0.7<H>  /  AST  15  /  ALT  10  /  AlkPhos  105  02-19    CAPILLARY BLOOD GLUCOSE          RADIOLOGY & ADDITIONAL TESTS:      Consultant(s):    Care Discussed with Consultants/Other Providers [ ] YES  [ ] NO

## 2018-02-21 NOTE — PROGRESS NOTE ADULT - ASSESSMENT
89F s/p L hip hemiarthroplasty POD 12    Analgesia  DVT ppx  WBAT with posterior hip precautions  PT/OOB  Incentive spirometry  DC planning to rehab when bed available

## 2018-02-21 NOTE — PROGRESS NOTE ADULT - ASSESSMENT
89 yo woman hx of fall with left hip bipolar hemiarthroplasty.  Episode of tachycardia and hypotension post op. More alert today, off pressors, continued anemia, no bradycardia last night. Now in normal sinus rhythm. Alert, responsive and conversant today. Appetite slightly better

## 2018-02-21 NOTE — PROGRESS NOTE ADULT - SUBJECTIVE AND OBJECTIVE BOX
Pt seen and examined.  No complaints this am.  Pain controlled.  No acute events overnight.     ICU Vital Signs Last 24 Hrs  T(C): 36.8 (21 Feb 2018 04:13), Max: 36.8 (21 Feb 2018 04:13)  T(F): 98.2 (21 Feb 2018 04:13), Max: 98.2 (21 Feb 2018 04:13)  HR: 62 (21 Feb 2018 04:13) (62 - 65)  BP: 151/66 (21 Feb 2018 04:13) (119/65 - 151/66)  BP(mean): --  ABP: --  ABP(mean): --  RR: 17 (21 Feb 2018 04:13) (16 - 17)  SpO2: 100% (21 Feb 2018 04:13) (100% - 100%)      Gen: NAD, AAOx3  Left Lower Extremity:  Dressing clean dry intact, +abd pillow  motor and sensation grossly intact  +DP/PT Pulses  Compartments soft  No calf TTP B/L

## 2018-02-22 VITALS
DIASTOLIC BLOOD PRESSURE: 84 MMHG | SYSTOLIC BLOOD PRESSURE: 145 MMHG | TEMPERATURE: 99 F | HEART RATE: 72 BPM | OXYGEN SATURATION: 100 % | RESPIRATION RATE: 18 BRPM

## 2018-02-22 PROCEDURE — C1776: CPT

## 2018-02-22 PROCEDURE — 88305 TISSUE EXAM BY PATHOLOGIST: CPT

## 2018-02-22 PROCEDURE — 86900 BLOOD TYPING SEROLOGIC ABO: CPT

## 2018-02-22 PROCEDURE — 85027 COMPLETE CBC AUTOMATED: CPT

## 2018-02-22 PROCEDURE — 93005 ELECTROCARDIOGRAM TRACING: CPT

## 2018-02-22 PROCEDURE — 82435 ASSAY OF BLOOD CHLORIDE: CPT

## 2018-02-22 PROCEDURE — 96375 TX/PRO/DX INJ NEW DRUG ADDON: CPT

## 2018-02-22 PROCEDURE — 80076 HEPATIC FUNCTION PANEL: CPT

## 2018-02-22 PROCEDURE — 82330 ASSAY OF CALCIUM: CPT

## 2018-02-22 PROCEDURE — 86901 BLOOD TYPING SEROLOGIC RH(D): CPT

## 2018-02-22 PROCEDURE — 80048 BASIC METABOLIC PNL TOTAL CA: CPT

## 2018-02-22 PROCEDURE — 84100 ASSAY OF PHOSPHORUS: CPT

## 2018-02-22 PROCEDURE — 73502 X-RAY EXAM HIP UNI 2-3 VIEWS: CPT

## 2018-02-22 PROCEDURE — 83010 ASSAY OF HAPTOGLOBIN QUANT: CPT

## 2018-02-22 PROCEDURE — 81001 URINALYSIS AUTO W/SCOPE: CPT

## 2018-02-22 PROCEDURE — 82550 ASSAY OF CK (CPK): CPT

## 2018-02-22 PROCEDURE — 97530 THERAPEUTIC ACTIVITIES: CPT

## 2018-02-22 PROCEDURE — 96374 THER/PROPH/DIAG INJ IV PUSH: CPT

## 2018-02-22 PROCEDURE — 85730 THROMBOPLASTIN TIME PARTIAL: CPT

## 2018-02-22 PROCEDURE — P9016: CPT

## 2018-02-22 PROCEDURE — 73552 X-RAY EXAM OF FEMUR 2/>: CPT

## 2018-02-22 PROCEDURE — 93306 TTE W/DOPPLER COMPLETE: CPT

## 2018-02-22 PROCEDURE — 82803 BLOOD GASES ANY COMBINATION: CPT

## 2018-02-22 PROCEDURE — 83735 ASSAY OF MAGNESIUM: CPT

## 2018-02-22 PROCEDURE — 97162 PT EVAL MOD COMPLEX 30 MIN: CPT

## 2018-02-22 PROCEDURE — 97110 THERAPEUTIC EXERCISES: CPT

## 2018-02-22 PROCEDURE — 84295 ASSAY OF SERUM SODIUM: CPT

## 2018-02-22 PROCEDURE — 83605 ASSAY OF LACTIC ACID: CPT

## 2018-02-22 PROCEDURE — 85610 PROTHROMBIN TIME: CPT

## 2018-02-22 PROCEDURE — 80053 COMPREHEN METABOLIC PANEL: CPT

## 2018-02-22 PROCEDURE — 70450 CT HEAD/BRAIN W/O DYE: CPT

## 2018-02-22 PROCEDURE — 82553 CREATINE MB FRACTION: CPT

## 2018-02-22 PROCEDURE — 88311 DECALCIFY TISSUE: CPT

## 2018-02-22 PROCEDURE — 83615 LACTATE (LD) (LDH) ENZYME: CPT

## 2018-02-22 PROCEDURE — 86923 COMPATIBILITY TEST ELECTRIC: CPT

## 2018-02-22 PROCEDURE — 84484 ASSAY OF TROPONIN QUANT: CPT

## 2018-02-22 PROCEDURE — 99285 EMERGENCY DEPT VISIT HI MDM: CPT | Mod: 25

## 2018-02-22 PROCEDURE — 82565 ASSAY OF CREATININE: CPT

## 2018-02-22 PROCEDURE — 83880 ASSAY OF NATRIURETIC PEPTIDE: CPT

## 2018-02-22 PROCEDURE — 71045 X-RAY EXAM CHEST 1 VIEW: CPT

## 2018-02-22 PROCEDURE — 85014 HEMATOCRIT: CPT

## 2018-02-22 PROCEDURE — 36430 TRANSFUSION BLD/BLD COMPNT: CPT

## 2018-02-22 PROCEDURE — 84132 ASSAY OF SERUM POTASSIUM: CPT

## 2018-02-22 PROCEDURE — P9045: CPT

## 2018-02-22 PROCEDURE — 86850 RBC ANTIBODY SCREEN: CPT

## 2018-02-22 PROCEDURE — 82947 ASSAY GLUCOSE BLOOD QUANT: CPT

## 2018-02-22 RX ADMIN — Medication 200 MILLIGRAM(S): at 06:23

## 2018-02-22 RX ADMIN — Medication 1 DROP(S): at 17:15

## 2018-02-22 RX ADMIN — BRIMONIDINE TARTRATE 1 DROP(S): 2 SOLUTION/ DROPS OPHTHALMIC at 06:23

## 2018-02-22 RX ADMIN — Medication 1 DROP(S): at 06:23

## 2018-02-22 RX ADMIN — MEGESTROL ACETATE 400 MILLIGRAM(S): 40 SUSPENSION ORAL at 12:35

## 2018-02-22 RX ADMIN — BRIMONIDINE TARTRATE 1 DROP(S): 2 SOLUTION/ DROPS OPHTHALMIC at 17:15

## 2018-02-22 RX ADMIN — RIVAROXABAN 15 MILLIGRAM(S): KIT at 17:14

## 2018-02-22 RX ADMIN — Medication 200 MILLIGRAM(S): at 17:15

## 2018-02-22 NOTE — PROGRESS NOTE ADULT - PROBLEM SELECTOR PROBLEM 4
Gastroesophageal reflux disease, esophagitis presence not specified

## 2018-02-22 NOTE — PROGRESS NOTE ADULT - PROBLEM SELECTOR PLAN 4
[place] diet .  Omeprazole am and Zantac pm  encourage PO
[place] diet .  Omeprazole am and Zantac pm  encourage PO
[place] diet .  Omeprazole am and Zantac pm  encourage PO
[place] diet .  Omeprazole am and Zantac pm  encourage PO
[place] diet .  Omeprazole am and Zantac pm  encourage PO
[place] diet .  Omeprazole am and Zantac pm  encourage PO
[place] diet .  Omeprazole am and Zantac pm  encourage PO
[place] diet .  Omeprazole am and Zantac pm
[place] diet .  Omeprazole am and Zantac pm  encourage PO
[place] diet .  Omeprazole am and Zantac pm  encourage PO
[place] diet .  Omeprazole am and Zantac pm  encourage PO
[place] diet .  Omeprazole am and Zantac pm
[place] diet .  Omeprazole am and Zantac pm  encourage PO
[place] diet .  Omeprazole am and Zantac pm  encourage PO

## 2018-02-22 NOTE — PROGRESS NOTE ADULT - PROBLEM SELECTOR PROBLEM 3
Chronic obstructive pulmonary disease, unspecified COPD type

## 2018-02-22 NOTE — PROGRESS NOTE ADULT - ASSESSMENT
89 yo woman hx of fall with left hip bipolar hemiarthroplasty.  Episode of tachycardia and hypotension post op. More alert today, off pressors, continued anemia, no bradycardia last night. Now in normal sinus rhythm. Alert, responsive and conversant today. Appetite slightly better. Now off telemetry.

## 2018-02-22 NOTE — PROGRESS NOTE ADULT - PROBLEM SELECTOR PLAN 2
continue rate control
continue rate control  BP should improve with better rate control
continue rate control  started on a small [place]ster of dobutamine for bp managment
stable rate controlled
continue rate control  pressors as needed
continue rate control
continue rate control  pressors as needed
continue rate control
continue rate control  pressors as needed

## 2018-02-22 NOTE — PROGRESS NOTE ADULT - PROBLEM SELECTOR PLAN 1
s/p bipolar hemiarthroplasty  pain meds as needed  DVT and GI prophylaxis  Transfuse if Patient remains hypotensive
s/p bipolar hemiarthroplasty
s/p bipolar hemiarthroplasty  pain meds as needed  DVT and GI prophylaxis
s/p bipolar hemiarthroplasty  pain meds as needed  DVT and GI prophylaxis
s/p bipolar hemiarthroplasty  pain meds as needed  DVT and GI prophylaxis  Transfuse if Patient remains hypotensive
s/p bipolar hemiarthroplasty  pain meds as needed  DVT and GI prophylaxis
s/p bipolar hemiarthroplasty  pain meds as needed  DVT and GI prophylaxis  Transfuse if Patient remains hypotensive
s/p bipolar hemiarthroplasty  pain meds as needed  DVT and GI prophylaxis
s/p bipolar hemiarthroplasty  pain meds as needed  DVT and GI prophylaxis  Transfuse if Patient remains hypotensive
s/p bipolar hemiarthroplasty  pain meds as needed  DVT and GI prophylaxis

## 2018-02-22 NOTE — PROGRESS NOTE ADULT - SUBJECTIVE AND OBJECTIVE BOX
Patient is a 89y old  Female who presents with a chief complaint of L hip fracture (20 Feb 2018 06:34)      HPI:   89F hx of dementia, afib (Xarelto), COPD, CVA, GERD, GIB, glaucoma, HLD, PUD, s/p mech fall with L hip pain. She states she was getting up from wheelchair to get her walker and tripped. falling onto her left side. She was unable to ambulate afterwards. No other apparent injuries. Denies headstrike or LOC. No n/v, no numbness and  tingling. Post op developed hypotension and rapid afib. required pressors for hypotension. Patient seen now resting comfortably off pressors. Now in NSR off antiarrhythmics on monitoring, with no episodic clarita or tachycardia. Transferred  to floor on telemetry and telemetry is normal. No recurrent sick sinus syndrome. Now off pressors and more alert. Appetite poor but trying to eat more when encouraged. Remains in NSR and telemetry now discontinued.    MEDICATIONS  (STANDING):  brimonidine 0.2% Ophthalmic Solution 1 Drop(s) Both EYES two times a day  docusate sodium Liquid 200 milliGRAM(s) Oral two times a day  megestrol Suspension 400 milliGRAM(s) Oral daily  rivaroxaban 15 milliGRAM(s) Oral every 24 hours  senna Syrup 5 milliLiter(s) Oral daily  timolol 0.5% Solution 1 Drop(s) Both EYES every 12 hours    MEDICATIONS  (PRN):  acetaminophen   Tablet. 650 milliGRAM(s) Oral every 6 hours PRN Mild Pain (1 - 3)  ALBUTerol/ipratropium for Nebulization 3 milliLiter(s) Nebulizer every 6 hours PRN Shortness of Breath and/or Wheezing  ondansetron Injectable 4 milliGRAM(s) IV Push every 4 hours PRN Nausea and/or Vomiting      Allergies    aspirin (Unknown)  penicillin (Unknown)    Vital Signs Last 24 Hrs  T(C): 36.8 (22 Feb 2018 06:05), Max: 37.2 (21 Feb 2018 21:01)  T(F): 98.2 (22 Feb 2018 06:05), Max: 98.9 (21 Feb 2018 21:01)  HR: 71 (22 Feb 2018 06:05) (63 - 71)  BP: 148/77 (22 Feb 2018 06:05) (113/67 - 148/77)  BP(mean): --  RR: 18 (22 Feb 2018 06:05) (18 - 18)  SpO2: 100% (22 Feb 2018 06:05) (96% - 100%)    PHYSICAL EXAM:    GENERAL: NAD, cachectic and conversant  HEAD:  Atraumatic  EYES: EOM, PERRLA, conjunctiva pink and sclera white  ENT: No tonsillar erythema, exudates, or enlargement, moist mucous membranes, good dentition, no lesions  NECK: Supple, No JVD, normal thyroid, carotids with normal upstrokes and no bruits  CHEST/LUNG: Clear to auscultation bilaterally, No rales, rhonchi, wheezing, or rubs  HEART: Irregular rate and rhythm, No murmurs, rubs, or gallops  ABDOMEN: Soft, nondistended, no masses, guarding, tenderness or rebound, bowel sounds present  EXTREMITIES:  2+ Peripheral Pulses, No clubbing, cyanosis, or edema.  (+) LEFT BIPOLAR Hemiarthroplasty  LYMPH: No lymphadenopathy noted  SKIN: No rashes or lesions  NERVOUS SYSTEM: confused , slightly more animated today      LABS: No labs obtained today        Consultant(s):    Care Discussed with Consultants/Other Providers [ ] YES  [ ] NO

## 2018-02-22 NOTE — PROGRESS NOTE ADULT - NSHPATTENDINGPLANDISCUSS_GEN_ALL_CORE
ortho staff
ortho staff and SICU resident
son, surgery
ortho staff
son, ortho
son, surgery
son
ortho staff
son, surgery
son, surgery
ortho staff and SICU resident
son
ortho staff
ortho staff and family.
ortho staff

## 2018-02-22 NOTE — PROGRESS NOTE ADULT - PROVIDER SPECIALTY LIST ADULT
Critical Care
Critical Care
Internal Medicine
Orthopedics
SICU
Internal Medicine

## 2018-02-22 NOTE — PROGRESS NOTE ADULT - PROBLEM SELECTOR PLAN 3
Incentive spirometry Bronchodilator therapy prn with albuterol

## 2018-02-22 NOTE — PROGRESS NOTE ADULT - PROBLEM SELECTOR PROBLEM 5
Cerebral infarction, unspecified mechanism

## 2018-02-22 NOTE — PROGRESS NOTE ADULT - PROBLEM SELECTOR PROBLEM 2
Atrial fibrillation, unspecified type

## 2018-02-22 NOTE — PROGRESS NOTE ADULT - PROBLEM SELECTOR PLAN 5
Stable post op

## 2018-02-22 NOTE — PROGRESS NOTE ADULT - PROBLEM SELECTOR PROBLEM 1
Closed fracture of neck of left femur, initial encounter

## 2018-06-03 NOTE — H&P ADULT. - ATTENDING COMMENTS
CVICU PT Note:    Patient unavailable for PT session at this time. RN, Stephanie reports pt recently finished activity.  Will continue efforts as schedule allows.    NIGHT HOSPITALIST:  Family not in attendance and unable to reach family at present.  Prognosis long term is guarded.  Emotional support provided to patient.  Care reviewed with covering NP.  Care assumed by Dr. Hernandez.

## 2018-06-26 ENCOUNTER — EMERGENCY (EMERGENCY)
Facility: HOSPITAL | Age: 83
LOS: 1 days | Discharge: HOME | End: 2018-06-26
Attending: EMERGENCY MEDICINE
Payer: MEDICARE

## 2018-06-26 VITALS
SYSTOLIC BLOOD PRESSURE: 128 MMHG | RESPIRATION RATE: 18 BRPM | DIASTOLIC BLOOD PRESSURE: 72 MMHG | WEIGHT: 139.99 LBS | HEART RATE: 56 BPM | TEMPERATURE: 98 F | OXYGEN SATURATION: 97 %

## 2018-06-26 LAB
ALBUMIN SERPL ELPH-MCNC: 3.7 G/DL — SIGNIFICANT CHANGE UP (ref 3.3–5)
ALP SERPL-CCNC: 96 U/L — SIGNIFICANT CHANGE UP (ref 40–120)
ALT FLD-CCNC: 15 U/L — SIGNIFICANT CHANGE UP (ref 10–45)
ANION GAP SERPL CALC-SCNC: 13 MMOL/L — SIGNIFICANT CHANGE UP (ref 5–17)
APPEARANCE UR: ABNORMAL
APTT BLD: 30.7 SEC — SIGNIFICANT CHANGE UP (ref 27.5–37.4)
AST SERPL-CCNC: 19 U/L — SIGNIFICANT CHANGE UP (ref 10–40)
BASOPHILS # BLD AUTO: 0 K/UL — SIGNIFICANT CHANGE UP (ref 0–0.2)
BASOPHILS NFR BLD AUTO: 0.1 % — SIGNIFICANT CHANGE UP (ref 0–2)
BILIRUB SERPL-MCNC: 2.9 MG/DL — HIGH (ref 0.2–1.2)
BILIRUB UR-MCNC: NEGATIVE — SIGNIFICANT CHANGE UP
BUN SERPL-MCNC: 15 MG/DL — SIGNIFICANT CHANGE UP (ref 7–23)
CALCIUM SERPL-MCNC: 9.4 MG/DL — SIGNIFICANT CHANGE UP (ref 8.4–10.5)
CHLORIDE SERPL-SCNC: 104 MMOL/L — SIGNIFICANT CHANGE UP (ref 96–108)
CO2 SERPL-SCNC: 25 MMOL/L — SIGNIFICANT CHANGE UP (ref 22–31)
COLOR SPEC: YELLOW — SIGNIFICANT CHANGE UP
CREAT SERPL-MCNC: 0.92 MG/DL — SIGNIFICANT CHANGE UP (ref 0.5–1.3)
DIFF PNL FLD: ABNORMAL
EOSINOPHIL # BLD AUTO: 0 K/UL — SIGNIFICANT CHANGE UP (ref 0–0.5)
EOSINOPHIL NFR BLD AUTO: 0.2 % — SIGNIFICANT CHANGE UP (ref 0–6)
EPI CELLS # UR: SIGNIFICANT CHANGE UP /HPF
GAS PNL BLDV: SIGNIFICANT CHANGE UP
GLUCOSE SERPL-MCNC: 105 MG/DL — HIGH (ref 70–99)
GLUCOSE UR QL: NEGATIVE — SIGNIFICANT CHANGE UP
HCT VFR BLD CALC: 35.8 % — SIGNIFICANT CHANGE UP (ref 34.5–45)
HGB BLD-MCNC: 12.3 G/DL — SIGNIFICANT CHANGE UP (ref 11.5–15.5)
INR BLD: 1.88 RATIO — HIGH (ref 0.88–1.16)
KETONES UR-MCNC: NEGATIVE — SIGNIFICANT CHANGE UP
LEUKOCYTE ESTERASE UR-ACNC: ABNORMAL
LYMPHOCYTES # BLD AUTO: 1.7 K/UL — SIGNIFICANT CHANGE UP (ref 1–3.3)
LYMPHOCYTES # BLD AUTO: 18.7 % — SIGNIFICANT CHANGE UP (ref 13–44)
MCHC RBC-ENTMCNC: 34.3 GM/DL — SIGNIFICANT CHANGE UP (ref 32–36)
MCHC RBC-ENTMCNC: 35.8 PG — HIGH (ref 27–34)
MCV RBC AUTO: 104 FL — HIGH (ref 80–100)
MONOCYTES # BLD AUTO: 0.5 K/UL — SIGNIFICANT CHANGE UP (ref 0–0.9)
MONOCYTES NFR BLD AUTO: 6.1 % — SIGNIFICANT CHANGE UP (ref 2–14)
NEUTROPHILS # BLD AUTO: 6.7 K/UL — SIGNIFICANT CHANGE UP (ref 1.8–7.4)
NEUTROPHILS NFR BLD AUTO: 74.9 % — SIGNIFICANT CHANGE UP (ref 43–77)
NITRITE UR-MCNC: NEGATIVE — SIGNIFICANT CHANGE UP
PH UR: 7.5 — SIGNIFICANT CHANGE UP (ref 5–8)
PLATELET # BLD AUTO: 154 K/UL — SIGNIFICANT CHANGE UP (ref 150–400)
POTASSIUM SERPL-MCNC: 4.2 MMOL/L — SIGNIFICANT CHANGE UP (ref 3.5–5.3)
POTASSIUM SERPL-SCNC: 4.2 MMOL/L — SIGNIFICANT CHANGE UP (ref 3.5–5.3)
PROT SERPL-MCNC: 7 G/DL — SIGNIFICANT CHANGE UP (ref 6–8.3)
PROT UR-MCNC: 30 MG/DL
PROTHROM AB SERPL-ACNC: 20.8 SEC — HIGH (ref 9.8–12.7)
RBC # BLD: 3.43 M/UL — LOW (ref 3.8–5.2)
RBC # FLD: 12.5 % — SIGNIFICANT CHANGE UP (ref 10.3–14.5)
RBC CASTS # UR COMP ASSIST: SIGNIFICANT CHANGE UP /HPF (ref 0–2)
SODIUM SERPL-SCNC: 142 MMOL/L — SIGNIFICANT CHANGE UP (ref 135–145)
SP GR SPEC: 1.02 — SIGNIFICANT CHANGE UP (ref 1.01–1.02)
TROPONIN T, HIGH SENSITIVITY RESULT: 22 NG/L — SIGNIFICANT CHANGE UP (ref 0–51)
UROBILINOGEN FLD QL: NEGATIVE — SIGNIFICANT CHANGE UP
WBC # BLD: 8.9 K/UL — SIGNIFICANT CHANGE UP (ref 3.8–10.5)
WBC # FLD AUTO: 8.9 K/UL — SIGNIFICANT CHANGE UP (ref 3.8–10.5)
WBC UR QL: >50 /HPF (ref 0–5)

## 2018-06-26 PROCEDURE — 51701 INSERT BLADDER CATHETER: CPT

## 2018-06-26 PROCEDURE — 80053 COMPREHEN METABOLIC PANEL: CPT

## 2018-06-26 PROCEDURE — 93005 ELECTROCARDIOGRAM TRACING: CPT | Mod: XU

## 2018-06-26 PROCEDURE — 84484 ASSAY OF TROPONIN QUANT: CPT

## 2018-06-26 PROCEDURE — 82803 BLOOD GASES ANY COMBINATION: CPT

## 2018-06-26 PROCEDURE — 85610 PROTHROMBIN TIME: CPT

## 2018-06-26 PROCEDURE — 74177 CT ABD & PELVIS W/CONTRAST: CPT

## 2018-06-26 PROCEDURE — 82435 ASSAY OF BLOOD CHLORIDE: CPT

## 2018-06-26 PROCEDURE — 84295 ASSAY OF SERUM SODIUM: CPT

## 2018-06-26 PROCEDURE — 70450 CT HEAD/BRAIN W/O DYE: CPT | Mod: 26

## 2018-06-26 PROCEDURE — 70450 CT HEAD/BRAIN W/O DYE: CPT

## 2018-06-26 PROCEDURE — 71260 CT THORAX DX C+: CPT | Mod: 26

## 2018-06-26 PROCEDURE — 74177 CT ABD & PELVIS W/CONTRAST: CPT | Mod: 26

## 2018-06-26 PROCEDURE — 99284 EMERGENCY DEPT VISIT MOD MDM: CPT | Mod: 25

## 2018-06-26 PROCEDURE — 72131 CT LUMBAR SPINE W/O DYE: CPT | Mod: 26

## 2018-06-26 PROCEDURE — 83605 ASSAY OF LACTIC ACID: CPT

## 2018-06-26 PROCEDURE — 87186 SC STD MICRODIL/AGAR DIL: CPT

## 2018-06-26 PROCEDURE — 81001 URINALYSIS AUTO W/SCOPE: CPT

## 2018-06-26 PROCEDURE — 85027 COMPLETE CBC AUTOMATED: CPT

## 2018-06-26 PROCEDURE — 85014 HEMATOCRIT: CPT

## 2018-06-26 PROCEDURE — 82550 ASSAY OF CK (CPK): CPT

## 2018-06-26 PROCEDURE — 82330 ASSAY OF CALCIUM: CPT

## 2018-06-26 PROCEDURE — 72125 CT NECK SPINE W/O DYE: CPT

## 2018-06-26 PROCEDURE — 73522 X-RAY EXAM HIPS BI 3-4 VIEWS: CPT

## 2018-06-26 PROCEDURE — 99284 EMERGENCY DEPT VISIT MOD MDM: CPT

## 2018-06-26 PROCEDURE — 96374 THER/PROPH/DIAG INJ IV PUSH: CPT | Mod: XU

## 2018-06-26 PROCEDURE — 71260 CT THORAX DX C+: CPT

## 2018-06-26 PROCEDURE — 84132 ASSAY OF SERUM POTASSIUM: CPT

## 2018-06-26 PROCEDURE — 82947 ASSAY GLUCOSE BLOOD QUANT: CPT

## 2018-06-26 PROCEDURE — 72125 CT NECK SPINE W/O DYE: CPT | Mod: 26

## 2018-06-26 PROCEDURE — 87086 URINE CULTURE/COLONY COUNT: CPT

## 2018-06-26 PROCEDURE — 85730 THROMBOPLASTIN TIME PARTIAL: CPT

## 2018-06-26 PROCEDURE — 72128 CT CHEST SPINE W/O DYE: CPT | Mod: 26

## 2018-06-26 PROCEDURE — 73522 X-RAY EXAM HIPS BI 3-4 VIEWS: CPT | Mod: 26

## 2018-06-26 RX ORDER — CEFTRIAXONE 500 MG/1
1 INJECTION, POWDER, FOR SOLUTION INTRAMUSCULAR; INTRAVENOUS ONCE
Qty: 0 | Refills: 0 | Status: COMPLETED | OUTPATIENT
Start: 2018-06-26 | End: 2018-06-26

## 2018-06-26 RX ADMIN — CEFTRIAXONE 100 GRAM(S): 500 INJECTION, POWDER, FOR SOLUTION INTRAMUSCULAR; INTRAVENOUS at 23:57

## 2018-06-26 NOTE — ED ADULT NURSE REASSESSMENT NOTE - NS ED NURSE REASSESS COMMENT FT1
Straight catheterization performed using sterile technique, 2 RNs at the bedside.  Comfort care measures performed, safety maintained.  Urine specimen sent to the lab as ordered for U/A and urine culture.

## 2018-06-26 NOTE — ED PROVIDER NOTE - OBJECTIVE STATEMENT
89F hx of dementia, afib (Xarelto), COPD, CVA, GERD, GIB, glaucoma, HLD, PUD, s/p mech fall 89F hx of dementia, afib (Xarelto), COPD, CVA, GERD, GIB, glaucoma, HLD, PUD, brought in by EMS from [facility] s/p unwitnessed fall today. Patient was found sitting up and c/o back and buttock pain. Unknown how long patient was on the floor

## 2018-06-26 NOTE — ED PROVIDER NOTE - PLAN OF CARE
Have the patient take her antibiotics until they are fully completed.  Follow up with your medical doctor in 2-3 days or call our clinic at [phone] and state you were seen in the Emergency Department and would like to be seen in clinic.   Take Tylenol 1g every six hours and supplement with ibuprofen 600mg, with food, every six hours which can be taken three hours apart from the Tylenol to have a layered effect.  Drink at least 2 Liters or 64 Ounces of water each day.  Return for any persistent, worsening symptoms, or ANY concerns at all.

## 2018-06-26 NOTE — ED PROVIDER NOTE - PROGRESS NOTE DETAILS
Steve Hsieh MD FACEP patient with uti and treated, spoke with radiology and all imaging without acute pathology including Thoracic spine reformatted imaging, patient without acute complaint, no neck stiffness.  The patient’s cervical collar was clinically cleared: there is no focal neurologic deficit, no midline cervical spine tenderness is present.  There is a normal level of consciousness, the patient is not intoxicated, and no distracting injury is present. Steve Hsieh MD, FACEP Mina, the son and proxy was informed of the findings of the imaging and of the need for patient to complete a course of antibiotics and that the patient will have the antibiotics at [facility] and family aware.

## 2018-06-26 NOTE — ED PROVIDER NOTE - CARE PLAN
Principal Discharge DX:	Urinary tract infection  Assessment and plan of treatment:	Have the patient take her antibiotics until they are fully completed.  Follow up with your medical doctor in 2-3 days or call our clinic at [phone] and state you were seen in the Emergency Department and would like to be seen in clinic.   Take Tylenol 1g every six hours and supplement with ibuprofen 600mg, with food, every six hours which can be taken three hours apart from the Tylenol to have a layered effect.  Drink at least 2 Liters or 64 Ounces of water each day.  Return for any persistent, worsening symptoms, or ANY concerns at all.  Secondary Diagnosis:	Fall

## 2018-06-26 NOTE — ED PROVIDER NOTE - MEDICAL DECISION MAKING DETAILS
Patient with fall, unwitnessed, no complaints AxOx2, at baseline per peter Enriquez. Will get iv, labs, ua, urine culture, ct head/neck/c/a/p and reformat spine  Will follow up on labs, analgesia, reassess and disposition as clinically indicated.

## 2018-06-26 NOTE — ED PROVIDER NOTE - ATTENDING CONTRIBUTION TO CARE
Patient with fall and complaining of back pain on xarelto.  See MDM above.  The patient was re-examined after interventions and is feeling much better.  The patient will follow up with their primary physician this week.  No immediate life threatening issues present on history or clinical exam. Patient is a safe disposition home, family has capacity and insight into their condition, non-ambulatory at baseline and will follow up with their doctor(s) this week. The family understands anticipatory guidance and was given strict return and follow up precautions.  The family has been informed of all concerning signs and symptoms to return to Emergency Department, the necessity to follow up with PMD/Clinic/follow up provided within 2 days was explained, and the family reports understanding of above with capacity and insight.

## 2018-06-26 NOTE — ED ADULT NURSE NOTE - OBJECTIVE STATEMENT
Patient with history of a-fib (Xarelto), dementia and COPD presents to the ED with c/o Patient with history of a-fib (Xarelto), dementia, contractures and COPD presents to the ED from the [facility] with c/o unwitnessed fall out of wheelchair.  Patient verbalizes pain to back and abdomen.  C-collar placed in ED.  Vital signs Patient with history of a-fib (Xarelto), dementia, contractures and COPD presents to the ED from the [facility] with c/o unwitnessed fall out of wheelchair.  Patient verbalizes pain to back and abdomen.  C-collar placed in ED.  Per EMS, patient found sitting up after fall.  No wounds noted or areas of ecchymosis.

## 2018-06-26 NOTE — ED PROVIDER NOTE - DIAGNOSIS COUNSELING, MDM
conducted a detailed discussion... I had a detailed discussion with the guardian regarding the historical points, exam findings, and any diagnostic results supporting the discharge/admit diagnosis.

## 2018-06-27 VITALS
DIASTOLIC BLOOD PRESSURE: 66 MMHG | HEART RATE: 70 BPM | SYSTOLIC BLOOD PRESSURE: 122 MMHG | OXYGEN SATURATION: 98 % | RESPIRATION RATE: 18 BRPM

## 2018-06-27 RX ORDER — CEFPODOXIME PROXETIL 100 MG
1 TABLET ORAL
Qty: 20 | Refills: 0 | OUTPATIENT
Start: 2018-06-27 | End: 2018-07-06

## 2018-06-28 LAB
-  AMIKACIN: SIGNIFICANT CHANGE UP
-  AMOXICILLIN/CLAVULANIC ACID: SIGNIFICANT CHANGE UP
-  AMPICILLIN/SULBACTAM: SIGNIFICANT CHANGE UP
-  AMPICILLIN: SIGNIFICANT CHANGE UP
-  AZTREONAM: SIGNIFICANT CHANGE UP
-  CEFAZOLIN: SIGNIFICANT CHANGE UP
-  CEFEPIME: SIGNIFICANT CHANGE UP
-  CEFOXITIN: SIGNIFICANT CHANGE UP
-  CEFTRIAXONE: SIGNIFICANT CHANGE UP
-  CIPROFLOXACIN: SIGNIFICANT CHANGE UP
-  ERTAPENEM: SIGNIFICANT CHANGE UP
-  GENTAMICIN: SIGNIFICANT CHANGE UP
-  LEVOFLOXACIN: SIGNIFICANT CHANGE UP
-  MEROPENEM: SIGNIFICANT CHANGE UP
-  NITROFURANTOIN: SIGNIFICANT CHANGE UP
-  PIPERACILLIN/TAZOBACTAM: SIGNIFICANT CHANGE UP
-  TOBRAMYCIN: SIGNIFICANT CHANGE UP
-  TRIMETHOPRIM/SULFAMETHOXAZOLE: SIGNIFICANT CHANGE UP
CULTURE RESULTS: SIGNIFICANT CHANGE UP
METHOD TYPE: SIGNIFICANT CHANGE UP
ORGANISM # SPEC MICROSCOPIC CNT: SIGNIFICANT CHANGE UP
ORGANISM # SPEC MICROSCOPIC CNT: SIGNIFICANT CHANGE UP
SPECIMEN SOURCE: SIGNIFICANT CHANGE UP

## 2018-06-28 NOTE — ED POST DISCHARGE NOTE - OTHER COMMUNICATION
pts pharmacy called stating they need the frequency/time for cefpodoxime erx. reviewed chart rx is for 1 tab BID x 10 days. pharmacy requesting sig on fax, fax completed and sent back to pharmacy. - LAKESHIA Hernandez.

## 2018-07-07 ENCOUNTER — EMERGENCY (EMERGENCY)
Facility: HOSPITAL | Age: 83
LOS: 1 days | Discharge: HOME | End: 2018-07-07
Attending: EMERGENCY MEDICINE
Payer: MEDICARE

## 2018-07-07 VITALS
OXYGEN SATURATION: 100 % | HEART RATE: 58 BPM | SYSTOLIC BLOOD PRESSURE: 144 MMHG | RESPIRATION RATE: 16 BRPM | DIASTOLIC BLOOD PRESSURE: 51 MMHG | TEMPERATURE: 98 F

## 2018-07-07 PROCEDURE — 99284 EMERGENCY DEPT VISIT MOD MDM: CPT | Mod: GC

## 2018-07-07 NOTE — ED PROVIDER NOTE - PLAN OF CARE
ATTENDING ADDENDUM (Dr. Gonzalo Sethi): Goals of care include resolution of emergent/urgent symptoms and concerns, and restoration to baseline level of homeostasis. ATTENDING ADDENDUM (Dr. Gonzalo Sethi): (1) anticipatory guidance provided  (2) rest  (3) outpatient follow-up with your primary care physician/provider (4) return if symptoms worsen, persist, or do not resolve (5) medications, if indicated, as prescribed

## 2018-07-07 NOTE — ED PROVIDER NOTE - PHYSICAL EXAMINATION
**ATTENDING ADDENDUM (Dr. Gonzalo Sethi): I have reviewed and substantially contributed to the elements of the PE as documented above. I have directly performed an examination of this patient in conjunction with the other members (EM resident/PA/NP) of the patient care team.

## 2018-07-07 NOTE — ED PROVIDER NOTE - OBJECTIVE STATEMENT
89 F h/o dementia, afib on xarelto, COPD, CVA, brought in from [facility] for fall. Patient AOx2, states she slipped and fell. Has no complaints currently. Unknown if witnessed fall. 89 F h/o dementia, afib on xarelto, COPD, CVA, brought in from [facility] for fall. Patient AOx2, states she slipped and fell. Has no complaints currently. Unknown if witnessed fall. Left message for collateral at [facility] with no response. 89 F h/o dementia, afib on xarelto, COPD, CVA, brought in from [facility] for fall. Patient AOx2, states she slipped and fell. Has no complaints currently. Unknown if witnessed fall. Left message for collateral at [facility] with no response.  **ATTENDING ADDENDUM (Dr. Gonzalo Sethi): I attest that I have directly and personally interviewed and examined this patient and elicited a comparable history of present illness and review of systems as documented, along with my EM resident. I attest that I have made significant contributions to the documentation where necessary and as noted in the EMR.

## 2018-07-07 NOTE — ED PROVIDER NOTE - MEDICAL DECISION MAKING DETAILS
**ATTENDING MEDICAL DECISION MAKING/SYNTHESIS (Dr. Gonzalo Sethi): I have reviewed the Chief Complaint, the HPI, the ROS, and have directly performed and confirmed the findings on the Physical Examination. I have reviewed the medical decision making with all providers, as applicable. The PROBLEM REPRESENTATION at this time is: 89-year-old woman with history of dementia (poor historian) and medical history as per transfer paperwork, s/p transfer from skilled nursing facility/assisted living facility ([facility]) s/p mechanical fall with occipital hematoma. On DOAC according to EMS. NO obvious focal or generalized weakness or change in baseline behavior/mental status. NO loss of consciousness or seizure noted. The MOST LIKELY DIAGNOSIS, and the LIST OF DIFFERENTIAL DIAGNOSES, includes (but is not limited to) the following: intracerebral hemorrhage (considered), cord/spine injury (considered, less likely), other trauma e.g. intra-thoracic hemorrhage (hemothorax), rib fracture(s) or flail chest, intra-abdominal hemorrhage (hemoperitoneum), pelvic or hip fracture/dislocation, or extremity fracture (considered, NO evidence), serious bacterial infection or sepsis/severe sepsis e.g. urinary tract infection, pneumonia or equivalent (as cause for fall, reportedly mechanical, less likely), dehydration, electrolyte-metabolic-endocrine derangements (less likely), contusion, sprain/strain, concussion. The likelihood of each of these diagnoses has been appropriately considered in the context of this patient's presentation and my evaluation. PLAN: as described in EMR, including diagnostics, therapeutics and consultation as clinically warranted. I will continue to reevaluate the patient, including the results of all testing, and monitor response to therapy throughout the patient's course in the ED.

## 2018-07-07 NOTE — ED PROVIDER NOTE - PROGRESS NOTE DETAILS
**ATTENDING ADDENDUM (Dr. Gonzalo Sethi): ED diganostics reviewed and noted. Awaiting completion of ED evaluation. Likely discharge back to skilled nursing facility. Will continue to observe and monitor closely. **ATTENDING ADDENDUM (Dr. Gonzalo Sethi): patient serially evaluated throughout ED course. NO acute deterioration up to this time in the ED. Agree with discharge back to skilled nursing facility/assisted living facility with head injury precautions. Anticipatory guidance provided by ED team.

## 2018-07-07 NOTE — ED PROVIDER NOTE - UNABLE TO OBTAIN
**ATTENDING ADDENDUM (Dr. Gonzalo Sethi): Exploration of CC, HPI and review of systems limited by patient's acuity and chronic dementia. Dementia

## 2018-07-07 NOTE — ED PROVIDER NOTE - CRANIAL NERVE AND PUPILLARY EXAM
cranial nerves 2-12 intact/tongue is midline/extra-ocular movements intact cranial nerves 2-12 intact/extra-ocular movements intact/gag reflex intact/tongue is midline

## 2018-07-07 NOTE — ED ADULT NURSE NOTE - OBJECTIVE STATEMENT
89 y/o female with PMH dementia, CVA, afib on xarelto, GERD, Glaucoma arrives to ED by EMS from [facility] s/p mechanical fall.   EMS reports patient tripped while walking hitting head on hardwood floor, patient endorses same story. No LOC. Patient at baseline mental status upon arrival to [facility]. No obvious signs of trauma or bleeding, Patient denies pain, no trauma to BL shoulders, chest wall, abdomen, pelvis, or LE.   Patient is awake, talking to staff, pupills 2mm brisk reactive, denies visual changes, dizziness. Neurologically intact. Patient reports "I feel tired". 89 y/o female with PMH dementia, CVA, afib on xarelto, GERD, Glaucoma arrives to ED by EMS from [facility] s/p mechanical fall.   EMS reports patient tripped while walking hitting head on hardwood floor, patient endorses same story. No LOC. Patient at baseline mental status as per [facility]. a/ox2. No obvious signs of trauma or bleeding, Patient denies pain, no trauma to BL shoulders, chest wall, abdomen, pelvis, or LE.   Patient is awake, talking to staff, pupills 2mm brisk reactive, denies visual changes, dizziness. Neurologically intact. Patient reports "I feel tired".

## 2018-07-07 NOTE — ED PROVIDER NOTE - ATTENDING CONTRIBUTION TO CARE
**ATTENDING ADDENDUM (Dr. Gonzalo Sethi): I attest that I have directly examined this patient and reviewed and formulated the diagnostic and therapeutic management plan in collaboration with the EM resident. Please see MDM note and remainder of EMR for findings from CC, HPI, ROS, and PE. (Morad)

## 2018-07-07 NOTE — ED ADULT NURSE NOTE - CHPI ED SYMPTOMS NEG
no bleeding/no fever/no numbness/no tingling/no weakness/no loss of consciousness/no deformity/no abrasion/no confusion/no vomiting

## 2018-07-07 NOTE — ED PROVIDER NOTE - CARE PLAN
Principal Discharge DX:	Fall Principal Discharge DX:	Fall  Goal:	ATTENDING ADDENDUM (Dr. Gonzalo Sethi): Goals of care include resolution of emergent/urgent symptoms and concerns, and restoration to baseline level of homeostasis.  Assessment and plan of treatment:	ATTENDING ADDENDUM (Dr. Gonzalo Sethi): (1) anticipatory guidance provided  (2) rest  (3) outpatient follow-up with your primary care physician/provider (4) return if symptoms worsen, persist, or do not resolve (5) medications, if indicated, as prescribed Principal Discharge DX:	Fall  Goal:	ATTENDING ADDENDUM (Dr. Gonzalo Sethi): Goals of care include resolution of emergent/urgent symptoms and concerns, and restoration to baseline level of homeostasis.  Assessment and plan of treatment:	ATTENDING ADDENDUM (Dr. Gonzalo Sethi): (1) anticipatory guidance provided  (2) rest  (3) outpatient follow-up with your primary care physician/provider (4) return if symptoms worsen, persist, or do not resolve (5) medications, if indicated, as prescribed  Secondary Diagnosis:	Contusion  Secondary Diagnosis:	Blunt trauma

## 2018-07-07 NOTE — ED PROVIDER NOTE - GASTROINTESTINAL, MLM
Abdomen soft, non-tender, no guarding. Abdomen soft, non-tender **ATTENDING ADDENDUM (Dr. Gonzalo Sethi): non-distended. NO guarding, rebound, or rigidity. NO pulsatile or non-pulsatile masses. NO hernias. NO obvious hepatosplenomegaly.

## 2018-07-07 NOTE — ED PROVIDER NOTE - CONDUCTED A DETAILED DISCUSSION WITH PATIENT AND/OR GUARDIAN REGARDING, MDM
radiology results/return to ED if symptoms worsen, persist or questions arise/lab results/**ATTENDING ADDENDUM (Dr. Gonzalo Sethi): Anticipatory guidance provided./need for outpatient follow-up

## 2018-07-07 NOTE — ED PROVIDER NOTE - ENMT, MLM
Airway patent, Nasal mucosa clear. Mouth with normal mucosa. Throat has no vesicles, no oropharyngeal exudates and uvula is midline. **ATTENDING ADDENDUM (Dr. Gonzalo Sethi): AIRWAY CLEAR. NO pooling of secretions, POSITIVE gag reflex, NO debris, ABLE TO SELF-PROTECT AIRWAY.

## 2018-07-07 NOTE — ED PROVIDER NOTE - EYES, MLM
Clear bilaterally, pupils equal, round and reactive to light. **ATTENDING ADDENDUM (Dr. Gonzalo Sethi): Extraocular muscle movements intact. Clear corneas bilaterally, pupils equal and round.

## 2018-07-07 NOTE — ED PROVIDER NOTE - RESPIRATORY, MLM
Breath sounds clear and equal bilaterally. Breath sounds clear and equal bilaterally. **ATTENDING ADDENDUM (Dr. Gonzalo Sethi): BREATHING CLEAR. POSITIVE BILATERAL BREATH SOUNDS auscultated. NO stridor, drooling, tripoding, or wheezing. POSITIVE bilateral chest wall expansion WITHOUT crepitus, tenderness, or deformity. POSITIVE midline trachea.

## 2018-07-08 VITALS
RESPIRATION RATE: 16 BRPM | HEART RATE: 61 BPM | TEMPERATURE: 98 F | SYSTOLIC BLOOD PRESSURE: 155 MMHG | DIASTOLIC BLOOD PRESSURE: 66 MMHG | OXYGEN SATURATION: 97 %

## 2018-07-08 LAB
ALBUMIN SERPL ELPH-MCNC: 3.6 G/DL — SIGNIFICANT CHANGE UP (ref 3.3–5)
ALP SERPL-CCNC: 115 U/L — SIGNIFICANT CHANGE UP (ref 40–120)
ALT FLD-CCNC: 12 U/L — SIGNIFICANT CHANGE UP (ref 10–45)
ANION GAP SERPL CALC-SCNC: 14 MMOL/L — SIGNIFICANT CHANGE UP (ref 5–17)
AST SERPL-CCNC: 19 U/L — SIGNIFICANT CHANGE UP (ref 10–40)
BASOPHILS # BLD AUTO: 0 K/UL — SIGNIFICANT CHANGE UP (ref 0–0.2)
BASOPHILS NFR BLD AUTO: 0.1 % — SIGNIFICANT CHANGE UP (ref 0–2)
BILIRUB SERPL-MCNC: 2.2 MG/DL — HIGH (ref 0.2–1.2)
BUN SERPL-MCNC: 19 MG/DL — SIGNIFICANT CHANGE UP (ref 7–23)
CALCIUM SERPL-MCNC: 8.8 MG/DL — SIGNIFICANT CHANGE UP (ref 8.4–10.5)
CHLORIDE SERPL-SCNC: 105 MMOL/L — SIGNIFICANT CHANGE UP (ref 96–108)
CK SERPL-CCNC: 62 U/L — SIGNIFICANT CHANGE UP (ref 25–170)
CO2 SERPL-SCNC: 23 MMOL/L — SIGNIFICANT CHANGE UP (ref 22–31)
CREAT SERPL-MCNC: 0.79 MG/DL — SIGNIFICANT CHANGE UP (ref 0.5–1.3)
EOSINOPHIL # BLD AUTO: 0 K/UL — SIGNIFICANT CHANGE UP (ref 0–0.5)
EOSINOPHIL NFR BLD AUTO: 0.3 % — SIGNIFICANT CHANGE UP (ref 0–6)
GLUCOSE SERPL-MCNC: 102 MG/DL — HIGH (ref 70–99)
HCT VFR BLD CALC: 32.9 % — LOW (ref 34.5–45)
HGB BLD-MCNC: 11.7 G/DL — SIGNIFICANT CHANGE UP (ref 11.5–15.5)
LYMPHOCYTES # BLD AUTO: 1.6 K/UL — SIGNIFICANT CHANGE UP (ref 1–3.3)
LYMPHOCYTES # BLD AUTO: 20.7 % — SIGNIFICANT CHANGE UP (ref 13–44)
MCHC RBC-ENTMCNC: 35.6 GM/DL — SIGNIFICANT CHANGE UP (ref 32–36)
MCHC RBC-ENTMCNC: 37.2 PG — HIGH (ref 27–34)
MCV RBC AUTO: 104 FL — HIGH (ref 80–100)
MONOCYTES # BLD AUTO: 0.4 K/UL — SIGNIFICANT CHANGE UP (ref 0–0.9)
MONOCYTES NFR BLD AUTO: 5.6 % — SIGNIFICANT CHANGE UP (ref 2–14)
NEUTROPHILS # BLD AUTO: 5.6 K/UL — SIGNIFICANT CHANGE UP (ref 1.8–7.4)
NEUTROPHILS NFR BLD AUTO: 73.3 % — SIGNIFICANT CHANGE UP (ref 43–77)
PLATELET # BLD AUTO: 183 K/UL — SIGNIFICANT CHANGE UP (ref 150–400)
POTASSIUM SERPL-MCNC: 4.3 MMOL/L — SIGNIFICANT CHANGE UP (ref 3.5–5.3)
POTASSIUM SERPL-SCNC: 4.3 MMOL/L — SIGNIFICANT CHANGE UP (ref 3.5–5.3)
PROT SERPL-MCNC: 6.1 G/DL — SIGNIFICANT CHANGE UP (ref 6–8.3)
RBC # BLD: 3.15 M/UL — LOW (ref 3.8–5.2)
RBC # FLD: 12.9 % — SIGNIFICANT CHANGE UP (ref 10.3–14.5)
SODIUM SERPL-SCNC: 142 MMOL/L — SIGNIFICANT CHANGE UP (ref 135–145)
WBC # BLD: 7.6 K/UL — SIGNIFICANT CHANGE UP (ref 3.8–10.5)
WBC # FLD AUTO: 7.6 K/UL — SIGNIFICANT CHANGE UP (ref 3.8–10.5)

## 2018-07-08 PROCEDURE — 72170 X-RAY EXAM OF PELVIS: CPT

## 2018-07-08 PROCEDURE — 85027 COMPLETE CBC AUTOMATED: CPT

## 2018-07-08 PROCEDURE — 72170 X-RAY EXAM OF PELVIS: CPT | Mod: 26

## 2018-07-08 PROCEDURE — 70450 CT HEAD/BRAIN W/O DYE: CPT | Mod: 26

## 2018-07-08 PROCEDURE — 82550 ASSAY OF CK (CPK): CPT

## 2018-07-08 PROCEDURE — 71045 X-RAY EXAM CHEST 1 VIEW: CPT

## 2018-07-08 PROCEDURE — 70450 CT HEAD/BRAIN W/O DYE: CPT

## 2018-07-08 PROCEDURE — 71045 X-RAY EXAM CHEST 1 VIEW: CPT | Mod: 26

## 2018-07-08 PROCEDURE — 80053 COMPREHEN METABOLIC PANEL: CPT

## 2018-07-08 PROCEDURE — 99284 EMERGENCY DEPT VISIT MOD MDM: CPT | Mod: 25

## 2018-07-27 NOTE — DIETITIAN INITIAL EVALUATION ADULT. - WEIGHT IN KG
Patient intake completed from [facility] yesterday.  Patient very happy with [facility] and helped her review and also understand many of her mediations she is currently taking.  Denied any new and or acute complaints at this time.  RNCC noted that PT ordered but had not yet contacted.  Patient resting and still feels weak and trying to get strength again.    RNCC did verify upcoming appointment with PCP on 8/1 and will be in attendance at that time.    Current Signs and Symptoms Assessed this encounter: Yes   [x] No new or worsening symptoms present  Symptoms present:    Cardiac/Pulmonary   [] Chest Pain    [] Cough   [] Increased Sputum   [] Orthopnea   [] Palpitations   [] Shortness of breath with activity   [] Shortness of breath at rest   [] Swelling    [] Wheezing  Activity   [] ADL impairment   [] Dizziness   [] Increased Fatigue   [] Lightheadedness    [] New Fear of Falling or Recent Fall    [x] Weakness  Mood    [] Angry   [] Anxious   [] Crying   [] Depressed   [x] Withdrawn  General   [] Acute Weight Concern   [] Appetite Concern   [] Chills    [] Confusion   [] Constipation   [] Diaphoresis    [] Diarrhea   [] Increased Thirst   [] Inadequate support    [x] Lethargy   [] Nausea   [] Numbness   [] Sleep Concern   []Tingling    [] Urinary Concern   [] Visual Changes   [] Vomiting    [] Wound   [] Other  Acute Pain Assessment    Acute Pain:  Yes  Acute Pain Scale:  6  Acute Pain Narrative:  Patient experiencing acute/chronic pain due to previous back surgeries.  Patient has spine stimulator that controls with remote       Chronic Pain Assessment    Chronic Pain:  Yes  Chronic Pain Narrative:  Chronic lower back pain        Labs/Diagnostics Reviewed: Not applicable    Symptom Trends:      Diabetes Symptom Course   Stable  Activity Status  Stable  Other known Chronic Condition Symptom Course      Utilization Since last  contact:   None     Medication:  New RX since Last Contact: No   New Medication Concerns (See  Patient Level Medication section for baseline adherence assessment): No    Home Monitoring:   [facility] intake 7/26   47.9

## 2018-08-23 NOTE — ED ADULT NURSE NOTE - EXTENSIONS OF SELF_ADULT
Berenice RAJIV Chaudhari   8/23/2018 1:00 PM   Anticoagulation Therapy Visit    Description:  76 year old female   Provider:  [facility]   Department:  [facility]           INR as of 8/23/2018     Today's INR 2.1 (8/22/2018)      Anticoagulation Summary as of 8/23/2018     INR goal 2.0-3.0   Today's INR 2.1 (8/22/2018)   Full warfarin instructions 5 mg on Mon, Wed, Fri; 2.5 mg all other days   Next INR check 9/6/2018    Indications   Long-term (current) use of anticoagulants [Z79.01] [Z79.01]  Atrial flutter with rapid ventricular response (H) [I48.92]         Contact Numbers     [facility]  Please call  [phone] to cancel and/or reschedule your appointment   Please call  [phone] with any problems or questions regarding your therapy.        August 2018 Details    Sun Mon Tue Wed Thu Fri Sat        1               2               3               4                 5               6               7               8               9               10               11                 12               13               14               15               16               17               18                 19               20               21               22               23      2.5 mg   See details      24      5 mg         25      2.5 mg           26      2.5 mg         27      5 mg         28      2.5 mg         29      5 mg         30      2.5 mg         31      5 mg           Date Details   08/23 This INR check               How to take your warfarin dose     To take:  2.5 mg Take 1 of the 2.5 mg tablets.    To take:  5 mg Take 2 of the 2.5 mg tablets.           September 2018 Details    Sun Mon Tue Wed Thu Fri Sat           1      2.5 mg           2      2.5 mg         3      5 mg         4      2.5 mg         5      5 mg         6            7               8                 9               10               11               12               13               14               15                 16                17               18               19               20               21               22                 23               24               25               26               27               28               29                 30                      Date Details   No additional details    Date of next INR:  9/6/2018         How to take your warfarin dose     To take:  2.5 mg Take 1 of the 2.5 mg tablets.    To take:  5 mg Take 2 of the 2.5 mg tablets.            None

## 2018-10-22 ENCOUNTER — INPATIENT (INPATIENT)
Facility: HOSPITAL | Age: 83
LOS: 0 days | Discharge: HOME | DRG: 552 | End: 2018-10-23
Attending: INTERNAL MEDICINE | Admitting: INTERNAL MEDICINE
Payer: COMMERCIAL

## 2018-10-22 VITALS
HEART RATE: 51 BPM | OXYGEN SATURATION: 100 % | TEMPERATURE: 97 F | RESPIRATION RATE: 16 BRPM | DIASTOLIC BLOOD PRESSURE: 76 MMHG | SYSTOLIC BLOOD PRESSURE: 137 MMHG | HEIGHT: 62 IN | WEIGHT: 110.23 LBS
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
## 2018-10-22 LAB
ALBUMIN SERPL ELPH-MCNC: 3.9 G/DL — SIGNIFICANT CHANGE UP (ref 3.3–5)
ALP SERPL-CCNC: 107 U/L — SIGNIFICANT CHANGE UP (ref 40–120)
ALT FLD-CCNC: 13 U/L — SIGNIFICANT CHANGE UP (ref 10–45)
ANION GAP SERPL CALC-SCNC: 14 MMOL/L — SIGNIFICANT CHANGE UP (ref 5–17)
APTT BLD: 32 SEC — SIGNIFICANT CHANGE UP (ref 27.5–37.4)
AST SERPL-CCNC: 18 U/L — SIGNIFICANT CHANGE UP (ref 10–40)
BASOPHILS # BLD AUTO: 0.1 K/UL — SIGNIFICANT CHANGE UP (ref 0–0.2)
BASOPHILS NFR BLD AUTO: 0.6 % — SIGNIFICANT CHANGE UP (ref 0–2)
BILIRUB SERPL-MCNC: 1.2 MG/DL — SIGNIFICANT CHANGE UP (ref 0.2–1.2)
BUN SERPL-MCNC: 21 MG/DL — SIGNIFICANT CHANGE UP (ref 7–23)
CALCIUM SERPL-MCNC: 8.9 MG/DL — SIGNIFICANT CHANGE UP (ref 8.4–10.5)
CHLORIDE SERPL-SCNC: 106 MMOL/L — SIGNIFICANT CHANGE UP (ref 96–108)
CO2 SERPL-SCNC: 21 MMOL/L — LOW (ref 22–31)
CREAT SERPL-MCNC: 0.76 MG/DL — SIGNIFICANT CHANGE UP (ref 0.5–1.3)
EOSINOPHIL # BLD AUTO: 0 K/UL — SIGNIFICANT CHANGE UP (ref 0–0.5)
EOSINOPHIL NFR BLD AUTO: 0.2 % — SIGNIFICANT CHANGE UP (ref 0–6)
GLUCOSE SERPL-MCNC: 78 MG/DL — SIGNIFICANT CHANGE UP (ref 70–99)
HCT VFR BLD CALC: 36 % — SIGNIFICANT CHANGE UP (ref 34.5–45)
HGB BLD-MCNC: 11.7 G/DL — SIGNIFICANT CHANGE UP (ref 11.5–15.5)
INR BLD: 1.57 RATIO — HIGH (ref 0.88–1.16)
LYMPHOCYTES # BLD AUTO: 1.4 K/UL — SIGNIFICANT CHANGE UP (ref 1–3.3)
LYMPHOCYTES # BLD AUTO: 15.6 % — SIGNIFICANT CHANGE UP (ref 13–44)
MCHC RBC-ENTMCNC: 32.4 PG — SIGNIFICANT CHANGE UP (ref 27–34)
MCHC RBC-ENTMCNC: 32.6 GM/DL — SIGNIFICANT CHANGE UP (ref 32–36)
MCV RBC AUTO: 99.6 FL — SIGNIFICANT CHANGE UP (ref 80–100)
MONOCYTES # BLD AUTO: 0.5 K/UL — SIGNIFICANT CHANGE UP (ref 0–0.9)
MONOCYTES NFR BLD AUTO: 5.4 % — SIGNIFICANT CHANGE UP (ref 2–14)
NEUTROPHILS # BLD AUTO: 6.8 K/UL — SIGNIFICANT CHANGE UP (ref 1.8–7.4)
NEUTROPHILS NFR BLD AUTO: 78.2 % — HIGH (ref 43–77)
PLATELET # BLD AUTO: 163 K/UL — SIGNIFICANT CHANGE UP (ref 150–400)
POTASSIUM SERPL-MCNC: 4.6 MMOL/L — SIGNIFICANT CHANGE UP (ref 3.5–5.3)
POTASSIUM SERPL-SCNC: 4.6 MMOL/L — SIGNIFICANT CHANGE UP (ref 3.5–5.3)
PROT SERPL-MCNC: 6.8 G/DL — SIGNIFICANT CHANGE UP (ref 6–8.3)
PROTHROM AB SERPL-ACNC: 17.3 SEC — HIGH (ref 9.8–12.7)
RBC # BLD: 3.62 M/UL — LOW (ref 3.8–5.2)
RBC # FLD: 12.5 % — SIGNIFICANT CHANGE UP (ref 10.3–14.5)
SODIUM SERPL-SCNC: 141 MMOL/L — SIGNIFICANT CHANGE UP (ref 135–145)
WBC # BLD: 8.8 K/UL — SIGNIFICANT CHANGE UP (ref 3.8–10.5)
WBC # FLD AUTO: 8.8 K/UL — SIGNIFICANT CHANGE UP (ref 3.8–10.5)

## 2018-10-22 PROCEDURE — 72128 CT CHEST SPINE W/O DYE: CPT

## 2018-10-22 PROCEDURE — 73080 X-RAY EXAM OF ELBOW: CPT | Mod: 26,RT

## 2018-10-22 PROCEDURE — 85730 THROMBOPLASTIN TIME PARTIAL: CPT

## 2018-10-22 PROCEDURE — 85027 COMPLETE CBC AUTOMATED: CPT

## 2018-10-22 PROCEDURE — 72170 X-RAY EXAM OF PELVIS: CPT | Mod: 26

## 2018-10-22 PROCEDURE — 99285 EMERGENCY DEPT VISIT HI MDM: CPT

## 2018-10-22 PROCEDURE — 72125 CT NECK SPINE W/O DYE: CPT

## 2018-10-22 PROCEDURE — 72131 CT LUMBAR SPINE W/O DYE: CPT

## 2018-10-22 PROCEDURE — 85610 PROTHROMBIN TIME: CPT

## 2018-10-22 PROCEDURE — 71045 X-RAY EXAM CHEST 1 VIEW: CPT

## 2018-10-22 PROCEDURE — 80053 COMPREHEN METABOLIC PANEL: CPT

## 2018-10-22 PROCEDURE — 82962 GLUCOSE BLOOD TEST: CPT

## 2018-10-22 PROCEDURE — 71045 X-RAY EXAM CHEST 1 VIEW: CPT | Mod: 26

## 2018-10-22 PROCEDURE — 72170 X-RAY EXAM OF PELVIS: CPT

## 2018-10-22 PROCEDURE — 70450 CT HEAD/BRAIN W/O DYE: CPT

## 2018-10-22 PROCEDURE — 73080 X-RAY EXAM OF ELBOW: CPT

## 2018-10-22 RX ORDER — DEXTROSE 50 % IN WATER 50 %
25 SYRINGE (ML) INTRAVENOUS ONCE
Qty: 0 | Refills: 0 | Status: DISCONTINUED | OUTPATIENT
Start: 2018-10-22 | End: 2018-10-22

## 2018-10-22 NOTE — ED PROVIDER NOTE - PROGRESS NOTE DETAILS
Spoke with patient's emergency contact son Umair Present (HCP) he confirms that pt's current mental status is her normal baseline, she baseline ambulates with a walker and with supervision/assistance from staff at the [facility]. He will be in the ED later tonight. -Nakul Morgan PA-C Patient more alert at reevaluation, stood and was able to bear weight on transfer to the bathroom by nursing staff. -Nakul Morgan PA-C Benjamin:  pt ambulated in the ED with assistance from 2 staff members.  Pt is on a NOAC and at high risk for fall in the dementia unit which is not supervised.  Dr. Mathis called and requested pt be admitted to [facility].  Discussed with Dr. Ferro and will admit pt. Acerra:  pt's son came to ED.  states pt is at her baseline and her really wants her to be discharged back to the [facility].  Pt will not be admitted and will discharge her back to the [facility] per family  request.  Son states pt does not do well in hospital and is doing well ther.

## 2018-10-22 NOTE — ED PROVIDER NOTE - PHYSICAL EXAMINATION
GEN: Pt in NAD, pt with eyes closed, few verbal responses, responds to painful stimuli occasional response to verbal stimuli, when pt does respond responses are appropriate, unable to assess orientation.  EYES: Pt resist eye opening unable to asses pupils.   ENT: Head NCAT, no obvious bruises or hematomas. Nose without deformity, turbinates without edema or erythema, no DC. No auricular TTP. Mouth and pharynx without erythema or exudates, uvula midline. No neck TTP, trachea midline, no midline spinal neck TTP.  RESP: No chest wall tenderness, CTA b/l, no wheezes, rales, or rhonchi.   CARDIAC: RRR, clear distinct S1, S2, no murmurs, gallops, or rubs.   ABD: Abdomen without bruising, soft, non-tender.  VASC: 2+ carotid, radial, and dorsalis pedis pulses b/l. No edema or tenderness of the lower extremities.  SKIN: No rashes on the trunk. Stage 1 pressure ulcer across sacrum. GEN: Pt in NAD, pt lethargic with eyes closed, few verbal responses, responds to painful stimuli occasional response to verbal stimuli, when pt does respond responses are appropriate, unable to assess orientation.  EYES: Pt resist eye opening unable to asses pupils.   ENT: Head NCAT, no obvious bruises or hematomas. Nose without deformity, turbinates without edema or erythema, no DC. No auricular TTP. Mouth and pharynx without erythema or exudates, uvula midline. No neck TTP, trachea midline, no midline spinal neck TTP.  RESP: No chest wall tenderness, CTA b/l, no wheezes, rales, or rhonchi.   CARDIAC: RRR, clear distinct S1, S2, no murmurs, gallops, or rubs.   ABD: Abdomen without bruising, soft, non-tender.  VASC: 2+ carotid, radial, and dorsalis pedis pulses b/l. No edema or tenderness of the lower extremities.  SKIN: No rashes on the trunk. Stage 1 pressure ulcer across sacrum. GEN: Pt in NAD, pt lethargic with eyes closed, few verbal responses, responds to painful stimuli occasional response to verbal stimuli, when pt does respond responses are appropriate, unable to assess orientation.  EYES: Pt resist eye opening unable to asses pupils.   ENT: Head NCAT, no obvious bruises or hematomas. Nose without deformity, turbinates without edema or erythema, no DC. No auricular TTP. Mouth and pharynx without erythema or exudates, uvula midline. No neck TTP, trachea midline, no midline spinal neck TTP.  RESP: Poor inspiratory effort. No chest wall tenderness, CTA b/l, no wheezes, rales, or rhonchi.   CARDIAC: Irregular  rhythm bradycardic, clear distinct S1, S2, no murmurs, gallops, or rubs.   ABD: Abdomen without bruising, soft, non-tender.  VASC: 2+ carotid, radial, and dorsalis pedis pulses b/l. No edema or tenderness of the lower extremities.  SKIN: No rashes on the trunk. Stage 1 pressure ulcer across sacrum. GEN: Pt in NAD, pt lethargic with eyes closed, few verbal responses, responds to painful stimuli occasional response to verbal stimuli, when pt does respond responses are appropriate, unable to assess orientation.  EYES: Pt resist eye opening unable to asses pupils.   ENT: Head NCAT, no obvious bruises or hematomas. Nose without deformity, turbinates without edema or erythema, no DC. No auricular TTP. Mouth and pharynx without erythema or exudates, uvula midline. No neck TTP, trachea midline, no midline spinal neck TTP.  RESP: Poor inspiratory effort. No chest wall tenderness, CTA b/l, no wheezes, rales, or rhonchi.   CARDIAC: Irregular rhythm bradycardic, clear distinct S1, S2, no murmurs, gallops, or rubs.   ABD: Abdomen without bruising, soft, non-tender.  MSK: No spinal deformity, midline spinal TTP thoracic area, no midline cervical or lumbar spinal tenderness. R elbow TTP, no gross deformity. No large or expanding hematomas, no joint erythema or gross deformity, no other tenderness of trunk or extremity.  VASC: 2+ carotid, radial, and dorsalis pedis pulses b/l. No edema or tenderness of the lower extremities.  SKIN: No rashes on the trunk. Stage 1 pressure ulcer across sacrum.

## 2018-10-22 NOTE — ED PROVIDER NOTE - OBJECTIVE STATEMENT
89 y/o F with h/o Afib on Xarelto, GERD, PUD, prior GI bleed, COPD, HLD, CVA, and glaucoma BIBEMS from [facility] for unwitnessed fall. As per EMS pt found on the ground, c/o pain in either her L or R side. Pt with baseline dementia, responds to few verbal commands, states she is having R elbow and back pain unsure that she firsts states is on the right then on the left. 89 y/o F with h/o Afib on Xarelto, GERD, PUD, prior GI bleed, COPD, HLD, CVA, and glaucoma BIBEMS from [facility] for unwitnessed fall. As per EMS pt found on the ground, c/o pain in either her L or R side. Pt with baseline dementia, responds to few verbal commands, states she is having R elbow and back pain unsure that she firsts states is on the right then on the left.    Attendinyo female with dementia presents s/p trip and fall at the [facility].  pt stays in the dementia unit there.  pt is at her baseline mental status.      called by dr. hairston, pt's pcp.  states pt does walk at baseline.  is oriented x1 or x2 at baseline depending on the day.  states to admit to [facility] if pt requires hospital admission.

## 2018-10-23 VITALS
DIASTOLIC BLOOD PRESSURE: 78 MMHG | HEART RATE: 76 BPM | SYSTOLIC BLOOD PRESSURE: 166 MMHG | RESPIRATION RATE: 16 BRPM | OXYGEN SATURATION: 97 %

## 2018-10-23 PROBLEM — I48.91 UNSPECIFIED ATRIAL FIBRILLATION: Chronic | Status: ACTIVE | Noted: 2017-02-15

## 2018-10-23 NOTE — ED ADULT NURSE REASSESSMENT NOTE - NS ED NURSE REASSESS COMMENT FT1
Pt currently comfortable, no obvious distress. VSS. Will cont to monitor.
Pt noted to have removed own IV, catheter on bed in tact, no obvious bleeding present.
Report received from [facility]. Patient sleeping, pending non emergent transport back to [facility].
Report received from [facility]. Pt resting comfortably in no obvious distress. Awaiting radiology and results. Safety maintained at all times, bed in lowest position, call bell in reach. Will continue to monitor closely.

## 2018-10-25 ENCOUNTER — INPATIENT (INPATIENT)
Facility: HOSPITAL | Age: 83
LOS: 11 days | Discharge: HOME | DRG: 308 | End: 2018-11-06
Attending: INTERNAL MEDICINE | Admitting: INTERNAL MEDICINE
Payer: MEDICARE

## 2018-10-25 VITALS
HEART RATE: 62 BPM | HEIGHT: 62 IN | WEIGHT: 110.01 LBS | OXYGEN SATURATION: 96 % | DIASTOLIC BLOOD PRESSURE: 64 MMHG | SYSTOLIC BLOOD PRESSURE: 108 MMHG

## 2018-10-25 DIAGNOSIS — R29.6 REPEATED FALLS: ICD-10-CM

## 2018-10-25 LAB
ALBUMIN SERPL ELPH-MCNC: 4.2 G/DL — SIGNIFICANT CHANGE UP (ref 3.3–5)
ALP SERPL-CCNC: 98 U/L — SIGNIFICANT CHANGE UP (ref 40–120)
ALT FLD-CCNC: 7 U/L — LOW (ref 10–45)
ANION GAP SERPL CALC-SCNC: 15 MMOL/L — SIGNIFICANT CHANGE UP (ref 5–17)
APPEARANCE UR: CLEAR — SIGNIFICANT CHANGE UP
APTT BLD: 33 SEC — SIGNIFICANT CHANGE UP (ref 27.5–37.4)
AST SERPL-CCNC: 13 U/L — SIGNIFICANT CHANGE UP (ref 10–40)
BACTERIA # UR AUTO: NEGATIVE — SIGNIFICANT CHANGE UP
BASOPHILS # BLD AUTO: 0 K/UL — SIGNIFICANT CHANGE UP (ref 0–0.2)
BASOPHILS NFR BLD AUTO: 0.1 % — SIGNIFICANT CHANGE UP (ref 0–2)
BILIRUB SERPL-MCNC: 1.8 MG/DL — HIGH (ref 0.2–1.2)
BILIRUB UR-MCNC: NEGATIVE — SIGNIFICANT CHANGE UP
BLD GP AB SCN SERPL QL: NEGATIVE — SIGNIFICANT CHANGE UP
BUN SERPL-MCNC: 19 MG/DL — SIGNIFICANT CHANGE UP (ref 7–23)
CALCIUM SERPL-MCNC: 9.3 MG/DL — SIGNIFICANT CHANGE UP (ref 8.4–10.5)
CHLORIDE SERPL-SCNC: 105 MMOL/L — SIGNIFICANT CHANGE UP (ref 96–108)
CK SERPL-CCNC: 49 U/L — SIGNIFICANT CHANGE UP (ref 25–170)
CO2 SERPL-SCNC: 23 MMOL/L — SIGNIFICANT CHANGE UP (ref 22–31)
COLOR SPEC: YELLOW — SIGNIFICANT CHANGE UP
CREAT SERPL-MCNC: 0.86 MG/DL — SIGNIFICANT CHANGE UP (ref 0.5–1.3)
DIFF PNL FLD: NEGATIVE — SIGNIFICANT CHANGE UP
EOSINOPHIL # BLD AUTO: 0 K/UL — SIGNIFICANT CHANGE UP (ref 0–0.5)
EOSINOPHIL NFR BLD AUTO: 0.4 % — SIGNIFICANT CHANGE UP (ref 0–6)
EPI CELLS # UR: 2 /HPF — SIGNIFICANT CHANGE UP
GAS PNL BLDV: SIGNIFICANT CHANGE UP
GLUCOSE SERPL-MCNC: 156 MG/DL — HIGH (ref 70–99)
GLUCOSE UR QL: NEGATIVE — SIGNIFICANT CHANGE UP
HCT VFR BLD CALC: 35.8 % — SIGNIFICANT CHANGE UP (ref 34.5–45)
HGB BLD-MCNC: 12.4 G/DL — SIGNIFICANT CHANGE UP (ref 11.5–15.5)
HYALINE CASTS # UR AUTO: 6 /LPF — HIGH (ref 0–2)
INR BLD: 1.64 RATIO — HIGH (ref 0.88–1.16)
KETONES UR-MCNC: NEGATIVE — SIGNIFICANT CHANGE UP
LEUKOCYTE ESTERASE UR-ACNC: NEGATIVE — SIGNIFICANT CHANGE UP
LYMPHOCYTES # BLD AUTO: 1 K/UL — SIGNIFICANT CHANGE UP (ref 1–3.3)
LYMPHOCYTES # BLD AUTO: 13.8 % — SIGNIFICANT CHANGE UP (ref 13–44)
MCHC RBC-ENTMCNC: 34.5 PG — HIGH (ref 27–34)
MCHC RBC-ENTMCNC: 34.6 GM/DL — SIGNIFICANT CHANGE UP (ref 32–36)
MCV RBC AUTO: 99.8 FL — SIGNIFICANT CHANGE UP (ref 80–100)
MONOCYTES # BLD AUTO: 0.4 K/UL — SIGNIFICANT CHANGE UP (ref 0–0.9)
MONOCYTES NFR BLD AUTO: 5.4 % — SIGNIFICANT CHANGE UP (ref 2–14)
NEUTROPHILS # BLD AUTO: 6 K/UL — SIGNIFICANT CHANGE UP (ref 1.8–7.4)
NEUTROPHILS NFR BLD AUTO: 80.3 % — HIGH (ref 43–77)
NITRITE UR-MCNC: NEGATIVE — SIGNIFICANT CHANGE UP
PH UR: 5.5 — SIGNIFICANT CHANGE UP (ref 5–8)
PLATELET # BLD AUTO: 136 K/UL — LOW (ref 150–400)
POTASSIUM SERPL-MCNC: 4.7 MMOL/L — SIGNIFICANT CHANGE UP (ref 3.5–5.3)
POTASSIUM SERPL-SCNC: 4.7 MMOL/L — SIGNIFICANT CHANGE UP (ref 3.5–5.3)
PROT SERPL-MCNC: 7.3 G/DL — SIGNIFICANT CHANGE UP (ref 6–8.3)
PROT UR-MCNC: ABNORMAL
PROTHROM AB SERPL-ACNC: 17.9 SEC — HIGH (ref 9.8–12.7)
RBC # BLD: 3.58 M/UL — LOW (ref 3.8–5.2)
RBC # FLD: 12.5 % — SIGNIFICANT CHANGE UP (ref 10.3–14.5)
RBC CASTS # UR COMP ASSIST: 7 /HPF — HIGH (ref 0–4)
RH IG SCN BLD-IMP: POSITIVE — SIGNIFICANT CHANGE UP
SODIUM SERPL-SCNC: 143 MMOL/L — SIGNIFICANT CHANGE UP (ref 135–145)
SP GR SPEC: 1.02 — SIGNIFICANT CHANGE UP (ref 1.01–1.02)
TROPONIN T, HIGH SENSITIVITY RESULT: 19 NG/L — SIGNIFICANT CHANGE UP (ref 0–51)
TROPONIN T, HIGH SENSITIVITY RESULT: 23 NG/L — SIGNIFICANT CHANGE UP (ref 0–51)
UROBILINOGEN FLD QL: NEGATIVE — SIGNIFICANT CHANGE UP
WBC # BLD: 7.5 K/UL — SIGNIFICANT CHANGE UP (ref 3.8–10.5)
WBC # FLD AUTO: 7.5 K/UL — SIGNIFICANT CHANGE UP (ref 3.8–10.5)
WBC UR QL: 2 /HPF — SIGNIFICANT CHANGE UP (ref 0–5)

## 2018-10-25 PROCEDURE — 76937 US GUIDE VASCULAR ACCESS: CPT | Mod: 26

## 2018-10-25 PROCEDURE — 93010 ELECTROCARDIOGRAM REPORT: CPT

## 2018-10-25 PROCEDURE — 72170 X-RAY EXAM OF PELVIS: CPT | Mod: 26

## 2018-10-25 PROCEDURE — 71045 X-RAY EXAM CHEST 1 VIEW: CPT | Mod: 26

## 2018-10-25 PROCEDURE — 72125 CT NECK SPINE W/O DYE: CPT | Mod: 26

## 2018-10-25 PROCEDURE — 99285 EMERGENCY DEPT VISIT HI MDM: CPT | Mod: 25,GC

## 2018-10-25 PROCEDURE — 70450 CT HEAD/BRAIN W/O DYE: CPT | Mod: 26

## 2018-10-25 PROCEDURE — 71260 CT THORAX DX C+: CPT | Mod: 26

## 2018-10-25 PROCEDURE — 74177 CT ABD & PELVIS W/CONTRAST: CPT | Mod: 26

## 2018-10-25 RX ORDER — OXYCODONE AND ACETAMINOPHEN 5; 325 MG/1; MG/1
1 TABLET ORAL EVERY 4 HOURS
Qty: 0 | Refills: 0 | Status: DISCONTINUED | OUTPATIENT
Start: 2018-10-25 | End: 2018-10-25

## 2018-10-25 RX ORDER — DOCUSATE SODIUM 100 MG
30 CAPSULE ORAL
Qty: 0 | Refills: 0 | COMMUNITY

## 2018-10-25 RX ORDER — DOCUSATE SODIUM 100 MG
100 CAPSULE ORAL
Qty: 0 | Refills: 0 | Status: DISCONTINUED | OUTPATIENT
Start: 2018-10-25 | End: 2018-11-06

## 2018-10-25 RX ORDER — TIMOLOL 0.5 %
1 DROPS OPHTHALMIC (EYE)
Qty: 0 | Refills: 0 | Status: DISCONTINUED | OUTPATIENT
Start: 2018-10-25 | End: 2018-11-06

## 2018-10-25 RX ORDER — BRIMONIDINE TARTRATE 2 MG/MG
1 SOLUTION/ DROPS OPHTHALMIC
Qty: 0 | Refills: 0 | Status: DISCONTINUED | OUTPATIENT
Start: 2018-10-25 | End: 2018-11-06

## 2018-10-25 RX ORDER — MEGESTROL ACETATE 40 MG/ML
400 SUSPENSION ORAL DAILY
Qty: 0 | Refills: 0 | Status: DISCONTINUED | OUTPATIENT
Start: 2018-10-25 | End: 2018-11-06

## 2018-10-25 RX ORDER — DOCUSATE SODIUM 100 MG
300 CAPSULE ORAL
Qty: 0 | Refills: 0 | Status: DISCONTINUED | OUTPATIENT
Start: 2018-10-25 | End: 2018-10-25

## 2018-10-25 RX ORDER — ATORVASTATIN CALCIUM 80 MG/1
40 TABLET, FILM COATED ORAL AT BEDTIME
Qty: 0 | Refills: 0 | Status: DISCONTINUED | OUTPATIENT
Start: 2018-10-25 | End: 2018-10-25

## 2018-10-25 RX ORDER — RIVAROXABAN 15 MG-20MG
15 KIT ORAL EVERY 24 HOURS
Qty: 0 | Refills: 0 | Status: DISCONTINUED | OUTPATIENT
Start: 2018-10-25 | End: 2018-11-06

## 2018-10-25 RX ORDER — FONDAPARINUX SODIUM 2.5 MG/.5ML
1 INJECTION, SOLUTION SUBCUTANEOUS
Qty: 0 | Refills: 0 | COMMUNITY

## 2018-10-25 RX ORDER — ATORVASTATIN CALCIUM 80 MG/1
20 TABLET, FILM COATED ORAL AT BEDTIME
Qty: 0 | Refills: 0 | Status: DISCONTINUED | OUTPATIENT
Start: 2018-10-25 | End: 2018-11-06

## 2018-10-25 RX ADMIN — RIVAROXABAN 15 MILLIGRAM(S): KIT at 20:27

## 2018-10-25 RX ADMIN — ATORVASTATIN CALCIUM 40 MILLIGRAM(S): 80 TABLET, FILM COATED ORAL at 22:00

## 2018-10-25 RX ADMIN — BRIMONIDINE TARTRATE 1 DROP(S): 2 SOLUTION/ DROPS OPHTHALMIC at 20:28

## 2018-10-25 RX ADMIN — ATORVASTATIN CALCIUM 20 MILLIGRAM(S): 80 TABLET, FILM COATED ORAL at 23:11

## 2018-10-25 RX ADMIN — Medication 1 DROP(S): at 20:28

## 2018-10-25 RX ADMIN — Medication 100 MILLIGRAM(S): at 20:27

## 2018-10-25 NOTE — ED PROVIDER NOTE - MEDICAL DECISION MAKING DETAILS
unwitnessed fall in unreliable patient. no obvious signs of trauma, but pt unreliable so will obtain trauma scans. will also work up as possible syncope with ekg, trop, cardiac monitor. anticipate admission given multiple falls in the last few days requiring ED visits.

## 2018-10-25 NOTE — ED ADULT NURSE REASSESSMENT NOTE - NS ED NURSE REASSESS COMMENT FT1
Pt straight cathed under sterile technique with two RNs present. 350cc robles colored urine drained. hygiene needs provided. safety maintained

## 2018-10-25 NOTE — CONSULT NOTE ADULT - ASSESSMENT
pt  with  h/o  afib/ copd, hld, pud  admitted with syncope   at assisted living  tele   pt with rib fx/ left  8/ 9 th ribs   seen by trauma, no intervention   PT eval   card to evaluate , if pt is  an ideal  candidate  for on going a/c        < from: CT Abdomen and Pelvis w/ IV Cont (10.25.18 @ 14:42) >  Degenerative changes in the spine. Status post left total hip replacement.  IMPRESSION: Comminuted mildly displaced fractures of the left eighth and   ninth posterior ribs.  Small left pleural effusion.  Indeterminate 0.5 cm nodular opacity in the right upper lobe, new since   6/26/2018.  < end of copied text >
89F with afib on xarelto s/p unwitnessed fall without LOC with nondisplaced left 8 and 9th rib fractures on CT. No other associated injuries    - Patient admitted to medicine for observation  - No other injuries noted upon review of imaging or on physical exam  - Recommend pain control with lidocaine patch, tylenol   - Recommend incentive spirometry- attempted to perform with patient in the ER but she had difficulty following verbal instructions.     Plan to be discussed with Dr. Smalls     [facility] R3 [phone]

## 2018-10-25 NOTE — ED ADULT NURSE NOTE - NSIMPLEMENTINTERV_GEN_ALL_ED
Implemented All Fall with Harm Risk Interventions:  [place] to call system. Call bell, personal items and telephone within reach. Instruct patient to call for assistance. Room bathroom lighting operational. Non-slip footwear when patient is off stretcher. Physically safe environment: no spills, clutter or unnecessary equipment. Stretcher in lowest position, wheels locked, appropriate side rails in place. Provide visual cue, wrist band, yellow gown, etc. Monitor gait and stability. Monitor for mental status changes and reorient to person, place, and time. Review medications for side effects contributing to fall risk. Reinforce activity limits and safety measures with patient and family. Provide visual clues: red socks.

## 2018-10-25 NOTE — ED PROVIDER NOTE - PHYSICAL EXAMINATION
Norma Galdamez M.D.:   patient awake alert seen lying on stretcher in no distress while lying still, seems to be in pain with movement .   LUNGS CTAB no wheeze no crackle.   CARD RRR no m/r/g.    Abdomen soft NT ND no rebound no guarding no CVA tenderness.   EXT WWP no edema no calf tenderness CV 2+DP/PT bilaterally.   neuro A&Ox3 gait normal.    skin warm and dry no rash  HEENT: moist mucous membranes, PERRL, EOMI Norma Galdamez M.D.:   patient awake alert seen lying on stretcher in no distress while lying still, seems to be in pain with movement .   LUNGS CTAB .   CARD RRR no m/r/g.    Abdomen soft NT ND.   EXT WWP no edema no calf tenderness CV 2+DP/PT bilaterally.   neuro A&Ox2 (person, place), moving all extremities.    skin warm and dry no rash diffuse ecchymosis on hands and feet  HEENT: dry mucous membranes, PERRL, EOMI Norma Galdamez M.D.:   patient awake alert seen lying on stretcher in no distress while lying still, seems to be in pain with movement .   LUNGS CTAB .   CARD RRR no m/r/g.    Abdomen soft NT ND.   EXT WWP no edema no calf tenderness CV 2+DP/PT bilaterally.   neuro A&Ox2 (person, place), moving all extremities.    skin warm and dry no rash diffuse ecchymosis on hands and feet ecchymosis left face lateral to eye  HEENT: dry mucous membranes, PERRL, EOMI

## 2018-10-25 NOTE — ED PROCEDURE NOTE - PROCEDURE ADDITIONAL DETAILS
Peripheral IV access in the Emergency Department obtained under dynamic ultrasound guidance with dark nonpulsatile blood return.  Catheter was flushed afterwards without any resistance or resulting extravasation.  IV catheter confirmed in compressible vein after insertion. due to technical difficulties images were not saved. POCUS: Emergency Department Focused Ultrasound performed at patient's bedside.  The complete report will be available in PACS.  Peripheral IV access in the Emergency Department obtained under dynamic ultrasound guidance with dark nonpulsatile blood return.  Catheter was flushed afterwards without any resistance or resulting extravasation.  IV catheter confirmed in compressible vein after insertion.

## 2018-10-25 NOTE — ED ADULT NURSE NOTE - OBJECTIVE STATEMENT
89y Female PMH htn, hld, Dementia, CVA on Xarelto presents to the ED via EMS from [facility] with C-collar in place c/o fall. Per EMS pt was walking with a walker at [facility] and had unwitnessed fall from standing height, but states staff was in the room next door and heard the fall. Staff states patient was awake and alert when they got to her. Pt complaining of back pain and bilateral arm pain- with palpation. 89y Female PMH htn, hld, Dementia, CVA on Xarelto presents to the ED via EMS from [facility] with C-collar in place c/o fall. Per EMS pt was walking with a walker at [facility] and had unwitnessed fall from standing height, but states staff was in the room next door and heard the fall. Staff states patient was awake and alert when they got to her. Pt complaining of back pain and bilateral arm pain- with palpation. Pt presents a&oX2, C-collar in place, airway intact, breathing spontaneously and unlabored, lung sounds clear bilaterally, abd soft nondistended nontender to palpation, radial/pedal pulses present and equal bilaterally, pt moving all extremities, no deformities/bleeding noted to extremities or head, cap refill <3 seconds, blanchable redness noted to sacral area, denies ha, dizziness, CP, SOB, abd pain, dysuria, hematuria, chills/fever, n/v. MD at bedside for eval. Pt placed on cardiac monitor. Safety maintained. Stretcher in lowest position, side rails.

## 2018-10-25 NOTE — CONSULT NOTE ADULT - SUBJECTIVE AND OBJECTIVE BOX
High Risk Travel:  International Travel? No(1)    	   : 89F hx CVA  , Afib on xarelto,   dementia, lives at [facility] after unwitnessed fall.   was walking with walker and facility heard thump and found pt face down on the floor.  pt was conscious, but no one saw if it was a mechanical fall. pt si demented and unable to provide further history or complaints./ pt arrived  in c-collar/ seen by trauma  in er  and rejected called by  er salo dmit/  card is  d r go;rae	     Past Medical History:  Atrial fibrillation, unspecified type  on rivaroxaban  COPD (chronic obstructive pulmonary disease)    CVA (cerebral infarction)    GERD (gastroesophageal reflux disease)    GI bleed    Glaucoma    Hyperlipemia    PUD (peptic ulcer disease).    REVIEW OF SYSTEMS:  GEN: no fever,    no chills  RESP: no SOB,   no cough  CVS: no chest pain,   no palpitations  GI: no abdominal pain,   no nausea,   no vomiting,   no constipation,   no diarrhea  : no dysuria,   no frequency  NEURO: no headache,   no dizziness  PSYCH: no depression,   not anxious  DerPHYSICAL EXAMINATION:  Vital Signs Last 24 Hrs  T(C): 36.7 (25 Oct 2018 16:00), Max: 36.7 (25 Oct 2018 16:00)  T(F): 98.1 (25 Oct 2018 16:00), Max: 98.1 (25 Oct 2018 16:00)  HR: 53 (25 Oct 2018 16:00) (53 - 62)  BP: 148/55 (25 Oct 2018 16:00) (108/64 - 148/55)  BP(mean): --  RR: 16 (25 Oct 2018 16:00) (16 - 16)  SpO2: 98% (25 Oct 2018 16:00) (96% - 100%)  CAPILLARY BLOOD GLUCOSE      GENERAL: NAD, well-groomed,  HEAD:   normocephalic  EYES: sclera anicteric  ENMT: mucous membranes moist  NECK: supple, No JVD  CHEST/LUNG: clear to auscultation bilaterally;    no      rales   ,   no rhonchi,   HEART: normal S1, S2  ABDOMEN: BS+, soft, ND, NT   EXTREMITIES:    no    edema    b/l LEs  NEURO: awake, ,     moves all extremities  SKIN: no     rash     LABS:                        12.4   7.5   )-----------( 136      ( 25 Oct 2018 12:58 )             35.8     10-25    143  |  105  |  19  ----------------------------<  156<H>  4.7   |  23  |  0.86    Ca    9.3      25 Oct 2018 12:58    TPro  7.3  /  Alb  4.2  /  TBili  1.8<H>  /  DBili  x   /  AST  13  /  ALT  7<L>  /  AlkPhos  98  10-25    PT/INR - ( 25 Oct 2018 12:58 )   PT: 17.9 sec;   INR: 1.64 ratio         PTT - ( 25 Oct 2018 12:58 )  PTT:33.0 sec      Urinalysis Basic - ( 25 Oct 2018 13:46 )    Color: Yellow / Appearance: Clear / S.024 / pH: x  Gluc: x / Ketone: Negative  / Bili: Negative / Urobili: Negative   Blood: x / Protein: Trace / Nitrite: Negative   Leuk Esterase: Negative / RBC: 7 /hpf / WBC 2 /hpf   Sq Epi: x / Non Sq Epi: 2 /hpf / Bacteria: Negative          10-25 @ 12:58  4.3  24
TRAUMA SURGERY CONSULT NOTE  Attending: Slim  Service: acute care surgery   Contact: [phone]     HPI  89F with a pmh of CVA  , Afib on xarelto,   dementia, lives at [facility] after unwitnessed fall.  Patient was walking with walker at facility and patient was found down on the floor.  There was no reported LOC however it is unclear if this was a syncopal or a mechanical fall.  Patient has baseline dementia and is a poor historian.  She arrived in the ER in a C-collar and was evaluated by the emergency room.  CT head, c-spine, chest abdomen and pelvis were performed.  A CT chest revealed right sided 8th and 9th rib fractures that were non-displaced with a small pleural effusion.  She did not have any other injuries.  On exam she was A &O x 2 (it is hard to evaluate if this is her baseline or not as no family or aids were present) but she did not have any pain on exam during a full secondary survey.  She remained hemodynamically stable in the ER.      PMH/PSH  Atrial fibrillation, unspecified type  GERD (gastroesophageal reflux disease)  COPD (chronic obstructive pulmonary disease)  GI bleed  Hyperlipemia  PUD (peptic ulcer disease)  CVA (cerebral infarction)  Glaucoma    H/O abdominal surgery      MEDICATIONS  atorvastatin 40 milliGRAM(s) Oral at bedtime  brimonidine 0.2% Ophthalmic Solution 1 Drop(s) Both EYES two times a day  docusate sodium Liquid 100 milliGRAM(s) Oral two times a day  megestrol Suspension 400 milliGRAM(s) Oral daily  oxyCODONE    5 mG/acetaminophen 325 mG 1 Tablet(s) Oral every 4 hours PRN  rivaroxaban 15 milliGRAM(s) Oral every 24 hours  timolol 0.5% Solution 1 Drop(s) Both EYES two times a day      Allergies    aspirin (Unknown)  penicillin (Unknown)    Intolerances      Physical Exam  T(C): 36.7 (10-25-18 @ 16:00), Max: 36.7 (10-25-18 @ 16:00)  HR: 53 (10-25-18 @ 16:00) (53 - 62)  BP: 148/55 (10-25-18 @ 16:00) (108/64 - 148/55)  RR: 16 (10-25-18 @ 16:00) (16 - 16)  SpO2: 98% (10-25-18 @ 16:00) (96% - 100%)  Wt(kg): --  Tmax: T(C): , Max: 36.7 (10-25-18 @ 16:00)  Wt(kg): --      Gen: NAD  Neuro: AAOx2  HEENT: normocephalic, atraumatic, no scleral icterus  CV: S1, S2, RRR  Chest wall: no tenderness to palpation.   Pulm: CTA B/L  Abd: Soft, ND, NT, no rebound, no guarding, no palpable organomegaly/masses  Ext: warm, no edema    LABS                        12.4   7.5   )-----------( 136      ( 25 Oct 2018 12:58 )             35.8     10-25    143  |  105  |  19  ----------------------------<  156<H>  4.7   |  23  |  0.86    Ca    9.3      25 Oct 2018 12:58    TPro  7.3  /  Alb  4.2  /  TBili  1.8<H>  /  DBili  x   /  AST  13  /  ALT  7<L>  /  AlkPhos  98  10-25    PT/INR - ( 25 Oct 2018 12:58 )   PT: 17.9 sec;   INR: 1.64 ratio         PTT - ( 25 Oct 2018 12:58 )  PTT:33.0 sec  Urinalysis Basic - ( 25 Oct 2018 13:46 )    Color: Yellow / Appearance: Clear / S.024 / pH: x  Gluc: x / Ketone: Negative  / Bili: Negative / Urobili: Negative   Blood: x / Protein: Trace / Nitrite: Negative   Leuk Esterase: Negative / RBC: 7 /hpf / WBC 2 /hpf   Sq Epi: x / Non Sq Epi: 2 /hpf / Bacteria: Negative            IMAGING  < from: CT Abdomen and Pelvis w/ IV Cont (10.25.18 @ 14:42) >  EXAM:  CT CHEST IC                          EXAM:  CT ABDOMEN AND PELVIS IC                            PROCEDURE DATE:  10/25/2018            INTERPRETATION:  CLINICAL INFORMATION: Unwitnessed fall.    COMPARISON: Prior CT dated 2018.    PROCEDURE:   CT of the Chest, Abdomen and Pelvis was performed with intravenous   contrast.   Imaging was performed through the chest in the arterial phase followed by   imaging of the abdomen and pelvis in the portal venous phase.  Intravenous contrast: 90 ml Omnipaque 350. 10 ml discarded.  Oral contrast:None.  Sagittal and coronal reformats were performed.    FINDINGS:    CHEST:     LUNGS AND LARGE AIRWAYS: Patent central airways. Compressive atelectasis   in the left lower lobe. A peripheral 5 mm nodular opacity in the right   upper lobe is new. An indeterminate 0.2 cm left upper lobe nodule (2:13)   is unchanged.  PLEURA: Small left pleural effusion, new since 2018.  VESSELS: Within normal limits.  HEART: Heart size is normal. No pericardial effusion. Coronary arterial   calcification.   MEDIASTINUM AND SARA: No lymphadenopathy.  CHEST WALL AND LOWER NECK: Within normal limits.    ABDOMEN AND PELVIS:    LIVER: Coarse calcification in the dome of the liver.  BILE DUCTS: Normal caliber.  GALLBLADDER: Status post cholecystectomy.  SPLEEN: Two early arterial enhancing lesions in the spleen are unchanged,   and may represent hemangiomata.  PANCREAS: Dilatation of the pancreatic duct in the head of the pancreas   to 0.6 cm, unchanged.  ADRENALS: Within normal limits.  KIDNEYS/URETERS: No hydronephrosis. Subcentimeter hypodense lesions in   the kidneys bilaterally are too small to characterize.    BLADDER: Underdistended, limiting evaluation.  REPRODUCTIVE ORGANS: Calcified uterine myomata.    BOWEL: No bowel obstruction. Moderate fecal material in the rectum.   Scattered colonic diverticulosis.  PERITONEUM: No ascites.  VESSELS:  Atherosclerotic calcification.  RETROPERITONEUM: No lymphadenopathy.    ABDOMINAL WALL: Small fat-containing umbilical hernia.  BONES: Old fracture of the proximal right humerus, unchanged. Comminuted   mildly displaced fractures of the left eighth and ninth posterior ribs.   Degenerative changes in the spine. Status post left total hip replacement.    IMPRESSION: Comminuted mildly displaced fractures of the left eighth and   ninth posterior ribs.    Small left pleural effusion.    Indeterminate 0.5 cm nodular opacity in the right upper lobe, new since   2018.                      SOCO PATTON M.D., ATTENDING RADIOLOGIST  This document has been electronically signed. Oct 25 2018  3:00PM                < end of copied text >  < from: CT Head No Cont (10.25.18 @ 14:40) >  EXAM:  CT CERVICAL SPINE                          EXAM:  CT BRAIN                            PROCEDURE DATE:  10/25/2018            INTERPRETATION:  HISTORY: Trauma. Unwitnessed fall.    Description: A noncontrast head CT and a noncontrast cervicalspine CT   were performed. The head CT was performed with 5 mm axial images. The   cervical spine CT was performed with axial thin section images with   sagittal and coronal reformatted series.    Comparison is made to the prior head and cervical spine CT from   10/22/2018.    Head CT:    There is no evidence for calvarial fracture, acute hemorrhage, acute   cortical infarct, mass effect, or hydrocephalus.     A small chronic right parietal temporal cortical infarct is unchanged. A   chronic small left cerebellar infarct is unchanged. A chronic right   superior cerebellar lacunar infarct is noted.    Mild hypodensity is present in the periventricular and subcortical white   matter which most likely represents chronic microvascular ischemic   changes given the patient's age.    Cerebral volume loss is noted with secondary proportional prominence of   the ventricles and sulci.    Cervical spine CT:    Cervical spine CT:     Fusion of the C3 and C4 vertebral bodies is unchanged. Minimal anterior   subluxation of C4 on C5 is unchanged. Severe disc space narrowing is   again noted at the C5-C6, C6-C7, and C7-T1 levels, similar compared to   the prior study.     Generalized osteopenia is noted. Consider correlation with bone   densitometry for quantification if warranted.     A defect involving the superior endplate of T2 is unchanged. There is no   evidence for new superimposed acute compression fracture deformity.     Disc bulges, facet degenerative changes, ligamentum flavum hypertrophy   result in multilevel spinal canal stenosis and neural foraminal   narrowing, the overall degree of which is not well demonstrated on this   study.     IMPRESSION:    1. On the head CT, there is no evidence for calvarial fracture or acute   intracranial hemorrhage. If symptoms persist, consider short interval   follow-up head CT or brain MRI follow-up if there are no MRI   contraindications.  2. On the cervical spine CT, there is no evidence for acute cervical   spine fracture. Degenerative changes as described. If symptoms persist,   consider cervical spine MRI follow-up if there are no MRI   contraindications.                    DAXA EAST M.D., ATTENDING RADIOLOGIST  This document has been electronically signed. Oct 25 2018  3:00PM               < end of copied text >  < from: CT Cervical Spine No Cont (10.25.18 @ 14:40) >  XAM:  CT CERVICAL SPINE                          EXAM:  CT BRAIN                            PROCEDURE DATE:  10/25/2018            INTERPRETATION:  HISTORY: Trauma. Unwitnessed fall.    Description: A noncontrast head CT and a noncontrast cervicalspine CT   were performed. The head CT was performed with 5 mm axial images. The   cervical spine CT was performed with axial thin section images with   sagittal and coronal reformatted series.    Comparison is made to the prior head and cervical spine CT from   10/22/2018.    Head CT:    There is no evidence for calvarial fracture, acute hemorrhage, acute   cortical infarct, mass effect, or hydrocephalus.     A small chronic right parietal temporal cortical infarct is unchanged. A   chronic small left cerebellar infarct is unchanged. A chronic right   superior cerebellar lacunar infarct is noted.    Mild hypodensity is present in the periventricular and subcortical white   matter which most likely represents chronic microvascular ischemic   changes given the patient's age.    Cerebral volume loss is noted with secondary proportional prominence of   the ventricles and sulci.    Cervical spine CT:    Cervical spine CT:     Fusion of the C3 and C4 vertebral bodies is unchanged. Minimal anterior   subluxation of C4 on C5 is unchanged. Severe disc space narrowing is   again noted at the C5-C6, C6-C7, and C7-T1 levels, similar compared to   the prior study.     Generalized osteopenia is noted. Consider correlation with bone   densitometry for quantification if warranted.     A defect involving the superior endplate of T2 is unchanged. There is no   evidence for new superimposed acute compression fracture deformity.     Disc bulges, facet degenerative changes, ligamentum flavum hypertrophy   result in multilevel spinal canal stenosis and neural foraminal   narrowing, the overall degree of which is not well demonstrated on this   study.     IMPRESSION:    1. On the head CT, there is no evidence for calvarial fracture or acute   intracranial hemorrhage. If symptoms persist, consider short interval   follow-up head CT or brain MRI follow-up if there are no MRI   contraindications.  2. On the cervical spine CT, there is no evidence for acute cervical   spine fracture. Degenerative changes as described. If symptoms persist,   consider cervical spine MRI follow-up if there are no MRI   contraindications.                    DAXA EAST M.D., ATTENDING RADIOLOGIST  This document has been electronically signed. Oct 25 2018  3:00PM               < end of copied text >

## 2018-10-25 NOTE — ED PROVIDER NOTE - ATTENDING CONTRIBUTION TO CARE
89 y/o F with h/o Afib on Xarelto, GERD, PUD, prior GI bleed, COPD, HLD, CVA, and glaucoma biba s/p unwitnessed fall at facility. pt was walking with walker, staff heard her fall, pt found face down on floor. immediately helped up, was awake and alert. pt does not recall event. pt cannot provide any further history. pt placed in c-collar by EMS.    PE: Oriented to person and place, NAD, NCAT, PERRL, MMM, Trachea midline, Normal conjunctiva, lungs CTAB, S1/S2 RRR, Normal perfusion, 2+ radial pulses bilat, Abdomen Soft, NTND, No rebound/guarding, No LE edema, No deformity of extremities. Moving extremities x 4. FROM bilat UE and LE without any focal ttp. Scattered ecchymoses of various ages.    Pt very poor historian. Most c/w mechanical trip and fall however is patient's 2nd fall in 3 days. Plan to obtain pan-scan due to diff obtaining clear hx and exam, Check CBC eval for anemia, cmp eval for metabolic derangement, check troponin. ekg sinus bradycardia. pt will require admission due to frequent falls and high risk 2/2 a/c use. - Vamsi Lopez MD

## 2018-10-25 NOTE — H&P ADULT - HISTORY OF PRESENT ILLNESS
CHIEF COMPLAINT:Patient is a 89y old  Female who presents with a chief complaint of s/p fall.      HPI:   : 89F hx CVA  , Afib on xarelto,   dementia, lives at [facility] after unwitnessed fall.    was walking with walker and facility heard thump and found pt face down on the floor.   pt was conscious, but no one saw if it was a mechanical fall. pt si demented and unable to provide further history or complaints./ pt arrived  in c-collar/  seen by trauma  in er  and rejected  called by  er salo cobbit/  card is  d r go;rae      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      MEDICATIONS  (STANDING):    MEDICATIONS  (PRN):      FAMILY HISTORY:  No pertinent family history in first degree relatives      SOCIAL HISTORY:    [ ] Non-smoker  [ ] Smoker  [ ] Alcohol    Allergies    aspirin (Unknown)  penicillin (Unknown)    Intolerances    	    REVIEW OF SYSTEMS:  CONSTITUTIONAL: No fever, weight loss, or fatigue  EYES: No eye pain, visual disturbances, or discharge  ENT:  No difficulty hearing, tinnitus, vertigo; No sinus or throat pain  NECK: No pain or stiffness  RESPIRATORY: No cough, wheezing, chills or hemoptysis; + Shortness of Breath  CARDIOVASCULAR: + chest pain, no   palpitations, passing out, dizziness, or leg swelling  GASTROINTESTINAL: No abdominal or epigastric pain. No nausea, vomiting, or hematemesis; No diarrhea or constipation. No melena or hematochezia.  GENITOURINARY: No dysuria, frequency, hematuria, or incontinence  NEUROLOGICAL: No headaches, memory loss, loss of strength, numbness, or tremors  SKIN: No itching, burning, rashes, or lesions   LYMPH Nodes: No enlarged glands  ENDOCRINE: No heat or cold intolerance; No hair loss  MUSCULOSKELETAL: No joint pain or swelling; No muscle, back, or extremity pain  PSYCHIATRIC: No depression, anxiety, mood swings, or difficulty sleeping  HEME/LYMPH: No easy bruising, or bleeding gums  ALLERGY AND IMMUNOLOGIC: No hives or eczema	    [ ] All others negative	  [ ] Unable to obtain    PHYSICAL EXAM:  T(C): 36.7 (10-25-18 @ 16:00), Max: 36.7 (10-25-18 @ 16:00)  HR: 53 (10-25-18 @ 16:00) (53 - 62)  BP: 148/55 (10-25-18 @ 16:00) (108/64 - 148/55)  RR: 16 (10-25-18 @ 16:00) (16 - 16)  SpO2: 98% (10-25-18 @ 16:00) (96% - 100%)  Wt(kg): --  I&O's Summary      Appearance: Normal	  HEENT:   Normal oral mucosa, PERRL, EOMI	  Lymphatic: No lymphadenopathy  Cardiovascular: Normal S1 S2, No JVD, + murmurs, No edema  Respiratory: Lungs clear to auscultation	  Psychiatry: A & O x 3, Mood & affect appropriate  Gastrointestinal:  Soft, Non-tender, + BS	  Skin: No rashes, No ecchymoses, No cyanosis	  Neurologic: Non-focal  Extremities: Normal range of motion, No clubbing, cyanosis or edema  Vascular: Peripheral pulses palpable 2+ bilaterally  + tenderness on palpation of chest wall    TELEMETRY: 	    ECG:  	  RADIOLOGY:  OTHER: 	  	  LABS:	 	    CARDIAC MARKERS:                              12.4   7.5   )-----------( 136      ( 25 Oct 2018 12:58 )             35.8     10-25    143  |  105  |  19  ----------------------------<  156<H>  4.7   |  23  |  0.86    Ca    9.3      25 Oct 2018 12:58    TPro  7.3  /  Alb  4.2  /  TBili  1.8<H>  /  DBili  x   /  AST  13  /  ALT  7<L>  /  AlkPhos  98  10-25    proBNP:   Lipid Profile:   HgA1c:   TSH:   PT/INR - ( 25 Oct 2018 12:58 )   PT: 17.9 sec;   INR: 1.64 ratio         PTT - ( 25 Oct 2018 12:58 )  PTT:33.0 sec    PREVIOUS DIAGNOSTIC TESTING:    < from: 12 Lead ECG (10.25.18 @ 12:16) >   SINUS BRADYCARDIA  OTHERWISE NORMAL ECG      < from: Transthoracic Echocardiogram (02.11.18 @ 13:31) >  1. Normal left ventricular systolic function. No segmental  wall motion abnormalities.  2. Right ventricular enlargement with decreased right  ventricular systolic function.  3. No pericardial effusion seen.  *** Compared with echocardiogram of 2/19/2017, the right  ventricular performance and tricuspid regurgitation are  worse. Technically difficult (portable) study.    < from: CT Abdomen and Pelvis w/ IV Cont (10.25.18 @ 14:42) >   Comminuted mildly displaced fractures of the left eighth and   ninth posterior ribs.    Small left pleural effusion.    Indeterminate 0.5 cm nodular opacity in the right upper lobe, new since   6/26/2018.    < end of copied text >

## 2018-10-25 NOTE — H&P ADULT - ASSESSMENT
pt with hx of bradycardia s/p fall/syncope with bradycardia  tele  tsh  orthostatic  repeat echo  hold beta blocker  pain management

## 2018-10-25 NOTE — ED PROVIDER NOTE - OBJECTIVE STATEMENT
Norma Galdamez M.D: 89F hx CVA, Afib on xarelto, dementia, lives at [facility] after unwitnessed fall. was walking with walker and facility heard thump and found pt face down on the floor. pt was conscious, but no one saw if it was a mechanical fall. pt si demented and unable to provide urther history or complaints. pt arrives in c-collar/

## 2018-10-25 NOTE — ED PROVIDER NOTE - CARE PLAN
Principal Discharge DX:	Frequent falls  Secondary Diagnosis:	Closed fracture of multiple ribs of left side, initial encounter

## 2018-10-25 NOTE — ED ADULT NURSE REASSESSMENT NOTE - NS ED NURSE REASSESS COMMENT FT1
Pt remains at baseline mental status, sleeping comfortably in bed in no apparent distress. Sinus George in the low 50's, MD Loo aware. Pt mentating well and states she is just tired. Safety maintained. Side rails up, bed in lowest position.

## 2018-10-25 NOTE — ED ADULT NURSE REASSESSMENT NOTE - NS ED NURSE REASSESS COMMENT FT1
Two RNs unable to obtain IV access/blood draws after multiple attempts. MD Loo aware. US team at bedside to place IV access. Safety maintained

## 2018-10-26 LAB
ALBUMIN SERPL ELPH-MCNC: 3.4 G/DL — SIGNIFICANT CHANGE UP (ref 3.3–5)
ALP SERPL-CCNC: 81 U/L — SIGNIFICANT CHANGE UP (ref 40–120)
ALT FLD-CCNC: 6 U/L — LOW (ref 10–45)
ANION GAP SERPL CALC-SCNC: 11 MMOL/L — SIGNIFICANT CHANGE UP (ref 5–17)
AST SERPL-CCNC: 11 U/L — SIGNIFICANT CHANGE UP (ref 10–40)
BILIRUB SERPL-MCNC: 2.3 MG/DL — HIGH (ref 0.2–1.2)
BUN SERPL-MCNC: 15 MG/DL — SIGNIFICANT CHANGE UP (ref 7–23)
CALCIUM SERPL-MCNC: 8.8 MG/DL — SIGNIFICANT CHANGE UP (ref 8.4–10.5)
CHLORIDE SERPL-SCNC: 106 MMOL/L — SIGNIFICANT CHANGE UP (ref 96–108)
CO2 SERPL-SCNC: 24 MMOL/L — SIGNIFICANT CHANGE UP (ref 22–31)
CREAT SERPL-MCNC: 0.69 MG/DL — SIGNIFICANT CHANGE UP (ref 0.5–1.3)
CULTURE RESULTS: NO GROWTH — SIGNIFICANT CHANGE UP
GLUCOSE SERPL-MCNC: 99 MG/DL — SIGNIFICANT CHANGE UP (ref 70–99)
HCT VFR BLD CALC: 32.3 % — LOW (ref 34.5–45)
HGB BLD-MCNC: 10.9 G/DL — LOW (ref 11.5–15.5)
MCHC RBC-ENTMCNC: 33.2 PG — SIGNIFICANT CHANGE UP (ref 27–34)
MCHC RBC-ENTMCNC: 33.9 GM/DL — SIGNIFICANT CHANGE UP (ref 32–36)
MCV RBC AUTO: 98 FL — SIGNIFICANT CHANGE UP (ref 80–100)
PLATELET # BLD AUTO: 122 K/UL — LOW (ref 150–400)
POTASSIUM SERPL-MCNC: 4.1 MMOL/L — SIGNIFICANT CHANGE UP (ref 3.5–5.3)
POTASSIUM SERPL-SCNC: 4.1 MMOL/L — SIGNIFICANT CHANGE UP (ref 3.5–5.3)
PROT SERPL-MCNC: 6.3 G/DL — SIGNIFICANT CHANGE UP (ref 6–8.3)
RBC # BLD: 3.29 M/UL — LOW (ref 3.8–5.2)
RBC # FLD: 12.3 % — SIGNIFICANT CHANGE UP (ref 10.3–14.5)
SODIUM SERPL-SCNC: 141 MMOL/L — SIGNIFICANT CHANGE UP (ref 135–145)
SPECIMEN SOURCE: SIGNIFICANT CHANGE UP
TSH SERPL-MCNC: 2.8 UIU/ML — SIGNIFICANT CHANGE UP (ref 0.27–4.2)
WBC # BLD: 6.2 K/UL — SIGNIFICANT CHANGE UP (ref 3.8–10.5)
WBC # FLD AUTO: 6.2 K/UL — SIGNIFICANT CHANGE UP (ref 3.8–10.5)

## 2018-10-26 PROCEDURE — 93308 TTE F-UP OR LMTD: CPT | Mod: 26

## 2018-10-26 PROCEDURE — 93321 DOPPLER ECHO F-UP/LMTD STD: CPT | Mod: 26

## 2018-10-26 PROCEDURE — 93010 ELECTROCARDIOGRAM REPORT: CPT

## 2018-10-26 PROCEDURE — 93010 ELECTROCARDIOGRAM REPORT: CPT | Mod: 77

## 2018-10-26 RX ORDER — DIGOXIN 250 MCG
0.25 TABLET ORAL ONCE
Qty: 0 | Refills: 0 | Status: DISCONTINUED | OUTPATIENT
Start: 2018-10-26 | End: 2018-10-27

## 2018-10-26 RX ORDER — ACETAMINOPHEN 500 MG
1000 TABLET ORAL ONCE
Qty: 0 | Refills: 0 | Status: COMPLETED | OUTPATIENT
Start: 2018-10-26 | End: 2018-10-26

## 2018-10-26 RX ORDER — SODIUM CHLORIDE 9 MG/ML
500 INJECTION INTRAMUSCULAR; INTRAVENOUS; SUBCUTANEOUS ONCE
Qty: 0 | Refills: 0 | Status: COMPLETED | OUTPATIENT
Start: 2018-10-26 | End: 2018-10-26

## 2018-10-26 RX ORDER — DIGOXIN 250 MCG
0.25 TABLET ORAL ONCE
Qty: 0 | Refills: 0 | Status: COMPLETED | OUTPATIENT
Start: 2018-10-26 | End: 2018-10-26

## 2018-10-26 RX ADMIN — BRIMONIDINE TARTRATE 1 DROP(S): 2 SOLUTION/ DROPS OPHTHALMIC at 18:02

## 2018-10-26 RX ADMIN — Medication 400 MILLIGRAM(S): at 22:19

## 2018-10-26 RX ADMIN — SODIUM CHLORIDE 500 MILLILITER(S): 9 INJECTION INTRAMUSCULAR; INTRAVENOUS; SUBCUTANEOUS at 22:21

## 2018-10-26 RX ADMIN — Medication 0.25 MILLIGRAM(S): at 22:47

## 2018-10-26 RX ADMIN — Medication 1000 MILLIGRAM(S): at 22:22

## 2018-10-26 RX ADMIN — Medication 100 MILLIGRAM(S): at 18:02

## 2018-10-26 RX ADMIN — Medication 1 DROP(S): at 06:29

## 2018-10-26 RX ADMIN — Medication 100 MILLIGRAM(S): at 06:29

## 2018-10-26 RX ADMIN — Medication 1 DROP(S): at 18:02

## 2018-10-26 RX ADMIN — MEGESTROL ACETATE 400 MILLIGRAM(S): 40 SUSPENSION ORAL at 18:02

## 2018-10-26 RX ADMIN — BRIMONIDINE TARTRATE 1 DROP(S): 2 SOLUTION/ DROPS OPHTHALMIC at 06:29

## 2018-10-26 RX ADMIN — ATORVASTATIN CALCIUM 20 MILLIGRAM(S): 80 TABLET, FILM COATED ORAL at 21:19

## 2018-10-26 RX ADMIN — RIVAROXABAN 15 MILLIGRAM(S): KIT at 18:14

## 2018-10-26 NOTE — DIETITIAN INITIAL EVALUATION ADULT. - ORAL INTAKE PTA
Pt reports decreased appetite PTA due to change in sense of taste s/p open heart surgery  about 1 year ago./fair n/a/As per previous RD notes, pt has had persistent poor po intake

## 2018-10-26 NOTE — DIETITIAN INITIAL EVALUATION ADULT. - ENERGY NEEDS
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 Pt admitted for chest pain and is present with acute onset chest pain and elevated cardiac enzymes. No noted edema and pressure ulcers at this time as per flow sheet. Ht: 5'2", Wt: 93.2 pounds, BMI: 17 , IBW: 110 pounds, %IBW: 85%  Pertinent Information: 89 y/o female with PMH Afib, GERD, COPD, GI bleed, hyperlipidemia, peptic ulcer disease, CVA, glaucoma. Pt admitted for fall, dx with bradycardia, syncope, falls /rib fx. Cardiology to follow up with pt's son regarding ppm. No noted edema or pressure injures in flow sheet at this time.

## 2018-10-26 NOTE — DIETITIAN INITIAL EVALUATION ADULT. - PERTINENT LABORATORY DATA
(10/ 26) Hemoglobin 29.3, Hematocrit 79.9, (10/26) INR 1.44 No nutritionally pertinent labs at this time

## 2018-10-26 NOTE — DIETITIAN INITIAL EVALUATION ADULT. - SIGNS/SYMPTOMS
Coumadin education consult, pt's belief to stay away from all Vitamin K intake BMI <19, visual fat loss and muscle wasting, 19.13% weight loss over 8 months

## 2018-10-26 NOTE — DIETITIAN INITIAL EVALUATION ADULT. - PHYSICAL APPEARANCE
well nourished Pt appeared to be severely malnourished. Unable to preform NFPE since pt was asleep and would not wake up. However, per visual examination, pt had severe temporal wasting with protruding bone, clavical wasting, squared shoulders and caved cheeks./underweight/well nourished Pt appeared to be severely malnourished. Unable to preform NFPE since pt was asleep and would not wake up. However, per visual examination, pt had severe temporal wasting with protruding bone, clavical wasting, squared shoulders and caved cheeks./emaciated

## 2018-10-26 NOTE — DIETITIAN INITIAL EVALUATION ADULT. - PERTINENT MEDS FT
Heparin, Toporol, Zestril, Lasix, Lipitor, Synthroid, Potassium Chloride Xarecto, Lopressor, Lipitor, Colace liquid, Megace.

## 2018-10-26 NOTE — DIETITIAN INITIAL EVALUATION ADULT. - NS AS NUTRI INTERV ED CONTENT
Purpose of the nutrition education/Nutrition relationship to health/disease/Educated pt on coumadin-nutrient interaction. Discussed with pt importance of daily Vitamin K consistency, Vitamin K food choices, and educated pt on vitamin K microgram to points system to keep INR consistent. Pt given coumadin education handout.

## 2018-10-26 NOTE — DIETITIAN INITIAL EVALUATION ADULT. - NS FNS REASON FOR WEIGHT CHANG
Possibly due to decrease po intake, glaucoma, advanced age, confused state/other (specify)/decreased po intake Possibly due to decrease po intake, glaucoma, advanced age, confused state/other (specify)

## 2018-10-26 NOTE — DIETITIAN INITIAL EVALUATION ADULT. - NS AS NUTRI INTERV MEDICAL AND FOOD SUPPLEMENTS
Recommend providing pt chocolate Ensure pudding TID and chocolate HealthShake TID to increase protein and kcal intake.

## 2018-10-26 NOTE — DIETITIAN INITIAL EVALUATION ADULT. - FACTORS AFF FOOD INTAKE
PTA pt reports reduced appetite due to change in sense of taste however since admission, pt reports increased appetite and good po intake >75% consumption of meal tray./change in sense of smell or taste As per previous RD notes, pt has poor po intake related to confused state/change in mental status

## 2018-10-26 NOTE — DIETITIAN INITIAL EVALUATION ADULT. - NUTRITION INTERVENTION
Meals and Snack/Nutrition Education Meals and Snack Medical Food Supplements/Meals and Snack/Vitamin

## 2018-10-26 NOTE — DIETITIAN INITIAL EVALUATION ADULT. - OTHER INFO
Pt seen for coumadin education consult. Pt reports increased appetite and good po intake of >75% of meal tray. Pt denies any nausea/vomiting and no difficulty chewing/swallowing. PTA, pt reports frequent episodes of diarrhea (about 2-3 times per week). Per flow sheet, pt's last bowel movement 10/21. Pt does not report taking any vitamins or mineral supplements at home. NKFA. Pt seen by nutrition for length of stay. As per nursing, pt was fed breakfast and ate >75% of breakfast meal tray. As per flow sheet, on 10/30/18 pt consumed 80% of breakfast meal tray and 50% of lunch meal tray. RN reports that pt's son requests supplements for pt to increase intake. Since admission, pt has lost weight, 10/26/18 110 pounds, 10/27/18 96.3 pounds, 10/29/18 97 pounds, 11/1/18 93.2 pounds. Unable to preform NFPE since pt was asleep and unable to receive consent. No reported N/V, diarrhea or constipation and no reported issues chewing or swallowing. As per flow sheet, last bowel movement was 10/28/18. NKFA. Pt seen by nutrition for length of stay. As per nursing, pt was fed breakfast and ate >75% of breakfast meal tray. As per flow sheet, on 10/30/18 pt consumed 80% of breakfast meal tray and 50% of lunch meal tray. RN reports that pt's son requests supplements for pt to increase intake. Discussed with RN need for SLP evaluation for pt since pt had been on mechanical soft diet in previous RD notes, however, RN did not think it was necessary at this time. Since admission, pt has lost weight, 10/26/18 110 pounds, 10/27/18 96.3 pounds, 10/29/18 97 pounds, 11/1/18 93.2 pounds. Unable to perform NFPE since pt was asleep and unable to receive consent. No reported N/V, diarrhea or constipation and no reported issues chewing or swallowing. As per flow sheet, last bowel movement was 10/28/18. NKFA.

## 2018-10-26 NOTE — DIETITIAN INITIAL EVALUATION ADULT. - NS FNS WEIGHT USED FOR CALC
adjusted/135 pounds 93.2 pounds, pt requires excess calories due to malnutrition and left rib fx/current

## 2018-10-26 NOTE — PROVIDER CONTACT NOTE (OTHER) - ASSESSMENT
Pt A+Ox2-3, [place]. Converted from NSR 60-70s to Afib 100-130s; asymptomatic. Has hx of Afib (on Xarelto). Home med rec shows pt on Metoprolol 12.5mg PO q12h. VS: 129/70, 132P, 18, 96% on RA. 12 lead EKG done.

## 2018-10-26 NOTE — DIETITIAN INITIAL EVALUATION ADULT. - FEEDING SKILL
independent As per RN, RN fed patient 100% of her breakfast tray/age appropriate assistance/independent/total assistance age appropriate assistance/As per RN, RN fed patient oatmeal with jelly, and 4 oz of orange juice./independent/total assistance

## 2018-10-26 NOTE — DIETITIAN INITIAL EVALUATION ADULT. - DIET TYPE
No beef, No fish/DASH/TLC (sodium and cholesterol restricted diet)/caffeine free caffeine free/regular/DASH/TLC (sodium and cholesterol restricted diet)

## 2018-10-26 NOTE — PATIENT PROFILE ADULT - FALL HARM RISK
----- Message from Emilie Gamez DO sent at 1/30/2018  6:28 PM CST -----  Pls have pt f/u on labs. age(85 years old or older)

## 2018-10-26 NOTE — DIETITIAN INITIAL EVALUATION ADULT. - NS AS NUTRI INTERV MEALS SNACK
Fat - modified diet/Mineral - modified diet/General/healthful diet/DASH/TLC diet- low sodium, low cholesterol Recommend providing pt with chocolate Ensure pudding TID and chocolate HealthShake TID to increase protein and kcal intake. General/healthful diet/Recommend liberalizing diet to regular

## 2018-10-27 LAB
HCT VFR BLD CALC: 31.5 % — LOW (ref 34.5–45)
HGB BLD-MCNC: 10.6 G/DL — LOW (ref 11.5–15.5)
MCHC RBC-ENTMCNC: 33.5 GM/DL — SIGNIFICANT CHANGE UP (ref 32–36)
MCHC RBC-ENTMCNC: 33.5 PG — SIGNIFICANT CHANGE UP (ref 27–34)
MCV RBC AUTO: 100 FL — SIGNIFICANT CHANGE UP (ref 80–100)
PLATELET # BLD AUTO: 120 K/UL — LOW (ref 150–400)
RBC # BLD: 3.15 M/UL — LOW (ref 3.8–5.2)
RBC # FLD: 12.8 % — SIGNIFICANT CHANGE UP (ref 10.3–14.5)
WBC # BLD: 8.2 K/UL — SIGNIFICANT CHANGE UP (ref 3.8–10.5)
WBC # FLD AUTO: 8.2 K/UL — SIGNIFICANT CHANGE UP (ref 3.8–10.5)

## 2018-10-27 PROCEDURE — 99233 SBSQ HOSP IP/OBS HIGH 50: CPT

## 2018-10-27 RX ORDER — METOPROLOL TARTRATE 50 MG
12.5 TABLET ORAL
Qty: 0 | Refills: 0 | Status: DISCONTINUED | OUTPATIENT
Start: 2018-10-27 | End: 2018-11-06

## 2018-10-27 RX ADMIN — ATORVASTATIN CALCIUM 20 MILLIGRAM(S): 80 TABLET, FILM COATED ORAL at 22:12

## 2018-10-27 RX ADMIN — MEGESTROL ACETATE 400 MILLIGRAM(S): 40 SUSPENSION ORAL at 17:18

## 2018-10-27 RX ADMIN — RIVAROXABAN 15 MILLIGRAM(S): KIT at 17:18

## 2018-10-27 RX ADMIN — Medication 100 MILLIGRAM(S): at 05:25

## 2018-10-27 RX ADMIN — Medication 1 DROP(S): at 17:18

## 2018-10-27 RX ADMIN — BRIMONIDINE TARTRATE 1 DROP(S): 2 SOLUTION/ DROPS OPHTHALMIC at 17:18

## 2018-10-27 RX ADMIN — Medication 1 DROP(S): at 05:25

## 2018-10-27 RX ADMIN — Medication 12.5 MILLIGRAM(S): at 17:21

## 2018-10-27 RX ADMIN — Medication 100 MILLIGRAM(S): at 17:18

## 2018-10-27 RX ADMIN — BRIMONIDINE TARTRATE 1 DROP(S): 2 SOLUTION/ DROPS OPHTHALMIC at 05:25

## 2018-10-27 NOTE — PHYSICAL THERAPY INITIAL EVALUATION ADULT - ACTIVE RANGE OF MOTION EXAMINATION, REHAB EVAL
left shoulder flex ~95 degrees/bilateral upper extremity Active ROM was WFL (within functional limits)/bilateral  lower extremity Active ROM was WFL (within functional limits)

## 2018-10-27 NOTE — CHART NOTE - NSCHARTNOTEFT_GEN_A_CORE
Chief Complaint: SBP 80s HR 120s     NP Note (episodic)     HPI:  Informed by day PA that pt's HR is in the 110-120s asymptomatic since 17:00, during the evening pt SBP 80s with HR remaining in the 120s in rapid afib with rvr. Pt seen and examined at bedside, pt is in pain during movement from her ribs.  She denies CP, dizziness, palpitations, abdominal  pain, N/V/D, numbness/tingling, extremity weakness, dysuria.       REVIEW OF SYSTEMS:  CONSTITUTIONAL: No weakness, fevers or chills  EYES/ENT: No visual changes;  No vertigo or throat pain   NECK: No pain or stiffness  RESPIRATORY: No cough, wheezing, hemoptysis; No shortness of breath  CARDIOVASCULAR: No chest pain or palpitations  GASTROINTESTINAL: No abdominal or epigastric pain. No nausea, vomiting, or hematemesis; No diarrhea or constipation. No melena or hematochezia.  MUSCULOSKELETAL: No joint pain, stiffness, or swelling, Normal ROM  GENITOURINARY: No dysuria, frequency or hematuria  NEUROLOGICAL: No numbness or weakness  SKIN: No itching, burning, rashes, or lesions   All other review of systems is negative unless indicated above.    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        Allergies    aspirin (Unknown)  penicillin (Unknown)    Intolerances          Physical Exam:    Vital Signs Last 24 Hrs  T(C): 37.1 (26 Oct 2018 23:18), Max: 37.2 (26 Oct 2018 21:15)  T(F): 98.8 (26 Oct 2018 23:18), Max: 99 (26 Oct 2018 21:15)  HR: 109 (26 Oct 2018 23:18) (59 - 132)  BP: 98/64 (26 Oct 2018 23:18) (92/53 - 160/73)  BP(mean): --  RR: 18 (26 Oct 2018 23:18) (16 - 18)  SpO2: 96% (26 Oct 2018 23:18) (94% - 98%)    General:  NAD, AOx2, nontoxic appearing  Head:  NC/AT, no scleral injection, no JVD   CV: RRR, S1S2   Respiratory: CTA B/L, nonlabored  Abdominal: Soft, NT, ND no palpable mass, no guarding or rebound tenderness  MSK: No BLLE edema, + peripheral pulses, FROM all 4 extremity      Labs:                          10.9   6.2   )-----------( 122      ( 26 Oct 2018 06:22 )             32.3     10-26    141  |  106  |  15  ----------------------------<  99  4.1   |  24  |  0.69    Ca    8.8      26 Oct 2018 06:21    TPro  6.3  /  Alb  3.4  /  TBili  2.3<H>  /  DBili  x   /  AST  11  /  ALT  6<L>  /  AlkPhos  81  10-26      Urinalysis Basic - ( 10-25 @ 13:46 )    Color: Negative / Appearance: Negative / SG: -- / pH: Negative  Gluc: Negative / Ketone: Yellow  / Bili: -- / Urobili: 6   Blood: 2 / Protein: -- / Nitrite: Negative   Leuk Esterase: Negative / RBC: 1.024 / WBC --   Sq Epi: Trace / Non Sq Epi: 7 / Bacteria: 5.5        Radiology:      EXAM:  XR CHEST AP OR PA 1V                            PROCEDURE DATE:  10/25/2018            INTERPRETATION:  CLINICAL INDICATION: Trauma, dementia, unwitnessed fall    TECHNIQUE: Single portable view of the chest was obtained.    COMPARISON: Chest radiograph 10/22/2018.    FINDINGS:     Mild diffuse reticulonodular pattern to the lung parenchyma. No pleural   effusion or pneumothorax.   Cardiac size cannot be accurately assessed in this projection. Visualized   osseous structures are unremarkable.  Cholecystectomy clips noted.    IMPRESSION:   Mild diffuse reticulonodular pattern to the lung parenchyma.      Patient name: LYNNE MALAGON  YOB: 1925   Age: 89 (F)   MR#: [id number]  Study Date: 10/26/2018  Location: [address] [place], [place] [id number]: Maribell Moncada RDCS  Study quality: Technically difficult  Referring Physician: Balaji Ferro MD  Blood Pressure: 92/53 mmHg  Height: 157 cm  Weight: 50 kg  BSA: 1.5 m2  Heart Rate: 65 mmHg  ------------------------------------------------------------------------  PROCEDURE: Limited transthoracic echocardiogram with 2-D.  M-Mode and spectral and color flow Doppler.  INDICATION: Syncope and collapse (R55)  ------------------------------------------------------------------------  Dimensions:    Normal Values:  LA:            2.0 - 4.0 cm  Ao:            2.0 - 3.8 cm  SEPTUM: 0.8    0.6 - 1.2 cm  PWT:    1.0    0.6 - 1.1 cm  LVIDd:  4.2    3.0 - 5.6 cm  LVIDs:  3.0    1.8 - 4.0 cm  Derived variables:  LVMI: 80 g/m2  RWT: 0.47  Fractional short: 29 %  EF (Teicholtz): 55 %  ------------------------------------------------------------------------  Observations:  Mitral Valve: Mitral annular calcification.  Aortic Valve/Aorta: The aortic valve opens well.  Left Ventricle: Limited images acquired at the patients  request. Based upon the parasternal long axis view only;  grossly normal left ventricular systolic function.  Right Heart: The right ventricle is not well visualized.  Pericardium/Pleura: Based upon the parasternal long axis  single view; normal pericardium with no pericardial  effusion.  ------------------------------------------------------------------------  Conclusions:  Limited tranthoracic echocardiogram at patients request to  end exam.  1. Mitral annular calcification.  2. The aortic valve opens well.  3. Limited images acquired at the patients request. Based  upon the parasternal long axis view only;  grossly normal  left ventricular systolic function.  ------------------------------------------------------------------------  Confirmed on  10/26/2018 - 16:30:31 by Jayy Coronado M.D.  ------------------------------------------------------------------------    Assessment & Plan:    89F 89F with afib on xarelto s/p unwitnessed fall without LOC with nondisplaced left 8 and 9th rib fractures on CT, bradycardic on admission, evaluated for r/o syncope now p/w rapid afib with rvr 120s with SBP 80s, pt in bed NAD. Upon chart review, pt was noted to be mildly orthostatic during the day as well.      - 500 ml NS over 2 hrs - re-evaluate SBP -> improved from SBP 80s to low 100s   - Informed attending, recommended digoxin 0.25 mg IVP x 2 doses   -Pt re-evaluated overnight HR improved  afib on telemetry, will continue to monitor     Will d/w   Primary Team in [place]    [place] [id number]

## 2018-10-27 NOTE — PHYSICAL THERAPY INITIAL EVALUATION ADULT - CRITERIA FOR SKILLED THERAPEUTIC INTERVENTIONS
functional limitations in following categories/therapy frequency/anticipated discharge recommendation/anticipated equipment needs at discharge/predicted duration of therapy intervention/impairments found

## 2018-10-27 NOTE — PHYSICAL THERAPY INITIAL EVALUATION ADULT - PERTINENT HX OF CURRENT PROBLEM, REHAB EVAL
89F hx CVA  , Afib on xarelto, dementia, lives at [facility] after unwitnessed fall. was walking with walker and facility heard thump and found pt face down on the floor. pt was conscious, but no one saw if it was a mechanical fall. pt is demented and unable to provide further history or complaints. pt arrived  in c-collar. As per cardiology note 10/27 +tachy/clarita syndrome, possible PPM placement.. cont below

## 2018-10-27 NOTE — PHYSICAL THERAPY INITIAL EVALUATION ADULT - ADDITIONAL COMMENTS
...CT Abdomen: Comminuted mildly displaced fractures of the left eighth and ninth posterior ribs. Small left pleural effusion. ...CT Abdomen: Comminuted mildly displaced fractures of the left eighth and ninth posterior ribs. Small left pleural effusion.    social history: pt with periods of confusion, pt [place]. pt owns rolling walker. however as per chart, pt lives at [facility], with A

## 2018-10-28 LAB
ANION GAP SERPL CALC-SCNC: 12 MMOL/L — SIGNIFICANT CHANGE UP (ref 5–17)
BUN SERPL-MCNC: 26 MG/DL — HIGH (ref 7–23)
CALCIUM SERPL-MCNC: 8.7 MG/DL — SIGNIFICANT CHANGE UP (ref 8.4–10.5)
CHLORIDE SERPL-SCNC: 109 MMOL/L — HIGH (ref 96–108)
CO2 SERPL-SCNC: 20 MMOL/L — LOW (ref 22–31)
CREAT SERPL-MCNC: 0.81 MG/DL — SIGNIFICANT CHANGE UP (ref 0.5–1.3)
GLUCOSE SERPL-MCNC: 76 MG/DL — SIGNIFICANT CHANGE UP (ref 70–99)
POTASSIUM SERPL-MCNC: 5.1 MMOL/L — SIGNIFICANT CHANGE UP (ref 3.5–5.3)
POTASSIUM SERPL-SCNC: 5.1 MMOL/L — SIGNIFICANT CHANGE UP (ref 3.5–5.3)
SODIUM SERPL-SCNC: 141 MMOL/L — SIGNIFICANT CHANGE UP (ref 135–145)

## 2018-10-28 RX ADMIN — ATORVASTATIN CALCIUM 20 MILLIGRAM(S): 80 TABLET, FILM COATED ORAL at 21:40

## 2018-10-28 RX ADMIN — BRIMONIDINE TARTRATE 1 DROP(S): 2 SOLUTION/ DROPS OPHTHALMIC at 05:34

## 2018-10-28 RX ADMIN — Medication 100 MILLIGRAM(S): at 15:41

## 2018-10-28 RX ADMIN — BRIMONIDINE TARTRATE 1 DROP(S): 2 SOLUTION/ DROPS OPHTHALMIC at 15:41

## 2018-10-28 RX ADMIN — Medication 12.5 MILLIGRAM(S): at 15:41

## 2018-10-28 RX ADMIN — Medication 12.5 MILLIGRAM(S): at 05:34

## 2018-10-28 RX ADMIN — Medication 1 DROP(S): at 15:41

## 2018-10-28 RX ADMIN — MEGESTROL ACETATE 400 MILLIGRAM(S): 40 SUSPENSION ORAL at 15:41

## 2018-10-28 RX ADMIN — RIVAROXABAN 15 MILLIGRAM(S): KIT at 15:41

## 2018-10-28 RX ADMIN — Medication 100 MILLIGRAM(S): at 05:34

## 2018-10-28 RX ADMIN — Medication 1 DROP(S): at 05:34

## 2018-10-28 NOTE — PROVIDER CONTACT NOTE (OTHER) - ASSESSMENT
called reporting that patient's Lab Complete Blood Count cannot be process since specimen clotted. Patient has an active order for PO Xarelto. Patient free from signs and symptoms of bleeding. No Bleeding noted. Bleeding Precautions maintained. Patient's last Lab Complete Blood Count from 27-Oct-2018 Result: RBC Count 3.15. Hemoglobin 10.6. Hematocrit 31.5. Platelet Count - Automated 120.

## 2018-10-28 NOTE — PROVIDER CONTACT NOTE (OTHER) - BACKGROUND
Patient admitted for Repeated Falls, Closed Fracture of multiple ribs of left side. History of Atrial Fibrillation, Gastrointestinal Bleeding, Peptic Ulcer Disease, Cerebral Infarction.

## 2018-10-29 LAB
ANION GAP SERPL CALC-SCNC: 14 MMOL/L — SIGNIFICANT CHANGE UP (ref 5–17)
BUN SERPL-MCNC: 30 MG/DL — HIGH (ref 7–23)
CALCIUM SERPL-MCNC: 8.8 MG/DL — SIGNIFICANT CHANGE UP (ref 8.4–10.5)
CHLORIDE SERPL-SCNC: 106 MMOL/L — SIGNIFICANT CHANGE UP (ref 96–108)
CO2 SERPL-SCNC: 21 MMOL/L — LOW (ref 22–31)
CREAT SERPL-MCNC: 0.77 MG/DL — SIGNIFICANT CHANGE UP (ref 0.5–1.3)
GLUCOSE SERPL-MCNC: 80 MG/DL — SIGNIFICANT CHANGE UP (ref 70–99)
HCT VFR BLD CALC: 33.6 % — LOW (ref 34.5–45)
HGB BLD-MCNC: 11.6 G/DL — SIGNIFICANT CHANGE UP (ref 11.5–15.5)
MCHC RBC-ENTMCNC: 34 PG — SIGNIFICANT CHANGE UP (ref 27–34)
MCHC RBC-ENTMCNC: 34.4 GM/DL — SIGNIFICANT CHANGE UP (ref 32–36)
MCV RBC AUTO: 98.9 FL — SIGNIFICANT CHANGE UP (ref 80–100)
PLATELET # BLD AUTO: 136 K/UL — LOW (ref 150–400)
POTASSIUM SERPL-MCNC: 4.1 MMOL/L — SIGNIFICANT CHANGE UP (ref 3.5–5.3)
POTASSIUM SERPL-MCNC: 4.3 MMOL/L — SIGNIFICANT CHANGE UP (ref 3.5–5.3)
POTASSIUM SERPL-SCNC: 4.1 MMOL/L — SIGNIFICANT CHANGE UP (ref 3.5–5.3)
POTASSIUM SERPL-SCNC: 4.3 MMOL/L — SIGNIFICANT CHANGE UP (ref 3.5–5.3)
RBC # BLD: 3.4 M/UL — LOW (ref 3.8–5.2)
RBC # FLD: 13 % — SIGNIFICANT CHANGE UP (ref 10.3–14.5)
SODIUM SERPL-SCNC: 141 MMOL/L — SIGNIFICANT CHANGE UP (ref 135–145)
WBC # BLD: 7.8 K/UL — SIGNIFICANT CHANGE UP (ref 3.8–10.5)
WBC # FLD AUTO: 7.8 K/UL — SIGNIFICANT CHANGE UP (ref 3.8–10.5)

## 2018-10-29 RX ORDER — SODIUM CHLORIDE 9 MG/ML
1000 INJECTION INTRAMUSCULAR; INTRAVENOUS; SUBCUTANEOUS
Qty: 0 | Refills: 0 | Status: DISCONTINUED | OUTPATIENT
Start: 2018-10-29 | End: 2018-11-06

## 2018-10-29 RX ADMIN — RIVAROXABAN 15 MILLIGRAM(S): KIT at 17:27

## 2018-10-29 RX ADMIN — Medication 1 DROP(S): at 17:27

## 2018-10-29 RX ADMIN — Medication 100 MILLIGRAM(S): at 05:35

## 2018-10-29 RX ADMIN — Medication 100 MILLIGRAM(S): at 17:27

## 2018-10-29 RX ADMIN — BRIMONIDINE TARTRATE 1 DROP(S): 2 SOLUTION/ DROPS OPHTHALMIC at 17:27

## 2018-10-29 RX ADMIN — Medication 12.5 MILLIGRAM(S): at 09:56

## 2018-10-29 RX ADMIN — Medication 1 DROP(S): at 05:35

## 2018-10-29 RX ADMIN — ATORVASTATIN CALCIUM 20 MILLIGRAM(S): 80 TABLET, FILM COATED ORAL at 21:45

## 2018-10-29 RX ADMIN — SODIUM CHLORIDE 50 MILLILITER(S): 9 INJECTION INTRAMUSCULAR; INTRAVENOUS; SUBCUTANEOUS at 09:56

## 2018-10-29 RX ADMIN — MEGESTROL ACETATE 400 MILLIGRAM(S): 40 SUSPENSION ORAL at 17:27

## 2018-10-29 RX ADMIN — Medication 12.5 MILLIGRAM(S): at 21:45

## 2018-10-29 RX ADMIN — BRIMONIDINE TARTRATE 1 DROP(S): 2 SOLUTION/ DROPS OPHTHALMIC at 05:35

## 2018-10-30 LAB
ANION GAP SERPL CALC-SCNC: 13 MMOL/L — SIGNIFICANT CHANGE UP (ref 5–17)
BUN SERPL-MCNC: 27 MG/DL — HIGH (ref 7–23)
CALCIUM SERPL-MCNC: 8.7 MG/DL — SIGNIFICANT CHANGE UP (ref 8.4–10.5)
CHLORIDE SERPL-SCNC: 107 MMOL/L — SIGNIFICANT CHANGE UP (ref 96–108)
CO2 SERPL-SCNC: 20 MMOL/L — LOW (ref 22–31)
CREAT SERPL-MCNC: 0.73 MG/DL — SIGNIFICANT CHANGE UP (ref 0.5–1.3)
GLUCOSE SERPL-MCNC: 98 MG/DL — SIGNIFICANT CHANGE UP (ref 70–99)
HCT VFR BLD CALC: 33.3 % — LOW (ref 34.5–45)
HGB BLD-MCNC: 11.2 G/DL — LOW (ref 11.5–15.5)
MCHC RBC-ENTMCNC: 33 PG — SIGNIFICANT CHANGE UP (ref 27–34)
MCHC RBC-ENTMCNC: 33.7 GM/DL — SIGNIFICANT CHANGE UP (ref 32–36)
MCV RBC AUTO: 98.1 FL — SIGNIFICANT CHANGE UP (ref 80–100)
PLATELET # BLD AUTO: 153 K/UL — SIGNIFICANT CHANGE UP (ref 150–400)
POTASSIUM SERPL-MCNC: 4.1 MMOL/L — SIGNIFICANT CHANGE UP (ref 3.5–5.3)
POTASSIUM SERPL-SCNC: 4.1 MMOL/L — SIGNIFICANT CHANGE UP (ref 3.5–5.3)
RBC # BLD: 3.39 M/UL — LOW (ref 3.8–5.2)
RBC # FLD: 12.4 % — SIGNIFICANT CHANGE UP (ref 10.3–14.5)
SODIUM SERPL-SCNC: 140 MMOL/L — SIGNIFICANT CHANGE UP (ref 135–145)
WBC # BLD: 8.2 K/UL — SIGNIFICANT CHANGE UP (ref 3.8–10.5)
WBC # FLD AUTO: 8.2 K/UL — SIGNIFICANT CHANGE UP (ref 3.8–10.5)

## 2018-10-30 RX ADMIN — Medication 12.5 MILLIGRAM(S): at 21:39

## 2018-10-30 RX ADMIN — Medication 100 MILLIGRAM(S): at 17:22

## 2018-10-30 RX ADMIN — RIVAROXABAN 15 MILLIGRAM(S): KIT at 17:22

## 2018-10-30 RX ADMIN — Medication 1 DROP(S): at 05:23

## 2018-10-30 RX ADMIN — MEGESTROL ACETATE 400 MILLIGRAM(S): 40 SUSPENSION ORAL at 17:22

## 2018-10-30 RX ADMIN — Medication 1 DROP(S): at 17:22

## 2018-10-30 RX ADMIN — Medication 12.5 MILLIGRAM(S): at 09:30

## 2018-10-30 RX ADMIN — Medication 100 MILLIGRAM(S): at 05:22

## 2018-10-30 RX ADMIN — BRIMONIDINE TARTRATE 1 DROP(S): 2 SOLUTION/ DROPS OPHTHALMIC at 17:22

## 2018-10-30 RX ADMIN — ATORVASTATIN CALCIUM 20 MILLIGRAM(S): 80 TABLET, FILM COATED ORAL at 21:39

## 2018-10-30 RX ADMIN — BRIMONIDINE TARTRATE 1 DROP(S): 2 SOLUTION/ DROPS OPHTHALMIC at 05:22

## 2018-10-31 LAB
ANION GAP SERPL CALC-SCNC: 11 MMOL/L — SIGNIFICANT CHANGE UP (ref 5–17)
BUN SERPL-MCNC: 22 MG/DL — SIGNIFICANT CHANGE UP (ref 7–23)
CALCIUM SERPL-MCNC: 8.7 MG/DL — SIGNIFICANT CHANGE UP (ref 8.4–10.5)
CHLORIDE SERPL-SCNC: 107 MMOL/L — SIGNIFICANT CHANGE UP (ref 96–108)
CO2 SERPL-SCNC: 22 MMOL/L — SIGNIFICANT CHANGE UP (ref 22–31)
CREAT SERPL-MCNC: 0.84 MG/DL — SIGNIFICANT CHANGE UP (ref 0.5–1.3)
GLUCOSE SERPL-MCNC: 96 MG/DL — SIGNIFICANT CHANGE UP (ref 70–99)
HCT VFR BLD CALC: 32 % — LOW (ref 34.5–45)
HGB BLD-MCNC: 11 G/DL — LOW (ref 11.5–15.5)
MCHC RBC-ENTMCNC: 34.3 PG — HIGH (ref 27–34)
MCHC RBC-ENTMCNC: 34.5 GM/DL — SIGNIFICANT CHANGE UP (ref 32–36)
MCV RBC AUTO: 99.6 FL — SIGNIFICANT CHANGE UP (ref 80–100)
PLATELET # BLD AUTO: 138 K/UL — LOW (ref 150–400)
POTASSIUM SERPL-MCNC: 4.3 MMOL/L — SIGNIFICANT CHANGE UP (ref 3.5–5.3)
POTASSIUM SERPL-SCNC: 4.3 MMOL/L — SIGNIFICANT CHANGE UP (ref 3.5–5.3)
RBC # BLD: 3.21 M/UL — LOW (ref 3.8–5.2)
RBC # FLD: 13 % — SIGNIFICANT CHANGE UP (ref 10.3–14.5)
SODIUM SERPL-SCNC: 140 MMOL/L — SIGNIFICANT CHANGE UP (ref 135–145)
WBC # BLD: 7.2 K/UL — SIGNIFICANT CHANGE UP (ref 3.8–10.5)
WBC # FLD AUTO: 7.2 K/UL — SIGNIFICANT CHANGE UP (ref 3.8–10.5)

## 2018-10-31 RX ORDER — ACETAMINOPHEN 500 MG
650 TABLET ORAL ONCE
Qty: 0 | Refills: 0 | Status: DISCONTINUED | OUTPATIENT
Start: 2018-10-31 | End: 2018-10-31

## 2018-10-31 RX ORDER — ACETAMINOPHEN 500 MG
650 TABLET ORAL EVERY 6 HOURS
Qty: 0 | Refills: 0 | Status: DISCONTINUED | OUTPATIENT
Start: 2018-10-31 | End: 2018-11-06

## 2018-10-31 RX ORDER — ACETAMINOPHEN 500 MG
650 TABLET ORAL ONCE
Qty: 0 | Refills: 0 | Status: COMPLETED | OUTPATIENT
Start: 2018-10-31 | End: 2018-10-31

## 2018-10-31 RX ADMIN — BRIMONIDINE TARTRATE 1 DROP(S): 2 SOLUTION/ DROPS OPHTHALMIC at 17:20

## 2018-10-31 RX ADMIN — MEGESTROL ACETATE 400 MILLIGRAM(S): 40 SUSPENSION ORAL at 17:20

## 2018-10-31 RX ADMIN — BRIMONIDINE TARTRATE 1 DROP(S): 2 SOLUTION/ DROPS OPHTHALMIC at 06:49

## 2018-10-31 RX ADMIN — Medication 1 DROP(S): at 17:20

## 2018-10-31 RX ADMIN — Medication 650 MILLIGRAM(S): at 11:00

## 2018-10-31 RX ADMIN — Medication 12.5 MILLIGRAM(S): at 11:00

## 2018-10-31 RX ADMIN — Medication 1 DROP(S): at 06:49

## 2018-10-31 RX ADMIN — Medication 100 MILLIGRAM(S): at 17:20

## 2018-10-31 RX ADMIN — Medication 650 MILLIGRAM(S): at 11:32

## 2018-10-31 RX ADMIN — RIVAROXABAN 15 MILLIGRAM(S): KIT at 17:20

## 2018-10-31 RX ADMIN — Medication 100 MILLIGRAM(S): at 06:49

## 2018-10-31 RX ADMIN — ATORVASTATIN CALCIUM 20 MILLIGRAM(S): 80 TABLET, FILM COATED ORAL at 22:38

## 2018-11-01 PROCEDURE — 70450 CT HEAD/BRAIN W/O DYE: CPT | Mod: 26

## 2018-11-01 RX ADMIN — MEGESTROL ACETATE 400 MILLIGRAM(S): 40 SUSPENSION ORAL at 17:20

## 2018-11-01 RX ADMIN — ATORVASTATIN CALCIUM 20 MILLIGRAM(S): 80 TABLET, FILM COATED ORAL at 22:35

## 2018-11-01 RX ADMIN — Medication 12.5 MILLIGRAM(S): at 22:34

## 2018-11-01 RX ADMIN — Medication 1 TABLET(S): at 17:20

## 2018-11-01 RX ADMIN — Medication 100 MILLIGRAM(S): at 05:30

## 2018-11-01 RX ADMIN — RIVAROXABAN 15 MILLIGRAM(S): KIT at 17:20

## 2018-11-01 RX ADMIN — Medication 100 MILLIGRAM(S): at 17:20

## 2018-11-01 RX ADMIN — BRIMONIDINE TARTRATE 1 DROP(S): 2 SOLUTION/ DROPS OPHTHALMIC at 05:30

## 2018-11-01 RX ADMIN — Medication 1 DROP(S): at 17:20

## 2018-11-01 RX ADMIN — Medication 1 DROP(S): at 05:30

## 2018-11-01 RX ADMIN — BRIMONIDINE TARTRATE 1 DROP(S): 2 SOLUTION/ DROPS OPHTHALMIC at 17:20

## 2018-11-01 NOTE — PROVIDER CONTACT NOTE (OTHER) - BACKGROUND
Admitted for repeated falls, hx afib, COPD, afib, CVA. Pt is scheduled to receive Metoprolol 12.5 tonight.

## 2018-11-01 NOTE — CHART NOTE - NSCHARTNOTEFT_GEN_A_CORE
Upon Nutritional Assessment by the Registered Dietitian your patient was determined to meet criteria / has evidence of the following diagnosis/diagnoses:          [ ]  Mild Protein Calorie Malnutrition        [ ]  Moderate Protein Calorie Malnutrition        [x ] Severe Protein Calorie Malnutrition        [ ] Unspecified Protein Calorie Malnutrition        [ ] Underweight / BMI <19        [ ] Morbid Obesity / BMI > 40      Findings as based on:  [x ] Comprehensive nutrition assessment:  BMI <19, visual fat loss and muscle wasting, 19.13% weight loss over 8 months  [ ] Nutrition Focused Physical Exam  [ ] Other:       Nutrition Plan/Recommendations:  Recommend providing pt chocolate Ensure pudding TID and chocolate Health Shake TID to increase protein and kcal intake. Liberalize diet to Regular  Add multivitamin.         PROVIDER Section:     By signing this assessment you are acknowledging and agree with the diagnosis/diagnoses assigned by the Registered Dietitian    Comments:

## 2018-11-02 LAB
ANION GAP SERPL CALC-SCNC: 12 MMOL/L — SIGNIFICANT CHANGE UP (ref 5–17)
BUN SERPL-MCNC: 23 MG/DL — SIGNIFICANT CHANGE UP (ref 7–23)
CALCIUM SERPL-MCNC: 8.9 MG/DL — SIGNIFICANT CHANGE UP (ref 8.4–10.5)
CHLORIDE SERPL-SCNC: 108 MMOL/L — SIGNIFICANT CHANGE UP (ref 96–108)
CO2 SERPL-SCNC: 20 MMOL/L — LOW (ref 22–31)
CREAT SERPL-MCNC: 0.85 MG/DL — SIGNIFICANT CHANGE UP (ref 0.5–1.3)
GLUCOSE SERPL-MCNC: 132 MG/DL — HIGH (ref 70–99)
HCT VFR BLD CALC: 31 % — LOW (ref 34.5–45)
HGB BLD-MCNC: 11 G/DL — LOW (ref 11.5–15.5)
MCHC RBC-ENTMCNC: 34.9 PG — HIGH (ref 27–34)
MCHC RBC-ENTMCNC: 35.4 GM/DL — SIGNIFICANT CHANGE UP (ref 32–36)
MCV RBC AUTO: 98.7 FL — SIGNIFICANT CHANGE UP (ref 80–100)
PLATELET # BLD AUTO: 169 K/UL — SIGNIFICANT CHANGE UP (ref 150–400)
POTASSIUM SERPL-MCNC: 4.4 MMOL/L — SIGNIFICANT CHANGE UP (ref 3.5–5.3)
POTASSIUM SERPL-SCNC: 4.4 MMOL/L — SIGNIFICANT CHANGE UP (ref 3.5–5.3)
RBC # BLD: 3.14 M/UL — LOW (ref 3.8–5.2)
RBC # FLD: 12.7 % — SIGNIFICANT CHANGE UP (ref 10.3–14.5)
SODIUM SERPL-SCNC: 140 MMOL/L — SIGNIFICANT CHANGE UP (ref 135–145)
WBC # BLD: 6.4 K/UL — SIGNIFICANT CHANGE UP (ref 3.8–10.5)
WBC # FLD AUTO: 6.4 K/UL — SIGNIFICANT CHANGE UP (ref 3.8–10.5)

## 2018-11-02 RX ORDER — POLYETHYLENE GLYCOL 3350 17 G/17G
17 POWDER, FOR SOLUTION ORAL DAILY
Qty: 0 | Refills: 0 | Status: DISCONTINUED | OUTPATIENT
Start: 2018-11-02 | End: 2018-11-06

## 2018-11-02 RX ORDER — SENNA PLUS 8.6 MG/1
2 TABLET ORAL AT BEDTIME
Qty: 0 | Refills: 0 | Status: DISCONTINUED | OUTPATIENT
Start: 2018-11-02 | End: 2018-11-06

## 2018-11-02 RX ADMIN — ATORVASTATIN CALCIUM 20 MILLIGRAM(S): 80 TABLET, FILM COATED ORAL at 21:25

## 2018-11-02 RX ADMIN — SENNA PLUS 2 TABLET(S): 8.6 TABLET ORAL at 21:31

## 2018-11-02 RX ADMIN — Medication 1 TABLET(S): at 12:53

## 2018-11-02 RX ADMIN — Medication 1 DROP(S): at 06:22

## 2018-11-02 RX ADMIN — Medication 100 MILLIGRAM(S): at 06:21

## 2018-11-02 RX ADMIN — Medication 1 DROP(S): at 20:31

## 2018-11-02 RX ADMIN — BRIMONIDINE TARTRATE 1 DROP(S): 2 SOLUTION/ DROPS OPHTHALMIC at 06:21

## 2018-11-02 RX ADMIN — BRIMONIDINE TARTRATE 1 DROP(S): 2 SOLUTION/ DROPS OPHTHALMIC at 20:31

## 2018-11-02 RX ADMIN — RIVAROXABAN 15 MILLIGRAM(S): KIT at 18:45

## 2018-11-02 RX ADMIN — Medication 100 MILLIGRAM(S): at 18:44

## 2018-11-02 RX ADMIN — MEGESTROL ACETATE 400 MILLIGRAM(S): 40 SUSPENSION ORAL at 18:44

## 2018-11-02 RX ADMIN — Medication 12.5 MILLIGRAM(S): at 09:11

## 2018-11-02 NOTE — PROVIDER CONTACT NOTE (OTHER) - BACKGROUND
Patient admitted for s/p fall with left 8th and 9th rib frx.  PMH of AFib, COPD, HLD, CVA, glaucoma, GERD

## 2018-11-02 NOTE — PROVIDER CONTACT NOTE (OTHER) - ASSESSMENT
A&O x2.  BP 95/50, HR 58 (HR 53 on tele monitor).  Due for 12.5 mg metoprolol (BID) but with no parameters for holding medication

## 2018-11-02 NOTE — PROVIDER CONTACT NOTE (OTHER) - BACKGROUND
admitted for Syncope, bradycardia and Hx of Afib. /65, hr 67, pox 93%RA, SR 60's on tele  monitor. patient asymptomatic.

## 2018-11-03 RX ADMIN — Medication 1 DROP(S): at 18:11

## 2018-11-03 RX ADMIN — BRIMONIDINE TARTRATE 1 DROP(S): 2 SOLUTION/ DROPS OPHTHALMIC at 18:11

## 2018-11-03 RX ADMIN — ATORVASTATIN CALCIUM 20 MILLIGRAM(S): 80 TABLET, FILM COATED ORAL at 21:04

## 2018-11-03 RX ADMIN — RIVAROXABAN 15 MILLIGRAM(S): KIT at 18:12

## 2018-11-03 RX ADMIN — Medication 100 MILLIGRAM(S): at 05:15

## 2018-11-03 RX ADMIN — Medication 1 DROP(S): at 05:15

## 2018-11-03 RX ADMIN — MEGESTROL ACETATE 400 MILLIGRAM(S): 40 SUSPENSION ORAL at 18:12

## 2018-11-03 RX ADMIN — SENNA PLUS 2 TABLET(S): 8.6 TABLET ORAL at 21:04

## 2018-11-03 RX ADMIN — Medication 12.5 MILLIGRAM(S): at 21:03

## 2018-11-03 RX ADMIN — Medication 100 MILLIGRAM(S): at 18:12

## 2018-11-03 RX ADMIN — Medication 1 TABLET(S): at 11:28

## 2018-11-03 RX ADMIN — BRIMONIDINE TARTRATE 1 DROP(S): 2 SOLUTION/ DROPS OPHTHALMIC at 05:15

## 2018-11-04 RX ADMIN — Medication 1 TABLET(S): at 09:30

## 2018-11-04 RX ADMIN — RIVAROXABAN 15 MILLIGRAM(S): KIT at 17:14

## 2018-11-04 RX ADMIN — Medication 12.5 MILLIGRAM(S): at 09:30

## 2018-11-04 RX ADMIN — MEGESTROL ACETATE 400 MILLIGRAM(S): 40 SUSPENSION ORAL at 17:14

## 2018-11-04 RX ADMIN — Medication 12.5 MILLIGRAM(S): at 21:30

## 2018-11-04 RX ADMIN — ATORVASTATIN CALCIUM 20 MILLIGRAM(S): 80 TABLET, FILM COATED ORAL at 21:29

## 2018-11-04 RX ADMIN — SENNA PLUS 2 TABLET(S): 8.6 TABLET ORAL at 21:29

## 2018-11-04 RX ADMIN — Medication 1 DROP(S): at 17:14

## 2018-11-04 NOTE — DOWNTIME INTERRUPTION NOTE - WHICH MANUAL FORMS INITIATED?
See paper chart for clinical documentation recorded during downtime.  Downtime from 0045 to 0201 (EST)

## 2018-11-05 RX ORDER — SODIUM CHLORIDE 9 MG/ML
500 INJECTION INTRAMUSCULAR; INTRAVENOUS; SUBCUTANEOUS ONCE
Qty: 0 | Refills: 0 | Status: DISCONTINUED | OUTPATIENT
Start: 2018-11-05 | End: 2018-11-05

## 2018-11-05 RX ORDER — SODIUM CHLORIDE 9 MG/ML
500 INJECTION INTRAMUSCULAR; INTRAVENOUS; SUBCUTANEOUS ONCE
Qty: 0 | Refills: 0 | Status: COMPLETED | OUTPATIENT
Start: 2018-11-05 | End: 2018-11-05

## 2018-11-05 RX ADMIN — Medication 12.5 MILLIGRAM(S): at 11:49

## 2018-11-05 RX ADMIN — SENNA PLUS 2 TABLET(S): 8.6 TABLET ORAL at 21:39

## 2018-11-05 RX ADMIN — RIVAROXABAN 15 MILLIGRAM(S): KIT at 17:30

## 2018-11-05 RX ADMIN — Medication 1 TABLET(S): at 11:50

## 2018-11-05 RX ADMIN — BRIMONIDINE TARTRATE 1 DROP(S): 2 SOLUTION/ DROPS OPHTHALMIC at 05:49

## 2018-11-05 RX ADMIN — ATORVASTATIN CALCIUM 20 MILLIGRAM(S): 80 TABLET, FILM COATED ORAL at 21:38

## 2018-11-05 RX ADMIN — Medication 100 MILLIGRAM(S): at 05:50

## 2018-11-05 RX ADMIN — BRIMONIDINE TARTRATE 1 DROP(S): 2 SOLUTION/ DROPS OPHTHALMIC at 17:31

## 2018-11-05 RX ADMIN — Medication 1 DROP(S): at 05:50

## 2018-11-05 RX ADMIN — Medication 1 DROP(S): at 17:31

## 2018-11-05 RX ADMIN — MEGESTROL ACETATE 400 MILLIGRAM(S): 40 SUSPENSION ORAL at 17:31

## 2018-11-05 RX ADMIN — Medication 100 MILLIGRAM(S): at 17:30

## 2018-11-05 RX ADMIN — SODIUM CHLORIDE 500 MILLILITER(S): 9 INJECTION INTRAMUSCULAR; INTRAVENOUS; SUBCUTANEOUS at 21:34

## 2018-11-05 NOTE — CHART NOTE - NSCHARTNOTEFT_GEN_A_CORE
PRESENT, LYNNE  89y Female  Called by RN as pt hypotensive 82/40. Pt seen and evaluated. Pt asymptomatic. Denies any complaints. Denies dizziness, lightheadedness, chest pain, palpitations, shortness of breath or palpitations.      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     A/P:    89F with CVA, afib on xarelto, COPD, GERD,  HLD, admitted on 10/25 with s/p unwitnessed fall without LOC with nondisplaced left 8 and 9th rib fractures on CT and syncope.  Now presents with hypotension.     Hypotension:  Pt asymptomatic, non toxic, afebrile, no signs of infection   Will give Normal saline 500 cc bolus over 1 hr and re-evaluate BP  Will hold evening dose of metoprolol 12.5 po.  Pt also noted to be sinus bradycardia in 50's on tele.   Will continue to monitor   F/U with primary team in AM.     Junior Tinoco NP  [id number]

## 2018-11-06 ENCOUNTER — TRANSCRIPTION ENCOUNTER (OUTPATIENT)
Age: 83
End: 2018-11-06

## 2018-11-06 VITALS
SYSTOLIC BLOOD PRESSURE: 104 MMHG | RESPIRATION RATE: 18 BRPM | TEMPERATURE: 98 F | DIASTOLIC BLOOD PRESSURE: 61 MMHG | OXYGEN SATURATION: 98 % | HEART RATE: 54 BPM

## 2018-11-06 PROCEDURE — 84484 ASSAY OF TROPONIN QUANT: CPT

## 2018-11-06 PROCEDURE — 70450 CT HEAD/BRAIN W/O DYE: CPT

## 2018-11-06 PROCEDURE — 80053 COMPREHEN METABOLIC PANEL: CPT

## 2018-11-06 PROCEDURE — 93308 TTE F-UP OR LMTD: CPT

## 2018-11-06 PROCEDURE — 71260 CT THORAX DX C+: CPT

## 2018-11-06 PROCEDURE — 86900 BLOOD TYPING SEROLOGIC ABO: CPT

## 2018-11-06 PROCEDURE — 84295 ASSAY OF SERUM SODIUM: CPT

## 2018-11-06 PROCEDURE — 72125 CT NECK SPINE W/O DYE: CPT

## 2018-11-06 PROCEDURE — 84132 ASSAY OF SERUM POTASSIUM: CPT

## 2018-11-06 PROCEDURE — 93321 DOPPLER ECHO F-UP/LMTD STD: CPT

## 2018-11-06 PROCEDURE — 84443 ASSAY THYROID STIM HORMONE: CPT

## 2018-11-06 PROCEDURE — 97116 GAIT TRAINING THERAPY: CPT

## 2018-11-06 PROCEDURE — 83605 ASSAY OF LACTIC ACID: CPT

## 2018-11-06 PROCEDURE — 82803 BLOOD GASES ANY COMBINATION: CPT

## 2018-11-06 PROCEDURE — 86850 RBC ANTIBODY SCREEN: CPT

## 2018-11-06 PROCEDURE — 97162 PT EVAL MOD COMPLEX 30 MIN: CPT

## 2018-11-06 PROCEDURE — 51701 INSERT BLADDER CATHETER: CPT

## 2018-11-06 PROCEDURE — 85610 PROTHROMBIN TIME: CPT

## 2018-11-06 PROCEDURE — 97110 THERAPEUTIC EXERCISES: CPT

## 2018-11-06 PROCEDURE — 76937 US GUIDE VASCULAR ACCESS: CPT

## 2018-11-06 PROCEDURE — 85730 THROMBOPLASTIN TIME PARTIAL: CPT

## 2018-11-06 PROCEDURE — 85027 COMPLETE CBC AUTOMATED: CPT

## 2018-11-06 PROCEDURE — 86901 BLOOD TYPING SEROLOGIC RH(D): CPT

## 2018-11-06 PROCEDURE — 93005 ELECTROCARDIOGRAM TRACING: CPT | Mod: XU

## 2018-11-06 PROCEDURE — 72170 X-RAY EXAM OF PELVIS: CPT

## 2018-11-06 PROCEDURE — 82550 ASSAY OF CK (CPK): CPT

## 2018-11-06 PROCEDURE — 71045 X-RAY EXAM CHEST 1 VIEW: CPT

## 2018-11-06 PROCEDURE — 99285 EMERGENCY DEPT VISIT HI MDM: CPT | Mod: 25

## 2018-11-06 PROCEDURE — 85014 HEMATOCRIT: CPT

## 2018-11-06 PROCEDURE — 74177 CT ABD & PELVIS W/CONTRAST: CPT

## 2018-11-06 PROCEDURE — 80048 BASIC METABOLIC PNL TOTAL CA: CPT

## 2018-11-06 PROCEDURE — 82435 ASSAY OF BLOOD CHLORIDE: CPT

## 2018-11-06 PROCEDURE — 36000 PLACE NEEDLE IN VEIN: CPT | Mod: XU

## 2018-11-06 PROCEDURE — 82947 ASSAY GLUCOSE BLOOD QUANT: CPT

## 2018-11-06 PROCEDURE — 81001 URINALYSIS AUTO W/SCOPE: CPT

## 2018-11-06 PROCEDURE — 87086 URINE CULTURE/COLONY COUNT: CPT

## 2018-11-06 PROCEDURE — 82330 ASSAY OF CALCIUM: CPT

## 2018-11-06 RX ORDER — TIMOLOL 0.5 %
1 DROPS OPHTHALMIC (EYE)
Qty: 1 | Refills: 0 | OUTPATIENT
Start: 2018-11-06 | End: 2018-12-05

## 2018-11-06 RX ORDER — ACETAMINOPHEN 500 MG
2 TABLET ORAL
Qty: 60 | Refills: 0
Start: 2018-11-06 | End: 2018-12-05

## 2018-11-06 RX ORDER — DOCUSATE SODIUM 100 MG
30 CAPSULE ORAL
Qty: 0 | Refills: 0 | COMMUNITY

## 2018-11-06 RX ORDER — FONDAPARINUX SODIUM 2.5 MG/.5ML
1 INJECTION, SOLUTION SUBCUTANEOUS
Qty: 30 | Refills: 0
Start: 2018-11-06 | End: 2018-12-05

## 2018-11-06 RX ORDER — DOCUSATE SODIUM 100 MG
30 CAPSULE ORAL
Qty: 900 | Refills: 0
Start: 2018-11-06 | End: 2018-12-05

## 2018-11-06 RX ORDER — FONDAPARINUX SODIUM 2.5 MG/.5ML
1 INJECTION, SOLUTION SUBCUTANEOUS
Qty: 0 | Refills: 0 | COMMUNITY

## 2018-11-06 RX ORDER — BRIMONIDINE TARTRATE 2 MG/MG
1 SOLUTION/ DROPS OPHTHALMIC
Qty: 1 | Refills: 0
Start: 2018-11-06 | End: 2018-12-05

## 2018-11-06 RX ORDER — ACETAMINOPHEN 500 MG
2 TABLET ORAL
Qty: 0 | Refills: 0 | COMMUNITY
Start: 2018-11-06

## 2018-11-06 RX ORDER — TIMOLOL 0.5 %
1 DROPS OPHTHALMIC (EYE)
Qty: 0 | Refills: 0 | DISCHARGE
Start: 2018-11-06

## 2018-11-06 RX ORDER — METOPROLOL TARTRATE 50 MG
0.5 TABLET ORAL
Qty: 30 | Refills: 0
Start: 2018-11-06 | End: 2018-12-05

## 2018-11-06 RX ORDER — MEGESTROL ACETATE 40 MG/ML
10 SUSPENSION ORAL
Qty: 300 | Refills: 0
Start: 2018-11-06 | End: 2018-12-05

## 2018-11-06 RX ORDER — ATORVASTATIN CALCIUM 80 MG/1
1 TABLET, FILM COATED ORAL
Qty: 30 | Refills: 0
Start: 2018-11-06 | End: 2018-12-05

## 2018-11-06 RX ORDER — TIMOLOL 0.5 %
1 DROPS OPHTHALMIC (EYE)
Qty: 0 | Refills: 0 | COMMUNITY

## 2018-11-06 RX ORDER — MEGESTROL ACETATE 40 MG/ML
10 SUSPENSION ORAL
Qty: 0 | Refills: 0 | COMMUNITY
Start: 2018-11-06

## 2018-11-06 RX ADMIN — Medication 1 TABLET(S): at 11:58

## 2018-11-06 RX ADMIN — POLYETHYLENE GLYCOL 3350 17 GRAM(S): 17 POWDER, FOR SOLUTION ORAL at 06:23

## 2018-11-06 RX ADMIN — Medication 1 DROP(S): at 06:23

## 2018-11-06 RX ADMIN — Medication 12.5 MILLIGRAM(S): at 11:57

## 2018-11-06 RX ADMIN — Medication 100 MILLIGRAM(S): at 06:24

## 2018-11-06 RX ADMIN — BRIMONIDINE TARTRATE 1 DROP(S): 2 SOLUTION/ DROPS OPHTHALMIC at 06:23

## 2018-11-06 NOTE — CHART NOTE - NSCHARTNOTEFT_GEN_A_CORE
Nutrition Follow Up Note  Patient seen for: Follow U    Source:     Diet :     Patient reports:     PO intake :     Source for PO intake:     Enteral /Parenteral Nutrition:       Daily Weight in k.1 (), Weight in k (), Weight in k (), Weight in k.7 (), Weight in k.4 (), Weight in k.3 (), Weight in k.5 (10-31)  % Weight Change    Pertinent Medications: MEDICATIONS  (STANDING):  atorvastatin 20 milliGRAM(s) Oral at bedtime  brimonidine 0.2% Ophthalmic Solution 1 Drop(s) Both EYES two times a day  docusate sodium Liquid 100 milliGRAM(s) Oral two times a day  megestrol Suspension 400 milliGRAM(s) Oral daily  metoprolol tartrate 12.5 milliGRAM(s) Oral two times a day  multivitamin 1 Tablet(s) Oral daily  rivaroxaban 15 milliGRAM(s) Oral every 24 hours  senna 2 Tablet(s) Oral at bedtime  sodium chloride 0.9%. 1000 milliLiter(s) (50 mL/Hr) IV Continuous <Continuous>  timolol 0.5% Solution 1 Drop(s) Both EYES two times a day    MEDICATIONS  (PRN):  acetaminophen   Tablet .. 650 milliGRAM(s) Oral every 6 hours PRN Mild Pain (1 - 3)  polyethylene glycol 3350 17 Gram(s) Oral daily PRN Constipation    Pertinent Labs:   Finger Sticks:      Skin per nursing documentation:   Edema:    Estimated Needs:   [ ] no change since previous assessment  [ ] recalculated:     Previous Nutrition Diagnosis:   Nutrition Diagnosis is:    New Nutrition Diagnosis:  Related to:    As evidenced by:      Interventions:     Recommend  1)    Monitoring and Evaluation:     Continue to monitor Nutritional intake, Tolerance to diet prescription, weights, labs, skin integrity    RD remains available upon request and will follow up per protocol Nutrition Follow Up Note  Patient seen for: malnutrition follow up  Reason for Admission: syncope/fall  Status post left total hip replacement.  Small left pleural effusion  Anticipate discharge to sub acute rehab.      Source: PCA, chart since previous assess which I intern completed with me on     Diet : Regular/NOCAFF    Patient reports: pt sleepy and lethargic at time of visit. pt also [place]. PCA attempted to awaken pt but she was not interested in participating in conversation.      PO intake : pt prefers finger foods including chicken fingers and sandwiches(grilled cheese), oatmeal, choccolate pudding. she prefers regular chocolate pudding rather than ensure pudding. discussed with PCA combining ensure pudding with regular pudding to encourage intake of ensure pudding. pt also likes to drink apple juice and orange juice. she dislikes health shakes, will discontinue.      Source for PO intake: PCA          Daily Weight in k.1 (), Weight in k (), Weight in k (), Weight in k.7 (), Weight in k.4 (), Weight in k.3 (), Weight in k.5 (10-31)  8% loss since 10/26      Pertinent Medications: MEDICATIONS  (STANDING):  atorvastatin 20 milliGRAM(s) Oral at bedtime  brimonidine 0.2% Ophthalmic Solution 1 Drop(s) Both EYES two times a day  docusate sodium Liquid 100 milliGRAM(s) Oral two times a day  megestrol Suspension 400 milliGRAM(s) Oral daily  metoprolol tartrate 12.5 milliGRAM(s) Oral two times a day  multivitamin 1 Tablet(s) Oral daily  rivaroxaban 15 milliGRAM(s) Oral every 24 hours  senna 2 Tablet(s) Oral at bedtime  sodium chloride 0.9%. 1000 milliLiter(s) (50 mL/Hr) IV Continuous <Continuous>  timolol 0.5% Solution 1 Drop(s) Both EYES two times a day    MEDICATIONS  (PRN):  acetaminophen   Tablet .. 650 milliGRAM(s) Oral every 6 hours PRN Mild Pain (1 - 3)  polyethylene glycol 3350 17 Gram(s) Oral daily PRN Constipation    Pertinent Labs: Glucose 132                    Skin per nursing documentation:   Edema: no edema, no pressure ulcers    Estimated Needs:   [x ] no change since previous assessment  [ ] recalculated:     Previous Nutrition Diagnosis: severe malnutrition  Nutrition Diagnosis is: ongoing    New Nutrition Diagnosis: N/A      Recommend  1)continue Regular diet  2)discontinued health shakes  3)continue ensure pudding and mix with regular chocolate pudding  4)continue multivitamin  5)recommend HgbA1c        Monitoring and Evaluation:     Continue to monitor Nutritional intake, Tolerance to diet prescription, weights, labs, skin integrity    RD remains available upon request and will follow up per protocol  Jacquie Coleman MA, RD, CDN #[phone] Nutrition Follow Up Note  Patient seen for: malnutrition follow up  Reason for Admission: syncope/fall  Status post left total hip replacement.  Small left pleural effusion  Anticipate discharge to sub acute rehab.      Source: PCA, chart since previous assess which I intern completed with me on     Diet : Regular/NOCAFF    Patient reports: pt sleepy and lethargic at time of visit. pt also [place]. PCA attempted to awaken pt but she was not interested in participating in conversation.      PO intake : pt prefers finger foods including chicken fingers and sandwiches(grilled cheese), oatmeal, choccolate pudding. she prefers regular chocolate pudding rather than ensure pudding. discussed with PCA combining ensure pudding with regular pudding to encourage intake of ensure pudding. pt also likes to drink apple juice and orange juice. she dislikes health shakes, will discontinue.      Source for PO intake: PCA          Daily Weight in k.1 (), Weight in k (), Weight in k (), Weight in k.7 (), Weight in k.4 (), Weight in k.3 (), Weight in k.5 (10-31)  8% loss since 10/26      Pertinent Medications: MEDICATIONS  (STANDING):  atorvastatin 20 milliGRAM(s) Oral at bedtime  brimonidine 0.2% Ophthalmic Solution 1 Drop(s) Both EYES two times a day  docusate sodium Liquid 100 milliGRAM(s) Oral two times a day  megestrol Suspension 400 milliGRAM(s) Oral daily  metoprolol tartrate 12.5 milliGRAM(s) Oral two times a day  multivitamin 1 Tablet(s) Oral daily  rivaroxaban 15 milliGRAM(s) Oral every 24 hours  senna 2 Tablet(s) Oral at bedtime  sodium chloride 0.9%. 1000 milliLiter(s) (50 mL/Hr) IV Continuous <Continuous>  timolol 0.5% Solution 1 Drop(s) Both EYES two times a day    MEDICATIONS  (PRN):  acetaminophen   Tablet .. 650 milliGRAM(s) Oral every 6 hours PRN Mild Pain (1 - 3)  polyethylene glycol 3350 17 Gram(s) Oral daily PRN Constipation    Pertinent Labs: Glucose 132                    Skin per nursing documentation:   Edema: no edema, no pressure ulcers    Estimated Needs:   [x ] no change since previous assessment  [ ] recalculated:     Previous Nutrition Diagnosis: severe malnutrition  Nutrition Diagnosis is: ongoing    New Nutrition Diagnosis: N/A      Recommend  1)continue Regular diet  2)discontinued health shakes  3)continue ensure pudding and mix with regular chocolate pudding  4)continue multivitamin  5)recommend HgbA1c  6)continue magace        Monitoring and Evaluation:     Continue to monitor Nutritional intake, Tolerance to diet prescription, weights, labs, skin integrity    RD remains available upon request and will follow up per protocol  Jacquie Coleman MA, RD, CDN #[phone]

## 2018-11-06 NOTE — DISCHARGE NOTE ADULT - HOSPITAL COURSE
To be done. 89F with history of  CVA, Afib on Xarelto, dementia, lives at [facility] after unwitnessed fall. She was walking with her walker and facility heard thump and found her face down on the floor.She was conscious, but no one saw if it was a mechanical fall. She arrived in the ER in a C-collar of which once she was evaluated by Trauma the collar was removed.  She sustained 8th and 9th left rib fractures.  Seen by Trauma, no intervention.  She was admitted to Telemetry.  Cardiology consult was called.  She remained in sinus rhythm to sinus bradycardia while on Telemetry.  Family does not want a pacemaker with pt.  Pt will be discharged back to the [facility].  She will follow up with her PMD in 1 week. She is hemodynamically stable for discharge today.

## 2018-11-06 NOTE — DISCHARGE NOTE ADULT - PLAN OF CARE
resolved. HOME CARE INSTRUCTIONS  Have someone stay with you until you feel stable.  Do not drive, operate machinery, or play sports until your caregiver says it is okay.  Keep all follow-up appointments as directed by your caregiver.   Lie down right away if you start feeling like you might faint. Breathe deeply and steadily. Wait until all the symptoms have passed.Drink enough fluids to keep your urine clear or pale yellow.  If you are taking blood pressure or heart medicine, get up slowly, taking several minutes to sit and then stand. This can reduce dizziness.  SEEK IMMEDIATE MEDICAL CARE IF:  You have a severe headache.  You have unusual pain in the chest, abdomen, or back.  You are bleeding from the mouth or rectum, or you have black or tarry stool.  You have an irregular or very fast heartbeat.  You have pain with breathing.  You have repeated fainting or seizure-like jerking during an episode.  You faint when sitting or lying down.  You have confusion.  You have difficulty walking.  You have severe weakness.  You have vision problems.  If you fainted, call your local emergency services (_____________________). Do not drive yourself to the hospital Stable Fall prevention.   Home physical therapy.

## 2018-11-06 NOTE — PROGRESS NOTE ADULT - PROVIDER SPECIALTY LIST ADULT
Cardiology
Internal Medicine
Cardiology
Internal Medicine

## 2018-11-06 NOTE — PROGRESS NOTE ADULT - REASON FOR ADMISSION
syncope/fall

## 2018-11-06 NOTE — PROVIDER CONTACT NOTE (OTHER) - BACKGROUND
pt admitted for repeated falls, hx cva and afib, pt receiving metoprolol 12.5 mg PO night dose scheduled for 9pm.

## 2018-11-06 NOTE — DISCHARGE NOTE ADULT - PATIENT PORTAL LINK FT
You can access the [facility] Patient Portal, offered by [facility], by registering with the following website: [email or web address]

## 2018-11-06 NOTE — PROGRESS NOTE ADULT - SUBJECTIVE AND OBJECTIVE BOX
CARDIOLOGY     PROGRESS  NOTE   ________________________________________________    CHIEF COMPLAINT:Patient is a 89y old  Female who presents with a chief complaint of syncope/fall (02 Nov 2018 07:26)    	  REVIEW OF SYSTEMS:  CONSTITUTIONAL: No fever, weight loss, or fatigue  EYES: No eye pain, visual disturbances, or discharge  ENT:  No difficulty hearing, tinnitus, vertigo; No sinus or throat pain  NECK: No pain or stiffness  RESPIRATORY: No cough, wheezing, chills or hemoptysis; No Shortness of Breath  CARDIOVASCULAR: No chest pain, palpitations, passing out, dizziness, or leg swelling  GASTROINTESTINAL: No abdominal or epigastric pain. No nausea, vomiting, or hematemesis; No diarrhea or constipation. No melena or hematochezia.  GENITOURINARY: No dysuria, frequency, hematuria, or incontinence  NEUROLOGICAL: No headaches, memory loss, loss of strength, numbness, or tremors  SKIN: No itching, burning, rashes, or lesions   LYMPH Nodes: No enlarged glands  ENDOCRINE: No heat or cold intolerance; No hair loss  MUSCULOSKELETAL: No joint pain or swelling; No muscle, back, or extremity pain  PSYCHIATRIC: No depression, anxiety, mood swings, or difficulty sleeping  HEME/LYMPH: No easy bruising, or bleeding gums  ALLERGY AND IMMUNOLOGIC: No hives or eczema	    [ ] All others negative	  [ ] Unable to obtain    PHYSICAL EXAM:  T(C): 36.8 (11-03-18 @ 04:11), Max: 37.1 (11-02-18 @ 20:37)  HR: 61 (11-03-18 @ 10:27) (58 - 65)  BP: 96/56 (11-03-18 @ 10:27) (95/50 - 112/64)  RR: 18 (11-03-18 @ 04:11) (18 - 18)  SpO2: 100% (11-03-18 @ 04:11) (94% - 100%)  Wt(kg): --  I&O's Summary    02 Nov 2018 07:01  -  03 Nov 2018 07:00  --------------------------------------------------------  IN: 600 mL / OUT: 100 mL / NET: 500 mL    03 Nov 2018 08:01  -  03 Nov 2018 11:58  --------------------------------------------------------  IN: 240 mL / OUT: 0 mL / NET: 240 mL        Appearance: Normal	  HEENT:   Normal oral mucosa, PERRL, EOMI	  Lymphatic: No lymphadenopathy  Cardiovascular: Normal S1 S2, No JVD, No murmurs, No edema  Respiratory: Lungs clear to auscultation	  Psychiatry: A & O x 3, Mood & affect appropriate  Gastrointestinal:  Soft, Non-tender, + BS	  Skin: No rashes, No ecchymoses, No cyanosis	  Neurologic: Non-focal  Extremities: Normal range of motion, No clubbing, cyanosis or edema  Vascular: Peripheral pulses palpable 2+ bilaterally    MEDICATIONS  (STANDING):  atorvastatin 20 milliGRAM(s) Oral at bedtime  brimonidine 0.2% Ophthalmic Solution 1 Drop(s) Both EYES two times a day  docusate sodium Liquid 100 milliGRAM(s) Oral two times a day  megestrol Suspension 400 milliGRAM(s) Oral daily  metoprolol tartrate 12.5 milliGRAM(s) Oral two times a day  multivitamin 1 Tablet(s) Oral daily  rivaroxaban 15 milliGRAM(s) Oral every 24 hours  senna 2 Tablet(s) Oral at bedtime  sodium chloride 0.9%. 1000 milliLiter(s) (50 mL/Hr) IV Continuous <Continuous>  timolol 0.5% Solution 1 Drop(s) Both EYES two times a day      TELEMETRY: 	    ECG:  	  RADIOLOGY:  OTHER: 	  	  LABS:	 	    CARDIAC MARKERS:                                11.0   6.4   )-----------( 169      ( 02 Nov 2018 06:35 )             31.0     11-02    140  |  108  |  23  ----------------------------<  132<H>  4.4   |  20<L>  |  0.85    Ca    8.9      02 Nov 2018 06:35      proBNP:   Lipid Profile:   HgA1c:   TSH: Thyroid Stimulating Hormone, Serum: 2.80 uIU/mL (10-26 @ 08:01)          Assessment and plan  ---------------------------  doing much better  no further sig bradycardia  discussed with son  mental status improved  ? dc on monday
no complaints    REVIEW OF SYSTEMS:  GEN: no fever,    no chills  RESP: no SOB,   no cough  CVS: no chest pain,   no palpitations  GI: no abdominal pain,   no nausea,   no vomiting,   no constipation,   no diarrhea  : no dysuria,   no frequency  NEURO: no headache,   no dizziness  PSYCH: no depression,   not anxious  Derm : no rash    MEDICATIONS  (STANDING):  atorvastatin 20 milliGRAM(s) Oral at bedtime  brimonidine 0.2% Ophthalmic Solution 1 Drop(s) Both EYES two times a day  docusate sodium Liquid 100 milliGRAM(s) Oral two times a day  megestrol Suspension 400 milliGRAM(s) Oral daily  metoprolol tartrate 12.5 milliGRAM(s) Oral two times a day  multivitamin 1 Tablet(s) Oral daily  rivaroxaban 15 milliGRAM(s) Oral every 24 hours  senna 2 Tablet(s) Oral at bedtime  sodium chloride 0.9%. 1000 milliLiter(s) (50 mL/Hr) IV Continuous <Continuous>  timolol 0.5% Solution 1 Drop(s) Both EYES two times a day    MEDICATIONS  (PRN):  acetaminophen   Tablet .. 650 milliGRAM(s) Oral every 6 hours PRN Mild Pain (1 - 3)  polyethylene glycol 3350 17 Gram(s) Oral daily PRN Constipation      Vital Signs Last 24 Hrs  T(C): 36.7 (06 Nov 2018 04:10), Max: 36.8 (05 Nov 2018 20:05)  T(F): 98.1 (06 Nov 2018 04:10), Max: 98.2 (05 Nov 2018 20:05)  HR: 54 (06 Nov 2018 04:10) (51 - 68)  BP: 125/46 (06 Nov 2018 04:10) (82/40 - 125/46)  BP(mean): --  RR: 18 (06 Nov 2018 04:10) (18 - 18)  SpO2: 99% (06 Nov 2018 04:10) (96% - 99%)  CAPILLARY BLOOD GLUCOSE        I&O's Summary    05 Nov 2018 07:01  -  06 Nov 2018 07:00  --------------------------------------------------------  IN: 620 mL / OUT: 0 mL / NET: 620 mL        PHYSICAL EXAM:  HEAD:  Atraumatic, Normocephalic  NECK: Supple, No   JVD  CHEST/LUNG:   no     rales,     no,    rhonchi  HEART: Regular rate and rhythm;         murmur  ABDOMEN: Soft, Nontender, ;   EXTREMITIES:   no     edema  NEUROLOGY:  alert    LABS:                          Thyroid Stimulating Hormone, Serum: 2.80 uIU/mL (10-26 @ 08:01)          Consultant(s) Notes Reviewed:      Care Discussed with Consultants/Other Providers:
- Patient seen and examined.  - In summary, patient is a 89 year old woman who presented s/p fall (25 Oct 2018 17:02)  - Today, patient is without complaints.         *****MEDICATIONS:    MEDICATIONS  (STANDING):  atorvastatin 20 milliGRAM(s) Oral at bedtime  brimonidine 0.2% Ophthalmic Solution 1 Drop(s) Both EYES two times a day  docusate sodium Liquid 100 milliGRAM(s) Oral two times a day  megestrol Suspension 400 milliGRAM(s) Oral daily  rivaroxaban 15 milliGRAM(s) Oral every 24 hours  timolol 0.5% Solution 1 Drop(s) Both EYES two times a day    MEDICATIONS  (PRN):           ***** REVIEW OF SYSTEM:  GEN: no fever, no chills, no pain  RESP: no SOB, no cough, no sputum  CVS: no chest pain, no palpitations, no edema  GI: no abdominal pain, no nausea, no vomiting, no constipation, no diarrhea  : no dysuria, no frequency  NEURO: no headache, no dizziness  PSYCH: no depression, not anxious  Derm : no itching, no rash         ***** VITAL SIGNS:  T(F): 98.7 (10-26-18 @ 04:29), Max: 98.7 (10-26-18 @ 00:30)  HR: 84 (10-26-18 @ 04:29) (53 - 84)  BP: 160/73 (10-26-18 @ 04:29) (106/49 - 160/73)  RR: 18 (10-26-18 @ 04:29) (16 - 18)  SpO2: 98% (10-26-18 @ 04:29) (96% - 100%)  Wt(kg): --  ,   I&O's Summary    25 Oct 2018 07:01  -  26 Oct 2018 07:00  --------------------------------------------------------  IN: 0 mL / OUT: 200 mL / NET: -200 mL             *****PHYSICAL EXAM:  GEN: A&O X 3 , NAD , comfortable  HEENT: NCAT, EOMI, MMM, no icterus  NECK: Supple, No JVD  CVS: S1S2 , regular , No M/R/G appreciated  PULM: CTA B/L,  no W/R/R appreciated  ABD.: soft. non tender, non distended,  bowel sounds present  Extrem: intact pulses , no edema noted  Derm: No rash or ecchymosis noted  PSYCH: normal mood, no depression, not anxious         *****LAB AND IMAGING:                        10.9   6.2   )-----------( 122      ( 26 Oct 2018 06:22 )             32.3               10-26    141  |  106  |  15  ----------------------------<  99  4.1   |  24  |  0.69    Ca    8.8      26 Oct 2018 06:21    TPro  6.3  /  Alb  3.4  /  TBili  2.3<H>  /  DBili  x   /  AST  11  /  ALT  6<L>  /  AlkPhos  81  10-26    PT/INR - ( 25 Oct 2018 12:58 )   PT: 17.9 sec;   INR: 1.64 ratio         PTT - ( 25 Oct 2018 12:58 )  PTT:33.0 sec       CARDIAC MARKERS ( 25 Oct 2018 21:36 )  x     / x     / 49 U/L / x     / x                  Urinalysis Basic - ( 25 Oct 2018 13:46 )    Color: Yellow / Appearance: Clear / S.024 / pH: x  Gluc: x / Ketone: Negative  / Bili: Negative / Urobili: Negative   Blood: x / Protein: Trace / Nitrite: Negative   Leuk Esterase: Negative / RBC: 7 /hpf / WBC 2 /hpf   Sq Epi: x / Non Sq Epi: 2 /hpf / Bacteria: Negative      [All pertinent recent Imaging/Reports reviewed]         *****A S S E S S M E N T   A N D   P L A N :  89F with hx of bradycardia adm s/p fall/syncope  cont tele  check orthostatics  await echo  hold beta blocker  pain management  PT    __________________________  ABEL Perez D.O.
CARDIOLOGY     PROGRESS  NOTE   ________________________________________________    CHIEF COMPLAINT:Patient is a 89y old  Female who presents with a chief complaint of syncope/fall (01 Nov 2018 07:07)  no complain.  	  REVIEW OF SYSTEMS:  CONSTITUTIONAL: No fever, weight loss, or fatigue  EYES: No eye pain, visual disturbances, or discharge  ENT:  No difficulty hearing, tinnitus, vertigo; No sinus or throat pain  NECK: No pain or stiffness  RESPIRATORY: No cough, wheezing, chills or hemoptysis; No Shortness of Breath  CARDIOVASCULAR: No chest pain, palpitations, passing out, dizziness, or leg swelling  GASTROINTESTINAL: No abdominal or epigastric pain. No nausea, vomiting, or hematemesis; No diarrhea or constipation. No melena or hematochezia.  GENITOURINARY: No dysuria, frequency, hematuria, or incontinence  NEUROLOGICAL: No headaches, memory loss, loss of strength, numbness, or tremors  SKIN: No itching, burning, rashes, or lesions   LYMPH Nodes: No enlarged glands  ENDOCRINE: No heat or cold intolerance; No hair loss  MUSCULOSKELETAL: No joint pain or swelling; No muscle, back, or extremity pain  PSYCHIATRIC: No depression, anxiety, mood swings, or difficulty sleeping  HEME/LYMPH: No easy bruising, or bleeding gums  ALLERGY AND IMMUNOLOGIC: No hives or eczema	    [ ] All others negative	  [ ] Unable to obtain    PHYSICAL EXAM:  T(C): 36.9 (11-01-18 @ 03:57), Max: 36.9 (10-31-18 @ 20:42)  HR: 62 (11-01-18 @ 03:57) (52 - 65)  BP: 137/81 (11-01-18 @ 03:57) (76/41 - 137/81)  RR: 18 (11-01-18 @ 03:57) (16 - 18)  SpO2: 94% (11-01-18 @ 03:57) (93% - 97%)  Wt(kg): --  I&O's Summary    31 Oct 2018 07:01  -  01 Nov 2018 07:00  --------------------------------------------------------  IN: 240 mL / OUT: 0 mL / NET: 240 mL        Appearance: Normal	  HEENT:   Normal oral mucosa, PERRL, EOMI	  Lymphatic: No lymphadenopathy  Cardiovascular: Normal S1 S2, No JVD, + murmurs, No edema  Respiratory: Lungs clear to auscultation	  Psychiatry: A & O x 3, Mood & affect appropriate  Gastrointestinal:  Soft, Non-tender, + BS	  Skin: No rashes, No ecchymoses, No cyanosis	  Neurologic: Non-focal  Extremities: Normal range of motion, No clubbing, cyanosis or edema  Vascular: Peripheral pulses palpable 2+ bilaterally    MEDICATIONS  (STANDING):  atorvastatin 20 milliGRAM(s) Oral at bedtime  brimonidine 0.2% Ophthalmic Solution 1 Drop(s) Both EYES two times a day  docusate sodium Liquid 100 milliGRAM(s) Oral two times a day  megestrol Suspension 400 milliGRAM(s) Oral daily  metoprolol tartrate 12.5 milliGRAM(s) Oral two times a day  rivaroxaban 15 milliGRAM(s) Oral every 24 hours  sodium chloride 0.9%. 1000 milliLiter(s) (50 mL/Hr) IV Continuous <Continuous>  timolol 0.5% Solution 1 Drop(s) Both EYES two times a day      TELEMETRY: 	    ECG:  	  RADIOLOGY:  OTHER: 	  	  LABS:	 	    CARDIAC MARKERS:                                11.0   7.2   )-----------( 138      ( 31 Oct 2018 10:36 )             32.0     10-31    140  |  107  |  22  ----------------------------<  96  4.3   |  22  |  0.84    Ca    8.7      31 Oct 2018 10:36      proBNP:   Lipid Profile:   HgA1c:   TSH: Thyroid Stimulating Hormone, Serum: 2.80 uIU/mL (10-26 @ 08:01)          Assessment and plan  ---------------------------  pt with bradycardia runs of pat  will meet with son today  continue beta blocker  physical therapy
CARDIOLOGY     PROGRESS  NOTE   ________________________________________________    CHIEF COMPLAINT:Patient is a 89y old  Female who presents with a chief complaint of syncope/fall (02 Nov 2018 07:10)    	  REVIEW OF SYSTEMS:  CONSTITUTIONAL: No fever, weight loss, or fatigue  EYES: No eye pain, visual disturbances, or discharge  ENT:  No difficulty hearing, tinnitus, vertigo; No sinus or throat pain  NECK: No pain or stiffness  RESPIRATORY: No cough, wheezing, chills or hemoptysis; No Shortness of Breath  CARDIOVASCULAR: No chest pain, palpitations, passing out, dizziness, or leg swelling  GASTROINTESTINAL: No abdominal or epigastric pain. No nausea, vomiting, or hematemesis; No diarrhea or constipation. No melena or hematochezia.  GENITOURINARY: No dysuria, frequency, hematuria, or incontinence  NEUROLOGICAL: No headaches, memory loss, loss of strength, numbness, or tremors  SKIN: No itching, burning, rashes, or lesions   LYMPH Nodes: No enlarged glands  ENDOCRINE: No heat or cold intolerance; No hair loss  MUSCULOSKELETAL: No joint pain or swelling; No muscle, back, or extremity pain  PSYCHIATRIC: No depression, anxiety, mood swings, or difficulty sleeping  HEME/LYMPH: No easy bruising, or bleeding gums  ALLERGY AND IMMUNOLOGIC: No hives or eczema	    [ ] All others negative	  [ ] Unable to obtain    PHYSICAL EXAM:  T(C): 36.9 (11-02-18 @ 04:45), Max: 36.9 (11-01-18 @ 20:06)  HR: 60 (11-02-18 @ 04:45) (58 - 67)  BP: 106/53 (11-02-18 @ 04:45) (106/53 - 116/63)  RR: 18 (11-02-18 @ 04:45) (18 - 18)  SpO2: 93% (11-02-18 @ 04:45) (93% - 99%)  Wt(kg): --  I&O's Summary    01 Nov 2018 07:01  -  02 Nov 2018 07:00  --------------------------------------------------------  IN: 720 mL / OUT: 0 mL / NET: 720 mL        Appearance: Normal	  HEENT:   Normal oral mucosa, PERRL, EOMI	  Lymphatic: No lymphadenopathy  Cardiovascular: Normal S1 S2, No JVD, + murmurs, No edema  Respiratory: Lungs clear to auscultation	  Psychiatry: A & O x 3, Mood & affect appropriate  Gastrointestinal:  Soft, Non-tender, + BS	  Skin: No rashes, No ecchymoses, No cyanosis	  Neurologic: Non-focal  Extremities: Normal range of motion, No clubbing, cyanosis or edema  Vascular: Peripheral pulses palpable 2+ bilaterally    MEDICATIONS  (STANDING):  atorvastatin 20 milliGRAM(s) Oral at bedtime  brimonidine 0.2% Ophthalmic Solution 1 Drop(s) Both EYES two times a day  docusate sodium Liquid 100 milliGRAM(s) Oral two times a day  megestrol Suspension 400 milliGRAM(s) Oral daily  metoprolol tartrate 12.5 milliGRAM(s) Oral two times a day  multivitamin 1 Tablet(s) Oral daily  rivaroxaban 15 milliGRAM(s) Oral every 24 hours  sodium chloride 0.9%. 1000 milliLiter(s) (50 mL/Hr) IV Continuous <Continuous>  timolol 0.5% Solution 1 Drop(s) Both EYES two times a day      TELEMETRY: 	    ECG:  	  RADIOLOGY:  OTHER: 	  	  LABS:	 	    CARDIAC MARKERS:                                11.0   6.4   )-----------( 169      ( 02 Nov 2018 06:35 )             31.0     11-02    140  |  108  |  23  ----------------------------<  132<H>  4.4   |  20<L>  |  0.85    Ca    8.9      02 Nov 2018 06:35      proBNP:   Lipid Profile:   HgA1c:   TSH: Thyroid Stimulating Hormone, Serum: 2.80 uIU/mL (10-26 @ 08:01)    < from: CT Head No Cont (11.01.18 @ 18:57) >  IMPRESSION:    Stable exam.    No mass effect, hemorrhage or evidence of acute intracranial pathology.    < end of copied text >        Assessment and plan  ---------------------------  doing better  ct neg  bradycardia discussed with peter
CARDIOLOGY     PROGRESS  NOTE   ________________________________________________    CHIEF COMPLAINT:Patient is a 89y old  Female who presents with a chief complaint of syncope/fall (04 Nov 2018 07:55)  doing well.    	  REVIEW OF SYSTEMS:  CONSTITUTIONAL: No fever, weight loss, or fatigue  EYES: No eye pain, visual disturbances, or discharge  ENT:  No difficulty hearing, tinnitus, vertigo; No sinus or throat pain  NECK: No pain or stiffness  RESPIRATORY: No cough, wheezing, chills or hemoptysis; No Shortness of Breath  CARDIOVASCULAR: No chest pain, palpitations, passing out, dizziness, or leg swelling  GASTROINTESTINAL: No abdominal or epigastric pain. No nausea, vomiting, or hematemesis; No diarrhea or constipation. No melena or hematochezia.  GENITOURINARY: No dysuria, frequency, hematuria, or incontinence  NEUROLOGICAL: No headaches, memory loss, loss of strength, numbness, or tremors  SKIN: No itching, burning, rashes, or lesions   LYMPH Nodes: No enlarged glands  ENDOCRINE: No heat or cold intolerance; No hair loss  MUSCULOSKELETAL: No joint pain or swelling; No muscle, back, or extremity pain  PSYCHIATRIC: No depression, anxiety, mood swings, or difficulty sleeping  HEME/LYMPH: No easy bruising, or bleeding gums  ALLERGY AND IMMUNOLOGIC: No hives or eczema	    [ ] All others negative	  [ ] Unable to obtain    PHYSICAL EXAM:  T(C): 36.8 (11-04-18 @ 04:25), Max: 37 (11-03-18 @ 21:00)  HR: 66 (11-04-18 @ 04:25) (59 - 66)  BP: 108/64 (11-04-18 @ 04:25) (96/56 - 113/50)  RR: 18 (11-04-18 @ 04:25) (18 - 19)  SpO2: 95% (11-04-18 @ 04:25) (95% - 99%)  Wt(kg): --  I&O's Summary    03 Nov 2018 08:01  -  04 Nov 2018 07:00  --------------------------------------------------------  IN: 480 mL / OUT: 0 mL / NET: 480 mL        Appearance: Normal	  HEENT:   Normal oral mucosa, PERRL, EOMI	  Lymphatic: No lymphadenopathy  Cardiovascular: Normal S1 S2, No JVD, + murmurs, No edema  Respiratory: Lungs clear to auscultation	  Psychiatry: A & O x 3, Mood & affect appropriate  Gastrointestinal:  Soft, Non-tender, + BS	  Skin: No rashes, No ecchymoses, No cyanosis	  Neurologic: Non-focal  Extremities: Normal range of motion, No clubbing, cyanosis or edema  Vascular: Peripheral pulses palpable 2+ bilaterally    MEDICATIONS  (STANDING):  atorvastatin 20 milliGRAM(s) Oral at bedtime  brimonidine 0.2% Ophthalmic Solution 1 Drop(s) Both EYES two times a day  docusate sodium Liquid 100 milliGRAM(s) Oral two times a day  megestrol Suspension 400 milliGRAM(s) Oral daily  metoprolol tartrate 12.5 milliGRAM(s) Oral two times a day  multivitamin 1 Tablet(s) Oral daily  rivaroxaban 15 milliGRAM(s) Oral every 24 hours  senna 2 Tablet(s) Oral at bedtime  sodium chloride 0.9%. 1000 milliLiter(s) (50 mL/Hr) IV Continuous <Continuous>  timolol 0.5% Solution 1 Drop(s) Both EYES two times a day      TELEMETRY: 	    ECG:  	  RADIOLOGY:  OTHER: 	  	  LABS:	 	    CARDIAC MARKERS:                  proBNP:   Lipid Profile:   HgA1c:   TSH: Thyroid Stimulating Hormone, Serum: 2.80 uIU/mL (10-26 @ 08:01)          Assessment and plan  ---------------------------  SSS, hr is much improved  physical therapy  dc planning
CARDIOLOGY     PROGRESS  NOTE   ________________________________________________    CHIEF COMPLAINT:Patient is a 89y old  Female who presents with a chief complaint of syncope/fall (05 Nov 2018 07:34)  no complain.  	  REVIEW OF SYSTEMS:  CONSTITUTIONAL: No fever, weight loss, or fatigue  EYES: No eye pain, visual disturbances, or discharge  ENT:  No difficulty hearing, tinnitus, vertigo; No sinus or throat pain  NECK: No pain or stiffness  RESPIRATORY: No cough, wheezing, chills or hemoptysis; No Shortness of Breath  CARDIOVASCULAR: No chest pain, palpitations, passing out, dizziness, or leg swelling  GASTROINTESTINAL: No abdominal or epigastric pain. No nausea, vomiting, or hematemesis; No diarrhea or constipation. No melena or hematochezia.  GENITOURINARY: No dysuria, frequency, hematuria, or incontinence  NEUROLOGICAL: No headaches, memory loss, loss of strength, numbness, or tremors  SKIN: No itching, burning, rashes, or lesions   LYMPH Nodes: No enlarged glands  ENDOCRINE: No heat or cold intolerance; No hair loss  MUSCULOSKELETAL: No joint pain or swelling; No muscle, back, or extremity pain  PSYCHIATRIC: No depression, anxiety, mood swings, or difficulty sleeping  HEME/LYMPH: No easy bruising, or bleeding gums  ALLERGY AND IMMUNOLOGIC: No hives or eczema	    [ ] All others negative	  [ ] Unable to obtain    PHYSICAL EXAM:  T(C): 36.9 (11-05-18 @ 04:58), Max: 36.9 (11-05-18 @ 04:58)  HR: 55 (11-05-18 @ 04:58) (55 - 81)  BP: 120/53 (11-05-18 @ 04:58) (112/54 - 120/53)  RR: 18 (11-05-18 @ 04:58) (18 - 18)  SpO2: 99% (11-05-18 @ 04:58) (96% - 99%)  Wt(kg): --  I&O's Summary    04 Nov 2018 07:01  -  05 Nov 2018 07:00  --------------------------------------------------------  IN: 240 mL / OUT: 0 mL / NET: 240 mL        Appearance: Normal	  HEENT:   Normal oral mucosa, PERRL, EOMI	  Lymphatic: No lymphadenopathy  Cardiovascular: Normal S1 S2, No JVD, + murmurs, No edema  Respiratory: Lungs clear to auscultation	  Psychiatry: A & O x 3, Mood & affect appropriate  Gastrointestinal:  Soft, Non-tender, + BS	  Skin: No rashes, No ecchymoses, No cyanosis	  Neurologic: Non-focal  Extremities: Normal range of motion, No clubbing, cyanosis or edema  Vascular: Peripheral pulses palpable 2+ bilaterally    MEDICATIONS  (STANDING):  atorvastatin 20 milliGRAM(s) Oral at bedtime  brimonidine 0.2% Ophthalmic Solution 1 Drop(s) Both EYES two times a day  docusate sodium Liquid 100 milliGRAM(s) Oral two times a day  megestrol Suspension 400 milliGRAM(s) Oral daily  metoprolol tartrate 12.5 milliGRAM(s) Oral two times a day  multivitamin 1 Tablet(s) Oral daily  rivaroxaban 15 milliGRAM(s) Oral every 24 hours  senna 2 Tablet(s) Oral at bedtime  sodium chloride 0.9%. 1000 milliLiter(s) (50 mL/Hr) IV Continuous <Continuous>  timolol 0.5% Solution 1 Drop(s) Both EYES two times a day      TELEMETRY: 	    ECG:  	  RADIOLOGY:  OTHER: 	  	  LABS:	 	    CARDIAC MARKERS:                  proBNP:   Lipid Profile:   HgA1c:   TSH: Thyroid Stimulating Hormone, Serum: 2.80 uIU/mL (10-26 @ 08:01)          Assessment and plan  ---------------------------  hr is 42 50 while sleeping  transfer to [facility] [place] today
CARDIOLOGY     PROGRESS  NOTE   ________________________________________________    CHIEF COMPLAINT:Patient is a 89y old  Female who presents with a chief complaint of syncope/fall (05 Nov 2018 07:37)    	  REVIEW OF SYSTEMS:  CONSTITUTIONAL: No fever, weight loss, or fatigue  EYES: No eye pain, visual disturbances, or discharge  ENT:  No difficulty hearing, tinnitus, vertigo; No sinus or throat pain  NECK: No pain or stiffness  RESPIRATORY: No cough, wheezing, chills or hemoptysis; No Shortness of Breath  CARDIOVASCULAR: No chest pain, palpitations, passing out, dizziness, or leg swelling  GASTROINTESTINAL: No abdominal or epigastric pain. No nausea, vomiting, or hematemesis; No diarrhea or constipation. No melena or hematochezia.  GENITOURINARY: No dysuria, frequency, hematuria, or incontinence  NEUROLOGICAL: No headaches, memory loss, loss of strength, numbness, or tremors  SKIN: No itching, burning, rashes, or lesions   LYMPH Nodes: No enlarged glands  ENDOCRINE: No heat or cold intolerance; No hair loss  MUSCULOSKELETAL: No joint pain or swelling; No muscle, back, or extremity pain  PSYCHIATRIC: No depression, anxiety, mood swings, or difficulty sleeping  HEME/LYMPH: No easy bruising, or bleeding gums  ALLERGY AND IMMUNOLOGIC: No hives or eczema	    [ ] All others negative	  [ ] Unable to obtain    PHYSICAL EXAM:  T(C): 36.7 (11-06-18 @ 04:10), Max: 36.8 (11-05-18 @ 20:05)  HR: 54 (11-06-18 @ 04:10) (51 - 68)  BP: 125/46 (11-06-18 @ 04:10) (82/40 - 125/46)  RR: 18 (11-06-18 @ 04:10) (18 - 18)  SpO2: 99% (11-06-18 @ 04:10) (96% - 99%)  Wt(kg): --  I&O's Summary    05 Nov 2018 07:01  -  06 Nov 2018 07:00  --------------------------------------------------------  IN: 620 mL / OUT: 0 mL / NET: 620 mL        Appearance: Normal	  HEENT:   Normal oral mucosa, PERRL, EOMI	  Lymphatic: No lymphadenopathy  Cardiovascular: Normal S1 S2, No JVD, No murmurs, No edema  Respiratory: Lungs clear to auscultation	  Psychiatry: A & O x 3, Mood & affect appropriate  Gastrointestinal:  Soft, Non-tender, + BS	  Skin: No rashes, No ecchymoses, No cyanosis	  Neurologic: Non-focal  Extremities: Normal range of motion, No clubbing, cyanosis or edema  Vascular: Peripheral pulses palpable 2+ bilaterally    MEDICATIONS  (STANDING):  atorvastatin 20 milliGRAM(s) Oral at bedtime  brimonidine 0.2% Ophthalmic Solution 1 Drop(s) Both EYES two times a day  docusate sodium Liquid 100 milliGRAM(s) Oral two times a day  megestrol Suspension 400 milliGRAM(s) Oral daily  metoprolol tartrate 12.5 milliGRAM(s) Oral two times a day  multivitamin 1 Tablet(s) Oral daily  rivaroxaban 15 milliGRAM(s) Oral every 24 hours  senna 2 Tablet(s) Oral at bedtime  sodium chloride 0.9%. 1000 milliLiter(s) (50 mL/Hr) IV Continuous <Continuous>  timolol 0.5% Solution 1 Drop(s) Both EYES two times a day      TELEMETRY: 	    ECG:  	  RADIOLOGY:  OTHER: 	  	  LABS:	 	    CARDIAC MARKERS:                  proBNP:   Lipid Profile:   HgA1c:   TSH: Thyroid Stimulating Hormone, Serum: 2.80 uIU/mL (10-26 @ 08:01)          Assessment and plan  ---------------------------  bradycardia asymptomatic  dc planning  physical therapy
CARDIOLOGY     PROGRESS  NOTE   ________________________________________________    CHIEF COMPLAINT:Patient is a 89y old  Female who presents with a chief complaint of syncope/fall (27 Oct 2018 08:14)  no complain.  	  REVIEW OF SYSTEMS:  CONSTITUTIONAL: No fever, weight loss, or fatigue  EYES: No eye pain, visual disturbances, or discharge  ENT:  No difficulty hearing, tinnitus, vertigo; No sinus or throat pain  NECK: No pain or stiffness  RESPIRATORY: No cough, wheezing, chills or hemoptysis; No Shortness of Breath  CARDIOVASCULAR: No chest pain, palpitations, passing out, dizziness, or leg swelling  GASTROINTESTINAL: No abdominal or epigastric pain. No nausea, vomiting, or hematemesis; No diarrhea or constipation. No melena or hematochezia.  GENITOURINARY: No dysuria, frequency, hematuria, or incontinence  NEUROLOGICAL: No headaches, memory loss, loss of strength, numbness, or tremors  SKIN: No itching, burning, rashes, or lesions   LYMPH Nodes: No enlarged glands  ENDOCRINE: No heat or cold intolerance; No hair loss  MUSCULOSKELETAL: No joint pain or swelling; No muscle, back, or extremity pain  PSYCHIATRIC: No depression, anxiety, mood swings, or difficulty sleeping  HEME/LYMPH: No easy bruising, or bleeding gums  ALLERGY AND IMMUNOLOGIC: No hives or eczema	    [ ] All others negative	  [x ] Unable to obtain    PHYSICAL EXAM:  T(C): 36.7 (10-27-18 @ 04:20), Max: 37.2 (10-26-18 @ 21:15)  HR: 58 (10-27-18 @ 04:20) (58 - 132)  BP: 111/63 (10-27-18 @ 04:20) (92/53 - 129/70)  RR: 18 (10-27-18 @ 04:20) (16 - 18)  SpO2: 95% (10-27-18 @ 04:20) (94% - 96%)  Wt(kg): --  I&O's Summary    26 Oct 2018 07:01  -  27 Oct 2018 07:00  --------------------------------------------------------  IN: 910 mL / OUT: 0 mL / NET: 910 mL        Appearance: Normal	  HEENT:   Normal oral mucosa, PERRL, EOMI	  Lymphatic: No lymphadenopathy  Cardiovascular: Normal S1 S2, No JVD, + murmurs, No edema  Respiratory: Lungs clear to auscultation	  Psychiatry: A & O x 3, Mood & affect appropriate  Gastrointestinal:  Soft, Non-tender, + BS	  Skin: No rashes, No ecchymoses, No cyanosis	  Neurologic: Non-focal  Extremities: Normal range of motion, No clubbing, cyanosis or edema  Vascular: Peripheral pulses palpable 2+ bilaterally    MEDICATIONS  (STANDING):  atorvastatin 20 milliGRAM(s) Oral at bedtime  brimonidine 0.2% Ophthalmic Solution 1 Drop(s) Both EYES two times a day  docusate sodium Liquid 100 milliGRAM(s) Oral two times a day  megestrol Suspension 400 milliGRAM(s) Oral daily  rivaroxaban 15 milliGRAM(s) Oral every 24 hours  timolol 0.5% Solution 1 Drop(s) Both EYES two times a day      TELEMETRY: 	    ECG:  	  RADIOLOGY:  OTHER: 	  	  LABS:	 	    CARDIAC MARKERS:  CARDIAC MARKERS ( 25 Oct 2018 21:36 )  x     / x     / 49 U/L / x     / x                                    10.9   6.2   )-----------( 122      ( 26 Oct 2018 06:22 )             32.3     10-26    141  |  106  |  15  ----------------------------<  99  4.1   |  24  |  0.69    Ca    8.8      26 Oct 2018 06:21    TPro  6.3  /  Alb  3.4  /  TBili  2.3<H>  /  DBili  x   /  AST  11  /  ALT  6<L>  /  AlkPhos  81  10-26    proBNP:   Lipid Profile:   HgA1c:   TSH: Thyroid Stimulating Hormone, Serum: 2.80 uIU/mL (10-26 @ 08:01)    PT/INR - ( 25 Oct 2018 12:58 )   PT: 17.9 sec;   INR: 1.64 ratio         PTT - ( 25 Oct 2018 12:58 )  PTT:33.0 sec      Assessment and plan  ---------------------------  pt in a.melodie last night in nsr today  Tachy clarita syndrome  ? ppm if family agrees
CARDIOLOGY     PROGRESS  NOTE   ________________________________________________    CHIEF COMPLAINT:Patient is a 89y old  Female who presents with a chief complaint of syncope/fall (28 Oct 2018 08:05)  pt remain in nsr.  	  REVIEW OF SYSTEMS:  CONSTITUTIONAL: No fever, weight loss, or fatigue  EYES: No eye pain, visual disturbances, or discharge  ENT:  No difficulty hearing, tinnitus, vertigo; No sinus or throat pain  NECK: No pain or stiffness  RESPIRATORY: No cough, wheezing, chills or hemoptysis; No Shortness of Breath  CARDIOVASCULAR: No chest pain, palpitations, passing out, dizziness, or leg swelling  GASTROINTESTINAL: No abdominal or epigastric pain. No nausea, vomiting, or hematemesis; No diarrhea or constipation. No melena or hematochezia.  GENITOURINARY: No dysuria, frequency, hematuria, or incontinence  NEUROLOGICAL: No headaches, memory loss, loss of strength, numbness, or tremors  SKIN: No itching, burning, rashes, or lesions   LYMPH Nodes: No enlarged glands  ENDOCRINE: No heat or cold intolerance; No hair loss  MUSCULOSKELETAL: No joint pain or swelling; No muscle, back, or extremity pain  PSYCHIATRIC: No depression, anxiety, mood swings, or difficulty sleeping  HEME/LYMPH: No easy bruising, or bleeding gums  ALLERGY AND IMMUNOLOGIC: No hives or eczema	    [ ] All others negative	  [x ] Unable to obtain    PHYSICAL EXAM:  T(C): 36.7 (10-28-18 @ 05:39), Max: 36.8 (10-27-18 @ 20:54)  HR: 62 (10-28-18 @ 05:39) (52 - 62)  BP: 124/62 (10-28-18 @ 05:39) (112/62 - 129/52)  RR: 18 (10-28-18 @ 05:39) (18 - 18)  SpO2: 93% (10-28-18 @ 04:30) (93% - 96%)  Wt(kg): --  I&O's Summary    27 Oct 2018 07:01  -  28 Oct 2018 07:00  --------------------------------------------------------  IN: 50 mL / OUT: 0 mL / NET: 50 mL        Appearance: Normal	  HEENT:   Normal oral mucosa, PERRL, EOMI	  Lymphatic: No lymphadenopathy  Cardiovascular: Normal S1 S2, No JVD, + murmurs, No edema  Respiratory: Lungs clear to auscultation	  Psychiatry: A & O x 3, Mood & affect appropriate  Gastrointestinal:  Soft, Non-tender, + BS	  Skin: No rashes, No ecchymoses, No cyanosis	  Neurologic: Non-focal  Extremities: Normal range of motion, No clubbing, cyanosis or edema  Vascular: Peripheral pulses palpable 2+ bilaterally    MEDICATIONS  (STANDING):  atorvastatin 20 milliGRAM(s) Oral at bedtime  brimonidine 0.2% Ophthalmic Solution 1 Drop(s) Both EYES two times a day  docusate sodium Liquid 100 milliGRAM(s) Oral two times a day  megestrol Suspension 400 milliGRAM(s) Oral daily  metoprolol tartrate 12.5 milliGRAM(s) Oral two times a day  rivaroxaban 15 milliGRAM(s) Oral every 24 hours  timolol 0.5% Solution 1 Drop(s) Both EYES two times a day      TELEMETRY: 	    ECG:  	  RADIOLOGY:  OTHER: 	  	  LABS:	 	    CARDIAC MARKERS:                                10.6   8.2   )-----------( 120      ( 27 Oct 2018 08:54 )             31.5     10-28    141  |  109<H>  |  26<H>  ----------------------------<  76  5.1   |  20<L>  |  0.81    Ca    8.7      28 Oct 2018 06:26      proBNP:   Lipid Profile:   HgA1c:   TSH: Thyroid Stimulating Hormone, Serum: 2.80 uIU/mL (10-26 @ 08:01)          Assessment and plan  ---------------------------  pt remain in nsr on metoprolol  pt will benefit from ppm trying to get in touch with family  if refusing will dc home
CARDIOLOGY     PROGRESS  NOTE   ________________________________________________    CHIEF COMPLAINT:Patient is a 89y old  Female who presents with a chief complaint of syncope/fall (29 Oct 2018 06:52)  comfortable.  	  REVIEW OF SYSTEMS:  CONSTITUTIONAL: No fever, weight loss, or fatigue  EYES: No eye pain, visual disturbances, or discharge  ENT:  No difficulty hearing, tinnitus, vertigo; No sinus or throat pain  NECK: No pain or stiffness  RESPIRATORY: No cough, wheezing, chills or hemoptysis; No Shortness of Breath  CARDIOVASCULAR: No chest pain, palpitations, passing out, dizziness, or leg swelling  GASTROINTESTINAL: No abdominal or epigastric pain. No nausea, vomiting, or hematemesis; No diarrhea or constipation. No melena or hematochezia.  GENITOURINARY: No dysuria, frequency, hematuria, or incontinence  NEUROLOGICAL: No headaches, memory loss, loss of strength, numbness, or tremors  SKIN: No itching, burning, rashes, or lesions   LYMPH Nodes: No enlarged glands  ENDOCRINE: No heat or cold intolerance; No hair loss  MUSCULOSKELETAL: No joint pain or swelling; No muscle, back, or extremity pain  PSYCHIATRIC: No depression, anxiety, mood swings, or difficulty sleeping  HEME/LYMPH: No easy bruising, or bleeding gums  ALLERGY AND IMMUNOLOGIC: No hives or eczema	    [ ] All others negative	  [ ] Unable to obtain    PHYSICAL EXAM:  T(C): 36.7 (10-29-18 @ 04:12), Max: 36.8 (10-28-18 @ 21:38)  HR: 53 (10-29-18 @ 04:12) (53 - 88)  BP: 129/55 (10-29-18 @ 04:12) (119/53 - 129/63)  RR: 18 (10-29-18 @ 04:12) (18 - 18)  SpO2: 91% (10-29-18 @ 04:12) (91% - 96%)  Wt(kg): --  I&O's Summary    28 Oct 2018 07:01  -  29 Oct 2018 07:00  --------------------------------------------------------  IN: 650 mL / OUT: 0 mL / NET: 650 mL        Appearance: Normal	  HEENT:   Normal oral mucosa, PERRL, EOMI	  Lymphatic: No lymphadenopathy  Cardiovascular: Normal S1 S2, No JVD, + murmurs, No edema  Respiratory: Lungs clear to auscultation	  Psychiatry: A & O x 3, Mood & affect appropriate  Gastrointestinal:  Soft, Non-tender, + BS	  Skin: No rashes, No ecchymoses, No cyanosis	  Neurologic: Non-focal  Extremities: Normal range of motion, No clubbing, cyanosis or edema  Vascular: Peripheral pulses palpable 2+ bilaterally    MEDICATIONS  (STANDING):  atorvastatin 20 milliGRAM(s) Oral at bedtime  brimonidine 0.2% Ophthalmic Solution 1 Drop(s) Both EYES two times a day  docusate sodium Liquid 100 milliGRAM(s) Oral two times a day  megestrol Suspension 400 milliGRAM(s) Oral daily  metoprolol tartrate 12.5 milliGRAM(s) Oral two times a day  rivaroxaban 15 milliGRAM(s) Oral every 24 hours  timolol 0.5% Solution 1 Drop(s) Both EYES two times a day      TELEMETRY: 	    ECG:  	  RADIOLOGY:  OTHER: 	  	  LABS:	 	    CARDIAC MARKERS:    < from: Limited Transthoracic Echo (10.26.18 @ 14:07) >  Limited tranthoracic echocardiogram at patients request to  end exam.  1. Mitral annular calcification.  2. The aortic valve opens well.  3. Limited images acquired at the patients request. Based  upon the parasternal long axis view only;  grossly normal  left ventricular systolic function.    < end of copied text >                              11.6   7.8   )-----------( 136      ( 29 Oct 2018 06:29 )             33.6     10-29    141  |  106  |  30<H>  ----------------------------<  80  4.3   |  21<L>  |  0.77    Ca    8.8      29 Oct 2018 06:29      proBNP:   Lipid Profile:   HgA1c:   TSH: Thyroid Stimulating Hormone, Serum: 2.80 uIU/mL (10-26 @ 08:01)          Assessment and plan  ---------------------------  tachy clarita syndrome/ syncope.  pt needs ppm awaiting family input.  continue meds  ivf
CARDIOLOGY     PROGRESS  NOTE   ________________________________________________    CHIEF COMPLAINT:Patient is a 89y old  Female who presents with a chief complaint of syncope/fall (29 Oct 2018 07:39)  sleeping.  	  REVIEW OF SYSTEMS:  CONSTITUTIONAL: No fever, weight loss, or fatigue  EYES: No eye pain, visual disturbances, or discharge  ENT:  No difficulty hearing, tinnitus, vertigo; No sinus or throat pain  NECK: No pain or stiffness  RESPIRATORY: No cough, wheezing, chills or hemoptysis; No Shortness of Breath  CARDIOVASCULAR: No chest pain, palpitations, passing out, dizziness, or leg swelling  GASTROINTESTINAL: No abdominal or epigastric pain. No nausea, vomiting, or hematemesis; No diarrhea or constipation. No melena or hematochezia.  GENITOURINARY: No dysuria, frequency, hematuria, or incontinence  NEUROLOGICAL: No headaches, memory loss, loss of strength, numbness, or tremors  SKIN: No itching, burning, rashes, or lesions   LYMPH Nodes: No enlarged glands  ENDOCRINE: No heat or cold intolerance; No hair loss  MUSCULOSKELETAL: No joint pain or swelling; No muscle, back, or extremity pain  PSYCHIATRIC: No depression, anxiety, mood swings, or difficulty sleeping  HEME/LYMPH: No easy bruising, or bleeding gums  ALLERGY AND IMMUNOLOGIC: No hives or eczema	    [ ] All others negative	  [ ] Unable to obtain    PHYSICAL EXAM:  T(C): 36.8 (10-30-18 @ 04:31), Max: 37.2 (10-29-18 @ 12:20)  HR: 69 (10-30-18 @ 04:31) (50 - 69)  BP: 133/77 (10-30-18 @ 04:31) (99/48 - 148/67)  RR: 18 (10-30-18 @ 04:31) (18 - 19)  SpO2: 98% (10-30-18 @ 04:31) (95% - 98%)  Wt(kg): --  I&O's Summary    29 Oct 2018 07:01  -  30 Oct 2018 07:00  --------------------------------------------------------  IN: 1440 mL / OUT: 0 mL / NET: 1440 mL        Appearance: Normal	  HEENT:   Normal oral mucosa, PERRL, EOMI	  Lymphatic: No lymphadenopathy  Cardiovascular: Normal S1 S2, No JVD, + murmurs, No edema  Respiratory: Lungs clear to auscultation	  Psychiatry: A & O x 3, Mood & affect appropriate  Gastrointestinal:  Soft, Non-tender, + BS	  Skin: No rashes, No ecchymoses, No cyanosis	  Neurologic: Non-focal  Extremities: Normal range of motion, No clubbing, cyanosis or edema  Vascular: Peripheral pulses palpable 2+ bilaterally    MEDICATIONS  (STANDING):  atorvastatin 20 milliGRAM(s) Oral at bedtime  brimonidine 0.2% Ophthalmic Solution 1 Drop(s) Both EYES two times a day  docusate sodium Liquid 100 milliGRAM(s) Oral two times a day  megestrol Suspension 400 milliGRAM(s) Oral daily  metoprolol tartrate 12.5 milliGRAM(s) Oral two times a day  rivaroxaban 15 milliGRAM(s) Oral every 24 hours  sodium chloride 0.9%. 1000 milliLiter(s) (50 mL/Hr) IV Continuous <Continuous>  timolol 0.5% Solution 1 Drop(s) Both EYES two times a day      TELEMETRY: 	    ECG:  	  RADIOLOGY:  OTHER: 	  	  LABS:	 	    CARDIAC MARKERS:                                11.2   8.2   )-----------( 153      ( 30 Oct 2018 06:17 )             33.3     10-30    140  |  107  |  27<H>  ----------------------------<  98  4.1   |  20<L>  |  0.73    Ca    8.7      30 Oct 2018 06:17      proBNP:   Lipid Profile:   HgA1c:   TSH: Thyroid Stimulating Hormone, Serum: 2.80 uIU/mL (10-26 @ 08:01)          Assessment and plan  ---------------------------  sss  tried to speak to the son he is out of town  ?ppm   otherwise dc planning  hr is well controlled
CARDIOLOGY     PROGRESS  NOTE   ________________________________________________    CHIEF COMPLAINT:Patient is a 89y old  Female who presents with a chief complaint of syncope/fall (31 Oct 2018 07:12)  doing well.  	  REVIEW OF SYSTEMS:  CONSTITUTIONAL: No fever, weight loss, or fatigue  EYES: No eye pain, visual disturbances, or discharge  ENT:  No difficulty hearing, tinnitus, vertigo; No sinus or throat pain  NECK: No pain or stiffness  RESPIRATORY: No cough, wheezing, chills or hemoptysis; No Shortness of Breath  CARDIOVASCULAR: No chest pain, palpitations, passing out, dizziness, or leg swelling  GASTROINTESTINAL: No abdominal or epigastric pain. No nausea, vomiting, or hematemesis; No diarrhea or constipation. No melena or hematochezia.  GENITOURINARY: No dysuria, frequency, hematuria, or incontinence  NEUROLOGICAL: No headaches, memory loss, loss of strength, numbness, or tremors  SKIN: No itching, burning, rashes, or lesions   LYMPH Nodes: No enlarged glands  ENDOCRINE: No heat or cold intolerance; No hair loss  MUSCULOSKELETAL: No joint pain or swelling; No muscle, back, or extremity pain  PSYCHIATRIC: No depression, anxiety, mood swings, or difficulty sleeping  HEME/LYMPH: No easy bruising, or bleeding gums  ALLERGY AND IMMUNOLOGIC: No hives or eczema	    [ ] All others negative	  [ ] Unable to obtain    PHYSICAL EXAM:  T(C): 36.7 (10-31-18 @ 06:56), Max: 37 (10-30-18 @ 12:00)  HR: 64 (10-31-18 @ 06:56) (54 - 67)  BP: 115/50 (10-31-18 @ 06:56) (107/52 - 118/86)  RR: 18 (10-31-18 @ 06:56) (16 - 18)  SpO2: 95% (10-31-18 @ 06:56) (95% - 98%)  Wt(kg): --  I&O's Summary    30 Oct 2018 07:01  -  31 Oct 2018 07:00  --------------------------------------------------------  IN: 630 mL / OUT: 0 mL / NET: 630 mL        Appearance: Normal	  HEENT:   Normal oral mucosa, PERRL, EOMI	  Lymphatic: No lymphadenopathy  Cardiovascular: Normal S1 S2, No JVD, = murmurs, No edema  Respiratory: Lungs clear to auscultation	  Psychiatry: A & O x 3, Mood & affect appropriate  Gastrointestinal:  Soft, Non-tender, + BS	  Skin: No rashes, No ecchymoses, No cyanosis	  Neurologic: Non-focal  Extremities: Normal range of motion, No clubbing, cyanosis or edema  Vascular: Peripheral pulses palpable 2+ bilaterally    MEDICATIONS  (STANDING):  atorvastatin 20 milliGRAM(s) Oral at bedtime  brimonidine 0.2% Ophthalmic Solution 1 Drop(s) Both EYES two times a day  docusate sodium Liquid 100 milliGRAM(s) Oral two times a day  megestrol Suspension 400 milliGRAM(s) Oral daily  metoprolol tartrate 12.5 milliGRAM(s) Oral two times a day  rivaroxaban 15 milliGRAM(s) Oral every 24 hours  sodium chloride 0.9%. 1000 milliLiter(s) (50 mL/Hr) IV Continuous <Continuous>  timolol 0.5% Solution 1 Drop(s) Both EYES two times a day      TELEMETRY: 	    ECG:  	  RADIOLOGY:  OTHER: 	  	  LABS:	 	    CARDIAC MARKERS:                                11.2   8.2   )-----------( 153      ( 30 Oct 2018 06:17 )             33.3     10-30    140  |  107  |  27<H>  ----------------------------<  98  4.1   |  20<L>  |  0.73    Ca    8.7      30 Oct 2018 06:17      proBNP:   Lipid Profile:   HgA1c:   TSH: Thyroid Stimulating Hormone, Serum: 2.80 uIU/mL (10-26 @ 08:01)          Assessment and plan  ---------------------------  sss/tachy clarita syndrome  discussed in length with son  he is thinking about about  ppm  pt with multiple admission sec to syncope
be d bound  REVIEW OF SYSTEMS:  GEN: no fever,    no chills  RESP: no SOB,   no cough  CVS: no chest pain,   no palpitations  GI: no abdominal pain,   no nausea,   no vomiting,   no constipation,   no diarrhea  : no dysuria,   no frequency  NEURO: no headache,   no dizziness  PSYCH: no depression,   not anxious  Derm : no rash    MEDICATIONS  (STANDING):  atorvastatin 20 milliGRAM(s) Oral at bedtime  brimonidine 0.2% Ophthalmic Solution 1 Drop(s) Both EYES two times a day  docusate sodium Liquid 100 milliGRAM(s) Oral two times a day  megestrol Suspension 400 milliGRAM(s) Oral daily  metoprolol tartrate 12.5 milliGRAM(s) Oral two times a day  multivitamin 1 Tablet(s) Oral daily  rivaroxaban 15 milliGRAM(s) Oral every 24 hours  sodium chloride 0.9%. 1000 milliLiter(s) (50 mL/Hr) IV Continuous <Continuous>  timolol 0.5% Solution 1 Drop(s) Both EYES two times a day    MEDICATIONS  (PRN):  acetaminophen   Tablet .. 650 milliGRAM(s) Oral every 6 hours PRN Mild Pain (1 - 3)      Vital Signs Last 24 Hrs  T(C): 36.9 (02 Nov 2018 04:45), Max: 36.9 (01 Nov 2018 20:06)  T(F): 98.4 (02 Nov 2018 04:45), Max: 98.4 (01 Nov 2018 20:06)  HR: 60 (02 Nov 2018 04:45) (58 - 67)  BP: 106/53 (02 Nov 2018 04:45) (106/53 - 116/63)  BP(mean): --  RR: 18 (02 Nov 2018 04:45) (18 - 18)  SpO2: 93% (02 Nov 2018 04:45) (93% - 99%)  CAPILLARY BLOOD GLUCOSE        I&O's Summary    01 Nov 2018 07:01  -  02 Nov 2018 07:00  --------------------------------------------------------  IN: 720 mL / OUT: 0 mL / NET: 720 mL        PHYSICAL EXAM:  HEAD:  Atraumatic, Normocephalic  NECK: Supple, No   JVD  CHEST/LUNG:   no     rales,     no,    rhonchi  HEART: Regular rate and rhythm;         murmur  ABDOMEN: Soft, Nontender, ;   EXTREMITIES:     no   edema  NEUROLOGY:  arousable    LABS:                        11.0   6.4   )-----------( 169      ( 02 Nov 2018 06:35 )             31.0     10-31    140  |  107  |  22  ----------------------------<  96  4.3   |  22  |  0.84    Ca    8.7      31 Oct 2018 10:36                      Thyroid Stimulating Hormone, Serum: 2.80 uIU/mL (10-26 @ 08:01)          Consultant(s) Notes Reviewed:      Care Discussed with Consultants/Other Providers:
in bed/ comfortable    REVIEW OF SYSTEMS:  GEN: no fever,    no chills  RESP: no SOB,   no cough  CVS: no chest pain,   no palpitations  GI: no abdominal pain,   no nausea,   no vomiting,   no constipation,   no diarrhea  : no dysuria,   no frequency  NEURO: no headache,   no dizziness  PSYCH: no depression,   not anxious  Derm : no rash    MEDICATIONS  (STANDING):  atorvastatin 20 milliGRAM(s) Oral at bedtime  brimonidine 0.2% Ophthalmic Solution 1 Drop(s) Both EYES two times a day  docusate sodium Liquid 100 milliGRAM(s) Oral two times a day  megestrol Suspension 400 milliGRAM(s) Oral daily  metoprolol tartrate 12.5 milliGRAM(s) Oral two times a day  rivaroxaban 15 milliGRAM(s) Oral every 24 hours  timolol 0.5% Solution 1 Drop(s) Both EYES two times a day    MEDICATIONS  (PRN):      Vital Signs Last 24 Hrs  T(C): 36.7 (29 Oct 2018 04:12), Max: 36.8 (28 Oct 2018 21:38)  T(F): 98 (29 Oct 2018 04:12), Max: 98.2 (28 Oct 2018 21:38)  HR: 53 (29 Oct 2018 04:12) (53 - 88)  BP: 129/55 (29 Oct 2018 04:12) (119/53 - 129/63)  BP(mean): --  RR: 18 (29 Oct 2018 04:12) (18 - 18)  SpO2: 91% (29 Oct 2018 04:12) (91% - 96%)  CAPILLARY BLOOD GLUCOSE        I&O's Summary    27 Oct 2018 07:01  -  28 Oct 2018 07:00  --------------------------------------------------------  IN: 50 mL / OUT: 0 mL / NET: 50 mL    28 Oct 2018 07:01  -  29 Oct 2018 06:52  --------------------------------------------------------  IN: 650 mL / OUT: 0 mL / NET: 650 mL        PHYSICAL EXAM:  HEAD:  Atraumatic, Normocephalic  NECK: Supple, No   JVD  CHEST/LUNG:   no     rales,     no,    rhonchi  HEART: Regular rate and rhythm;         murmur  ABDOMEN: Soft, Nontender, ;   EXTREMITIES:    no    edema  NEUROLOGY:  alert    LABS:                        10.6   8.2   )-----------( 120      ( 27 Oct 2018 08:54 )             31.5     10-28    141  |  109<H>  |  26<H>  ----------------------------<  76  5.1   |  20<L>  |  0.81    Ca    8.7      28 Oct 2018 06:26                      Thyroid Stimulating Hormone, Serum: 2.80 uIU/mL (10-26 @ 08:01)          Consultant(s) Notes Reviewed:      Care Discussed with Consultants/Other Providers:
no compalints    REVIEW OF SYSTEMS:  GEN: no fever,    no chills  RESP: no SOB,   no cough  CVS: no chest pain,   no palpitations  GI: no abdominal pain,   no nausea,   no vomiting,   no constipation,   no diarrhea  : no dysuria,   no frequency  NEURO: no headache,   no dizziness  PSYCH: no depression,   not anxious  Derm : no rash    MEDICATIONS  (STANDING):  atorvastatin 20 milliGRAM(s) Oral at bedtime  brimonidine 0.2% Ophthalmic Solution 1 Drop(s) Both EYES two times a day  docusate sodium Liquid 100 milliGRAM(s) Oral two times a day  megestrol Suspension 400 milliGRAM(s) Oral daily  metoprolol tartrate 12.5 milliGRAM(s) Oral two times a day  multivitamin 1 Tablet(s) Oral daily  rivaroxaban 15 milliGRAM(s) Oral every 24 hours  senna 2 Tablet(s) Oral at bedtime  sodium chloride 0.9%. 1000 milliLiter(s) (50 mL/Hr) IV Continuous <Continuous>  timolol 0.5% Solution 1 Drop(s) Both EYES two times a day    MEDICATIONS  (PRN):  acetaminophen   Tablet .. 650 milliGRAM(s) Oral every 6 hours PRN Mild Pain (1 - 3)  polyethylene glycol 3350 17 Gram(s) Oral daily PRN Constipation      Vital Signs Last 24 Hrs  T(C): 36.8 (03 Nov 2018 04:11), Max: 37.1 (02 Nov 2018 20:37)  T(F): 98.2 (03 Nov 2018 04:11), Max: 98.7 (02 Nov 2018 20:37)  HR: 61 (03 Nov 2018 10:27) (58 - 65)  BP: 96/56 (03 Nov 2018 10:27) (95/50 - 112/64)  BP(mean): --  RR: 18 (03 Nov 2018 04:11) (18 - 18)  SpO2: 100% (03 Nov 2018 04:11) (94% - 100%)  CAPILLARY BLOOD GLUCOSE        I&O's Summary    02 Nov 2018 07:01  -  03 Nov 2018 07:00  --------------------------------------------------------  IN: 600 mL / OUT: 100 mL / NET: 500 mL    03 Nov 2018 08:01  -  03 Nov 2018 12:12  --------------------------------------------------------  IN: 240 mL / OUT: 0 mL / NET: 240 mL        PHYSICAL EXAM:  HEAD:  Atraumatic, Normocephalic  NECK: Supple, No   JVD  CHEST/LUNG:   no     rales,     no,    rhonchi  HEART: Regular rate and rhythm;         murmur  ABDOMEN: Soft, Nontender, ;   EXTREMITIES:   no     edema  NEUROLOGY:  alert    LABS:                        11.0   6.4   )-----------( 169      ( 02 Nov 2018 06:35 )             31.0     11-02    140  |  108  |  23  ----------------------------<  132<H>  4.4   |  20<L>  |  0.85    Ca    8.9      02 Nov 2018 06:35                      Thyroid Stimulating Hormone, Serum: 2.80 uIU/mL (10-26 @ 08:01)          Consultant(s) Notes Reviewed:      Care Discussed with Consultants/Other Providers:
no compalints    REVIEW OF SYSTEMS:  GEN: no fever,    no chills  RESP: no SOB,   no cough  CVS: no chest pain,   no palpitations  GI: no abdominal pain,   no nausea,   no vomiting,   no constipation,   no diarrhea  : no dysuria,   no frequency  NEURO: no headache,   no dizziness  PSYCH: no depression,   not anxious  Derm : no rash    MEDICATIONS  (STANDING):  atorvastatin 20 milliGRAM(s) Oral at bedtime  brimonidine 0.2% Ophthalmic Solution 1 Drop(s) Both EYES two times a day  docusate sodium Liquid 100 milliGRAM(s) Oral two times a day  megestrol Suspension 400 milliGRAM(s) Oral daily  metoprolol tartrate 12.5 milliGRAM(s) Oral two times a day  multivitamin 1 Tablet(s) Oral daily  rivaroxaban 15 milliGRAM(s) Oral every 24 hours  senna 2 Tablet(s) Oral at bedtime  sodium chloride 0.9%. 1000 milliLiter(s) (50 mL/Hr) IV Continuous <Continuous>  timolol 0.5% Solution 1 Drop(s) Both EYES two times a day    MEDICATIONS  (PRN):  acetaminophen   Tablet .. 650 milliGRAM(s) Oral every 6 hours PRN Mild Pain (1 - 3)  polyethylene glycol 3350 17 Gram(s) Oral daily PRN Constipation      Vital Signs Last 24 Hrs  T(C): 36.9 (05 Nov 2018 04:58), Max: 36.9 (05 Nov 2018 04:58)  T(F): 98.5 (05 Nov 2018 04:58), Max: 98.5 (05 Nov 2018 04:58)  HR: 55 (05 Nov 2018 04:58) (55 - 81)  BP: 120/53 (05 Nov 2018 04:58) (112/54 - 120/53)  BP(mean): --  RR: 18 (05 Nov 2018 04:58) (18 - 18)  SpO2: 99% (05 Nov 2018 04:58) (96% - 99%)  CAPILLARY BLOOD GLUCOSE        I&O's Summary    04 Nov 2018 07:01  -  05 Nov 2018 07:00  --------------------------------------------------------  IN: 240 mL / OUT: 0 mL / NET: 240 mL        PHYSICAL EXAM:  HEAD:  Atraumatic, Normocephalic  NECK: Supple, No   JVD  CHEST/LUNG:   no     rales,     no,    rhonchi  HEART: Regular rate and rhythm;         murmur  ABDOMEN: Soft, Nontender, ;   EXTREMITIES:    no    edema  NEUROLOGY:  alert    LABS:                          Thyroid Stimulating Hormone, Serum: 2.80 uIU/mL (10-26 @ 08:01)          Consultant(s) Notes Reviewed:      Care Discussed with Consultants/Other Providers:
resting    REVIEW OF SYSTEMS:  GEN: no fever,    no chills  RESP: no SOB,   no cough  CVS: no chest pain,   no palpitations  GI: no abdominal pain,   no nausea,   no vomiting,   no constipation,   no diarrhea  : no dysuria,   no frequency  NEURO: no headache,   no dizziness  PSYCH: no depression,   not anxious  Derm : no rash    MEDICATIONS  (STANDING):  atorvastatin 20 milliGRAM(s) Oral at bedtime  brimonidine 0.2% Ophthalmic Solution 1 Drop(s) Both EYES two times a day  docusate sodium Liquid 100 milliGRAM(s) Oral two times a day  megestrol Suspension 400 milliGRAM(s) Oral daily  metoprolol tartrate 12.5 milliGRAM(s) Oral two times a day  multivitamin 1 Tablet(s) Oral daily  rivaroxaban 15 milliGRAM(s) Oral every 24 hours  senna 2 Tablet(s) Oral at bedtime  sodium chloride 0.9%. 1000 milliLiter(s) (50 mL/Hr) IV Continuous <Continuous>  timolol 0.5% Solution 1 Drop(s) Both EYES two times a day    MEDICATIONS  (PRN):  acetaminophen   Tablet .. 650 milliGRAM(s) Oral every 6 hours PRN Mild Pain (1 - 3)  polyethylene glycol 3350 17 Gram(s) Oral daily PRN Constipation      Vital Signs Last 24 Hrs  T(C): 36.8 (04 Nov 2018 04:25), Max: 37 (03 Nov 2018 21:00)  T(F): 98.3 (04 Nov 2018 04:25), Max: 98.6 (03 Nov 2018 21:00)  HR: 66 (04 Nov 2018 04:25) (59 - 66)  BP: 108/64 (04 Nov 2018 04:25) (96/56 - 113/50)  BP(mean): --  RR: 18 (04 Nov 2018 04:25) (18 - 19)  SpO2: 95% (04 Nov 2018 04:25) (95% - 99%)  CAPILLARY BLOOD GLUCOSE        I&O's Summary    03 Nov 2018 08:01  -  04 Nov 2018 07:00  --------------------------------------------------------  IN: 480 mL / OUT: 0 mL / NET: 480 mL        PHYSICAL EXAM:  HEAD:  Atraumatic, Normocephalic  NECK: Supple, No   JVD  CHEST/LUNG:   no     rales,     no,    rhonchi  HEART: Regular rate and rhythm;         murmur  ABDOMEN: Soft, Nontender, ;   EXTREMITIES:    no    edema  NEUROLOGY:  arousable    LABS:                          Thyroid Stimulating Hormone, Serum: 2.80 uIU/mL (10-26 @ 08:01)          Consultant(s) Notes Reviewed:      Care Discussed with Consultants/Other Providers:
resting in bed    REVIEW OF SYSTEMS:  GEN: no fever,    no chills  RESP: no SOB,   no cough  CVS: no chest pain,   no palpitations  GI: no abdominal pain,   no nausea,   no vomiting,   no constipation,   no diarrhea  : no dysuria,   no frequency  NEURO: no headache,   no dizziness  PSYCH: no depression,   not anxious  Derm : no rash    MEDICATIONS  (STANDING):  atorvastatin 20 milliGRAM(s) Oral at bedtime  brimonidine 0.2% Ophthalmic Solution 1 Drop(s) Both EYES two times a day  docusate sodium Liquid 100 milliGRAM(s) Oral two times a day  megestrol Suspension 400 milliGRAM(s) Oral daily  rivaroxaban 15 milliGRAM(s) Oral every 24 hours  timolol 0.5% Solution 1 Drop(s) Both EYES two times a day    MEDICATIONS  (PRN):      Vital Signs Last 24 Hrs  T(C): 37.1 (26 Oct 2018 04:29), Max: 37.1 (26 Oct 2018 00:30)  T(F): 98.7 (26 Oct 2018 04:29), Max: 98.7 (26 Oct 2018 00:30)  HR: 84 (26 Oct 2018 04:29) (53 - 84)  BP: 160/73 (26 Oct 2018 04:29) (106/49 - 160/73)  BP(mean): --  RR: 18 (26 Oct 2018 04:29) (16 - 18)  SpO2: 98% (26 Oct 2018 04:29) (96% - 100%)  CAPILLARY BLOOD GLUCOSE        I&O's Summary    25 Oct 2018 07:01  -  26 Oct 2018 07:00  --------------------------------------------------------  IN: 0 mL / OUT: 200 mL / NET: -200 mL        PHYSICAL EXAM:  HEAD:  Atraumatic, Normocephalic  NECK: Supple, No   JVD  CHEST/LUNG:   no     rales,     no,    rhonchi  HEART: Regular rate and rhythm;         murmur  ABDOMEN: Soft, Nontender, ;   EXTREMITIES:    no    edema  NEUROLOGY:  alert    LABS:                        10.9   6.2   )-----------( 122      ( 26 Oct 2018 06:22 )             32.3     10-26    141  |  106  |  15  ----------------------------<  99  4.1   |  24  |  0.69    Ca    8.8      26 Oct 2018 06:21    TPro  6.3  /  Alb  3.4  /  TBili  2.3<H>  /  DBili  x   /  AST  11  /  ALT  6<L>  /  AlkPhos  81  10-26    PT/INR - ( 25 Oct 2018 12:58 )   PT: 17.9 sec;   INR: 1.64 ratio         PTT - ( 25 Oct 2018 12:58 )  PTT:33.0 sec  CARDIAC MARKERS ( 25 Oct 2018 21:36 )  x     / x     / 49 U/L / x     / x          Urinalysis Basic - ( 25 Oct 2018 13:46 )    Color: Yellow / Appearance: Clear / S.024 / pH: x  Gluc: x / Ketone: Negative  / Bili: Negative / Urobili: Negative   Blood: x / Protein: Trace / Nitrite: Negative   Leuk Esterase: Negative / RBC: 7 /hpf / WBC 2 /hpf   Sq Epi: x / Non Sq Epi: 2 /hpf / Bacteria: Negative          10-25 @ 12:58  4.3  24              Consultant(s) Notes Reviewed:      Care Discussed with Consultants/Other Providers:
resting, comfortable  REVIEW OF SYSTEMS:  GEN: no fever,    no chills  RESP: no SOB,   no cough  CVS: no chest pain,   no palpitations  GI: no abdominal pain,   no nausea,   no vomiting,   no constipation,   no diarrhea  : no dysuria,   no frequency  NEURO: no headache,   no dizziness  PSYCH: no depression,   not anxious  Derm : no rash    MEDICATIONS  (STANDING):  atorvastatin 20 milliGRAM(s) Oral at bedtime  brimonidine 0.2% Ophthalmic Solution 1 Drop(s) Both EYES two times a day  docusate sodium Liquid 100 milliGRAM(s) Oral two times a day  megestrol Suspension 400 milliGRAM(s) Oral daily  rivaroxaban 15 milliGRAM(s) Oral every 24 hours  timolol 0.5% Solution 1 Drop(s) Both EYES two times a day    MEDICATIONS  (PRN):      Vital Signs Last 24 Hrs  T(C): 36.7 (27 Oct 2018 04:20), Max: 37.2 (26 Oct 2018 21:15)  T(F): 98 (27 Oct 2018 04:20), Max: 99 (26 Oct 2018 21:15)  HR: 58 (27 Oct 2018 04:20) (58 - 132)  BP: 111/63 (27 Oct 2018 04:20) (92/53 - 129/70)  BP(mean): --  RR: 18 (27 Oct 2018 04:20) (16 - 18)  SpO2: 95% (27 Oct 2018 04:20) (94% - 96%)  CAPILLARY BLOOD GLUCOSE        I&O's Summary    26 Oct 2018 07:01  -  27 Oct 2018 07:00  --------------------------------------------------------  IN: 910 mL / OUT: 0 mL / NET: 910 mL        PHYSICAL EXAM:  HEAD:  Atraumatic, Normocephalic  NECK: Supple, No   JVD  CHEST/LUNG:   no     rales,     no,    rhonchi  HEART: Regular rate and rhythm;         murmur  ABDOMEN: Soft, Nontender, ;   EXTREMITIES:     no   edema  NEUROLOGY:  arousable    LABS:                        10.9   6.2   )-----------( 122      ( 26 Oct 2018 06:22 )             32.3     10-26    141  |  106  |  15  ----------------------------<  99  4.1   |  24  |  0.69    Ca    8.8      26 Oct 2018 06:21    TPro  6.3  /  Alb  3.4  /  TBili  2.3<H>  /  DBili  x   /  AST  11  /  ALT  6<L>  /  AlkPhos  81  10-26    PT/INR - ( 25 Oct 2018 12:58 )   PT: 17.9 sec;   INR: 1.64 ratio         PTT - ( 25 Oct 2018 12:58 )  PTT:33.0 sec  CARDIAC MARKERS ( 25 Oct 2018 21:36 )  x     / x     / 49 U/L / x     / x          Urinalysis Basic - ( 25 Oct 2018 13:46 )    Color: Yellow / Appearance: Clear / S.024 / pH: x  Gluc: x / Ketone: Negative  / Bili: Negative / Urobili: Negative   Blood: x / Protein: Trace / Nitrite: Negative   Leuk Esterase: Negative / RBC: 7 /hpf / WBC 2 /hpf   Sq Epi: x / Non Sq Epi: 2 /hpf / Bacteria: Negative          10-25 @ 12:58  4.3  24      Thyroid Stimulating Hormone, Serum: 2.80 uIU/mL (10-26 @ 08:01)          Consultant(s) Notes Reviewed:      Care Discussed with Consultants/Other Providers:
resting/  no cp/sob  REVIEW OF SYSTEMS:  GEN: no fever,    no chills  RESP: no SOB,   no cough  CVS: no chest pain,   no palpitations  GI: no abdominal pain,   no nausea,   no vomiting,   no constipation,   no diarrhea  : no dysuria,   no frequency  NEURO: no headache,   no dizziness  PSYCH: no depression,   not anxious  Derm : no rash    MEDICATIONS  (STANDING):  atorvastatin 20 milliGRAM(s) Oral at bedtime  brimonidine 0.2% Ophthalmic Solution 1 Drop(s) Both EYES two times a day  docusate sodium Liquid 100 milliGRAM(s) Oral two times a day  megestrol Suspension 400 milliGRAM(s) Oral daily  metoprolol tartrate 12.5 milliGRAM(s) Oral two times a day  rivaroxaban 15 milliGRAM(s) Oral every 24 hours  sodium chloride 0.9%. 1000 milliLiter(s) (50 mL/Hr) IV Continuous <Continuous>  timolol 0.5% Solution 1 Drop(s) Both EYES two times a day    MEDICATIONS  (PRN):      Vital Signs Last 24 Hrs  T(C): 36.8 (30 Oct 2018 04:31), Max: 37.2 (29 Oct 2018 12:20)  T(F): 98.2 (30 Oct 2018 04:31), Max: 98.9 (29 Oct 2018 12:20)  HR: 69 (30 Oct 2018 04:31) (50 - 69)  BP: 133/77 (30 Oct 2018 04:31) (99/48 - 148/67)  BP(mean): --  RR: 18 (30 Oct 2018 04:31) (18 - 19)  SpO2: 98% (30 Oct 2018 04:31) (95% - 98%)  CAPILLARY BLOOD GLUCOSE        I&O's Summary    29 Oct 2018 07:01  -  30 Oct 2018 07:00  --------------------------------------------------------  IN: 1440 mL / OUT: 0 mL / NET: 1440 mL        PHYSICAL EXAM:  HEAD:  Atraumatic, Normocephalic  NECK: Supple, No   JVD  CHEST/LUNG:   no     rales,     no,    rhonchi  HEART: Regular rate and rhythm;         murmur  ABDOMEN: Soft, Nontender, ;   EXTREMITIES:    no    edema  NEUROLOGY: a rousable    LABS:                        11.2   8.2   )-----------( 153      ( 30 Oct 2018 06:17 )             33.3     10-30    140  |  107  |  27<H>  ----------------------------<  98  4.1   |  20<L>  |  0.73    Ca    8.7      30 Oct 2018 06:17                      Thyroid Stimulating Hormone, Serum: 2.80 uIU/mL (10-26 @ 08:01)          Consultant(s) Notes Reviewed:      Care Discussed with Consultants/Other Providers:
tele  nsr  no  compalints   in bed    REVIEW OF SYSTEMS:  GEN: no fever,    no chills  RESP: no SOB,   no cough  CVS: no chest pain,   no palpitations  GI: no abdominal pain,   no nausea,   no vomiting,   no constipation,   no diarrhea  : no dysuria,   no frequency  NEURO: no headache,   no dizziness  PSYCH: no depression,   not anxious  Derm : no rash    MEDICATIONS  (STANDING):  atorvastatin 20 milliGRAM(s) Oral at bedtime  brimonidine 0.2% Ophthalmic Solution 1 Drop(s) Both EYES two times a day  docusate sodium Liquid 100 milliGRAM(s) Oral two times a day  megestrol Suspension 400 milliGRAM(s) Oral daily  metoprolol tartrate 12.5 milliGRAM(s) Oral two times a day  rivaroxaban 15 milliGRAM(s) Oral every 24 hours  timolol 0.5% Solution 1 Drop(s) Both EYES two times a day    MEDICATIONS  (PRN):      Vital Signs Last 24 Hrs  T(C): 36.7 (28 Oct 2018 05:39), Max: 36.8 (27 Oct 2018 20:54)  T(F): 98 (28 Oct 2018 05:39), Max: 98.3 (27 Oct 2018 20:54)  HR: 62 (28 Oct 2018 05:39) (52 - 62)  BP: 124/62 (28 Oct 2018 05:39) (112/62 - 129/52)  BP(mean): --  RR: 18 (28 Oct 2018 05:39) (18 - 18)  SpO2: 93% (28 Oct 2018 04:30) (93% - 96%)  CAPILLARY BLOOD GLUCOSE        I&O's Summary    27 Oct 2018 07:01  -  28 Oct 2018 07:00  --------------------------------------------------------  IN: 50 mL / OUT: 0 mL / NET: 50 mL        PHYSICAL EXAM:  HEAD:  Atraumatic, Normocephalic  NECK: Supple, No   JVD  CHEST/LUNG:   no     rales,     no,    rhonchi  HEART: Regular rate and rhythm;         murmur  ABDOMEN: Soft, Nontender, ;   EXTREMITIES:   no     edema  NEUROLOGY:  arousable    LABS:                        10.6   8.2   )-----------( 120      ( 27 Oct 2018 08:54 )             31.5     10-28    141  |  109<H>  |  26<H>  ----------------------------<  76  5.1   |  20<L>  |  0.81    Ca    8.7      28 Oct 2018 06:26                      Thyroid Stimulating Hormone, Serum: 2.80 uIU/mL (10-26 @ 08:01)          Consultant(s) Notes Reviewed:      Care Discussed with Consultants/Other Providers:
tele,  nsr  50, pat 130    REVIEW OF SYSTEMS:  GEN: no fever,    no chills  RESP: no SOB,   no cough  CVS: no chest pain,   no palpitations  GI: no abdominal pain,   no nausea,   no vomiting,   no constipation,   no diarrhea  : no dysuria,   no frequency  NEURO: no headache,   no dizziness  PSYCH: no depression,   not anxious  Derm : no rash    MEDICATIONS  (STANDING):  atorvastatin 20 milliGRAM(s) Oral at bedtime  brimonidine 0.2% Ophthalmic Solution 1 Drop(s) Both EYES two times a day  docusate sodium Liquid 100 milliGRAM(s) Oral two times a day  megestrol Suspension 400 milliGRAM(s) Oral daily  metoprolol tartrate 12.5 milliGRAM(s) Oral two times a day  rivaroxaban 15 milliGRAM(s) Oral every 24 hours  sodium chloride 0.9%. 1000 milliLiter(s) (50 mL/Hr) IV Continuous <Continuous>  timolol 0.5% Solution 1 Drop(s) Both EYES two times a day    MEDICATIONS  (PRN):  acetaminophen   Tablet .. 650 milliGRAM(s) Oral every 6 hours PRN Mild Pain (1 - 3)      Vital Signs Last 24 Hrs  T(C): 36.9 (01 Nov 2018 03:57), Max: 36.9 (31 Oct 2018 20:42)  T(F): 98.5 (01 Nov 2018 03:57), Max: 98.5 (31 Oct 2018 20:42)  HR: 62 (01 Nov 2018 03:57) (52 - 65)  BP: 137/81 (01 Nov 2018 03:57) (76/41 - 137/81)  BP(mean): --  RR: 18 (01 Nov 2018 03:57) (16 - 18)  SpO2: 94% (01 Nov 2018 03:57) (93% - 97%)  CAPILLARY BLOOD GLUCOSE        I&O's Summary    31 Oct 2018 07:01  -  01 Nov 2018 07:00  --------------------------------------------------------  IN: 240 mL / OUT: 0 mL / NET: 240 mL        PHYSICAL EXAM:  HEAD:  Atraumatic, Normocephalic  NECK: Supple, No   JVD  CHEST/LUNG:   no     rales,     no,    rhonchi  HEART: Regular rate and rhythm;         murmur  ABDOMEN: Soft, Nontender, ;   EXTREMITIES:   no     edema  NEUROLOGY:  arousable    LABS:                        11.0   7.2   )-----------( 138      ( 31 Oct 2018 10:36 )             32.0     10-31    140  |  107  |  22  ----------------------------<  96  4.3   |  22  |  0.84    Ca    8.7      31 Oct 2018 10:36                      Thyroid Stimulating Hormone, Serum: 2.80 uIU/mL (10-26 @ 08:01)          Consultant(s) Notes Reviewed:      Care Discussed with Consultants/Other Providers:
tele, nsr/ clarita    REVIEW OF SYSTEMS:  GEN: no fever,    no chills  RESP: no SOB,   no cough  CVS: no chest pain,   no palpitations  GI: no abdominal pain,   no nausea,   no vomiting,   no constipation,   no diarrhea  : no dysuria,   no frequency  NEURO: no headache,   no dizziness  PSYCH: no depression,   not anxious  Derm : no rash    MEDICATIONS  (STANDING):  atorvastatin 20 milliGRAM(s) Oral at bedtime  brimonidine 0.2% Ophthalmic Solution 1 Drop(s) Both EYES two times a day  docusate sodium Liquid 100 milliGRAM(s) Oral two times a day  megestrol Suspension 400 milliGRAM(s) Oral daily  metoprolol tartrate 12.5 milliGRAM(s) Oral two times a day  rivaroxaban 15 milliGRAM(s) Oral every 24 hours  sodium chloride 0.9%. 1000 milliLiter(s) (50 mL/Hr) IV Continuous <Continuous>  timolol 0.5% Solution 1 Drop(s) Both EYES two times a day    MEDICATIONS  (PRN):      Vital Signs Last 24 Hrs  T(C): 36.7 (31 Oct 2018 06:56), Max: 37 (30 Oct 2018 12:00)  T(F): 98 (31 Oct 2018 06:56), Max: 98.6 (30 Oct 2018 12:00)  HR: 64 (31 Oct 2018 06:56) (54 - 67)  BP: 115/50 (31 Oct 2018 06:56) (107/52 - 118/86)  BP(mean): --  RR: 18 (31 Oct 2018 06:56) (16 - 18)  SpO2: 95% (31 Oct 2018 06:56) (95% - 98%)  CAPILLARY BLOOD GLUCOSE        I&O's Summary    30 Oct 2018 07:01  -  31 Oct 2018 07:00  --------------------------------------------------------  IN: 630 mL / OUT: 0 mL / NET: 630 mL        PHYSICAL EXAM:  HEAD:  Atraumatic, Normocephalic  NECK: Supple, No   JVD  CHEST/LUNG:   no     rales,     no,    rhonchi  HEART: Regular rate and rhythm;         murmur  ABDOMEN: Soft, Nontender, ;   EXTREMITIES:    no    edema  NEUROLOGY:  alert    LABS:                        11.2   8.2   )-----------( 153      ( 30 Oct 2018 06:17 )             33.3     10-30    140  |  107  |  27<H>  ----------------------------<  98  4.1   |  20<L>  |  0.73    Ca    8.7      30 Oct 2018 06:17                      Thyroid Stimulating Hormone, Serum: 2.80 uIU/mL (10-26 @ 08:01)          Consultant(s) Notes Reviewed:      Care Discussed with Consultants/Other Providers:

## 2018-11-06 NOTE — PROGRESS NOTE ADULT - ASSESSMENT
pt  with  h/o  afib/ copd, hld, pud, anemia  admitted with syncope   at assisted living  tele   pt with rib fx/ left  8/ 9 th ribs   seen by trauma, no intervention   in nsr  on lipitor/  lopressor/ xarelto    son  aware  of  plan of care,   nsr,  45  to 80. card  to  f/p        < from: CT Abdomen and Pelvis w/ IV Cont (10.25.18 @ 14:42) >  Degenerative changes in the spine. Status post left total hip replacement.  IMPRESSION: Comminuted mildly displaced fractures of the left eighth and   ninth posterior ribs.  Small left pleural effusion.  Indeterminate 0.5 cm nodular opacity in the right upper lobe, new since   6/26/2018.  < end of copied text >
pt  with  h/o  afib/ copd, hld, pud  admitted with syncope   at assisted living  tele   pt with rib fx/ left  8/ 9 th ribs   seen by trauma, no intervention   PT eval   card to evaluate , if pt is  an ideal  candidate  for on going a/c  folow bp curve   follow  hb/  no gi  bleed        < from: CT Abdomen and Pelvis w/ IV Cont (10.25.18 @ 14:42) >  Degenerative changes in the spine. Status post left total hip replacement.  IMPRESSION: Comminuted mildly displaced fractures of the left eighth and   ninth posterior ribs.  Small left pleural effusion.  Indeterminate 0.5 cm nodular opacity in the right upper lobe, new since   6/26/2018.  < end of copied text >
pt  with  h/o  afib/ copd, hld, pud  admitted with syncope   at assisted living  tele   pt with rib fx/ left  8/ 9 th ribs   seen by trauma, no intervention   PT eval   card to evaluate , if pt is  an ideal  candidate  for on going a/c  folow bp curve   follow  hb/  no gi  bleed  labs, pending        < from: CT Abdomen and Pelvis w/ IV Cont (10.25.18 @ 14:42) >  Degenerative changes in the spine. Status post left total hip replacement.  IMPRESSION: Comminuted mildly displaced fractures of the left eighth and   ninth posterior ribs.  Small left pleural effusion.  Indeterminate 0.5 cm nodular opacity in the right upper lobe, new since   6/26/2018.  < end of copied text >
pt  with  h/o  afib/ copd, hld, pud, anemia  admitted with syncope   at assisted living  tele   pt with rib fx/ left  8/ 9 th ribs   seen by trauma, no intervention     in nsr  on lipitor/  lopressor/ xarelto  awaiting disposition          < from: CT Abdomen and Pelvis w/ IV Cont (10.25.18 @ 14:42) >  Degenerative changes in the spine. Status post left total hip replacement.  IMPRESSION: Comminuted mildly displaced fractures of the left eighth and   ninth posterior ribs.  Small left pleural effusion.  Indeterminate 0.5 cm nodular opacity in the right upper lobe, new since   6/26/2018.  < end of copied text >
pt  with  h/o  afib/ copd, hld, pud, anemia  admitted with syncope   at assisted living  tele   pt with rib fx/ left  8/ 9 th ribs   seen by trauma, no intervention   PT eval, still pending      follow  hb/  no gi  bleed  in nsr          < from: CT Abdomen and Pelvis w/ IV Cont (10.25.18 @ 14:42) >  Degenerative changes in the spine. Status post left total hip replacement.  IMPRESSION: Comminuted mildly displaced fractures of the left eighth and   ninth posterior ribs.  Small left pleural effusion.  Indeterminate 0.5 cm nodular opacity in the right upper lobe, new since   6/26/2018.  < end of copied text >
pt  with  h/o  afib/ copd, hld, pud, anemia  admitted with syncope   at assisted living  tele   pt with rib fx/ left  8/ 9 th ribs   seen by trauma, no intervention   PT eval/  disposition, , still pending   in nsr  on lipitor/  lopressor/ xarelto          < from: CT Abdomen and Pelvis w/ IV Cont (10.25.18 @ 14:42) >  Degenerative changes in the spine. Status post left total hip replacement.  IMPRESSION: Comminuted mildly displaced fractures of the left eighth and   ninth posterior ribs.  Small left pleural effusion.  Indeterminate 0.5 cm nodular opacity in the right upper lobe, new since   6/26/2018.  < end of copied text >
pt  with  h/o  afib/ copd, hld, pud, anemia  admitted with syncope   at assisted living  tele   pt with rib fx/ left  8/ 9 th ribs   seen by trauma, no intervention   in nsr  on lipitor/  lopressor/ xarelto  if  no  further  cardiac  intervention, then start d/c planning          < from: CT Abdomen and Pelvis w/ IV Cont (10.25.18 @ 14:42) >  Degenerative changes in the spine. Status post left total hip replacement.  IMPRESSION: Comminuted mildly displaced fractures of the left eighth and   ninth posterior ribs.  Small left pleural effusion.  Indeterminate 0.5 cm nodular opacity in the right upper lobe, new since   6/26/2018.  < end of copied text >
pt  with  h/o  afib/ copd, hld, pud, anemia  admitted with syncope   at assisted living  tele   pt with rib fx/ left  8/ 9 th ribs   seen by trauma, no intervention   in nsr  on lipitor/  lopressor/ xarelto  no pauses.  brief  pat    son  aware  of  plan of care          < from: CT Abdomen and Pelvis w/ IV Cont (10.25.18 @ 14:42) >  Degenerative changes in the spine. Status post left total hip replacement.  IMPRESSION: Comminuted mildly displaced fractures of the left eighth and   ninth posterior ribs.  Small left pleural effusion.  Indeterminate 0.5 cm nodular opacity in the right upper lobe, new since   6/26/2018.  < end of copied text >
pt  with  h/o  afib/ copd, hld, pud, anemia  admitted with syncope   at assisted living  tele   pt with rib fx/ left  8/ 9 th ribs   seen by trauma, no intervention   in nsr  on lipitor/  lopressor/ xarelto  no pauses.  brief  pat    son  aware  of  plan of care,   d/c  planning to start        < from: CT Abdomen and Pelvis w/ IV Cont (10.25.18 @ 14:42) >  Degenerative changes in the spine. Status post left total hip replacement.  IMPRESSION: Comminuted mildly displaced fractures of the left eighth and   ninth posterior ribs.  Small left pleural effusion.  Indeterminate 0.5 cm nodular opacity in the right upper lobe, new since   6/26/2018.  < end of copied text >
pt  with  h/o  afib/ copd, hld, pud, anemia  admitted with syncope   at assisted living  tele   pt with rib fx/ left  8/ 9 th ribs   seen by trauma, no intervention   in nsr  on lipitor/  lopressor/ xarelto  no pauses.  brief  pat    son  aware  of  plan of care,   no furthe r intervention/  d/c  planning        < from: CT Abdomen and Pelvis w/ IV Cont (10.25.18 @ 14:42) >  Degenerative changes in the spine. Status post left total hip replacement.  IMPRESSION: Comminuted mildly displaced fractures of the left eighth and   ninth posterior ribs.  Small left pleural effusion.  Indeterminate 0.5 cm nodular opacity in the right upper lobe, new since   6/26/2018.  < end of copied text >
pt  with  h/o  afib/ copd, hld, pud, anemia  admitted with syncope   at assisted living  tele   pt with rib fx/ left  8/ 9 th ribs   seen by trauma, no intervention   in nsr  on lipitor/  lopressor/ xarelto  no pauses.  brief  pat    son  aware  of  plan of careplan, per  acrd          < from: CT Abdomen and Pelvis w/ IV Cont (10.25.18 @ 14:42) >  Degenerative changes in the spine. Status post left total hip replacement.  IMPRESSION: Comminuted mildly displaced fractures of the left eighth and   ninth posterior ribs.  Small left pleural effusion.  Indeterminate 0.5 cm nodular opacity in the right upper lobe, new since   6/26/2018.  < end of copied text >
pt  with  h/o  afib/ copd, hld, pud, anemia  admitted with syncope   at assisted living  tele   pt with rib fx/ left  8/ 9 th ribs   seen by trauma, no intervention   in nsr  on lipitor/  lopressor/ xarelto  no pauses.  brief  pat  if  no  further  cardiac  intervention, then start d/c planning          < from: CT Abdomen and Pelvis w/ IV Cont (10.25.18 @ 14:42) >  Degenerative changes in the spine. Status post left total hip replacement.  IMPRESSION: Comminuted mildly displaced fractures of the left eighth and   ninth posterior ribs.  Small left pleural effusion.  Indeterminate 0.5 cm nodular opacity in the right upper lobe, new since   6/26/2018.  < end of copied text >

## 2018-11-06 NOTE — DISCHARGE NOTE ADULT - CARE PLAN
Principal Discharge DX:	Syncope  Goal:	resolved.  Assessment and plan of treatment:	HOME CARE INSTRUCTIONS  Have someone stay with you until you feel stable.  Do not drive, operate machinery, or play sports until your caregiver says it is okay.  Keep all follow-up appointments as directed by your caregiver.   Lie down right away if you start feeling like you might faint. Breathe deeply and steadily. Wait until all the symptoms have passed.Drink enough fluids to keep your urine clear or pale yellow.  If you are taking blood pressure or heart medicine, get up slowly, taking several minutes to sit and then stand. This can reduce dizziness.  SEEK IMMEDIATE MEDICAL CARE IF:  You have a severe headache.  You have unusual pain in the chest, abdomen, or back.  You are bleeding from the mouth or rectum, or you have black or tarry stool.  You have an irregular or very fast heartbeat.  You have pain with breathing.  You have repeated fainting or seizure-like jerking during an episode.  You faint when sitting or lying down.  You have confusion.  You have difficulty walking.  You have severe weakness.  You have vision problems.  If you fainted, call your local emergency services (_____________________). Do not drive yourself to the hospital  Secondary Diagnosis:	Frequent falls  Goal:	Stable  Assessment and plan of treatment:	Fall prevention.   Home physical therapy.

## 2018-11-06 NOTE — PROVIDER CONTACT NOTE (OTHER) - ACTION/TREATMENT ORDERED:
CONTINUE TO OBSERVE
okay to give Metoprolol .S R 60's on tele monitor. will continue to monitor the patient.
12 lead EKG STAT. Continue to monitor & maintain safety.
JAYSON Pacheco ordered 500ml NS Bolus to be infused over 60 minutes, and to hold bed time metoprolol dose. Bed time metoprolol dose held as ordered and 500 ml NS infusing. Will continue to monitor pt.
Metoprolol held as per LAKESHIA Phillips. Will continue to monitor pt and maintain safety.
NP aware & reviewed all of patient's Lab Complete Blood Count results since admission, all lab results, orders, history & plan of care. No new orders per NP & next Lab Complete Blood Count tomorrow.
NP notified and aware.  Okay as per NP to hold medication.  Re-evaluate HR & BP for AM dose of metoprolol.  Continue to monitor patient and maintain safety

## 2018-11-06 NOTE — DISCHARGE NOTE ADULT - MEDICATION SUMMARY - MEDICATIONS TO TAKE
I will START or STAY ON the medications listed below when I get home from the hospital:    acetaminophen 325 mg oral tablet  -- 2 tab(s) by mouth every 6 hours, As needed, Mild Pain (1 - 3)  -- Indication: For Pain     Xarelto 15 mg oral tablet  -- 1 tab(s) by mouth once a day (in the morning) MDD:1  -- Indication: For Afib    atorvastatin 20 mg oral tablet  -- 1 tab(s) by mouth once a day MDD:1  -- Indication: For CAD    megestrol 40 mg/mL oral suspension  -- 10 milliliter(s) by mouth once a day  -- Indication: For Appetite stimulant    metoprolol tartrate 25 mg oral tablet  -- 0.5 tab(s) (12.5 mg) by mouth 2 times a day MDD:1  -- It is very important that you take or use this exactly as directed.  Do not skip doses or discontinue unless directed by your doctor.  May cause drowsiness.  Alcohol may intensify this effect.  Use care when operating dangerous machinery.  Some non-prescription drugs may aggravate your condition.  Read all labels carefully.  If a warning appears, check with your doctor before taking.  Take with food or milk.  This drug may impair the ability to drive or operate machinery.  Use care until you become familiar with its effects.    -- Indication: For CAD    vitamin A & D topical ointment  -- Apply on skin to affected area once a day ( Right Heel)  -- Indication: For Dry Skin     docusate sodium 10 mg/mL oral liquid  -- 30 milliliter(s) by mouth once a day (at bedtime)  -- Indication: For Stool Softener    timolol maleate 0.5% ophthalmic gel forming solution  -- 1 drop(s) to each eye 2 times a day  -- Indication: For Glaucoma    brimonidine 0.2% ophthalmic solution  -- 1 drop(s) to each affected eye 2 times a day  -- Indication: For Glaucoma    Multiple Vitamins oral tablet  -- 1 tab(s) by mouth once a day MDD:1  -- Indication: For Supplement I will START or STAY ON the medications listed below when I get home from the hospital:    acetaminophen 325 mg oral tablet  -- 2 tab(s) by mouth every 6 hours, As needed, Mild Pain (1 - 3)  -- Indication: For Pain     Xarelto 15 mg oral tablet  -- 1 tab(s) by mouth once a day (in the morning) MDD:1  -- Indication: For Afib    atorvastatin 20 mg oral tablet  -- 1 tab(s) by mouth once a day MDD:1  -- Indication: For CAD    megestrol 40 mg/mL oral suspension  -- 10 milliliter(s) by mouth once a day  -- Indication: For Appetite stimulant    metoprolol tartrate 25 mg oral tablet  -- 0.5 tab(s) (12.5 mg) by mouth 2 times a day MDD:1  -- It is very important that you take or use this exactly as directed.  Do not skip doses or discontinue unless directed by your doctor.  May cause drowsiness.  Alcohol may intensify this effect.  Use care when operating dangerous machinery.  Some non-prescription drugs may aggravate your condition.  Read all labels carefully.  If a warning appears, check with your doctor before taking.  Take with food or milk.  This drug may impair the ability to drive or operate machinery.  Use care until you become familiar with its effects.    -- Indication: For CAD    vitamin A & D topical ointment  -- Apply on skin to affected area once a day ( Right Heel)  -- Indication: For Dry Skin     docusate sodium 10 mg/mL oral liquid  -- 30 milliliter(s) by mouth once a day (at bedtime)  -- Indication: For Stool Softener    brimonidine 0.2% ophthalmic solution  -- 1 drop(s) to each affected eye 2 times a day  -- Indication: For Glaucoma    timolol maleate 0.5% ophthalmic solution  -- 1 drop(s) to each affected eye 2 times a day  -- Indication: For Glaucoma    Multiple Vitamins oral tablet  -- 1 tab(s) by mouth once a day MDD:1  -- Indication: For Supplement

## 2018-11-10 ENCOUNTER — EMERGENCY (EMERGENCY)
Facility: HOSPITAL | Age: 83
LOS: 1 days | Discharge: HOME | End: 2018-11-10
Attending: EMERGENCY MEDICINE
Payer: MEDICARE

## 2018-11-10 VITALS
DIASTOLIC BLOOD PRESSURE: 56 MMHG | RESPIRATION RATE: 17 BRPM | HEART RATE: 53 BPM | SYSTOLIC BLOOD PRESSURE: 139 MMHG | TEMPERATURE: 99 F | OXYGEN SATURATION: 100 %

## 2018-11-10 VITALS
HEART RATE: 59 BPM | WEIGHT: 119.93 LBS | OXYGEN SATURATION: 100 % | RESPIRATION RATE: 19 BRPM | DIASTOLIC BLOOD PRESSURE: 58 MMHG | TEMPERATURE: 97 F | SYSTOLIC BLOOD PRESSURE: 110 MMHG

## 2018-11-10 LAB
ALBUMIN SERPL ELPH-MCNC: 3.9 G/DL — SIGNIFICANT CHANGE UP (ref 3.3–5)
ALP SERPL-CCNC: 113 U/L — SIGNIFICANT CHANGE UP (ref 40–120)
ALT FLD-CCNC: 6 U/L — LOW (ref 10–45)
ANION GAP SERPL CALC-SCNC: 11 MMOL/L — SIGNIFICANT CHANGE UP (ref 5–17)
AST SERPL-CCNC: 20 U/L — SIGNIFICANT CHANGE UP (ref 10–40)
BASOPHILS # BLD AUTO: 0 K/UL — SIGNIFICANT CHANGE UP (ref 0–0.2)
BASOPHILS NFR BLD AUTO: 0.3 % — SIGNIFICANT CHANGE UP (ref 0–2)
BILIRUB SERPL-MCNC: 1 MG/DL — SIGNIFICANT CHANGE UP (ref 0.2–1.2)
BUN SERPL-MCNC: 21 MG/DL — SIGNIFICANT CHANGE UP (ref 7–23)
CALCIUM SERPL-MCNC: 8.9 MG/DL — SIGNIFICANT CHANGE UP (ref 8.4–10.5)
CHLORIDE SERPL-SCNC: 104 MMOL/L — SIGNIFICANT CHANGE UP (ref 96–108)
CO2 SERPL-SCNC: 22 MMOL/L — SIGNIFICANT CHANGE UP (ref 22–31)
CREAT SERPL-MCNC: 0.88 MG/DL — SIGNIFICANT CHANGE UP (ref 0.5–1.3)
EOSINOPHIL # BLD AUTO: 0 K/UL — SIGNIFICANT CHANGE UP (ref 0–0.5)
EOSINOPHIL NFR BLD AUTO: 0.1 % — SIGNIFICANT CHANGE UP (ref 0–6)
GLUCOSE SERPL-MCNC: 88 MG/DL — SIGNIFICANT CHANGE UP (ref 70–99)
HCT VFR BLD CALC: 34.1 % — LOW (ref 34.5–45)
HGB BLD-MCNC: 11.6 G/DL — SIGNIFICANT CHANGE UP (ref 11.5–15.5)
LYMPHOCYTES # BLD AUTO: 1.7 K/UL — SIGNIFICANT CHANGE UP (ref 1–3.3)
LYMPHOCYTES # BLD AUTO: 19.1 % — SIGNIFICANT CHANGE UP (ref 13–44)
MCHC RBC-ENTMCNC: 33.5 PG — SIGNIFICANT CHANGE UP (ref 27–34)
MCHC RBC-ENTMCNC: 33.9 GM/DL — SIGNIFICANT CHANGE UP (ref 32–36)
MCV RBC AUTO: 98.8 FL — SIGNIFICANT CHANGE UP (ref 80–100)
MONOCYTES # BLD AUTO: 0.7 K/UL — SIGNIFICANT CHANGE UP (ref 0–0.9)
MONOCYTES NFR BLD AUTO: 7.5 % — SIGNIFICANT CHANGE UP (ref 2–14)
NEUTROPHILS # BLD AUTO: 6.6 K/UL — SIGNIFICANT CHANGE UP (ref 1.8–7.4)
NEUTROPHILS NFR BLD AUTO: 73.1 % — SIGNIFICANT CHANGE UP (ref 43–77)
PLATELET # BLD AUTO: 214 K/UL — SIGNIFICANT CHANGE UP (ref 150–400)
POTASSIUM SERPL-MCNC: 4.4 MMOL/L — SIGNIFICANT CHANGE UP (ref 3.5–5.3)
POTASSIUM SERPL-SCNC: 4.4 MMOL/L — SIGNIFICANT CHANGE UP (ref 3.5–5.3)
PROT SERPL-MCNC: 7 G/DL — SIGNIFICANT CHANGE UP (ref 6–8.3)
RBC # BLD: 3.45 M/UL — LOW (ref 3.8–5.2)
RBC # FLD: 14.1 % — SIGNIFICANT CHANGE UP (ref 10.3–14.5)
SODIUM SERPL-SCNC: 137 MMOL/L — SIGNIFICANT CHANGE UP (ref 135–145)
WBC # BLD: 9.1 K/UL — SIGNIFICANT CHANGE UP (ref 3.8–10.5)
WBC # FLD AUTO: 9.1 K/UL — SIGNIFICANT CHANGE UP (ref 3.8–10.5)

## 2018-11-10 PROCEDURE — 85027 COMPLETE CBC AUTOMATED: CPT

## 2018-11-10 PROCEDURE — 72170 X-RAY EXAM OF PELVIS: CPT

## 2018-11-10 PROCEDURE — 71045 X-RAY EXAM CHEST 1 VIEW: CPT | Mod: 26

## 2018-11-10 PROCEDURE — 72170 X-RAY EXAM OF PELVIS: CPT | Mod: 26

## 2018-11-10 PROCEDURE — 80053 COMPREHEN METABOLIC PANEL: CPT

## 2018-11-10 PROCEDURE — 99284 EMERGENCY DEPT VISIT MOD MDM: CPT

## 2018-11-10 PROCEDURE — 71045 X-RAY EXAM CHEST 1 VIEW: CPT

## 2018-11-10 PROCEDURE — 99284 EMERGENCY DEPT VISIT MOD MDM: CPT | Mod: 25

## 2018-11-10 NOTE — ED PROVIDER NOTE - PLAN OF CARE
Continue your current medication regimen.  Follow up with your Primary Care Physician within the next 2-3 days.  Bring a copy of your test results with you to your appointment.  Return to the Emergency Room if you experience new or worsening symptoms.

## 2018-11-10 NOTE — ED ADULT NURSE NOTE - OBJECTIVE STATEMENT
89 y.o female arrived via EMS from [facility] d/t fall. Pt c/o pain to side of L breast- ecchymosis present to area. Healing L arm bruising. Pt is alert to name// Place. c/o of "being cold". No family at bedside. Afebrile. No c/o neck pain. Diaper changed- urine present. RAMEY/ follows commands.

## 2018-11-10 NOTE — ED PROVIDER NOTE - CARE PLAN
Principal Discharge DX:	Fall  Assessment and plan of treatment:	Continue your current medication regimen.  Follow up with your Primary Care Physician within the next 2-3 days.  Bring a copy of your test results with you to your appointment.  Return to the Emergency Room if you experience new or worsening symptoms. Principal Discharge DX:	Contusion, buttock, initial encounter  Assessment and plan of treatment:	Continue your current medication regimen.  Follow up with your Primary Care Physician within the next 2-3 days.  Bring a copy of your test results with you to your appointment.  Return to the Emergency Room if you experience new or worsening symptoms.  Secondary Diagnosis:	Fall, initial encounter

## 2018-11-10 NOTE — ED PROVIDER NOTE - PROGRESS NOTE DETAILS
Left msg for son/emergency contact to notify of ED visit, left call back number. Pt stable for d/c back to [facility]. Dr. Daniel sierra - Carey Niño, PA-C Spoke with patient's son. - Carey Niño PA-C Spoke with patient's pcp Dr Romero, aware of visit and plan for discharge back to [facility]. -Daniel

## 2018-11-10 NOTE — ED ADULT TRIAGE NOTE - NSWEIGHTCALCTOOLDRUG_GEN_A_CORE
used 2 mos old male with fever x 2 days of 100.4 and vomiting since yesterday. Will check cbc, blood culture, ua, urine culture, rvp. Also US for pyloric stenosis and axr

## 2018-11-10 NOTE — ED PROVIDER NOTE - MUSCULOSKELETAL MINIMAL EXAM
+ ecchymosis (old) to left lateral ribs, + scattered ecchymosis to upper extremities L>R, full ROM of extremities x 4

## 2018-11-10 NOTE — ED PROVIDER NOTE - OBJECTIVE STATEMENT
89yo F with PMH A.fib, CVA, recent fall 89yo F with PMH A.fib, CVA, recent admission for fall and left rib fractures BIBEMS from [facility] for fall today. EMS with no details of fall. RN Clarisa MEDEL called [facility] spoke with , reports patient with witnessed fall today, was being assisted out of wheelchair when she fell on her buttock and then was assisted back in chair. No head injury, no LOC. Pt now c/o left sided rib pain, clutching her side. Denies any CP, SOB, abd pain.

## 2018-11-10 NOTE — ED PROVIDER NOTE - ATTENDING CONTRIBUTION TO CARE
89 yo F with h/o Afib, CVA, advanced dementia, repeated falls, and recent admission for fall with multiple left rib fractures; presenting today from [facility] for witnessed mechanical fall today.  Patient reportedly was being assisted out of wheelchair and slid down landing on buttocks; no head trauma.  Patient with no apparent distress, but was transferred to ED for evaluation.    On Physical Exam:  General: in NAD, elderly, unable to supply history  HEENT: PERRL, MMM  Neck: no neck tenderness, no nuchal rigidity  Cardiac: normal s1, s2; irregular  Lungs: CTABL  Chest: left chest wall tenderness (corresponds to known rib fractures)  Abdomen: soft nontender/nondistended; no abdominal ecchymoses/abrasions/lacerations  : no bladder tenderness or distension  Skin: intact, no rash; old appearing ecchymosis (old) to left lateral ribs, also old appearing scattered ecchymosis to upper extremities L>R  Extremities: no peripheral edema, no gross deformities; no apparent tenderness to extremities, moving all extremities equally  Neuro: no facial asymmetry, moving all extremities.    AP: Witness low energy mechanism fall, onto buttocks; minimal trauma per description and no apparent new trauma identified on PE; will obtain CXR (given recent rib fractures, eval for displacement, consolidation/effusion) and pelvis xray (given fall onto buttocks).  If no acute findings, will be stable for d/c back to facility.

## 2018-11-10 NOTE — ED ADULT NURSE NOTE - NSIMPLEMENTINTERV_GEN_ALL_ED
Implemented All Universal Safety Interventions:  [place] to call system. Call bell, personal items and telephone within reach. Instruct patient to call for assistance. Room bathroom lighting operational. Non-slip footwear when patient is off stretcher. Physically safe environment: no spills, clutter or unnecessary equipment. Stretcher in lowest position, wheels locked, appropriate side rails in place.

## 2018-11-10 NOTE — ED PROVIDER NOTE - UNABLE TO OBTAIN
poor historia 2/2 dementia, reports she fell "days ago," no recollection of fall today. Dementia see HPI poor historian 2/2 dementia, reports she fell "days ago," no recollection of fall today.

## 2019-01-01 NOTE — ED PROCEDURE NOTE - CPROC ED INFORMED CONSENT1
This was an emergent procedure and consent was implied. pt breathing normally here, no tachypnea.  RR 34.  mom reassured.  anything worsens RTC

## 2019-07-14 ENCOUNTER — EMERGENCY (EMERGENCY)
Facility: HOSPITAL | Age: 84
LOS: 1 days | Discharge: HOME | End: 2019-07-14
Attending: EMERGENCY MEDICINE
Payer: MEDICARE

## 2019-07-14 VITALS
OXYGEN SATURATION: 100 % | DIASTOLIC BLOOD PRESSURE: 78 MMHG | HEART RATE: 51 BPM | TEMPERATURE: 98 F | RESPIRATION RATE: 16 BRPM | SYSTOLIC BLOOD PRESSURE: 140 MMHG

## 2019-07-14 VITALS
HEART RATE: 44 BPM | RESPIRATION RATE: 16 BRPM | SYSTOLIC BLOOD PRESSURE: 107 MMHG | OXYGEN SATURATION: 100 % | DIASTOLIC BLOOD PRESSURE: 69 MMHG

## 2019-07-14 PROCEDURE — 99284 EMERGENCY DEPT VISIT MOD MDM: CPT | Mod: 25

## 2019-07-14 PROCEDURE — 99284 EMERGENCY DEPT VISIT MOD MDM: CPT | Mod: GC

## 2019-07-14 PROCEDURE — 96374 THER/PROPH/DIAG INJ IV PUSH: CPT

## 2019-07-14 RX ADMIN — Medication 100 MILLIGRAM(S): at 16:19

## 2019-07-14 NOTE — ED PROVIDER NOTE - NSFOLLOWUPINSTRUCTIONS_ED_ALL_ED_FT
Please follow up with your primary care provider in the next few days. You will need to follow up with a dentist in the next few days for evaluation.    Take the antibiotic (clindamycin) 450mg three times a day for the next 7 days. You can open the capsule to mix into food to help swallow.    Continue all other home medications as prescribed.    Return to the ED for any worsening symptoms of difficulty breathing, difficulty swallowing, worsening swelling or redness, fevers, or any new or concerning symptoms.    Please read all attached.

## 2019-07-14 NOTE — ED PROVIDER NOTE - PHYSICAL EXAMINATION
General: elderly woman lying in bed with eyes closed in no acute distress  Head: normocephalic, left jawline erythema and mild swelling compared to left with no tenderness to palpation  Eyes: clear eyes  Mouth: moist mucous membranes, poor dentition with caries, tenderness to palpation of gum line  Neck: supple neck  CV: normal rate and rhythm, normal S1 and S2  Respiratory: clear to auscultation bilaterally  Abdomen: soft, nontender, nondistended, bowel sounds present x 4 quadrants  Back: no midline tenderness to palpation, no CVAT  Neuro: alert and oriented x3, CN II-XII intact, speech clear, strength 5/5 UE and LE bilaterally, sensation equal and intact bilaterally  Skin: no rashes or lesions  Extremities: no edema, peripheral pulses 2+ bilaterally General: elderly woman lying in bed with eyes closed in no acute distress  Head: normocephalic, left jawline erythema and mild swelling compared to left with no tenderness to palpation  Eyes: clear eyes  Mouth: moist mucous membranes, poor dentition with caries, tenderness to palpation of gum line, soft lower palate  Neck: supple neck  CV: normal rate and rhythm  Respiratory: clear to auscultation bilaterally  Abdomen: soft, nontender, nondistended  Neuro: mildly responsive to pain, nonverbal, moves all limbs when moved from initial position  Skin: erythema of left jawline with no fluctuance or tenderness to palpation, 6cm x 2.5cm  Extremities: no LE edema or tenderness to palpation, peripheral pulses 2+ bilaterally

## 2019-07-14 NOTE — ED PROVIDER NOTE - PROGRESS NOTE DETAILS
Ana Laura Jauregui, resident MD: spoke with [facility], discussed plan for discharge with antibiotics and dental f/u. pt's son came and is at bedside. pt is awake and responds to pt's son and answers questions and is tolerating PO without difficulty at this time. pt's son reports that she is at her baseline mental status. pt received a dose of abx here and will discharge at this time. discussed abx use and return precautions with pt's son.

## 2019-07-14 NOTE — ED PROVIDER NOTE - NSFOLLOWUPCLINICS_GEN_ALL_ED_FT
[facility]  Dental  [address] [place] [id number]  Phone: [phone]  Fax:   Follow Up Time:

## 2019-07-14 NOTE — ED PROVIDER NOTE - OBJECTIVE STATEMENT
89yo woman PMH dementia with history of not responding, afib on xarelto, GERD was sent from [facility] due to left jaw redness and swelling.   Spoke with the nurse taking care of pt at [facility] who reports that she did not have any trauma to the area, no symptoms prior to today. Pt's baseline mental status is A&Ox0 and pt does not respond occasionally. She had no other symptoms than the left jaw swelling that they were concerned about. Pt is able to swallow crushed pills and eats without difficulty.

## 2019-07-14 NOTE — ED ADULT NURSE NOTE - OBJECTIVE STATEMENT
89 y/o female 89 y/o female, alert and nonverbal at this time, responsive to verbal stimulus, BIBEMS from [facility] for left jaw redness and swelling. As per EMS, pt was observed having complaints about left jaw which is visibly red, skin intact. Unknown incidences of trauma or bug bites. Pt sent to ED by [facility] for further evaluation. Upon further assessment, airway patent and intact, patient resting comfortably, s/s chest pain/SOB. ABD soft nontender, denies n/v/d. Denies blood in urine and/or stool. Skin intact. Peripheral pulses strong and normal baseline sensation present x4. Safety and comfort measures maintained. 89 y/o female, alert and nonverbal at this time, responsive to verbal stimulus, PMH afib on elequis, COPD, GERD, dementia, CAD, CVA, BIBEMS from [facility] for left jaw redness and swelling. As per EMS, pt was observed having complaints about left jaw which is visibly red, skin intact. Unknown incidences of trauma or bug bites. Pt sent to ED by [facility] for further evaluation. Upon further assessment, airway patent and intact, patient resting comfortably, s/s chest pain/SOB. ABD soft nontender, Redness noted to left jaw adjacent to chin. Peripheral pulses strong and normal baseline sensation present x4. Safety and comfort measures maintained.

## 2019-07-14 NOTE — ED PROVIDER NOTE - ATTENDING CONTRIBUTION TO CARE
I performed a history and physical exam of the patient and discussed their management with the resident and /or advanced care provider. I reviewed the resident and /or ACP's note and agree with the documented findings and plan of care. My medical decison making and observations are found above.  Nl neck, no bony jaw pain, no abscess.

## 2019-07-14 NOTE — ED PROVIDER NOTE - CLINICAL SUMMARY MEDICAL DECISION MAKING FREE TEXT BOX
89yo woman presents with left jaw erythema and swelling with signs of poor dentition with no tenderness to palpation of facial lesion and no history of trauma. Most likely infectious etiology vs allergic etiology. Will treat with abx as potential dental etiology and have pt f/u with dentist outpt and send back to [facility] with return precautions. 89yo woman presents with left jaw erythema and swelling with signs of poor dentition with no tenderness to palpation of facial lesion and no history of trauma. Most likely infectious etiology vs allergic etiology. Will treat with abx as potential dental etiology and have pt f/u with dentist outpt and send back to [facility] with return precautions.  Yun: redness to lt lower jaw, poor dentition. no trama, no bone pain, not likely fx. No abcess, no pain under tongue. Will treat as dental infection. called her home to ensure dental follow up.

## 2019-07-14 NOTE — ED ADULT NURSE NOTE - NSIMPLEMENTINTERV_GEN_ALL_ED
Implemented All Fall with Harm Risk Interventions:  [place] to call system. Call bell, personal items and telephone within reach. Instruct patient to call for assistance. Room bathroom lighting operational. Non-slip footwear when patient is off stretcher. Physically safe environment: no spills, clutter or unnecessary equipment. Stretcher in lowest position, wheels locked, appropriate side rails in place. Provide visual cue, wrist band, yellow gown, etc. Monitor gait and stability. Monitor for mental status changes and reorient to person, place, and time. Review medications for side effects contributing to fall risk. Reinforce activity limits and safety measures with patient and family. Provide visual clues: red socks.

## 2020-01-07 ENCOUNTER — INPATIENT (INPATIENT)
Facility: HOSPITAL | Age: 85
LOS: 2 days | Discharge: NURSING, REHAB OR LONG-TERM CARE | DRG: 312 | End: 2020-01-10
Attending: INTERNAL MEDICINE | Admitting: INTERNAL MEDICINE
Payer: MEDICARE

## 2020-01-07 VITALS
HEIGHT: 62 IN | SYSTOLIC BLOOD PRESSURE: 88 MMHG | OXYGEN SATURATION: 98 % | HEART RATE: 78 BPM | TEMPERATURE: 99 F | WEIGHT: 110.01 LBS | DIASTOLIC BLOOD PRESSURE: 62 MMHG | RESPIRATION RATE: 18 BRPM

## 2020-01-07 DIAGNOSIS — A41.9 SEPSIS, UNSPECIFIED ORGANISM: ICD-10-CM

## 2020-01-07 LAB
ALBUMIN SERPL ELPH-MCNC: 3.5 G/DL — SIGNIFICANT CHANGE UP (ref 3.3–5)
ALP SERPL-CCNC: 97 U/L — SIGNIFICANT CHANGE UP (ref 40–120)
ALT FLD-CCNC: 16 U/L — SIGNIFICANT CHANGE UP (ref 10–45)
ANION GAP SERPL CALC-SCNC: 12 MMOL/L — SIGNIFICANT CHANGE UP (ref 5–17)
APPEARANCE UR: ABNORMAL
APTT BLD: 37.3 SEC — HIGH (ref 27.5–36.3)
AST SERPL-CCNC: 32 U/L — SIGNIFICANT CHANGE UP (ref 10–40)
BACTERIA # UR AUTO: ABNORMAL
BASE EXCESS BLDV CALC-SCNC: 0.5 MMOL/L — SIGNIFICANT CHANGE UP (ref -2–2)
BASE EXCESS BLDV CALC-SCNC: 0.7 MMOL/L — SIGNIFICANT CHANGE UP (ref -2–2)
BASOPHILS # BLD AUTO: 0.01 K/UL — SIGNIFICANT CHANGE UP (ref 0–0.2)
BASOPHILS NFR BLD AUTO: 0.1 % — SIGNIFICANT CHANGE UP (ref 0–2)
BILIRUB SERPL-MCNC: 2.4 MG/DL — HIGH (ref 0.2–1.2)
BILIRUB UR-MCNC: NEGATIVE — SIGNIFICANT CHANGE UP
BLD GP AB SCN SERPL QL: NEGATIVE — SIGNIFICANT CHANGE UP
BUN SERPL-MCNC: 27 MG/DL — HIGH (ref 7–23)
CA-I SERPL-SCNC: 1.17 MMOL/L — SIGNIFICANT CHANGE UP (ref 1.12–1.3)
CA-I SERPL-SCNC: 1.19 MMOL/L — SIGNIFICANT CHANGE UP (ref 1.12–1.3)
CALCIUM SERPL-MCNC: 9.1 MG/DL — SIGNIFICANT CHANGE UP (ref 8.4–10.5)
CHLORIDE BLDV-SCNC: 108 MMOL/L — SIGNIFICANT CHANGE UP (ref 96–108)
CHLORIDE BLDV-SCNC: 109 MMOL/L — HIGH (ref 96–108)
CHLORIDE SERPL-SCNC: 105 MMOL/L — SIGNIFICANT CHANGE UP (ref 96–108)
CK SERPL-CCNC: 69 U/L — SIGNIFICANT CHANGE UP (ref 25–170)
CO2 BLDV-SCNC: 28 MMOL/L — SIGNIFICANT CHANGE UP (ref 22–30)
CO2 BLDV-SCNC: 28 MMOL/L — SIGNIFICANT CHANGE UP (ref 22–30)
CO2 SERPL-SCNC: 22 MMOL/L — SIGNIFICANT CHANGE UP (ref 22–31)
COLOR SPEC: YELLOW — SIGNIFICANT CHANGE UP
CREAT SERPL-MCNC: 0.99 MG/DL — SIGNIFICANT CHANGE UP (ref 0.5–1.3)
DIFF PNL FLD: ABNORMAL
EOSINOPHIL # BLD AUTO: 0.01 K/UL — SIGNIFICANT CHANGE UP (ref 0–0.5)
EOSINOPHIL NFR BLD AUTO: 0.1 % — SIGNIFICANT CHANGE UP (ref 0–6)
EPI CELLS # UR: 0 /HPF — SIGNIFICANT CHANGE UP
GAS PNL BLDV: 139 MMOL/L — SIGNIFICANT CHANGE UP (ref 135–145)
GAS PNL BLDV: 139 MMOL/L — SIGNIFICANT CHANGE UP (ref 135–145)
GAS PNL BLDV: SIGNIFICANT CHANGE UP
GAS PNL BLDV: SIGNIFICANT CHANGE UP
GLUCOSE BLDV-MCNC: 102 MG/DL — HIGH (ref 70–99)
GLUCOSE BLDV-MCNC: 97 MG/DL — SIGNIFICANT CHANGE UP (ref 70–99)
GLUCOSE SERPL-MCNC: 134 MG/DL — HIGH (ref 70–99)
GLUCOSE UR QL: NEGATIVE — SIGNIFICANT CHANGE UP
GRAN CASTS # UR COMP ASSIST: 1 /LPF — SIGNIFICANT CHANGE UP
HCO3 BLDV-SCNC: 26 MMOL/L — SIGNIFICANT CHANGE UP (ref 21–29)
HCO3 BLDV-SCNC: 27 MMOL/L — SIGNIFICANT CHANGE UP (ref 21–29)
HCT VFR BLD CALC: 38.6 % — SIGNIFICANT CHANGE UP (ref 34.5–45)
HCT VFR BLDA CALC: 38 % — LOW (ref 39–50)
HCT VFR BLDA CALC: 39 % — SIGNIFICANT CHANGE UP (ref 39–50)
HGB BLD CALC-MCNC: 12.5 G/DL — SIGNIFICANT CHANGE UP (ref 11.5–15.5)
HGB BLD CALC-MCNC: 12.6 G/DL — SIGNIFICANT CHANGE UP (ref 11.5–15.5)
HGB BLD-MCNC: 13 G/DL — SIGNIFICANT CHANGE UP (ref 11.5–15.5)
HYALINE CASTS # UR AUTO: 23 /LPF — HIGH (ref 0–2)
IMM GRANULOCYTES NFR BLD AUTO: 0.4 % — SIGNIFICANT CHANGE UP (ref 0–1.5)
INR BLD: 2.37 RATIO — HIGH (ref 0.88–1.16)
KETONES UR-MCNC: NEGATIVE — SIGNIFICANT CHANGE UP
LACTATE BLDV-MCNC: 1.7 MMOL/L — SIGNIFICANT CHANGE UP (ref 0.7–2)
LACTATE BLDV-MCNC: 3.8 MMOL/L — HIGH (ref 0.7–2)
LEUKOCYTE ESTERASE UR-ACNC: ABNORMAL
LIDOCAIN IGE QN: 21 U/L — SIGNIFICANT CHANGE UP (ref 7–60)
LYMPHOCYTES # BLD AUTO: 1.06 K/UL — SIGNIFICANT CHANGE UP (ref 1–3.3)
LYMPHOCYTES # BLD AUTO: 15.7 % — SIGNIFICANT CHANGE UP (ref 13–44)
MCHC RBC-ENTMCNC: 33.7 GM/DL — SIGNIFICANT CHANGE UP (ref 32–36)
MCHC RBC-ENTMCNC: 33.9 PG — SIGNIFICANT CHANGE UP (ref 27–34)
MCV RBC AUTO: 100.8 FL — HIGH (ref 80–100)
MONOCYTES # BLD AUTO: 0.44 K/UL — SIGNIFICANT CHANGE UP (ref 0–0.9)
MONOCYTES NFR BLD AUTO: 6.5 % — SIGNIFICANT CHANGE UP (ref 2–14)
NEUTROPHILS # BLD AUTO: 5.22 K/UL — SIGNIFICANT CHANGE UP (ref 1.8–7.4)
NEUTROPHILS NFR BLD AUTO: 77.2 % — HIGH (ref 43–77)
NITRITE UR-MCNC: POSITIVE
NRBC # BLD: 0 /100 WBCS — SIGNIFICANT CHANGE UP (ref 0–0)
OTHER CELLS CSF MANUAL: 6 ML/DL — LOW (ref 18–22)
PCO2 BLDV: 48 MMHG — SIGNIFICANT CHANGE UP (ref 35–50)
PCO2 BLDV: 52 MMHG — HIGH (ref 35–50)
PH BLDV: 7.33 — LOW (ref 7.35–7.45)
PH BLDV: 7.36 — SIGNIFICANT CHANGE UP (ref 7.35–7.45)
PH UR: 6 — SIGNIFICANT CHANGE UP (ref 5–8)
PLATELET # BLD AUTO: 113 K/UL — LOW (ref 150–400)
PO2 BLDV: 24 MMHG — LOW (ref 25–45)
PO2 BLDV: 27 MMHG — SIGNIFICANT CHANGE UP (ref 25–45)
POTASSIUM BLDV-SCNC: 3.8 MMOL/L — SIGNIFICANT CHANGE UP (ref 3.5–5.3)
POTASSIUM BLDV-SCNC: 3.9 MMOL/L — SIGNIFICANT CHANGE UP (ref 3.5–5.3)
POTASSIUM SERPL-MCNC: 4.4 MMOL/L — SIGNIFICANT CHANGE UP (ref 3.5–5.3)
POTASSIUM SERPL-SCNC: 4.4 MMOL/L — SIGNIFICANT CHANGE UP (ref 3.5–5.3)
PROT SERPL-MCNC: 6.5 G/DL — SIGNIFICANT CHANGE UP (ref 6–8.3)
PROT UR-MCNC: SIGNIFICANT CHANGE UP
PROTHROM AB SERPL-ACNC: 27.8 SEC — HIGH (ref 10–12.9)
RBC # BLD: 3.83 M/UL — SIGNIFICANT CHANGE UP (ref 3.8–5.2)
RBC # FLD: 13.5 % — SIGNIFICANT CHANGE UP (ref 10.3–14.5)
RBC CASTS # UR COMP ASSIST: 5 /HPF — HIGH (ref 0–4)
RH IG SCN BLD-IMP: POSITIVE — SIGNIFICANT CHANGE UP
SAO2 % BLDV: 34 % — LOW (ref 67–88)
SAO2 % BLDV: 39 % — LOW (ref 67–88)
SODIUM SERPL-SCNC: 139 MMOL/L — SIGNIFICANT CHANGE UP (ref 135–145)
SP GR SPEC: 1.02 — SIGNIFICANT CHANGE UP (ref 1.01–1.02)
TROPONIN T, HIGH SENSITIVITY RESULT: 14 NG/L — SIGNIFICANT CHANGE UP (ref 0–51)
TROPONIN T, HIGH SENSITIVITY RESULT: 15 NG/L — SIGNIFICANT CHANGE UP (ref 0–51)
UROBILINOGEN FLD QL: NEGATIVE — SIGNIFICANT CHANGE UP
WBC # BLD: 6.77 K/UL — SIGNIFICANT CHANGE UP (ref 3.8–10.5)
WBC # FLD AUTO: 6.77 K/UL — SIGNIFICANT CHANGE UP (ref 3.8–10.5)
WBC UR QL: 147 /HPF — HIGH (ref 0–5)

## 2020-01-07 PROCEDURE — 70450 CT HEAD/BRAIN W/O DYE: CPT | Mod: 26

## 2020-01-07 PROCEDURE — 71045 X-RAY EXAM CHEST 1 VIEW: CPT | Mod: 26

## 2020-01-07 PROCEDURE — 72170 X-RAY EXAM OF PELVIS: CPT | Mod: 26

## 2020-01-07 PROCEDURE — 74177 CT ABD & PELVIS W/CONTRAST: CPT | Mod: 26

## 2020-01-07 PROCEDURE — 73060 X-RAY EXAM OF HUMERUS: CPT | Mod: 26,RT

## 2020-01-07 PROCEDURE — 99291 CRITICAL CARE FIRST HOUR: CPT | Mod: GC

## 2020-01-07 PROCEDURE — 72128 CT CHEST SPINE W/O DYE: CPT | Mod: 26

## 2020-01-07 PROCEDURE — 93010 ELECTROCARDIOGRAM REPORT: CPT | Mod: GC

## 2020-01-07 PROCEDURE — 72125 CT NECK SPINE W/O DYE: CPT | Mod: 26

## 2020-01-07 PROCEDURE — 71260 CT THORAX DX C+: CPT | Mod: 26

## 2020-01-07 PROCEDURE — 73030 X-RAY EXAM OF SHOULDER: CPT | Mod: 26,RT

## 2020-01-07 RX ORDER — BRIMONIDINE TARTRATE 2 MG/MG
1 SOLUTION/ DROPS OPHTHALMIC
Refills: 0 | Status: DISCONTINUED | OUTPATIENT
Start: 2020-01-07 | End: 2020-01-10

## 2020-01-07 RX ORDER — ACETAMINOPHEN 500 MG
1000 TABLET ORAL ONCE
Refills: 0 | Status: COMPLETED | OUTPATIENT
Start: 2020-01-07 | End: 2020-01-07

## 2020-01-07 RX ORDER — ACETAMINOPHEN 500 MG
650 TABLET ORAL EVERY 6 HOURS
Refills: 0 | Status: DISCONTINUED | OUTPATIENT
Start: 2020-01-07 | End: 2020-01-10

## 2020-01-07 RX ORDER — CEFTRIAXONE 500 MG/1
1000 INJECTION, POWDER, FOR SOLUTION INTRAMUSCULAR; INTRAVENOUS ONCE
Refills: 0 | Status: COMPLETED | OUTPATIENT
Start: 2020-01-07 | End: 2020-01-07

## 2020-01-07 RX ORDER — RIVAROXABAN 15 MG-20MG
15 KIT ORAL ONCE
Refills: 0 | Status: COMPLETED | OUTPATIENT
Start: 2020-01-07 | End: 2020-01-07

## 2020-01-07 RX ORDER — ATORVASTATIN CALCIUM 80 MG/1
20 TABLET, FILM COATED ORAL AT BEDTIME
Refills: 0 | Status: DISCONTINUED | OUTPATIENT
Start: 2020-01-07 | End: 2020-01-10

## 2020-01-07 RX ORDER — SODIUM CHLORIDE 9 MG/ML
1000 INJECTION INTRAMUSCULAR; INTRAVENOUS; SUBCUTANEOUS
Refills: 0 | Status: DISCONTINUED | OUTPATIENT
Start: 2020-01-07 | End: 2020-01-08

## 2020-01-07 RX ORDER — METOPROLOL TARTRATE 50 MG
12.5 TABLET ORAL
Refills: 0 | Status: DISCONTINUED | OUTPATIENT
Start: 2020-01-07 | End: 2020-01-08

## 2020-01-07 RX ORDER — CEFTRIAXONE 500 MG/1
1000 INJECTION, POWDER, FOR SOLUTION INTRAMUSCULAR; INTRAVENOUS ONCE
Refills: 0 | Status: DISCONTINUED | OUTPATIENT
Start: 2020-01-07 | End: 2020-01-07

## 2020-01-07 RX ORDER — TIMOLOL 0.5 %
1 DROPS OPHTHALMIC (EYE)
Refills: 0 | Status: DISCONTINUED | OUTPATIENT
Start: 2020-01-07 | End: 2020-01-10

## 2020-01-07 RX ORDER — MEGESTROL ACETATE 40 MG/ML
400 SUSPENSION ORAL DAILY
Refills: 0 | Status: DISCONTINUED | OUTPATIENT
Start: 2020-01-07 | End: 2020-01-10

## 2020-01-07 RX ORDER — ACETAMINOPHEN 500 MG
650 TABLET ORAL ONCE
Refills: 0 | Status: DISCONTINUED | OUTPATIENT
Start: 2020-01-07 | End: 2020-01-07

## 2020-01-07 RX ORDER — SODIUM CHLORIDE 9 MG/ML
1000 INJECTION INTRAMUSCULAR; INTRAVENOUS; SUBCUTANEOUS ONCE
Refills: 0 | Status: COMPLETED | OUTPATIENT
Start: 2020-01-07 | End: 2020-01-07

## 2020-01-07 RX ADMIN — SODIUM CHLORIDE 60 MILLILITER(S): 9 INJECTION INTRAMUSCULAR; INTRAVENOUS; SUBCUTANEOUS at 23:27

## 2020-01-07 RX ADMIN — SODIUM CHLORIDE 2000 MILLILITER(S): 9 INJECTION INTRAMUSCULAR; INTRAVENOUS; SUBCUTANEOUS at 11:55

## 2020-01-07 RX ADMIN — Medication 400 MILLIGRAM(S): at 13:22

## 2020-01-07 RX ADMIN — RIVAROXABAN 15 MILLIGRAM(S): KIT at 19:36

## 2020-01-07 RX ADMIN — CEFTRIAXONE 100 MILLIGRAM(S): 500 INJECTION, POWDER, FOR SOLUTION INTRAMUSCULAR; INTRAVENOUS at 14:38

## 2020-01-07 NOTE — ED PROVIDER NOTE - CARE PLAN
Principal Discharge DX:	Sepsis  Secondary Diagnosis:	UTI (urinary tract infection)  Secondary Diagnosis:	Fall, initial encounter

## 2020-01-07 NOTE — ED ADULT NURSE NOTE - NSIMPLEMENTINTERV_GEN_ALL_ED
Implemented All Fall with Harm Risk Interventions:  [place] to call system. Call bell, personal items and telephone within reach. Instruct patient to call for assistance. Room bathroom lighting operational. Non-slip footwear when patient is off stretcher. Physically safe environment: no spills, clutter or unnecessary equipment. Stretcher in lowest position, wheels locked, appropriate side rails in place. Provide visual cue, wrist band, yellow gown, etc. Monitor gait and stability. Monitor for mental status changes and reorient to person, place, and time. Review medications for side effects contributing to fall risk. Reinforce activity limits and safety measures with patient and family. Provide visual clues: red socks.

## 2020-01-07 NOTE — H&P ADULT - NSICDXPASTMEDICALHX_GEN_ALL_CORE_FT
PAST MEDICAL HISTORY:  Atrial fibrillation, unspecified type on rivaroxaban    COPD (chronic obstructive pulmonary disease)     CVA (cerebral infarction)     GERD (gastroesophageal reflux disease)     GI bleed     Glaucoma     Hyperlipemia     PUD (peptic ulcer disease)

## 2020-01-07 NOTE — ED PROVIDER NOTE - PROGRESS NOTE DETAILS
Elizabeth Goldberger PGY-3: CT called initially for someone to sign consent for IV contrast which was done. Later sent pt back from scanner bc IV placed was too small for contrast. USIV now placed, called to expedite imaging patient has lact of 3.8, CT studies are still pending due to difficulty gaining IV access. I met her son Mr. Duque and he expressed his concern about her mom staying in the hospital (he was specifically worried she may not get fed regularly since she doesn't eat herself, and that she may not work with physical therapy right away). I explained to him that my recommendation is for her to stay regardless to ensure response to IV antibiotics and that she will not need special needs. We made a decision of completing work up here and to update him, if he disagrees it would be a discharge AMA and pending reception by her facility. signed out to Dr. Logan. Elizabeth Goldberger PGY-3: no acute injuries on CT. Re sepsis, VS improved; rpt lactate pending. Had extensive lengthy conversation w son over phone regarding indication for admission, that pt would be unsafe discharge given presenting hypotension, lactate 3.8 and fall possibly mediated by infxn. He agreed to have her admitted

## 2020-01-07 NOTE — H&P ADULT - HISTORY OF PRESENT ILLNESS
CHIEF COMPLAINT:Patient is a 89y old  Female who presents with a chief complaint of     HPI:  89f w hx CVA, Afib on Xarelto, dementia, lives at [facility] after unwitnessed fall. Per report she was found on the floor. Baseline mental status awake and alert, oriented x1-2, generally agitated. No obvious deformities. Pt appears uncomfortable and describes being in pain but cannot specify where; per nursing was c/o right shoulder pain  in ER pt was found with sbp of 80.    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      MEDICATIONS  (STANDING):    MEDICATIONS  (PRN):      FAMILY HISTORY:  No pertinent family history in first degree relatives      SOCIAL HISTORY:    [ ] Non-smoker  [ ] Smoker  [ ] Alcohol    Allergies    aspirin (Unknown)  penicillin (Unknown)    Intolerances    	    REVIEW OF SYSTEMS:  CONSTITUTIONAL: No fever, weight loss, or fatigue  EYES: No eye pain, visual disturbances, or discharge  ENT:  No difficulty hearing, tinnitus, vertigo; No sinus or throat pain  NECK: No pain or stiffness  RESPIRATORY: No cough, wheezing, chills or hemoptysis; No Shortness of Breath  CARDIOVASCULAR: No chest pain, palpitations, passing out, dizziness, or leg swelling  GASTROINTESTINAL: No abdominal or epigastric pain. No nausea, vomiting, or hematemesis; No diarrhea or constipation. No melena or hematochezia.  GENITOURINARY: No dysuria, frequency, hematuria, or incontinence  NEUROLOGICAL: No headaches, memory loss, loss of strength, numbness, or tremors, s/p fall.  SKIN: No itching, burning, rashes, or lesions   LYMPH Nodes: No enlarged glands  ENDOCRINE: No heat or cold intolerance; No hair loss  MUSCULOSKELETAL: No joint pain or swelling; No muscle, back, or extremity pain  PSYCHIATRIC: No depression, anxiety, mood swings, or difficulty sleeping  HEME/LYMPH: No easy bruising, or bleeding gums  ALLERGY AND IMMUNOLOGIC: No hives or eczema	    [ ] All others negative	  [ ] Unable to obtain    PHYSICAL EXAM:  T(C): 36.7 (01-07-20 @ 20:51), Max: 37.1 (01-07-20 @ 11:21)  HR: 78 (01-07-20 @ 20:51) (74 - 80)  BP: 101/66 (01-07-20 @ 20:51) (88/62 - 121/72)  RR: 17 (01-07-20 @ 20:51) (16 - 18)  SpO2: 97% (01-07-20 @ 20:51) (95% - 100%)  Wt(kg): --  I&O's Summary      Appearance: Normal	  HEENT:   Normal oral mucosa, PERRL, EOMI, large laceration  on l forehead	  Lymphatic: No lymphadenopathy  Cardiovascular: Normal S1 S2, No JVD,+ murmurs, No edema  Respiratory: rhonchi  Psychiatry: A & O x 3, Mood & affect appropriate  Gastrointestinal:  Soft, Non-tender, + BS	  Skin: No rashes, No ecchymoses, No cyanosis	  Neurologic: Non-focal  Extremities: Normal range of motion, No clubbing, cyanosis or edema  Vascular: Peripheral pulses palpable 2+ bilaterally    TELEMETRY: 	    ECG:  	  RADIOLOGY:  OTHER: 	  	  LABS:	 	    CARDIAC MARKERS:  CARDIAC MARKERS ( 07 Jan 2020 11:26 )  x     / x     / 69 U/L / x     / x                                  13.0   6.77  )-----------( 113      ( 07 Jan 2020 11:26 )             38.6     01-07    139  |  105  |  27<H>  ----------------------------<  134<H>  4.4   |  22  |  0.99    Ca    9.1      07 Jan 2020 11:26    TPro  6.5  /  Alb  3.5  /  TBili  2.4<H>  /  DBili  x   /  AST  32  /  ALT  16  /  AlkPhos  97  01-07    proBNP:   Lipid Profile:   HgA1c:   TSH:   PT/INR - ( 07 Jan 2020 11:26 )   PT: 27.8 sec;   INR: 2.37 ratio         PTT - ( 07 Jan 2020 11:26 )  PTT:37.3 sec    PREVIOUS DIAGNOSTIC TESTING:    < from: 12 Lead ECG (10.26.18 @ 17:41) >  Diagnosis Line ATRIAL FIBRILLATION WITH RAPID VENTRICULAR RESPONSE  SEPTAL INFARCT , AGE UNDETERMINED  ST & T WAVE ABNORMALITY, CONSIDER LATERAL ISCHEMIA OR DIGITALIS EFFECT    < from: Limited Transthoracic Echo (10.26.18 @ 14:07) >  Mitral Valve: Mitral annular calcification.  Aortic Valve/Aorta: The aortic valve opens well.  Left Ventricle: Limited images acquired at the patients  request. Based upon the parasternal long axis view only;  grossly normal left ventricular systolic function.  Right Heart: The right ventricle is not well visualized.  Pericardium/Pleura: Based upon the parasternal long axis  single view; normal pericardium with no pericardial  effusion.  ------------------------------------------------------------------------  Conclusions:  Limited tranthoracic echocardiogram at patients request to  end exam.  1. Mitral annular calcification.  2. The aortic valve opens well.  3. Limited images acquired at the patients request. Based  upon the parasternal long axis view only;  grossly normal  left ventricular systolic function.  < from: CT Thoracic Spine Reform No Cont (01.07.20 @ 16:43) >  Volume loss, microvascular disease, no acute hemorrhage or midline shift. If symptoms persist consider follow up head CT or MRI contraindications.    Cervical and thoracic spine are unchanged from prior, no obvious fracture or dislocation, multilevel degenerative changes as noted previously with diffuse osteopenia and small unchanged mild superior endplate T2 compression deformity.    < from: CT Chest w/ IV Cont (01.07.20 @ 16:43) >  No acute traumatic injury within the chest, abdomen, or pelvis.    Urine Microscopic-Add On (NC) (01.07.20 @ 13:32)    Granular Cast: 1: verified by microscopic /LPF    Bacteria: Moderate    Epithelial Cells: 0 /hpf    Red Blood Cell - Urine: 5 /hpf    White Blood Cell - Urine: 147 /HPF    Hyaline Casts: 23 /lpf

## 2020-01-07 NOTE — ED ADULT NURSE REASSESSMENT NOTE - NS ED NURSE REASSESS COMMENT FT1
Pt straight cathed using sterile technique. Second RN present to confirm sterility. Pt tolerated procedure well. Sterile specimens collected and sent to lab. Will cont to monitor.

## 2020-01-07 NOTE — ED PROVIDER NOTE - OBJECTIVE STATEMENT
89f w hx CVA, Afib on Xarelto, dementia, lives at [facility] after unwitnessed fall. Per report she was found on the floor. Baseline mental status awake and alert, oriented x1-2, generally agitated. No obvious deformities. Pt appears uncomfortable and describes being in pain but cannot specify where; per nursing was c/o right shoulder pain.

## 2020-01-07 NOTE — ED ADULT NURSE NOTE - OBJECTIVE STATEMENT
89y F aax2 orient to name, place and disoriented to time/date presents to Ed via EMS from [facility]  ,As p[er EMS 89y F aax2 orient to name, place and disoriented to time/date presents to Ed via EMS from [facility]  ,As per EMS pt have unwitnessed fall on the [facility]. Pt c/o shoulder, back pain,, keep eye closes, follows commands, 89y F  Hx dementia, aax2 orient to name, place and disoriented to time/date presents to Ed via EMS from [facility]  ,As per EMS pt have unwitnessed fall on the [facility]. Pt c/o shoulder, back pain, keep eye closed, follows commands. Denies cp,, no respiratory distress noted, on c-collar placed.  PT safety maintained ,reach and bed in the lowest position.

## 2020-01-07 NOTE — H&P ADULT - ASSESSMENT
89f w hx CVA, Afib on Xarelto, dementia, lives at [facility] after unwitnessed fall. Per report she was found on the floor. Baseline mental status awake and alert, oriented x1-2, generally agitated. No obvious deformities. Pt appears uncomfortable and describes being in pain but cannot specify where; per nursing was c/o right shoulder pain  in ER pt was found with sbp of 80  tele hold ac for now  ivf  check orthostatic  add Ceftriaxone for uti, awaiting cultures  physical therapy

## 2020-01-07 NOTE — ED ADULT NURSE REASSESSMENT NOTE - NS ED NURSE REASSESS COMMENT FT1
Pt need Ct scan w/ contrast, is was late because need 20IV,   MD placed Ultrasound IV, pt pull out, then Rn placed another IV she pull again, then Rn placed a 3rd IV. Pt was moved to the hallway near to Rn station to keep monitor

## 2020-01-07 NOTE — ED ADULT NURSE REASSESSMENT NOTE - NS ED NURSE REASSESS COMMENT FT1
left hand is purple/redness , pt pulled out  the IV. left hand is purple/redness , swollen,, bruises on different spots, right arm.  Pt pulled out  the IV's

## 2020-01-07 NOTE — ED PROVIDER NOTE - ATTENDING CONTRIBUTION TO CARE
89yr female with dementia, afib on rivaroxaban p/w unwitnessed fall, now less interactive than baseline line according to paramedics. fall to ground from likely standing/sitting. complained of shoulder and back pain. on arrival, eyes closed and only occasionally opens but responds to questions. denies cp, abd pain. exam notable for no obvious signs of trauma, clear lungs, no extraneous heart sounds, no mid line ttp over the spine. neuro exam limited. fingerstick 138  will do labs, trauma scans. ekg

## 2020-01-08 LAB
ALBUMIN SERPL ELPH-MCNC: 3.3 G/DL — SIGNIFICANT CHANGE UP (ref 3.3–5)
ALP SERPL-CCNC: 87 U/L — SIGNIFICANT CHANGE UP (ref 40–120)
ALT FLD-CCNC: 11 U/L — SIGNIFICANT CHANGE UP (ref 10–45)
ANION GAP SERPL CALC-SCNC: 11 MMOL/L — SIGNIFICANT CHANGE UP (ref 5–17)
AST SERPL-CCNC: 21 U/L — SIGNIFICANT CHANGE UP (ref 10–40)
BILIRUB SERPL-MCNC: 1.9 MG/DL — HIGH (ref 0.2–1.2)
BUN SERPL-MCNC: 17 MG/DL — SIGNIFICANT CHANGE UP (ref 7–23)
CALCIUM SERPL-MCNC: 8.5 MG/DL — SIGNIFICANT CHANGE UP (ref 8.4–10.5)
CHLORIDE SERPL-SCNC: 109 MMOL/L — HIGH (ref 96–108)
CO2 SERPL-SCNC: 22 MMOL/L — SIGNIFICANT CHANGE UP (ref 22–31)
CREAT SERPL-MCNC: 0.82 MG/DL — SIGNIFICANT CHANGE UP (ref 0.5–1.3)
GLUCOSE SERPL-MCNC: 118 MG/DL — HIGH (ref 70–99)
HCT VFR BLD CALC: 31.7 % — LOW (ref 34.5–45)
HGB BLD-MCNC: 10.6 G/DL — LOW (ref 11.5–15.5)
MCHC RBC-ENTMCNC: 33.4 GM/DL — SIGNIFICANT CHANGE UP (ref 32–36)
MCHC RBC-ENTMCNC: 34.1 PG — HIGH (ref 27–34)
MCV RBC AUTO: 101.9 FL — HIGH (ref 80–100)
PLATELET # BLD AUTO: 111 K/UL — LOW (ref 150–400)
POTASSIUM SERPL-MCNC: 4 MMOL/L — SIGNIFICANT CHANGE UP (ref 3.5–5.3)
POTASSIUM SERPL-SCNC: 4 MMOL/L — SIGNIFICANT CHANGE UP (ref 3.5–5.3)
PROT SERPL-MCNC: 6 G/DL — SIGNIFICANT CHANGE UP (ref 6–8.3)
RBC # BLD: 3.11 M/UL — LOW (ref 3.8–5.2)
RBC # FLD: 13.8 % — SIGNIFICANT CHANGE UP (ref 10.3–14.5)
SODIUM SERPL-SCNC: 142 MMOL/L — SIGNIFICANT CHANGE UP (ref 135–145)
TSH SERPL-MCNC: 4.04 UIU/ML — SIGNIFICANT CHANGE UP (ref 0.27–4.2)
VIT B12 SERPL-MCNC: 685 PG/ML — SIGNIFICANT CHANGE UP (ref 232–1245)
WBC # BLD: 6.59 K/UL — SIGNIFICANT CHANGE UP (ref 3.8–10.5)
WBC # FLD AUTO: 6.59 K/UL — SIGNIFICANT CHANGE UP (ref 3.8–10.5)

## 2020-01-08 PROCEDURE — 99232 SBSQ HOSP IP/OBS MODERATE 35: CPT

## 2020-01-08 RX ORDER — SODIUM CHLORIDE 9 MG/ML
1000 INJECTION INTRAMUSCULAR; INTRAVENOUS; SUBCUTANEOUS
Refills: 0 | Status: DISCONTINUED | OUTPATIENT
Start: 2020-01-08 | End: 2020-01-09

## 2020-01-08 RX ADMIN — Medication 1 DROP(S): at 05:10

## 2020-01-08 RX ADMIN — Medication 12.5 MILLIGRAM(S): at 05:10

## 2020-01-08 RX ADMIN — MEGESTROL ACETATE 400 MILLIGRAM(S): 40 SUSPENSION ORAL at 14:45

## 2020-01-08 RX ADMIN — ATORVASTATIN CALCIUM 20 MILLIGRAM(S): 80 TABLET, FILM COATED ORAL at 22:37

## 2020-01-08 RX ADMIN — Medication 1 DROP(S): at 17:13

## 2020-01-08 RX ADMIN — BRIMONIDINE TARTRATE 1 DROP(S): 2 SOLUTION/ DROPS OPHTHALMIC at 17:13

## 2020-01-08 RX ADMIN — SODIUM CHLORIDE 60 MILLILITER(S): 9 INJECTION INTRAMUSCULAR; INTRAVENOUS; SUBCUTANEOUS at 22:39

## 2020-01-08 RX ADMIN — Medication 1 TABLET(S): at 14:45

## 2020-01-08 RX ADMIN — SODIUM CHLORIDE 60 MILLILITER(S): 9 INJECTION INTRAMUSCULAR; INTRAVENOUS; SUBCUTANEOUS at 16:05

## 2020-01-08 NOTE — PHYSICAL THERAPY INITIAL EVALUATION ADULT - PERTINENT HX OF CURRENT PROBLEM, REHAB EVAL
Pt is a 89f w hx CVA, Afib on Xarelto, dementia, lives at [facility] after unwitnessed fall. Per report she was found on the floor. Baseline mental status awake and alert, oriented x1-2, generally agitated. No obvious deformities. Pt appears uncomfortable and describes being in pain but cannot specify where; per nursing was c/o right shoulder pain. Imaging negative for acute fx's pt with unchanged T2 mild compression deformity.

## 2020-01-08 NOTE — PROGRESS NOTE ADULT - SUBJECTIVE AND OBJECTIVE BOX
CARDIOLOGY     PROGRESS  NOTE   ________________________________________________    CHIEF COMPLAINT:Patient is a 89y old  Female who presents with a chief complaint of fall/ hypotension (07 Jan 2020 22:30)  doing better.  	  REVIEW OF SYSTEMS:  CONSTITUTIONAL: No fever, weight loss, or fatigue  EYES: No eye pain, visual disturbances, or discharge  ENT:  No difficulty hearing, tinnitus, vertigo; No sinus or throat pain  NECK: No pain or stiffness  RESPIRATORY: No cough, wheezing, chills or hemoptysis; + mild Shortness of Breath  CARDIOVASCULAR: No chest pain, palpitations, passing out, dizziness, or leg swelling  GASTROINTESTINAL: No abdominal or epigastric pain. No nausea, vomiting, or hematemesis; No diarrhea or constipation. No melena or hematochezia.  GENITOURINARY: No dysuria, frequency, hematuria, or incontinence  NEUROLOGICAL: No headaches, memory loss, loss of strength, numbness, or tremors  SKIN: No itching, burning, rashes, or lesions   LYMPH Nodes: No enlarged glands  ENDOCRINE: No heat or cold intolerance; No hair loss  MUSCULOSKELETAL: No joint pain or swelling; No muscle, back, or extremity pain  PSYCHIATRIC: No depression, anxiety, mood swings, or difficulty sleeping  HEME/LYMPH: No easy bruising, or bleeding gums  ALLERGY AND IMMUNOLOGIC: No hives or eczema	    [ ] All others negative	  [ ] Unable to obtain    PHYSICAL EXAM:  T(C): 36.7 (01-08-20 @ 06:33), Max: 37.1 (01-07-20 @ 11:21)  HR: 56 (01-08-20 @ 06:33) (56 - 80)  BP: 118/75 (01-08-20 @ 06:33) (88/62 - 121/75)  RR: 18 (01-08-20 @ 06:33) (16 - 18)  SpO2: 96% (01-08-20 @ 06:33) (95% - 100%)  Wt(kg): --  I&O's Summary    07 Jan 2020 07:01  -  08 Jan 2020 07:00  --------------------------------------------------------  IN: 180 mL / OUT: 0 mL / NET: 180 mL        Appearance: + echuymosis  HEENT:   Normal oral mucosa, PERRL, EOMI	  Lymphatic: No lymphadenopathy  Cardiovascular: Normal S1 S2, No JVD,+ murmurs, No edema  Respiratory: rhonchi  Psychiatry: A & O x 3, Mood & affect appropriate  Gastrointestinal:  Soft, Non-tender, + BS	  Skin: No rashes, No ecchymoses, No cyanosis	  Neurologic: Non-focal  Extremities: Normal range of motion, No clubbing, cyanosis or edema  Vascular: Peripheral pulses palpable 2+ bilaterally    MEDICATIONS  (STANDING):  atorvastatin 20 milliGRAM(s) Oral at bedtime  brimonidine 0.2% Ophthalmic Solution 1 Drop(s) Both EYES two times a day  levoFLOXacin  Tablet 250 milliGRAM(s) Oral every 24 hours  megestrol Suspension 400 milliGRAM(s) Oral daily  metoprolol tartrate 12.5 milliGRAM(s) Oral two times a day  multivitamin 1 Tablet(s) Oral daily  sodium chloride 0.9%. 1000 milliLiter(s) (60 mL/Hr) IV Continuous <Continuous>  timolol 0.5% Solution 1 Drop(s) Both EYES two times a day      TELEMETRY: 	    ECG:  	  RADIOLOGY:  OTHER: 	  	  LABS:	 	    CARDIAC MARKERS:  CARDIAC MARKERS ( 07 Jan 2020 11:26 )  x     / x     / 69 U/L / x     / x                                    13.0   6.77  )-----------( 113      ( 07 Jan 2020 11:26 )             38.6     01-07    139  |  105  |  27<H>  ----------------------------<  134<H>  4.4   |  22  |  0.99    Ca    9.1      07 Jan 2020 11:26    TPro  6.5  /  Alb  3.5  /  TBili  2.4<H>  /  DBili  x   /  AST  32  /  ALT  16  /  AlkPhos  97  01-07    proBNP:   Lipid Profile:   HgA1c:   TSH:   PT/INR - ( 07 Jan 2020 11:26 )   PT: 27.8 sec;   INR: 2.37 ratio         PTT - ( 07 Jan 2020 11:26 )  PTT:37.3 sec      Assessment and plan  ---------------------------  89f w hx CVA, Afib on Xarelto, dementia, lives at [facility] after unwitnessed fall. Per report she was found on the floor. Baseline mental status awake and alert, oriented x1-2, generally agitated. No obvious deformities. Pt appears uncomfortable and describes being in pain but cannot specify where; per nursing was c/o right shoulder pain  in ER pt was found with sbp of 80  tele hold ac for now  ivf  check orthostatic  add Levaquin for uti, awaiting cultures  physical therapy  dc beta blocker sec to bradycardia ?SSS

## 2020-01-08 NOTE — PATIENT PROFILE ADULT - ABILITY TO HEAR (WITH HEARING AID OR HEARING APPLIANCE IF NORMALLY USED):
Severely Impaired: absence of useful hearing Mildly to Moderately Impaired: difficulty hearing in some environments or speaker may need to increase volume or speak distinctly

## 2020-01-08 NOTE — CONSULT NOTE ADULT - ASSESSMENT
89  yr pt,   pt  with  h/o  afib/ copd, hld, pud/  h/o left ribf xs. 8/ 9 th,. left hip surg  h/o mlple falls.  echo, normal ef    admitted with   fall/  ?   syncope  from [facility]  AFIB, not an ideal candidate  for  a/c, given falls  ct  head/  chest/ abdomen, no pathology   ct  T spine,   T2  comp deformity  tele   uti, follow  urine  c/s. on antibiotic/ rocephin  dvt ppx     labs pending   PT eval      < from: CT Thoracic Spine Reform No Cont (01.07.20 @ 16:43) >  IMPRESSION:  Volume loss, microvascular disease, no acute hemorrhage or midline shift. If symptoms persist consider follow up head CT or MRI contraindications.  Cervical and thoracic spine are unchanged from prior, no obvious fracture or dislocation, multilevel degenerative changes as noted previously with diffuse osteopenia and small unchanged mild superior endplate T2 compression deformity.  < end of copied text     < from: CT Chest w/ IV Cont (01.07.20 @ 16:43) >  IMPRESSION:   No acute traumatic injury within the chest, abdomen, or pelvis.  For spine findings, please see dedicated report of concurrently performed study.  < end of copied text        < from: CT Abdomen and Pelvis w/ IV Cont (10.25.18 @ 14:42) >  Degenerative changes in the spine. Status post left total hip replacement.  IMPRESSION: Comminuted mildly displaced fractures of the left eighth and   ninth posterior ribs.  Small left pleural effusion.  Indeterminate 0.5 cm nodular opacity in the right upper lobe, new since   6/26/2018.  < end of copied text >       < from: Limited Transthoracic Echo (10.26.18 @ 14:07) >  Conclusions:  Limited tranthoracic echocardiogram at patients request to  end exam.  1. Mitral annular calcification.  2. The aortic valve opens well.  3. Limited images acquired at the patients request. Based  upon the parasternal long axis view only;  grossly normal  left ventricular systolic function.  < end of copied text > 89  yr pt,   pt  with  h/o  afib/ copd, hld, pud/  h/o left ribf xs. 8/ 9 th,. left hip surg  h/o mlple falls.  echo, normal ef    admitted with   fall/  ?   syncope  from [facility]  AFIB, not an ideal candidate  for  a/c, given falls  ct  head/  chest/ abdomen, no pathology   ct  T spine,   T2  comp deformity  tele   uti, follow  urine  c/s. on  Levaquin   dvt ppx     labs pending   PT eval      < from: CT Thoracic Spine Reform No Cont (01.07.20 @ 16:43) >  IMPRESSION:  Volume loss, microvascular disease, no acute hemorrhage or midline shift. If symptoms persist consider follow up head CT or MRI contraindications.  Cervical and thoracic spine are unchanged from prior, no obvious fracture or dislocation, multilevel degenerative changes as noted previously with diffuse osteopenia and small unchanged mild superior endplate T2 compression deformity.  < end of copied text     < from: CT Chest w/ IV Cont (01.07.20 @ 16:43) >  IMPRESSION:   No acute traumatic injury within the chest, abdomen, or pelvis.  For spine findings, please see dedicated report of concurrently performed study.  < end of copied text        < from: CT Abdomen and Pelvis w/ IV Cont (10.25.18 @ 14:42) >  Degenerative changes in the spine. Status post left total hip replacement.  IMPRESSION: Comminuted mildly displaced fractures of the left eighth and   ninth posterior ribs.  Small left pleural effusion.  Indeterminate 0.5 cm nodular opacity in the right upper lobe, new since   6/26/2018.  < end of copied text >       < from: Limited Transthoracic Echo (10.26.18 @ 14:07) >  Conclusions:  Limited tranthoracic echocardiogram at patients request to  end exam.  1. Mitral annular calcification.  2. The aortic valve opens well.  3. Limited images acquired at the patients request. Based  upon the parasternal long axis view only;  grossly normal  left ventricular systolic function.  < end of copied text >

## 2020-01-08 NOTE — CONSULT NOTE ADULT - SUBJECTIVE AND OBJECTIVE BOX
HPI:  CHIEF COMPLAINT:Patient is a 89y old  Female who presents with a chief complaint of  fall     HPI:  89f w hx CVA, Afib on Xarelto, dementia, lives at [facility] after unwitnessed fall. Per report she was found on the floor. Baseline mental status awake and alert, oriented x1-2, generally agitated. No obvious deformities    Pt appears uncomfortable and describes being in pain but cannot specify where; per nursing was c/o right shoulder pain  in ER pt was found with sbp of 80.    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      MEDICATIONS  (STANDING):    MEDICATIONS  (PRN):      FAMILY HISTORY:  No pertinent family history in first degree relatives      SOCIAL HISTORY:    [ ] Non-smoker  [ ] Smoker  [ ] Alcohol    Allergies    aspirin (Unknown)  penicillin (Unknown)    Intolerances    	    REVIEW OF SYSTEMS:  CONSTITUTIONAL: No fever, weight loss, or fatigue  EYES: No eye pain, visual disturbances, or discharge  ENT:  No difficulty hearing, tinnitus, vertigo; No sinus or throat pain  NECK: No pain or stiffness  RESPIRATORY: No cough, wheezing, chills or hemoptysis; No Shortness of Breath  CARDIOVASCULAR: No chest pain, palpitations, passing out, dizziness, or leg swelling  GASTROINTESTINAL: No abdominal or epigastric pain. No nausea, vomiting, or hematemesis; No diarrhea or constipation. No melena or hematochezia.  GENITOURINARY: No dysuria, frequency, hematuria, or incontinence  NEUROLOGICAL: No headaches, memory loss, loss of strength, numbness, or tremors, s/p fall.  SKIN: No itching, burning, rashes, or lesions   LYMPH Nodes: No enlarged glands  ENDOCRINE: No heat or cold intolerance; No hair loss  MUSCULOSKELETAL: No joint pain or swelling; No muscle, back, or extremity pain  PSYCHIATRIC: No depression, anxiety, mood swings, or difficulty sleeping  HEME/LYMPH: No easy bruising, or bleeding gums  ALLERGY AND IMMUNOLOGIC: No hives or eczema	    [ ] All others negative	  [ ] Unable to obtain    PHYSICAL EXAM:  T(C): 36.7 (20 @ 20:51), Max: 37.1 (20 @ 11:21)  HR: 78 (20 @ 20:51) (74 - 80)  BP: 101/66 (20 @ 20:51) (88/62 - 121/72)  RR: 17 (20 @ 20:51) (16 - 18)  SpO2: 97% (20 @ 20:51) (95% - 100%)  Wt(kg): --  I&O's Summary      Appearance: Normal	  HEENT:   Normal oral mucosa, PERRL, EOMI, large laceration  on l forehead	  Lymphatic: No lymphadenopathy  Cardiovascular: Normal S1 S2, No JVD,+ murmurs, No edema  Respiratory: rhonchi  Psychiatry: A & O x 3, Mood & affect appropriate  Gastrointestinal:  Soft, Non-tender, + BS	  Skin: No rashes, No ecchymoses, No cyanosis	  Neurologic: Non-focal  Extremities No clubbing, cyanosis or edema  Vascular: Peripheral pulses palpable 2+ bilaterally    TELEMETRY: 	    ECG:  	  RADIOLOGY:  OTHER: 	  	  LABS:	 	    CARDIAC MARKERS:  CARDIAC MARKERS ( 2020 11:26 )  x     / x     / 69 U/L / x     / x                                  13.0   6.77  )-----------( 113      ( 2020 11:26 )             38.6         139  |  105  |  27<H>  ----------------------------<  134<H>  4.4   |  22  |  0.99    Ca    9.1      2020 11:26    TPro  6.5  /  Alb  3.5  /  TBili  2.4<H>  /  DBili  x   /  AST  32  /  ALT  16  /  AlkPhos  97      proBNP:   Lipid Profile:   HgA1c:   TSH:   PT/INR - ( 2020 11:26 )   PT: 27.8 sec;   INR: 2.37 ratio         PTT - ( 2020 11:26 )  PTT:37.3 sec    PREVIOUS DIAGNOSTIC TESTING:    < from: 12 Lead ECG (10.26.18 @ 17:41) >  Diagnosis Line ATRIAL FIBRILLATION WITH RAPID VENTRICULAR RESPONSE  SEPTAL INFARCT , AGE UNDETERMINED  ST & T WAVE ABNORMALITY, CONSIDER LATERAL ISCHEMIA OR DIGITALIS EFFECT    < from: Limited Transthoracic Echo (10.26.18 @ 14:07) >  Mitral Valve: Mitral annular calcification.  Aortic Valve/Aorta: The aortic valve opens well.  Left Ventricle: Limited images acquired at the patients  request. Based upon the parasternal long axis view only;  grossly normal left ventricular systolic function.  Right Heart: The right ventricle is not well visualized.  Pericardium/Pleura: Based upon the parasternal long axis  single view; normal pericardium with no pericardial  effusion.  ------------------------------------------------------------------------  Conclusions:  Limited tranthoracic echocardiogram at patients request to  end exam.  1. Mitral annular calcification.  2. The aortic valve opens well.  3. Limited images acquired at the patients request. Based  upon the parasternal long axis view only;  grossly normal  left ventricular systolic function.  < from: CT Thoracic Spine Reform No Cont (20 @ 16:43) >  Volume loss, microvascular disease, no acute hemorrhage or midline shift. If symptoms persist consider follow up head CT or MRI contraindications.    Cervical and thoracic spine are unchanged from prior, no obvious fracture or dislocation, multilevel degenerative changes as noted previously with diffuse osteopenia and small unchanged mild superior endplate T2 compression deformity.    < from: CT Chest w/ IV Cont (20 @ 16:43) >  No acute traumatic injury within the chest, abdomen, or pelvis.    Urine Microscopic-Add On (NC) (20 @ 13:32)    Granular Cast: 1: verified by microscopic /LPF    Bacteria: Moderate    Epithelial Cells: 0 /hpf    Red Blood Cell - Urine: 5 /hpf    White Blood Cell - Urine: 147 /HPF    Hyaline Casts: 23 /lpf (2020 22:30)    I&O's Summary    2020 07:01  -  2020 07:00  --------------------------------------------------------  IN: 180 mL / OUT: 0 mL / NET: 180 mL        MEDICATIONS  (STANDING):  atorvastatin 20 milliGRAM(s) Oral at bedtime  brimonidine 0.2% Ophthalmic Solution 1 Drop(s) Both EYES two times a day  levoFLOXacin  Tablet 250 milliGRAM(s) Oral every 24 hours  megestrol Suspension 400 milliGRAM(s) Oral daily  multivitamin 1 Tablet(s) Oral daily  sodium chloride 0.9%. 1000 milliLiter(s) (60 mL/Hr) IV Continuous <Continuous>  timolol 0.5% Solution 1 Drop(s) Both EYES two times a day    MEDICATIONS  (PRN):  acetaminophen   Tablet .. 650 milliGRAM(s) Oral every 6 hours PRN Moderate Pain (4 - 6)    LABS:                        13.0   6.77  )-----------( 113      ( 2020 11:26 )             38.6     01-07    139  |  105  |  27<H>  ----------------------------<  134<H>  4.4   |  22  |  0.99    Ca    9.1      2020 11:26    TPro  6.5  /  Alb  3.5  /  TBili  2.4<H>  /  DBili  x   /  AST  32  /  ALT  16  /  AlkPhos  97  01-07    PT/INR - ( 2020 11:26 )   PT: 27.8 sec;   INR: 2.37 ratio         PTT - ( 2020 11:26 )  PTT:37.3 sec  CARDIAC MARKERS ( 2020 11:26 )  x     / x     / 69 U/L / x     / x          Urinalysis Basic - ( 2020 13:32 )    Color: Yellow / Appearance: Slightly Turbid / S.019 / pH: x  Gluc: x / Ketone: Negative  / Bili: Negative / Urobili: Negative   Blood: x / Protein: Trace / Nitrite: Positive   Leuk Esterase: Large / RBC: 5 /hpf /  /HPF   Sq Epi: x / Non Sq Epi: 0 /hpf / Bacteria: Moderate          - @ 19:31  3.8  27              Consultant(s) Notes Reviewed:      Care Discussed with Consultants/Other Providers:  Plan:

## 2020-01-08 NOTE — PROVIDER CONTACT NOTE (OTHER) - ACTION/TREATMENT ORDERED:
NP notified and aware. Continue IV fluids and encourage PO hydration. Continue to monitor NP notified and aware. Continue IV fluids  for 12 hours and encourage PO hydration. Continue to monitor

## 2020-01-08 NOTE — PROVIDER CONTACT NOTE (OTHER) - ASSESSMENT
Patient A&Ox2. Lying 130/88 HR 57, sitting 128/71 HR 74, standing 99/39 HR 81 Patient A&Ox2. Lying bp 130/88 HR 57, sitting 128/71 HR 74, standing 99/39 HR 81, pt states she felt dizzy when she sat at edge of Bed from lying position

## 2020-01-08 NOTE — PROVIDER CONTACT NOTE (OTHER) - BACKGROUND
Patient admitted s/p fall and syncope. Has pmhx HTN, DM, HLD, CHF on lasic, and multiple falls. Patient admitted s/p fall and syncope. hx: HTN, DM, CHF, and multiple falls.

## 2020-01-08 NOTE — PROVIDER CONTACT NOTE (OTHER) - ASSESSMENT
Patient shows no visible signs of bleeding. Red blood cell 3.11, hemoglobin 10.6, hematocrit 31.7. Patient on fluids Patient shows no visible signs of bleeding. Patient on 0.9% NaCL at 60ml/hr.

## 2020-01-08 NOTE — PROVIDER CONTACT NOTE (OTHER) - ACTION/TREATMENT ORDERED:
Provider notified and aware. Will followup with labs tomorrow morning, 1/9/19. Continue to monitor. Provider notified and aware. Continue to monitor.

## 2020-01-08 NOTE — PATIENT PROFILE ADULT - [employer]
[[email or web address]] : Presents on acute basis - states table fell onto R foot at work yesterday; noted R 1st toe to be swollen and discolored; x-ray done which revealed non-displaced fracture of the tuft of the R 1st toe (films reviewed in PACS).  Does C/O discomfort but has been walking.
(3) slightly limited

## 2020-01-09 LAB
-  AMIKACIN: SIGNIFICANT CHANGE UP
-  AMPICILLIN/SULBACTAM: SIGNIFICANT CHANGE UP
-  AMPICILLIN: SIGNIFICANT CHANGE UP
-  AZTREONAM: SIGNIFICANT CHANGE UP
-  CEFAZOLIN: SIGNIFICANT CHANGE UP
-  CEFEPIME: SIGNIFICANT CHANGE UP
-  CEFOXITIN: SIGNIFICANT CHANGE UP
-  CEFTRIAXONE: SIGNIFICANT CHANGE UP
-  CIPROFLOXACIN: SIGNIFICANT CHANGE UP
-  GENTAMICIN: SIGNIFICANT CHANGE UP
-  IMIPENEM: SIGNIFICANT CHANGE UP
-  LEVOFLOXACIN: SIGNIFICANT CHANGE UP
-  MEROPENEM: SIGNIFICANT CHANGE UP
-  NITROFURANTOIN: SIGNIFICANT CHANGE UP
-  PIPERACILLIN/TAZOBACTAM: SIGNIFICANT CHANGE UP
-  TIGECYCLINE: SIGNIFICANT CHANGE UP
-  TOBRAMYCIN: SIGNIFICANT CHANGE UP
-  TRIMETHOPRIM/SULFAMETHOXAZOLE: SIGNIFICANT CHANGE UP
ALBUMIN SERPL ELPH-MCNC: 3.6 G/DL — SIGNIFICANT CHANGE UP (ref 3.3–5)
ALP SERPL-CCNC: 92 U/L — SIGNIFICANT CHANGE UP (ref 40–120)
ALT FLD-CCNC: 13 U/L — SIGNIFICANT CHANGE UP (ref 10–45)
ANION GAP SERPL CALC-SCNC: 12 MMOL/L — SIGNIFICANT CHANGE UP (ref 5–17)
AST SERPL-CCNC: 24 U/L — SIGNIFICANT CHANGE UP (ref 10–40)
BILIRUB SERPL-MCNC: 2.5 MG/DL — HIGH (ref 0.2–1.2)
BUN SERPL-MCNC: 14 MG/DL — SIGNIFICANT CHANGE UP (ref 7–23)
CALCIUM SERPL-MCNC: 8.9 MG/DL — SIGNIFICANT CHANGE UP (ref 8.4–10.5)
CHLORIDE SERPL-SCNC: 111 MMOL/L — HIGH (ref 96–108)
CO2 SERPL-SCNC: 20 MMOL/L — LOW (ref 22–31)
CREAT SERPL-MCNC: 0.77 MG/DL — SIGNIFICANT CHANGE UP (ref 0.5–1.3)
CULTURE RESULTS: SIGNIFICANT CHANGE UP
GLUCOSE SERPL-MCNC: 101 MG/DL — HIGH (ref 70–99)
HCT VFR BLD CALC: 32.9 % — LOW (ref 34.5–45)
HGB BLD-MCNC: 11.2 G/DL — LOW (ref 11.5–15.5)
MCHC RBC-ENTMCNC: 33.8 PG — SIGNIFICANT CHANGE UP (ref 27–34)
MCHC RBC-ENTMCNC: 34 GM/DL — SIGNIFICANT CHANGE UP (ref 32–36)
MCV RBC AUTO: 99.4 FL — SIGNIFICANT CHANGE UP (ref 80–100)
METHOD TYPE: SIGNIFICANT CHANGE UP
NRBC # BLD: 0 /100 WBCS — SIGNIFICANT CHANGE UP (ref 0–0)
ORGANISM # SPEC MICROSCOPIC CNT: SIGNIFICANT CHANGE UP
ORGANISM # SPEC MICROSCOPIC CNT: SIGNIFICANT CHANGE UP
PLATELET # BLD AUTO: 113 K/UL — LOW (ref 150–400)
POTASSIUM SERPL-MCNC: 3.6 MMOL/L — SIGNIFICANT CHANGE UP (ref 3.5–5.3)
POTASSIUM SERPL-SCNC: 3.6 MMOL/L — SIGNIFICANT CHANGE UP (ref 3.5–5.3)
PROT SERPL-MCNC: 6.2 G/DL — SIGNIFICANT CHANGE UP (ref 6–8.3)
RBC # BLD: 3.31 M/UL — LOW (ref 3.8–5.2)
RBC # FLD: 13.4 % — SIGNIFICANT CHANGE UP (ref 10.3–14.5)
SODIUM SERPL-SCNC: 143 MMOL/L — SIGNIFICANT CHANGE UP (ref 135–145)
SPECIMEN SOURCE: SIGNIFICANT CHANGE UP
WBC # BLD: 6.68 K/UL — SIGNIFICANT CHANGE UP (ref 3.8–10.5)
WBC # FLD AUTO: 6.68 K/UL — SIGNIFICANT CHANGE UP (ref 3.8–10.5)

## 2020-01-09 PROCEDURE — 99232 SBSQ HOSP IP/OBS MODERATE 35: CPT

## 2020-01-09 RX ORDER — POTASSIUM CHLORIDE 20 MEQ
20 PACKET (EA) ORAL ONCE
Refills: 0 | Status: COMPLETED | OUTPATIENT
Start: 2020-01-09 | End: 2020-01-09

## 2020-01-09 RX ORDER — POTASSIUM CHLORIDE 20 MEQ
20 PACKET (EA) ORAL ONCE
Refills: 0 | Status: DISCONTINUED | OUTPATIENT
Start: 2020-01-09 | End: 2020-01-09

## 2020-01-09 RX ORDER — RIVAROXABAN 15 MG-20MG
15 KIT ORAL DAILY
Refills: 0 | Status: DISCONTINUED | OUTPATIENT
Start: 2020-01-09 | End: 2020-01-10

## 2020-01-09 RX ADMIN — ATORVASTATIN CALCIUM 20 MILLIGRAM(S): 80 TABLET, FILM COATED ORAL at 21:38

## 2020-01-09 RX ADMIN — BRIMONIDINE TARTRATE 1 DROP(S): 2 SOLUTION/ DROPS OPHTHALMIC at 17:04

## 2020-01-09 RX ADMIN — MEGESTROL ACETATE 400 MILLIGRAM(S): 40 SUSPENSION ORAL at 13:43

## 2020-01-09 RX ADMIN — RIVAROXABAN 15 MILLIGRAM(S): KIT at 13:43

## 2020-01-09 RX ADMIN — Medication 1 TABLET(S): at 13:43

## 2020-01-09 RX ADMIN — Medication 1 DROP(S): at 17:04

## 2020-01-09 RX ADMIN — Medication 20 MILLIEQUIVALENT(S): at 13:43

## 2020-01-09 RX ADMIN — BRIMONIDINE TARTRATE 1 DROP(S): 2 SOLUTION/ DROPS OPHTHALMIC at 05:20

## 2020-01-09 RX ADMIN — Medication 1 DROP(S): at 05:21

## 2020-01-09 NOTE — PROGRESS NOTE ADULT - SUBJECTIVE AND OBJECTIVE BOX
CARDIOLOGY     PROGRESS  NOTE   ________________________________________________    CHIEF COMPLAINT:Patient is a 89y old  Female who presents with a chief complaint of fall/ hypotension (08 Jan 2020 08:46)  doing better.  	  REVIEW OF SYSTEMS:  CONSTITUTIONAL: No fever, weight loss, or fatigue  EYES: No eye pain, visual disturbances, or discharge  ENT:  No difficulty hearing, tinnitus, vertigo; No sinus or throat pain  NECK: No pain or stiffness  RESPIRATORY: No cough, wheezing, chills or hemoptysis; + Shortness of Breath  CARDIOVASCULAR: No chest pain, palpitations, passing out, dizziness, or leg swelling  GASTROINTESTINAL: No abdominal or epigastric pain. No nausea, vomiting, or hematemesis; No diarrhea or constipation. No melena or hematochezia.  GENITOURINARY: No dysuria, frequency, hematuria, or incontinence  NEUROLOGICAL: No headaches, memory loss, loss of strength, numbness, or tremors  SKIN: No itching, burning, rashes, or lesions   LYMPH Nodes: No enlarged glands  ENDOCRINE: No heat or cold intolerance; No hair loss  MUSCULOSKELETAL: No joint pain or swelling; No muscle, back, or extremity pain  PSYCHIATRIC: No depression, anxiety, mood swings, or difficulty sleeping  HEME/LYMPH: No easy bruising, or bleeding gums  ALLERGY AND IMMUNOLOGIC: No hives or eczema	    [ ] All others negative	  [ ] Unable to obtain    PHYSICAL EXAM:  T(C): 36.8 (01-09-20 @ 05:06), Max: 36.8 (01-09-20 @ 05:06)  HR: 60 (01-09-20 @ 05:06) (50 - 62)  BP: 146/69 (01-09-20 @ 05:06) (105/55 - 146/69)  RR: 18 (01-09-20 @ 05:06) (18 - 18)  SpO2: 96% (01-09-20 @ 05:06) (94% - 99%)  Wt(kg): --  I&O's Summary    08 Jan 2020 07:01  -  09 Jan 2020 07:00  --------------------------------------------------------  IN: 1120 mL / OUT: 600 mL / NET: 520 mL        Appearance: Normal	  HEENT:   Normal oral mucosa, PERRL, EOMI	  Lymphatic: No lymphadenopathy  Cardiovascular: Normal S1 S2, No JVD, + murmurs, No edema  Respiratory:rhonchi  Psychiatry: A & O x 3, Mood & affect appropriate  Gastrointestinal:  Soft, Non-tender, + BS	  Skin: No rashes, No ecchymoses, No cyanosis	  Neurologic: Non-focal  Extremities: Normal range of motion, No clubbing, cyanosis or edema  Vascular: Peripheral pulses palpable 2+ bilaterally    MEDICATIONS  (STANDING):  atorvastatin 20 milliGRAM(s) Oral at bedtime  brimonidine 0.2% Ophthalmic Solution 1 Drop(s) Both EYES two times a day  levoFLOXacin  Tablet 250 milliGRAM(s) Oral every 24 hours  megestrol Suspension 400 milliGRAM(s) Oral daily  multivitamin 1 Tablet(s) Oral daily  sodium chloride 0.9%. 1000 milliLiter(s) (60 mL/Hr) IV Continuous <Continuous>  timolol 0.5% Solution 1 Drop(s) Both EYES two times a day      TELEMETRY: 	    ECG:  	  RADIOLOGY:  OTHER: 	  	  LABS:	 	    CARDIAC MARKERS:  CARDIAC MARKERS ( 07 Jan 2020 11:26 )  x     / x     / 69 U/L / x     / x                                    11.2   6.68  )-----------( 113      ( 09 Jan 2020 06:25 )             32.9     01-09    143  |  111<H>  |  14  ----------------------------<  101<H>  3.6   |  20<L>  |  0.77    Ca    8.9      09 Jan 2020 06:25    TPro  6.2  /  Alb  3.6  /  TBili  2.5<H>  /  DBili  x   /  AST  24  /  ALT  13  /  AlkPhos  92  01-09    proBNP:   Lipid Profile:   HgA1c:   TSH: Thyroid Stimulating Hormone, Serum: 4.04 uIU/mL (01-08 @ 15:00)    PT/INR - ( 07 Jan 2020 11:26 )   PT: 27.8 sec;   INR: 2.37 ratio         PTT - ( 07 Jan 2020 11:26 )  PTT:37.3 sec    Culture - Urine (01.07.20 @ 17:15)    Specimen Source: .Urine Catheterized    Culture Results:   >100,000 CFU/ml Gram Negative Rods      Assessment and plan  ---------------------------  89f w hx CVA, Afib on Xarelto, dementia, lives at [facility] after unwitnessed fall. Per report she was found on the floor. Baseline mental status awake and alert, oriented x1-2, generally agitated. No obvious deformities. Pt appears uncomfortable and describes being in pain but cannot specify where; per nursing was c/o right shoulder pain  in ER pt was found with sbp of 80  tele hold ac for now  ivf  check orthostatic  add Levaquin for uti, awaiting cultures  physical therapy  dc beta blocker sec to bradycardia ?SSS  doing better, not orthostatic  +urine culture for g- rods, appropriate abx as per med  dc planning if clear by physical therapy

## 2020-01-09 NOTE — PROGRESS NOTE ADULT - SUBJECTIVE AND OBJECTIVE BOX
no cp    REVIEW OF SYSTEMS:  GEN: no fever,    no chills  RESP: no SOB,   no cough  CVS: no chest pain,   no palpitations  GI: no abdominal pain,   no nausea,   no vomiting,   no constipation,   no diarrhea  : no dysuria,   no frequency  NEURO: no headache,   no dizziness  PSYCH: no depression,   not anxious  Derm : no rash    MEDICATIONS  (STANDING):  atorvastatin 20 milliGRAM(s) Oral at bedtime  brimonidine 0.2% Ophthalmic Solution 1 Drop(s) Both EYES two times a day  levoFLOXacin  Tablet 250 milliGRAM(s) Oral every 24 hours  megestrol Suspension 400 milliGRAM(s) Oral daily  multivitamin 1 Tablet(s) Oral daily  rivaroxaban 15 milliGRAM(s) Oral daily  timolol 0.5% Solution 1 Drop(s) Both EYES two times a day    MEDICATIONS  (PRN):  acetaminophen   Tablet .. 650 milliGRAM(s) Oral every 6 hours PRN Moderate Pain (4 - 6)      Vital Signs Last 24 Hrs  T(C): 36.8 (2020 05:06), Max: 36.8 (2020 05:06)  T(F): 98.2 (2020 05:06), Max: 98.2 (2020 05:06)  HR: 60 (2020 05:06) (50 - 62)  BP: 146/69 (2020 05:06) (105/55 - 146/69)  BP(mean): --  RR: 18 (2020 05:06) (18 - 18)  SpO2: 96% (2020 05:06) (94% - 99%)  CAPILLARY BLOOD GLUCOSE        I&O's Summary    2020 07:01  -  2020 07:00  --------------------------------------------------------  IN: 1120 mL / OUT: 600 mL / NET: 520 mL        PHYSICAL EXAM:  HEAD:  Atraumatic, Normocephalic  NECK: Supple, No   JVD  CHEST/LUNG:   no     rales,     no,    rhonchi  HEART: Regular rate and rhythm;         murmur  ABDOMEN: Soft, Nontender, ;   EXTREMITIES:   no     edema  NEUROLOGY:  alert    LABS:                        11.2   6.68  )-----------( 113      ( 2020 06:25 )             32.9     -    143  |  111<H>  |  14  ----------------------------<  101<H>  3.6   |  20<L>  |  0.77    Ca    8.9      2020 06:25    TPro  6.2  /  Alb  3.6  /  TBili  2.5<H>  /  DBili  x   /  AST  24  /  ALT  13  /  AlkPhos  92  09    PT/INR - ( 2020 11:26 )   PT: 27.8 sec;   INR: 2.37 ratio         PTT - ( 2020 11:26 )  PTT:37.3 sec  CARDIAC MARKERS ( 2020 11:26 )  x     / x     / 69 U/L / x     / x          Urinalysis Basic - ( 2020 13:32 )    Color: Yellow / Appearance: Slightly Turbid / S.019 / pH: x  Gluc: x / Ketone: Negative  / Bili: Negative / Urobili: Negative   Blood: x / Protein: Trace / Nitrite: Positive   Leuk Esterase: Large / RBC: 5 /hpf /  /HPF   Sq Epi: x / Non Sq Epi: 0 /hpf / Bacteria: Moderate           @ 19:31  3.8  27      Thyroid Stimulating Hormone, Serum: 4.04 uIU/mL ( @ 15:00)          Consultant(s) Notes Reviewed:      Care Discussed with Consultants/Other Providers:

## 2020-01-09 NOTE — PROVIDER CONTACT NOTE (OTHER) - RECOMMENDATIONS
Possibly extend fluids beyond end time of 0000. Change positions slowly. Continue orthostatic VS Q12H. Continue to monitor.

## 2020-01-09 NOTE — PROVIDER CONTACT NOTE (OTHER) - ASSESSMENT
Pt. A&Ox1-2. Pt. complains of lightheadedness and dizziness with orthostatic VS from laying to sitting and sitting to standing. VS laying /59 HR 75, sitting /77 HR 64, and standing /61 HR 62.

## 2020-01-09 NOTE — PROGRESS NOTE ADULT - ASSESSMENT
89  yr pt,   pt  with  h/o  afib/ copd, hld, pud/  h/o left ribf xs. 8/ 9 th,. left hip surg  h/o mlple falls.  echo, normal ef    admitted with   fall/  ?   syncope  from [facility]  AFIB, not an ideal candidate  for  a/c, given falls  ct  head/  chest/ abdomen, no pathology   ct  T spine,   T2  comp deformity   uti, follow  urine  c/s. / gram negative rods , on  Levaquin   dvt ppx        PT eval      < from: CT Thoracic Spine Reform No Cont (01.07.20 @ 16:43) >  IMPRESSION:  Volume loss, microvascular disease, no acute hemorrhage or midline shift. If symptoms persist consider follow up head CT or MRI contraindications.  Cervical and thoracic spine are unchanged from prior, no obvious fracture or dislocation, multilevel degenerative changes as noted previously with diffuse osteopenia and small unchanged mild superior endplate T2 compression deformity.  < end of copied text     < from: CT Chest w/ IV Cont (01.07.20 @ 16:43) >  IMPRESSION:   No acute traumatic injury within the chest, abdomen, or pelvis.  For spine findings, please see dedicated report of concurrently performed study.  < end of copied text        < from: CT Abdomen and Pelvis w/ IV Cont (10.25.18 @ 14:42) >  Degenerative changes in the spine. Status post left total hip replacement.  IMPRESSION: Comminuted mildly displaced fractures of the left eighth and   ninth posterior ribs.  Small left pleural effusion.  Indeterminate 0.5 cm nodular opacity in the right upper lobe, new since   6/26/2018.  < end of copied text >       < from: Limited Transthoracic Echo (10.26.18 @ 14:07) >  Conclusions:  Limited tranthoracic echocardiogram at patients request to  end exam.  1. Mitral annular calcification.  2. The aortic valve opens well.  3. Limited images acquired at the patients request. Based  upon the parasternal long axis view only;  grossly normal  left ventricular systolic function.  < end of copied text >

## 2020-01-09 NOTE — PROVIDER CONTACT NOTE (OTHER) - ASSESSMENT
Pt. A&Ox1-2 confused. Pt. denies CP, SOB, lightheadedness, dizziness. Pt. sleeping during event. VS temp 97.7, HR 50, /57, RR 18, pulse ox 94% on room air.

## 2020-01-09 NOTE — PROVIDER CONTACT NOTE (OTHER) - ACTION/TREATMENT ORDERED:
PA notified and aware. Possibly extend fluids beyond end time of 0000. Change positions slowly. Continue orthostatic VS Q12H. Continue to monitor.

## 2020-01-09 NOTE — CHART NOTE - NSCHARTNOTEFT_GEN_A_CORE
Patient with brief HR dip down to 40 x 10 seconds while asleep - has otherwise been 45-60 on tele; asymptomatic with stable vital signs.  Will discontinue telemetry at this point as d/w Dr. Ferro.    Inés Bryant NP  [phone]

## 2020-01-10 ENCOUNTER — TRANSCRIPTION ENCOUNTER (OUTPATIENT)
Age: 85
End: 2020-01-10

## 2020-01-10 VITALS
OXYGEN SATURATION: 98 % | SYSTOLIC BLOOD PRESSURE: 109 MMHG | DIASTOLIC BLOOD PRESSURE: 51 MMHG | RESPIRATION RATE: 18 BRPM | HEART RATE: 54 BPM | TEMPERATURE: 98 F

## 2020-01-10 PROCEDURE — 83690 ASSAY OF LIPASE: CPT

## 2020-01-10 PROCEDURE — 71045 X-RAY EXAM CHEST 1 VIEW: CPT

## 2020-01-10 PROCEDURE — 86850 RBC ANTIBODY SCREEN: CPT

## 2020-01-10 PROCEDURE — 85730 THROMBOPLASTIN TIME PARTIAL: CPT

## 2020-01-10 PROCEDURE — 82435 ASSAY OF BLOOD CHLORIDE: CPT

## 2020-01-10 PROCEDURE — 85027 COMPLETE CBC AUTOMATED: CPT

## 2020-01-10 PROCEDURE — 84132 ASSAY OF SERUM POTASSIUM: CPT

## 2020-01-10 PROCEDURE — 87086 URINE CULTURE/COLONY COUNT: CPT

## 2020-01-10 PROCEDURE — 74177 CT ABD & PELVIS W/CONTRAST: CPT

## 2020-01-10 PROCEDURE — 84443 ASSAY THYROID STIM HORMONE: CPT

## 2020-01-10 PROCEDURE — 72170 X-RAY EXAM OF PELVIS: CPT

## 2020-01-10 PROCEDURE — 36000 PLACE NEEDLE IN VEIN: CPT | Mod: XU

## 2020-01-10 PROCEDURE — 82803 BLOOD GASES ANY COMBINATION: CPT

## 2020-01-10 PROCEDURE — 86900 BLOOD TYPING SEROLOGIC ABO: CPT

## 2020-01-10 PROCEDURE — 97161 PT EVAL LOW COMPLEX 20 MIN: CPT

## 2020-01-10 PROCEDURE — 71260 CT THORAX DX C+: CPT

## 2020-01-10 PROCEDURE — 99285 EMERGENCY DEPT VISIT HI MDM: CPT | Mod: 25

## 2020-01-10 PROCEDURE — 73060 X-RAY EXAM OF HUMERUS: CPT

## 2020-01-10 PROCEDURE — 86901 BLOOD TYPING SEROLOGIC RH(D): CPT

## 2020-01-10 PROCEDURE — 82962 GLUCOSE BLOOD TEST: CPT

## 2020-01-10 PROCEDURE — 93005 ELECTROCARDIOGRAM TRACING: CPT | Mod: XU

## 2020-01-10 PROCEDURE — 85014 HEMATOCRIT: CPT

## 2020-01-10 PROCEDURE — 72125 CT NECK SPINE W/O DYE: CPT

## 2020-01-10 PROCEDURE — 84295 ASSAY OF SERUM SODIUM: CPT

## 2020-01-10 PROCEDURE — 82607 VITAMIN B-12: CPT

## 2020-01-10 PROCEDURE — 51701 INSERT BLADDER CATHETER: CPT

## 2020-01-10 PROCEDURE — 85610 PROTHROMBIN TIME: CPT

## 2020-01-10 PROCEDURE — 99232 SBSQ HOSP IP/OBS MODERATE 35: CPT

## 2020-01-10 PROCEDURE — 80053 COMPREHEN METABOLIC PANEL: CPT

## 2020-01-10 PROCEDURE — 81001 URINALYSIS AUTO W/SCOPE: CPT

## 2020-01-10 PROCEDURE — 82947 ASSAY GLUCOSE BLOOD QUANT: CPT

## 2020-01-10 PROCEDURE — 83605 ASSAY OF LACTIC ACID: CPT

## 2020-01-10 PROCEDURE — 87186 SC STD MICRODIL/AGAR DIL: CPT

## 2020-01-10 PROCEDURE — 96375 TX/PRO/DX INJ NEW DRUG ADDON: CPT | Mod: XU

## 2020-01-10 PROCEDURE — 73030 X-RAY EXAM OF SHOULDER: CPT

## 2020-01-10 PROCEDURE — 96374 THER/PROPH/DIAG INJ IV PUSH: CPT | Mod: XU

## 2020-01-10 PROCEDURE — 84484 ASSAY OF TROPONIN QUANT: CPT

## 2020-01-10 PROCEDURE — 82550 ASSAY OF CK (CPK): CPT

## 2020-01-10 PROCEDURE — 82330 ASSAY OF CALCIUM: CPT

## 2020-01-10 PROCEDURE — 70450 CT HEAD/BRAIN W/O DYE: CPT

## 2020-01-10 RX ORDER — NITROFURANTOIN MACROCRYSTAL 50 MG
1 CAPSULE ORAL
Qty: 10 | Refills: 0
Start: 2020-01-10 | End: 2020-01-14

## 2020-01-10 RX ORDER — NITROFURANTOIN MACROCRYSTAL 50 MG
100 CAPSULE ORAL
Refills: 0 | Status: DISCONTINUED | OUTPATIENT
Start: 2020-01-10 | End: 2020-01-10

## 2020-01-10 RX ADMIN — Medication 1 TABLET(S): at 12:29

## 2020-01-10 RX ADMIN — RIVAROXABAN 15 MILLIGRAM(S): KIT at 12:32

## 2020-01-10 RX ADMIN — BRIMONIDINE TARTRATE 1 DROP(S): 2 SOLUTION/ DROPS OPHTHALMIC at 04:55

## 2020-01-10 RX ADMIN — Medication 1 DROP(S): at 04:55

## 2020-01-10 RX ADMIN — MEGESTROL ACETATE 400 MILLIGRAM(S): 40 SUSPENSION ORAL at 12:29

## 2020-01-10 NOTE — DISCHARGE NOTE PROVIDER - HOSPITAL COURSE
89 y/o F with PMHx CVA, AFib on Xarelto, dementia who presents from [facility] s/p unwitnessed fall. Per report, pt was found on the floor. Baseline mental status is A&O X 1-2 with generalized agitation. CT Chest/spine negative for acute fracture/deformity/trauma. In ED, pt had SBP of 80, treated with IVF. Patient briefly orthostatic, treated with IVF and since resolved.     Found to have UTI, treated with ABX, Culture/sensitivities showing E.coli, sensitive to Bactrim. Pt to be discharged on PO Bactrim.     PT recommended Return to [facility]. Pt is medically stable to return to [facility]. 89 y/o F with PMHx CVA, AFib on Xarelto, dementia who presents from [facility] s/p unwitnessed fall. Per report, pt was found on the floor. Baseline mental status is A&O X 1-2 with generalized agitation. CT Chest/spine negative for acute fracture/deformity/trauma. In ED, pt had SBP of 80, treated with IVF. Patient briefly orthostatic, treated with IVF and since resolved.     Found to have UTI, treated with ABX, Culture/sensitivities showing E.coli, sensitive to Bactrim. Pt to be discharged on PO Bactrim 100mg BID x 5 days.    PT recommended Return to [facility]. Pt is medically stable to return to [facility].

## 2020-01-10 NOTE — PROGRESS NOTE ADULT - SUBJECTIVE AND OBJECTIVE BOX
no  complaints  REVIEW OF SYSTEMS:  GEN: no fever,    no chills  RESP: no SOB,   no cough  CVS: no chest pain,   no palpitations  GI: no abdominal pain,   no nausea,   no vomiting,   no constipation,   no diarrhea  : no dysuria,   no frequency  NEURO: no headache,   no dizziness  PSYCH: no depression,   not anxious  Derm : no rash    MEDICATIONS  (STANDING):  atorvastatin 20 milliGRAM(s) Oral at bedtime  brimonidine 0.2% Ophthalmic Solution 1 Drop(s) Both EYES two times a day  megestrol Suspension 400 milliGRAM(s) Oral daily  multivitamin 1 Tablet(s) Oral daily  nitrofurantoin monohydrate/macrocrystals (MACROBID) 100 milliGRAM(s) Oral two times a day with meals  rivaroxaban 15 milliGRAM(s) Oral daily  timolol 0.5% Solution 1 Drop(s) Both EYES two times a day    MEDICATIONS  (PRN):  acetaminophen   Tablet .. 650 milliGRAM(s) Oral every 6 hours PRN Moderate Pain (4 - 6)      Vital Signs Last 24 Hrs  T(C): 36.3 (10 Herberth 2020 04:58), Max: 36.7 (09 Jan 2020 20:17)  T(F): 97.3 (10 [place]berth 2020 04:58), Max: 98 (09 Jan 2020 20:17)  HR: 57 (10 Herberth 2020 04:58) (54 - 57)  BP: 127/57 (10 Herberth 2020 04:58) (112/53 - 129/70)  BP(mean): --  RR: 18 (10 Herberth 2020 04:58) (18 - 18)  SpO2: 95% (10 Herberth 2020 04:58) (95% - 100%)  CAPILLARY BLOOD GLUCOSE        I&O's Summary    09 Jan 2020 07:01  -  10 Herberth 2020 07:00  --------------------------------------------------------  IN: 540 mL / OUT: 200 mL / NET: 340 mL        PHYSICAL EXAM:  HEAD:  Atraumatic, Normocephalic  NECK: Supple, No   JVD  CHEST/LUNG:   no     rales,     no,    rhonchi  HEART: Regular rate and rhythm;         murmur  ABDOMEN: Soft, Nontender, ;   EXTREMITIES:     no   edema  NEUROLOGY:  dementia    LABS:                        11.2   6.68  )-----------( 113      ( 09 Jan 2020 06:25 )             32.9     01-09    143  |  111<H>  |  14  ----------------------------<  101<H>  3.6   |  20<L>  |  0.77    Ca    8.9      09 Jan 2020 06:25    TPro  6.2  /  Alb  3.6  /  TBili  2.5<H>  /  DBili  x   /  AST  24  /  ALT  13  /  AlkPhos  92  01-09                    Thyroid Stimulating Hormone, Serum: 4.04 uIU/mL (01-08 @ 15:00)          Consultant(s) Notes Reviewed:      Care Discussed with Consultants/Other Providers:

## 2020-01-10 NOTE — DISCHARGE NOTE PROVIDER - NSDCMRMEDTOKEN_GEN_ALL_CORE_FT
acetaminophen 325 mg oral tablet: 2 tab(s) orally every 6 hours, As needed, Mild Pain (1 - 3)  atorvastatin 20 mg oral tablet: 1 tab(s) orally once a day MDD:1  brimonidine 0.2% ophthalmic solution: 1 drop(s) to each affected eye 2 times a day  docusate sodium 10 mg/mL oral liquid: 30 milliliter(s) orally once a day (at bedtime)  megestrol 40 mg/mL oral suspension: 10 milliliter(s) orally once a day  metoprolol tartrate 25 mg oral tablet: 0.5 tab(s) (12.5 mg) orally 2 times a day MDD:1  Multiple Vitamins oral tablet: 1 tab(s) orally once a day MDD:1  Physical Therapy Evaluation and Treatment:   timolol maleate 0.5% ophthalmic solution: 1 drop(s) to each affected eye 2 times a day  vitamin A &amp; D topical ointment: Apply topically to affected area once a day ( Right Heel)  Xarelto 15 mg oral tablet: 1 tab(s) orally once a day (in the morning) MDD:1 acetaminophen 325 mg oral tablet: 2 tab(s) orally every 6 hours, As needed, Mild Pain (1 - 3)  atorvastatin 20 mg oral tablet: 1 tab(s) orally once a day MDD:1  brimonidine 0.2% ophthalmic solution: 1 drop(s) to each affected eye 2 times a day  docusate sodium 10 mg/mL oral liquid: 30 milliliter(s) orally once a day (at bedtime)  megestrol 40 mg/mL oral suspension: 10 milliliter(s) orally once a day  Multiple Vitamins oral tablet: 1 tab(s) orally once a day MDD:1  nitrofurantoin macrocrystals-monohydrate 100 mg oral capsule: 1 cap(s) orally 2 times a day (with meals)  Physical Therapy Evaluation and Treatment:   timolol maleate 0.5% ophthalmic solution: 1 drop(s) to each affected eye 2 times a day  vitamin A &amp; D topical ointment: Apply topically to affected area once a day ( Right Heel)  Xarelto 15 mg oral tablet: 1 tab(s) orally once a day (in the morning) MDD:1

## 2020-01-10 NOTE — PROGRESS NOTE ADULT - ASSESSMENT
89  yr pt,   pt  with  h/o  afib/ copd, hld, pud/  h/o left ribf xs. 8/ 9 th,. left hip surg  h/o mlple falls.  echo, normal ef    admitted with   fall/  ?   syncope  from [facility]  AFIB, not an ideal candidate  for  a/c, given falls  ct  head/  chest/ abdomen, no pathology   ct  T spine,   T2  comp deformity   uti, follow  urine  c/s. / gram negative rods /  ecoli, ' R'  to , Levaquin    nitrofurantoin,  for  3  days   dvt ppx        PT eval      < from: CT Thoracic Spine Reform No Cont (01.07.20 @ 16:43) >  IMPRESSION:  Volume loss, microvascular disease, no acute hemorrhage or midline shift. If symptoms persist consider follow up head CT or MRI contraindications.  Cervical and thoracic spine are unchanged from prior, no obvious fracture or dislocation, multilevel degenerative changes as noted previously with diffuse osteopenia and small unchanged mild superior endplate T2 compression deformity.  < end of copied text     < from: CT Chest w/ IV Cont (01.07.20 @ 16:43) >  IMPRESSION:   No acute traumatic injury within the chest, abdomen, or pelvis.  For spine findings, please see dedicated report of concurrently performed study.  < end of copied text        < from: CT Abdomen and Pelvis w/ IV Cont (10.25.18 @ 14:42) >  Degenerative changes in the spine. Status post left total hip replacement.  IMPRESSION: Comminuted mildly displaced fractures of the left eighth and   ninth posterior ribs.  Small left pleural effusion.  Indeterminate 0.5 cm nodular opacity in the right upper lobe, new since   6/26/2018.  < end of copied text >       < from: Limited Transthoracic Echo (10.26.18 @ 14:07) >  Conclusions:  Limited tranthoracic echocardiogram at patients request to  end exam.  1. Mitral annular calcification.  2. The aortic valve opens well.  3. Limited images acquired at the patients request. Based  upon the parasternal long axis view only;  grossly normal  left ventricular systolic function.  < end of copied text >

## 2020-01-10 NOTE — PROGRESS NOTE ADULT - ASSESSMENT
89  yr pt,   pt  with  h/o  afib/ copd, hld, pud/  h/o left ribf xs. 8/ 9 th,. left hip surg  h/o mlple falls.  echo, normal ef    admitted with   fall/  ?   syncope  from [facility]  AFIB, not an ideal candidate  for  a/c, given falls  ct  head/  chest/ abdomen, no pathology   ct  T spine,   T2  comp deformity   uti, follow  urine  c/s. / gram negative rods /  ecoli, ' R'  to , Levaquin  ampicillin for  3  days   dvt ppx        PT eval      < from: CT Thoracic Spine Reform No Cont (01.07.20 @ 16:43) >  IMPRESSION:  Volume loss, microvascular disease, no acute hemorrhage or midline shift. If symptoms persist consider follow up head CT or MRI contraindications.  Cervical and thoracic spine are unchanged from prior, no obvious fracture or dislocation, multilevel degenerative changes as noted previously with diffuse osteopenia and small unchanged mild superior endplate T2 compression deformity.  < end of copied text     < from: CT Chest w/ IV Cont (01.07.20 @ 16:43) >  IMPRESSION:   No acute traumatic injury within the chest, abdomen, or pelvis.  For spine findings, please see dedicated report of concurrently performed study.  < end of copied text        < from: CT Abdomen and Pelvis w/ IV Cont (10.25.18 @ 14:42) >  Degenerative changes in the spine. Status post left total hip replacement.  IMPRESSION: Comminuted mildly displaced fractures of the left eighth and   ninth posterior ribs.  Small left pleural effusion.  Indeterminate 0.5 cm nodular opacity in the right upper lobe, new since   6/26/2018.  < end of copied text >       < from: Limited Transthoracic Echo (10.26.18 @ 14:07) >  Conclusions:  Limited tranthoracic echocardiogram at patients request to  end exam.  1. Mitral annular calcification.  2. The aortic valve opens well.  3. Limited images acquired at the patients request. Based  upon the parasternal long axis view only;  grossly normal  left ventricular systolic function.  < end of copied text >

## 2020-01-10 NOTE — DISCHARGE NOTE NURSING/CASE MANAGEMENT/SOCIAL WORK - PATIENT PORTAL LINK FT
You can access the FollowMyHealth Patient Portal offered by [facility] by registering at the following website: [email or web address]. By joining [employer]’s FollowMyHealth portal, you will also be able to view your health information using other applications (apps) compatible with our system.

## 2020-01-10 NOTE — PROGRESS NOTE ADULT - SUBJECTIVE AND OBJECTIVE BOX
CARDIOLOGY     PROGRESS  NOTE   ________________________________________________    CHIEF COMPLAINT:Patient is a 89y old  Female who presents with a chief complaint of fall/ hypotension (10 Herberth 2020 08:26)  doing well, no complain.  	  REVIEW OF SYSTEMS:  CONSTITUTIONAL: No fever, weight loss, or fatigue  EYES: No eye pain, visual disturbances, or discharge  ENT:  No difficulty hearing, tinnitus, vertigo; No sinus or throat pain  NECK: No pain or stiffness  RESPIRATORY: No cough, wheezing, chills or hemoptysis; No Shortness of Breath  CARDIOVASCULAR: No chest pain, palpitations, passing out, dizziness, or leg swelling  GASTROINTESTINAL: No abdominal or epigastric pain. No nausea, vomiting, or hematemesis; No diarrhea or constipation. No melena or hematochezia.  GENITOURINARY: No dysuria, frequency, hematuria, or incontinence  NEUROLOGICAL: No headaches, memory loss, loss of strength, numbness, or tremors  SKIN: No itching, burning, rashes, or lesions   LYMPH Nodes: No enlarged glands  ENDOCRINE: No heat or cold intolerance; No hair loss  MUSCULOSKELETAL: No joint pain or swelling; No muscle, back, or extremity pain  PSYCHIATRIC: No depression, anxiety, mood swings, or difficulty sleeping  HEME/LYMPH: No easy bruising, or bleeding gums  ALLERGY AND IMMUNOLOGIC: No hives or eczema	    [ ] All others negative	  [ ] Unable to obtain    PHYSICAL EXAM:  T(C): 36.3 (01-10-20 @ 04:58), Max: 36.7 (01-09-20 @ 20:17)  HR: 57 (01-10-20 @ 04:58) (54 - 57)  BP: 127/57 (01-10-20 @ 04:58) (112/53 - 129/70)  RR: 18 (01-10-20 @ 04:58) (18 - 18)  SpO2: 95% (01-10-20 @ 04:58) (95% - 100%)  Wt(kg): --  I&O's Summary    09 Jan 2020 07:01  -  10 Herberth 2020 07:00  --------------------------------------------------------  IN: 540 mL / OUT: 200 mL / NET: 340 mL        Appearance: Normal	  HEENT:   Normal oral mucosa, PERRL, EOMI	  Lymphatic: No lymphadenopathy  Cardiovascular: Normal S1 S2, No JVD, + murmurs, No edema  Respiratory: Lungs clear to auscultation	  Psychiatry: A & O x 3, Mood & affect appropriate  Gastrointestinal:  Soft, Non-tender, + BS	  Skin: No rashes, No ecchymoses, No cyanosis	  Neurologic: Non-focal  Extremities: Normal range of motion, No clubbing, cyanosis or edema  Vascular: Peripheral pulses palpable 2+ bilaterally    MEDICATIONS  (STANDING):  atorvastatin 20 milliGRAM(s) Oral at bedtime  brimonidine 0.2% Ophthalmic Solution 1 Drop(s) Both EYES two times a day  megestrol Suspension 400 milliGRAM(s) Oral daily  multivitamin 1 Tablet(s) Oral daily  nitrofurantoin monohydrate/macrocrystals (MACROBID) 100 milliGRAM(s) Oral two times a day with meals  rivaroxaban 15 milliGRAM(s) Oral daily  timolol 0.5% Solution 1 Drop(s) Both EYES two times a day      TELEMETRY: 	    ECG:  	  RADIOLOGY:  OTHER: 	  	  LABS:	 	    CARDIAC MARKERS:                                11.2   6.68  )-----------( 113      ( 09 Jan 2020 06:25 )             32.9     01-09    143  |  111<H>  |  14  ----------------------------<  101<H>  3.6   |  20<L>  |  0.77    Ca    8.9      09 Jan 2020 06:25    TPro  6.2  /  Alb  3.6  /  TBili  2.5<H>  /  DBili  x   /  AST  24  /  ALT  13  /  AlkPhos  92  01-09    proBNP:   Lipid Profile:   HgA1c:   TSH: Thyroid Stimulating Hormone, Serum: 4.04 uIU/mL (01-08 @ 15:00)  Culture - Urine (01.07.20 @ 17:15)    -  Trimethoprim/Sulfamethoxazole: S <=2/38    -  Tobramycin: S <=4    -  Piperacillin/Tazobactam: S <=16    -  Tigecycline: S <=2    -  Nitrofurantoin: S <=32 Should not be used to treat pyelonephritis    -  Meropenem: S <=1    -  Levofloxacin: R >4    -  Imipenem: S <=1    -  Ampicillin: S <=8 These ampicillin results predict results for amoxicillin    -  Ampicillin/Sulbactam: S <=8/4 Enterobacter, Citrobacter, and Serratia may develop resistance during prolonged therapy (3-4 days)    -  Amikacin: S <=16    -  Gentamicin: S <=4    -  Ciprofloxacin: R >2    -  Ceftriaxone: S <=1 Enterobacter, Citrobacter, and Serratia may develop resistance during prolonged therapy    -  Cefoxitin: S <=8    -  Cefepime: S <=4    -  Cefazolin: S <=8 (MIC_CL_COM_ENTERIC_CEFAZU) For uncomplicated UTI with K. pneumoniae, E. coli, or P. mirablis: EVARISTO <=16 is sensitive and EVARISTO >=32 is resistant. This also predicts results for oral agents cefaclor, cefdinir, cefpodoxime, cefprozil, cefuroxime axetil, cephalexin and locarbef for uncomplicated UTI. Note that some isolates may be susceptible to these agents while testing resistant to cefazolin.    -  Aztreonam: S <=4    Specimen Source: .Urine Catheterized    Culture Results:   >100,000 CFU/ml Escherichia coli    Organism Identification: Escherichia coli    Organism: Escherichia coli    Method Type: EVARISTO            Assessment and plan  ---------------------------  89f w hx CVA, Afib on Xarelto, dementia, lives at [facility] after unwitnessed fall. Per report she was found on the floor. Baseline mental status awake and alert, oriented x1-2, generally agitated. No obvious deformities. Pt appears uncomfortable and describes being in pain but cannot specify where; per nursing was c/o right shoulder pain  in ER pt was found with sbp of 80  tele hold ac for now  ivf  check orthostatic  add Levaquin for uti, awaiting cultures  physical therapy  dc beta blocker sec to bradycardia ?SSS  doing better, not orthostatic  +urine culture for g- rods, appropriate abx as per med started on macrobid  dc planning if clear by physical therapy

## 2020-01-10 NOTE — PROGRESS NOTE ADULT - REASON FOR ADMISSION
fall/ hypotension

## 2020-01-10 NOTE — DISCHARGE NOTE PROVIDER - NSDCCPCAREPLAN_GEN_ALL_CORE_FT
PRINCIPAL DISCHARGE DIAGNOSIS  Diagnosis: Fall, initial encounter  Assessment and Plan of Treatment: You had a fall. Trauma/Syncopal workup was negative for any pathology or fracture. Continue to work with physical therapy at your assisted living facility. If you feel dizzy, lay down with your feet up.      SECONDARY DISCHARGE DIAGNOSES  Diagnosis: UTI (urinary tract infection)  Assessment and Plan of Treatment: Continue to take your antibiotics as prescribed. If you develop fever, chills, headache, blood in your urine, pain or discomfort while urinating, severe abdominal pain, discolored urine.

## 2020-01-10 NOTE — PROGRESS NOTE ADULT - SUBJECTIVE AND OBJECTIVE BOX
no complaints  REVIEW OF SYSTEMS:  GEN: no fever,    no chills  RESP: no SOB,   no cough  CVS: no chest pain,   no palpitations  GI: no abdominal pain,   no nausea,   no vomiting,   no constipation,   no diarrhea  : no dysuria,   no frequency  NEURO: no headache,   no dizziness  PSYCH: no depression,   not anxious  Derm : no rash    MEDICATIONS  (STANDING):  atorvastatin 20 milliGRAM(s) Oral at bedtime  brimonidine 0.2% Ophthalmic Solution 1 Drop(s) Both EYES two times a day  levoFLOXacin  Tablet 250 milliGRAM(s) Oral every 24 hours  megestrol Suspension 400 milliGRAM(s) Oral daily  multivitamin 1 Tablet(s) Oral daily  rivaroxaban 15 milliGRAM(s) Oral daily  timolol 0.5% Solution 1 Drop(s) Both EYES two times a day    MEDICATIONS  (PRN):  acetaminophen   Tablet .. 650 milliGRAM(s) Oral every 6 hours PRN Moderate Pain (4 - 6)      Vital Signs Last 24 Hrs  T(C): 36.3 (10 Herberth 2020 04:58), Max: 36.7 (09 Jan 2020 20:17)  T(F): 97.3 (10 Herberth 2020 04:58), Max: 98 (09 Jan 2020 20:17)  HR: 57 (10 Herberth 2020 04:58) (54 - 57)  BP: 127/57 (10 Herberth 2020 04:58) (112/53 - 129/70)  BP(mean): --  RR: 18 (10 Herberth 2020 04:58) (18 - 18)  SpO2: 95% (10 Herberth 2020 04:58) (95% - 100%)  CAPILLARY BLOOD GLUCOSE        I&O's Summary    09 Jan 2020 07:01  -  10 Herberth 2020 07:00  --------------------------------------------------------  IN: 540 mL / OUT: 200 mL / NET: 340 mL        PHYSICAL EXAM:  HEAD:  Atraumatic, Normocephalic  NECK: Supple, No   JVD  CHEST/LUNG:   no     rales,     no,    rhonchi  HEART: Regular rate and rhythm;         murmur  ABDOMEN: Soft, Nontender, ;   EXTREMITIES:   no     edema  NEUROLOGY:   dementia    LABS:                        11.2   6.68  )-----------( 113      ( 09 Jan 2020 06:25 )             32.9     01-09    143  |  111<H>  |  14  ----------------------------<  101<H>  3.6   |  20<L>  |  0.77    Ca    8.9      09 Jan 2020 06:25    TPro  6.2  /  Alb  3.6  /  TBili  2.5<H>  /  DBili  x   /  AST  24  /  ALT  13  /  AlkPhos  92  01-09                    Thyroid Stimulating Hormone, Serum: 4.04 uIU/mL (01-08 @ 15:00)          Consultant(s) Notes Reviewed:      Care Discussed with Consultants/Other Providers:

## 2020-04-17 ENCOUNTER — INPATIENT (INPATIENT)
Facility: HOSPITAL | Age: 85
LOS: 20 days | Discharge: NURSING, REHAB OR LONG-TERM CARE | DRG: 871 | End: 2020-05-08
Attending: INTERNAL MEDICINE | Admitting: INTERNAL MEDICINE
Payer: MEDICARE

## 2020-04-17 VITALS — RESPIRATION RATE: 14 BRPM | OXYGEN SATURATION: 94 % | HEART RATE: 56 BPM

## 2020-04-17 DIAGNOSIS — Z71.89 OTHER SPECIFIED COUNSELING: ICD-10-CM

## 2020-04-17 DIAGNOSIS — J18.9 PNEUMONIA, UNSPECIFIED ORGANISM: ICD-10-CM

## 2020-04-17 DIAGNOSIS — U07.1 COVID-19: ICD-10-CM

## 2020-04-17 DIAGNOSIS — R57.9 SHOCK, UNSPECIFIED: ICD-10-CM

## 2020-04-17 DIAGNOSIS — Z51.5 ENCOUNTER FOR PALLIATIVE CARE: ICD-10-CM

## 2020-04-17 LAB
ALBUMIN SERPL ELPH-MCNC: 3.6 G/DL — SIGNIFICANT CHANGE UP (ref 3.3–5)
ALP SERPL-CCNC: 78 U/L — SIGNIFICANT CHANGE UP (ref 40–120)
ALT FLD-CCNC: 9 U/L — LOW (ref 10–45)
ANION GAP SERPL CALC-SCNC: 24 MMOL/L — HIGH (ref 5–17)
APPEARANCE UR: CLEAR — SIGNIFICANT CHANGE UP
APTT BLD: 27.1 SEC — LOW (ref 27.5–36.3)
AST SERPL-CCNC: 30 U/L — SIGNIFICANT CHANGE UP (ref 10–40)
BASE EXCESS BLDV CALC-SCNC: -5.2 MMOL/L — LOW (ref -2–2)
BASOPHILS # BLD AUTO: 0 K/UL — SIGNIFICANT CHANGE UP (ref 0–0.2)
BASOPHILS NFR BLD AUTO: 0 % — SIGNIFICANT CHANGE UP (ref 0–2)
BILIRUB SERPL-MCNC: 2.2 MG/DL — HIGH (ref 0.2–1.2)
BILIRUB UR-MCNC: NEGATIVE — SIGNIFICANT CHANGE UP
BUN SERPL-MCNC: 45 MG/DL — HIGH (ref 7–23)
CA-I SERPL-SCNC: 1.11 MMOL/L — LOW (ref 1.12–1.3)
CALCIUM SERPL-MCNC: 9.1 MG/DL — SIGNIFICANT CHANGE UP (ref 8.4–10.5)
CHLORIDE BLDV-SCNC: 119 MMOL/L — HIGH (ref 96–108)
CHLORIDE SERPL-SCNC: 116 MMOL/L — HIGH (ref 96–108)
CK MB CFR SERPL CALC: 1.4 NG/ML — SIGNIFICANT CHANGE UP (ref 0–3.8)
CK MB CFR SERPL CALC: <1 NG/ML — SIGNIFICANT CHANGE UP (ref 0–3.8)
CK SERPL-CCNC: 36 U/L — SIGNIFICANT CHANGE UP (ref 25–170)
CK SERPL-CCNC: 45 U/L — SIGNIFICANT CHANGE UP (ref 25–170)
CO2 BLDV-SCNC: 21 MMOL/L — LOW (ref 22–30)
CO2 SERPL-SCNC: 16 MMOL/L — LOW (ref 22–31)
COLOR SPEC: YELLOW — SIGNIFICANT CHANGE UP
CREAT SERPL-MCNC: 1.54 MG/DL — HIGH (ref 0.5–1.3)
CRP SERPL-MCNC: 0.77 MG/DL — HIGH (ref 0–0.4)
D DIMER BLD IA.RAPID-MCNC: 371 NG/ML DDU — HIGH
DIFF PNL FLD: NEGATIVE — SIGNIFICANT CHANGE UP
EOSINOPHIL # BLD AUTO: 0 K/UL — SIGNIFICANT CHANGE UP (ref 0–0.5)
EOSINOPHIL NFR BLD AUTO: 0 % — SIGNIFICANT CHANGE UP (ref 0–6)
FERRITIN SERPL-MCNC: 475 NG/ML — HIGH (ref 15–150)
FIBRINOGEN PPP-MCNC: 692 MG/DL — HIGH (ref 350–510)
GAS PNL BLDV: 145 MMOL/L — SIGNIFICANT CHANGE UP (ref 135–145)
GAS PNL BLDV: SIGNIFICANT CHANGE UP
GAS PNL BLDV: SIGNIFICANT CHANGE UP
GLUCOSE BLDV-MCNC: 229 MG/DL — HIGH (ref 70–99)
GLUCOSE SERPL-MCNC: 126 MG/DL — HIGH (ref 70–99)
GLUCOSE UR QL: NEGATIVE — SIGNIFICANT CHANGE UP
HCO3 BLDV-SCNC: 20 MMOL/L — LOW (ref 21–29)
HCT VFR BLD CALC: 40.2 % — SIGNIFICANT CHANGE UP (ref 34.5–45)
HCT VFR BLDA CALC: 36 % — LOW (ref 39–50)
HGB BLD CALC-MCNC: 11.5 G/DL — SIGNIFICANT CHANGE UP (ref 11.5–15.5)
HGB BLD-MCNC: 12.6 G/DL — SIGNIFICANT CHANGE UP (ref 11.5–15.5)
INR BLD: 1.29 RATIO — HIGH (ref 0.88–1.16)
KETONES UR-MCNC: ABNORMAL
LACTATE BLDV-MCNC: 1.4 MMOL/L — SIGNIFICANT CHANGE UP (ref 0.7–2)
LDH SERPL L TO P-CCNC: 296 U/L — HIGH (ref 50–242)
LEUKOCYTE ESTERASE UR-ACNC: NEGATIVE — SIGNIFICANT CHANGE UP
LYMPHOCYTES # BLD AUTO: 0.89 K/UL — LOW (ref 1–3.3)
LYMPHOCYTES # BLD AUTO: 13 % — SIGNIFICANT CHANGE UP (ref 13–44)
MCHC RBC-ENTMCNC: 31.3 GM/DL — LOW (ref 32–36)
MCHC RBC-ENTMCNC: 33 PG — SIGNIFICANT CHANGE UP (ref 27–34)
MCV RBC AUTO: 105.2 FL — HIGH (ref 80–100)
MONOCYTES # BLD AUTO: 0.42 K/UL — SIGNIFICANT CHANGE UP (ref 0–0.9)
MONOCYTES NFR BLD AUTO: 6.1 % — SIGNIFICANT CHANGE UP (ref 2–14)
NEUTROPHILS # BLD AUTO: 5.56 K/UL — SIGNIFICANT CHANGE UP (ref 1.8–7.4)
NEUTROPHILS NFR BLD AUTO: 80.9 % — HIGH (ref 43–77)
NITRITE UR-MCNC: NEGATIVE — SIGNIFICANT CHANGE UP
NT-PROBNP SERPL-SCNC: 4277 PG/ML — HIGH (ref 0–300)
PCO2 BLDV: 37 MMHG — SIGNIFICANT CHANGE UP (ref 35–50)
PH BLDV: 7.34 — LOW (ref 7.35–7.45)
PH UR: 5.5 — SIGNIFICANT CHANGE UP (ref 5–8)
PLATELET # BLD AUTO: 176 K/UL — SIGNIFICANT CHANGE UP (ref 150–400)
PO2 BLDV: 46 MMHG — HIGH (ref 25–45)
POTASSIUM BLDV-SCNC: 4.7 MMOL/L — SIGNIFICANT CHANGE UP (ref 3.5–5.3)
POTASSIUM SERPL-MCNC: 4.8 MMOL/L — SIGNIFICANT CHANGE UP (ref 3.5–5.3)
POTASSIUM SERPL-SCNC: 4.8 MMOL/L — SIGNIFICANT CHANGE UP (ref 3.5–5.3)
PROCALCITONIN SERPL-MCNC: 0.22 NG/ML — HIGH (ref 0.02–0.1)
PROT SERPL-MCNC: 7.6 G/DL — SIGNIFICANT CHANGE UP (ref 6–8.3)
PROT UR-MCNC: ABNORMAL
PROTHROM AB SERPL-ACNC: 15 SEC — HIGH (ref 10–12.9)
RBC # BLD: 3.82 M/UL — SIGNIFICANT CHANGE UP (ref 3.8–5.2)
RBC # FLD: 13.9 % — SIGNIFICANT CHANGE UP (ref 10.3–14.5)
SAO2 % BLDV: 74 % — SIGNIFICANT CHANGE UP (ref 67–88)
SARS-COV-2 RNA SPEC QL NAA+PROBE: DETECTED
SODIUM SERPL-SCNC: 156 MMOL/L — HIGH (ref 135–145)
SP GR SPEC: 1.02 — SIGNIFICANT CHANGE UP (ref 1.01–1.02)
TROPONIN T, HIGH SENSITIVITY RESULT: 33 NG/L — SIGNIFICANT CHANGE UP (ref 0–51)
TROPONIN T, HIGH SENSITIVITY RESULT: 63 NG/L — HIGH (ref 0–51)
UROBILINOGEN FLD QL: NEGATIVE — SIGNIFICANT CHANGE UP
WBC # BLD: 6.87 K/UL — SIGNIFICANT CHANGE UP (ref 3.8–10.5)
WBC # FLD AUTO: 6.87 K/UL — SIGNIFICANT CHANGE UP (ref 3.8–10.5)

## 2020-04-17 PROCEDURE — 71250 CT THORAX DX C-: CPT | Mod: 26,CS

## 2020-04-17 PROCEDURE — 93010 ELECTROCARDIOGRAM REPORT: CPT | Mod: CS,GC

## 2020-04-17 PROCEDURE — 70450 CT HEAD/BRAIN W/O DYE: CPT | Mod: 26,CS

## 2020-04-17 PROCEDURE — 99223 1ST HOSP IP/OBS HIGH 75: CPT | Mod: CS

## 2020-04-17 PROCEDURE — 99232 SBSQ HOSP IP/OBS MODERATE 35: CPT | Mod: CS

## 2020-04-17 PROCEDURE — 71045 X-RAY EXAM CHEST 1 VIEW: CPT | Mod: 26

## 2020-04-17 PROCEDURE — 99285 EMERGENCY DEPT VISIT HI MDM: CPT | Mod: CS,GC

## 2020-04-17 RX ORDER — AZITHROMYCIN 500 MG/1
500 TABLET, FILM COATED ORAL ONCE
Refills: 0 | Status: COMPLETED | OUTPATIENT
Start: 2020-04-17 | End: 2020-04-17

## 2020-04-17 RX ORDER — NOREPINEPHRINE BITARTRATE/D5W 8 MG/250ML
0.1 PLASTIC BAG, INJECTION (ML) INTRAVENOUS
Qty: 8 | Refills: 0 | Status: DISCONTINUED | OUTPATIENT
Start: 2020-04-17 | End: 2020-04-17

## 2020-04-17 RX ORDER — HEPARIN SODIUM 5000 [USP'U]/ML
5000 INJECTION INTRAVENOUS; SUBCUTANEOUS EVERY 12 HOURS
Refills: 0 | Status: DISCONTINUED | OUTPATIENT
Start: 2020-04-17 | End: 2020-05-08

## 2020-04-17 RX ORDER — SODIUM CHLORIDE 9 MG/ML
1000 INJECTION INTRAMUSCULAR; INTRAVENOUS; SUBCUTANEOUS
Refills: 0 | Status: DISCONTINUED | OUTPATIENT
Start: 2020-04-17 | End: 2020-04-18

## 2020-04-17 RX ORDER — RIVAROXABAN 15 MG-20MG
15 KIT ORAL DAILY
Refills: 0 | Status: DISCONTINUED | OUTPATIENT
Start: 2020-04-17 | End: 2020-04-17

## 2020-04-17 RX ORDER — MIDODRINE HYDROCHLORIDE 2.5 MG/1
20 TABLET ORAL ONCE
Refills: 0 | Status: COMPLETED | OUTPATIENT
Start: 2020-04-17 | End: 2020-04-17

## 2020-04-17 RX ORDER — SODIUM CHLORIDE 9 MG/ML
1000 INJECTION INTRAMUSCULAR; INTRAVENOUS; SUBCUTANEOUS ONCE
Refills: 0 | Status: COMPLETED | OUTPATIENT
Start: 2020-04-17 | End: 2020-04-17

## 2020-04-17 RX ORDER — MIDODRINE HYDROCHLORIDE 2.5 MG/1
30 TABLET ORAL EVERY 8 HOURS
Refills: 0 | Status: DISCONTINUED | OUTPATIENT
Start: 2020-04-17 | End: 2020-04-22

## 2020-04-17 RX ORDER — ACETAMINOPHEN 500 MG
650 TABLET ORAL ONCE
Refills: 0 | Status: COMPLETED | OUTPATIENT
Start: 2020-04-17 | End: 2020-04-17

## 2020-04-17 RX ORDER — ACETAMINOPHEN 500 MG
650 TABLET ORAL EVERY 6 HOURS
Refills: 0 | Status: DISCONTINUED | OUTPATIENT
Start: 2020-04-17 | End: 2020-04-20

## 2020-04-17 RX ORDER — SODIUM CHLORIDE 9 MG/ML
500 INJECTION INTRAMUSCULAR; INTRAVENOUS; SUBCUTANEOUS ONCE
Refills: 0 | Status: COMPLETED | OUTPATIENT
Start: 2020-04-17 | End: 2020-04-17

## 2020-04-17 RX ORDER — ATORVASTATIN CALCIUM 80 MG/1
20 TABLET, FILM COATED ORAL AT BEDTIME
Refills: 0 | Status: DISCONTINUED | OUTPATIENT
Start: 2020-04-17 | End: 2020-04-21

## 2020-04-17 RX ORDER — NOREPINEPHRINE BITARTRATE/D5W 8 MG/250ML
0.06 PLASTIC BAG, INJECTION (ML) INTRAVENOUS
Qty: 8 | Refills: 0 | Status: DISCONTINUED | OUTPATIENT
Start: 2020-04-17 | End: 2020-04-18

## 2020-04-17 RX ADMIN — HEPARIN SODIUM 5000 UNIT(S): 5000 INJECTION INTRAVENOUS; SUBCUTANEOUS at 19:08

## 2020-04-17 RX ADMIN — SODIUM CHLORIDE 500 MILLILITER(S): 9 INJECTION INTRAMUSCULAR; INTRAVENOUS; SUBCUTANEOUS at 03:02

## 2020-04-17 RX ADMIN — Medication 9.38 MICROGRAM(S)/KG/MIN: at 03:12

## 2020-04-17 RX ADMIN — MIDODRINE HYDROCHLORIDE 20 MILLIGRAM(S): 2.5 TABLET ORAL at 05:34

## 2020-04-17 RX ADMIN — Medication 0.1 MICROGRAM(S)/KG/MIN: at 09:00

## 2020-04-17 RX ADMIN — Medication 650 MILLIGRAM(S): at 01:00

## 2020-04-17 RX ADMIN — SODIUM CHLORIDE 70 MILLILITER(S): 9 INJECTION INTRAMUSCULAR; INTRAVENOUS; SUBCUTANEOUS at 14:41

## 2020-04-17 RX ADMIN — SODIUM CHLORIDE 1000 MILLILITER(S): 9 INJECTION INTRAMUSCULAR; INTRAVENOUS; SUBCUTANEOUS at 02:06

## 2020-04-17 RX ADMIN — SODIUM CHLORIDE 1000 MILLILITER(S): 9 INJECTION INTRAMUSCULAR; INTRAVENOUS; SUBCUTANEOUS at 01:00

## 2020-04-17 RX ADMIN — AZITHROMYCIN 250 MILLIGRAM(S): 500 TABLET, FILM COATED ORAL at 03:19

## 2020-04-17 RX ADMIN — AZITHROMYCIN 500 MILLIGRAM(S): 500 TABLET, FILM COATED ORAL at 05:11

## 2020-04-17 RX ADMIN — Medication 5.63 MICROGRAM(S)/KG/MIN: at 09:09

## 2020-04-17 RX ADMIN — SODIUM CHLORIDE 1000 MILLILITER(S): 9 INJECTION INTRAMUSCULAR; INTRAVENOUS; SUBCUTANEOUS at 02:38

## 2020-04-17 NOTE — ED PROVIDER NOTE - CLINICAL SUMMARY MEDICAL DECISION MAKING FREE TEXT BOX
89 year-old female with history of CVA, atrial fibrillation on Xarelto, dementia presents to the Emergency Department for bradycardia; patient AMS; hypoxia.  Likely COVID vs. bacterial PNA vs. UTI; plan for sepsis/COVID blood work and TBA.  IVF, anti-pyretics, antibiotics.

## 2020-04-17 NOTE — H&P ADULT - ASSESSMENT
89 year-old female with history of CVA, atrial fibrillation on Xarelto, dementia presents to the Emergency Department for bradycardia.  Patient presents from the [facility]; on vitals was found to be bradycardic to 30s-40s and sent to the ED.  Patient with decreased responsiveness and PO intake for the past few days; recently started on Azithromycin due to unknown etiology/NH paperwork w/o such information.  Tested for COVID on 4/3/20 and negative.  Patient with hypoxia at NH down to 89% on RA.  pt with sig cardiac/ medical history with increasing sob and hypotension .  pt at [facility] is on presssors will continue  ivf  midodrine  pt was seen by clinical review team as i discussed with Dr Macario pt is DNR and DNI sec to her sig comorbidity  will continue cardiac meds 89 year-old female with history of CVA, atrial fibrillation on Xarelto, dementia presents to the Emergency Department for bradycardia.  Patient presents from the [facility]; on vitals was found to be bradycardic to 30s-40s and sent to the ED.  Patient with decreased responsiveness and PO intake for the past few days; recently started on Azithromycin due to unknown etiology/NH paperwork w/o such information.  Tested for COVID on 4/3/20 and negative.  Patient with hypoxia at NH down to 89% on RA.  pt with sig cardiac/ medical history with increasing sob and hypotension .  pt at time is on pressors will continue, and will taper slowly  ivf  midodrine  pt was seen by clinical review team as i discussed with Dr Macario pt is DNR and DNI sec to her sig comorbidity  will continue cardiac meds  covid + will add hydroxychloroquine 89 year-old female with history of CVA, atrial fibrillation on Xarelto, dementia presents to the Emergency Department for bradycardia.  Patient presents from the [facility]; on vitals was found to be bradycardic to 30s-40s and sent to the ED.  Patient with decreased responsiveness and PO intake for the past few days; recently started on Azithromycin due to unknown etiology/NH paperwork w/o such information.  Tested for COVID on 4/3/20 and negative.  Patient with hypoxia at NH down to 89% on RA.  pt with sig cardiac/ medical history with increasing sob and hypotension .  pt at time is on pressors will continue, and will taper slowly  ivf  midodrine  pt was seen by clinical review team as i discussed with Dr Macario pt is DNR and DNI sec to her sig comorbidity  will continue cardiac meds  covid + will add hydroxychloroquine if pt becomes responsive  spoke to the son Mina wants full medical therapy icluding intubation, clinical review team does npt feel aggressive therapy with intubation will change outcome

## 2020-04-17 NOTE — CHART NOTE - NSCHARTNOTEFT_GEN_A_CORE
Case reviewed with Hospital Clinical Review Team.  Patient with advanced illness and poor ICU candidate.  [facility] Clinical Review Team concurs with assessment.    Signed on behalf of the [facility] Clinical Review Team

## 2020-04-17 NOTE — ED ADULT NURSE NOTE - OBJECTIVE STATEMENT
Pt is a 89 Y F with hx of COPD, GERD, HLD presenting to ED via EMS from [facility] with complaints of bradycardia "HR 35", AMS, hypoxia, decreased PO intake x2-3 days. Pt arrives to ED unresponsive to verbal stimuli. Pt not speaking/opening eyes. Pt responsive to painful stimuli. Abd soft, non-tender. Skin is warm and dry. Skin appears pale. Pt hypotensive, hypoxic to 88% on RA. 2 IV's established. Pt placed on 6 L NC, pulse ox 100%. Safety and comfort maintained.

## 2020-04-17 NOTE — ED ADULT NURSE REASSESSMENT NOTE - NS ED NURSE REASSESS COMMENT FT1
Jose TYLER from Medicine Team said he spoke with Dr. Ferro and patient should get the 70mL 0.9% NS as ordered.

## 2020-04-17 NOTE — ED ADULT NURSE REASSESSMENT NOTE - NS ED NURSE REASSESS COMMENT FT1
As per Gregory LEVY, pt seen by clinical triage team. Pt to be DNR/DNI and be admitted to floor (not ICU) on levo at fixed rate of 0.06mcg/kg/min

## 2020-04-17 NOTE — CONSULT NOTE ADULT - SUBJECTIVE AND OBJECTIVE BOX
HPI:  89 year-old female with history of CVA, atrial fibrillation on Xarelto, Dementia presents to the Emergency Department for bradycardia.  Patient presents from the [facility]; on vitals was found to be bradycardic to 30s-40s and sent to the ED.  Patient with decreased responsiveness and PO intake for the past few days; recently started on Azithromycin due to unknown etiology/NH paperwork w/o such information.  Tested for COVID on 4/3/20 and negative.  Patient with hypoxia at NH down to 89% on RA.     As per ED note: "elderly female w dementia sent in due to bradycardia at nursing home. in ED pt with HR initially in the upper 50's/low 60s but never truly bradycardic, eventually pt went into afib with rvr fluctuating between 100-120. Pt hypotensive. workup consistent with pulmonary infection possible pna, along with covid, hypernatermia, jacoby. pt received fluids with only temporary improvement in pressure, eventually requiring pressors for maintaining BP. pt received abx for supsected pulm infection and septic shock. delta troponin elevated - likely 2/2 hypoperfusion, less concern for ACS given entire clinical picture. discussed with patient's family that her clinical condition and prognosis is poor - at this time they would like to keep her full code. discussed with MICU for admission - they will get a ethics consult to assess most appropriate plans of care for pt and talk to family to come to final decision. 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      MEDICATIONS  (STANDING):  midodrine 30 milliGRAM(s) Oral every 8 hours  norepinephrine Infusion 0.06 MICROgram(s)/kG/Min (5.63 mL/Hr) IV Continuous <Continuous>    MEDICATIONS  (PRN):      FAMILY HISTORY:  No pertinent family history in first degree relatives      Allergies    aspirin (Unknown)  penicillin (Unknown)    Intolerances      PERTINENT PM/SXH:   Atrial fibrillation, unspecified type  GERD (gastroesophageal reflux disease)  COPD (chronic obstructive pulmonary disease)  GI bleed  Hyperlipemia  PUD (peptic ulcer disease)  CVA (cerebral infarction)  Glaucoma    H/O abdominal surgery    FAMILY HISTORY:  No pertinent family history in first degree relatives    ITEMS NOT CHECKED ARE NOT PRESENT    SOCIAL HISTORY:   Significant other/partner[ ]   Children[ ]  Anabaptism/Spirituality:  Substance hx:  [ ]   Tobacco hx:  [ ]   Alcohol hx: [ ]   Home Opioid hx:  [ ] I-Stop Reference No:  Living Situation: [ ]Home  [ ]Long term care  [ ]Rehab [ ]Other  As per prior care coordination notes: "Pt. is 89 yo female admitted for  unwitnessed fall. PMH of demenita, afib,, COPD, CVA, GERD, GI bleed, glaucoma,  HLD, PUD. Met with pt. at bedside. Pt. noted to be alert and oriented x1,2.  Spoke to son, Mina  [phone], on phone, introduced self, outlined role  of  and provided contact information. Son verbalized understanding.  Demographics confirmed, PTA, pt. lived at the [facility] in [place] on the 2 nd  floor memory care unit. Pt. used a walker and aide. Pt. unable to  assign a  caregiver. PT recommends return to [facility] with home PT."     ADVANCE DIRECTIVES:    DNR  MOLST  [ ]  Living Will  [ ]   DECISION MAKER(s):  [ ] Health Care Proxy(s)  [ ] Surrogate(s)  [ ] Guardian           Name(s): Phone Number(s):    BASELINE (I)ADL(s) (prior to admission):  [place]: [ ]Total  [ ] Moderate [ ]Dependent    Allergies    aspirin (Unknown)  penicillin (Unknown)    Intolerances    MEDICATIONS  (STANDING):  acetaminophen   Tablet .. 650 milliGRAM(s) Oral every 6 hours  atorvastatin 20 milliGRAM(s) Oral at bedtime  heparin  Injectable 5000 Unit(s) SubCutaneous every 12 hours  midodrine 30 milliGRAM(s) Oral every 8 hours  multivitamin 1 Tablet(s) Oral daily  norepinephrine Infusion 0.06 MICROgram(s)/kG/Min (5.63 mL/Hr) IV Continuous <Continuous>  sodium chloride 0.9%. 1000 milliLiter(s) (70 mL/Hr) IV Continuous <Continuous>    MEDICATIONS  (PRN):  No review of symptoms (ROS) or direct physical were performed in order to decrease unnecessary exposure of a provider due to current COVID 19 pandemic.     PRESENT SYMPTOMS: [ ]Unable to obtain due to poor mentation   Source if other than patient:  [ ]Family   [ ]Team     Pain: [ ]yes [ ]no  QOL impact -   Location -                    Aggravating factors -  Quality -  Radiation -  Timing-  Severity (0-10 scale):  Minimal acceptable level (0-10 scale):     CPOT:    https://www.[facility].org/getattachment/ftm22i95-2u1v-4r9q-[email or web address]-8814h2216k5a/Critical-Care-Pain-Observation-Tool-(CPOT)      PAIN AD Score:     http://geriatrictoolkit.[email or web address]/painad.pdf (press ctrl +  left click to view)    Dyspnea:                           [ ]Mild [ ]Moderate [ ]Severe  Anxiety:                             [ ]Mild [ ]Moderate [ ]Severe  Fatigue:                             [ ]Mild [ ]Moderate [ ]Severe  Nausea:                             [ ]Mild [ ]Moderate [ ]Severe  Loss of appetite:              [ ]Mild [ ]Moderate [ ]Severe  Constipation:                    [ ]Mild [ ]Moderate [ ]Severe    Other Symptoms:  [ ]All other review of systems negative     Palliative Performance Status Version 2:         %    http://npcrc.org/files/news/palliative_performance_scale_ppsv2.pdf  PHYSICAL EXAM:  Vital Signs Last 24 Hrs  T(C): 36.8 (2020 16:00), Max: 38.9 (2020 01:00)  T(F): 98.2 (2020 16:00), Max: 102 (2020 01:00)  HR: 63 (2020 16:00) (56 - 132)  BP: 92/45 (2020 16:00) (60/34 - 161/84)  BP(mean): 56 (2020 16:00) (39 - 108)  RR: 14 (2020 16:00) (14 - 20)  SpO2: 98% (2020 16:00) (88% - 100%) I&O's Summary    AS  per ED note:   *Gen: thin/frail, AAO*0, responsive to painful stimulus  	*HEENT: NC/AT, dry mucous membranes, airway patent, trachea midline  	*CV: RRR, S1/S2 present  	*Resp: no respiratory distress, LCTAB  	*Abd: non-distended, soft N/Tx4, no guarding or rigidity  	*Neuro: limited d/t mental status, responsive/reactive to painful stimulus  	*Extremities: no gross deformity  	*Skin: no rashes, no wounds    GENERAL:  [ ]Alert  [ ]Oriented x   [ ]Lethargic  [ ]Cachexia  [ ]Unarousable  [ ]Verbal  [ ]Non-Verbal  Behavioral:   [ ] Anxiety  [ ] Delirium [ ] Agitation [ ] Other  HEENT:  [ ]Normal   [ ]Dry mouth   [ ]ET Tube/Trach  [ ]Oral lesions  PULMONARY:   [ ]Clear [ ]Tachypnea  [ ]Audible excessive secretions   [ ]Rhonchi        [ ]Right [ ]Left [ ]Bilateral  [ ]Crackles        [ ]Right [ ]Left [ ]Bilateral  [ ]Wheezing     [ ]Right [ ]Left [ ]Bilateral  [ ]Diminished breath sounds [ ]right [ ]left [ ]bilateral  CARDIOVASCULAR:    [ ]Regular [ ]Irregular [ ]Tachy  [ ]George [ ]Murmur [ ]Other  GASTROINTESTINAL:  [ ]Soft  [ ]Distended   [ ]+BS  [ ]Non tender [ ]Tender  [ ]PEG [ ]OGT/ NGT  Last BM:   GENITOURINARY:  [ ]Normal [ ] Incontinent   [ ]Oliguria/Anuria   [ ]Hernandez  MUSCULOSKELETAL:   [ ]Normal   [ ]Weakness  [ ]Bed/Wheelchair bound [ ]Edema  NEUROLOGIC:   [ ]No focal deficits  [ ]Cognitive impairment  [ ]Dysphagia [ ]Dysarthria [ ]Paresis [ ]Other   SKIN:   [ ]Normal    [ ]Rash  [ ]Pressure ulcer(s)       Present on admission [ ]y [ ]n    CRITICAL CARE:  [ ] Shock Present  [ ]Septic [ ]Cardiogenic [ ]Neurologic [ ]Hypovolemic  [ ]  Vasopressors [ ]  Inotropes   [ ]Respiratory failure present [ ]Mechanical ventilation [ ]Non-invasive ventilatory support [ ]High flow    [ ]Acute  [ ]Chronic [ ]Hypoxic  [ ]Hypercarbic [ ]Other  [ ]Other organ failure     LABS:                        12.6   6.87  )-----------( 176      ( 2020 01:11 )             40.2   04-17    146<H>  |  115<H>  |  40<H>  ----------------------------<  236<H>  4.1   |  15<L>  |  1.22    Ca    7.8<L>      2020 05:13    TPro  7.6  /  Alb  3.6  /  TBili  2.2<H>  /  DBili  x   /  AST  30  /  ALT  9<L>  /  AlkPhos  78  04-17  PT/INR - ( 2020 01:11 )   PT: 15.0 sec;   INR: 1.29 ratio         PTT - ( 2020 01:11 )  PTT:27.1 sec    Urinalysis Basic - ( 2020 01:59 )    Color: Yellow / Appearance: Clear / S.025 / pH: x  Gluc: x / Ketone: Small  / Bili: Negative / Urobili: Negative   Blood: x / Protein: 30 mg/dL / Nitrite: Negative   Leuk Esterase: Negative / RBC: 0 /hpf / WBC 0 /HPF   Sq Epi: x / Non Sq Epi: 1 /hpf / Bacteria: Negative      RADIOLOGY & ADDITIONAL STUDIES:  < from: CT Head No Cont (20 @ 04:23) >  EXAM:  CT BRAIN                            PROCEDURE DATE:  2020    INTERPRETATION:  CLINICAL INDICATION:  Altered mental status. Covid 19 positive. Hypoxia.    TECHNIQUE : Axial CT scanning of the brain was obtained from the skull base to the vertex without the administration of intravenous contrast. Coronal and sagittal reformatted images were subsequently obtained.       COMPARISON: CT brain dated 2020.    FINDINGS:      No acute intracranial hemorrhage, midline shift or hydrocephalus. No CT evidence of acute, territorial infarct. Senescent changes, including extensive chronic microvascular ischemic change, are in keeping with the patient's age. Chronic lacunar infarct in the right cerebellum. Small chronic infarct in the right parietotemporal region..    The visualized paranasal sinuses and mastoid air cells are clear.    The calvarium is intact. Patient is status post left orbital cataract surgery.    IMPRESSION:    No acute intracranial CT abnormality.    < end of copied text >  < from: CT Chest No Cont (20 @ 04:24) >  EXAM:  CT CHEST                            PROCEDURE DATE:  2020  IMPRESSION:     Pattern of GGO suggests infection including atypical pneumonia/viral infection from atypical agents including COVID-19 (C19V-1).                        ERICKSON SAM M.D., RADIOLOGY RESIDENT  This document has been electronically signed.  YESI LUNDY M.D., ATTENDING RADIOLOGIST  This document has been electronically signed. 2020  4:20AM    < end of copied text >  < from: Transthoracic Echocardiogram (18 @ 13:31) >  Conclusions:  1. Normal left ventricular systolic function. No segmental  wall motion abnormalities.  2. Right ventricular enlargement with decreased right  ventricular systolic function.  3. No pericardial effusion seen.moderate pulmonary hypertension.EF (Visual Estimate): 70-75 %Moderate-severe tricuspid regurgitation.    < end of copied text >    PROTEIN CALORIE MALNUTRITION PRESENT: [ ]mild [ ]moderate [ ]severe [ ]underweight [ ]morbid obesity  https://www.andeal.org/vault/2440/web/files/ONC/Table_Clinical%20Characteristics%20to%20Document%20Malnutrition-White%20JV%20et%20al%870073.pdf    Height (cm): 157.48 (20 @ 10:36)  Weight (kg): 50 (20 @ 03:10), 49.9 (20 @ 10:36)  BMI (kg/m2): 20.2 (20 @ 03:10), 20.1 (20 @ 10:36)    [ ]PPSV2 < or = to 30% [ ]significant weight loss  [ ]poor nutritional intake  [ ]anasarca     Albumin, Serum: 3.6 g/dL (20 @ 01:11)   [ ]Artificial Nutrition      REFERRALS:   [ ]Chaplaincy  [ ]Hospice  [ ]Child Life  [ ]Social Work  [ ]Case management [ ]Holistic Therapy     Goals of Care Document:

## 2020-04-17 NOTE — CHART NOTE - NSCHARTNOTEFT_GEN_A_CORE
Spoke with Dr. Hernandez in the ED. Consult can be cancelled at this time. Clinical decision team was involved and goals are now clear. No acute issues needed for palliative care. As per Dr. Hernandez, he will cancel consult. Palliative team can be re-consulted for any further issues. Spoke with Dr. Hernandez in the ED. Palliative Consult can be cancelled at this time. Clinical decision team was involved and goals are now clear. No acute issues needed for palliative care. As per Dr. Hernandez, he will cancel consult. Palliative team can be re-consulted for any further issues.

## 2020-04-17 NOTE — ED PROVIDER NOTE - PROGRESS NOTE DETAILS
Troy LEVY: MICU deferring contacting the clinical impact team till AM.  Requesting 20mg Midodrine (given rectally since patient cannot PO). Resident: Amando Jenkins - Pt signed out to me pending eval by clinical triage team. Pt elderly COVID + hypoxic, had MILTON on original labs was given fluids with resolution and then bump in troponin, bradycardic on arrival now in and out of Afib. Pt was started on levo for hypotension currently at 0.1, was given 20 midodrine WA. Current MAP 80 requested that RN change levo to 0.05 and attempt to cap dose, will give additional WA midodrine if needing >0.05. Attending MD David: patient re-evaluated, MICU attending evaluation pending. Patient remains on levophed, unable to wean off successfully despite PO midodrine. Resident: Amando Jenkins - Spoke with Dr. Casey, states that clinical triage team agrees with decision to cap pressors and admit pt to floor. No intubation, no CPR pressors at 0.06 with MAP >60, will cap and page hospitalist for admission. Attending MD David: [facility] Clinical Review Team note appreciated. Patient with grim prognosis. ICU level care not warranted at this time and patient will be admitted to floor for ongoing management.

## 2020-04-17 NOTE — ED ADULT NURSE REASSESSMENT NOTE - NS ED NURSE REASSESS COMMENT FT1
Patient resting in stretcher and does not appear in distress.  Levophed infusing via left hand IV and site is free of redness or swelling and levophed switched to left hand IV #20.  Site is patent and flushing without issue.

## 2020-04-17 NOTE — ED ADULT NURSE REASSESSMENT NOTE - NS ED NURSE REASSESS COMMENT FT1
Pt receive admitted bed assignment on 8 [facility]. ED RN attempt to call report but 8 [facility] RN states floor will not accept fixed rate levophed. Situation escalated to charge RN and logistics

## 2020-04-17 NOTE — ED ADULT NURSE REASSESSMENT NOTE - NS ED NURSE REASSESS COMMENT FT1
As per Jose NP, levophed infusion paused at 1445. Jose NP states ED RN closely watch BP and maintain MAP>60. Jose NP states hold midodrine at this time. ED RN to keep Jose NP updated. Fixed rate levo preventing pt from receiving admitted medicine bed; medicine floors not equipped for levo? Nursing education and logistics involved.

## 2020-04-17 NOTE — ED ADULT NURSE REASSESSMENT NOTE - NS ED NURSE REASSESS COMMENT FT1
Pt straight catheterized under sterile technique with 2 RN's at the bedside as per MD order. Pt tolerated well. UA/UC sent as per MD order. Pt cleaned, new linens/diaper applied. Safety and comfort maintained. Red socks on.

## 2020-04-17 NOTE — ED ADULT NURSE REASSESSMENT NOTE - NS ED NURSE REASSESS COMMENT FT1
Pt admitted to medicine, RTM. Levophed infusing at set rate 0.06mcg/kg/min. Pt resting calmly in bed.

## 2020-04-17 NOTE — ED PROVIDER NOTE - OBJECTIVE STATEMENT
89 year-old female with history of CVA, atrial fibrillation on Xarelto, dementia presents to the Emergency Department for bradycardia.  Patient presents from the [facility]; on vitals was found to be bradycardic to 30s-40s and sent to the ED.  Patient with decreased responsiveness and PO intake for the past few days; recently started on Azithromycin due to unknown etiology/NH paperwork w/o such information.  Tested for COVID on 4/3/20 and negative.  Patient with hypoxia at NH down to 89% on RA.

## 2020-04-17 NOTE — CHART NOTE - NSCHARTNOTEFT_GEN_A_CORE
89 yo female with h/o cva  , lives in nursing home arrives to ED this am hypotensive. At baseline patient is A and O x 1. She received 1.5 liters 02 and remains on pressors. Pt is Covid -19 positive. Hospital clinical review team has reviewed the case. We support medical team decision that pt is not a ICU candidate and that mechanical ventilation would change overall  outcome. In addition., we agree that pt should be dispositioned to medical floor on fixed pressors.    Thee Casey MD 89 yo female with h/o cva  , lives in nursing home arrives to ED this am hypotensive. At baseline patient is A and O x 1. She received 1.5 liters 02 and remains on pressors. Pt is Covid -19 positive. Hospital clinical review team has reviewed the case. We support medical team decision that pt is not a ICU candidate and that mechanical ventilation and cpr would not  change overall  outcome. In addition., we agree that pt should be dispositioned to medical floor on fixed pressors.    Thee Casey MD

## 2020-04-17 NOTE — ED PROVIDER NOTE - PHYSICAL EXAMINATION
*Gen: thin/frail, AAO*0, responsive to painful stimulus  *HEENT: NC/AT, dry mucous membranes, airway patent, trachea midline  *CV: RRR, S1/S2 present  *Resp: no respiratory distress, LCTAB  *Abd: non-distended, soft N/Tx4, no guarding or rigidity  *Neuro: limited d/t mental status, responsive/reactive to painful stimulus  *Extremities: no gross deformity  *Skin: no rashes, no wounds   ~ Joann Simmons M.D.

## 2020-04-17 NOTE — ED ADULT NURSE REASSESSMENT NOTE - NS ED NURSE REASSESS COMMENT FT1
Report received from Halie MERRILL. Pt nonverbal and minimally responsive to voice. Presented to ED from [facility] c/o AMS, hypoxia, decreased PO intake and hypotension. Full code. COVID+. Pt currently on 0.1mcg/kg/min levophed. As per Gregory LEVY, titrate levo to 0.5mcg/kg/min for map goal of 60mmHg; if map <55mmHg, will administer additional rectal midodrine. As per Halie MERRILL, ICU reject patient, awaiting ethics committee consult. Pt to be admitted. Report received from Halie MERRILL. Pt nonverbal and minimally responsive to voice. Presented to ED from [facility] c/o AMS, hypoxia, decreased PO intake and hypotension. Full code. COVID+. Pt currently on 0.1mcg/kg/min levophed. As per Gregory LEVY, titrate levo to 0.5mcg/kg/min for map goal of 60mmHg; if map <55mmHg, will administer additional rectal midodrine. As per Halie MERRILL, ICU reject patient, awaiting  clinical decision team consult. Pt to be admitted.

## 2020-04-17 NOTE — ED ADULT NURSE REASSESSMENT NOTE - NS ED NURSE REASSESS COMMENT FT1
Called JAYSON Garcia from Medicine Team regarding order for 70mL/hour of 0.9% NS and per NP he said to hold off on giving fluids at this time and said he will check with ordering provider.

## 2020-04-17 NOTE — CONSULT NOTE ADULT - PROBLEM SELECTOR RECOMMENDATION 3
As per ED note: "discussed with patient's family that her clinical condition and prognosis is poor - at this time they would like to keep her full code."

## 2020-04-17 NOTE — CONSULT NOTE ADULT - PROBLEM SELECTOR RECOMMENDATION 2
Overall poor prognosis based on Age, elevated Troponin, Dementia (Advanced), cardiovascular issues (including  Right ventricular enlargement with decreased right ventricular systolic function, moderate pulmonary hypertension and Moderate-severe tricuspid regurgitation), and shock on pressors. Overall poor prognosis based on Age, elevated Troponin, Dementia (Advanced), cardiovascular issues (including  Right ventricular enlargement with decreased right ventricular systolic function, moderate pulmonary hypertension and Moderate-severe tricuspid regurgitation), and shock on pressors. [place], with Calculator to predict probability of survival (based on BUN, Age, Triage Acuity, RCDW, Auto Lymphocyte %) < 5 %.

## 2020-04-17 NOTE — ED ADULT NURSE NOTE - NSIMPLEMENTINTERV_GEN_ALL_ED
Implemented All Fall with Harm Risk Interventions:  [place] to call system. Call bell, personal items and telephone within reach. Instruct patient to call for assistance. Room bathroom lighting operational. Non-slip footwear when patient is off stretcher. Physically safe environment: no spills, clutter or unnecessary equipment. Stretcher in lowest position, wheels locked, appropriate side rails in place. Provide visual cue, wrist band, yellow gown, etc. Monitor gait and stability. Monitor for mental status changes and reorient to person, place, and time. Review medications for side effects contributing to fall risk. Reinforce activity limits and safety measures with patient and family. Provide visual clues: red socks.

## 2020-04-17 NOTE — CONSULT NOTE ADULT - SUBJECTIVE AND OBJECTIVE BOX
89y old  Female who presents with a chief complaint of sob.    HPI:  89 year-old female       with history of CVA, atrial fibrillation on Xarelto, dementia      presents to the Emergency Department for bradycardia.     Patient presents from the [facility]; on vitals was found to be bradycardic to 30s-40s and sent to the ED.    Patient with decreased responsiveness and PO intake for the past few days; recently started on Azithromycin       Tested for COVID on 4/3/20 and was  negative.    Patient with hypoxia at NH down to 89% on RA.    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      MEDICATIONS  (STANDING):  midodrine 30 milliGRAM(s) Oral every 8 hours  norepinephrine Infusion 0.06 MICROgram(s)/kG/Min (5.63 mL/Hr) IV Continuous <Continuous>    MEDICATIONS  (PRN):      FAMILY HISTORY:  No pertinent family history in first degree relatives      SOCIAL HISTORY:    [ ] Non-smoker  [ ] Smoker  [ ] Alcohol    Allergies    aspirin (Unknown)  penicillin (Unknown)    Intolerances    	    PHYSICAL EXAM:  T(C): 36.6 (20 @ 11:30), Max: 38.9 (20 @ 01:00)  HR: 97 (20 @ 11:30) (56 - 132)  BP: 116/61 (20 @ 11:30) (60/34 - 161/84)  RR: 14 (20 @ 11:30) (14 - 20)  SpO2: 100% (20 @ 11:30) (88% - 100%)  Wt(kg): --  I&O's Summary    TELEMETRY: 	    ECG:  	  RADIOLOGY:  OTHER: 	  	  LABS:	 	    CARDIAC MARKERS:  CARDIAC MARKERS ( 2020 05:13 )  x     / x     / 36 U/L / x     / 1.4 ng/mL  CARDIAC MARKERS ( 2020 01:11 )  x     / x     / 45 U/L / x     / <1.0 ng/mL                              12.6   6.87  )-----------( 176      ( 2020 01:11 )             40.2         146<H>  |  115<H>  |  40<H>  ----------------------------<  236<H>  4.1   |  15<L>  |  1.22    Ca    7.8<L>      2020 05:13    TPro  7.6  /  Alb  3.6  /  TBili  2.2<H>  /  DBili  x   /  AST  30  /  ALT  9<L>  /  AlkPhos  78      proBNP: Serum Pro-Brain Natriuretic Peptide: 4277 pg/mL ( @ 05:13)    Lipid Profile:   HgA1c:   TSH:   PT/INR - ( 2020 01:11 )   PT: 15.0 sec;   INR: 1.29 ratio         PTT - ( 2020 01:11 )  PTT:27.1 sec    PREVIOUS DIAGNOSTIC TESTING:      < from: 12 Lead ECG (20 @ 00:46) >  Ventricular Rate 60 BPM    Atrial Rate 60 BPM    P-R Interval 130 ms    QRS Duration 70 ms    Q-T Interval 438 ms    QTC Calculation(Bezet) 438 ms    P Axis 93 degrees    R Axis -39 degrees    T Axis 62 degrees    Diagnosis Line SINUS RHYTHM WITH PREMATURE ATRIAL COMPLEXES  LEFT AXIS DEVIATION  NONSPECIFIC T WAVE ABNORMALITY  < from: Transthoracic Echocardiogram (18 @ 13:31) >  1. Normal left ventricular systolic function. No segmental  wall motion abnormalities.  2. Right ventricular enlargement with decreased right  ventricular systolic function.  3. No pericardial effusion seen.    < from: CT Chest No Cont (20 @ 04:24) >  LUNGS AND AIRWAYS: Patent central airways.  Bilateral groundglass infiltrates with peripheral and basilar predominance..    PLEURA: No effusion or pneumothorax.    MEDIASTINUM AND SARA: No enlarged precarinal lymph node measures 1.5 cm in short axis.    VESSELS: Main pulmonary artery is normal in caliber. Coronary artery calcifications arepresent. Thoracic aorta is normal in caliber.    HEART: Heart size is normal. No pericardial effusion.  Pattern of GGO suggests infection including atypical pneumonia/viral infection from atypical agents including COVID-19 (C19V-  < from: Limited Transthoracic Echo (10.26.18 @ 14:07) >  Limited tranthoracic echocardiogram at patients request to  end exam.  1. Mitral annular calcification.  2. The aortic valve opens well.  3. Limited images acquired at the patients request. Based  upon the parasternal long axis view only;  grossly normal  left ventricular systolic function.    < end of copied text >  COVID-19 PCR . (20 @ 01:41)    COVID-19 PCR: Detected: This test has been validated by [employer] to be accurate;  though it has not been FDA cleared/approved by the usual pathway.  As with all laboratory tests, results should be correlated with clinical  findings.  https://www.fda.gov/media/212661/download  https://www.fda.gov/media/906901/download (2020 11:56)      REVIEW OF SYSTEMS:  GEN: no fever,    no chills  RESP:  SOB,   no cough  CVS: no chest pain,   no palpitations  GI: no abdominal pain,   no nausea,   no vomiting,   no constipation,   no diarrhea  : no dysuria,   no frequency  NEURO: no headache,   no dizziness  PSYCH: no depression,   not anxious  Derm : no rash    Allergies    aspirin (Unknown)  penicillin (Unknown)    Intolerances        CAPILLARY BLOOD GLUCOSE      POCT Blood Glucose.: 108 mg/dL (2020 00:45)    I&O's Summary      Vital Signs Last 24 Hrs  T(C): 36.6 (2020 11:30), Max: 38.9 (2020 01:00)  T(F): 97.8 (2020 11:30), Max: 102 (2020 01:00)  HR: 97 (2020 11:30) (56 - 132)  BP: 116/61 (2020 11:30) (60/34 - 161/84)  BP(mean): 73 (2020 11:30) (39 - 108)  RR: 14 (2020 11:30) (14 - 20)  SpO2: 100% (2020 11:30) (88% - 100%)  PHYSICAL EXAM:  GENERAL: NAD,   HEAD:  Atraumatic, Normocephalic  EYES: EOMI,  NECK: Supple, No JVD  CHEST/LUNG: Clear to auscultation bilaterally; No wheeze  HEART: Regular rate and rhythm;        murmur  ABDOMEN: Soft, Nontender,   EXTREMITIES:     no    edema  NEUROLOGY:   weak/  dementia    MEDICATIONS  (STANDING):  acetaminophen   Tablet .. 650 milliGRAM(s) Oral every 6 hours  atorvastatin 20 milliGRAM(s) Oral at bedtime  midodrine 30 milliGRAM(s) Oral every 8 hours  multivitamin 1 Tablet(s) Oral daily  norepinephrine Infusion 0.06 MICROgram(s)/kG/Min (5.63 mL/Hr) IV Continuous <Continuous>  rivaroxaban 15 milliGRAM(s) Oral daily  sodium chloride 0.9%. 1000 milliLiter(s) (70 mL/Hr) IV Continuous <Continuous>    MEDICATIONS  (PRN):    LABS:                        12.6   6.87  )-----------( 176      ( 2020 01:11 )             40.2         146<H>  |  115<H>  |  40<H>  ----------------------------<  236<H>  4.1   |  15<L>  |  1.22    Ca    7.8<L>      2020 05:13    TPro  7.6  /  Alb  3.6  /  TBili  2.2<H>  /  DBili  x   /  AST  30  /  ALT  9<L>  /  AlkPhos  78      PT/INR - ( 2020 01:11 )   PT: 15.0 sec;   INR: 1.29 ratio         PTT - ( 2020 01:11 )  PTT:27.1 sec  CARDIAC MARKERS ( 2020 05:13 )  x     / x     / 36 U/L / x     / 1.4 ng/mL  CARDIAC MARKERS ( 2020 01:11 )  x     / x     / 45 U/L / x     / <1.0 ng/mL      Urinalysis Basic - ( 2020 01:59 )    Color: Yellow / Appearance: Clear / S.025 / pH: x  Gluc: x / Ketone: Small  / Bili: Negative / Urobili: Negative   Blood: x / Protein: 30 mg/dL / Nitrite: Negative   Leuk Esterase: Negative / RBC: 0 /hpf / WBC 0 /HPF   Sq Epi: x / Non Sq Epi: 1 /hpf / Bacteria: Negative           @ 05:13  4.7  46              Consultant(s) Notes Reviewed:      Care Discussed with Consultants/Other Providers:  Plan:

## 2020-04-17 NOTE — ED ADULT NURSE REASSESSMENT NOTE - NS ED NURSE REASSESS COMMENT FT1
Mary LEVY discuss pt status with administration regarding fixed dose levophed on medicine unit. Pt receive admitted bed assignment on 8 jessie. Report called to Aida MERRILL. As per Aida MERRILL, [facility] RN manager aware of situation as well. As per Mary LEVY, neither pt nor family requesting DNR/DNI, however, Clinical Triage Team deem CPR and intubation futile due to poor grim prognosis. Aida MERRILL aware.

## 2020-04-17 NOTE — CONSULT NOTE ADULT - PROBLEM SELECTOR RECOMMENDATION 4
From the Geriatrics and Palliative Care point of view and based on the info above, the patient's prognosis is poor (likely hours to days and mostly hours). In regards to resuscitation, CPR is an intense medical therapy that requires a strong human body able to support this intervention in order to successfully go through it, survive, and recover after it. However, in this case, the patient is so frail and with minimum physiologic reserves that it will not allow her to survive the procedure of resuscitation and instead putting her through interventions that will likely cause further distress and suffering. In this case and understanding the patient’s illness and poor prognosis factors (as above) it is expected that in the setting of a cardiac arrest, even if CPR is performed, the patient will imminently die. Neither CPR nor intubation will provide a bridge for recovery.    In the setting of a cardiac arrest, it will up to the providers assisting the code to determine based on their clinical knowledge and the patient's clinical situation the appropriateness of performing advanced life supporting interventions.     Hospital Clinical Review Team notes noticed.         Ronni Quintanilla MD   Geriatrics and Palliative Care ([facility]) Consult Service    of Geriatric and Palliative Medicine  [facility]      Please page the following number for clinical matters between the hours of 9 am and 5 pm from Monday through Friday : [phone]    After 5pm and on weekends, please see the contact information below:    In the event of newly developing, evolving, or worsening symptoms, please contact the Palliative Medicine team via pager (if the patient is at [facility] #[phone] or if the patient is at [facility] #[phone]) The Geriatric and Palliative Medicine service has coverage 24 hours a day/ 7 days a week to provide medical recommendations regarding symptom management needs via telephone

## 2020-04-17 NOTE — CONSULT NOTE ADULT - ASSESSMENT
89 year-old female with history of CVA (CT Showing extensive chronic microvascular ischemic change, are in keeping with the patient's age. Chronic lacunar infarct in the right cerebellum. 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 She was found to be COVID (+) and septic shock. Also on admission with MILTON, elevated troponin and BNP. 89 year-old female with history of CVA (CT Showing extensive chronic microvascular ischemic change, are in keeping with the patient's age. Chronic lacunar infarct in the right cerebellum. 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 She was found to be COVID (+) and septic shock. Also on admission with MILTON, elevated troponin and BNP (which may be 2/2 to hypoperfusion)

## 2020-04-17 NOTE — ED PROVIDER NOTE - ATTENDING CONTRIBUTION TO CARE
elderly female w dementia sent in due to bradycardia at nursing home. in ED pt with HR initially in the upper 50's/low 60s but never truly bradycardic, eventually pt went into afib with rvr fluctuating between 100-120. Pt hypotensive. workup consistent with pulmonary infection possible pna, along with covid, hypernatermia, jacoby. pt received fluids with only temporary improvement in pressure, eventually requiring pressors for maintaining BP. pt received abx for supsected pulm infection and septic shock. delta troponin elevated - likely 2/2 hypoperfusion, less concern for ACS given entire clinical picture. discussed with patient's family that her clinical condition and prognosis is poor - at this time they would like to keep her full code. discussed with [facility]U for admission - they will get a ethics consult to assess most appropriate plans of care for pt and talk to family to come to final decision. at this time pt not adequate candidtate for any major invasive procedure - will continue to trend trops but will not treat as ACS but rather will treat for hypoperfusionn.

## 2020-04-17 NOTE — CONSULT NOTE ADULT - ASSESSMENT
89   yr pt,   pt  with  h/o  afib,   not  on   xarelto,  due  to falls,   cva,   copd, hld,     alzheimers  dementia,    pud/  h/o left ribf xs. 8/ 9 th,. left hip surg  h/o mlple falls.  echo, normal ef/ T2  comp  deformity    sent  to  er  from   [facility], for  weakness/ sob  ct head, no cva   ct  chest  with  goo   COVID  positive  elevated  troponin/ CRP/  Ferritin  , from Covid   hypernatremia   pt  with  advanced  age/  supportive  care is  ideal       < from: CT Chest No Cont (04.17.20 @ 04:24) >  IMPRESSION:   Pattern of GGO suggests infection including atypical pneumonia/viral infection from atypical agents including COVID-19 (C19V-1)  < end of copied text >     < from: CT Thoracic Spine Reform No Cont (01.07.20 @ 16:43) >  IMPRESSION:  Volume loss, microvascular disease, no acute hemorrhage or midline shift. If symptoms persist consider follow up head CT or MRI contraindications.  Cervical and thoracic spine are unchanged from prior, no obvious fracture or dislocation, multilevel degenerative changes as noted previously with diffuse osteopenia and small unchanged mild superior endplate T2 compression deformity.  < end of copied text      < from: Limited Transthoracic Echo (10.26.18 @ 14:07) >  Conclusions:  Limited tranthoracic echocardiogram at patients request to  end exam.  1. Mitral annular calcification.  2. The aortic valve opens well.  3. Limited images acquired at the patients request. Based  upon the parasternal long axis view only;  grossly normal  left ventricular systolic function.  < end of copied text > 89   yr pt,   pt  with  h/o  afib,   not  on   xarelto,  due  to falls,   cva,   copd, hld,     alzheimers  dementia,    pud/  h/o left ribf xs. 8/ 9 th,. left hip surg  h/o mlple falls.  echo, normal ef/ T2  comp  deformity    sent  to  [facility]  from   [facility], for  weakness/ sob  ct head, no cva   ct  chest  with  goo   COVID  positive  elevated  troponin/ CRP/  Ferritin  , from Covid   hypernatremia   pt  with  advanced  age/  comfort/   supportive  care is  ideal  would  not  do  further  blood  draws       < from: CT Chest No Cont (04.17.20 @ 04:24) >  IMPRESSION:   Pattern of GGO suggests infection including atypical pneumonia/viral infection from atypical agents including COVID-19 (C19V-1)  < end of copied text >     < from: CT Thoracic Spine Reform No Cont (01.07.20 @ 16:43) >  IMPRESSION:  Volume loss, microvascular disease, no acute hemorrhage or midline shift. If symptoms persist consider follow up head CT or MRI contraindications.  Cervical and thoracic spine are unchanged from prior, no obvious fracture or dislocation, multilevel degenerative changes as noted previously with diffuse osteopenia and small unchanged mild superior endplate T2 compression deformity.  < end of copied text      < from: Limited Transthoracic Echo (10.26.18 @ 14:07) >  Conclusions:  Limited tranthoracic echocardiogram at patients request to  end exam.  1. Mitral annular calcification.  2. The aortic valve opens well.  3. Limited images acquired at the patients request. Based  upon the parasternal long axis view only;  grossly normal  left ventricular systolic function.  < end of copied text >

## 2020-04-17 NOTE — H&P ADULT - HISTORY OF PRESENT ILLNESS
CHIEF COMPLAINT:Patient is a 89y old  Female who presents with a chief complaint of sob.    HPI:  89 year-old female with history of CVA, atrial fibrillation on Xarelto, dementia presents to the Emergency Department for bradycardia.  Patient presents from the [facility]; on vitals was found to be bradycardic to 30s-40s and sent to the ED.  Patient with decreased responsiveness and PO intake for the past few days; recently started on Azithromycin due to unknown etiology/NH paperwork w/o such information.  Tested for COVID on 4/3/20 and negative.  Patient with hypoxia at NH down to 89% on RA.    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      MEDICATIONS  (STANDING):  midodrine 30 milliGRAM(s) Oral every 8 hours  norepinephrine Infusion 0.06 MICROgram(s)/kG/Min (5.63 mL/Hr) IV Continuous <Continuous>    MEDICATIONS  (PRN):      FAMILY HISTORY:  No pertinent family history in first degree relatives      SOCIAL HISTORY:    [ ] Non-smoker  [ ] Smoker  [ ] Alcohol    Allergies    aspirin (Unknown)  penicillin (Unknown)    Intolerances    	    REVIEW OF SYSTEMS:  CONSTITUTIONAL: No fever, weight loss, or fatigue  EYES: No eye pain, visual disturbances, or discharge  ENT:  No difficulty hearing, tinnitus, vertigo; No sinus or throat pain  NECK: No pain or stiffness  RESPIRATORY: No cough, wheezing, chills or hemoptysis; + Shortness of Breath  CARDIOVASCULAR: No chest pain, palpitations, passing out, dizziness, or leg swelling  GASTROINTESTINAL: No abdominal or epigastric pain. No nausea, vomiting, or hematemesis; No diarrhea or constipation. No melena or hematochezia.  GENITOURINARY: No dysuria, frequency, hematuria, or incontinence  NEUROLOGICAL: No headaches, memory loss, loss of strength, numbness, or tremors  SKIN: No itching, burning, rashes, or lesions   LYMPH Nodes: No enlarged glands  ENDOCRINE: No heat or cold intolerance; No hair loss  MUSCULOSKELETAL: No joint pain or swelling; No muscle, back, or extremity pain  PSYCHIATRIC: No depression, anxiety, mood swings, or difficulty sleeping  HEME/LYMPH: No easy bruising, or bleeding gums  ALLERGY AND IMMUNOLOGIC: No hives or eczema	    [ ] All others negative	  [ ] Unable to obtain    PHYSICAL EXAM:  T(C): 36.6 (04-17-20 @ 11:30), Max: 38.9 (04-17-20 @ 01:00)  HR: 97 (04-17-20 @ 11:30) (56 - 132)  BP: 116/61 (04-17-20 @ 11:30) (60/34 - 161/84)  RR: 14 (04-17-20 @ 11:30) (14 - 20)  SpO2: 100% (04-17-20 @ 11:30) (88% - 100%)  Wt(kg): --  I&O's Summary      Appearance: Normal	  HEENT:   Normal oral mucosa, PERRL, EOMI	  Lymphatic: No lymphadenopathy  Cardiovascular: Normal S1 S2, No JVD, + murmurs, No edema  Respiratory: Lungs clear to auscultation	  Psychiatry: A & O x 3, Mood & affect appropriate  Gastrointestinal:  Soft, Non-tender, + BS	  Skin: No rashes, No ecchymoses, No cyanosis	  Neurologic: Non-focal  Extremities: Normal range of motion, No clubbing, cyanosis or edema  Vascular: Peripheral pulses palpable 2+ bilaterally    TELEMETRY: 	    ECG:  	  RADIOLOGY:  OTHER: 	  	  LABS:	 	    CARDIAC MARKERS:  CARDIAC MARKERS ( 17 Apr 2020 05:13 )  x     / x     / 36 U/L / x     / 1.4 ng/mL  CARDIAC MARKERS ( 17 Apr 2020 01:11 )  x     / x     / 45 U/L / x     / <1.0 ng/mL                              12.6   6.87  )-----------( 176      ( 17 Apr 2020 01:11 )             40.2     04-17    146<H>  |  115<H>  |  40<H>  ----------------------------<  236<H>  4.1   |  15<L>  |  1.22    Ca    7.8<L>      17 Apr 2020 05:13    TPro  7.6  /  Alb  3.6  /  TBili  2.2<H>  /  DBili  x   /  AST  30  /  ALT  9<L>  /  AlkPhos  78  04-17    proBNP: Serum Pro-Brain Natriuretic Peptide: 4277 pg/mL (04-17 @ 05:13)    Lipid Profile:   HgA1c:   TSH:   PT/INR - ( 17 Apr 2020 01:11 )   PT: 15.0 sec;   INR: 1.29 ratio         PTT - ( 17 Apr 2020 01:11 )  PTT:27.1 sec    PREVIOUS DIAGNOSTIC TESTING:      < from: 12 Lead ECG (04.17.20 @ 00:46) >  Ventricular Rate 60 BPM    Atrial Rate 60 BPM    P-R Interval 130 ms    QRS Duration 70 ms    Q-T Interval 438 ms    QTC Calculation(Bezet) 438 ms    P Axis 93 degrees    R Axis -39 degrees    T Axis 62 degrees    Diagnosis Line SINUS RHYTHM WITH PREMATURE ATRIAL COMPLEXES  LEFT AXIS DEVIATION  NONSPECIFIC T WAVE ABNORMALITY  < from: Transthoracic Echocardiogram (02.11.18 @ 13:31) >  1. Normal left ventricular systolic function. No segmental  wall motion abnormalities.  2. Right ventricular enlargement with decreased right  ventricular systolic function.  3. No pericardial effusion seen.    < from: CT Chest No Cont (04.17.20 @ 04:24) >  LUNGS AND AIRWAYS: Patent central airways.  Bilateral groundglass infiltrates with peripheral and basilar predominance..    PLEURA: No effusion or pneumothorax.    MEDIASTINUM AND SARA: No enlarged precarinal lymph node measures 1.5 cm in short axis.    VESSELS: Main pulmonary artery is normal in caliber. Coronary artery calcifications arepresent. Thoracic aorta is normal in caliber.    HEART: Heart size is normal. No pericardial effusion.    CHEST WALL AND LOWER NECK: Within normal limits.    VISUALIZED UPPER ABDOMEN: Status post cholecystectomy. 6 mm right hepatic dome calcification.    BONES: No acute abnormality. Healed fracture of the surgical neck of the right humerus.    IMPRESSION:     Pattern of GGO suggests infection including atypical pneumonia/viral infection from atypical agents including COVID-19 (C19V- CHIEF COMPLAINT:Patient is a 89y old  Female who presents with a chief complaint of sob.    HPI:  89 year-old female with history of CVA, atrial fibrillation on Xarelto, dementia presents to the Emergency Department for bradycardia.  Patient presents from the [facility]; on vitals was found to be bradycardic to 30s-40s and sent to the ED.  Patient with decreased responsiveness and PO intake for the past few days; recently started on Azithromycin due to unknown etiology/NH paperwork w/o such information.  Tested for COVID on 4/3/20 and negative.  Patient with hypoxia at NH down to 89% on RA.    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      MEDICATIONS  (STANDING):  midodrine 30 milliGRAM(s) Oral every 8 hours  norepinephrine Infusion 0.06 MICROgram(s)/kG/Min (5.63 mL/Hr) IV Continuous <Continuous>    MEDICATIONS  (PRN):      FAMILY HISTORY:  No pertinent family history in first degree relatives      SOCIAL HISTORY:    [ ] Non-smoker  [ ] Smoker  [ ] Alcohol    Allergies    aspirin (Unknown)  penicillin (Unknown)    Intolerances    	    REVIEW OF SYSTEMS:  CONSTITUTIONAL: No fever, weight loss, or fatigue  EYES: No eye pain, visual disturbances, or discharge  ENT:  No difficulty hearing, tinnitus, vertigo; No sinus or throat pain  NECK: No pain or stiffness  RESPIRATORY: No cough, wheezing, chills or hemoptysis; + Shortness of Breath  CARDIOVASCULAR: No chest pain, palpitations, passing out, dizziness, or leg swelling  GASTROINTESTINAL: No abdominal or epigastric pain. No nausea, vomiting, or hematemesis; No diarrhea or constipation. No melena or hematochezia.  GENITOURINARY: No dysuria, frequency, hematuria, or incontinence  NEUROLOGICAL: No headaches, memory loss, loss of strength, numbness, or tremors  SKIN: No itching, burning, rashes, or lesions   LYMPH Nodes: No enlarged glands  ENDOCRINE: No heat or cold intolerance; No hair loss  MUSCULOSKELETAL: No joint pain or swelling; No muscle, back, or extremity pain  PSYCHIATRIC: No depression, anxiety, mood swings, or difficulty sleeping  HEME/LYMPH: No easy bruising, or bleeding gums  ALLERGY AND IMMUNOLOGIC: No hives or eczema	    [ ] All others negative	  [ ] Unable to obtain    PHYSICAL EXAM:  T(C): 36.6 (04-17-20 @ 11:30), Max: 38.9 (04-17-20 @ 01:00)  HR: 97 (04-17-20 @ 11:30) (56 - 132)  BP: 116/61 (04-17-20 @ 11:30) (60/34 - 161/84)  RR: 14 (04-17-20 @ 11:30) (14 - 20)  SpO2: 100% (04-17-20 @ 11:30) (88% - 100%)  Wt(kg): --  I&O's Summary      Appearance: Normal	  HEENT:   Normal oral mucosa, PERRL, EOMI	  Lymphatic: No lymphadenopathy  Cardiovascular: Normal S1 S2, No JVD, + murmurs, No edema  Respiratory: Lungs clear to auscultation	  Psychiatry: A & O x 3, Mood & affect appropriate  Gastrointestinal:  Soft, Non-tender, + BS	  Skin: No rashes, No ecchymoses, No cyanosis	  Neurologic: Non-focal  Extremities: Normal range of motion, No clubbing, cyanosis or edema  Vascular: Peripheral pulses palpable 2+ bilaterally    TELEMETRY: 	    ECG:  	  RADIOLOGY:  OTHER: 	  	  LABS:	 	    CARDIAC MARKERS:  CARDIAC MARKERS ( 17 Apr 2020 05:13 )  x     / x     / 36 U/L / x     / 1.4 ng/mL  CARDIAC MARKERS ( 17 Apr 2020 01:11 )  x     / x     / 45 U/L / x     / <1.0 ng/mL                              12.6   6.87  )-----------( 176      ( 17 Apr 2020 01:11 )             40.2     04-17    146<H>  |  115<H>  |  40<H>  ----------------------------<  236<H>  4.1   |  15<L>  |  1.22    Ca    7.8<L>      17 Apr 2020 05:13    TPro  7.6  /  Alb  3.6  /  TBili  2.2<H>  /  DBili  x   /  AST  30  /  ALT  9<L>  /  AlkPhos  78  04-17    proBNP: Serum Pro-Brain Natriuretic Peptide: 4277 pg/mL (04-17 @ 05:13)    Lipid Profile:   HgA1c:   TSH:   PT/INR - ( 17 Apr 2020 01:11 )   PT: 15.0 sec;   INR: 1.29 ratio         PTT - ( 17 Apr 2020 01:11 )  PTT:27.1 sec    PREVIOUS DIAGNOSTIC TESTING:      < from: 12 Lead ECG (04.17.20 @ 00:46) >  Ventricular Rate 60 BPM    Atrial Rate 60 BPM    P-R Interval 130 ms    QRS Duration 70 ms    Q-T Interval 438 ms    QTC Calculation(Bezet) 438 ms    P Axis 93 degrees    R Axis -39 degrees    T Axis 62 degrees    Diagnosis Line SINUS RHYTHM WITH PREMATURE ATRIAL COMPLEXES  LEFT AXIS DEVIATION  NONSPECIFIC T WAVE ABNORMALITY  < from: Transthoracic Echocardiogram (02.11.18 @ 13:31) >  1. Normal left ventricular systolic function. No segmental  wall motion abnormalities.  2. Right ventricular enlargement with decreased right  ventricular systolic function.  3. No pericardial effusion seen.    < from: CT Chest No Cont (04.17.20 @ 04:24) >  LUNGS AND AIRWAYS: Patent central airways.  Bilateral groundglass infiltrates with peripheral and basilar predominance..    PLEURA: No effusion or pneumothorax.    MEDIASTINUM AND SARA: No enlarged precarinal lymph node measures 1.5 cm in short axis.    VESSELS: Main pulmonary artery is normal in caliber. Coronary artery calcifications arepresent. Thoracic aorta is normal in caliber.    HEART: Heart size is normal. No pericardial effusion.  Pattern of GGO suggests infection including atypical pneumonia/viral infection from atypical agents including COVID-19 (C19V-  < from: Limited Transthoracic Echo (10.26.18 @ 14:07) >  Limited tranthoracic echocardiogram at patients request to  end exam.  1. Mitral annular calcification.  2. The aortic valve opens well.  3. Limited images acquired at the patients request. Based  upon the parasternal long axis view only;  grossly normal  left ventricular systolic function.    < end of copied text >  COVID-19 PCR . (04.17.20 @ 01:41)    COVID-19 PCR: Detected: This test has been validated by [employer] to be accurate;  though it has not been FDA cleared/approved by the usual pathway.  As with all laboratory tests, results should be correlated with clinical  findings.  https://www.fda.gov/media/769423/download  https://www.fda.gov/media/359192/download

## 2020-04-18 LAB
-  COAGULASE NEGATIVE STAPHYLOCOCCUS: SIGNIFICANT CHANGE UP
ALBUMIN SERPL ELPH-MCNC: 3.1 G/DL — LOW (ref 3.3–5)
ALP SERPL-CCNC: 61 U/L — SIGNIFICANT CHANGE UP (ref 40–120)
ALT FLD-CCNC: 10 U/L — SIGNIFICANT CHANGE UP (ref 10–45)
ANION GAP SERPL CALC-SCNC: 13 MMOL/L — SIGNIFICANT CHANGE UP (ref 5–17)
AST SERPL-CCNC: 27 U/L — SIGNIFICANT CHANGE UP (ref 10–40)
BILIRUB SERPL-MCNC: 2 MG/DL — HIGH (ref 0.2–1.2)
BUN SERPL-MCNC: 29 MG/DL — HIGH (ref 7–23)
CALCIUM SERPL-MCNC: 8.4 MG/DL — SIGNIFICANT CHANGE UP (ref 8.4–10.5)
CHLORIDE SERPL-SCNC: 120 MMOL/L — HIGH (ref 96–108)
CO2 SERPL-SCNC: 20 MMOL/L — LOW (ref 22–31)
CREAT SERPL-MCNC: 0.88 MG/DL — SIGNIFICANT CHANGE UP (ref 0.5–1.3)
CRP SERPL-MCNC: 1.08 MG/DL — HIGH (ref 0–0.4)
CULTURE RESULTS: NO GROWTH — SIGNIFICANT CHANGE UP
FERRITIN SERPL-MCNC: 608 NG/ML — HIGH (ref 15–150)
GLUCOSE SERPL-MCNC: 103 MG/DL — HIGH (ref 70–99)
GRAM STN FLD: SIGNIFICANT CHANGE UP
HCT VFR BLD CALC: 34.1 % — LOW (ref 34.5–45)
HGB BLD-MCNC: 11.1 G/DL — LOW (ref 11.5–15.5)
MCHC RBC-ENTMCNC: 32.6 GM/DL — SIGNIFICANT CHANGE UP (ref 32–36)
MCHC RBC-ENTMCNC: 34.2 PG — HIGH (ref 27–34)
MCV RBC AUTO: 104.9 FL — HIGH (ref 80–100)
METHOD TYPE: SIGNIFICANT CHANGE UP
NRBC # BLD: 0 /100 WBCS — SIGNIFICANT CHANGE UP (ref 0–0)
PLATELET # BLD AUTO: 128 K/UL — LOW (ref 150–400)
POTASSIUM SERPL-MCNC: 4.2 MMOL/L — SIGNIFICANT CHANGE UP (ref 3.5–5.3)
POTASSIUM SERPL-SCNC: 4.2 MMOL/L — SIGNIFICANT CHANGE UP (ref 3.5–5.3)
PROCALCITONIN SERPL-MCNC: 0.14 NG/ML — HIGH (ref 0.02–0.1)
PROT SERPL-MCNC: 6.4 G/DL — SIGNIFICANT CHANGE UP (ref 6–8.3)
RBC # BLD: 3.25 M/UL — LOW (ref 3.8–5.2)
RBC # FLD: 13.9 % — SIGNIFICANT CHANGE UP (ref 10.3–14.5)
SODIUM SERPL-SCNC: 153 MMOL/L — HIGH (ref 135–145)
SPECIMEN SOURCE: SIGNIFICANT CHANGE UP
WBC # BLD: 6.27 K/UL — SIGNIFICANT CHANGE UP (ref 3.8–10.5)
WBC # FLD AUTO: 6.27 K/UL — SIGNIFICANT CHANGE UP (ref 3.8–10.5)

## 2020-04-18 PROCEDURE — 99232 SBSQ HOSP IP/OBS MODERATE 35: CPT | Mod: CS

## 2020-04-18 RX ORDER — NOREPINEPHRINE BITARTRATE/D5W 8 MG/250ML
0.04 PLASTIC BAG, INJECTION (ML) INTRAVENOUS
Qty: 8 | Refills: 0 | Status: DISCONTINUED | OUTPATIENT
Start: 2020-04-18 | End: 2020-04-19

## 2020-04-18 RX ORDER — SODIUM CHLORIDE 9 MG/ML
1000 INJECTION, SOLUTION INTRAVENOUS
Refills: 0 | Status: DISCONTINUED | OUTPATIENT
Start: 2020-04-18 | End: 2020-04-20

## 2020-04-18 RX ORDER — VANCOMYCIN HCL 1 G
1000 VIAL (EA) INTRAVENOUS ONCE
Refills: 0 | Status: COMPLETED | OUTPATIENT
Start: 2020-04-18 | End: 2020-04-18

## 2020-04-18 RX ADMIN — Medication 3.75 MICROGRAM(S)/KG/MIN: at 14:32

## 2020-04-18 RX ADMIN — HEPARIN SODIUM 5000 UNIT(S): 5000 INJECTION INTRAVENOUS; SUBCUTANEOUS at 18:36

## 2020-04-18 RX ADMIN — Medication 250 MILLIGRAM(S): at 18:36

## 2020-04-18 RX ADMIN — SODIUM CHLORIDE 50 MILLILITER(S): 9 INJECTION, SOLUTION INTRAVENOUS at 14:32

## 2020-04-18 RX ADMIN — HEPARIN SODIUM 5000 UNIT(S): 5000 INJECTION INTRAVENOUS; SUBCUTANEOUS at 06:07

## 2020-04-18 NOTE — PROGRESS NOTE ADULT - SUBJECTIVE AND OBJECTIVE BOX
CARDIOLOGY     PROGRESS  NOTE   ________________________________________________    CHIEF COMPLAINT:Patient is a 89y old  Female who presents with a chief complaint of sob/ hypotension (18 Apr 2020 11:42)  lethargic  	  REVIEW OF SYSTEMS:  CONSTITUTIONAL: No fever, weight loss, or fatigue  EYES: No eye pain, visual disturbances, or discharge  ENT:  No difficulty hearing, tinnitus, vertigo; No sinus or throat pain  NECK: No pain or stiffness  RESPIRATORY: No cough, wheezing, chills or hemoptysis; No Shortness of Breath  CARDIOVASCULAR: No chest pain, palpitations, passing out, dizziness, or leg swelling  GASTROINTESTINAL: No abdominal or epigastric pain. No nausea, vomiting, or hematemesis; No diarrhea or constipation. No melena or hematochezia.  GENITOURINARY: No dysuria, frequency, hematuria, or incontinence  NEUROLOGICAL: No headaches, memory loss, loss of strength, numbness, or tremors  SKIN: No itching, burning, rashes, or lesions   LYMPH Nodes: No enlarged glands  ENDOCRINE: No heat or cold intolerance; No hair loss  MUSCULOSKELETAL: No joint pain or swelling; No muscle, back, or extremity pain  PSYCHIATRIC: No depression, anxiety, mood swings, or difficulty sleeping  HEME/LYMPH: No easy bruising, or bleeding gums  ALLERGY AND IMMUNOLOGIC: No hives or eczema	    [ ] All others negative	  [x ] Unable to obtain    PHYSICAL EXAM:  T(C): 37.6 (04-18-20 @ 09:13), Max: 37.6 (04-18-20 @ 09:13)  HR: 80 (04-18-20 @ 09:13) (63 - 109)  BP: 138/68 (04-18-20 @ 09:13) (70/43 - 166/63)  RR: 14 (04-18-20 @ 09:13) (14 - 18)  SpO2: 99% (04-18-20 @ 09:13) (92% - 100%)  Wt(kg): --  I&O's Summary    17 Apr 2020 07:01  -  18 Apr 2020 07:00  --------------------------------------------------------  IN: 767 mL / OUT: 0 mL / NET: 767 mL        Appearance: Normal	  HEENT:   Normal oral mucosa, PERRL, EOMI	  Lymphatic: No lymphadenopathy  Cardiovascular: Normal S1 S2, No JVD, + murmurs, No edema  Respiratory: Lungs clear to auscultation	  Psychiatry: A & O x 3, Mood & affect appropriate  Gastrointestinal:  Soft, Non-tender, + BS	  Skin: No rashes, No ecchymoses, No cyanosis	  Neurologic: Non-focal  Extremities: Normal range of motion, No clubbing, cyanosis or edema  Vascular: Peripheral pulses palpable 2+ bilaterally    MEDICATIONS  (STANDING):  acetaminophen   Tablet .. 650 milliGRAM(s) Oral every 6 hours  atorvastatin 20 milliGRAM(s) Oral at bedtime  heparin  Injectable 5000 Unit(s) SubCutaneous every 12 hours  midodrine 30 milliGRAM(s) Oral every 8 hours  multivitamin 1 Tablet(s) Oral daily  norepinephrine Infusion 0.06 MICROgram(s)/kG/Min (5.63 mL/Hr) IV Continuous <Continuous>  sodium chloride 0.9%. 1000 milliLiter(s) (70 mL/Hr) IV Continuous <Continuous>      TELEMETRY: 	    ECG:  	  RADIOLOGY:  OTHER: 	  	  LABS:	 	    CARDIAC MARKERS:  CARDIAC MARKERS ( 17 Apr 2020 05:13 )  x     / x     / 36 U/L / x     / 1.4 ng/mL  CARDIAC MARKERS ( 17 Apr 2020 01:11 )  x     / x     / 45 U/L / x     / <1.0 ng/mL                                11.1   6.27  )-----------( 128      ( 18 Apr 2020 09:59 )             34.1     04-18    153<H>  |  120<H>  |  29<H>  ----------------------------<  103<H>  4.2   |  20<L>  |  0.88    Ca    8.4      18 Apr 2020 09:59    TPro  6.4  /  Alb  3.1<L>  /  TBili  2.0<H>  /  DBili  x   /  AST  27  /  ALT  10  /  AlkPhos  61  04-18    proBNP: Serum Pro-Brain Natriuretic Peptide: 4277 pg/mL (04-17 @ 05:13)    Lipid Profile:   HgA1c:   TSH:   PT/INR - ( 17 Apr 2020 01:11 )   PT: 15.0 sec;   INR: 1.29 ratio         PTT - ( 17 Apr 2020 01:11 )  PTT:27.1 sec  COVID-19 PCR . (04.17.20 @ 01:41)    COVID-19 PCR: Detected: This test has been validated by [employer] to be accurate;  though it has not been FDA cleared/approved by the usual pathway.  As with all laboratory tests, results should be correlated with clinical  findings.  https://www.fda.gov/media/680876/download  https://www.fda.gov/media/057599/download    < from: 12 Lead ECG (04.17.20 @ 00:46) >  Diagnosis Line SINUS RHYTHM WITH PREMATURE ATRIAL COMPLEXES  LEFT AXIS DEVIATION  NONSPECIFIC T WAVE ABNORMALITY    < end of copied text >  ·  Problem: Palliative care encounter.  Recommendation: From the Geriatrics and Palliative Care point of view and based on the info above, the patient's prognosis is poor (likely hours to days and mostly hours). In regards to resuscitation, CPR is an intense medical therapy that requires a strong human body able to support this intervention in order to successfully go through it, survive, and recover after it. However, in this case, the patient is so frail and with minimum physiologic reserves that it will not allow her to survive the procedure of resuscitation and instead putting her through interventions that will likely cause further distress and suffering. In this case and understanding the patient’s illness and poor prognosis factors (as above) it is expected that in the setting of a cardiac arrest, even if CPR is performed, the patient will imminently die. Neither CPR nor intubation will provide a bridge for recovery.    In the setting of a cardiac arrest, it will up to the providers assisting the code to determine based on their clinical knowledge and the patient's clinical situation the appropriateness of performing advanced life supporting interventions.     Hospital Clinical Review Team notes noticed.     Assessment and plan  ---------------------------  89 year-old female with history of CVA, atrial fibrillation on Xarelto, dementia presents to the Emergency Department for bradycardia.  Patient presents from the [facility]; on vitals was found to be bradycardic to 30s-40s and sent to the ED.  Patient with decreased responsiveness and PO intake for the past few days; recently started on Azithromycin due to unknown etiology/NH paperwork w/o such information.  Tested for COVID on 4/3/20 and negative.  Patient with hypoxia at NH down to 89% on RA.  pt with sig cardiac/ medical history with increasing sob and hypotension .  pt at time is on pressors will continue, and will taper slowly  ivf  midodrine  covid + will add hydroxychloroquine if pt becomes responsive  spoke to the son Mina wants full medical therapy including intubation, clinical review team does not feel aggressive therapy with intubation will change outcome  will titrate the pressors to off  palliative care noted and discussed with Dr Quintanilla yesterday  prognosis is poor   dvt prophylaxis CARDIOLOGY     PROGRESS  NOTE   ________________________________________________    CHIEF COMPLAINT:Patient is a 89y old  Female who presents with a chief complaint of sob/ hypotension (18 Apr 2020 11:42)  lethargic  	  REVIEW OF SYSTEMS:  CONSTITUTIONAL: No fever, weight loss, or fatigue  EYES: No eye pain, visual disturbances, or discharge  ENT:  No difficulty hearing, tinnitus, vertigo; No sinus or throat pain  NECK: No pain or stiffness  RESPIRATORY: No cough, wheezing, chills or hemoptysis; No Shortness of Breath  CARDIOVASCULAR: No chest pain, palpitations, passing out, dizziness, or leg swelling  GASTROINTESTINAL: No abdominal or epigastric pain. No nausea, vomiting, or hematemesis; No diarrhea or constipation. No melena or hematochezia.  GENITOURINARY: No dysuria, frequency, hematuria, or incontinence  NEUROLOGICAL: No headaches, memory loss, loss of strength, numbness, or tremors  SKIN: No itching, burning, rashes, or lesions   LYMPH Nodes: No enlarged glands  ENDOCRINE: No heat or cold intolerance; No hair loss  MUSCULOSKELETAL: No joint pain or swelling; No muscle, back, or extremity pain  PSYCHIATRIC: No depression, anxiety, mood swings, or difficulty sleeping  HEME/LYMPH: No easy bruising, or bleeding gums  ALLERGY AND IMMUNOLOGIC: No hives or eczema	    [ ] All others negative	  [x ] Unable to obtain    PHYSICAL EXAM:  T(C): 37.6 (04-18-20 @ 09:13), Max: 37.6 (04-18-20 @ 09:13)  HR: 80 (04-18-20 @ 09:13) (63 - 109)  BP: 138/68 (04-18-20 @ 09:13) (70/43 - 166/63)  RR: 14 (04-18-20 @ 09:13) (14 - 18)  SpO2: 99% (04-18-20 @ 09:13) (92% - 100%)  Wt(kg): --  I&O's Summary    17 Apr 2020 07:01  -  18 Apr 2020 07:00  --------------------------------------------------------  IN: 767 mL / OUT: 0 mL / NET: 767 mL        Appearance: Normal	  HEENT:   Normal oral mucosa, PERRL, EOMI	  Lymphatic: No lymphadenopathy  Cardiovascular: Normal S1 S2, No JVD, + murmurs, No edema  Respiratory: Lungs clear to auscultation	  Psychiatry: A & O x 3, Mood & affect appropriate  Gastrointestinal:  Soft, Non-tender, + BS	  Skin: No rashes, No ecchymoses, No cyanosis	  Neurologic: Non-focal  Extremities: Normal range of motion, No clubbing, cyanosis or edema  Vascular: Peripheral pulses palpable 2+ bilaterally    MEDICATIONS  (STANDING):  acetaminophen   Tablet .. 650 milliGRAM(s) Oral every 6 hours  atorvastatin 20 milliGRAM(s) Oral at bedtime  heparin  Injectable 5000 Unit(s) SubCutaneous every 12 hours  midodrine 30 milliGRAM(s) Oral every 8 hours  multivitamin 1 Tablet(s) Oral daily  norepinephrine Infusion 0.06 MICROgram(s)/kG/Min (5.63 mL/Hr) IV Continuous <Continuous>  sodium chloride 0.9%. 1000 milliLiter(s) (70 mL/Hr) IV Continuous <Continuous>      TELEMETRY: 	    ECG:  	  RADIOLOGY:  OTHER: 	  	  LABS:	 	    CARDIAC MARKERS:  CARDIAC MARKERS ( 17 Apr 2020 05:13 )  x     / x     / 36 U/L / x     / 1.4 ng/mL  CARDIAC MARKERS ( 17 Apr 2020 01:11 )  x     / x     / 45 U/L / x     / <1.0 ng/mL                                11.1   6.27  )-----------( 128      ( 18 Apr 2020 09:59 )             34.1     04-18    153<H>  |  120<H>  |  29<H>  ----------------------------<  103<H>  4.2   |  20<L>  |  0.88    Ca    8.4      18 Apr 2020 09:59    TPro  6.4  /  Alb  3.1<L>  /  TBili  2.0<H>  /  DBili  x   /  AST  27  /  ALT  10  /  AlkPhos  61  04-18    proBNP: Serum Pro-Brain Natriuretic Peptide: 4277 pg/mL (04-17 @ 05:13)    Lipid Profile:   HgA1c:   TSH:   PT/INR - ( 17 Apr 2020 01:11 )   PT: 15.0 sec;   INR: 1.29 ratio         PTT - ( 17 Apr 2020 01:11 )  PTT:27.1 sec  COVID-19 PCR . (04.17.20 @ 01:41)    COVID-19 PCR: Detected: This test has been validated by [employer] to be accurate;  though it has not been FDA cleared/approved by the usual pathway.  As with all laboratory tests, results should be correlated with clinical  findings.  https://www.fda.gov/media/469382/download  https://www.fda.gov/media/525137/download    < from: 12 Lead ECG (04.17.20 @ 00:46) >  Diagnosis Line SINUS RHYTHM WITH PREMATURE ATRIAL COMPLEXES  LEFT AXIS DEVIATION  NONSPECIFIC T WAVE ABNORMALITY    < end of copied text >  ·  Problem: Palliative care encounter.  Recommendation: From the Geriatrics and Palliative Care point of view and based on the info above, the patient's prognosis is poor (likely hours to days and mostly hours). In regards to resuscitation, CPR is an intense medical therapy that requires a strong human body able to support this intervention in order to successfully go through it, survive, and recover after it. However, in this case, the patient is so frail and with minimum physiologic reserves that it will not allow her to survive the procedure of resuscitation and instead putting her through interventions that will likely cause further distress and suffering. In this case and understanding the patient’s illness and poor prognosis factors (as above) it is expected that in the setting of a cardiac arrest, even if CPR is performed, the patient will imminently die. Neither CPR nor intubation will provide a bridge for recovery.    In the setting of a cardiac arrest, it will up to the providers assisting the code to determine based on their clinical knowledge and the patient's clinical situation the appropriateness of performing advanced life supporting interventions.     Hospital Clinical Review Team notes noticed.   Culture - Blood (04.17.20 @ 04:17)    Gram Stain:   Growth in anaerobic bottle: Gram Positive Cocci in Clusters    Specimen Source: .Blood Blood-Peripheral    Culture Results:   Growth in anaerobic bottle: Gram Positive Cocci in Clusters  "Due to technical problems, Proteus sp. will Not be reported as part of  the BCID panel until further notice"  ***Blood Panel PCR results on this specimen are available  approximately 3 hours after the Gram stain result.***  Gram stain, PCR, and/or culture results may not always  correspond due to difference in methodologies.  ************************************************************  This PCR assay was performed using [employer].  The following targets are tested for: Enterococcus,  vancomycin resistant enterococci, Listeria monocytogenes,  coagulase negative staphylococci, S. aureus,  methicillin resistant S. aureus, Streptococcus agalactiae  (Group B), S. pneumoniae, S. pyogenes (Group A),  Acinetobacter baumannii, Enterobacter cloacae, E. coli,  Klebsiella oxytoca, K. pneumoniae, Proteus sp.,  Serratia marcescens, Haemophilus influenzae,  Neisseria meningitidis, Pseudomonas aeruginosa, Candida  albicans, C. glabrata, C krusei, C parapsilosis,  C. tropicalis and the KPC resistance gene.      Assessment and plan  ---------------------------  89 year-old female with history of CVA, atrial fibrillation on Xarelto, dementia presents to the Emergency Department for bradycardia.  Patient presents from the [facility]; on vitals was found to be bradycardic to 30s-40s and sent to the ED.  Patient with decreased responsiveness and PO intake for the past few days; recently started on Azithromycin due to unknown etiology/NH paperwork w/o such information.  Tested for COVID on 4/3/20 and negative.  Patient with hypoxia at NH down to 89% on RA.  pt with sig cardiac/ medical history with increasing sob and hypotension .  pt at time is on pressors will continue, and will taper slowly  ivf  midodrine  covid + will add hydroxychloroquine if pt becomes responsive  spoke to the son Mina wants full medical therapy including intubation, clinical review team does not feel aggressive therapy with intubation will change outcome  will titrate the pressors to off  palliative care noted and discussed with Dr Quintanilla yesterday  prognosis is poor   dvt prophylaxis  blood culture 1 set +/ second set negative  will give vanco one dose ID called

## 2020-04-18 NOTE — PROGRESS NOTE ADULT - ASSESSMENT
89   yr pt,   pt  with  h/o  afib,   not  on   xarelto,  due  to falls,   cva,   copd, hld,     alzheimers  dementia,    pud/  h/o left ribf xs. 8/ 9 th,. left hip surg  h/o mlple falls.  echo, normal ef/ T2  comp  deformity    sent  to  er  from   [facility], for  weakness/ sob  ct head, no cva//   ct  chest  with  goo   COVID  positive  elevated  troponin/ CRP/  Ferritin  , from Covid   pt  with  advanced  age/    comfort/   supportive  care is  ideal  however, son  wants  all  done. seen by icu  and palliative care   now  on floor,  with fixed  dose  pressor,  nor  epi was  initiated   on 4/ 17  hypernatremia  of  153  palliative/ comfort care,  ought to  be goal  in this pt        < from: CT Chest No Cont (04.17.20 @ 04:24) >  IMPRESSION:   Pattern of GGO suggests infection including atypical pneumonia/viral infection from atypical agents including COVID-19 (C19V-1)  < end of copied text >     < from: CT Thoracic Spine Reform No Cont (01.07.20 @ 16:43) >  IMPRESSION:  Volume loss, microvascular disease, no acute hemorrhage or midline shift. If symptoms persist consider follow up head CT or MRI contraindications.  Cervical and thoracic spine are unchanged from prior, no obvious fracture or dislocation, multilevel degenerative changes as noted previously with diffuse osteopenia and small unchanged mild superior endplate T2 compression deformity.  < end of copied text      < from: Limited Transthoracic Echo (10.26.18 @ 14:07) >  Conclusions:  Limited tranthoracic echocardiogram at patients request to  end exam.  1. Mitral annular calcification.  2. The aortic valve opens well.  3. Limited images acquired at the patients request. Based  upon the parasternal long axis view only;  grossly normal  left ventricular systolic function.  < end of copied text >

## 2020-04-18 NOTE — PROVIDER CONTACT NOTE (OTHER) - SITUATION
Pt on levophed gtt on medicine floor. Per NP and ANM, ok to keep pt on floor because pt is not a candidate for ICU at this time. Levo gtt will remain at designated rate, no titration ordered/indicated

## 2020-04-18 NOTE — PROGRESS NOTE ADULT - SUBJECTIVE AND OBJECTIVE BOX
weak /  on  floor 8 jessie  REVIEW OF SYSTEMS:  GEN: no fever,    no chills  RESP: no SOB,   no cough  CVS: no chest pain,   no palpitations  GI: no abdominal pain,   no nausea,   no vomiting,   no constipation,   no diarrhea  MEDICATIONS  (STANDING):  acetaminophen   Tablet .. 650 milliGRAM(s) Oral every 6 hours  atorvastatin 20 milliGRAM(s) Oral at bedtime  heparin  Injectable 5000 Unit(s) SubCutaneous every 12 hours  midodrine 30 milliGRAM(s) Oral every 8 hours  multivitamin 1 Tablet(s) Oral daily  norepinephrine Infusion 0.06 MICROgram(s)/kG/Min (5.63 mL/Hr) IV Continuous <Continuous>  sodium chloride 0.9%. 1000 milliLiter(s) (70 mL/Hr) IV Continuous <Continuous>    MEDICATIONS  (PRN):      Vital Signs Last 24 Hrs  T(C): 37.6 (2020 09:13), Max: 37.6 (2020 09:13)  T(F): 99.7 (2020 09:13), Max: 99.7 (2020 09:13)  HR: 80 (2020 09:13) (63 - 109)  BP: 138/68 (2020 09:13) (70/43 - 166/63)  BP(mean): 56 (2020 16:00) (47 - 74)  RR: 14 (2020 09:13) (14 - 18)  SpO2: 99% (2020 09:13) (92% - 100%)  CAPILLARY BLOOD GLUCOSE        I&O's Summary    2020 07:01  -  2020 07:00  --------------------------------------------------------  IN: 767 mL / OUT: 0 mL / NET: 767 mL        PHYSICAL EXAM:  HEAD:  Atraumatic, Normocephalic  NECK: Supple, No   JVD  CHEST/LUNG:   no     rales,     no,    rhonchi  HEART: Regular rate and rhythm;         murmur  ABDOMEN: Soft, Nontender, ;   EXTREMITIES:        edema  NEUROLOGY:    cognitive impairment    LABS:                        11.1   6.27  )-----------( 128      ( 2020 09:59 )             34.1     04-18    153<H>  |  120<H>  |  29<H>  ----------------------------<  103<H>  4.2   |  20<L>  |  0.88    Ca    8.4      2020 09:59    TPro  6.4  /  Alb  3.1<L>  /  TBili  2.0<H>  /  DBili  x   /  AST  27  /  ALT  10  /  AlkPhos  61  04-18    PT/INR - ( 2020 01:11 )   PT: 15.0 sec;   INR: 1.29 ratio         PTT - ( 2020 01:11 )  PTT:27.1 sec  CARDIAC MARKERS ( 2020 05:13 )  x     / x     / 36 U/L / x     / 1.4 ng/mL  CARDIAC MARKERS ( 2020 01:11 )  x     / x     / 45 U/L / x     / <1.0 ng/mL      Urinalysis Basic - ( 2020 01:59 )    Color: Yellow / Appearance: Clear / S.025 / pH: x  Gluc: x / Ketone: Small  / Bili: Negative / Urobili: Negative   Blood: x / Protein: 30 mg/dL / Nitrite: Negative   Leuk Esterase: Negative / RBC: 0 /hpf / WBC 0 /HPF   Sq Epi: x / Non Sq Epi: 1 /hpf / Bacteria: Negative           @ 05:13  4.7  46              Consultant(s) Notes Reviewed:      Care Discussed with Consultants/Other Providers:

## 2020-04-18 NOTE — CONSULT NOTE ADULT - ASSESSMENT
89y female with history of CVA, atrial fibrillation on Xarelto, dementia presents to the Emergency Department for bradycardia.  Reviewed flow sheets and she was noted to have fever to 102, it was taken rectally, her wbc are wnl, CT of chest done and reported findings of GGO suggestive of viral pneumonia COVID. She is currently requires 4L of oxygen sat's are 99%, ferritin is 608 and her CRP is 1. She was also found to have positive blood cultures 1 of 4 with GPC, this could be a procurement contaminant, but follow up the ID and SS of the bacteria. Reviewed notes the patient was also seen by palliative care team and have documented the patient poor prognosis. Given overall poor prognosis,  i would not recommend  plaquenil for  COVID it  is not going to change her overall outcomes.    Plan:  ·	Reviewed orders she is written for Vancomycin IV once for blood culture results, in my opinion findings are likely a procurement contaminant; follow up the ID and SS   ·	continue supportive care as per primary care team, palliative care team and consultants.

## 2020-04-18 NOTE — CONSULT NOTE ADULT - SUBJECTIVE AND OBJECTIVE BOX
HPI: Patient is a 89y female with history of CVA, atrial fibrillation on Xarelto, dementia presents to the Emergency Department for bradycardia.  Patient presents from the [facility]; on vitals was found to be bradycardic to 30s-40s and sent to the ED.  Patient with decreased responsiveness and PO intake for the past few days; recently started on Azithromycin due to unknown etiology/NH paperwork w/o such information.  Tested for COVID on 4/3/20 and negative.  Patient with hypoxia at NH down to 89% on RA. She was tested for COVID-19 and now she has tested positive on . She also had blood culture done in the ED and now it is being reported as positive for GPC in clusters in 1 of 4 bottles. She was given one does of Vancomycin and ID consult placed.     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      Allergies    aspirin (Unknown)  penicillin (Unknown)    Intolerances    FAMILY HISTORY:  No pertinent family history in first degree relatives      SOCIAL HISTORY: Can not be obtained the patient is lethargic     T(F): 99.7 (20 @ 09:13), Max: 99.7 (20 @ 09:13)  HR: 80 (20 @ 09:13)  BP: 138/68 (20 @ 09:13)  RR: 14 (20 @ 09:13)  SpO2: 99% (20 @ 09:13)  Wt(kg): --    PHYSICAL EXAM:  General: lethargic   Eyes: her eyes are closed   Oropharynx: no lesions   Lungs: decrease bs  Heart: regular rate and rhythm; no murmur  Abdomen: soft, nondistended, nontender  Skin: no lesions  Extremities: no clubbing, cyanosis, or edema  Neurologic: lethargic     LAB RESULTS:                        11.1   6.27  )-----------( 128      ( 2020 09:59 )             34.1         153<H>  |  120<H>  |  29<H>  ----------------------------<  103<H>  4.2   |  20<L>  |  0.88    Ca    8.4      2020 09:59    TPro  6.4  /  Alb  3.1<L>  /  TBili  2.0<H>  /  DBili  x   /  AST  27  /  ALT  10  /  AlkPhos  61  04-18    LIVER FUNCTIONS - ( 2020 09:59 )  Alb: 3.1 g/dL / Pro: 6.4 g/dL / ALK PHOS: 61 U/L / ALT: 10 U/L / AST: 27 U/L / GGT: x           Urinalysis Basic - ( 2020 01:59 )    Color: Yellow / Appearance: Clear / S.025 / pH: x  Gluc: x / Ketone: Small  / Bili: Negative / Urobili: Negative   Blood: x / Protein: 30 mg/dL / Nitrite: Negative   Leuk Esterase: Negative / RBC: 0 /hpf / WBC 0 /HPF   Sq Epi: x / Non Sq Epi: 1 /hpf / Bacteria: Negative        MICROBIOLOGY:  RECENT CULTURES:   @ 06:35 .Urine Catheterized     No growth       @ 04:17 .Blood Blood-Peripheral     No growth to date.    Growth in anaerobic bottle: Gram Positive Cocci in Clusters          RADIOLOGY REVIEWED:      Antimicrobials Day #    vancomycin  IVPB 1000 milliGRAM(s) IV Intermittent once    Other Medications:  acetaminophen   Tablet .. 650 milliGRAM(s) Oral every 6 hours  atorvastatin 20 milliGRAM(s) Oral at bedtime  heparin  Injectable 5000 Unit(s) SubCutaneous every 12 hours  midodrine 30 milliGRAM(s) Oral every 8 hours  multivitamin 1 Tablet(s) Oral daily  norepinephrine Infusion 0.06 MICROgram(s)/kG/Min IV Continuous <Continuous>  sodium chloride 0.9%. 1000 milliLiter(s) IV Continuous <Continuous>

## 2020-04-18 NOTE — CHART NOTE - NSCHARTNOTEFT_GEN_A_CORE
Called by Dr Ferro and recommended to decrease Levophed to   0.04 MCG/KG/Min. BP is 133/88 Called by Dr Ferro and recommended to decrease Levophed to   0.04 MCG/KG/Min. BP is 133/88. Na today is 153, started D5% 1/2 NS  50 cc/hr as recommended by Dr Ferro.  Sherley Jerry NP  [phone]

## 2020-04-19 LAB
ALBUMIN SERPL ELPH-MCNC: 2.4 G/DL — LOW (ref 3.3–5)
ALP SERPL-CCNC: 55 U/L — SIGNIFICANT CHANGE UP (ref 40–120)
ALT FLD-CCNC: 10 U/L — SIGNIFICANT CHANGE UP (ref 10–45)
ANION GAP SERPL CALC-SCNC: 12 MMOL/L — SIGNIFICANT CHANGE UP (ref 5–17)
AST SERPL-CCNC: 30 U/L — SIGNIFICANT CHANGE UP (ref 10–40)
BILIRUB SERPL-MCNC: 1.8 MG/DL — HIGH (ref 0.2–1.2)
BUN SERPL-MCNC: 17 MG/DL — SIGNIFICANT CHANGE UP (ref 7–23)
CALCIUM SERPL-MCNC: 8 MG/DL — LOW (ref 8.4–10.5)
CHLORIDE SERPL-SCNC: 119 MMOL/L — HIGH (ref 96–108)
CO2 SERPL-SCNC: 16 MMOL/L — LOW (ref 22–31)
CREAT SERPL-MCNC: 0.67 MG/DL — SIGNIFICANT CHANGE UP (ref 0.5–1.3)
GLUCOSE SERPL-MCNC: 123 MG/DL — HIGH (ref 70–99)
HCT VFR BLD CALC: 33.6 % — LOW (ref 34.5–45)
HGB BLD-MCNC: 10.9 G/DL — LOW (ref 11.5–15.5)
MCHC RBC-ENTMCNC: 32.4 GM/DL — SIGNIFICANT CHANGE UP (ref 32–36)
MCHC RBC-ENTMCNC: 34 PG — SIGNIFICANT CHANGE UP (ref 27–34)
MCV RBC AUTO: 104.7 FL — HIGH (ref 80–100)
NRBC # BLD: 0 /100 WBCS — SIGNIFICANT CHANGE UP (ref 0–0)
PLATELET # BLD AUTO: 111 K/UL — LOW (ref 150–400)
POTASSIUM SERPL-MCNC: 4 MMOL/L — SIGNIFICANT CHANGE UP (ref 3.5–5.3)
POTASSIUM SERPL-SCNC: 4 MMOL/L — SIGNIFICANT CHANGE UP (ref 3.5–5.3)
PROT SERPL-MCNC: 5.9 G/DL — LOW (ref 6–8.3)
RBC # BLD: 3.21 M/UL — LOW (ref 3.8–5.2)
RBC # FLD: 13.8 % — SIGNIFICANT CHANGE UP (ref 10.3–14.5)
SODIUM SERPL-SCNC: 147 MMOL/L — HIGH (ref 135–145)
WBC # BLD: 4.57 K/UL — SIGNIFICANT CHANGE UP (ref 3.8–10.5)
WBC # FLD AUTO: 4.57 K/UL — SIGNIFICANT CHANGE UP (ref 3.8–10.5)

## 2020-04-19 PROCEDURE — 99232 SBSQ HOSP IP/OBS MODERATE 35: CPT | Mod: CS

## 2020-04-19 PROCEDURE — 93010 ELECTROCARDIOGRAM REPORT: CPT

## 2020-04-19 RX ORDER — NOREPINEPHRINE BITARTRATE/D5W 8 MG/250ML
0.06 PLASTIC BAG, INJECTION (ML) INTRAVENOUS
Qty: 8 | Refills: 0 | Status: DISCONTINUED | OUTPATIENT
Start: 2020-04-19 | End: 2020-04-20

## 2020-04-19 RX ADMIN — SODIUM CHLORIDE 50 MILLILITER(S): 9 INJECTION, SOLUTION INTRAVENOUS at 15:09

## 2020-04-19 RX ADMIN — HEPARIN SODIUM 5000 UNIT(S): 5000 INJECTION INTRAVENOUS; SUBCUTANEOUS at 04:58

## 2020-04-19 RX ADMIN — Medication 5.63 MICROGRAM(S)/KG/MIN: at 09:29

## 2020-04-19 RX ADMIN — Medication 3.75 MICROGRAM(S)/KG/MIN: at 04:59

## 2020-04-19 RX ADMIN — SODIUM CHLORIDE 50 MILLILITER(S): 9 INJECTION, SOLUTION INTRAVENOUS at 04:58

## 2020-04-19 RX ADMIN — HEPARIN SODIUM 5000 UNIT(S): 5000 INJECTION INTRAVENOUS; SUBCUTANEOUS at 18:41

## 2020-04-19 NOTE — CHART NOTE - NSCHARTNOTEFT_GEN_A_CORE
NP note -  hypotension    called by RN for hypotensive SBP 60's.  pt seen and examined in the bed. AOX 0, responsed to pain stimuli. on levophed 0.04mcg/kg/min and midodrin 30mg po Q8hr however pt unable to take midodrine d/t AMS, NPO status. on ivf D5% 1/2NS at 50ml/hr for hypernatremia.       Vital Signs Last 24 Hrs  T(C): 36.6 (19 Apr 2020 08:55), Max: 36.9 (18 Apr 2020 15:23)  T(F): 97.8 (19 Apr 2020 08:55), Max: 98.5 (18 Apr 2020 15:23)  HR: 131 (19 Apr 2020 09:10) (93 - 132)  BP: 98/51 (19 Apr 2020 09:10) (63/33 - 144/62)  BP(mean): --  RR: 17 (19 Apr 2020 08:55) (16 - 17)  SpO2: 98% (19 Apr 2020 08:55) (96% - 100%)                        11.1   6.27  )-----------( 128      ( 18 Apr 2020 09:59 )             34.1     04-18    153<H>  |  120<H>  |  29<H>  ----------------------------<  103<H>  4.2   |  20<L>  |  0.88    Ca    8.4      18 Apr 2020 09:59    TPro  6.4  /  Alb  3.1<L>  /  TBili  2.0<H>  /  DBili  x   /  AST  27  /  ALT  10  /  AlkPhos  61  04-18        Appearance: Normal. no toxic appearance. 	  Cardiovascular: Normal S1 S2, No JVD, + murmurs, No edema  Respiratory: Lungs clear to auscultation	  Psychiatry: A & O x 0, Mood & affect appropriate      A/P  89 year-old female with history of CVA, atrial fibrillation on Xarelto, dementia presents to the Emergency Department for bradycardia. Patient presents from the [facility]; on vitals was found to be bradycardic to 30s-40s and sent to the ED.  Patient with decreased responsiveness and PO intake for the past few days; recently started on Azithromycin due to unknown etiology/NH paperwork w/o such information.  Tested for COVID on 4/3/20 and negative.  Patient with hypoxia at NH down to 89% on RA.  Now with hypotension with tachycardia 130's, on levophed 0.04mcg/kg/min. on IVF D5% 1/2 ns at 50ml/hr.  1. increased levophed 0.04 to 0.06mcg/kg/min.  2. monitor BP Q 5 mins for 3 sets. BP stable now after increased levophed.   3. continue with ivf for dehydration.  4. continue with oxygen therapy to keep O2 sat > 92%.   5. will resume midodrin when pt tolerates PO intake. '    NP. Hima Reyez  [id number]

## 2020-04-19 NOTE — PROGRESS NOTE ADULT - ASSESSMENT
89   yr pt,   pt  with  h/o  afib,   not  on   xarelto,  due  to falls,   cva,   copd, hld,     alzheimers  dementia,    pud/  h/o left ribf xs. 8/ 9 th,. left hip surg  h/o mlple falls.  echo, normal ef/ T2  comp  deformity    sent  to  er  from   [facility], for  weakness/ sob  ct head, no cva//   ct  chest  with  goo   COVID  positive  elevated  troponin/ CRP/  Ferritin  , from Covid   pt  with  advanced  age/    comfort/   supportive  care is  ideal  however, son  wants  all  done. seen by icu  and palliative care     on floor,  with fixed  dose  pressor,  nor  epi was  initiated   on 4/ 17/  and on  midodrine  hypernatremia  of  153, now  147/  decreasing platelets  and   low  sbp  palliative/ comfort care,  ought to  be goal  in this pt        < from: CT Chest No Cont (04.17.20 @ 04:24) >  IMPRESSION:   Pattern of GGO suggests infection including atypical pneumonia/viral infection from atypical agents including COVID-19 (C19V-1)  < end of copied text >     < from: CT Thoracic Spine Reform No Cont (01.07.20 @ 16:43) >  IMPRESSION:  Volume loss, microvascular disease, no acute hemorrhage or midline shift. If symptoms persist consider follow up head CT or MRI contraindications.  Cervical and thoracic spine are unchanged from prior, no obvious fracture or dislocation, multilevel degenerative changes as noted previously with diffuse osteopenia and small unchanged mild superior endplate T2 compression deformity.  < end of copied text      < from: Limited Transthoracic Echo (10.26.18 @ 14:07) >  Conclusions:  Limited tranthoracic echocardiogram at patients request to  end exam.  1. Mitral annular calcification.  2. The aortic valve opens well.  3. Limited images acquired at the patients request. Based  upon the parasternal long axis view only;  grossly normal  left ventricular systolic function.  < end of copied text > 89   yr pt,   pt  with  h/o  afib,   not  on   xarelto,  due  to falls,   cva,   copd, hld,     alzheimers  dementia,    pud/  h/o left ribf xs. 8/ 9 th,. left hip surg  h/o mlple falls.  echo, normal ef/ T2  comp  deformity    sent  to  er  from   [facility], for  weakness/ sob  ct head, no cva//   ct  chest  with  goo   COVID  positive  elevated  troponin/ CRP/  Ferritin  , from Covid   pt  with  advanced  age/    comfort/   supportive  care is  ideal  however, son  wants  all  done. seen by icu  and palliative care     on floor,  with fixed  dose  pressor,  nor  epi was  initiated   on 4/ 17/  and on  midodrine/  howeve r , pt is  npo/ risk  of  aspiration  hypernatremia  of  153, now  147/  decreasing platelets  and   low  sbp  palliative/ comfort care,  ought to  be goal  in this pt        < from: CT Chest No Cont (04.17.20 @ 04:24) >  IMPRESSION:   Pattern of GGO suggests infection including atypical pneumonia/viral infection from atypical agents including COVID-19 (C19V-1)  < end of copied text >     < from: CT Thoracic Spine Reform No Cont (01.07.20 @ 16:43) >  IMPRESSION:  Volume loss, microvascular disease, no acute hemorrhage or midline shift. If symptoms persist consider follow up head CT or MRI contraindications.  Cervical and thoracic spine are unchanged from prior, no obvious fracture or dislocation, multilevel degenerative changes as noted previously with diffuse osteopenia and small unchanged mild superior endplate T2 compression deformity.  < end of copied text      < from: Limited Transthoracic Echo (10.26.18 @ 14:07) >  Conclusions:  Limited tranthoracic echocardiogram at patients request to  end exam.  1. Mitral annular calcification.  2. The aortic valve opens well.  3. Limited images acquired at the patients request. Based  upon the parasternal long axis view only;  grossly normal  left ventricular systolic function.  < end of copied text > 89   yr pt,   pt  with  h/o  afib,   not  on   xarelto,  due  to falls,   cva,   copd, hld,     alzheimers  dementia,    pud/  h/o left ribf xs. 8/ 9 th,. left hip surg  h/o mlple falls.  echo, normal ef/ T2  comp  deformity    sent  to  [facility]  from   [facility], for  weakness/ sob  ct head, no cva//   ct  chest  with  goo   COVID  positive  elevated  troponin/ CRP/  Ferritin  , from Covid   pt  with  advanced  age/    comfort/   supportive  care is  ideal  however, son  wants  all  done. seen by icu  and palliative care     on floor,  with fixed  dose  pressor,  nor  epi was  initiated   on 4/ 17/  and on  midodrine/  howeve r , pt is  npo/ risk  of  aspiration  hypernatremia  of  153, now  147/  decreasing platelets  and   low  sbp  CNS, IN 1/2  blood   c/s,  which is  a  contaminant  palliative/ comfort care,  ought to  be goal  in this pt        < from: CT Chest No Cont (04.17.20 @ 04:24) >  IMPRESSION:   Pattern of GGO suggests infection including atypical pneumonia/viral infection from atypical agents including COVID-19 (C19V-1)  < end of copied text >     < from: CT Thoracic Spine Reform No Cont (01.07.20 @ 16:43) >  IMPRESSION:  Volume loss, microvascular disease, no acute hemorrhage or midline shift. If symptoms persist consider follow up head CT or MRI contraindications.  Cervical and thoracic spine are unchanged from prior, no obvious fracture or dislocation, multilevel degenerative changes as noted previously with diffuse osteopenia and small unchanged mild superior endplate T2 compression deformity.  < end of copied text      < from: Limited Transthoracic Echo (10.26.18 @ 14:07) >  Conclusions:  Limited tranthoracic echocardiogram at patients request to  end exam.  1. Mitral annular calcification.  2. The aortic valve opens well.  3. Limited images acquired at the patients request. Based  upon the parasternal long axis view only;  grossly normal  left ventricular systolic function.  < end of copied text >

## 2020-04-19 NOTE — PROGRESS NOTE ADULT - SUBJECTIVE AND OBJECTIVE BOX
weak,  be  dbound/ non verbal  REVIEW OF SYSTEMS:  GEN: no fever,    no chills  RESP: no SOB,   no cough  CVS: no chest pain,   no palpitations  GI: no abdominal pain,   no nausea,   no vomiting,   no constipation,   no diarrhea  : no dysuria,   no frequency  NEURO: no headache,   no dizziness  PSYCH: no depression,   not anxious  Derm : no rash    MEDICATIONS  (STANDING):  acetaminophen   Tablet .. 650 milliGRAM(s) Oral every 6 hours  atorvastatin 20 milliGRAM(s) Oral at bedtime  dextrose 5% + sodium chloride 0.45%. 1000 milliLiter(s) (50 mL/Hr) IV Continuous <Continuous>  heparin  Injectable 5000 Unit(s) SubCutaneous every 12 hours  midodrine 30 milliGRAM(s) Oral every 8 hours  multivitamin 1 Tablet(s) Oral daily  norepinephrine Infusion 0.06 MICROgram(s)/kG/Min (5.63 mL/Hr) IV Continuous <Continuous>    MEDICATIONS  (PRN):      Vital Signs Last 24 Hrs  T(C): 36.6 (19 Apr 2020 08:55), Max: 36.9 (18 Apr 2020 15:23)  T(F): 97.8 (19 Apr 2020 08:55), Max: 98.5 (18 Apr 2020 15:23)  HR: 131 (19 Apr 2020 09:10) (93 - 132)  BP: 98/51 (19 Apr 2020 09:10) (63/33 - 144/62)  BP(mean): --  RR: 17 (19 Apr 2020 08:55) (16 - 17)  SpO2: 98% (19 Apr 2020 08:55) (96% - 100%)  CAPILLARY BLOOD GLUCOSE        I&O's Summary    18 Apr 2020 07:01  -  19 Apr 2020 07:00  --------------------------------------------------------  IN: 1919 mL / OUT: 0 mL / NET: 1919 mL        PHYSICAL EXAM:  HEAD:  Atraumatic, Normocephalic  NECK: Supple, No   JVD  CHEST/LUNG:   no     rales,     no,    rhonchi  HEART: Regular rate and rhythm;         murmur  ABDOMEN: Soft, Nontender, ;   EXTREMITIES:    no    edema  NEUROLOGY:    opens  eyes    LABS:                        10.9   4.57  )-----------( 111      ( 19 Apr 2020 09:30 )             33.6     04-19    147<H>  |  119<H>  |  17  ----------------------------<  123<H>  4.0   |  16<L>  |  0.67    Ca    8.0<L>      19 Apr 2020 09:30    TPro  5.9<L>  /  Alb  2.4<L>  /  TBili  1.8<H>  /  DBili  x   /  AST  30  /  ALT  10  /  AlkPhos  55  04-19                            Consultant(s) Notes Reviewed:      Care Discussed with Consultants/Other Providers:

## 2020-04-19 NOTE — PROGRESS NOTE ADULT - SUBJECTIVE AND OBJECTIVE BOX
CC: f/u for bactermia and COVID    Patient is lethargic in bed, nurses report she is on pressors on the floor    REVIEW OF SYSTEMS:  All other review of systems negative (Comprehensive ROS)      T(F): 97.8 (04-19-20 @ 08:55), Max: 98.5 (04-18-20 @ 15:23)  HR: 131 (04-19-20 @ 09:10)  BP: 98/51 (04-19-20 @ 09:10)  RR: 17 (04-19-20 @ 08:55)  SpO2: 98% (04-19-20 @ 08:55)  Wt(kg): --    PHYSICAL EXAM:  General: lethargic and chronically ill   Eyes: closed  Oropharynx: no lesions or injection 	  Lungs: clear to auscultation  Heart: regular rate and rhythm; no murmur  Abdomen: soft, nondistended, nontender,  Skin: no lesions  Extremities: no clubbing, cyanosis, or edema  Neurologic: ;lethargic     LAB RESULTS:                        10.9   4.57  )-----------( 111      ( 19 Apr 2020 09:30 )             33.6     04-19    147<H>  |  119<H>  |  17  ----------------------------<  123<H>  4.0   |  16<L>  |  0.67    Ca    8.0<L>      19 Apr 2020 09:30    TPro  5.9<L>  /  Alb  2.4<L>  /  TBili  1.8<H>  /  DBili  x   /  AST  30  /  ALT  10  /  AlkPhos  55  04-19    LIVER FUNCTIONS - ( 19 Apr 2020 09:30 )  Alb: 2.4 g/dL / Pro: 5.9 g/dL / ALK PHOS: 55 U/L / ALT: 10 U/L / AST: 30 U/L / GGT: x             MICROBIOLOGY:  RECENT CULTURES:  04-17 @ 06:35 .Urine Catheterized     No growth      04-17 @ 04:17 .Blood Blood-Peripheral Blood Culture PCR    No growth to date.    Growth in anaerobic bottle: Gram Positive Cocci in Clusters        RADIOLOGY REVIEWED:        Antimicrobials Day #      Other Medications Reviewed

## 2020-04-19 NOTE — PROGRESS NOTE ADULT - ASSESSMENT
89y female with history of CVA, atrial fibrillation on Xarelto, dementia presents to the Emergency Department for bradycardia.  Reviewed flow sheets and she was noted to have fever to 102, it was taken rectally, her wbc are wnl, CT of chest done and reported findings of GGO suggestive of viral pneumonia COVID. She is currently requires 4L of oxygen sat's are 99%, ferritin is 608 and her CRP is 1. Reviewed notes the patient was also seen by palliative care team and have documented the patient poor prognosis. Given overall poor prognosis,  i would not recommend  plaquenil for  COVID it  is not going to change her overall outcomes.  Micro reviewed and blood cultures reported as 1 of 4 positive bottles is now ID as CoNS, which is a procurement contaminant      Plan:  ·	Blood culture results is a procurement contaminant no need for further antibiotic use   ·	continue supportive care as per primary care team and consultants   ·	prognosis is poor she was seen by the palliative care

## 2020-04-19 NOTE — CHART NOTE - NSCHARTNOTEFT_GEN_A_CORE
Called by RN to evaluate pt with HR of 112. Pt with a H/O Afib, currently not on Xarelto. EKG revealed Atrial fibrillation. Pt is currently on levophed and midodrine.    Vital Signs Last 24 Hrs  T(C): 36.6 (19 Apr 2020 04:10), Max: 37.6 (18 Apr 2020 09:13)  T(F): 97.8 (19 Apr 2020 04:10), Max: 99.7 (18 Apr 2020 09:13)  HR: 112 (19 Apr 2020 03:20) (80 - 112)  BP: 120/67 (18 Apr 2020 23:57) (120/67 - 144/62)  BP(mean): --  RR: 16 (19 Apr 2020 03:20) (14 - 17)  SpO2: 100% (19 Apr 2020 03:20) (96% - 100%)                          11.1   6.27  )-----------( 128      ( 18 Apr 2020 09:59 )             34.1   04-18    153<H>  |  120<H>  |  29<H>  ----------------------------<  103<H>  4.2   |  20<L>  |  0.88    Ca    8.4      18 Apr 2020 09:59    TPro  6.4  /  Alb  3.1<L>  /  TBili  2.0<H>  /  DBili  x   /  AST  27  /  ALT  10  /  AlkPhos  61  04-18    A/P 89 year-old female with history of CVA, atrial fibrillation on Xarelto, dementia presents to the Emergency Department for bradycardia.  Patient presents from the [facility]; on vitals was found to be bradycardic to 30s-40s and sent to the ED.  Patient with decreased responsiveness and PO intake for the past few days; recently started on Azithromycin due to unknown etiology/NH paperwork w/o such information.  Tested for COVID on 4/3/20 and negative.  Patient with hypoxia at NH down to 89% on RA.  pt with sig cardiac/ medical history with increasing sob and hypotension .  pt at time is on pressors will continue, and will taper slowly  ivf  midodrine Called by RN to evaluate pt with HR of 112. Pt with a H/O Afib, currently not on Xarelto. It was documented that pt was in sinus rhythm. EKG this AM revealed Atrial fibrillation. Pt is currently on levophed and midodrine.    Vital Signs Last 24 Hrs  T(C): 36.6 (19 Apr 2020 04:10), Max: 37.6 (18 Apr 2020 09:13)  T(F): 97.8 (19 Apr 2020 04:10), Max: 99.7 (18 Apr 2020 09:13)  HR: 112 (19 Apr 2020 03:20) (80 - 112)  BP: 120/67 (18 Apr 2020 23:57) (120/67 - 144/62)  BP(mean): --  RR: 16 (19 Apr 2020 03:20) (14 - 17)  SpO2: 100% (19 Apr 2020 03:20) (96% - 100%)                          11.1   6.27  )-----------( 128      ( 18 Apr 2020 09:59 )             34.1   04-18    153<H>  |  120<H>  |  29<H>  ----------------------------<  103<H>  4.2   |  20<L>  |  0.88    Ca    8.4      18 Apr 2020 09:59    TPro  6.4  /  Alb  3.1<L>  /  TBili  2.0<H>  /  DBili  x   /  AST  27  /  ALT  10  /  AlkPhos  61  04-18    A/P 89 year-old female with history of CVA, atrial fibrillation on Xarelto, dementia presents to the Emergency Department for bradycardia.  Patient presents from the [facility]; on vitals was found to be bradycardic to 30s-40s and sent to the ED.  Patient with decreased responsiveness and PO intake for the past few days; recently started on Azithromycin due to unknown etiology/NH paperwork w/o such information.  Tested for COVID on 4/3/20 and negative.  Patient with hypoxia at NH down to 89% on RA.  pt with sig cardiac/ medical history with increasing sob and hypotension .  pt at time is on pressors will continue, and will taper slowly  Cont. IVF  Cont. midodrine  Communicated new clinical findings with MD

## 2020-04-19 NOTE — PROGRESS NOTE ADULT - SUBJECTIVE AND OBJECTIVE BOX
CARDIOLOGY     PROGRESS  NOTE   ________________________________________________    CHIEF COMPLAINT:Patient is a 89y old  Female who presents with a chief complaint of sob/ hypotension (19 Apr 2020 11:01)  no complain.  	  REVIEW OF SYSTEMS:  CONSTITUTIONAL: No fever, weight loss, or fatigue  EYES: No eye pain, visual disturbances, or discharge  ENT:  No difficulty hearing, tinnitus, vertigo; No sinus or throat pain  NECK: No pain or stiffness  RESPIRATORY: No cough, wheezing, chills or hemoptysis; No Shortness of Breath  CARDIOVASCULAR: No chest pain, palpitations, passing out, dizziness, or leg swelling  GASTROINTESTINAL: No abdominal or epigastric pain. No nausea, vomiting, or hematemesis; No diarrhea or constipation. No melena or hematochezia.  GENITOURINARY: No dysuria, frequency, hematuria, or incontinence  NEUROLOGICAL: No headaches, memory loss, loss of strength, numbness, or tremors  SKIN: No itching, burning, rashes, or lesions   LYMPH Nodes: No enlarged glands  ENDOCRINE: No heat or cold intolerance; No hair loss  MUSCULOSKELETAL: No joint pain or swelling; No muscle, back, or extremity pain  PSYCHIATRIC: No depression, anxiety, mood swings, or difficulty sleeping  HEME/LYMPH: No easy bruising, or bleeding gums  ALLERGY AND IMMUNOLOGIC: No hives or eczema	    [ ] All others negative	  [x ] Unable to obtain    PHYSICAL EXAM:  T(C): 36.6 (04-19-20 @ 08:55), Max: 36.9 (04-18-20 @ 15:23)  HR: 131 (04-19-20 @ 09:10) (93 - 132)  BP: 98/51 (04-19-20 @ 09:10) (63/33 - 144/62)  RR: 17 (04-19-20 @ 08:55) (16 - 17)  SpO2: 98% (04-19-20 @ 08:55) (96% - 100%)  Wt(kg): --  I&O's Summary    18 Apr 2020 07:01  -  19 Apr 2020 07:00  --------------------------------------------------------  IN: 1919 mL / OUT: 0 mL / NET: 1919 mL        Appearance: Normal	  HEENT:   Normal oral mucosa, PERRL, EOMI	  Lymphatic: No lymphadenopathy  Cardiovascular: Normal S1 S2, No JVD, + murmurs, No edema  Respiratory: Lungs clear to auscultation	  Psychiatry: A & O x 3, Mood & affect appropriate  Gastrointestinal:  Soft, Non-tender, + BS	  Skin: No rashes, No ecchymoses, No cyanosis	  Neurologic: Non-focal  Extremities: Normal range of motion, No clubbing, cyanosis or edema  Vascular: Peripheral pulses palpable 2+ bilaterally    MEDICATIONS  (STANDING):  acetaminophen   Tablet .. 650 milliGRAM(s) Oral every 6 hours  atorvastatin 20 milliGRAM(s) Oral at bedtime  dextrose 5% + sodium chloride 0.45%. 1000 milliLiter(s) (50 mL/Hr) IV Continuous <Continuous>  heparin  Injectable 5000 Unit(s) SubCutaneous every 12 hours  midodrine 30 milliGRAM(s) Oral every 8 hours  multivitamin 1 Tablet(s) Oral daily  norepinephrine Infusion 0.06 MICROgram(s)/kG/Min (5.63 mL/Hr) IV Continuous <Continuous>      TELEMETRY: 	    ECG:  	  RADIOLOGY:  OTHER: 	  	  LABS:	 	    CARDIAC MARKERS:                                10.9   4.57  )-----------( 111      ( 19 Apr 2020 09:30 )             33.6     04-19    147<H>  |  119<H>  |  17  ----------------------------<  123<H>  4.0   |  16<L>  |  0.67    Ca    8.0<L>      19 Apr 2020 09:30    TPro  5.9<L>  /  Alb  2.4<L>  /  TBili  1.8<H>  /  DBili  x   /  AST  30  /  ALT  10  /  AlkPhos  55  04-19    proBNP: Serum Pro-Brain Natriuretic Peptide: 4277 pg/mL (04-17 @ 05:13)    Lipid Profile:   HgA1c:   TSH:   < from: 12 Lead ECG (04.17.20 @ 00:46) >  Ventricular Rate 60 BPM    Atrial Rate 60 BPM    P-R Interval 130 ms    QRS Duration 70 ms    Q-T Interval 438 ms    QTC Calculation(Bezet) 438 ms    P Axis 93 degrees    R Axis -39 degrees    T Axis 62 degrees    Diagnosis Line SINUS RHYTHM WITH PREMATURE ATRIAL COMPLEXES  LEFT AXIS DEVIATION  NONSPECIFIC T WAVE ABNORMALITY    COVID-19 PCR . (04.17.20 @ 01:41)    COVID-19 PCR: Detected: This test has been validated by [employer] to be accurate;  though it has not been FDA cleared/approved by the usual pathway.  As with all laboratory tests, results should be correlated with clinical  findings.  https://www.fda.gov/media/761695/download  https://www.fda.gov/media/815464/download    ·	Blood culture results is a procurement contaminant no need for further antibiotic use   ·	continue supportive care as per primary care team and consultants   ·	prognosis is poor she was seen by the palliative care       Assessment and plan  ---------------------------  89 year-old female with history of CVA, atrial fibrillation on Xarelto, dementia presents to the Emergency Department for bradycardia.  Patient presents from the [facility]; on vitals was found to be bradycardic to 30s-40s and sent to the ED.  Patient with decreased responsiveness and PO intake for the past few days; recently started on Azithromycin due to unknown etiology/NH paperwork w/o such information.  Tested for COVID on 4/3/20 and negative.  Patient with hypoxia at NH down to 89% on RA.  pt with sig cardiac/ medical history with increasing sob and hypotension .  pt at time is on pressors will continue, and will taper slowly  ivf  midodrine  covid + will add hydroxychloroquine if pt becomes responsive  spoke to the son Mina wants full medical therapy including intubation, clinical review team does not feel aggressive therapy with intubation will change outcome  will titrate the pressors to off  palliative care noted and discussed with Dr Quintanilla yesterday  prognosis is poor   dvt prophylaxis  blood culture 1 set +/ second set negative  ID appreciated

## 2020-04-20 LAB
ALBUMIN SERPL ELPH-MCNC: 2.6 G/DL — LOW (ref 3.3–5)
ALP SERPL-CCNC: 57 U/L — SIGNIFICANT CHANGE UP (ref 40–120)
ALT FLD-CCNC: 12 U/L — SIGNIFICANT CHANGE UP (ref 10–45)
ANION GAP SERPL CALC-SCNC: 13 MMOL/L — SIGNIFICANT CHANGE UP (ref 5–17)
AST SERPL-CCNC: 26 U/L — SIGNIFICANT CHANGE UP (ref 10–40)
BASOPHILS # BLD AUTO: 0 K/UL — SIGNIFICANT CHANGE UP (ref 0–0.2)
BASOPHILS NFR BLD AUTO: 0 % — SIGNIFICANT CHANGE UP (ref 0–2)
BILIRUB SERPL-MCNC: 2.1 MG/DL — HIGH (ref 0.2–1.2)
BUN SERPL-MCNC: 15 MG/DL — SIGNIFICANT CHANGE UP (ref 7–23)
CALCIUM SERPL-MCNC: 8.3 MG/DL — LOW (ref 8.4–10.5)
CHLORIDE SERPL-SCNC: 115 MMOL/L — HIGH (ref 96–108)
CO2 SERPL-SCNC: 20 MMOL/L — LOW (ref 22–31)
CREAT SERPL-MCNC: 0.69 MG/DL — SIGNIFICANT CHANGE UP (ref 0.5–1.3)
CULTURE RESULTS: SIGNIFICANT CHANGE UP
EOSINOPHIL # BLD AUTO: 0 K/UL — SIGNIFICANT CHANGE UP (ref 0–0.5)
EOSINOPHIL NFR BLD AUTO: 0 % — SIGNIFICANT CHANGE UP (ref 0–6)
GLUCOSE SERPL-MCNC: 108 MG/DL — HIGH (ref 70–99)
HCT VFR BLD CALC: 35.6 % — SIGNIFICANT CHANGE UP (ref 34.5–45)
HGB BLD-MCNC: 11.4 G/DL — LOW (ref 11.5–15.5)
LYMPHOCYTES # BLD AUTO: 1.56 K/UL — SIGNIFICANT CHANGE UP (ref 1–3.3)
LYMPHOCYTES # BLD AUTO: 28 % — SIGNIFICANT CHANGE UP (ref 13–44)
MCHC RBC-ENTMCNC: 32 GM/DL — SIGNIFICANT CHANGE UP (ref 32–36)
MCHC RBC-ENTMCNC: 33.9 PG — SIGNIFICANT CHANGE UP (ref 27–34)
MCV RBC AUTO: 106 FL — HIGH (ref 80–100)
MONOCYTES # BLD AUTO: 0.67 K/UL — SIGNIFICANT CHANGE UP (ref 0–0.9)
MONOCYTES NFR BLD AUTO: 12 % — SIGNIFICANT CHANGE UP (ref 2–14)
NEUTROPHILS # BLD AUTO: 3.28 K/UL — SIGNIFICANT CHANGE UP (ref 1.8–7.4)
NEUTROPHILS NFR BLD AUTO: 58 % — SIGNIFICANT CHANGE UP (ref 43–77)
ORGANISM # SPEC MICROSCOPIC CNT: SIGNIFICANT CHANGE UP
ORGANISM # SPEC MICROSCOPIC CNT: SIGNIFICANT CHANGE UP
PLATELET # BLD AUTO: 134 K/UL — LOW (ref 150–400)
POTASSIUM SERPL-MCNC: 3.5 MMOL/L — SIGNIFICANT CHANGE UP (ref 3.5–5.3)
POTASSIUM SERPL-SCNC: 3.5 MMOL/L — SIGNIFICANT CHANGE UP (ref 3.5–5.3)
PROT SERPL-MCNC: 5.9 G/DL — LOW (ref 6–8.3)
RBC # BLD: 3.36 M/UL — LOW (ref 3.8–5.2)
RBC # FLD: 14 % — SIGNIFICANT CHANGE UP (ref 10.3–14.5)
SODIUM SERPL-SCNC: 148 MMOL/L — HIGH (ref 135–145)
SPECIMEN SOURCE: SIGNIFICANT CHANGE UP
WBC # BLD: 5.56 K/UL — SIGNIFICANT CHANGE UP (ref 3.8–10.5)
WBC # FLD AUTO: 5.56 K/UL — SIGNIFICANT CHANGE UP (ref 3.8–10.5)

## 2020-04-20 PROCEDURE — 71045 X-RAY EXAM CHEST 1 VIEW: CPT | Mod: 26

## 2020-04-20 PROCEDURE — 99232 SBSQ HOSP IP/OBS MODERATE 35: CPT | Mod: CS

## 2020-04-20 PROCEDURE — 71045 X-RAY EXAM CHEST 1 VIEW: CPT | Mod: 26,77

## 2020-04-20 RX ORDER — NOREPINEPHRINE BITARTRATE/D5W 8 MG/250ML
0.04 PLASTIC BAG, INJECTION (ML) INTRAVENOUS
Qty: 8 | Refills: 0 | Status: DISCONTINUED | OUTPATIENT
Start: 2020-04-20 | End: 2020-04-21

## 2020-04-20 RX ORDER — ACETAMINOPHEN 500 MG
650 TABLET ORAL EVERY 6 HOURS
Refills: 0 | Status: DISCONTINUED | OUTPATIENT
Start: 2020-04-20 | End: 2020-05-08

## 2020-04-20 RX ORDER — SODIUM CHLORIDE 9 MG/ML
1000 INJECTION, SOLUTION INTRAVENOUS
Refills: 0 | Status: DISCONTINUED | OUTPATIENT
Start: 2020-04-20 | End: 2020-04-25

## 2020-04-20 RX ADMIN — Medication 3.75 MICROGRAM(S)/KG/MIN: at 14:52

## 2020-04-20 RX ADMIN — SODIUM CHLORIDE 50 MILLILITER(S): 9 INJECTION, SOLUTION INTRAVENOUS at 15:16

## 2020-04-20 RX ADMIN — HEPARIN SODIUM 5000 UNIT(S): 5000 INJECTION INTRAVENOUS; SUBCUTANEOUS at 05:46

## 2020-04-20 RX ADMIN — Medication 1 TABLET(S): at 15:14

## 2020-04-20 RX ADMIN — HEPARIN SODIUM 5000 UNIT(S): 5000 INJECTION INTRAVENOUS; SUBCUTANEOUS at 17:31

## 2020-04-20 RX ADMIN — MIDODRINE HYDROCHLORIDE 30 MILLIGRAM(S): 2.5 TABLET ORAL at 15:13

## 2020-04-20 NOTE — PROGRESS NOTE ADULT - SUBJECTIVE AND OBJECTIVE BOX
CARDIOLOGY     PROGRESS  NOTE   ________________________________________________    CHIEF COMPLAINT:Patient is a 89y old  Female who presents with a chief complaint of sob/ hypotension (20 Apr 2020 11:27)  comfortable.  	  REVIEW OF SYSTEMS:  CONSTITUTIONAL: No fever, weight loss, or fatigue  EYES: No eye pain, visual disturbances, or discharge  ENT:  No difficulty hearing, tinnitus, vertigo; No sinus or throat pain  NECK: No pain or stiffness  RESPIRATORY: No cough, wheezing, chills or hemoptysis; No Shortness of Breath  CARDIOVASCULAR: No chest pain, palpitations, passing out, dizziness, or leg swelling  GASTROINTESTINAL: No abdominal or epigastric pain. No nausea, vomiting, or hematemesis; No diarrhea or constipation. No melena or hematochezia.  GENITOURINARY: No dysuria, frequency, hematuria, or incontinence  NEUROLOGICAL: No headaches, memory loss, loss of strength, numbness, or tremors  SKIN: No itching, burning, rashes, or lesions   LYMPH Nodes: No enlarged glands  ENDOCRINE: No heat or cold intolerance; No hair loss  MUSCULOSKELETAL: No joint pain or swelling; No muscle, back, or extremity pain  PSYCHIATRIC: No depression, anxiety, mood swings, or difficulty sleeping  HEME/LYMPH: No easy bruising, or bleeding gums  ALLERGY AND IMMUNOLOGIC: No hives or eczema	    [ ] All others negative	  [ ] Unable to obtain    PHYSICAL EXAM:  T(C): 36.4 (04-20-20 @ 08:35), Max: 36.6 (04-19-20 @ 23:48)  HR: 119 (04-20-20 @ 10:48) (111 - 136)  BP: 110/80 (04-20-20 @ 08:35) (105/65 - 140/79)  RR: 18 (04-20-20 @ 10:48) (18 - 19)  SpO2: 96% (04-20-20 @ 10:48) (96% - 100%)  Wt(kg): --  I&O's Summary    19 Apr 2020 07:01  -  20 Apr 2020 07:00  --------------------------------------------------------  IN: 667.6 mL / OUT: 0 mL / NET: 667.6 mL        Appearance: Normal	  HEENT:   Normal oral mucosa, PERRL, EOMI	  Lymphatic: No lymphadenopathy  Cardiovascular: Normal S1 S2, No JVD, No murmurs, No edema  Respiratory: Lungs clear to auscultation	  Psychiatry: A & O x 3, Mood & affect appropriate  Gastrointestinal:  Soft, Non-tender, + BS	  Skin: No rashes, No ecchymoses, No cyanosis	  Neurologic: Non-focal  Extremities: Normal range of motion, No clubbing, cyanosis or edema  Vascular: Peripheral pulses palpable 2+ bilaterally    MEDICATIONS  (STANDING):  acetaminophen   Tablet .. 650 milliGRAM(s) Oral every 6 hours  atorvastatin 20 milliGRAM(s) Oral at bedtime  dextrose 5%. 1000 milliLiter(s) (50 mL/Hr) IV Continuous <Continuous>  heparin  Injectable 5000 Unit(s) SubCutaneous every 12 hours  midodrine 30 milliGRAM(s) Oral every 8 hours  multivitamin 1 Tablet(s) Oral daily  norepinephrine Infusion 0.06 MICROgram(s)/kG/Min (5.63 mL/Hr) IV Continuous <Continuous>      TELEMETRY: 	    ECG:  	  RADIOLOGY:  OTHER: 	  	  LABS:	 	    CARDIAC MARKERS:                                11.4   5.56  )-----------( 134      ( 20 Apr 2020 07:05 )             35.6     04-20    148<H>  |  115<H>  |  15  ----------------------------<  108<H>  3.5   |  20<L>  |  0.69    Ca    8.3<L>      20 Apr 2020 07:01    TPro  5.9<L>  /  Alb  2.6<L>  /  TBili  2.1<H>  /  DBili  x   /  AST  26  /  ALT  12  /  AlkPhos  57  04-20    proBNP: Serum Pro-Brain Natriuretic Peptide: 4277 pg/mL (04-17 @ 05:13)    Lipid Profile:   HgA1c:   TSH:   ·	Blood culture results is a procurement contaminant no need for further antibiotic use   ·	continue supportive care as per primary care team and consultants   ·	prognosis is poor she was seen by the palliative care       Assessment and plan  ---------------------------  89 year-old female with history of CVA, atrial fibrillation on Xarelto, dementia presents to the Emergency Department for bradycardia.  Patient presents from the [facility]; on vitals was found to be bradycardic to 30s-40s and sent to the ED.  Patient with decreased responsiveness and PO intake for the past few days; recently started on Azithromycin due to unknown etiology/NH paperwork w/o such information.  Tested for COVID on 4/3/20 and negative.  Patient with hypoxia at NH down to 89% on RA.  pt with sig cardiac/ medical history with increasing sob and hypotension .  pt at time is on pressors will continue, and will taper slowly  ivf  covid + will add hydroxychloroquine if pt becomes responsive  spoke to the son Mina wants full medical therapy including intubation, clinical review team does not feel aggressive therapy with intubation will change outcome  will titrate the pressors to off  palliative care noted and discussed with Dr Quintanilla yesterday  prognosis is poor   dvt prophylaxis  blood culture 1 set +/ second set negative  ID appreciated  pt can not have po, will consider NGT , so we can give her pills , so we can dc pressor  son wants all medical therapy be given

## 2020-04-20 NOTE — PROGRESS NOTE ADULT - SUBJECTIVE AND OBJECTIVE BOX
weak/  non verbal  REVIEW OF SYSTEMS:  GEN: no fever,    no chills    MEDICATIONS  (STANDING):  acetaminophen   Tablet .. 650 milliGRAM(s) Oral every 6 hours  atorvastatin 20 milliGRAM(s) Oral at bedtime  dextrose 5%. 1000 milliLiter(s) (50 mL/Hr) IV Continuous <Continuous>  heparin  Injectable 5000 Unit(s) SubCutaneous every 12 hours  midodrine 30 milliGRAM(s) Oral every 8 hours  multivitamin 1 Tablet(s) Oral daily  norepinephrine Infusion 0.06 MICROgram(s)/kG/Min (5.63 mL/Hr) IV Continuous <Continuous>    MEDICATIONS  (PRN):      Vital Signs Last 24 Hrs  T(C): 36.4 (20 Apr 2020 08:35), Max: 36.6 (19 Apr 2020 23:48)  T(F): 97.5 (20 Apr 2020 08:35), Max: 97.8 (19 Apr 2020 23:48)  HR: 119 (20 Apr 2020 10:48) (111 - 136)  BP: 110/80 (20 Apr 2020 08:35) (105/65 - 140/79)  BP(mean): --  RR: 18 (20 Apr 2020 10:48) (18 - 19)  SpO2: 96% (20 Apr 2020 10:48) (96% - 100%)  CAPILLARY BLOOD GLUCOSE        I&O's Summary    19 Apr 2020 07:01  -  20 Apr 2020 07:00  --------------------------------------------------------  IN: 667.6 mL / OUT: 0 mL / NET: 667.6 mL        PHYSICAL EXAM:  HEAD:  Atraumatic, Normocephalic  NECK: Supple, No   JVD  CHEST/LUNG:   no     rales,     no,    rhonchi  HEART: Regular rate and rhythm;         murmur  ABDOMEN: Soft, Nontender, ;   EXTREMITIES:        edema  NEUROLOGY:  be dbound/  listless,  non verbla    LABS:                        11.4   5.56  )-----------( 134      ( 20 Apr 2020 07:05 )             35.6     04-20    148<H>  |  115<H>  |  15  ----------------------------<  108<H>  3.5   |  20<L>  |  0.69    Ca    8.3<L>      20 Apr 2020 07:01    TPro  5.9<L>  /  Alb  2.6<L>  /  TBili  2.1<H>  /  DBili  x   /  AST  26  /  ALT  12  /  AlkPhos  57  04-20                            Consultant(s) Notes Reviewed:      Care Discussed with Consultants/Other Providers:

## 2020-04-20 NOTE — PROGRESS NOTE ADULT - SUBJECTIVE AND OBJECTIVE BOX
CC: f/u for  covid  Patient reports nothing not takling    REVIEW OF SYSTEMS:  All other review of systems negative (Comprehensive ROS)    Antimicrobials Day #  :    Other Medications Reviewed    T(F): 97.5 (04-20-20 @ 08:35), Max: 97.8 (04-19-20 @ 23:48)  HR: 119 (04-20-20 @ 10:48)  BP: 110/80 (04-20-20 @ 08:35)  RR: 18 (04-20-20 @ 10:48)  SpO2: 96% (04-20-20 @ 10:48)  Wt(kg): --    PHYSICAL EXAM:  General: alert, no acute distress  Eyes:  anicteric, no conjunctival injection, no discharge  Oropharynx: no lesions or injection 	  Neck: supple, without adenopathy  Lungs: poor effort to auscultation  Heart: regular rate and rhythm; no murmur, rubs or gallops  Abdomen: soft, nondistended, nontender, without mass or organomegaly  Skin: no lesions  Extremities: no clubbing, cyanosis, or edema  Neurologic: lethargy    LAB RESULTS:                        11.4   5.56  )-----------( 134      ( 20 Apr 2020 07:05 )             35.6     04-20    148<H>  |  115<H>  |  15  ----------------------------<  108<H>  3.5   |  20<L>  |  0.69    Ca    8.3<L>      20 Apr 2020 07:01    TPro  5.9<L>  /  Alb  2.6<L>  /  TBili  2.1<H>  /  DBili  x   /  AST  26  /  ALT  12  /  AlkPhos  57  04-20    LIVER FUNCTIONS - ( 20 Apr 2020 07:01 )  Alb: 2.6 g/dL / Pro: 5.9 g/dL / ALK PHOS: 57 U/L / ALT: 12 U/L / AST: 26 U/L / GGT: x             MICROBIOLOGY:  RECENT CULTURES:  04-17 @ 06:35 .Urine Catheterized     No growth      04-17 @ 04:17 .Blood Blood-Peripheral Blood Culture PCR    No growth to date.    Growth in anaerobic bottle: Gram Positive Cocci in Clusters        RADIOLOGY REVIEWED:  < from: CT Chest No Cont (04.17.20 @ 04:24) >  EXAM:  CT CHEST                            PROCEDURE DATE:  04/17/2020            INTERPRETATION:  CLINICAL INFORMATION: Altered mental status. Hypoxia, Covid 19 PCR positive. Evaluate for pneumonia.    COMPARISON: CT of the chest dated 1/7/2020.    PROCEDURE:   CT of the Chest was performed without intravenous contrast.  Sagittal and coronal reformats were performed. MIPS were obtained.      FINDINGS:    LUNGS AND AIRWAYS: Patent central airways.  Bilateral groundglass infiltrates with peripheral and basilar predominance..    PLEURA: No effusion or pneumothorax.    MEDIASTINUM AND SARA: No enlarged precarinal lymph node measures 1.5 cm in short axis.    VESSELS: Main pulmonary artery is normal in caliber. Coronary artery calcifications arepresent. Thoracic aorta is normal in caliber.    HEART: Heart size is normal. No pericardial effusion.    CHEST WALL AND LOWER NECK: Within normal limits.    VISUALIZED UPPER ABDOMEN: Status post cholecystectomy. 6 mm right hepatic dome calcification.    BONES: No acute abnormality. Healed fracture of the surgical neck of the right humerus.    IMPRESSION:     Pattern of GGO suggests infection including atypical pneumonia/viral infection from atypical agents including COVID-19 (C19V-1).      < end of copied text >              Assessment:  Elderly woman with afib admitted for bradycardia, had fever, ct chest with ggo, covid positive, oxygenates well on just 2L, no further fever. positive bc is contaminant  Plan:  supportive care for covid  monitor off antimicrobics

## 2020-04-20 NOTE — PROVIDER CONTACT NOTE (OTHER) - REASON
Xray dept called and stated that inserted kaofeed is in the gastroesophageal juncture and needs to be advanced

## 2020-04-20 NOTE — PROGRESS NOTE ADULT - ASSESSMENT
89   yr pt,   pt  with  h/o  afib,   not  on   xarelto,  due  to falls,   cva,   copd, hld,     alzheimers  dementia,    pud/  h/o left ribf xs. 8/ 9 th,. left hip surg  h/o mlple falls.  echo, normal ef/ T2  comp  deformity    sent  to  er  from   [facility], for  weakness/ sob  ct head, no cva//   ct  chest  with  goo   COVID  positive  elevated  troponin/ CRP/  Ferritin  , from Covid   pt  with  advanced  age/    comfort/   supportive  care is  ideal  however, son  wants  all  done. seen by icu  and palliative care     on floor,  with fixed  dose  pressor,  nor  epi was  initiated   on 4/ 17/    and on  midodrine/  however , pt is  npo/ risk  of  aspiration  hypernatremia  of  148, ,   CNS,  in 1/2  blood   c/s,  which is  a  contaminant  palliative/ comfort care,  ought to  be goal  in this pt  sbp  above  100  today/  pt is npo        < from: CT Chest No Cont (04.17.20 @ 04:24) >  IMPRESSION:   Pattern of GGO suggests infection including atypical pneumonia/viral infection from atypical agents including COVID-19 (C19V-1)  < end of copied text >     < from: CT Thoracic Spine Reform No Cont (01.07.20 @ 16:43) >  IMPRESSION:  Volume loss, microvascular disease, no acute hemorrhage or midline shift. If symptoms persist consider follow up head CT or MRI contraindications.  Cervical and thoracic spine are unchanged from prior, no obvious fracture or dislocation, multilevel degenerative changes as noted previously with diffuse osteopenia and small unchanged mild superior endplate T2 compression deformity.  < end of copied text      < from: Limited Transthoracic Echo (10.26.18 @ 14:07) >  Conclusions:  Limited tranthoracic echocardiogram at patients request to  end exam.  1. Mitral annular calcification.  2. The aortic valve opens well.  3. Limited images acquired at the patients request. Based  upon the parasternal long axis view only;  grossly normal  left ventricular systolic function.  < end of copied text >

## 2020-04-20 NOTE — CHART NOTE - NSCHARTNOTEFT_GEN_A_CORE
NP note-NGT placement    K-O NGT placed via right nostril, 45cm depth, secured, location checked by auscultation. post CXR confirmed that NGT tip located at gastric junction. advanced NGT 2cm deeper. pt tolerated procedure well. will start using it for medication administration.    NP. Hima Reyez  [id number]

## 2020-04-21 LAB
ANION GAP SERPL CALC-SCNC: 10 MMOL/L — SIGNIFICANT CHANGE UP (ref 5–17)
BUN SERPL-MCNC: 15 MG/DL — SIGNIFICANT CHANGE UP (ref 7–23)
CALCIUM SERPL-MCNC: 8.2 MG/DL — LOW (ref 8.4–10.5)
CHLORIDE SERPL-SCNC: 111 MMOL/L — HIGH (ref 96–108)
CO2 SERPL-SCNC: 22 MMOL/L — SIGNIFICANT CHANGE UP (ref 22–31)
CREAT SERPL-MCNC: 0.7 MG/DL — SIGNIFICANT CHANGE UP (ref 0.5–1.3)
CRP SERPL-MCNC: 0.44 MG/DL — HIGH (ref 0–0.4)
D DIMER BLD IA.RAPID-MCNC: 336 NG/ML DDU — HIGH
FERRITIN SERPL-MCNC: 404 NG/ML — HIGH (ref 15–150)
GLUCOSE SERPL-MCNC: 117 MG/DL — HIGH (ref 70–99)
POTASSIUM SERPL-MCNC: 3.2 MMOL/L — LOW (ref 3.5–5.3)
POTASSIUM SERPL-SCNC: 3.2 MMOL/L — LOW (ref 3.5–5.3)
SODIUM SERPL-SCNC: 143 MMOL/L — SIGNIFICANT CHANGE UP (ref 135–145)

## 2020-04-21 PROCEDURE — 99232 SBSQ HOSP IP/OBS MODERATE 35: CPT | Mod: CS

## 2020-04-21 RX ORDER — POTASSIUM CHLORIDE 20 MEQ
10 PACKET (EA) ORAL
Refills: 0 | Status: COMPLETED | OUTPATIENT
Start: 2020-04-21 | End: 2020-04-21

## 2020-04-21 RX ORDER — NOREPINEPHRINE BITARTRATE/D5W 8 MG/250ML
0.02 PLASTIC BAG, INJECTION (ML) INTRAVENOUS
Qty: 8 | Refills: 0 | Status: DISCONTINUED | OUTPATIENT
Start: 2020-04-21 | End: 2020-04-22

## 2020-04-21 RX ORDER — MULTIVIT-MIN/FERROUS GLUCONATE 9 MG/15 ML
15 LIQUID (ML) ORAL DAILY
Refills: 0 | Status: DISCONTINUED | OUTPATIENT
Start: 2020-04-21 | End: 2020-05-08

## 2020-04-21 RX ORDER — POTASSIUM CHLORIDE 20 MEQ
40 PACKET (EA) ORAL ONCE
Refills: 0 | Status: COMPLETED | OUTPATIENT
Start: 2020-04-21 | End: 2020-04-21

## 2020-04-21 RX ADMIN — Medication 1.88 MICROGRAM(S)/KG/MIN: at 17:48

## 2020-04-21 RX ADMIN — Medication 100 MILLIEQUIVALENT(S): at 12:42

## 2020-04-21 RX ADMIN — HEPARIN SODIUM 5000 UNIT(S): 5000 INJECTION INTRAVENOUS; SUBCUTANEOUS at 17:15

## 2020-04-21 RX ADMIN — MIDODRINE HYDROCHLORIDE 30 MILLIGRAM(S): 2.5 TABLET ORAL at 21:43

## 2020-04-21 RX ADMIN — Medication 15 MILLILITER(S): at 12:43

## 2020-04-21 RX ADMIN — Medication 40 MILLIEQUIVALENT(S): at 12:43

## 2020-04-21 RX ADMIN — MIDODRINE HYDROCHLORIDE 30 MILLIGRAM(S): 2.5 TABLET ORAL at 12:45

## 2020-04-21 RX ADMIN — HEPARIN SODIUM 5000 UNIT(S): 5000 INJECTION INTRAVENOUS; SUBCUTANEOUS at 06:13

## 2020-04-21 RX ADMIN — SODIUM CHLORIDE 50 MILLILITER(S): 9 INJECTION, SOLUTION INTRAVENOUS at 08:00

## 2020-04-21 RX ADMIN — Medication 100 MILLIEQUIVALENT(S): at 14:37

## 2020-04-21 RX ADMIN — Medication 1.88 MICROGRAM(S)/KG/MIN: at 12:43

## 2020-04-21 NOTE — PROGRESS NOTE ADULT - SUBJECTIVE AND OBJECTIVE BOX
be dbound.  non verbal    REVIEW OF SYSTEMS:  GEN: no fever,    no chills  RESP: no SOB,   no cough  CVS: no chest pain,   no palpitations  GI: no abdominal pain,   no nausea,   no vomiting,   no constipation,   no diarrhea  : no dysuria,   no frequency  NEURO: no headache,   no dizziness  PSYCH: no depression,   not anxious  Derm : no rash    MEDICATIONS  (STANDING):  dextrose 5%. 1000 milliLiter(s) (50 mL/Hr) IV Continuous <Continuous>  heparin  Injectable 5000 Unit(s) SubCutaneous every 12 hours  midodrine 30 milliGRAM(s) Oral every 8 hours  multivitamin/minerals/iron Oral Solution (CENTRUM) 15 milliLiter(s) Enteral Tube daily  norepinephrine Infusion 0.04 MICROgram(s)/kG/Min (3.75 mL/Hr) IV Continuous <Continuous>  potassium chloride   Solution 40 milliEquivalent(s) Oral once  potassium chloride  10 mEq/100 mL IVPB 10 milliEquivalent(s) IV Intermittent every 1 hour    MEDICATIONS  (PRN):  acetaminophen   Tablet .. 650 milliGRAM(s) Oral every 6 hours PRN Temp greater or equal to 38C (100.4F), Moderate Pain (4 - 6)      Vital Signs Last 24 Hrs  T(C): 36.3 (21 Apr 2020 08:27), Max: 36.9 (20 Apr 2020 16:51)  T(F): 97.4 (21 Apr 2020 08:27), Max: 98.4 (20 Apr 2020 16:51)  HR: 78 (21 Apr 2020 08:27) (78 - 105)  BP: 119/64 (21 Apr 2020 08:27) (111/79 - 119/64)  BP(mean): --  RR: 18 (21 Apr 2020 08:27) (18 - 18)  SpO2: 97% (21 Apr 2020 08:27) (97% - 100%)  CAPILLARY BLOOD GLUCOSE        I&O's Summary    20 Apr 2020 07:01  -  21 Apr 2020 07:00  --------------------------------------------------------  IN: 1245.6 mL / OUT: 0 mL / NET: 1245.6 mL        PHYSICAL EXAM:  HEAD:  Atraumatic, Normocephalic  NECK: Supple, No   JVD  CHEST/LUNG:   no     rales,     no,    rhonchi  HEART: Regular rate and rhythm;         murmur  ABDOMEN: Soft, Nontender, ;   EXTREMITIES:    no    edema  NEUROLOGY:   non verbal    LABS:                        11.4   5.56  )-----------( 134      ( 20 Apr 2020 07:05 )             35.6     04-21    143  |  111<H>  |  15  ----------------------------<  117<H>  3.2<L>   |  22  |  0.70    Ca    8.2<L>      21 Apr 2020 08:57    TPro  5.9<L>  /  Alb  2.6<L>  /  TBili  2.1<H>  /  DBili  x   /  AST  26  /  ALT  12  /  AlkPhos  57  04-20                            Consultant(s) Notes Reviewed:      Care Discussed with Consultants/Other Providers:

## 2020-04-21 NOTE — PROGRESS NOTE ADULT - SUBJECTIVE AND OBJECTIVE BOX
CARDIOLOGY     PROGRESS  NOTE   ________________________________________________    CHIEF COMPLAINT:Patient is a 89y old  Female who presents with a chief complaint of sob/ hypotension (20 Apr 2020 15:50)  no complain.  	  REVIEW OF SYSTEMS:  CONSTITUTIONAL: No fever, weight loss, or fatigue  EYES: No eye pain, visual disturbances, or discharge  ENT:  No difficulty hearing, tinnitus, vertigo; No sinus or throat pain  NECK: No pain or stiffness  RESPIRATORY: No cough, wheezing, chills or hemoptysis; No Shortness of Breath  CARDIOVASCULAR: No chest pain, palpitations, passing out, dizziness, or leg swelling  GASTROINTESTINAL: No abdominal or epigastric pain. No nausea, vomiting, or hematemesis; No diarrhea or constipation. No melena or hematochezia.  GENITOURINARY: No dysuria, frequency, hematuria, or incontinence  NEUROLOGICAL: No headaches, memory loss, loss of strength, numbness, or tremors  SKIN: No itching, burning, rashes, or lesions   LYMPH Nodes: No enlarged glands  ENDOCRINE: No heat or cold intolerance; No hair loss  MUSCULOSKELETAL: No joint pain or swelling; No muscle, back, or extremity pain  PSYCHIATRIC: No depression, anxiety, mood swings, or difficulty sleeping  HEME/LYMPH: No easy bruising, or bleeding gums  ALLERGY AND IMMUNOLOGIC: No hives or eczema	    [ ] All others negative	  [ ] Unable to obtain    PHYSICAL EXAM:  T(C): 36.3 (04-21-20 @ 08:27), Max: 36.9 (04-20-20 @ 16:51)  HR: 78 (04-21-20 @ 08:27) (78 - 105)  BP: 119/64 (04-21-20 @ 08:27) (111/79 - 119/64)  RR: 18 (04-21-20 @ 08:27) (18 - 18)  SpO2: 97% (04-21-20 @ 08:27) (97% - 100%)  Wt(kg): --  I&O's Summary    20 Apr 2020 07:01  -  21 Apr 2020 07:00  --------------------------------------------------------  IN: 1245.6 mL / OUT: 0 mL / NET: 1245.6 mL        Appearance: Normal	  HEENT:   Normal oral mucosa, PERRL, EOMI	  Lymphatic: No lymphadenopathy  Cardiovascular: Normal S1 S2, No JVD, No murmurs, No edema  Respiratory: Lungs clear to auscultation	  Psychiatry: A & O x 3, Mood & affect appropriate  Gastrointestinal:  Soft, Non-tender, + BS	  Skin: No rashes, No ecchymoses, No cyanosis	  Neurologic: Non-focal  Extremities: Normal range of motion, No clubbing, cyanosis or edema  Vascular: Peripheral pulses palpable 2+ bilaterally    MEDICATIONS  (STANDING):  atorvastatin 20 milliGRAM(s) Oral at bedtime  dextrose 5%. 1000 milliLiter(s) (50 mL/Hr) IV Continuous <Continuous>  heparin  Injectable 5000 Unit(s) SubCutaneous every 12 hours  midodrine 30 milliGRAM(s) Oral every 8 hours  multivitamin/minerals/iron Oral Solution (CENTRUM) 15 milliLiter(s) Enteral Tube daily  norepinephrine Infusion 0.04 MICROgram(s)/kG/Min (3.75 mL/Hr) IV Continuous <Continuous>  potassium chloride   Solution 40 milliEquivalent(s) Oral once  potassium chloride  10 mEq/100 mL IVPB 10 milliEquivalent(s) IV Intermittent every 1 hour      TELEMETRY: 	    ECG:  	  RADIOLOGY:  OTHER: 	  	  LABS:	 	    CARDIAC MARKERS:                                11.4   5.56  )-----------( 134      ( 20 Apr 2020 07:05 )             35.6     04-21    143  |  111<H>  |  15  ----------------------------<  117<H>  3.2<L>   |  22  |  0.70    Ca    8.2<L>      21 Apr 2020 08:57    TPro  5.9<L>  /  Alb  2.6<L>  /  TBili  2.1<H>  /  DBili  x   /  AST  26  /  ALT  12  /  AlkPhos  57  04-20    proBNP: Serum Pro-Brain Natriuretic Peptide: 4277 pg/mL (04-17 @ 05:13)    Lipid Profile:   HgA1c:   TSH:         Assessment and plan  ---------------------------  89 year-old female with history of CVA, atrial fibrillation on Xarelto, dementia presents to the Emergency Department for bradycardia.  Patient presents from the [facility]; on vitals was found to be bradycardic to 30s-40s and sent to the ED.  Patient with decreased responsiveness and PO intake for the past few days; recently started on Azithromycin due to unknown etiology/NH paperwork w/o such information.  Tested for COVID on 4/3/20 and negative.  Patient with hypoxia at NH down to 89% on RA.  pt with sig cardiac/ medical history with increasing sob and hypotension .  pt at time is on pressors will continue, and will taper slowly  ivf  covid + will add hydroxychloroquine if pt becomes responsive  spoke to the son Mina wants full medical therapy including intubation, clinical review team does not feel aggressive therapy with intubation will change outcome  will titrate the pressors to off  palliative care noted and discussed with Dr Quintanilla yesterday  prognosis is poor   dvt prophylaxis  blood culture 1 set +/ second set negative  ID appreciated  ngt placed appreciated  will titrate levo   son wants pt to be FED  son wants all medical therapy be given  dc lipitor

## 2020-04-21 NOTE — PROGRESS NOTE ADULT - ASSESSMENT
89   yr pt,   pt  with  h/o  afib,   not  on   xarelto,  due  to falls,   cva,   copd, hld,     alzheimers  dementia,    pud/  h/o left ribf xs. 8/ 9 th,. left hip surg  h/o mlple falls.  echo, normal ef/ T2  comp  deformity    sent  to  er  from   [facility], for  weakness/ sob  ct head, no cva//   ct  chest  with  goo   COVID  positive  elevated  troponin/ CRP/  Ferritin  , from Covid   pt  with  advanced  age/    comfort/   supportive  care is  ideal  however, son  wants  all  done. seen by icu  and palliative care     on floor,  with fixed  dose  pressor,  nor  epi was  initiated   on 4/ 17/    and on  midodrine/  however , pt is  npo/ risk  of  aspiration  hypernatremia  resolved/  hypokalemia     CNS,  in 1/2  blood   c/s,  which is  a  contaminant  palliative/ comfort care,  ought to  be goal  in this pt  sbp  is  119/  lower pressor dose/ defer  to  card        < from: CT Chest No Cont (04.17.20 @ 04:24) >  IMPRESSION:   Pattern of GGO suggests infection including atypical pneumonia/viral infection from atypical agents including COVID-19 (C19V-1)  < end of copied text >     < from: CT Thoracic Spine Reform No Cont (01.07.20 @ 16:43) >  IMPRESSION:  Volume loss, microvascular disease, no acute hemorrhage or midline shift. If symptoms persist consider follow up head CT or MRI contraindications.  Cervical and thoracic spine are unchanged from prior, no obvious fracture or dislocation, multilevel degenerative changes as noted previously with diffuse osteopenia and small unchanged mild superior endplate T2 compression deformity.  < end of copied text      < from: Limited Transthoracic Echo (10.26.18 @ 14:07) >  Conclusions:  Limited tranthoracic echocardiogram at patients request to  end exam.  1. Mitral annular calcification.  2. The aortic valve opens well.  3. Limited images acquired at the patients request. Based  upon the parasternal long axis view only;  grossly normal  left ventricular systolic function.  < end of copied text >

## 2020-04-21 NOTE — PROGRESS NOTE ADULT - SUBJECTIVE AND OBJECTIVE BOX
CC: f/u for covid    Patient reports nothing     REVIEW OF SYSTEMS:  All other review of systems negative (Comprehensive ROS)cannot get    Antimicrobials Day #  :    Other Medications Reviewed    T(F): 97.4 (04-21-20 @ 08:27), Max: 98.4 (04-21-20 @ 00:10)  HR: 78 (04-21-20 @ 08:27)  BP: 119/64 (04-21-20 @ 08:27)  RR: 18 (04-21-20 @ 08:27)  SpO2: 97% (04-21-20 @ 08:27)  Wt(kg): --    PHYSICAL EXAM:  General:poorly responsive, no acute distress  Eyes:  anicteric, no conjunctival injection, no discharge  Oropharynx: no lesions or injection 	  Neck: supple, without adenopathy  Lungs: ralesto auscultation      Heart: regular rate and rhythm; no murmur, rubs or gallops  Abdomen: soft, nondistended, nontender, without mass or organomegaly  Skin: no lesions  Extremities: no clubbing, cyanosis, or edema  Neurologic:: poorly responsive    LAB RESULTS:                        11.4   5.56  )-----------( 134      ( 20 Apr 2020 07:05 )             35.6     04-21    143  |  111<H>  |  15  ----------------------------<  117<H>  3.2<L>   |  22  |  0.70    Ca    8.2<L>      21 Apr 2020 08:57    TPro  5.9<L>  /  Alb  2.6<L>  /  TBili  2.1<H>  /  DBili  x   /  AST  26  /  ALT  12  /  AlkPhos  57  04-20    LIVER FUNCTIONS - ( 20 Apr 2020 07:01 )  Alb: 2.6 g/dL / Pro: 5.9 g/dL / ALK PHOS: 57 U/L / ALT: 12 U/L / AST: 26 U/L / GGT: x             MICROBIOLOGY:  RECENT CULTURES:  04-17 @ 06:35 .Urine Catheterized     No growth      04-17 @ 04:17 .Blood Blood-Peripheral Blood Culture PCR    No growth to date.    Growth in anaerobic bottle: Gram Positive Cocci in Clusters        RADIOLOGY REVIEWED:              Assessment:  covid infection, good sats on 2l. no fever. positive blood cx contaminant  Plan:  supportive care

## 2020-04-22 LAB
ALBUMIN SERPL ELPH-MCNC: 2.7 G/DL — LOW (ref 3.3–5)
ALP SERPL-CCNC: 66 U/L — SIGNIFICANT CHANGE UP (ref 40–120)
ALT FLD-CCNC: 18 U/L — SIGNIFICANT CHANGE UP (ref 10–45)
ANION GAP SERPL CALC-SCNC: 9 MMOL/L — SIGNIFICANT CHANGE UP (ref 5–17)
AST SERPL-CCNC: 36 U/L — SIGNIFICANT CHANGE UP (ref 10–40)
BILIRUB SERPL-MCNC: 2.4 MG/DL — HIGH (ref 0.2–1.2)
BUN SERPL-MCNC: 11 MG/DL — SIGNIFICANT CHANGE UP (ref 7–23)
CALCIUM SERPL-MCNC: 8.6 MG/DL — SIGNIFICANT CHANGE UP (ref 8.4–10.5)
CHLORIDE SERPL-SCNC: 112 MMOL/L — HIGH (ref 96–108)
CO2 SERPL-SCNC: 23 MMOL/L — SIGNIFICANT CHANGE UP (ref 22–31)
CREAT SERPL-MCNC: 0.74 MG/DL — SIGNIFICANT CHANGE UP (ref 0.5–1.3)
CULTURE RESULTS: SIGNIFICANT CHANGE UP
GLUCOSE SERPL-MCNC: 90 MG/DL — SIGNIFICANT CHANGE UP (ref 70–99)
MAGNESIUM SERPL-MCNC: 1.8 MG/DL — SIGNIFICANT CHANGE UP (ref 1.6–2.6)
POTASSIUM SERPL-MCNC: 4 MMOL/L — SIGNIFICANT CHANGE UP (ref 3.5–5.3)
POTASSIUM SERPL-SCNC: 4 MMOL/L — SIGNIFICANT CHANGE UP (ref 3.5–5.3)
PROT SERPL-MCNC: 6.1 G/DL — SIGNIFICANT CHANGE UP (ref 6–8.3)
SODIUM SERPL-SCNC: 144 MMOL/L — SIGNIFICANT CHANGE UP (ref 135–145)
SPECIMEN SOURCE: SIGNIFICANT CHANGE UP

## 2020-04-22 PROCEDURE — 99232 SBSQ HOSP IP/OBS MODERATE 35: CPT | Mod: CS

## 2020-04-22 RX ORDER — THIAMINE MONONITRATE (VIT B1) 100 MG
100 TABLET ORAL DAILY
Refills: 0 | Status: DISCONTINUED | OUTPATIENT
Start: 2020-04-22 | End: 2020-05-08

## 2020-04-22 RX ORDER — MIDODRINE HYDROCHLORIDE 2.5 MG/1
20 TABLET ORAL EVERY 8 HOURS
Refills: 0 | Status: DISCONTINUED | OUTPATIENT
Start: 2020-04-22 | End: 2020-04-26

## 2020-04-22 RX ADMIN — Medication 100 MILLIGRAM(S): at 17:12

## 2020-04-22 RX ADMIN — Medication 15 MILLILITER(S): at 14:18

## 2020-04-22 RX ADMIN — MIDODRINE HYDROCHLORIDE 20 MILLIGRAM(S): 2.5 TABLET ORAL at 23:21

## 2020-04-22 RX ADMIN — MIDODRINE HYDROCHLORIDE 30 MILLIGRAM(S): 2.5 TABLET ORAL at 14:18

## 2020-04-22 RX ADMIN — HEPARIN SODIUM 5000 UNIT(S): 5000 INJECTION INTRAVENOUS; SUBCUTANEOUS at 17:12

## 2020-04-22 RX ADMIN — HEPARIN SODIUM 5000 UNIT(S): 5000 INJECTION INTRAVENOUS; SUBCUTANEOUS at 06:00

## 2020-04-22 NOTE — PROGRESS NOTE ADULT - ASSESSMENT
89   yr pt,   pt  with  h/o  afib,   not  on   xarelto,  due  to falls,   cva,   copd, hld,     alzheimers  dementia,    pud/  h/o left ribf xs. 8/ 9 th,. left hip surg  h/o mlple falls.  echo, normal ef/ T2  comp  deformity    sent  to  er  from   [facility], for  weakness/ sob  ct head, no cva//   ct  chest  with  goo   COVID  positive  elevated  troponin/ CRP/  Ferritin  , from Covid   pt  with  advanced  age/    comfort/   supportive  care is  ideal  however, son  wants  all  done. seen by icu  and palliative care      s/p  fixed  dose  pressor,  nor  epi was  initiated   on 4/ 17/      hypernatremia  resolved/   CNS,  in 1/2  blood   c/s,  which is  a  contaminant  palliative/ comfort care,  ought to  be goal  in this pt  ha s ng tube now.  on midodrine/  son  aware        < from: CT Chest No Cont (04.17.20 @ 04:24) >  IMPRESSION:   Pattern of GGO suggests infection including atypical pneumonia/viral infection from atypical agents including COVID-19 (C19V-1)  < end of copied text >     < from: CT Thoracic Spine Reform No Cont (01.07.20 @ 16:43) >  IMPRESSION:  Volume loss, microvascular disease, no acute hemorrhage or midline shift. If symptoms persist consider follow up head CT or MRI contraindications.  Cervical and thoracic spine are unchanged from prior, no obvious fracture or dislocation, multilevel degenerative changes as noted previously with diffuse osteopenia and small unchanged mild superior endplate T2 compression deformity.  < end of copied text      < from: Limited Transthoracic Echo (10.26.18 @ 14:07) >  Conclusions:  Limited tranthoracic echocardiogram at patients request to  end exam.  1. Mitral annular calcification.  2. The aortic valve opens well.  3. Limited images acquired at the patients request. Based  upon the parasternal long axis view only;  grossly normal  left ventricular systolic function.  < end of copied text >

## 2020-04-22 NOTE — PROVIDER CONTACT NOTE (OTHER) - BACKGROUND
pt COVID+, on levophed infusion for hypotension. midodrine recently added to medication regimen to wean off of vasopressors. /50 pre-midodrine administration

## 2020-04-22 NOTE — CHART NOTE - NSCHARTNOTEFT_GEN_A_CORE
Nutrition Initial Assessment    Nutrition Consult Received: Yes [x   ]  No [   ] Pt seen for nutrition consult for tube feeding.     Reason for Initial Nutrition Assessment:    Source of Information: Unable to conduct face to face interview or conduct nutrition focused physical exam at this time due to limited contact restrictions related to their medical condition and isolation precautions. Information obtained from RN and previous RD notes, pt is non-verbal with dementia.     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 Per nursing home transfer records pt was taking a no salt added, chopped diet with original ensure 1 daily. Pt with no noted food allergies, noted to be taking a multivitamin. pt unable to swallow at this time, S/P NG tube placement. Per transfer records pt requires assistance with feeding PTA.     Weight: Per previous RD notes pt with history of weight loss from 110-115 lbs to low of 93.2 lbs (11/1/18), pt appears to have gained weight within the past ~1.5 years to current dosing weight 110 lbs (4/17).       GI Issues: Per RN no sign of N+V-pt is non-verbal, last BM this morning-noted to be incontinent.     Current Nutrition Order: Diet, NPO:   Except Medications (04-19-20 @ 20:45)    PO Intake:   Good (%) [   ]    Fair (50-75%) [   ]    Poor (<50%) [   x] Pt has been NPO for >5 days in house, S/P NG tube placement with plan for enteral nutrition     Skin Integrity: free of pressure injury, no edema noted.     Labs:   04-22 Na144 mmol/L Glu 90 mg/dL K+ 4.0 mmol/L Cr  0.74 mg/dL BUN 11 mg/dL Phos n/a   Alb 2.7 g/dL<L> PAB n/a             Medications:  MEDICATIONS  (STANDING):  dextrose 5%. 1000 milliLiter(s) (50 mL/Hr) IV Continuous <Continuous>  heparin  Injectable 5000 Unit(s) SubCutaneous every 12 hours  midodrine 30 milliGRAM(s) Oral every 8 hours  multivitamin/minerals/iron Oral Solution (CENTRUM) 15 milliLiter(s) Enteral Tube daily    MEDICATIONS  (PRN):  acetaminophen   Tablet .. 650 milliGRAM(s) Oral every 6 hours PRN Temp greater or equal to 38C (100.4F), Moderate Pain (4 - 6)    Admitted Anthropometrics:      Weight (kg): 50 (04-17-20 @ 03:10)  Height per previous RD note: 157.48 cm  BMI kg/m2: 20    IBW: 110 lbs, % IBW: 100%    Nutrition Focused Physical Exam: Unable to complete due to limited isolation contact precautions at this time.     Estimated Energy Needs (20 kcal/kg- 25 kcal/kg): 7494-4037 Kcal/day   Estimated Protein Needs (1g/kg- 1.2g/kg): 50-60g protein/day   Based on weight of: 50 Kg     [x  ] Nutrition Diagnosis: Mild malnutrition related to altered mental status as evidenced by pt NPO for >5 days in house, poor po intake PTA.   [  ] No active nutrition diagnosis at this time  [  ] Current medical condition precludes nutrition intervention    Goal: Pt to meet >75% estimated needs via best tolerated route     Nutrition Interventions:     Recommendations:    1. Noted plan to start enteral feeding, recommend initiating bolus feeding Jevity 1.2 Robbi, initiate first feed at 50 mL and increase by 50 mL each feed to goal of 150 mL every 6 hours for now for a total of 4 feeds per day. This regimen at goal provides 600 mL total volume, 720 kcal, 33g protein, 484 mL free water (14 Kcal/Kg, 0.6g protein/Kg based on dosing wt 50 Kg). Noted pt NPO for >5 days, poor po intake PTA, pt at risk for refeeding, consider providing thiamine prior to initiating feeding and daily thereafter and continuing daily multivitamin  2. Closely monitor electrolytes daily specifically potassium, phosphorus, magnesium and replete as needed.   3. When tolerance is achieved consider increasing bolus by 50 mL to ultimate goal of 240 mL via gravity bag every 6 hours, infused at ~28 drops (2mL) per minute over 2 hours to equate to 120 mL/h. This regimen at goal provides 1140 Kcal, 52.8g protein, 764 mL free water (23 Kcal/Kg, 1g protein/Kg based on dosing wt 50 Kg)   4. Oral diet deferred to medical team, nutrition plan of care to be in line with medical GOC and pt/family wishes, monitor feasibility to perform swallow evaluation if appropriate and in line with GOC.      RD to follow-up per protocol.    Kajal Gray R.D., [facility], Pager #[phone]

## 2020-04-22 NOTE — PROGRESS NOTE ADULT - SUBJECTIVE AND OBJECTIVE BOX
weak/  non verbal  REVIEW OF SYSTEMS:  GEN: no fever,    no chills  RESP: no SOB,   no cough      MEDICATIONS  (STANDING):  dextrose 5%. 1000 milliLiter(s) (50 mL/Hr) IV Continuous <Continuous>  heparin  Injectable 5000 Unit(s) SubCutaneous every 12 hours  midodrine 30 milliGRAM(s) Oral every 8 hours  multivitamin/minerals/iron Oral Solution (CENTRUM) 15 milliLiter(s) Enteral Tube daily  thiamine 100 milliGRAM(s) Enteral Tube daily    MEDICATIONS  (PRN):  acetaminophen   Tablet .. 650 milliGRAM(s) Oral every 6 hours PRN Temp greater or equal to 38C (100.4F), Moderate Pain (4 - 6)      Vital Signs Last 24 Hrs  T(C): 36.5 (22 Apr 2020 08:01), Max: 37.1 (21 Apr 2020 23:25)  T(F): 97.7 (22 Apr 2020 08:01), Max: 98.8 (21 Apr 2020 23:25)  HR: 63 (22 Apr 2020 08:01) (60 - 66)  BP: 113/62 (22 Apr 2020 08:01) (113/62 - 168/66)  BP(mean): --  RR: 18 (22 Apr 2020 08:01) (18 - 20)  SpO2: 99% (22 Apr 2020 08:01) (97% - 100%)  CAPILLARY BLOOD GLUCOSE        I&O's Summary    21 Apr 2020 07:01  -  22 Apr 2020 07:00  --------------------------------------------------------  IN: 777 mL / OUT: 0 mL / NET: 777 mL        PHYSICAL EXAM:  HEAD:  Atraumatic, Normocephalic  NECK: Supple, No   JVD  CHEST/LUNG:   no     rales,     no,    rhonchi  HEART: Regular rate and rhythm;         murmur  ABDOMEN: Soft, Nontender, ;   EXTREMITIES:     no edema  weak/  letahric    LABS:    04-22    144  |  112<H>  |  11  ----------------------------<  90  4.0   |  23  |  0.74    Ca    8.6      22 Apr 2020 06:52  Mg     1.8     04-22    TPro  6.1  /  Alb  2.7<L>  /  TBili  2.4<H>  /  DBili  x   /  AST  36  /  ALT  18  /  AlkPhos  66  04-22                            Consultant(s) Notes Reviewed:      Care Discussed with Consultants/Other Providers:

## 2020-04-22 NOTE — PROGRESS NOTE ADULT - SUBJECTIVE AND OBJECTIVE BOX
CARDIOLOGY     PROGRESS  NOTE   ________________________________________________    CHIEF COMPLAINT:Patient is a 89y old  Female who presents with a chief complaint of sob/ hypotension (22 Apr 2020 11:06)  no complain.  	  REVIEW OF SYSTEMS:  CONSTITUTIONAL: No fever, weight loss, or fatigue  EYES: No eye pain, visual disturbances, or discharge  ENT:  No difficulty hearing, tinnitus, vertigo; No sinus or throat pain  NECK: No pain or stiffness  RESPIRATORY: No cough, wheezing, chills or hemoptysis; No Shortness of Breath  CARDIOVASCULAR: No chest pain, palpitations, passing out, dizziness, or leg swelling  GASTROINTESTINAL: No abdominal or epigastric pain. No nausea, vomiting, or hematemesis; No diarrhea or constipation. No melena or hematochezia.  GENITOURINARY: No dysuria, frequency, hematuria, or incontinence  NEUROLOGICAL: No headaches, memory loss, loss of strength, numbness, or tremors  SKIN: No itching, burning, rashes, or lesions   LYMPH Nodes: No enlarged glands  ENDOCRINE: No heat or cold intolerance; No hair loss  MUSCULOSKELETAL: No joint pain or swelling; No muscle, back, or extremity pain  PSYCHIATRIC: No depression, anxiety, mood swings, or difficulty sleeping  HEME/LYMPH: No easy bruising, or bleeding gums  ALLERGY AND IMMUNOLOGIC: No hives or eczema	    [ ] All others negative	  [ x] Unable to obtain    PHYSICAL EXAM:  T(C): 36.5 (04-22-20 @ 08:01), Max: 37.1 (04-21-20 @ 23:25)  HR: 63 (04-22-20 @ 08:01) (60 - 66)  BP: 113/62 (04-22-20 @ 08:01) (113/62 - 168/66)  RR: 18 (04-22-20 @ 08:01) (18 - 20)  SpO2: 99% (04-22-20 @ 08:01) (97% - 100%)  Wt(kg): --  I&O's Summary    21 Apr 2020 07:01  -  22 Apr 2020 07:00  --------------------------------------------------------  IN: 777 mL / OUT: 0 mL / NET: 777 mL        Appearance: Normal	  HEENT:   Normal oral mucosa, PERRL, EOMI	  Lymphatic: No lymphadenopathy  Cardiovascular: Normal S1 S2, No JVD, +murmurs, No edema  Respiratory: Lungs clear to auscultation	  Psychiatry: A & O x 3, Mood & affect appropriate  Gastrointestinal:  Soft, Non-tender, + BS	  Skin: No rashes, No ecchymoses, No cyanosis	  Neurologic: Non-focal  Extremities: Normal range of motion, No clubbing, cyanosis or edema  Vascular: Peripheral pulses palpable 2+ bilaterally    MEDICATIONS  (STANDING):  dextrose 5%. 1000 milliLiter(s) (50 mL/Hr) IV Continuous <Continuous>  heparin  Injectable 5000 Unit(s) SubCutaneous every 12 hours  midodrine 30 milliGRAM(s) Oral every 8 hours  multivitamin/minerals/iron Oral Solution (CENTRUM) 15 milliLiter(s) Enteral Tube daily  thiamine 100 milliGRAM(s) Enteral Tube daily      TELEMETRY: 	    ECG:  	  RADIOLOGY:  OTHER: 	  	  LABS:	 	    CARDIAC MARKERS:            04-22    144  |  112<H>  |  11  ----------------------------<  90  4.0   |  23  |  0.74    Ca    8.6      22 Apr 2020 06:52  Mg     1.8     04-22    TPro  6.1  /  Alb  2.7<L>  /  TBili  2.4<H>  /  DBili  x   /  AST  36  /  ALT  18  /  AlkPhos  66  04-22    proBNP: Serum Pro-Brain Natriuretic Peptide: 4277 pg/mL (04-17 @ 05:13)    Lipid Profile:   HgA1c:   TSH:     Assessment:  covid infection, good sats on 2l. no fever. positive blood cx contaminant  Plan:  supportive care    Assessment and plan  ---------------------------  89 year-old female with history of CVA, atrial fibrillation on Xarelto, dementia presents to the Emergency Department for bradycardia.  Patient presents from the [facility]; on vitals was found to be bradycardic to 30s-40s and sent to the ED.  Patient with decreased responsiveness and PO intake for the past few days; recently started on Azithromycin due to unknown etiology/NH paperwork w/o such information.  Tested for COVID on 4/3/20 and negative.  Patient with hypoxia at NH down to 89% on RA.  pt with sig cardiac/ medical history with increasing sob and hypotension .  pt at time is on pressors will continue, and will taper slowly  ivf  covid + will add hydroxychloroquine if pt becomes responsive  spoke to the son Mina wants full medical therapy including intubation, clinical review team does not feel aggressive therapy with intubation will change outcome  will titrate the pressors to off  palliative care noted and discussed with Dr Quintanilla yesterday  dvt prophylaxis  blood culture 1 set +/ second set negative  ID appreciated  ngt placed appreciated, getting Fed  son wants pt to be FEED the pt and discussed with him, he wants full medical therapy, spoke to the son  continue to observe

## 2020-04-22 NOTE — CHART NOTE - NSCHARTNOTEFT_GEN_A_CORE
Upon Nutritional Assessment by the Registered Dietitian your patient was determined to meet criteria / has evidence of the following diagnosis/diagnoses:          [x ]  Mild Protein Calorie Malnutrition        [ ]  Moderate Protein Calorie Malnutrition        [ ] Severe Protein Calorie Malnutrition        [ ] Unspecified Protein Calorie Malnutrition        [ ] Underweight / BMI <19        [ ] Morbid Obesity / BMI > 40      Findings as based on:  [x ] Comprehensive nutrition assessment: NPO for >5 days in house, poor po intake PTA.  [ ] Nutrition Focused Physical Exam  [ ] Other:       Nutrition Plan/Recommendations:  Plan to initiate tube feeding, please refer to chart note-nutrition services for further recommendations.         PROVIDER Section:     By signing this assessment you are acknowledging and agree with the diagnosis/diagnoses assigned by the Registered Dietitian    Comments:

## 2020-04-22 NOTE — PROVIDER CONTACT NOTE (MEDICATION) - ASSESSMENT
pt has no IV access since ~1700, no oneavailable to place new one - very difficult stick. in BL wrist restraints, stable otherwise.

## 2020-04-22 NOTE — PROGRESS NOTE ADULT - SUBJECTIVE AND OBJECTIVE BOX
CC: f/u for  covid  Patient reports nothing, not responsive    REVIEW OF SYSTEMS:  All other review of systems negative (Comprehensive ROS)    Antimicrobials Day #  :    Other Medications Reviewed    T(F): 98.6 (04-22-20 @ 15:42), Max: 98.8 (04-21-20 @ 23:25)  HR: 58 (04-22-20 @ 15:42)  BP: 156/73 (04-22-20 @ 15:42)  RR: 20 (04-22-20 @ 15:42)  SpO2: 97% (04-22-20 @ 15:42)  Wt(kg): --    PHYSICAL EXAM:  General: unresponsive no acute distress  Eyes:  anicteric, no conjunctival injection, no discharge  Oropharynx: no lesions or injection 	  Neck: supple, without adenopathy  Lungs: course  to auscultation  Heart: regular rate and rhythm; no murmur, rubs or gallops  Abdomen: soft, nondistended, nontender, without mass or organomegaly  Skin: no lesions  Extremities: no clubbing, cyanosis, or edema  Neurologic: unresponsive     LAB RESULTS:    04-22    144  |  112<H>  |  11  ----------------------------<  90  4.0   |  23  |  0.74    Ca    8.6      22 Apr 2020 06:52  Mg     1.8     04-22    TPro  6.1  /  Alb  2.7<L>  /  TBili  2.4<H>  /  DBili  x   /  AST  36  /  ALT  18  /  AlkPhos  66  04-22    LIVER FUNCTIONS - ( 22 Apr 2020 06:52 )  Alb: 2.7 g/dL / Pro: 6.1 g/dL / ALK PHOS: 66 U/L / ALT: 18 U/L / AST: 36 U/L / GGT: x             MICROBIOLOGY:  RECENT CULTURES:      RADIOLOGY REVIEWED:      < from: Xray Chest 1 View- PORTABLE-Urgent (04.20.20 @ 22:56) >  IMPRESSION:    1.  Enteric tube in the stomach.      < end of copied text >          Assessment:  elderly demented unresponsive woman with afib admitted for sob, found to have covid but oxyenates well on minimal suppplementation. Dont think plaquenil will be of any impact ehre since such mild disease.   Plan:  monitor off antimicrobics  supportive care

## 2020-04-23 LAB
ALBUMIN SERPL ELPH-MCNC: 2.6 G/DL — LOW (ref 3.3–5)
ALP SERPL-CCNC: 71 U/L — SIGNIFICANT CHANGE UP (ref 40–120)
ALT FLD-CCNC: 20 U/L — SIGNIFICANT CHANGE UP (ref 10–45)
ANION GAP SERPL CALC-SCNC: 13 MMOL/L — SIGNIFICANT CHANGE UP (ref 5–17)
AST SERPL-CCNC: 32 U/L — SIGNIFICANT CHANGE UP (ref 10–40)
BILIRUB SERPL-MCNC: 2.5 MG/DL — HIGH (ref 0.2–1.2)
BUN SERPL-MCNC: 13 MG/DL — SIGNIFICANT CHANGE UP (ref 7–23)
CALCIUM SERPL-MCNC: 8.8 MG/DL — SIGNIFICANT CHANGE UP (ref 8.4–10.5)
CHLORIDE SERPL-SCNC: 108 MMOL/L — SIGNIFICANT CHANGE UP (ref 96–108)
CO2 SERPL-SCNC: 20 MMOL/L — LOW (ref 22–31)
CREAT SERPL-MCNC: 0.75 MG/DL — SIGNIFICANT CHANGE UP (ref 0.5–1.3)
GLUCOSE SERPL-MCNC: 90 MG/DL — SIGNIFICANT CHANGE UP (ref 70–99)
MAGNESIUM SERPL-MCNC: 1.7 MG/DL — SIGNIFICANT CHANGE UP (ref 1.6–2.6)
PHOSPHATE SERPL-MCNC: 2.9 MG/DL — SIGNIFICANT CHANGE UP (ref 2.5–4.5)
POTASSIUM SERPL-MCNC: 4.1 MMOL/L — SIGNIFICANT CHANGE UP (ref 3.5–5.3)
POTASSIUM SERPL-SCNC: 4.1 MMOL/L — SIGNIFICANT CHANGE UP (ref 3.5–5.3)
PROT SERPL-MCNC: 6 G/DL — SIGNIFICANT CHANGE UP (ref 6–8.3)
SODIUM SERPL-SCNC: 141 MMOL/L — SIGNIFICANT CHANGE UP (ref 135–145)

## 2020-04-23 PROCEDURE — 99232 SBSQ HOSP IP/OBS MODERATE 35: CPT | Mod: CS

## 2020-04-23 RX ORDER — MAGNESIUM SULFATE 500 MG/ML
1 VIAL (ML) INJECTION ONCE
Refills: 0 | Status: COMPLETED | OUTPATIENT
Start: 2020-04-23 | End: 2020-04-23

## 2020-04-23 RX ADMIN — HEPARIN SODIUM 5000 UNIT(S): 5000 INJECTION INTRAVENOUS; SUBCUTANEOUS at 06:00

## 2020-04-23 RX ADMIN — Medication 100 GRAM(S): at 13:08

## 2020-04-23 RX ADMIN — SODIUM CHLORIDE 50 MILLILITER(S): 9 INJECTION, SOLUTION INTRAVENOUS at 13:08

## 2020-04-23 RX ADMIN — HEPARIN SODIUM 5000 UNIT(S): 5000 INJECTION INTRAVENOUS; SUBCUTANEOUS at 17:17

## 2020-04-23 RX ADMIN — Medication 100 MILLIGRAM(S): at 13:09

## 2020-04-23 RX ADMIN — MIDODRINE HYDROCHLORIDE 20 MILLIGRAM(S): 2.5 TABLET ORAL at 06:00

## 2020-04-23 RX ADMIN — MIDODRINE HYDROCHLORIDE 20 MILLIGRAM(S): 2.5 TABLET ORAL at 22:01

## 2020-04-23 RX ADMIN — Medication 15 MILLILITER(S): at 13:08

## 2020-04-23 RX ADMIN — MIDODRINE HYDROCHLORIDE 20 MILLIGRAM(S): 2.5 TABLET ORAL at 13:09

## 2020-04-23 NOTE — PHYSICAL THERAPY INITIAL EVALUATION ADULT - IMPAIRMENTS CONTRIBUTING IMPAIRED BED MOBILITY, REHAB EVAL
impaired balance/cognition/impaired coordination/abnormal muscle tone/decreased strength/impaired postural control/impaired motor control

## 2020-04-23 NOTE — PROGRESS NOTE ADULT - SUBJECTIVE AND OBJECTIVE BOX
CARDIOLOGY     PROGRESS  NOTE   ________________________________________________    CHIEF COMPLAINT:Patient is a 89y old  Female who presents with a chief complaint of sob/ hypotension (23 Apr 2020 10:06)  doiong better, hemodynamicaly  	  REVIEW OF SYSTEMS:  CONSTITUTIONAL: No fever, weight loss, or fatigue  EYES: No eye pain, visual disturbances, or discharge  ENT:  No difficulty hearing, tinnitus, vertigo; No sinus or throat pain  NECK: No pain or stiffness  RESPIRATORY: No cough, wheezing, chills or hemoptysis; No Shortness of Breath  CARDIOVASCULAR: No chest pain, palpitations, passing out, dizziness, or leg swelling  GASTROINTESTINAL: No abdominal or epigastric pain. No nausea, vomiting, or hematemesis; No diarrhea or constipation. No melena or hematochezia.  GENITOURINARY: No dysuria, frequency, hematuria, or incontinence  NEUROLOGICAL: No headaches, memory loss, loss of strength, numbness, or tremors  SKIN: No itching, burning, rashes, or lesions   LYMPH Nodes: No enlarged glands  ENDOCRINE: No heat or cold intolerance; No hair loss  MUSCULOSKELETAL: No joint pain or swelling; No muscle, back, or extremity pain  PSYCHIATRIC: No depression, anxiety, mood swings, or difficulty sleeping  HEME/LYMPH: No easy bruising, or bleeding gums  ALLERGY AND IMMUNOLOGIC: No hives or eczema	    [ ] All others negative	  x[ ] Unable to obtain    PHYSICAL EXAM:  T(C): 36.4 (04-23-20 @ 07:58), Max: 37 (04-22-20 @ 15:42)  HR: 64 (04-23-20 @ 07:58) (58 - 64)  BP: 119/77 (04-23-20 @ 07:58) (119/77 - 156/73)  RR: 22 (04-23-20 @ 07:58) (20 - 22)  SpO2: 97% (04-23-20 @ 07:58) (97% - 100%)  Wt(kg): --  I&O's Summary    22 Apr 2020 07:01  -  23 Apr 2020 07:00  --------------------------------------------------------  IN: 0 mL / OUT: 0 mL / NET: 0 mL        Appearance: + ngt/ unresponmsive  HEENT:   Normal oral mucosa, PERRL, EOMI	  Lymphatic: No lymphadenopathy  Cardiovascular: Normal S1 S2, No JVD, No murmurs, No edema  Respiratory: Lungs clear to auscultation	  Psychiatry: A & O x 3, Mood & affect appropriate  Gastrointestinal:  Soft, Non-tender, + BS	  Skin: No rashes, No ecchymoses, No cyanosis	  Neurologic: Non-focal  Extremities: Normal range of motion, No clubbing, cyanosis or edema  Vascular: Peripheral pulses palpable 2+ bilaterally    MEDICATIONS  (STANDING):  dextrose 5%. 1000 milliLiter(s) (50 mL/Hr) IV Continuous <Continuous>  heparin  Injectable 5000 Unit(s) SubCutaneous every 12 hours  magnesium sulfate  IVPB 1 Gram(s) IV Intermittent once  midodrine 20 milliGRAM(s) Oral every 8 hours  multivitamin/minerals/iron Oral Solution (CENTRUM) 15 milliLiter(s) Enteral Tube daily  thiamine 100 milliGRAM(s) Enteral Tube daily      TELEMETRY: 	    ECG:  	  RADIOLOGY:  OTHER: 	  	  LABS:	 	    CARDIAC MARKERS:            04-23    141  |  108  |  13  ----------------------------<  90  4.1   |  20<L>  |  0.75    Ca    8.8      23 Apr 2020 07:04  Phos  2.9     04-23  Mg     1.7     04-23    TPro  6.0  /  Alb  2.6<L>  /  TBili  2.5<H>  /  DBili  x   /  AST  32  /  ALT  20  /  AlkPhos  71  04-23    proBNP: Serum Pro-Brain Natriuretic Peptide: 4277 pg/mL (04-17 @ 05:13)    Lipid Profile:   HgA1c:   TSH:     < from: Xray Chest 1 View- PORTABLE-Urgent (04.20.20 @ 22:56) >  Lines and Tubes: Enteric tube in the stomach.      Lungs: Bilateral lower lung opacities are unchanged.      Pleura: Left pleural effusion.      Heart and Mediastinum: The heart is normal in size.      Skeletal: Unremarkable.        IMPRESSION:    1.  Enteric tube in the stomach.    < end of copied text >      Assessment and plan  ---------------------------  89 year-old female with history of CVA, atrial fibrillation on Xarelto, dementia presents to the Emergency Department for bradycardia.  Patient presents from the [facility]; on vitals was found to be bradycardic to 30s-40s and sent to the ED.  Patient with decreased responsiveness and PO intake for the past few days; recently started on Azithromycin due to unknown etiology/NH paperwork w/o such information.  Tested for COVID on 4/3/20 and negative.  Patient with hypoxia at NH down to 89% on RA.  pt with sig cardiac/ medical history with increasing sob and hypotension .  pt at time is on pressors will continue, and will taper slowly  ivf  covid + will add hydroxychloroquine if pt becomes responsive  spoke to the son Mina wants full medical therapy including intubation, clinical review team does not feel aggressive therapy with intubation will change outcome  will titrate the pressors to off  palliative care noted and discussed with Dr Quintanilla yesterday  dvt prophylaxis  blood culture 1 set +/ second set negative  ID appreciated  ngt placed appreciated, getting Fed  son wants pt to be FEED the pt and discussed with him, he wants full medical therapy  continue to observe  decrease midodrine as tolertaed CARDIOLOGY     PROGRESS  NOTE   ________________________________________________    CHIEF COMPLAINT:Patient is a 89y old  Female who presents with a chief complaint of sob/ hypotension (23 Apr 2020 10:06)  doiong better, hemodynamicaly  	  REVIEW OF SYSTEMS:  CONSTITUTIONAL: No fever, weight loss, or fatigue  EYES: No eye pain, visual disturbances, or discharge  ENT:  No difficulty hearing, tinnitus, vertigo; No sinus or throat pain  NECK: No pain or stiffness  RESPIRATORY: No cough, wheezing, chills or hemoptysis; No Shortness of Breath  CARDIOVASCULAR: No chest pain, palpitations, passing out, dizziness, or leg swelling  GASTROINTESTINAL: No abdominal or epigastric pain. No nausea, vomiting, or hematemesis; No diarrhea or constipation. No melena or hematochezia.  GENITOURINARY: No dysuria, frequency, hematuria, or incontinence  NEUROLOGICAL: No headaches, memory loss, loss of strength, numbness, or tremors  SKIN: No itching, burning, rashes, or lesions   LYMPH Nodes: No enlarged glands  ENDOCRINE: No heat or cold intolerance; No hair loss  MUSCULOSKELETAL: No joint pain or swelling; No muscle, back, or extremity pain  PSYCHIATRIC: No depression, anxiety, mood swings, or difficulty sleeping  HEME/LYMPH: No easy bruising, or bleeding gums  ALLERGY AND IMMUNOLOGIC: No hives or eczema	    [ ] All others negative	  x[ ] Unable to obtain    PHYSICAL EXAM:  T(C): 36.4 (04-23-20 @ 07:58), Max: 37 (04-22-20 @ 15:42)  HR: 64 (04-23-20 @ 07:58) (58 - 64)  BP: 119/77 (04-23-20 @ 07:58) (119/77 - 156/73)  RR: 22 (04-23-20 @ 07:58) (20 - 22)  SpO2: 97% (04-23-20 @ 07:58) (97% - 100%)  Wt(kg): --  I&O's Summary    22 Apr 2020 07:01  -  23 Apr 2020 07:00  --------------------------------------------------------  IN: 0 mL / OUT: 0 mL / NET: 0 mL        Appearance: + ngt/ unresponmsive  HEENT:   Normal oral mucosa, PERRL, EOMI	  Lymphatic: No lymphadenopathy  Cardiovascular: Normal S1 S2, No JVD, No murmurs, No edema  Respiratory: Lungs clear to auscultation	  Psychiatry: A & O x 3, Mood & affect appropriate  Gastrointestinal:  Soft, Non-tender, + BS	  Skin: No rashes, No ecchymoses, No cyanosis	  Neurologic: Non-focal  Extremities: Normal range of motion, No clubbing, cyanosis or edema  Vascular: Peripheral pulses palpable 2+ bilaterally    MEDICATIONS  (STANDING):  dextrose 5%. 1000 milliLiter(s) (50 mL/Hr) IV Continuous <Continuous>  heparin  Injectable 5000 Unit(s) SubCutaneous every 12 hours  magnesium sulfate  IVPB 1 Gram(s) IV Intermittent once  midodrine 20 milliGRAM(s) Oral every 8 hours  multivitamin/minerals/iron Oral Solution (CENTRUM) 15 milliLiter(s) Enteral Tube daily  thiamine 100 milliGRAM(s) Enteral Tube daily      TELEMETRY: 	    ECG:  	  RADIOLOGY:  OTHER: 	  	  LABS:	 	    CARDIAC MARKERS:            04-23    141  |  108  |  13  ----------------------------<  90  4.1   |  20<L>  |  0.75    Ca    8.8      23 Apr 2020 07:04  Phos  2.9     04-23  Mg     1.7     04-23    TPro  6.0  /  Alb  2.6<L>  /  TBili  2.5<H>  /  DBili  x   /  AST  32  /  ALT  20  /  AlkPhos  71  04-23    proBNP: Serum Pro-Brain Natriuretic Peptide: 4277 pg/mL (04-17 @ 05:13)    Lipid Profile:   HgA1c:   TSH:     < from: Xray Chest 1 View- PORTABLE-Urgent (04.20.20 @ 22:56) >  Lines and Tubes: Enteric tube in the stomach.      Lungs: Bilateral lower lung opacities are unchanged.      Pleura: Left pleural effusion.      Heart and Mediastinum: The heart is normal in size.      Skeletal: Unremarkable.        IMPRESSION:    1.  Enteric tube in the stomach.    < end of copied text >  < from: CT Head No Cont (04.17.20 @ 04:23) >  No acute intracranial hemorrhage, midline shift or hydrocephalus. No CT evidence of acute, territorial infarct. Senescent changes, including extensive chronic microvascular ischemic change, are in keeping with the patient's age. Chronic lacunar infarct in the right cerebellum. Small chronic infarct in the right parietotemporal region..    The visualized paranasal sinuses and mastoid air cells are clear.    The calvarium is intact. Patient is status post left orbital cataract surgery.    IMPRESSION:    No acute intracranial CT abnormality.    < end of copied text >      Assessment and plan  ---------------------------  89 year-old female with history of CVA, atrial fibrillation on Xarelto, dementia presents to the Emergency Department for bradycardia.  Patient presents from the [facility]; on vitals was found to be bradycardic to 30s-40s and sent to the ED.  Patient with decreased responsiveness and PO intake for the past few days; recently started on Azithromycin due to unknown etiology/NH paperwork w/o such information.  Tested for COVID on 4/3/20 and negative.  Patient with hypoxia at NH down to 89% on RA.  pt with sig cardiac/ medical history with increasing sob and hypotension .  pt at time is on pressors will continue, and will taper slowly  ivf  covid + will add hydroxychloroquine if pt becomes responsive  spoke to the son Mina wants full medical therapy including intubation, clinical review team does not feel aggressive therapy with intubation will change outcome  will titrate the pressors to off  palliative care noted and discussed with Dr Quintanilla yesterday  dvt prophylaxis  blood culture 1 set +/ second set negative  ID appreciated  ngt placed appreciated, getting Fed  son wants pt to be FEED the pt and discussed with him, he wants full medical therapy  continue to observe  decrease midodrine as tolerated  ?? may need to repeat head ct

## 2020-04-23 NOTE — PROGRESS NOTE ADULT - SUBJECTIVE AND OBJECTIVE BOX
CC: f/u for covid    Patient reports nothing not talking    REVIEW OF SYSTEMS:  All other review of systems negative (Comprehensive ROS)    Antimicrobials Day #  :    Other Medications Reviewed    T(F): 98.2 (04-23-20 @ 16:09), Max: 98.2 (04-23-20 @ 16:09)  HR: 71 (04-23-20 @ 16:09)  BP: 117/65 (04-23-20 @ 16:09)  RR: 22 (04-23-20 @ 16:09)  SpO2: 93% (04-23-20 @ 16:09)  Wt(kg): --    PHYSICAL EXAM:  General: not responding  no acute distress  Eyes:  anicteric, no conjunctival injection, no discharge  Oropharynx: no lesions or injection 	  Neck: supple, without adenopathy  Lungs: grossly clear to auscultation  Heart: regular rate and rhythm; no murmur, rubs or gallops  Abdomen: soft, nondistended, nontender, without mass or organomegaly  Skin: no lesions  Extremities: no clubbing, cyanosis, or edema  Neurologic: not responding to me  LAB RESULTS:    04-23    141  |  108  |  13  ----------------------------<  90  4.1   |  20<L>  |  0.75    Ca    8.8      23 Apr 2020 07:04  Phos  2.9     04-23  Mg     1.7     04-23    TPro  6.0  /  Alb  2.6<L>  /  TBili  2.5<H>  /  DBili  x   /  AST  32  /  ALT  20  /  AlkPhos  71  04-23    LIVER FUNCTIONS - ( 23 Apr 2020 07:04 )  Alb: 2.6 g/dL / Pro: 6.0 g/dL / ALK PHOS: 71 U/L / ALT: 20 U/L / AST: 32 U/L / GGT: x             MICROBIOLOGY:  RECENT CULTURES:      RADIOLOGY REVIEWED:      < from: Xray Chest 1 View- PORTABLE-Urgent (04.20.20 @ 22:56) >  IMPRESSION:    1.  Enteric tube in the stomach.    < end of copied text >          Assessment:  elderly woman with advanced dementia sent from [facility] for sob, tested positive for covid but continues to have good sats on minimal oxygen.   Plan:  supportive care  no indication for specific intervention for covid  call if further input is needed

## 2020-04-23 NOTE — PROGRESS NOTE ADULT - SUBJECTIVE AND OBJECTIVE BOX
be dbound/  non verbal    REVIEW OF SYSTEMS:  GEN: no fever,    no chills  RESP: no SOB,   no cough  CVS: no chest pain,   no palpitations  GI: no abdominal pain,   no nausea,   no vomiting,   no constipation,   no diarrhea  : no dysuria,   no frequency  NEURO: no headache,   no dizziness  PSYCH: no depression,   not anxious  Derm : no rash    MEDICATIONS  (STANDING):  dextrose 5%. 1000 milliLiter(s) (50 mL/Hr) IV Continuous <Continuous>  heparin  Injectable 5000 Unit(s) SubCutaneous every 12 hours  magnesium sulfate  IVPB 1 Gram(s) IV Intermittent once  midodrine 20 milliGRAM(s) Oral every 8 hours  multivitamin/minerals/iron Oral Solution (CENTRUM) 15 milliLiter(s) Enteral Tube daily  thiamine 100 milliGRAM(s) Enteral Tube daily    MEDICATIONS  (PRN):  acetaminophen   Tablet .. 650 milliGRAM(s) Oral every 6 hours PRN Temp greater or equal to 38C (100.4F), Moderate Pain (4 - 6)      Vital Signs Last 24 Hrs  T(C): 36.4 (23 Apr 2020 07:58), Max: 37 (22 Apr 2020 15:42)  T(F): 97.5 (23 Apr 2020 07:58), Max: 98.6 (22 Apr 2020 15:42)  HR: 64 (23 Apr 2020 07:58) (58 - 64)  BP: 119/77 (23 Apr 2020 07:58) (119/77 - 156/73)  BP(mean): --  RR: 22 (23 Apr 2020 07:58) (20 - 22)  SpO2: 97% (23 Apr 2020 07:58) (97% - 100%)  CAPILLARY BLOOD GLUCOSE        I&O's Summary    22 Apr 2020 07:01  -  23 Apr 2020 07:00  --------------------------------------------------------  IN: 0 mL / OUT: 0 mL / NET: 0 mL        PHYSICAL EXAM:  HEAD:  Atraumatic, Normocephalic  NECK: Supple, No   JVD  CHEST/LUNG:   no     rales,     no,    rhonchi  HEART: Regular rate and rhythm;         murmur  ABDOMEN: Soft, Nontender, ;   EXTREMITIES:   no     edema  NEUROLOGY:   be dbound  LABS:    04-23    141  |  108  |  13  ----------------------------<  90  4.1   |  20<L>  |  0.75    Ca    8.8      23 Apr 2020 07:04  Phos  2.9     04-23  Mg     1.7     04-23    TPro  6.0  /  Alb  2.6<L>  /  TBili  2.5<H>  /  DBili  x   /  AST  32  /  ALT  20  /  AlkPhos  71  04-23                            Consultant(s) Notes Reviewed:      Care Discussed with Consultants/Other Providers:

## 2020-04-23 NOTE — PHYSICAL THERAPY INITIAL EVALUATION ADULT - BALANCE DISTURBANCE, IDENTIFIED IMPAIRMENT CONTRIBUTE, REHAB EVAL
decreased strength/impaired coordination/impaired postural control/impaired motor control/abnormal muscle tone

## 2020-04-23 NOTE — PROVIDER CONTACT NOTE (OTHER) - RECOMMENDATIONS
order for mittens recommended
Advised to change tube to NGT to prevent clogging due to medication administration
Pt to be placed NPO/IVF.
IV team paged to attempt IV insertion.
LAKESHIA Camejo made aware - no new orders at this time.

## 2020-04-23 NOTE — PHYSICAL THERAPY INITIAL EVALUATION ADULT - GENERAL OBSERVATIONS, REHAB EVAL
Pt received semisupine in bed, lethargic, eyes mostly closed t/o session, +supplemental 02 via NC, +NGT, +airborne/contact precautions, +COVID, PT donned/doffed appropriate PPE during session.

## 2020-04-23 NOTE — PROVIDER CONTACT NOTE (OTHER) - SITUATION
Pt has no IV access at this time, pt is difficult stick. 2 RN attempted x2 each, unable. Overnight RN also attempted IV access.

## 2020-04-23 NOTE — PHYSICAL THERAPY INITIAL EVALUATION ADULT - ADDITIONAL COMMENTS
As per care coordination note. pt resides at [facility]; unable to obtain hx from pt due to current cognitive status, unclear level of assist required previously.

## 2020-04-23 NOTE — PROGRESS NOTE ADULT - ASSESSMENT
89   yr pt,   pt  with  h/o  afib,   not  on   xarelto,  due  to falls,   cva,   copd, hld,     alzheimers  dementia,    pud/  h/o left ribf xs. 8/ 9 th,. left hip surg  h/o mlple falls.  echo, normal ef/ T2  comp  deformity    sent  to  er  from   [facility], for  weakness/ sob  ct head, no cva//   ct  chest  with  goo   COVID  positive  elevated  troponin/ CRP/  Ferritin  , from Covid   pt  with  advanced  age/    comfort/   supportive  care is  ideal  however, son  wants  all  done. seen by icu  and palliative care  hypernatremia  resolved/   CNS,  in 1/2  blood   c/s,  which is  a  contaminant  palliative/ comfort care,  ought to  be goal  in this pt   on   ng tube now.  on midodrine/  son   wishes  to  continue   current care  on dvt ppx/  sbp above   120        < from: CT Chest No Cont (04.17.20 @ 04:24) >  IMPRESSION:   Pattern of GGO suggests infection including atypical pneumonia/viral infection from atypical agents including COVID-19 (C19V-1)  < end of copied text >     < from: CT Thoracic Spine Reform No Cont (01.07.20 @ 16:43) >  IMPRESSION:  Volume loss, microvascular disease, no acute hemorrhage or midline shift. If symptoms persist consider follow up head CT or MRI contraindications.  Cervical and thoracic spine are unchanged from prior, no obvious fracture or dislocation, multilevel degenerative changes as noted previously with diffuse osteopenia and small unchanged mild superior endplate T2 compression deformity.  < end of copied text      < from: Limited Transthoracic Echo (10.26.18 @ 14:07) >  Conclusions:  Limited tranthoracic echocardiogram at patients request to  end exam.  1. Mitral annular calcification.  2. The aortic valve opens well.  3. Limited images acquired at the patients request. Based  upon the parasternal long axis view only;  grossly normal  left ventricular systolic function.  < end of copied text >

## 2020-04-23 NOTE — PHYSICAL THERAPY INITIAL EVALUATION ADULT - PERTINENT HX OF CURRENT PROBLEM, REHAB EVAL
Pt is 89F admitted 4/17/20 PMHx CVA, atrial fibrillation on Xarelto, dementia; presents to ED from the [facility] w/bradycardia to 30-40's.

## 2020-04-24 LAB
ALBUMIN SERPL ELPH-MCNC: 2.4 G/DL — LOW (ref 3.3–5)
ALP SERPL-CCNC: 70 U/L — SIGNIFICANT CHANGE UP (ref 40–120)
ALT FLD-CCNC: 15 U/L — SIGNIFICANT CHANGE UP (ref 10–45)
ANION GAP SERPL CALC-SCNC: 12 MMOL/L — SIGNIFICANT CHANGE UP (ref 5–17)
AST SERPL-CCNC: 24 U/L — SIGNIFICANT CHANGE UP (ref 10–40)
BASOPHILS # BLD AUTO: 0.02 K/UL — SIGNIFICANT CHANGE UP (ref 0–0.2)
BASOPHILS NFR BLD AUTO: 0.2 % — SIGNIFICANT CHANGE UP (ref 0–2)
BILIRUB SERPL-MCNC: 1.6 MG/DL — HIGH (ref 0.2–1.2)
BUN SERPL-MCNC: 10 MG/DL — SIGNIFICANT CHANGE UP (ref 7–23)
CALCIUM SERPL-MCNC: 8.3 MG/DL — LOW (ref 8.4–10.5)
CHLORIDE SERPL-SCNC: 107 MMOL/L — SIGNIFICANT CHANGE UP (ref 96–108)
CO2 SERPL-SCNC: 22 MMOL/L — SIGNIFICANT CHANGE UP (ref 22–31)
CORTIS AM PEAK SERPL-MCNC: 12.1 UG/DL — SIGNIFICANT CHANGE UP (ref 6–18.4)
CREAT SERPL-MCNC: 0.7 MG/DL — SIGNIFICANT CHANGE UP (ref 0.5–1.3)
CRP SERPL-MCNC: 0.4 MG/DL — SIGNIFICANT CHANGE UP (ref 0–0.4)
D DIMER BLD IA.RAPID-MCNC: 505 NG/ML DDU — HIGH
EOSINOPHIL # BLD AUTO: 0.02 K/UL — SIGNIFICANT CHANGE UP (ref 0–0.5)
EOSINOPHIL NFR BLD AUTO: 0.2 % — SIGNIFICANT CHANGE UP (ref 0–6)
FERRITIN SERPL-MCNC: 323 NG/ML — HIGH (ref 15–150)
GLUCOSE SERPL-MCNC: 172 MG/DL — HIGH (ref 70–99)
HCT VFR BLD CALC: 26.4 % — LOW (ref 34.5–45)
HGB BLD-MCNC: 8.7 G/DL — LOW (ref 11.5–15.5)
IMM GRANULOCYTES NFR BLD AUTO: 0.8 % — SIGNIFICANT CHANGE UP (ref 0–1.5)
LYMPHOCYTES # BLD AUTO: 1.52 K/UL — SIGNIFICANT CHANGE UP (ref 1–3.3)
LYMPHOCYTES # BLD AUTO: 15.1 % — SIGNIFICANT CHANGE UP (ref 13–44)
MAGNESIUM SERPL-MCNC: 1.9 MG/DL — SIGNIFICANT CHANGE UP (ref 1.6–2.6)
MCHC RBC-ENTMCNC: 33 GM/DL — SIGNIFICANT CHANGE UP (ref 32–36)
MCHC RBC-ENTMCNC: 33.6 PG — SIGNIFICANT CHANGE UP (ref 27–34)
MCV RBC AUTO: 101.9 FL — HIGH (ref 80–100)
MONOCYTES # BLD AUTO: 0.7 K/UL — SIGNIFICANT CHANGE UP (ref 0–0.9)
MONOCYTES NFR BLD AUTO: 7 % — SIGNIFICANT CHANGE UP (ref 2–14)
NEUTROPHILS # BLD AUTO: 7.71 K/UL — HIGH (ref 1.8–7.4)
NEUTROPHILS NFR BLD AUTO: 76.7 % — SIGNIFICANT CHANGE UP (ref 43–77)
NRBC # BLD: 0 /100 WBCS — SIGNIFICANT CHANGE UP (ref 0–0)
PHOSPHATE SERPL-MCNC: 2.9 MG/DL — SIGNIFICANT CHANGE UP (ref 2.5–4.5)
PLATELET # BLD AUTO: 125 K/UL — LOW (ref 150–400)
POTASSIUM SERPL-MCNC: 3.7 MMOL/L — SIGNIFICANT CHANGE UP (ref 3.5–5.3)
POTASSIUM SERPL-SCNC: 3.7 MMOL/L — SIGNIFICANT CHANGE UP (ref 3.5–5.3)
PROT SERPL-MCNC: 5.9 G/DL — LOW (ref 6–8.3)
RBC # BLD: 2.59 M/UL — LOW (ref 3.8–5.2)
RBC # FLD: 14.3 % — SIGNIFICANT CHANGE UP (ref 10.3–14.5)
SODIUM SERPL-SCNC: 141 MMOL/L — SIGNIFICANT CHANGE UP (ref 135–145)
TSH SERPL-MCNC: 3.19 UIU/ML — SIGNIFICANT CHANGE UP (ref 0.27–4.2)
WBC # BLD: 10.05 K/UL — SIGNIFICANT CHANGE UP (ref 3.8–10.5)
WBC # FLD AUTO: 10.05 K/UL — SIGNIFICANT CHANGE UP (ref 3.8–10.5)

## 2020-04-24 PROCEDURE — 99232 SBSQ HOSP IP/OBS MODERATE 35: CPT | Mod: CS

## 2020-04-24 RX ADMIN — MIDODRINE HYDROCHLORIDE 20 MILLIGRAM(S): 2.5 TABLET ORAL at 05:37

## 2020-04-24 RX ADMIN — SODIUM CHLORIDE 50 MILLILITER(S): 9 INJECTION, SOLUTION INTRAVENOUS at 11:31

## 2020-04-24 RX ADMIN — Medication 15 MILLILITER(S): at 11:29

## 2020-04-24 RX ADMIN — HEPARIN SODIUM 5000 UNIT(S): 5000 INJECTION INTRAVENOUS; SUBCUTANEOUS at 17:55

## 2020-04-24 RX ADMIN — MIDODRINE HYDROCHLORIDE 20 MILLIGRAM(S): 2.5 TABLET ORAL at 11:53

## 2020-04-24 RX ADMIN — HEPARIN SODIUM 5000 UNIT(S): 5000 INJECTION INTRAVENOUS; SUBCUTANEOUS at 05:36

## 2020-04-24 RX ADMIN — MIDODRINE HYDROCHLORIDE 20 MILLIGRAM(S): 2.5 TABLET ORAL at 22:35

## 2020-04-24 RX ADMIN — Medication 100 MILLIGRAM(S): at 11:31

## 2020-04-24 NOTE — CHART NOTE - NSCHARTNOTEFT_GEN_A_CORE
Nutrition Follow Up Note  Patient seen for: Malnutrition follow up. Pt is a 89 year old female with dementia, positive for COVID 19, Previously NPO due to aspiration risk, now initiated on enteral feeding via NGT    Source: RN, pt with dementia, unable to provide information over phone. Unable to conduct a face to face interview or nutrition-focused physical exam at this time due to limited contact restrictions related to their medical condition and isolation precautions.     Diet : Bolus feeds Jevity 1.2 Robbi 150 mL/h every 6 hours for a total of 4 feeds per day    Patient reports: Per RN no N+V, last BM overnight        Enteral /Parenteral Nutrition: Per RN pt has been tolerating bolus feeds well at goal of 150 mL/h every 6 hours, has been infused via pump. In efforts to conserve equipment for critical care units would convert to bolus via syringe-discussed with RN.       Daily no new wt   % Weight Change    Pertinent Medications: MEDICATIONS  (STANDING):  dextrose 5%. 1000 milliLiter(s) (50 mL/Hr) IV Continuous <Continuous>  heparin  Injectable 5000 Unit(s) SubCutaneous every 12 hours  midodrine 20 milliGRAM(s) Oral every 8 hours  multivitamin/minerals/iron Oral Solution (CENTRUM) 15 milliLiter(s) Enteral Tube daily  thiamine 100 milliGRAM(s) Enteral Tube daily    MEDICATIONS  (PRN):  acetaminophen   Tablet .. 650 milliGRAM(s) Oral every 6 hours PRN Temp greater or equal to 38C (100.4F), Moderate Pain (4 - 6)    Pertinent Labs: 04-24 @ 09:24: Na 141, BUN 10, Cr 0.70, <H>, K+ 3.7, Phos 2.9, Mg 1.9, Alk Phos 70, ALT/SGPT 15, AST/SGOT 24, HbA1c -- Pt deemed at risk for refeeding syndrome, electrolytes have been stable thus far, can continue to advance tube feeding.     Finger Sticks: none       Skin per nursing documentation: free of pressure injury   Edema: 2+ gab arm and R hand edema     Estimated Needs:   [ x] no change since previous assessment  [ ] recalculated:     Previous Nutrition Diagnosis: Mild malnutrition   Nutrition Diagnosis is: ongoing, addressed with tube feeding    New Nutrition Diagnosis:  Related to:    As evidenced by:      Interventions:     Recommend  1) Consider increasing bolus feed Jevity 1.2 Robbi by 50 mL each feed until goal of 237 mL (1 can) every 6 hours is reached for a total of 4 feeds per day. This regimen at goal provides 1140 Kcal, 52.8g protein, 764 mL free water (23 Kcal/Kg, 1g protein/Kg based on dosing wt 50 Kg)   2) Nutrition plan of care to be in line with medical GOC and pt/family wishes  3) Continue daily multivitamin and thiamine, continue to monitor electrolytes K, PO4, Mg and replete as needed.       Monitoring and Evaluation:     Continue to monitor Nutritional intake, Tolerance to diet prescription, weights, labs, skin integrity    RD remains available upon request and will follow up per protocol    Kajal Gray R.D., [facility], Pager #[phone]

## 2020-04-24 NOTE — PROGRESS NOTE ADULT - SUBJECTIVE AND OBJECTIVE BOX
bed bound/  non verba;    REVIEW OF SYSTEMS:  GEN: no fever,    no chills  RESP: no SOB,   no cough  CVS: no chest pain,   no palpitations  GI: no abdominal pain,   no nausea,   no vomiting,   no constipation,   no diarrhea  : no dysuria,   no frequency  NEURO: no headache,   no dizziness  PSYCH: no depression,   not anxious  Derm : no rash    MEDICATIONS  (STANDING):  dextrose 5%. 1000 milliLiter(s) (50 mL/Hr) IV Continuous <Continuous>  heparin  Injectable 5000 Unit(s) SubCutaneous every 12 hours  midodrine 20 milliGRAM(s) Oral every 8 hours  multivitamin/minerals/iron Oral Solution (CENTRUM) 15 milliLiter(s) Enteral Tube daily  thiamine 100 milliGRAM(s) Enteral Tube daily    MEDICATIONS  (PRN):  acetaminophen   Tablet .. 650 milliGRAM(s) Oral every 6 hours PRN Temp greater or equal to 38C (100.4F), Moderate Pain (4 - 6)      Vital Signs Last 24 Hrs  T(C): 37.3 (23 Apr 2020 23:47), Max: 37.3 (23 Apr 2020 23:47)  T(F): 99.1 (23 Apr 2020 23:47), Max: 99.1 (23 Apr 2020 23:47)  HR: 72 (23 Apr 2020 23:47) (64 - 72)  BP: 139/68 (23 Apr 2020 23:47) (117/65 - 139/68)  BP(mean): --  RR: 22 (23 Apr 2020 23:47) (22 - 22)  SpO2: 98% (23 Apr 2020 23:47) (93% - 98%)  CAPILLARY BLOOD GLUCOSE        I&O's Summary    23 Apr 2020 07:01  -  24 Apr 2020 07:00  --------------------------------------------------------  IN: 300 mL / OUT: 0 mL / NET: 300 mL        PHYSICAL EXAM:  HEAD:  Atraumatic, Normocephalic  NECK: Supple, No   JVD  CHEST/LUNG:   no     rales,     no,    rhonchi  HEART: Regular rate and rhythm;         murmur  ABDOMEN: Soft, Nontender, ;   EXTREMITIES:    no    edema  NEUROLOGY  listless    LABS:    04-23    141  |  108  |  13  ----------------------------<  90  4.1   |  20<L>  |  0.75    Ca    8.8      23 Apr 2020 07:04  Phos  2.9     04-23  Mg     1.7     04-23    TPro  6.0  /  Alb  2.6<L>  /  TBili  2.5<H>  /  DBili  x   /  AST  32  /  ALT  20  /  AlkPhos  71  04-23                            Consultant(s) Notes Reviewed:      Care Discussed with Consultants/Other Providers:

## 2020-04-24 NOTE — PROGRESS NOTE ADULT - ASSESSMENT
89   yr pt,   pt  with  h/o  afib,   not  on   xarelto,  due  to falls,   cva,   copd, hld,     alzheimers  dementia,    pud/  h/o left ribf xs. 8/ 9 th,. left hip surg  h/o mlple falls.  echo, normal ef/ T2  comp  deformity    sent  to  er  from   [facility], for  weakness/ sob  ct head, no cva//   ct  chest  with  goo   COVID  positive  elevated  troponin/ CRP/  Ferritin  , from Covid   pt  with  advanced  age/    comfort/   supportive  care is  ideal  however, son  wants  all  done. seen by icu  and palliative care  hypernatremia  resolved/   CNS,  in 1/2  blood   c/s,  which is  a  contaminant  palliative/ comfort care,  ought to  be goal  in this pt   on   ng tube now.  on midodrine/  son   wishes  to  continue   current care  on dvt ppx/  sbp above  130/  on  ngt  feeds a nd  iv dextrise,  Fs  is  90        < from: CT Chest No Cont (04.17.20 @ 04:24) >  IMPRESSION:   Pattern of GGO suggests infection including atypical pneumonia/viral infection from atypical agents including COVID-19 (C19V-1)  < end of copied text >     < from: CT Thoracic Spine Reform No Cont (01.07.20 @ 16:43) >  IMPRESSION:  Volume loss, microvascular disease, no acute hemorrhage or midline shift. If symptoms persist consider follow up head CT or MRI contraindications.  Cervical and thoracic spine are unchanged from prior, no obvious fracture or dislocation, multilevel degenerative changes as noted previously with diffuse osteopenia and small unchanged mild superior endplate T2 compression deformity.  < end of copied text      < from: Limited Transthoracic Echo (10.26.18 @ 14:07) >  Conclusions:  Limited tranthoracic echocardiogram at patients request to  end exam.  1. Mitral annular calcification.  2. The aortic valve opens well.  3. Limited images acquired at the patients request. Based  upon the parasternal long axis view only;  grossly normal  left ventricular systolic function.  < end of copied text >

## 2020-04-24 NOTE — PROGRESS NOTE ADULT - SUBJECTIVE AND OBJECTIVE BOX
CARDIOLOGY     PROGRESS  NOTE   ________________________________________________    CHIEF COMPLAINT:Patient is a 89y old  Female who presents with a chief complaint of sob/ hypotension (24 Apr 2020 07:28)  comfortable.  	  REVIEW OF SYSTEMS:  CONSTITUTIONAL: No fever, weight loss, or fatigue  EYES: No eye pain, visual disturbances, or discharge  ENT:  No difficulty hearing, tinnitus, vertigo; No sinus or throat pain  NECK: No pain or stiffness  RESPIRATORY: No cough, wheezing, chills or hemoptysis; + decrease  Shortness of Breath  CARDIOVASCULAR: No chest pain, palpitations, passing out, dizziness, or leg swelling  GASTROINTESTINAL: No abdominal or epigastric pain. No nausea, vomiting, or hematemesis; No diarrhea or constipation. No melena or hematochezia.  GENITOURINARY: No dysuria, frequency, hematuria, or incontinence  NEUROLOGICAL: No headaches, memory loss, loss of strength, numbness, or tremors  SKIN: No itching, burning, rashes, or lesions   LYMPH Nodes: No enlarged glands  ENDOCRINE: No heat or cold intolerance; No hair loss  MUSCULOSKELETAL: No joint pain or swelling; No muscle, back, or extremity pain  PSYCHIATRIC: No depression, anxiety, mood swings, or difficulty sleeping  HEME/LYMPH: No easy bruising, or bleeding gums  ALLERGY AND IMMUNOLOGIC: No hives or eczema	    [ ] All others negative	  [ ] Unable to obtain    PHYSICAL EXAM:  T(C): 37.1 (04-24-20 @ 08:42), Max: 37.3 (04-23-20 @ 23:47)  HR: 69 (04-24-20 @ 08:42) (69 - 72)  BP: 122/69 (04-24-20 @ 08:42) (117/65 - 139/68)  RR: 22 (04-24-20 @ 08:42) (22 - 22)  SpO2: 99% (04-24-20 @ 08:42) (93% - 99%)  Wt(kg): --  I&O's Summary    23 Apr 2020 07:01  -  24 Apr 2020 07:00  --------------------------------------------------------  IN: 300 mL / OUT: 0 mL / NET: 300 mL    24 Apr 2020 07:01  -  24 Apr 2020 12:09  --------------------------------------------------------  IN: 150 mL / OUT: 0 mL / NET: 150 mL        Appearance: Normal	  HEENT:   Normal oral mucosa, PERRL, EOMI	  Lymphatic: No lymphadenopathy  Cardiovascular: Normal S1 S2, No JVD, + murmurs, No edema  Respiratory: Lungs clear to auscultation	  Gastrointestinal:  Soft, Non-tender, + BS	  Skin: No rashes, No ecchymoses, No cyanosis	  Neurologic: Non-focal  Extremities: Normal range of motion, No clubbing, cyanosis or edema  Vascular: Peripheral pulses palpable 2+ bilaterally    MEDICATIONS  (STANDING):  dextrose 5%. 1000 milliLiter(s) (50 mL/Hr) IV Continuous <Continuous>  heparin  Injectable 5000 Unit(s) SubCutaneous every 12 hours  midodrine 20 milliGRAM(s) Oral every 8 hours  multivitamin/minerals/iron Oral Solution (CENTRUM) 15 milliLiter(s) Enteral Tube daily  thiamine 100 milliGRAM(s) Enteral Tube daily      TELEMETRY: 	    ECG:  	  RADIOLOGY:  OTHER: 	  	  LABS:	 	    CARDIAC MARKERS:                                8.7    10.05 )-----------( 125      ( 24 Apr 2020 09:24 )             26.4     04-24    141  |  107  |  10  ----------------------------<  172<H>  3.7   |  22  |  0.70    Ca    8.3<L>      24 Apr 2020 09:24  Phos  2.9     04-24  Mg     1.9     04-24    TPro  5.9<L>  /  Alb  2.4<L>  /  TBili  1.6<H>  /  DBili  x   /  AST  24  /  ALT  15  /  AlkPhos  70  04-24    proBNP: Serum Pro-Brain Natriuretic Peptide: 4277 pg/mL (04-17 @ 05:13)    Lipid Profile:   HgA1c:   TSH:         Assessment and plan  ---------------------------  89 year-old female with history of CVA, atrial fibrillation on Xarelto, dementia presents to the Emergency Department for bradycardia.  Patient presents from the [facility]; on vitals was found to be bradycardic to 30s-40s and sent to the ED.  Patient with decreased responsiveness and PO intake for the past few days; recently started on Azithromycin due to unknown etiology/NH paperwork w/o such information.  Tested for COVID on 4/3/20 and negative.  Patient with hypoxia at NH down to 89% on RA.  pt with sig cardiac/ medical history with increasing sob and hypotension .  pt at time is on pressors will continue, and will taper slowly  ivf  covid + will add hydroxychloroquine if pt becomes responsive  spoke to the son Mina wants full medical therapy including intubation, clinical review team does not feel aggressive therapy with intubation will change outcome  will titrate the pressors to off  palliative care noted and discussed with Dr Quintanilla yesterday  dvt prophylaxis  blood culture 1 set +/ second set negative  ID appreciated  ngt placed appreciated, getting Fed  son wants pt to be FEED the pt and discussed with him, he wants full medical therapy  continue to observe  decrease midodrine as tolerated  continue feeding  o2 sat is well controlled

## 2020-04-25 PROCEDURE — 99232 SBSQ HOSP IP/OBS MODERATE 35: CPT | Mod: CS

## 2020-04-25 RX ADMIN — MIDODRINE HYDROCHLORIDE 20 MILLIGRAM(S): 2.5 TABLET ORAL at 21:18

## 2020-04-25 RX ADMIN — HEPARIN SODIUM 5000 UNIT(S): 5000 INJECTION INTRAVENOUS; SUBCUTANEOUS at 06:01

## 2020-04-25 RX ADMIN — HEPARIN SODIUM 5000 UNIT(S): 5000 INJECTION INTRAVENOUS; SUBCUTANEOUS at 18:02

## 2020-04-25 RX ADMIN — MIDODRINE HYDROCHLORIDE 20 MILLIGRAM(S): 2.5 TABLET ORAL at 13:34

## 2020-04-25 RX ADMIN — Medication 15 MILLILITER(S): at 11:12

## 2020-04-25 RX ADMIN — Medication 100 MILLIGRAM(S): at 11:12

## 2020-04-25 RX ADMIN — MIDODRINE HYDROCHLORIDE 20 MILLIGRAM(S): 2.5 TABLET ORAL at 06:02

## 2020-04-25 NOTE — PROGRESS NOTE ADULT - SUBJECTIVE AND OBJECTIVE BOX
CARDIOLOGY     PROGRESS  NOTE   ________________________________________________    CHIEF COMPLAINT:Patient is a 89y old  Female who presents with a chief complaint of sob/ hypotension (25 Apr 2020 08:14)  no complain.  	  REVIEW OF SYSTEMS:  CONSTITUTIONAL: No fever, weight loss, or fatigue  EYES: No eye pain, visual disturbances, or discharge  ENT:  No difficulty hearing, tinnitus, vertigo; No sinus or throat pain  NECK: No pain or stiffness  RESPIRATORY: No cough, wheezing, chills or hemoptysis; No Shortness of Breath  CARDIOVASCULAR: No chest pain, palpitations, passing out, dizziness, or leg swelling  GASTROINTESTINAL: No abdominal or epigastric pain. No nausea, vomiting, or hematemesis; No diarrhea or constipation. No melena or hematochezia.  GENITOURINARY: No dysuria, frequency, hematuria, or incontinence  NEUROLOGICAL: No headaches, memory loss, loss of strength, numbness, or tremors  SKIN: No itching, burning, rashes, or lesions   LYMPH Nodes: No enlarged glands  ENDOCRINE: No heat or cold intolerance; No hair loss  MUSCULOSKELETAL: No joint pain or swelling; No muscle, back, or extremity pain  PSYCHIATRIC: No depression, anxiety, mood swings, or difficulty sleeping  HEME/LYMPH: No easy bruising, or bleeding gums  ALLERGY AND IMMUNOLOGIC: No hives or eczema	    [ ] All others negative	  [ x] Unable to obtain    PHYSICAL EXAM:  T(C): 36.7 (04-25-20 @ 08:34), Max: 37.5 (04-24-20 @ 16:48)  HR: 60 (04-25-20 @ 08:34) (60 - 80)  BP: 107/58 (04-25-20 @ 08:34) (107/58 - 137/55)  RR: 24 (04-25-20 @ 08:34) (22 - 24)  SpO2: 99% (04-25-20 @ 08:34) (98% - 100%)  Wt(kg): --  I&O's Summary    24 Apr 2020 07:01  -  25 Apr 2020 07:00  --------------------------------------------------------  IN: 900 mL / OUT: 0 mL / NET: 900 mL        Appearance: Normal	  HEENT:   Normal oral mucosa, PERRL, EOMI	  Lymphatic: No lymphadenopathy  Cardiovascular: Normal S1 S2, No JVD, +murmurs, No edema  Respiratory: Lungs clear to auscultation	  Psychiatry: A & O x 3, Mood & affect appropriate  Gastrointestinal:  Soft, Non-tender, + BS	  Skin: No rashes, No ecchymoses, No cyanosis	  Neurologic: Non-focal  Extremities: Normal range of motion, No clubbing, cyanosis or edema  Vascular: Peripheral pulses palpable 2+ bilaterally    MEDICATIONS  (STANDING):  dextrose 5%. 1000 milliLiter(s) (50 mL/Hr) IV Continuous <Continuous>  heparin  Injectable 5000 Unit(s) SubCutaneous every 12 hours  midodrine 20 milliGRAM(s) Oral every 8 hours  multivitamin/minerals/iron Oral Solution (CENTRUM) 15 milliLiter(s) Enteral Tube daily  thiamine 100 milliGRAM(s) Enteral Tube daily      TELEMETRY: 	    ECG:  	  RADIOLOGY:  OTHER: 	  	  LABS:	 	    CARDIAC MARKERS:                                8.7    10.05 )-----------( 125      ( 24 Apr 2020 09:24 )             26.4     04-24    141  |  107  |  10  ----------------------------<  172<H>  3.7   |  22  |  0.70    Ca    8.3<L>      24 Apr 2020 09:24  Phos  2.9     04-24  Mg     1.9     04-24    TPro  5.9<L>  /  Alb  2.4<L>  /  TBili  1.6<H>  /  DBili  x   /  AST  24  /  ALT  15  /  AlkPhos  70  04-24    proBNP: Serum Pro-Brain Natriuretic Peptide: 4277 pg/mL (04-17 @ 05:13)    Lipid Profile:   HgA1c:   TSH: Thyroid Stimulating Hormone, Serum: 3.19 uIU/mL (04-24 @ 12:11)          Assessment and plan  ---------------------------  89 year-old female with history of CVA, atrial fibrillation on Xarelto, dementia presents to the Emergency Department for bradycardia.  Patient presents from the [facility]; on vitals was found to be bradycardic to 30s-40s and sent to the ED.  Patient with decreased responsiveness and PO intake for the past few days; recently started on Azithromycin due to unknown etiology/NH paperwork w/o such information.  Tested for COVID on 4/3/20 and negative.  Patient with hypoxia at NH down to 89% on RA.  pt with sig cardiac/ medical history with increasing sob and hypotension .  pt at time is on pressors will continue, and will taper slowly  ivf  covid + will add hydroxychloroquine if pt becomes responsive  spoke to the son Mina wants full medical therapy including intubation, clinical review team does not feel aggressive therapy with intubation will change outcome  will titrate the pressors to off  palliative care noted and discussed with Dr Quintanilla yesterday  dvt prophylaxis  blood culture 1 set +/ second set negative  ID appreciated  ngt placed appreciated, getting Fed  son wants pt to be FEED the pt and discussed with him, he wants full medical therapy  continue to observe  decrease midodrine as tolerated  continue feeding  o2 sat is well controlled  awaiting mental status improvement ?peg / discussed with son he wants everything to be done CARDIOLOGY     PROGRESS  NOTE   ________________________________________________    CHIEF COMPLAINT:Patient is a 89y old  Female who presents with a chief complaint of sob/ hypotension (25 Apr 2020 08:14)  no complain.  	  REVIEW OF SYSTEMS:  CONSTITUTIONAL: No fever, weight loss, or fatigue  EYES: No eye pain, visual disturbances, or discharge  ENT:  No difficulty hearing, tinnitus, vertigo; No sinus or throat pain  NECK: No pain or stiffness  RESPIRATORY: No cough, wheezing, chills or hemoptysis; No Shortness of Breath  CARDIOVASCULAR: No chest pain, palpitations, passing out, dizziness, or leg swelling  GASTROINTESTINAL: No abdominal or epigastric pain. No nausea, vomiting, or hematemesis; No diarrhea or constipation. No melena or hematochezia.  GENITOURINARY: No dysuria, frequency, hematuria, or incontinence  NEUROLOGICAL: No headaches, memory loss, loss of strength, numbness, or tremors  SKIN: No itching, burning, rashes, or lesions   LYMPH Nodes: No enlarged glands  ENDOCRINE: No heat or cold intolerance; No hair loss  MUSCULOSKELETAL: No joint pain or swelling; No muscle, back, or extremity pain  PSYCHIATRIC: No depression, anxiety, mood swings, or difficulty sleeping  HEME/LYMPH: No easy bruising, or bleeding gums  ALLERGY AND IMMUNOLOGIC: No hives or eczema	    [ ] All others negative	  [ x] Unable to obtain    PHYSICAL EXAM:  T(C): 36.7 (04-25-20 @ 08:34), Max: 37.5 (04-24-20 @ 16:48)  HR: 60 (04-25-20 @ 08:34) (60 - 80)  BP: 107/58 (04-25-20 @ 08:34) (107/58 - 137/55)  RR: 24 (04-25-20 @ 08:34) (22 - 24)  SpO2: 99% (04-25-20 @ 08:34) (98% - 100%)  Wt(kg): --  I&O's Summary    24 Apr 2020 07:01  -  25 Apr 2020 07:00  --------------------------------------------------------  IN: 900 mL / OUT: 0 mL / NET: 900 mL        Appearance: Normal	  HEENT:   Normal oral mucosa, PERRL, EOMI	  Lymphatic: No lymphadenopathy  Cardiovascular: Normal S1 S2, No JVD, +murmurs, No edema  Respiratory: Lungs clear to auscultation	  Psychiatry: A & O x 3, Mood & affect appropriate  Gastrointestinal:  Soft, Non-tender, + BS	  Skin: No rashes, No ecchymoses, No cyanosis	  Neurologic: Non-focal  Extremities: Normal range of motion, No clubbing, cyanosis or edema  Vascular: Peripheral pulses palpable 2+ bilaterally    MEDICATIONS  (STANDING):  dextrose 5%. 1000 milliLiter(s) (50 mL/Hr) IV Continuous <Continuous>  heparin  Injectable 5000 Unit(s) SubCutaneous every 12 hours  midodrine 20 milliGRAM(s) Oral every 8 hours  multivitamin/minerals/iron Oral Solution (CENTRUM) 15 milliLiter(s) Enteral Tube daily  thiamine 100 milliGRAM(s) Enteral Tube daily      TELEMETRY: 	    ECG:  	  RADIOLOGY:  OTHER: 	  	  LABS:	 	    CARDIAC MARKERS:                                8.7    10.05 )-----------( 125      ( 24 Apr 2020 09:24 )             26.4     04-24    141  |  107  |  10  ----------------------------<  172<H>  3.7   |  22  |  0.70    Ca    8.3<L>      24 Apr 2020 09:24  Phos  2.9     04-24  Mg     1.9     04-24    TPro  5.9<L>  /  Alb  2.4<L>  /  TBili  1.6<H>  /  DBili  x   /  AST  24  /  ALT  15  /  AlkPhos  70  04-24    proBNP: Serum Pro-Brain Natriuretic Peptide: 4277 pg/mL (04-17 @ 05:13)    Lipid Profile:   HgA1c:   TSH: Thyroid Stimulating Hormone, Serum: 3.19 uIU/mL (04-24 @ 12:11)    Informed by RN about the IV site infiltration. On examination mild swelling at the site present. mild swelling of fingers present. Ordered for warm compress. Hand is warm to touch, pulse is palpable. No complication noted. Patient has mittens for agitation. Reapplied after examination.      Assessment and plan  ---------------------------  89 year-old female with history of CVA, atrial fibrillation on Xarelto, dementia presents to the Emergency Department for bradycardia.  Patient presents from the [facility]; on vitals was found to be bradycardic to 30s-40s and sent to the ED.  Patient with decreased responsiveness and PO intake for the past few days; recently started on Azithromycin due to unknown etiology/NH paperwork w/o such information.  Tested for COVID on 4/3/20 and negative.  Patient with hypoxia at NH down to 89% on RA.  pt with sig cardiac/ medical history with increasing sob and hypotension .  pt at time is on pressors will continue, and will taper slowly  ivf  covid + will add hydroxychloroquine if pt becomes responsive  spoke to the son Mina wants full medical therapy including intubation, clinical review team does not feel aggressive therapy with intubation will change outcome  will titrate the pressors to off  palliative care noted and discussed with Dr Quintanilla yesterday  dvt prophylaxis  blood culture 1 set +/ second set negative  ID appreciated  ngt placed appreciated, getting Fed  son wants pt to be FEED the pt and discussed with him, he wants full medical therapy  continue to observe  decrease midodrine as tolerated  continue feeding  o2 sat is well controlled  awaiting mental status improvement ?peg / discussed with son he wants everything to be done  decrease hgb will repeat

## 2020-04-25 NOTE — CHART NOTE - NSCHARTNOTEFT_GEN_A_CORE
Informed by RN about the IV site infiltration. On examination mild swelling at the site present. mild swelling of fingers present. Ordered for warm compress. Hand is warm to touch, pulse is palpable. No complication noted. Patient has mittens for agitation. Reapplied after examination.

## 2020-04-25 NOTE — PROGRESS NOTE ADULT - ASSESSMENT
89   yr pt,   pt  with  h/o  afib,   not  on   xarelto,  due  to falls,   cva,   copd, hld,     alzheimers  dementia,    pud/  h/o left ribf xs. 8/ 9 th,. left hip surg  h/o mlple falls.  echo, normal ef/ T2  comp  deformity    sent  to  er  from   [facility], for  weakness/ sob  ct head, no cva//   ct  chest  with  goo   COVID  positive  elevated  troponin/ CRP/  Ferritin  , from Covid   pt  with  advanced  age/    comfort/   supportive  care is  ideal  however, son  wants  all  done. seen by icu  and palliative care  hypernatremia  resolved/   CNS,  in 1/2  blood   c/s,  which is  a  contaminant  palliative/ comfort care,  ought to  be goal  in this pt   on   ng tube now.  on midodrine/  son   wishes  to  continue   current care  on dvt ppx/  sbp above  130/  on  ngt  feeds a nd  iv dextrise,  on 2  liters  oxygen/  anemia,  stable/  with  stable  vitals        < from: CT Chest No Cont (04.17.20 @ 04:24) >  IMPRESSION:   Pattern of GGO suggests infection including atypical pneumonia/viral infection from atypical agents including COVID-19 (C19V-1)  < end of copied text >     < from: CT Thoracic Spine Reform No Cont (01.07.20 @ 16:43) >  IMPRESSION:  Volume loss, microvascular disease, no acute hemorrhage or midline shift. If symptoms persist consider follow up head CT or MRI contraindications.  Cervical and thoracic spine are unchanged from prior, no obvious fracture or dislocation, multilevel degenerative changes as noted previously with diffuse osteopenia and small unchanged mild superior endplate T2 compression deformity.  < end of copied text      < from: Limited Transthoracic Echo (10.26.18 @ 14:07) >  Conclusions:  Limited tranthoracic echocardiogram at patients request to  end exam.  1. Mitral annular calcification.  2. The aortic valve opens well.  3. Limited images acquired at the patients request. Based  upon the parasternal long axis view only;  grossly normal  left ventricular systolic function.  < end of copied text >

## 2020-04-25 NOTE — PROGRESS NOTE ADULT - SUBJECTIVE AND OBJECTIVE BOX
REVIEW OF SYSTEMS:  GEN: no fever,    no chills  RESP: no SOB,   no cough  CVS: no chest pain,   no palpitations  GI: no abdominal pain,   no nausea,   no vomiting,   no constipation,   no diarrhea  : no dysuria,   no frequency  NEURO: no headache,   no dizziness  PSYCH: no depression,   not anxious  Derm : no rash    MEDICafebrile/  non verbalne 100 milliGRAM(s) Enteral Tube daily    MEDICATIONS  (PRN):  acetaminophen   Tablet .. 650 milliGRAM(s) Oral every 6 hours PRN Temp greater or equal to 38C (100.4F), Moderate Pain (4 - 6)  guaiFENesin   Syrup  (Sugar-Free) 200 milliGRAM(s) Oral every 6 hours PRN Cough      Vital Signs Last 24 Hrs  T(C): 37.3 (24 Apr 2020 23:36), Max: 37.5 (24 Apr 2020 16:48)  T(F): 99.1 (24 Apr 2020 23:36), Max: 99.5 (24 Apr 2020 16:48)  HR: 70 (24 Apr 2020 23:36) (69 - 80)  BP: 137/55 (24 Apr 2020 23:36) (122/69 - 137/55)  BP(mean): --  RR: 22 (24 Apr 2020 23:36) (22 - 22)  SpO2: 100% (24 Apr 2020 23:36) (98% - 100%)  CAPILLARY BLOOD GLUCOSE        I&O's Summary    24 Apr 2020 07:01  -  25 Apr 2020 07:00  --------------------------------------------------------  IN: 900 mL / OUT: 0 mL / NET: 900 mL        PHYSICAL EXAMno:  HEAD:  Atraumatic, Normocephalic  NECK: Supple, No   JVD  CHEST/LUNG:   no     rales,     no,    rhonchi  HEART: Regular rate and rhythm;         murmur  ABDOMEN: Soft, Nontender, ;   EXTREMITIES:    no    edema  NEUROLOGY:    be dbound/  non verbal    LABS:                        8.7    10.05 )-----------( 125      ( 24 Apr 2020 09:24 )             26.4     04-24    141  |  107  |  10  ----------------------------<  172<H>  3.7   |  22  |  0.70    Ca    8.3<L>      24 Apr 2020 09:24  Phos  2.9     04-24  Mg     1.9     04-24    TPro  5.9<L>  /  Alb  2.4<L>  /  TBili  1.6<H>  /  DBili  x   /  AST  24  /  ALT  15  /  AlkPhos  70  04-24                    Thyroid Stimulating Hormone, Serum: 3.19 uIU/mL (04-24 @ 12:11)          Consultant(s) Notes Reviewed:      Care Discussed with Consultants/Other Providers:

## 2020-04-25 NOTE — PHYSICAL THERAPY INITIAL EVALUATION ADULT - IMPAIRMENTS CONTRIBUTING IMPAIRED BED MOBILITY, REHAB EVAL
"   04/25/20 0800   Pain/Comfort/Sleep   Pain Body Location - Side Left   Pain Body Location breast   Pain Rating (0-10): Rest 3   Breasts WDL   Left Breast Symptoms tenderness generalized;other (see comments)  (dressing intact)   Left Nipple Symptoms painful   Breast Pumping   Breast Pumping Interventions frequent pumping encouraged   Maternal Feeding Assessment   Maternal Emotional State relaxed;independent   Reproductive Interventions   Breastfeeding Assistance electric breast pump used   Breastfeeding Support encouragement provided;lactation counseling provided     Pumping right breast well without complications.  Left breast with dressing intact at this time.  C/o tenderness 4/10 in the under area of breast 3/10, no longer pumping this side.  Deniea any other c/o or concerns. At this time.  Encouraged to call for assist prn.  States "understand".  " decreased strength/impaired balance

## 2020-04-26 LAB
ALBUMIN SERPL ELPH-MCNC: 2.4 G/DL — LOW (ref 3.3–5)
ALP SERPL-CCNC: 62 U/L — SIGNIFICANT CHANGE UP (ref 40–120)
ALT FLD-CCNC: 11 U/L — SIGNIFICANT CHANGE UP (ref 10–45)
ANION GAP SERPL CALC-SCNC: 7 MMOL/L — SIGNIFICANT CHANGE UP (ref 5–17)
AST SERPL-CCNC: 15 U/L — SIGNIFICANT CHANGE UP (ref 10–40)
BILIRUB SERPL-MCNC: 1.1 MG/DL — SIGNIFICANT CHANGE UP (ref 0.2–1.2)
BUN SERPL-MCNC: 14 MG/DL — SIGNIFICANT CHANGE UP (ref 7–23)
CALCIUM SERPL-MCNC: 8.3 MG/DL — LOW (ref 8.4–10.5)
CHLORIDE SERPL-SCNC: 106 MMOL/L — SIGNIFICANT CHANGE UP (ref 96–108)
CO2 SERPL-SCNC: 29 MMOL/L — SIGNIFICANT CHANGE UP (ref 22–31)
CREAT SERPL-MCNC: 0.76 MG/DL — SIGNIFICANT CHANGE UP (ref 0.5–1.3)
GLUCOSE SERPL-MCNC: 91 MG/DL — SIGNIFICANT CHANGE UP (ref 70–99)
HCT VFR BLD CALC: 25.3 % — LOW (ref 34.5–45)
HCT VFR BLD CALC: 27.8 % — LOW (ref 34.5–45)
HGB BLD-MCNC: 8 G/DL — LOW (ref 11.5–15.5)
HGB BLD-MCNC: 8.8 G/DL — LOW (ref 11.5–15.5)
MCHC RBC-ENTMCNC: 31.6 GM/DL — LOW (ref 32–36)
MCHC RBC-ENTMCNC: 31.7 GM/DL — LOW (ref 32–36)
MCHC RBC-ENTMCNC: 33.3 PG — SIGNIFICANT CHANGE UP (ref 27–34)
MCHC RBC-ENTMCNC: 33.3 PG — SIGNIFICANT CHANGE UP (ref 27–34)
MCV RBC AUTO: 105.3 FL — HIGH (ref 80–100)
MCV RBC AUTO: 105.4 FL — HIGH (ref 80–100)
NRBC # BLD: 0 /100 WBCS — SIGNIFICANT CHANGE UP (ref 0–0)
NRBC # BLD: 0 /100 WBCS — SIGNIFICANT CHANGE UP (ref 0–0)
PLATELET # BLD AUTO: 154 K/UL — SIGNIFICANT CHANGE UP (ref 150–400)
PLATELET # BLD AUTO: 161 K/UL — SIGNIFICANT CHANGE UP (ref 150–400)
POTASSIUM SERPL-MCNC: 4.1 MMOL/L — SIGNIFICANT CHANGE UP (ref 3.5–5.3)
POTASSIUM SERPL-SCNC: 4.1 MMOL/L — SIGNIFICANT CHANGE UP (ref 3.5–5.3)
PROT SERPL-MCNC: 5.7 G/DL — LOW (ref 6–8.3)
RBC # BLD: 2.4 M/UL — LOW (ref 3.8–5.2)
RBC # BLD: 2.64 M/UL — LOW (ref 3.8–5.2)
RBC # FLD: 15.1 % — HIGH (ref 10.3–14.5)
RBC # FLD: 15.3 % — HIGH (ref 10.3–14.5)
SODIUM SERPL-SCNC: 142 MMOL/L — SIGNIFICANT CHANGE UP (ref 135–145)
WBC # BLD: 8.12 K/UL — SIGNIFICANT CHANGE UP (ref 3.8–10.5)
WBC # BLD: 9.27 K/UL — SIGNIFICANT CHANGE UP (ref 3.8–10.5)
WBC # FLD AUTO: 8.12 K/UL — SIGNIFICANT CHANGE UP (ref 3.8–10.5)
WBC # FLD AUTO: 9.27 K/UL — SIGNIFICANT CHANGE UP (ref 3.8–10.5)

## 2020-04-26 PROCEDURE — 99232 SBSQ HOSP IP/OBS MODERATE 35: CPT | Mod: CS

## 2020-04-26 RX ORDER — MIDODRINE HYDROCHLORIDE 2.5 MG/1
10 TABLET ORAL EVERY 8 HOURS
Refills: 0 | Status: DISCONTINUED | OUTPATIENT
Start: 2020-04-26 | End: 2020-05-05

## 2020-04-26 RX ADMIN — HEPARIN SODIUM 5000 UNIT(S): 5000 INJECTION INTRAVENOUS; SUBCUTANEOUS at 05:49

## 2020-04-26 RX ADMIN — Medication 15 MILLILITER(S): at 13:33

## 2020-04-26 RX ADMIN — MIDODRINE HYDROCHLORIDE 10 MILLIGRAM(S): 2.5 TABLET ORAL at 14:09

## 2020-04-26 RX ADMIN — MIDODRINE HYDROCHLORIDE 10 MILLIGRAM(S): 2.5 TABLET ORAL at 22:42

## 2020-04-26 RX ADMIN — HEPARIN SODIUM 5000 UNIT(S): 5000 INJECTION INTRAVENOUS; SUBCUTANEOUS at 17:32

## 2020-04-26 RX ADMIN — Medication 100 MILLIGRAM(S): at 13:34

## 2020-04-26 RX ADMIN — MIDODRINE HYDROCHLORIDE 20 MILLIGRAM(S): 2.5 TABLET ORAL at 05:49

## 2020-04-26 NOTE — PROGRESS NOTE ADULT - ASSESSMENT
89   yr pt,   pt  with  h/o  afib,   not  on   xarelto,  due  to falls,   cva,   copd, hld,     alzheimers  dementia,    pud/  h/o left ribf xs. 8/ 9 th,. left hip surg  h/o mlple falls.  echo, normal ef/ T2  comp  deformity    sent  to  er  from   [facility], for  weakness/ sob  ct head, no cva//   ct  chest  with  goo   COVID  positive  elevated  troponin/ CRP/  Ferritin  , from Covid   pt  with  advanced  age/    comfort/   supportive  care is  ideal  however, son  wants  all  done. seen by icu  and palliative care  hypernatremia  resolved/   CNS,  in 1/2  blood   c/s,  which is  a  contaminant  palliative/ comfort care,  ought to  be goal  in this pt   on   ng tube now.  on midodrine/  son   wishes  to  continue   current care  on dvt ppx/  sbp above  130/  on  ngt  feeds a nd  iv dextrise,  on 2  liters  oxygen/  anemia,    hb at  8/  no  gi bleed  son will  decide  on monday  about feeds ,  going forward        < from: CT Chest No Cont (04.17.20 @ 04:24) >  IMPRESSION:   Pattern of GGO suggests infection including atypical pneumonia/viral infection from atypical agents including COVID-19 (C19V-1)  < end of copied text >     < from: CT Thoracic Spine Reform No Cont (01.07.20 @ 16:43) >  IMPRESSION:  Volume loss, microvascular disease, no acute hemorrhage or midline shift. If symptoms persist consider follow up head CT or MRI contraindications.  Cervical and thoracic spine are unchanged from prior, no obvious fracture or dislocation, multilevel degenerative changes as noted previously with diffuse osteopenia and small unchanged mild superior endplate T2 compression deformity.  < end of copied text      < from: Limited Transthoracic Echo (10.26.18 @ 14:07) >  Conclusions:  Limited tranthoracic echocardiogram at patients request to  end exam.  1. Mitral annular calcification.  2. The aortic valve opens well.  3. Limited images acquired at the patients request. Based  upon the parasternal long axis view only;  grossly normal  left ventricular systolic function.  < end of copied text >

## 2020-04-26 NOTE — PROGRESS NOTE ADULT - SUBJECTIVE AND OBJECTIVE BOX
CARDIOLOGY     PROGRESS  NOTE   ________________________________________________    CHIEF COMPLAINT:Patient is a 89y old  Female who presents with a chief complaint of sob/ hypotension (26 Apr 2020 10:07)  sleeping  	  REVIEW OF SYSTEMS:  CONSTITUTIONAL: No fever, weight loss, or fatigue  EYES: No eye pain, visual disturbances, or discharge  ENT:  No difficulty hearing, tinnitus, vertigo; No sinus or throat pain  NECK: No pain or stiffness  RESPIRATORY: No cough, wheezing, chills or hemoptysis; +Shortness of Breath  CARDIOVASCULAR: No chest pain, palpitations, passing out, dizziness, or leg swelling  GASTROINTESTINAL: No abdominal or epigastric pain. No nausea, vomiting, or hematemesis; No diarrhea or constipation. No melena or hematochezia.  GENITOURINARY: No dysuria, frequency, hematuria, or incontinence  NEUROLOGICAL: No headaches, memory loss, loss of strength, numbness, or tremors  SKIN: No itching, burning, rashes, or lesions   LYMPH Nodes: No enlarged glands  ENDOCRINE: No heat or cold intolerance; No hair loss  MUSCULOSKELETAL: No joint pain or swelling; No muscle, back, or extremity pain  PSYCHIATRIC: No depression, anxiety, mood swings, or difficulty sleeping  HEME/LYMPH: No easy bruising, or bleeding gums  ALLERGY AND IMMUNOLOGIC: No hives or eczema	    [ ] All others negative	  [ ] Unable to obtain    PHYSICAL EXAM:  T(C): 36.5 (04-26-20 @ 08:38), Max: 37.2 (04-25-20 @ 16:41)  HR: 56 (04-26-20 @ 08:38) (56 - 71)  BP: 131/75 (04-26-20 @ 08:38) (102/61 - 131/75)  RR: 22 (04-26-20 @ 08:38) (22 - 22)  SpO2: 93% (04-26-20 @ 08:38) (93% - 98%)  Wt(kg): --  I&O's Summary    25 Apr 2020 07:01  -  26 Apr 2020 07:00  --------------------------------------------------------  IN: 750 mL / OUT: 0 mL / NET: 750 mL        Appearance: Normal	  HEENT:   Normal oral mucosa, PERRL, EOMI	  Lymphatic: No lymphadenopathy  Cardiovascular: Normal S1 S2, No JVD,+ murmurs, No edema  Respiratory: Lungs clear to auscultation	  Gastrointestinal:  Soft, Non-tender, + BS	  Skin: No rashes, No ecchymoses, No cyanosis	  Neurologic: Non-focal  Extremities: Normal range of motion, No clubbing, cyanosis or edema  Vascular: Peripheral pulses palpable 2+ bilaterally    MEDICATIONS  (STANDING):  heparin  Injectable 5000 Unit(s) SubCutaneous every 12 hours  midodrine 20 milliGRAM(s) Oral every 8 hours  multivitamin/minerals/iron Oral Solution (CENTRUM) 15 milliLiter(s) Enteral Tube daily  thiamine 100 milliGRAM(s) Enteral Tube daily      TELEMETRY: 	    ECG:  	  RADIOLOGY:  OTHER: 	  	  LABS:	 	    CARDIAC MARKERS:                                8.0    8.12  )-----------( 161      ( 26 Apr 2020 05:55 )             25.3     04-26    142  |  106  |  14  ----------------------------<  91  4.1   |  29  |  0.76    Ca    8.3<L>      26 Apr 2020 05:55    TPro  5.7<L>  /  Alb  2.4<L>  /  TBili  1.1  /  DBili  x   /  AST  15  /  ALT  11  /  AlkPhos  62  04-26    proBNP: Serum Pro-Brain Natriuretic Peptide: 4277 pg/mL (04-17 @ 05:13)    Lipid Profile:   HgA1c:   TSH: Thyroid Stimulating Hormone, Serum: 3.19 uIU/mL (04-24 @ 12:11)          Assessment and plan  ---------------------------  89 year-old female with history of CVA, atrial fibrillation on Xarelto, dementia presents to the Emergency Department for bradycardia.  Patient presents from the [facility]; on vitals was found to be bradycardic to 30s-40s and sent to the ED.  Patient with decreased responsiveness and PO intake for the past few days; recently started on Azithromycin due to unknown etiology/NH paperwork w/o such information.  Tested for COVID on 4/3/20 and negative.  Patient with hypoxia at NH down to 89% on RA.  pt with sig cardiac/ medical history with increasing sob and hypotension .  pt at time is on pressors will continue, and will taper slowly  covid + will add hydroxychloroquine if pt becomes responsive  spoke to the son Mina wants full medical therapy including intubation, clinical review team does not feel aggressive therapy with intubation will change outcome  will titrate the pressors to off  palliative care noted and discussed with Dr Quintanilla yesterday  dvt prophylaxis  blood culture 1 set +/ second set negative  ID appreciated  ngt placed appreciated, getting Fed  son wants pt to be FEED the pt and discussed with him, he wants full medical therapy  continue to observe  decrease midodrine as tolerated  continue feeding  o2 sat is well controlled  awaiting mental status improvement ?peg / discussed with son he wants everything to be done  decrease hgb repeat noted  GI Eval ? need of peg/ check stool guaiac

## 2020-04-26 NOTE — PROGRESS NOTE ADULT - SUBJECTIVE AND OBJECTIVE BOX
Discussed lid hygiene, warm compress and eyelid wash. bed bound    REVIEW OF SYSTEMS:  GEN: no fever,    no chills  RESP: no SOB,   no cough  CVS: no chest pain,   no palpitations  GI: no abdominal pain,   no nausea,   no vomiting,   no constipation,   no diarrhea  : no dysuria,   no frequency  NEURO: no headache,   no dizziness  PSYCH: no depression,   not anxious  Derm : no rash    MEDICATIONS  (STANDING):  heparin  Injectable 5000 Unit(s) SubCutaneous every 12 hours  midodrine 20 milliGRAM(s) Oral every 8 hours  multivitamin/minerals/iron Oral Solution (CENTRUM) 15 milliLiter(s) Enteral Tube daily  thiamine 100 milliGRAM(s) Enteral Tube daily    MEDICATIONS  (PRN):  acetaminophen   Tablet .. 650 milliGRAM(s) Oral every 6 hours PRN Temp greater or equal to 38C (100.4F), Moderate Pain (4 - 6)  guaiFENesin   Syrup  (Sugar-Free) 200 milliGRAM(s) Oral every 6 hours PRN Cough      Vital Signs Last 24 Hrs  T(C): 36.5 (26 Apr 2020 08:38), Max: 37.2 (25 Apr 2020 16:41)  T(F): 97.7 (26 Apr 2020 08:38), Max: 99 (25 Apr 2020 16:41)  HR: 56 (26 Apr 2020 08:38) (56 - 71)  BP: 131/75 (26 Apr 2020 08:38) (102/61 - 131/75)  BP(mean): --  RR: 22 (26 Apr 2020 08:38) (22 - 22)  SpO2: 93% (26 Apr 2020 08:38) (93% - 98%)  CAPILLARY BLOOD GLUCOSE        I&O's Summary    25 Apr 2020 07:01  -  26 Apr 2020 07:00  --------------------------------------------------------  IN: 750 mL / OUT: 0 mL / NET: 750 mL        PHYSICAL EXAM:  HEAD:  Atraumatic, Normocephalic  NECK: Supple, No   JVD  CHEST/LUNG:   no     rales,     no,    rhonchi  HEART: Regular rate and rhythm;         murmur  ABDOMEN: Soft, Nontender, ;   EXTREMITIES:   no     edema  NEUROLOGY:    non verbal    LABS:                        8.0    8.12  )-----------( 161      ( 26 Apr 2020 05:55 )             25.3     04-26    142  |  106  |  14  ----------------------------<  91  4.1   |  29  |  0.76    Ca    8.3<L>      26 Apr 2020 05:55    TPro  5.7<L>  /  Alb  2.4<L>  /  TBili  1.1  /  DBili  x   /  AST  15  /  ALT  11  /  AlkPhos  62  04-26                    Thyroid Stimulating Hormone, Serum: 3.19 uIU/mL (04-24 @ 12:11)          Consultant(s) Notes Reviewed:      Care Discussed with Consultants/Other Providers:

## 2020-04-27 LAB
BLD GP AB SCN SERPL QL: NEGATIVE — SIGNIFICANT CHANGE UP
HCT VFR BLD CALC: 26.1 % — LOW (ref 34.5–45)
HGB BLD-MCNC: 8.2 G/DL — LOW (ref 11.5–15.5)
MCHC RBC-ENTMCNC: 31.4 GM/DL — LOW (ref 32–36)
MCHC RBC-ENTMCNC: 33.1 PG — SIGNIFICANT CHANGE UP (ref 27–34)
MCV RBC AUTO: 105.2 FL — HIGH (ref 80–100)
NRBC # BLD: 0 /100 WBCS — SIGNIFICANT CHANGE UP (ref 0–0)
PLATELET # BLD AUTO: 190 K/UL — SIGNIFICANT CHANGE UP (ref 150–400)
RBC # BLD: 2.48 M/UL — LOW (ref 3.8–5.2)
RBC # FLD: 15.2 % — HIGH (ref 10.3–14.5)
RH IG SCN BLD-IMP: POSITIVE — SIGNIFICANT CHANGE UP
WBC # BLD: 8.44 K/UL — SIGNIFICANT CHANGE UP (ref 3.8–10.5)
WBC # FLD AUTO: 8.44 K/UL — SIGNIFICANT CHANGE UP (ref 3.8–10.5)

## 2020-04-27 PROCEDURE — 99232 SBSQ HOSP IP/OBS MODERATE 35: CPT | Mod: CS

## 2020-04-27 RX ADMIN — MIDODRINE HYDROCHLORIDE 10 MILLIGRAM(S): 2.5 TABLET ORAL at 12:11

## 2020-04-27 RX ADMIN — Medication 100 MILLIGRAM(S): at 12:11

## 2020-04-27 RX ADMIN — HEPARIN SODIUM 5000 UNIT(S): 5000 INJECTION INTRAVENOUS; SUBCUTANEOUS at 17:24

## 2020-04-27 RX ADMIN — HEPARIN SODIUM 5000 UNIT(S): 5000 INJECTION INTRAVENOUS; SUBCUTANEOUS at 05:17

## 2020-04-27 RX ADMIN — MIDODRINE HYDROCHLORIDE 10 MILLIGRAM(S): 2.5 TABLET ORAL at 05:18

## 2020-04-27 RX ADMIN — Medication 15 MILLILITER(S): at 12:10

## 2020-04-27 RX ADMIN — MIDODRINE HYDROCHLORIDE 10 MILLIGRAM(S): 2.5 TABLET ORAL at 23:29

## 2020-04-27 NOTE — PROGRESS NOTE ADULT - ASSESSMENT
89   yr pt,   pt  with  h/o  afib,   not  on   xarelto,  due  to falls,   cva,   copd, hld,     alzheimers  dementia,    pud/  h/o left ribf xs. 8/ 9 th,. left hip surg  h/o mlple falls.  echo, normal ef/ T2  comp  deformity    sent  to  [facility]  from   [facility], for  weakness/ sob  ct head, no cva//   ct  chest  with  goo   COVID  positive  elevated  troponin/ CRP/  Ferritin  , from Covid   pt  with  advanced  age/    comfort/   supportive  care is  ideal  however, son  wants  all  done. seen by icu  and palliative care  hypernatremia  resolved/   CNS,  in 1/2  blood   c/s,  which is  a  contaminant  palliative/ comfort care,  ought to  be goal  in this pt   on   ng tube now.  on midodrine/  son   wishes  to  continue   current care  on dvt ppx/  sbp  was   89 this  am/  folow bp  curve  on 2  liters  oxygen/  anemia,    hb at  8/  no  gi bleed  son will  decide  about feeds ,  going forward        < from: CT Chest No Cont (04.17.20 @ 04:24) >  IMPRESSION:   Pattern of GGO suggests infection including atypical pneumonia/viral infection from atypical agents including COVID-19 (C19V-1)  < end of copied text >     < from: CT Thoracic Spine Reform No Cont (01.07.20 @ 16:43) >  IMPRESSION:  Volume loss, microvascular disease, no acute hemorrhage or midline shift. If symptoms persist consider follow up head CT or MRI contraindications.  Cervical and thoracic spine are unchanged from prior, no obvious fracture or dislocation, multilevel degenerative changes as noted previously with diffuse osteopenia and small unchanged mild superior endplate T2 compression deformity.  < end of copied text      < from: Limited Transthoracic Echo (10.26.18 @ 14:07) >  Conclusions:  Limited tranthoracic echocardiogram at patients request to  end exam.  1. Mitral annular calcification.  2. The aortic valve opens well.  3. Limited images acquired at the patients request. Based  upon the parasternal long axis view only;  grossly normal  left ventricular systolic function.  < end of copied text >

## 2020-04-27 NOTE — PROGRESS NOTE ADULT - SUBJECTIVE AND OBJECTIVE BOX
CARDIOLOGY     PROGRESS  NOTE   ________________________________________________    CHIEF COMPLAINT:Patient is a 89y old  Female who presents with a chief complaint of sob/ hypotension (26 Apr 2020 12:38)  no complain.  	  REVIEW OF SYSTEMS:  CONSTITUTIONAL: No fever, weight loss, or fatigue  EYES: No eye pain, visual disturbances, or discharge  ENT:  No difficulty hearing, tinnitus, vertigo; No sinus or throat pain  NECK: No pain or stiffness  RESPIRATORY: No cough, wheezing, chills or hemoptysis; No Shortness of Breath  CARDIOVASCULAR: No chest pain, palpitations, passing out, dizziness, or leg swelling  GASTROINTESTINAL: No abdominal or epigastric pain. No nausea, vomiting, or hematemesis; No diarrhea or constipation. No melena or hematochezia.  GENITOURINARY: No dysuria, frequency, hematuria, or incontinence  NEUROLOGICAL: No headaches, memory loss, loss of strength, numbness, or tremors  SKIN: No itching, burning, rashes, or lesions   LYMPH Nodes: No enlarged glands  ENDOCRINE: No heat or cold intolerance; No hair loss  MUSCULOSKELETAL: No joint pain or swelling; No muscle, back, or extremity pain  PSYCHIATRIC: No depression, anxiety, mood swings, or difficulty sleeping  HEME/LYMPH: No easy bruising, or bleeding gums  ALLERGY AND IMMUNOLOGIC: No hives or eczema	    [ ] All others negative	  [x ] Unable to obtain    PHYSICAL EXAM:  T(C): 36.4 (04-27-20 @ 01:17), Max: 36.4 (04-27-20 @ 01:17)  HR: 63 (04-27-20 @ 01:17) (61 - 67)  BP: 95/58 (04-27-20 @ 01:17) (95/58 - 137/53)  RR: 20 (04-27-20 @ 01:17) (20 - 20)  SpO2: 93% (04-27-20 @ 01:17) (93% - 99%)  Wt(kg): --  I&O's Summary    26 Apr 2020 07:01  -  27 Apr 2020 07:00  --------------------------------------------------------  IN: 700 mL / OUT: 0 mL / NET: 700 mL        Appearance: ngt  HEENT:   Normal oral mucosa, PERRL, EOMI	  Lymphatic: No lymphadenopathy  Cardiovascular: Normal S1 S2, No JVD, + murmurs, No edema  Respiratory: Lungs clear to auscultation	  Psychiatry: A & O x 3, Mood & affect appropriate  Gastrointestinal:  Soft, Non-tender, + BS	  Skin: No rashes, No ecchymoses, No cyanosis	  Neurologic: Non-focal  Extremities: Normal range of motion, No clubbing, cyanosis or edema  Vascular: Peripheral pulses palpable 2+ bilaterally    MEDICATIONS  (STANDING):  heparin  Injectable 5000 Unit(s) SubCutaneous every 12 hours  midodrine 10 milliGRAM(s) Oral every 8 hours  multivitamin/minerals/iron Oral Solution (CENTRUM) 15 milliLiter(s) Enteral Tube daily  thiamine 100 milliGRAM(s) Enteral Tube daily      TELEMETRY: 	    ECG:  	  RADIOLOGY:  OTHER: 	  	  LABS:	 	    CARDIAC MARKERS:                                8.2    8.44  )-----------( 190      ( 27 Apr 2020 07:37 )             26.1     04-26    142  |  106  |  14  ----------------------------<  91  4.1   |  29  |  0.76    Ca    8.3<L>      26 Apr 2020 05:55    TPro  5.7<L>  /  Alb  2.4<L>  /  TBili  1.1  /  DBili  x   /  AST  15  /  ALT  11  /  AlkPhos  62  04-26    proBNP: Serum Pro-Brain Natriuretic Peptide: 4277 pg/mL (04-17 @ 05:13)    Lipid Profile:   HgA1c:   TSH: Thyroid Stimulating Hormone, Serum: 3.19 uIU/mL (04-24 @ 12:11)  elderly woman with advanced dementia sent from [facility] for sob, tested positive for covid but continues to have good sats on minimal oxygen.   Plan:  supportive care  no indication for specific intervention for covid  call if further input is needed    < from: Xray Chest 1 View- PORTABLE-Urgent (04.20.20 @ 22:56) >  Lines and Tubes: Enteric tube in the stomach.      Lungs: Bilateral lower lung opacities are unchanged.      Pleura: Left pleural effusion.      Heart and Mediastinum: The heart is normal in size.      Skeletal: Unremarkable.    < end of copied text >        Assessment and plan  ---------------------------  89 year-old female with history of CVA, atrial fibrillation on Xarelto, dementia presents to the Emergency Department for bradycardia.  Patient presents from the [facility]; on vitals was found to be bradycardic to 30s-40s and sent to the ED.  Patient with decreased responsiveness and PO intake for the past few days; recently started on Azithromycin due to unknown etiology/NH paperwork w/o such information.  Tested for COVID on 4/3/20 and negative.  Patient with hypoxia at NH down to 89% on RA.  pt with sig cardiac/ medical history with increasing sob and hypotension .  pt at time is on pressors will continue, and will taper slowly  covid + will add hydroxychloroquine if pt becomes responsive  spoke to the son Mina wants full medical therapy including intubation, clinical review team does not feel aggressive therapy with intubation will change outcome  will titrate the pressors to off  palliative care noted and discussed with Dr Quintanilla yesterday  dvt prophylaxis  blood culture 1 set +/ second set negative  ID appreciated  ngt placed appreciated, getting Fed  son wants pt to be FEED the pt and discussed with him, he wants full medical therapy  continue to observe  decrease midodrine as tolerated  continue feeding  o2 sat decreased, on 2 L nc   awaiting mental status improvement ?peg / discussed with son he wants everything to be done  decrease hgb repeat noted, awaiting blood from Today  decrease po2/ covid +/ [prognosis is poor

## 2020-04-27 NOTE — CHART NOTE - NSCHARTNOTEFT_GEN_A_CORE
Nutrition Follow Up Note  Patient seen for: Nutrition consult for tube feeding. Chart reviewed, interim events noted.     Source: RN, pt with dementia, non-verbal. Unable to conduct a face to face interview or nutrition-focused physical exam at this time due to limited contact restrictions related to their medical condition and isolation precautions.     Diet : NPO with tube feeding Jevity 1.2 Robbi 150 mL bolus every 6 hours for a total of 4 feeds per day     Patient reports: Per RN no signs of N+V, last Bm noted yesterday.        Enteral /Parenteral Nutrition: Per RN pt tolerating bolus feeding well at goal       Daily no new wt   % Weight Change    Pertinent Medications: MEDICATIONS  (STANDING):  heparin  Injectable 5000 Unit(s) SubCutaneous every 12 hours  midodrine 10 milliGRAM(s) Oral every 8 hours  multivitamin/minerals/iron Oral Solution (CENTRUM) 15 milliLiter(s) Enteral Tube daily  thiamine 100 milliGRAM(s) Enteral Tube daily    MEDICATIONS  (PRN):  acetaminophen   Tablet .. 650 milliGRAM(s) Oral every 6 hours PRN Temp greater or equal to 38C (100.4F), Moderate Pain (4 - 6)  guaiFENesin   Syrup  (Sugar-Free) 200 milliGRAM(s) Oral every 6 hours PRN Cough    Pertinent Labs: Reviewed   Finger Sticks: none       Skin per nursing documentation: free of pressure injury   Edema: 2+ gab arm edema-4/26    Estimated Needs:   [x ] no change since previous assessment  [ ] recalculated:     Previous Nutrition Diagnosis: Mild malnutrition   Nutrition Diagnosis is: ongoing, addressed with tube feeding     New Nutrition Diagnosis:  Related to:    As evidenced by:      Interventions:     Recommend  1) Consider increasing bolus feed Jevity 1.2 Robbi by 50 mL each feed until goal of 237 mL (1 can) every 6 hours is reached for a total of 4 feeds per day. This regimen at goal provides 1140 Kcal, 52.8g protein, 764 mL free water (23 Kcal/Kg, 1g protein/Kg based on dosing wt 50 Kg)   2) Nutrition plan of care to be in line with medical GOC and pt/family wishes  3) Continue daily multivitamin and thiamine, continue to monitor electrolytes K, PO4, Mg and replete as needed.     Recommendations communicated with NP    Monitoring and Evaluation:     Continue to monitor Nutritional intake, Tolerance to diet prescription, weights, labs, skin integrity    RD remains available upon request and will follow up per protocol    Kajal Gray R.D., [facility], Pager #[phone]

## 2020-04-27 NOTE — PROGRESS NOTE ADULT - SUBJECTIVE AND OBJECTIVE BOX
be dboune  REVIEW OF SYSTEMS:  GEN: no fever,    no chills  RESP: no SOB,   no cough  CVS: no chest pain,   no palpitations  GI: no abdominal pain,   no nausea,   no vomiting,   no constipation,   no diarrhea  : no dysuria,   no frequency  NEURO: no headache,   no dizziness  PSYCH: no depression,   not anxious  Derm : no rash    MEDICATIONS  (STANDING):  heparin  Injectable 5000 Unit(s) SubCutaneous every 12 hours  midodrine 10 milliGRAM(s) Oral every 8 hours  multivitamin/minerals/iron Oral Solution (CENTRUM) 15 milliLiter(s) Enteral Tube daily  thiamine 100 milliGRAM(s) Enteral Tube daily    MEDICATIONS  (PRN):  acetaminophen   Tablet .. 650 milliGRAM(s) Oral every 6 hours PRN Temp greater or equal to 38C (100.4F), Moderate Pain (4 - 6)  guaiFENesin   Syrup  (Sugar-Free) 200 milliGRAM(s) Oral every 6 hours PRN Cough      Vital Signs Last 24 Hrs  T(C): 36.4 (27 Apr 2020 01:17), Max: 36.4 (27 Apr 2020 01:17)  T(F): 97.5 (27 Apr 2020 01:17), Max: 97.5 (27 Apr 2020 01:17)  HR: 63 (27 Apr 2020 01:17) (61 - 67)  BP: 95/58 (27 Apr 2020 01:17) (95/58 - 137/53)  BP(mean): --  RR: 20 (27 Apr 2020 01:17) (20 - 20)  SpO2: 93% (27 Apr 2020 01:17) (93% - 99%)  CAPILLARY BLOOD GLUCOSE        I&O's Summary    26 Apr 2020 07:01  -  27 Apr 2020 07:00  --------------------------------------------------------  IN: 700 mL / OUT: 0 mL / NET: 700 mL        PHYSICAL EXAM:  HEAD:  Atraumatic, Normocephalic  NECK: Supple, No   JVD  CHEST/LUNG:   no     rales,     no,    rhonchi  HEART: Regular rate and rhythm;         murmur  ABDOMEN: Soft, Nontender, ;   EXTREMITIES:   no     edema  NEUROLOGY: non verbal    LABS:                        8.2    8.44  )-----------( 190      ( 27 Apr 2020 07:37 )             26.1     04-26    142  |  106  |  14  ----------------------------<  91  4.1   |  29  |  0.76    Ca    8.3<L>      26 Apr 2020 05:55    TPro  5.7<L>  /  Alb  2.4<L>  /  TBili  1.1  /  DBili  x   /  AST  15  /  ALT  11  /  AlkPhos  62  04-26                    Thyroid Stimulating Hormone, Serum: 3.19 uIU/mL (04-24 @ 12:11)          Consultant(s) Notes Reviewed:      Care Discussed with Consultants/Other Providers:

## 2020-04-28 LAB
ANION GAP SERPL CALC-SCNC: 13 MMOL/L — SIGNIFICANT CHANGE UP (ref 5–17)
BASOPHILS # BLD AUTO: 0.02 K/UL — SIGNIFICANT CHANGE UP (ref 0–0.2)
BASOPHILS NFR BLD AUTO: 0.2 % — SIGNIFICANT CHANGE UP (ref 0–2)
BUN SERPL-MCNC: 18 MG/DL — SIGNIFICANT CHANGE UP (ref 7–23)
CALCIUM SERPL-MCNC: 9.4 MG/DL — SIGNIFICANT CHANGE UP (ref 8.4–10.5)
CHLORIDE SERPL-SCNC: 99 MMOL/L — SIGNIFICANT CHANGE UP (ref 96–108)
CO2 SERPL-SCNC: 25 MMOL/L — SIGNIFICANT CHANGE UP (ref 22–31)
CREAT SERPL-MCNC: 0.77 MG/DL — SIGNIFICANT CHANGE UP (ref 0.5–1.3)
EOSINOPHIL # BLD AUTO: 0.02 K/UL — SIGNIFICANT CHANGE UP (ref 0–0.5)
EOSINOPHIL NFR BLD AUTO: 0.2 % — SIGNIFICANT CHANGE UP (ref 0–6)
GLUCOSE SERPL-MCNC: 131 MG/DL — HIGH (ref 70–99)
HCT VFR BLD CALC: 29 % — LOW (ref 34.5–45)
HGB BLD-MCNC: 9.4 G/DL — LOW (ref 11.5–15.5)
IMM GRANULOCYTES NFR BLD AUTO: 0.3 % — SIGNIFICANT CHANGE UP (ref 0–1.5)
LYMPHOCYTES # BLD AUTO: 1.37 K/UL — SIGNIFICANT CHANGE UP (ref 1–3.3)
LYMPHOCYTES # BLD AUTO: 11.9 % — LOW (ref 13–44)
MCHC RBC-ENTMCNC: 32.4 GM/DL — SIGNIFICANT CHANGE UP (ref 32–36)
MCHC RBC-ENTMCNC: 33.8 PG — SIGNIFICANT CHANGE UP (ref 27–34)
MCV RBC AUTO: 104.3 FL — HIGH (ref 80–100)
MONOCYTES # BLD AUTO: 0.76 K/UL — SIGNIFICANT CHANGE UP (ref 0–0.9)
MONOCYTES NFR BLD AUTO: 6.6 % — SIGNIFICANT CHANGE UP (ref 2–14)
NEUTROPHILS # BLD AUTO: 9.35 K/UL — HIGH (ref 1.8–7.4)
NEUTROPHILS NFR BLD AUTO: 80.8 % — HIGH (ref 43–77)
NRBC # BLD: 0 /100 WBCS — SIGNIFICANT CHANGE UP (ref 0–0)
PLATELET # BLD AUTO: 216 K/UL — SIGNIFICANT CHANGE UP (ref 150–400)
POTASSIUM SERPL-MCNC: 5.4 MMOL/L — HIGH (ref 3.5–5.3)
POTASSIUM SERPL-SCNC: 5.4 MMOL/L — HIGH (ref 3.5–5.3)
RBC # BLD: 2.78 M/UL — LOW (ref 3.8–5.2)
RBC # FLD: 15.2 % — HIGH (ref 10.3–14.5)
SODIUM SERPL-SCNC: 137 MMOL/L — SIGNIFICANT CHANGE UP (ref 135–145)
WBC # BLD: 11.55 K/UL — HIGH (ref 3.8–10.5)
WBC # FLD AUTO: 11.55 K/UL — HIGH (ref 3.8–10.5)

## 2020-04-28 PROCEDURE — 99232 SBSQ HOSP IP/OBS MODERATE 35: CPT | Mod: CS

## 2020-04-28 RX ADMIN — HEPARIN SODIUM 5000 UNIT(S): 5000 INJECTION INTRAVENOUS; SUBCUTANEOUS at 05:30

## 2020-04-28 RX ADMIN — MIDODRINE HYDROCHLORIDE 10 MILLIGRAM(S): 2.5 TABLET ORAL at 05:30

## 2020-04-28 RX ADMIN — Medication 100 MILLIGRAM(S): at 13:24

## 2020-04-28 RX ADMIN — MIDODRINE HYDROCHLORIDE 10 MILLIGRAM(S): 2.5 TABLET ORAL at 13:24

## 2020-04-28 RX ADMIN — MIDODRINE HYDROCHLORIDE 10 MILLIGRAM(S): 2.5 TABLET ORAL at 21:24

## 2020-04-28 RX ADMIN — HEPARIN SODIUM 5000 UNIT(S): 5000 INJECTION INTRAVENOUS; SUBCUTANEOUS at 17:00

## 2020-04-28 RX ADMIN — Medication 15 MILLILITER(S): at 13:24

## 2020-04-28 NOTE — PROGRESS NOTE ADULT - SUBJECTIVE AND OBJECTIVE BOX
CARDIOLOGY     PROGRESS  NOTE   ________________________________________________    CHIEF COMPLAINT:Patient is a 89y old  Female who presents with a chief complaint of sob/ hypotension (27 Apr 2020 10:53)  not responding.  	  REVIEW OF SYSTEMS:  CONSTITUTIONAL: No fever, weight loss, or fatigue  EYES: No eye pain, visual disturbances, or discharge  ENT:  No difficulty hearing, tinnitus, vertigo; No sinus or throat pain  NECK: No pain or stiffness  RESPIRATORY: No cough, wheezing, chills or hemoptysis; No Shortness of Breath  CARDIOVASCULAR: No chest pain, palpitations, passing out, dizziness, or leg swelling  GASTROINTESTINAL: No abdominal or epigastric pain. No nausea, vomiting, or hematemesis; No diarrhea or constipation. No melena or hematochezia.  GENITOURINARY: No dysuria, frequency, hematuria, or incontinence  NEUROLOGICAL: No headaches, memory loss, loss of strength, numbness, or tremors  SKIN: No itching, burning, rashes, or lesions   LYMPH Nodes: No enlarged glands  ENDOCRINE: No heat or cold intolerance; No hair loss  MUSCULOSKELETAL: No joint pain or swelling; No muscle, back, or extremity pain  PSYCHIATRIC: No depression, anxiety, mood swings, or difficulty sleeping  HEME/LYMPH: No easy bruising, or bleeding gums  ALLERGY AND IMMUNOLOGIC: No hives or eczema	    [ ] All others negative	  [ x] Unable to obtain    PHYSICAL EXAM:  T(C): 37 (04-28-20 @ 07:05), Max: 37.1 (04-27-20 @ 16:57)  HR: 93 (04-28-20 @ 07:05) (73 - 93)  BP: 111/67 (04-28-20 @ 07:05) (111/67 - 131/80)  RR: 20 (04-28-20 @ 07:05) (20 - 20)  SpO2: 94% (04-28-20 @ 07:05) (93% - 94%)  Wt(kg): --  I&O's Summary    27 Apr 2020 07:01  -  28 Apr 2020 07:00  --------------------------------------------------------  IN: 948 mL / OUT: 0 mL / NET: 948 mL        Appearance: Normal	  HEENT:   Normal oral mucosa, PERRL, EOMI	  Lymphatic: No lymphadenopathy  Cardiovascular: Normal S1 S2, No JVD,+ murmurs, No edema  Respiratory: Lungs clear to auscultation	  Psychiatry: A & O x 3, Mood & affect appropriate  Gastrointestinal:  Soft, Non-tender, + BS	  Skin: No rashes, No ecchymoses, No cyanosis	  Neurologic: Non-focal  Extremities: Normal range of motion, No clubbing, cyanosis or edema  Vascular: Peripheral pulses palpable 2+ bilaterally    MEDICATIONS  (STANDING):  heparin  Injectable 5000 Unit(s) SubCutaneous every 12 hours  midodrine 10 milliGRAM(s) Oral every 8 hours  multivitamin/minerals/iron Oral Solution (CENTRUM) 15 milliLiter(s) Enteral Tube daily  thiamine 100 milliGRAM(s) Enteral Tube daily      TELEMETRY: 	    ECG:  	  RADIOLOGY:  OTHER: 	  	  LABS:	 	    CARDIAC MARKERS:                                8.2    8.44  )-----------( 190      ( 27 Apr 2020 07:37 )             26.1           proBNP: Serum Pro-Brain Natriuretic Peptide: 4277 pg/mL (04-17 @ 05:13)    Lipid Profile:   HgA1c:   TSH: Thyroid Stimulating Hormone, Serum: 3.19 uIU/mL (04-24 @ 12:11)          Assessment and plan  ---------------------------  89 year-old female with history of CVA, atrial fibrillation on Xarelto, dementia presents to the Emergency Department for bradycardia.  Patient presents from the [facility]; on vitals was found to be bradycardic to 30s-40s and sent to the ED.  Patient with decreased responsiveness and PO intake for the past few days; recently started on Azithromycin due to unknown etiology/NH paperwork w/o such information.  Tested for COVID on 4/3/20 and negative.  Patient with hypoxia at NH down to 89% on RA.  pt with sig cardiac/ medical history with increasing sob and hypotension .  pt at time is on pressors will continue, and will taper slowly  covid + will add hydroxychloroquine if pt becomes responsive  spoke to the son Mina wants full medical therapy including intubation, clinical review team does not feel aggressive therapy with intubation will change outcome  will titrate the pressors to off  palliative care noted and discussed with Dr Quintanilla yesterday  dvt prophylaxis  blood culture 1 set +/ second set negative  ID appreciated  ngt placed appreciated, getting Fed  son wants pt to be FEED the pt and discussed with him, he wants full medical therapy  continue to observe  decrease midodrine as tolerated  continue feeding  o2 sat decreased, on 2 L nc   awaiting mental status improvement ?peg / discussed with son he wants everything to be done  decrease hgb repeat noted, awaiting blood from Today  spoke to the son he wants everything to be done including peg placement  continue to observe

## 2020-04-28 NOTE — PROGRESS NOTE ADULT - SUBJECTIVE AND OBJECTIVE BOX
weak/  no complaints    REVIEW OF SYSTEMS:  GEN: no fever,    no chills  RESP: no SOB,   no cough  CVS: no chest pain,   no palpitations  GI: no abdominal pain,   no nausea,   no vomiting,   no constipation,   no diarrhea  : no dysuria,   no frequency  NEURO: no headache,   no dizziness  PSYCH: no depression,   not anxious  Derm : no rash    MEDICATIONS  (STANDING):  heparin  Injectable 5000 Unit(s) SubCutaneous every 12 hours  midodrine 10 milliGRAM(s) Oral every 8 hours  multivitamin/minerals/iron Oral Solution (CENTRUM) 15 milliLiter(s) Enteral Tube daily  thiamine 100 milliGRAM(s) Enteral Tube daily    MEDICATIONS  (PRN):  acetaminophen   Tablet .. 650 milliGRAM(s) Oral every 6 hours PRN Temp greater or equal to 38C (100.4F), Moderate Pain (4 - 6)  guaiFENesin   Syrup  (Sugar-Free) 200 milliGRAM(s) Oral every 6 hours PRN Cough      Vital Signs Last 24 Hrs  T(C): 37 (28 Apr 2020 07:05), Max: 37.1 (27 Apr 2020 16:57)  T(F): 98.6 (28 Apr 2020 07:05), Max: 98.8 (27 Apr 2020 16:57)  HR: 93 (28 Apr 2020 07:05) (81 - 93)  BP: 111/67 (28 Apr 2020 07:05) (111/67 - 131/80)  BP(mean): --  RR: 20 (28 Apr 2020 07:05) (20 - 20)  SpO2: 94% (28 Apr 2020 07:05) (93% - 94%)  CAPILLARY BLOOD GLUCOSE        I&O's Summary    27 Apr 2020 07:01  -  28 Apr 2020 07:00  --------------------------------------------------------  IN: 948 mL / OUT: 0 mL / NET: 948 mL        PHYSICAL EXAM:  HEAD:  Atraumatic, Normocephalic  NECK: Supple, No   JVD  CHEST/LUNG:   no     rales,     no,    rhonchi  HEART: Regular rate and rhythm;         murmur  ABDOMEN: Soft, Nontender, ;   EXTREMITIES:    no    edema  NEUROLOGY:  alert    LABS:                        9.4    11.55 )-----------( 216      ( 28 Apr 2020 10:03 )             29.0     04-28    137  |  99  |  18  ----------------------------<  131<H>  5.4<H>   |  25  |  0.77    Ca    9.4      28 Apr 2020 10:03                      Thyroid Stimulating Hormone, Serum: 3.19 uIU/mL (04-24 @ 12:11)          Consultant(s) Notes Reviewed:      Care Discussed with Consultants/Other Providers:

## 2020-04-28 NOTE — PROGRESS NOTE ADULT - ASSESSMENT
89   yr pt,   pt  with  h/o  afib,   not  on   xarelto,  due  to falls,   cva,   copd, hld,     alzheimers  dementia,    pud/  h/o left ribf xs. 8/ 9 th,. left hip surg  h/o mlple falls.  echo, normal ef/ T2  comp  deformity    sent  to  er  from   [facility], for  weakness/ sob  ct head, no cva//   ct  chest  with  goo   COVID  positive  elevated  troponin/ CRP/  Ferritin  , from Covid   pt  with  advanced  age/    comfort/   supportive  care is  ideal  however, son  wants  all  done. seen by icu  and palliative care  hypernatremia  resolved/   CNS,  in 1/2  blood   c/s,  which is  a  contaminant  palliative/ comfort care,  ought to  be goal  in this pt   on   ng tube now.  on midodrine/  son   wishes  to  continue   current care  on 2  liters  oxygen/  anemia,    hb at  9    son will  decide  about feeds ,  going forward/  on tube  feeds  now/  stable  vitals        < from: CT Chest No Cont (04.17.20 @ 04:24) >  IMPRESSION:   Pattern of GGO suggests infection including atypical pneumonia/viral infection from atypical agents including COVID-19 (C19V-1)  < end of copied text >     < from: CT Thoracic Spine Reform No Cont (01.07.20 @ 16:43) >  IMPRESSION:  Volume loss, microvascular disease, no acute hemorrhage or midline shift. If symptoms persist consider follow up head CT or MRI contraindications.  Cervical and thoracic spine are unchanged from prior, no obvious fracture or dislocation, multilevel degenerative changes as noted previously with diffuse osteopenia and small unchanged mild superior endplate T2 compression deformity.  < end of copied text      < from: Limited Transthoracic Echo (10.26.18 @ 14:07) >  Conclusions:  Limited tranthoracic echocardiogram at patients request to  end exam.  1. Mitral annular calcification.  2. The aortic valve opens well.  3. Limited images acquired at the patients request. Based  upon the parasternal long axis view only;  grossly normal  left ventricular systolic function.  < end of copied text >

## 2020-04-29 LAB
ANION GAP SERPL CALC-SCNC: 14 MMOL/L — SIGNIFICANT CHANGE UP (ref 5–17)
BASOPHILS # BLD AUTO: 0.03 K/UL — SIGNIFICANT CHANGE UP (ref 0–0.2)
BASOPHILS NFR BLD AUTO: 0.2 % — SIGNIFICANT CHANGE UP (ref 0–2)
BUN SERPL-MCNC: 22 MG/DL — SIGNIFICANT CHANGE UP (ref 7–23)
CALCIUM SERPL-MCNC: 9.1 MG/DL — SIGNIFICANT CHANGE UP (ref 8.4–10.5)
CHLORIDE SERPL-SCNC: 97 MMOL/L — SIGNIFICANT CHANGE UP (ref 96–108)
CO2 SERPL-SCNC: 23 MMOL/L — SIGNIFICANT CHANGE UP (ref 22–31)
CREAT SERPL-MCNC: 0.81 MG/DL — SIGNIFICANT CHANGE UP (ref 0.5–1.3)
EOSINOPHIL # BLD AUTO: 0.01 K/UL — SIGNIFICANT CHANGE UP (ref 0–0.5)
EOSINOPHIL NFR BLD AUTO: 0.1 % — SIGNIFICANT CHANGE UP (ref 0–6)
GLUCOSE SERPL-MCNC: 182 MG/DL — HIGH (ref 70–99)
HCT VFR BLD CALC: 28.1 % — LOW (ref 34.5–45)
HGB BLD-MCNC: 9 G/DL — LOW (ref 11.5–15.5)
IMM GRANULOCYTES NFR BLD AUTO: 0.6 % — SIGNIFICANT CHANGE UP (ref 0–1.5)
LYMPHOCYTES # BLD AUTO: 1.27 K/UL — SIGNIFICANT CHANGE UP (ref 1–3.3)
LYMPHOCYTES # BLD AUTO: 8.3 % — LOW (ref 13–44)
MCHC RBC-ENTMCNC: 32 GM/DL — SIGNIFICANT CHANGE UP (ref 32–36)
MCHC RBC-ENTMCNC: 33.5 PG — SIGNIFICANT CHANGE UP (ref 27–34)
MCV RBC AUTO: 104.5 FL — HIGH (ref 80–100)
MONOCYTES # BLD AUTO: 0.79 K/UL — SIGNIFICANT CHANGE UP (ref 0–0.9)
MONOCYTES NFR BLD AUTO: 5.1 % — SIGNIFICANT CHANGE UP (ref 2–14)
NEUTROPHILS # BLD AUTO: 13.16 K/UL — HIGH (ref 1.8–7.4)
NEUTROPHILS NFR BLD AUTO: 85.7 % — HIGH (ref 43–77)
NRBC # BLD: 0 /100 WBCS — SIGNIFICANT CHANGE UP (ref 0–0)
PLATELET # BLD AUTO: 223 K/UL — SIGNIFICANT CHANGE UP (ref 150–400)
POTASSIUM SERPL-MCNC: 5 MMOL/L — SIGNIFICANT CHANGE UP (ref 3.5–5.3)
POTASSIUM SERPL-SCNC: 5 MMOL/L — SIGNIFICANT CHANGE UP (ref 3.5–5.3)
RBC # BLD: 2.69 M/UL — LOW (ref 3.8–5.2)
RBC # FLD: 15.7 % — HIGH (ref 10.3–14.5)
SODIUM SERPL-SCNC: 134 MMOL/L — LOW (ref 135–145)
WBC # BLD: 15.35 K/UL — HIGH (ref 3.8–10.5)
WBC # FLD AUTO: 15.35 K/UL — HIGH (ref 3.8–10.5)

## 2020-04-29 PROCEDURE — 99232 SBSQ HOSP IP/OBS MODERATE 35: CPT | Mod: CS

## 2020-04-29 RX ADMIN — HEPARIN SODIUM 5000 UNIT(S): 5000 INJECTION INTRAVENOUS; SUBCUTANEOUS at 05:54

## 2020-04-29 RX ADMIN — MIDODRINE HYDROCHLORIDE 10 MILLIGRAM(S): 2.5 TABLET ORAL at 14:01

## 2020-04-29 RX ADMIN — HEPARIN SODIUM 5000 UNIT(S): 5000 INJECTION INTRAVENOUS; SUBCUTANEOUS at 18:04

## 2020-04-29 RX ADMIN — Medication 100 MILLIGRAM(S): at 12:35

## 2020-04-29 RX ADMIN — MIDODRINE HYDROCHLORIDE 10 MILLIGRAM(S): 2.5 TABLET ORAL at 22:19

## 2020-04-29 RX ADMIN — MIDODRINE HYDROCHLORIDE 10 MILLIGRAM(S): 2.5 TABLET ORAL at 05:54

## 2020-04-29 RX ADMIN — Medication 15 MILLILITER(S): at 12:35

## 2020-04-29 NOTE — PROGRESS NOTE ADULT - SUBJECTIVE AND OBJECTIVE BOX
CARDIOLOGY     PROGRESS  NOTE   ________________________________________________    CHIEF COMPLAINT:Patient is a 89y old  Female who presents with a chief complaint of sob/ hypotension (28 Apr 2020 11:20)  sleeping.  	  REVIEW OF SYSTEMS:  CONSTITUTIONAL: No fever, weight loss, or fatigue  EYES: No eye pain, visual disturbances, or discharge  ENT:  No difficulty hearing, tinnitus, vertigo; No sinus or throat pain  NECK: No pain or stiffness  RESPIRATORY: No cough, wheezing, chills or hemoptysis; No Shortness of Breath  CARDIOVASCULAR: No chest pain, palpitations, passing out, dizziness, or leg swelling  GASTROINTESTINAL: No abdominal or epigastric pain. No nausea, vomiting, or hematemesis; No diarrhea or constipation. No melena or hematochezia.  GENITOURINARY: No dysuria, frequency, hematuria, or incontinence  NEUROLOGICAL: No headaches, memory loss, loss of strength, numbness, or tremors  SKIN: No itching, burning, rashes, or lesions   LYMPH Nodes: No enlarged glands  ENDOCRINE: No heat or cold intolerance; No hair loss  MUSCULOSKELETAL: No joint pain or swelling; No muscle, back, or extremity pain  PSYCHIATRIC: No depression, anxiety, mood swings, or difficulty sleeping  HEME/LYMPH: No easy bruising, or bleeding gums  ALLERGY AND IMMUNOLOGIC: No hives or eczema	    [ ] All others negative	  [x ] Unable to obtain    PHYSICAL EXAM:  T(C): 37.1 (04-29-20 @ 08:27), Max: 37.9 (04-28-20 @ 16:04)  HR: 81 (04-29-20 @ 08:27) (81 - 120)  BP: 120/69 (04-29-20 @ 08:27) (93/67 - 126/69)  RR: 22 (04-29-20 @ 08:27) (18 - 24)  SpO2: 91% (04-29-20 @ 08:27) (90% - 96%)  Wt(kg): --  I&O's Summary      Appearance: Normal	  HEENT:   Normal oral mucosa, PERRL, EOMI	  Lymphatic: No lymphadenopathy  Cardiovascular: Normal S1 S2, No JVD,+ murmurs, No edema  Respiratory: Lungs clear to auscultation	  Psychiatry: A & O x 3, Mood & affect appropriate  Gastrointestinal:  Soft, Non-tender, + BS	  Skin: No rashes, No ecchymoses, No cyanosis	  Neurologic: Non-focal  Extremities: Normal range of motion, No clubbing, cyanosis or edema  Vascular: Peripheral pulses palpable 2+ bilaterally    MEDICATIONS  (STANDING):  heparin  Injectable 5000 Unit(s) SubCutaneous every 12 hours  midodrine 10 milliGRAM(s) Oral every 8 hours  multivitamin/minerals/iron Oral Solution (CENTRUM) 15 milliLiter(s) Enteral Tube daily  thiamine 100 milliGRAM(s) Enteral Tube daily      TELEMETRY: 	    ECG:  	  RADIOLOGY:  OTHER: 	  	  LABS:	 	    CARDIAC MARKERS:                                9.0    15.35 )-----------( 223      ( 29 Apr 2020 06:35 )             28.1     04-29    134<L>  |  97  |  22  ----------------------------<  182<H>  5.0   |  23  |  0.81    Ca    9.1      29 Apr 2020 06:33      proBNP: Serum Pro-Brain Natriuretic Peptide: 4277 pg/mL (04-17 @ 05:13)    Lipid Profile:   HgA1c:   TSH: Thyroid Stimulating Hormone, Serum: 3.19 uIU/mL (04-24 @ 12:11)    < from: Xray Chest 1 View- PORTABLE-Urgent (04.20.20 @ 22:56) >  Lines and Tubes: Enteric tube in the stomach.      Lungs: Bilateral lower lung opacities are unchanged.      Pleura: Left pleural effusion.      Heart and Mediastinum: The heart is normal in size.    < end of copied text >        Assessment and plan  ---------------------------  89 year-old female with history of CVA, atrial fibrillation on Xarelto, dementia presents to the Emergency Department for bradycardia.  Patient presents from the [facility]; on vitals was found to be bradycardic to 30s-40s and sent to the ED.  Patient with decreased responsiveness and PO intake for the past few days; recently started on Azithromycin due to unknown etiology/NH paperwork w/o such information.  Tested for COVID on 4/3/20 and negative.  Patient with hypoxia at NH down to 89% on RA.  pt with sig cardiac/ medical history with increasing sob and hypotension .  pt at time is on pressors will continue, and will taper slowly  covid + will add hydroxychloroquine if pt becomes responsive  spoke to the son Mina wants full medical therapy including intubation, clinical review team does not feel aggressive therapy with intubation will change outcome  Covid + now with o2 demand on 2 L nc  continue ngt feeding  son wants all medical therapy

## 2020-04-29 NOTE — PROGRESS NOTE ADULT - ASSESSMENT
89   yr pt,   pt  with  h/o  afib,   not  on   xarelto,  due  to falls,   cva,   copd, hld,     alzheimers  dementia,    pud/  h/o left ribf xs. 8/ 9 th,. left hip surg  h/o mlple falls.  echo, normal ef/ T2  comp  deformity    sent  to  er  from   [facility], for  weakness/ sob  ct head, no cva//   ct  chest  with  goo   COVID  positive  elevated  troponin/ CRP/  Ferritin  , from Covid   pt  with  advanced  age/    comfort/   supportive  care is  ideal  however, son  wants  all  done. seen by icu  and palliative care  hypernatremia  resolved/   CNS,  in 1/2  blood   c/s,  which is  a  contaminant  palliative/ comfort care,  ought to  be goal  in this pt   on   ng tube now.  on midodrine/  son   wishes  to  continue   current care  on 2  liters  oxygen/  anemia,    hb at  9   febrile,  wbc   of  15,000 / suspect  aspiration/  if fever continues, then  consider  zosyn          < from: CT Chest No Cont (04.17.20 @ 04:24) >  IMPRESSION:   Pattern of GGO suggests infection including atypical pneumonia/viral infection from atypical agents including COVID-19 (C19V-1)  < end of copied text >     < from: CT Thoracic Spine Reform No Cont (01.07.20 @ 16:43) >  IMPRESSION:  Volume loss, microvascular disease, no acute hemorrhage or midline shift. If symptoms persist consider follow up head CT or MRI contraindications.  Cervical and thoracic spine are unchanged from prior, no obvious fracture or dislocation, multilevel degenerative changes as noted previously with diffuse osteopenia and small unchanged mild superior endplate T2 compression deformity.  < end of copied text      < from: Limited Transthoracic Echo (10.26.18 @ 14:07) >  Conclusions:  Limited tranthoracic echocardiogram at patients request to  end exam.  1. Mitral annular calcification.  2. The aortic valve opens well.  3. Limited images acquired at the patients request. Based  upon the parasternal long axis view only;  grossly normal  left ventricular systolic function.  < end of copied text >

## 2020-04-29 NOTE — PROGRESS NOTE ADULT - SUBJECTIVE AND OBJECTIVE BOX
febrile/  weak    REVIEW OF SYSTEMS:  GEN: fever,    no chills  RESP: no SOB,   no cough  CVS: no chest pain,   no palpitations  GI: no abdominal pain,   no nausea,   no vomiting,   no constipation,   no diarrhea  : no dysuria,   no frequency  NEURO: no headache,   no dizziness  PSYCH: no depression,   not anxious  Derm : no rash    MEDICATIONS  (STANDING):  heparin  Injectable 5000 Unit(s) SubCutaneous every 12 hours  midodrine 10 milliGRAM(s) Oral every 8 hours  multivitamin/minerals/iron Oral Solution (CENTRUM) 15 milliLiter(s) Enteral Tube daily  thiamine 100 milliGRAM(s) Enteral Tube daily    MEDICATIONS  (PRN):  acetaminophen   Tablet .. 650 milliGRAM(s) Oral every 6 hours PRN Temp greater or equal to 38C (100.4F), Moderate Pain (4 - 6)  guaiFENesin   Syrup  (Sugar-Free) 200 milliGRAM(s) Oral every 6 hours PRN Cough      Vital Signs Last 24 Hrs  T(C): 37.1 (29 Apr 2020 08:27), Max: 37.9 (28 Apr 2020 16:04)  T(F): 98.8 (29 Apr 2020 08:27), Max: 100.2 (28 Apr 2020 16:04)  HR: 81 (29 Apr 2020 08:27) (81 - 120)  BP: 120/69 (29 Apr 2020 08:27) (93/67 - 126/69)  BP(mean): --  RR: 22 (29 Apr 2020 08:27) (18 - 24)  SpO2: 91% (29 Apr 2020 08:27) (90% - 96%)  CAPILLARY BLOOD GLUCOSE        I&O's Summary      PHYSICAL EXAM:  HEAD:  Atraumatic, Normocephalic  NECK: Supple, No   JVD  CHEST/LUNG:   no     rales,     no,    rhonchi  HEART: Regular rate and rhythm;         murmur  ABDOMEN: Soft, Nontender, ;   EXTREMITIES:    no    edema  NEUROLOGY: non verbal    LABS:                        9.0    15.35 )-----------( 223      ( 29 Apr 2020 06:35 )             28.1     04-29    134<L>  |  97  |  22  ----------------------------<  182<H>  5.0   |  23  |  0.81    Ca    9.1      29 Apr 2020 06:33                      Thyroid Stimulating Hormone, Serum: 3.19 uIU/mL (04-24 @ 12:11)          Consultant(s) Notes Reviewed:      Care Discussed with Consultants/Other Providers:

## 2020-04-30 LAB
ALBUMIN SERPL ELPH-MCNC: 2.5 G/DL — LOW (ref 3.3–5)
ALP SERPL-CCNC: 86 U/L — SIGNIFICANT CHANGE UP (ref 40–120)
ALT FLD-CCNC: 24 U/L — SIGNIFICANT CHANGE UP (ref 10–45)
ANION GAP SERPL CALC-SCNC: 12 MMOL/L — SIGNIFICANT CHANGE UP (ref 5–17)
AST SERPL-CCNC: 30 U/L — SIGNIFICANT CHANGE UP (ref 10–40)
BASOPHILS # BLD AUTO: 0.02 K/UL — SIGNIFICANT CHANGE UP (ref 0–0.2)
BASOPHILS NFR BLD AUTO: 0.2 % — SIGNIFICANT CHANGE UP (ref 0–2)
BILIRUB SERPL-MCNC: 0.8 MG/DL — SIGNIFICANT CHANGE UP (ref 0.2–1.2)
BUN SERPL-MCNC: 28 MG/DL — HIGH (ref 7–23)
CALCIUM SERPL-MCNC: 9 MG/DL — SIGNIFICANT CHANGE UP (ref 8.4–10.5)
CHLORIDE SERPL-SCNC: 100 MMOL/L — SIGNIFICANT CHANGE UP (ref 96–108)
CO2 SERPL-SCNC: 24 MMOL/L — SIGNIFICANT CHANGE UP (ref 22–31)
CREAT SERPL-MCNC: 0.83 MG/DL — SIGNIFICANT CHANGE UP (ref 0.5–1.3)
EOSINOPHIL # BLD AUTO: 0.03 K/UL — SIGNIFICANT CHANGE UP (ref 0–0.5)
EOSINOPHIL NFR BLD AUTO: 0.3 % — SIGNIFICANT CHANGE UP (ref 0–6)
GLUCOSE SERPL-MCNC: 92 MG/DL — SIGNIFICANT CHANGE UP (ref 70–99)
HCT VFR BLD CALC: 26.7 % — LOW (ref 34.5–45)
HGB BLD-MCNC: 8.2 G/DL — LOW (ref 11.5–15.5)
IMM GRANULOCYTES NFR BLD AUTO: 0.6 % — SIGNIFICANT CHANGE UP (ref 0–1.5)
LYMPHOCYTES # BLD AUTO: 18.1 % — SIGNIFICANT CHANGE UP (ref 13–44)
LYMPHOCYTES # BLD AUTO: 2.07 K/UL — SIGNIFICANT CHANGE UP (ref 1–3.3)
MCHC RBC-ENTMCNC: 30.7 GM/DL — LOW (ref 32–36)
MCHC RBC-ENTMCNC: 33.1 PG — SIGNIFICANT CHANGE UP (ref 27–34)
MCV RBC AUTO: 107.7 FL — HIGH (ref 80–100)
MONOCYTES # BLD AUTO: 0.73 K/UL — SIGNIFICANT CHANGE UP (ref 0–0.9)
MONOCYTES NFR BLD AUTO: 6.4 % — SIGNIFICANT CHANGE UP (ref 2–14)
NEUTROPHILS # BLD AUTO: 8.49 K/UL — HIGH (ref 1.8–7.4)
NEUTROPHILS NFR BLD AUTO: 74.4 % — SIGNIFICANT CHANGE UP (ref 43–77)
NRBC # BLD: 0 /100 WBCS — SIGNIFICANT CHANGE UP (ref 0–0)
PLATELET # BLD AUTO: 230 K/UL — SIGNIFICANT CHANGE UP (ref 150–400)
POTASSIUM SERPL-MCNC: 5.4 MMOL/L — HIGH (ref 3.5–5.3)
POTASSIUM SERPL-SCNC: 5.4 MMOL/L — HIGH (ref 3.5–5.3)
PROT SERPL-MCNC: 7 G/DL — SIGNIFICANT CHANGE UP (ref 6–8.3)
RBC # BLD: 2.48 M/UL — LOW (ref 3.8–5.2)
RBC # FLD: 15.8 % — HIGH (ref 10.3–14.5)
SODIUM SERPL-SCNC: 136 MMOL/L — SIGNIFICANT CHANGE UP (ref 135–145)
WBC # BLD: 11.41 K/UL — HIGH (ref 3.8–10.5)
WBC # FLD AUTO: 11.41 K/UL — HIGH (ref 3.8–10.5)

## 2020-04-30 PROCEDURE — 99232 SBSQ HOSP IP/OBS MODERATE 35: CPT | Mod: CS

## 2020-04-30 RX ADMIN — HEPARIN SODIUM 5000 UNIT(S): 5000 INJECTION INTRAVENOUS; SUBCUTANEOUS at 06:02

## 2020-04-30 RX ADMIN — MIDODRINE HYDROCHLORIDE 10 MILLIGRAM(S): 2.5 TABLET ORAL at 06:02

## 2020-04-30 RX ADMIN — MIDODRINE HYDROCHLORIDE 10 MILLIGRAM(S): 2.5 TABLET ORAL at 22:31

## 2020-04-30 RX ADMIN — MIDODRINE HYDROCHLORIDE 10 MILLIGRAM(S): 2.5 TABLET ORAL at 14:52

## 2020-04-30 RX ADMIN — Medication 100 MILLIGRAM(S): at 14:52

## 2020-04-30 RX ADMIN — Medication 15 MILLILITER(S): at 14:52

## 2020-04-30 RX ADMIN — HEPARIN SODIUM 5000 UNIT(S): 5000 INJECTION INTRAVENOUS; SUBCUTANEOUS at 18:59

## 2020-04-30 RX ADMIN — Medication 200 MILLIGRAM(S): at 14:55

## 2020-04-30 NOTE — PROGRESS NOTE ADULT - SUBJECTIVE AND OBJECTIVE BOX
CARDIOLOGY     PROGRESS  NOTE   ________________________________________________    CHIEF COMPLAINT:Patient is a 89y old  Female who presents with a chief complaint of sob/ hypotension (30 Apr 2020 09:49)  sleeping.  	  REVIEW OF SYSTEMS:  CONSTITUTIONAL: No fever, weight loss, or fatigue  EYES: No eye pain, visual disturbances, or discharge  ENT:  No difficulty hearing, tinnitus, vertigo; No sinus or throat pain  NECK: No pain or stiffness  RESPIRATORY: No cough, wheezing, chills or hemoptysis; No Shortness of Breath  CARDIOVASCULAR: No chest pain, palpitations, passing out, dizziness, or leg swelling  GASTROINTESTINAL: No abdominal or epigastric pain. No nausea, vomiting, or hematemesis; No diarrhea or constipation. No melena or hematochezia.  GENITOURINARY: No dysuria, frequency, hematuria, or incontinence  NEUROLOGICAL: No headaches, memory loss, loss of strength, numbness, or tremors  SKIN: No itching, burning, rashes, or lesions   LYMPH Nodes: No enlarged glands  ENDOCRINE: No heat or cold intolerance; No hair loss  MUSCULOSKELETAL: No joint pain or swelling; No muscle, back, or extremity pain  PSYCHIATRIC: No depression, anxiety, mood swings, or difficulty sleeping  HEME/LYMPH: No easy bruising, or bleeding gums  ALLERGY AND IMMUNOLOGIC: No hives or eczema	    [ ] All others negative	  [x ] Unable to obtain    PHYSICAL EXAM:  T(C): 36.4 (04-30-20 @ 08:39), Max: 37.2 (04-29-20 @ 15:58)  HR: 64 (04-30-20 @ 08:39) (64 - 117)  BP: 104/62 (04-30-20 @ 08:39) (104/62 - 120/68)  RR: 20 (04-30-20 @ 08:39) (20 - 20)  SpO2: 97% (04-30-20 @ 08:39) (94% - 97%)  Wt(kg): --  I&O's Summary      Appearance: Normal	  HEENT:   Normal oral mucosa, PERRL, EOMI	  Lymphatic: No lymphadenopathy  Cardiovascular: Normal S1 S2, No JVD, + murmurs, No edema  Respiratory: Lungs clear to auscultation	  Psychiatry: A & O x 3, Mood & affect appropriate  Gastrointestinal:  Soft, Non-tender, + BS	  Skin: No rashes, No ecchymoses, No cyanosis	  Neurologic: Non-focal  Extremities: Normal range of motion, No clubbing, cyanosis or edema  Vascular: Peripheral pulses palpable 2+ bilaterally    MEDICATIONS  (STANDING):  heparin  Injectable 5000 Unit(s) SubCutaneous every 12 hours  midodrine 10 milliGRAM(s) Oral every 8 hours  multivitamin/minerals/iron Oral Solution (CENTRUM) 15 milliLiter(s) Enteral Tube daily  thiamine 100 milliGRAM(s) Enteral Tube daily      TELEMETRY: 	    ECG:  	  RADIOLOGY:  OTHER: 	  	  LABS:	 	    CARDIAC MARKERS:                                8.2    11.41 )-----------( 230      ( 30 Apr 2020 07:40 )             26.7     04-30    136  |  100  |  28<H>  ----------------------------<  92  5.4<H>   |  24  |  0.83    Ca    9.0      30 Apr 2020 07:39    TPro  7.0  /  Alb  2.5<L>  /  TBili  0.8  /  DBili  x   /  AST  30  /  ALT  24  /  AlkPhos  86  04-30    proBNP: Serum Pro-Brain Natriuretic Peptide: 4277 pg/mL (04-17 @ 05:13)    Lipid Profile:   HgA1c:   TSH: Thyroid Stimulating Hormone, Serum: 3.19 uIU/mL (04-24 @ 12:11)          Assessment and plan  ---------------------------  89 year-old female with history of CVA, atrial fibrillation on Xarelto, dementia presents to the Emergency Department for bradycardia.  Patient presents from the [facility]; on vitals was found to be bradycardic to 30s-40s and sent to the ED.  Patient with decreased responsiveness and PO intake for the past few days; recently started on Azithromycin due to unknown etiology/NH paperwork w/o such information.  Tested for COVID on 4/3/20 and negative.  Patient with hypoxia at NH down to 89% on RA.  pt with sig cardiac/ medical history with increasing sob and hypotension .  pt at time is on pressors will continue, and will taper slowly  covid + will add hydroxychloroquine if pt becomes responsive  spoke to the son Mina wants full medical therapy including intubation, clinical review team does not feel aggressive therapy with intubation will change outcome  Covid + now with o2 demand on 2 L nc  continue ngt feeding  son wants all medical therapy  will consider possible peg placement

## 2020-04-30 NOTE — PROGRESS NOTE ADULT - ASSESSMENT
89   yr pt,   pt  with  h/o  afib,   not  on   xarelto,  due  to falls,   cva,   copd, hld,     alzheimers  dementia,    pud/  h/o left ribf xs. 8/ 9 th,. left hip surg  h/o mlple falls.  echo, normal ef/ T2  comp  deformity    sent  to  er  from   [facility], for  weakness/ sob  ct head, no cva//   ct  chest  with  goo   COVID  positive  elevated  troponin/ CRP/  Ferritin  , from Covid   pt  with  advanced  age/    comfort/   supportive  care is  ideal  however, son  wants  all  done. seen by icu  and palliative care  hypernatremia  resolved/   CNS,  in 1/2  blood   c/s,  which is  a  contaminant  palliative/ comfort care,  ought to  be goal  in this pt   on    ng tube  feeds,   on midodrine/  son   wishes  to  continue   current care  on 2  liters  oxygen/  anemia,    hb at  8.2  febrile,  wbc   of  11,000 /  mild   hyperkalemia  son   deciding on  Goc        < from: CT Chest No Cont (04.17.20 @ 04:24) >  IMPRESSION:   Pattern of GGO suggests infection including atypical pneumonia/viral infection from atypical agents including COVID-19 (C19V-1)  < end of copied text >     < from: CT Thoracic Spine Reform No Cont (01.07.20 @ 16:43) >  IMPRESSION:  Volume loss, microvascular disease, no acute hemorrhage or midline shift. If symptoms persist consider follow up head CT or MRI contraindications.  Cervical and thoracic spine are unchanged from prior, no obvious fracture or dislocation, multilevel degenerative changes as noted previously with diffuse osteopenia and small unchanged mild superior endplate T2 compression deformity.  < end of copied text      < from: Limited Transthoracic Echo (10.26.18 @ 14:07) >  Conclusions:  Limited tranthoracic echocardiogram at patients request to  end exam.  1. Mitral annular calcification.  2. The aortic valve opens well.  3. Limited images acquired at the patients request. Based  upon the parasternal long axis view only;  grossly normal  left ventricular systolic function.  < end of copied text >

## 2020-04-30 NOTE — PROGRESS NOTE ADULT - SUBJECTIVE AND OBJECTIVE BOX
afebrile.   non  verbal    REVIEW OF SYSTEMS:  GEN: no fever,    no chills  RESP: no SOB,   no cough  CVS: no chest pain,   no palpitations  GI: no abdominal pain,   no nausea,   no vomiting,   no constipation,   no diarrhea  : no dysuria,   no frequency  NEURO: no headache,   no dizziness  PSYCH: no depression,   not anxious  Derm : no rash    MEDICATIONS  (STANDING):  heparin  Injectable 5000 Unit(s) SubCutaneous every 12 hours  midodrine 10 milliGRAM(s) Oral every 8 hours  multivitamin/minerals/iron Oral Solution (CENTRUM) 15 milliLiter(s) Enteral Tube daily  thiamine 100 milliGRAM(s) Enteral Tube daily    MEDICATIONS  (PRN):  acetaminophen   Tablet .. 650 milliGRAM(s) Oral every 6 hours PRN Temp greater or equal to 38C (100.4F), Moderate Pain (4 - 6)  guaiFENesin   Syrup  (Sugar-Free) 200 milliGRAM(s) Oral every 6 hours PRN Cough      Vital Signs Last 24 Hrs  T(C): 36.4 (30 Apr 2020 08:39), Max: 37.2 (29 Apr 2020 15:58)  T(F): 97.6 (30 Apr 2020 08:39), Max: 99 (29 Apr 2020 15:58)  HR: 64 (30 Apr 2020 08:39) (64 - 117)  BP: 104/62 (30 Apr 2020 08:39) (104/62 - 120/68)  BP(mean): --  RR: 20 (30 Apr 2020 08:39) (20 - 20)  SpO2: 97% (30 Apr 2020 08:39) (94% - 97%)  CAPILLARY BLOOD GLUCOSE        I&O's Summary      PHYSICAL EXAM:  HEAD:  Atraumatic, Normocephalic  NECK: Supple, No   JVD  CHEST/LUNG:   no     rales,     no,    rhonchi  HEART: Regular rate and rhythm;         murmur  ABDOMEN: Soft, Nontender, ;   EXTREMITIES:    no    edema  NEUROLOGY: non verbal    LABS:                        8.2    11.41 )-----------( 230      ( 30 Apr 2020 07:40 )             26.7     04-30    136  |  100  |  28<H>  ----------------------------<  92  5.4<H>   |  24  |  0.83    Ca    9.0      30 Apr 2020 07:39    TPro  7.0  /  Alb  2.5<L>  /  TBili  0.8  /  DBili  x   /  AST  30  /  ALT  24  /  AlkPhos  86  04-30                    Thyroid Stimulating Hormone, Serum: 3.19 uIU/mL (04-24 @ 12:11)          Consultant(s) Notes Reviewed:      Care Discussed with Consultants/Other Providers:

## 2020-05-01 LAB
ALBUMIN SERPL ELPH-MCNC: 2.6 G/DL — LOW (ref 3.3–5)
ALP SERPL-CCNC: 102 U/L — SIGNIFICANT CHANGE UP (ref 40–120)
ALT FLD-CCNC: 34 U/L — SIGNIFICANT CHANGE UP (ref 10–45)
ANION GAP SERPL CALC-SCNC: 12 MMOL/L — SIGNIFICANT CHANGE UP (ref 5–17)
AST SERPL-CCNC: 42 U/L — HIGH (ref 10–40)
BILIRUB SERPL-MCNC: 0.6 MG/DL — SIGNIFICANT CHANGE UP (ref 0.2–1.2)
BUN SERPL-MCNC: 33 MG/DL — HIGH (ref 7–23)
CALCIUM SERPL-MCNC: 8.7 MG/DL — SIGNIFICANT CHANGE UP (ref 8.4–10.5)
CHLORIDE SERPL-SCNC: 98 MMOL/L — SIGNIFICANT CHANGE UP (ref 96–108)
CO2 SERPL-SCNC: 24 MMOL/L — SIGNIFICANT CHANGE UP (ref 22–31)
CREAT SERPL-MCNC: 0.86 MG/DL — SIGNIFICANT CHANGE UP (ref 0.5–1.3)
CRP SERPL-MCNC: 2.38 MG/DL — HIGH (ref 0–0.4)
D DIMER BLD IA.RAPID-MCNC: 1228 NG/ML DDU — HIGH
FERRITIN SERPL-MCNC: 404 NG/ML — HIGH (ref 15–150)
GLUCOSE SERPL-MCNC: 122 MG/DL — HIGH (ref 70–99)
HCT VFR BLD CALC: 25.1 % — LOW (ref 34.5–45)
HGB BLD-MCNC: 7.7 G/DL — LOW (ref 11.5–15.5)
MAGNESIUM SERPL-MCNC: 2.4 MG/DL — SIGNIFICANT CHANGE UP (ref 1.6–2.6)
MCHC RBC-ENTMCNC: 30.7 GM/DL — LOW (ref 32–36)
MCHC RBC-ENTMCNC: 32.6 PG — SIGNIFICANT CHANGE UP (ref 27–34)
MCV RBC AUTO: 106.4 FL — HIGH (ref 80–100)
NRBC # BLD: 0 /100 WBCS — SIGNIFICANT CHANGE UP (ref 0–0)
PLATELET # BLD AUTO: 232 K/UL — SIGNIFICANT CHANGE UP (ref 150–400)
POTASSIUM SERPL-MCNC: 4.6 MMOL/L — SIGNIFICANT CHANGE UP (ref 3.5–5.3)
POTASSIUM SERPL-SCNC: 4.6 MMOL/L — SIGNIFICANT CHANGE UP (ref 3.5–5.3)
PROT SERPL-MCNC: 6.7 G/DL — SIGNIFICANT CHANGE UP (ref 6–8.3)
RBC # BLD: 2.36 M/UL — LOW (ref 3.8–5.2)
RBC # FLD: 15.8 % — HIGH (ref 10.3–14.5)
SODIUM SERPL-SCNC: 134 MMOL/L — LOW (ref 135–145)
WBC # BLD: 8.69 K/UL — SIGNIFICANT CHANGE UP (ref 3.8–10.5)
WBC # FLD AUTO: 8.69 K/UL — SIGNIFICANT CHANGE UP (ref 3.8–10.5)

## 2020-05-01 PROCEDURE — 99232 SBSQ HOSP IP/OBS MODERATE 35: CPT | Mod: CS

## 2020-05-01 RX ADMIN — HEPARIN SODIUM 5000 UNIT(S): 5000 INJECTION INTRAVENOUS; SUBCUTANEOUS at 17:25

## 2020-05-01 RX ADMIN — Medication 15 MILLILITER(S): at 12:13

## 2020-05-01 RX ADMIN — Medication 100 MILLIGRAM(S): at 12:13

## 2020-05-01 RX ADMIN — HEPARIN SODIUM 5000 UNIT(S): 5000 INJECTION INTRAVENOUS; SUBCUTANEOUS at 06:12

## 2020-05-01 RX ADMIN — MIDODRINE HYDROCHLORIDE 10 MILLIGRAM(S): 2.5 TABLET ORAL at 12:13

## 2020-05-01 RX ADMIN — MIDODRINE HYDROCHLORIDE 10 MILLIGRAM(S): 2.5 TABLET ORAL at 22:37

## 2020-05-01 RX ADMIN — Medication 200 MILLIGRAM(S): at 12:13

## 2020-05-01 RX ADMIN — MIDODRINE HYDROCHLORIDE 10 MILLIGRAM(S): 2.5 TABLET ORAL at 06:12

## 2020-05-01 NOTE — PROGRESS NOTE ADULT - SUBJECTIVE AND OBJECTIVE BOX
afebrile/  non verbal    REVIEW OF SYSTEMS:  GEN: no fever,    no chills  RESP: no SOB,   no cough  CVS: no chest pain,   no palpitations  GI: no abdominal pain,   no nausea,   no vomiting,   no constipation,   no diarrhea  : no dysuria,   no frequency  NEURO: no headache,   no dizziness  PSYCH: no depression,   not anxious  Derm : no rash    MEDICATIONS  (STANDING):  heparin  Injectable 5000 Unit(s) SubCutaneous every 12 hours  midodrine 10 milliGRAM(s) Oral every 8 hours  multivitamin/minerals/iron Oral Solution (CENTRUM) 15 milliLiter(s) Enteral Tube daily  thiamine 100 milliGRAM(s) Enteral Tube daily    MEDICATIONS  (PRN):  acetaminophen   Tablet .. 650 milliGRAM(s) Oral every 6 hours PRN Temp greater or equal to 38C (100.4F), Moderate Pain (4 - 6)  guaiFENesin   Syrup  (Sugar-Free) 200 milliGRAM(s) Oral every 6 hours PRN Cough      Vital Signs Last 24 Hrs  T(C): 37.1 (01 May 2020 07:34), Max: 37.4 (01 May 2020 04:23)  T(F): 98.7 (01 May 2020 07:34), Max: 99.4 (01 May 2020 04:23)  HR: 73 (01 May 2020 07:34) (73 - 82)  BP: 126/83 (01 May 2020 07:34) (122/81 - 127/47)  BP(mean): --  RR: 20 (01 May 2020 07:34) (20 - 21)  SpO2: 95% (01 May 2020 07:34) (95% - 97%)  CAPILLARY BLOOD GLUCOSE        I&O's Summary    30 Apr 2020 07:01  -  01 May 2020 07:00  --------------------------------------------------------  IN: 0 mL / OUT: 0 mL / NET: 0 mL        PHYSICAL EXAM:  HEAD:  Atraumatic, Normocephalic  NECK: Supple, No   JVD  CHEST/LUNG:   no     rales,     no,    rhonchi  HEART: Regular rate and rhythm;         murmur  ABDOMEN: Soft, Nontender, ;   EXTREMITIES:   no     edema  NEUROLOGY:  be dbound    LABS:                        7.7    8.69  )-----------( 232      ( 01 May 2020 07:03 )             25.1     05-01    134<L>  |  98  |  33<H>  ----------------------------<  122<H>  4.6   |  24  |  0.86    Ca    8.7      01 May 2020 07:03  Mg     2.4     05-01    TPro  6.7  /  Alb  2.6<L>  /  TBili  0.6  /  DBili  x   /  AST  42<H>  /  ALT  34  /  AlkPhos  102  05-01                    Thyroid Stimulating Hormone, Serum: 3.19 uIU/mL (04-24 @ 12:11)          Consultant(s) Notes Reviewed:      Care Discussed with Consultants/Other Providers:

## 2020-05-01 NOTE — PROGRESS NOTE ADULT - ASSESSMENT
89   yr pt,   pt  with  h/o  afib,   not  on   xarelto,  due  to falls,   cva,   copd, hld,     alzheimers  dementia,    pud/  h/o left ribf xs. 8/ 9 th,. left hip surg  h/o mlple falls.  echo, normal ef/ T2  comp  deformity    sent  to  er  from   [facility], for  weakness/ sob  ct head, no cva//   ct  chest  with  goo   COVID  positive  elevated  troponin/ CRP/  Ferritin  , from Covid   pt  with  advanced  age/    comfort/   supportive  care is  ideal  however, son  wants  all  done. seen by icu  and palliative care  hypernatremia  resolved/   CNS,  in 1/2  blood   c/s,  which is  a  contaminant  palliative/ comfort care,  ought to  be goal  in this pt   on    ng tube  feeds,   on midodrine/  son   wishes  to  continue   current care  on 2  liters  oxygen/    anemia,    hb at   7.7/  f/p   cbc  son   deciding on  Goc still/  on ngt feeds  now  eventually needs rehab        < from: CT Chest No Cont (04.17.20 @ 04:24) >  IMPRESSION:   Pattern of GGO suggests infection including atypical pneumonia/viral infection from atypical agents including COVID-19 (C19V-1)  < end of copied text >     < from: CT Thoracic Spine Reform No Cont (01.07.20 @ 16:43) >  IMPRESSION:  Volume loss, microvascular disease, no acute hemorrhage or midline shift. If symptoms persist consider follow up head CT or MRI contraindications.  Cervical and thoracic spine are unchanged from prior, no obvious fracture or dislocation, multilevel degenerative changes as noted previously with diffuse osteopenia and small unchanged mild superior endplate T2 compression deformity.  < end of copied text      < from: Limited Transthoracic Echo (10.26.18 @ 14:07) >  Conclusions:  Limited tranthoracic echocardiogram at patients request to  end exam.  1. Mitral annular calcification.  2. The aortic valve opens well.  3. Limited images acquired at the patients request. Based  upon the parasternal long axis view only;  grossly normal  left ventricular systolic function.  < end of copied text >

## 2020-05-01 NOTE — PROGRESS NOTE ADULT - SUBJECTIVE AND OBJECTIVE BOX
CARDIOLOGY     PROGRESS  NOTE   ________________________________________________    CHIEF COMPLAINT:Patient is a 89y old  Female who presents with a chief complaint of sob/ hypotension (01 May 2020 09:41)  no complain.  	  REVIEW OF SYSTEMS:  CONSTITUTIONAL: No fever, weight loss, or fatigue  EYES: No eye pain, visual disturbances, or discharge  ENT:  No difficulty hearing, tinnitus, vertigo; No sinus or throat pain  NECK: No pain or stiffness  RESPIRATORY: No cough, wheezing, chills or hemoptysis; Shortness of Breath  CARDIOVASCULAR: No chest pain, palpitations, passing out, dizziness, or leg swelling  GASTROINTESTINAL: No abdominal or epigastric pain. No nausea, vomiting, or hematemesis; No diarrhea or constipation. No melena or hematochezia.  GENITOURINARY: No dysuria, frequency, hematuria, or incontinence  NEUROLOGICAL: No headaches, memory loss, loss of strength, numbness, or tremors  SKIN: No itching, burning, rashes, or lesions   LYMPH Nodes: No enlarged glands  ENDOCRINE: No heat or cold intolerance; No hair loss  MUSCULOSKELETAL: No joint pain or swelling; No muscle, back, or extremity pain  PSYCHIATRIC: No depression, anxiety, mood swings, or difficulty sleeping  HEME/LYMPH: No easy bruising, or bleeding gums  ALLERGY AND IMMUNOLOGIC: No hives or eczema	    [ ] All others negative	  [ ] Unable to obtain    PHYSICAL EXAM:  T(C): 37.1 (05-01-20 @ 07:34), Max: 37.4 (05-01-20 @ 04:23)  HR: 73 (05-01-20 @ 07:34) (73 - 82)  BP: 126/83 (05-01-20 @ 07:34) (122/81 - 127/47)  RR: 20 (05-01-20 @ 07:34) (20 - 21)  SpO2: 95% (05-01-20 @ 07:34) (95% - 97%)  Wt(kg): --  I&O's Summary    30 Apr 2020 07:01  -  01 May 2020 07:00  --------------------------------------------------------  IN: 0 mL / OUT: 0 mL / NET: 0 mL        Appearance: Normal	  HEENT:   Normal oral mucosa, PERRL, EOMI	  Lymphatic: No lymphadenopathy  Cardiovascular: Normal S1 S2, No JVD, No murmurs, No edema  Respiratory: Lungs clear to auscultation	  Gastrointestinal:  Soft, Non-tender, + BS	  Skin: No rashes, No ecchymoses, No cyanosis	  Neurologic: Non-focal  Extremities: Normal range of motion, No clubbing, cyanosis or edema  Vascular: Peripheral pulses palpable 2+ bilaterally    MEDICATIONS  (STANDING):  heparin  Injectable 5000 Unit(s) SubCutaneous every 12 hours  midodrine 10 milliGRAM(s) Oral every 8 hours  multivitamin/minerals/iron Oral Solution (CENTRUM) 15 milliLiter(s) Enteral Tube daily  thiamine 100 milliGRAM(s) Enteral Tube daily      TELEMETRY: 	    ECG:  	  RADIOLOGY:  OTHER: 	  	  LABS:	 	    CARDIAC MARKERS:                                7.7    8.69  )-----------( 232      ( 01 May 2020 07:03 )             25.1     05-01    134<L>  |  98  |  33<H>  ----------------------------<  122<H>  4.6   |  24  |  0.86    Ca    8.7      01 May 2020 07:03  Mg     2.4     05-01    TPro  6.7  /  Alb  2.6<L>  /  TBili  0.6  /  DBili  x   /  AST  42<H>  /  ALT  34  /  AlkPhos  102  05-01    proBNP: Serum Pro-Brain Natriuretic Peptide: 4277 pg/mL (04-17 @ 05:13)    Lipid Profile:   HgA1c:   TSH: Thyroid Stimulating Hormone, Serum: 3.19 uIU/mL (04-24 @ 12:11)          Assessment and plan  ---------------------------    89 year-old female with history of CVA, atrial fibrillation on Xarelto, dementia presents to the Emergency Department for bradycardia.  Patient presents from the [facility]; on vitals was found to be bradycardic to 30s-40s and sent to the ED.  Patient with decreased responsiveness and PO intake for the past few days; recently started on Azithromycin due to unknown etiology/NH paperwork w/o such information.  Tested for COVID on 4/3/20 and negative.  Patient with hypoxia at NH down to 89% on RA.  pt with sig cardiac/ medical history with increasing sob and hypotension .  pt at time is on pressors will continue, and will taper slowly  covid + will add hydroxychloroquine if pt becomes responsive  spoke to the son Mina wants full medical therapy including intubation, clinical review team does not feel aggressive therapy with intubation will change outcome  Covid + now with o2 demand on 2 L nc  continue ngt feeding  son wants all medical therapy  will consider possible peg placement, as son request  check stool guaiac, fu cbc

## 2020-05-02 LAB
BLD GP AB SCN SERPL QL: NEGATIVE — SIGNIFICANT CHANGE UP
FOLATE SERPL-MCNC: 16 NG/ML — SIGNIFICANT CHANGE UP
HCT VFR BLD CALC: 25.7 % — LOW (ref 34.5–45)
HGB BLD-MCNC: 8.1 G/DL — LOW (ref 11.5–15.5)
MCHC RBC-ENTMCNC: 31.5 GM/DL — LOW (ref 32–36)
MCHC RBC-ENTMCNC: 32.9 PG — SIGNIFICANT CHANGE UP (ref 27–34)
MCV RBC AUTO: 104.5 FL — HIGH (ref 80–100)
NRBC # BLD: 0 /100 WBCS — SIGNIFICANT CHANGE UP (ref 0–0)
PLATELET # BLD AUTO: 255 K/UL — SIGNIFICANT CHANGE UP (ref 150–400)
RBC # BLD: 2.46 M/UL — LOW (ref 3.8–5.2)
RBC # FLD: 15.5 % — HIGH (ref 10.3–14.5)
RH IG SCN BLD-IMP: POSITIVE — SIGNIFICANT CHANGE UP
VIT B12 SERPL-MCNC: 840 PG/ML — SIGNIFICANT CHANGE UP (ref 232–1245)
WBC # BLD: 10.74 K/UL — HIGH (ref 3.8–10.5)
WBC # FLD AUTO: 10.74 K/UL — HIGH (ref 3.8–10.5)

## 2020-05-02 PROCEDURE — 71045 X-RAY EXAM CHEST 1 VIEW: CPT | Mod: 26,77

## 2020-05-02 PROCEDURE — 71045 X-RAY EXAM CHEST 1 VIEW: CPT | Mod: 26

## 2020-05-02 PROCEDURE — 99232 SBSQ HOSP IP/OBS MODERATE 35: CPT | Mod: CS

## 2020-05-02 RX ORDER — BACITRACIN 500 [USP'U]/G
1 OINTMENT OPHTHALMIC
Refills: 0 | Status: DISCONTINUED | OUTPATIENT
Start: 2020-05-02 | End: 2020-05-08

## 2020-05-02 RX ORDER — SODIUM CHLORIDE 9 MG/ML
1000 INJECTION INTRAMUSCULAR; INTRAVENOUS; SUBCUTANEOUS
Refills: 0 | Status: DISCONTINUED | OUTPATIENT
Start: 2020-05-02 | End: 2020-05-06

## 2020-05-02 RX ADMIN — Medication 100 MILLIGRAM(S): at 17:00

## 2020-05-02 RX ADMIN — BACITRACIN 1 APPLICATION(S): 500 OINTMENT OPHTHALMIC at 17:01

## 2020-05-02 RX ADMIN — Medication 1 DROP(S): at 22:07

## 2020-05-02 RX ADMIN — Medication 1 DROP(S): at 17:01

## 2020-05-02 RX ADMIN — BACITRACIN 1 APPLICATION(S): 500 OINTMENT OPHTHALMIC at 13:40

## 2020-05-02 RX ADMIN — MIDODRINE HYDROCHLORIDE 10 MILLIGRAM(S): 2.5 TABLET ORAL at 17:00

## 2020-05-02 RX ADMIN — HEPARIN SODIUM 5000 UNIT(S): 5000 INJECTION INTRAVENOUS; SUBCUTANEOUS at 06:40

## 2020-05-02 RX ADMIN — BACITRACIN 1 APPLICATION(S): 500 OINTMENT OPHTHALMIC at 22:07

## 2020-05-02 RX ADMIN — HEPARIN SODIUM 5000 UNIT(S): 5000 INJECTION INTRAVENOUS; SUBCUTANEOUS at 17:01

## 2020-05-02 RX ADMIN — Medication 1 DROP(S): at 13:31

## 2020-05-02 RX ADMIN — Medication 15 MILLILITER(S): at 17:00

## 2020-05-02 RX ADMIN — SODIUM CHLORIDE 50 MILLILITER(S): 9 INJECTION INTRAMUSCULAR; INTRAVENOUS; SUBCUTANEOUS at 17:02

## 2020-05-02 NOTE — PROVIDER CONTACT NOTE (OTHER) - ACTION/TREATMENT ORDERED:
as per NP still awaiting confirmation. 6 am meds rescheduled.
NP and ANM aware, will continue to monitor VS per order.
Notified NP Semianow that pt pulled NGT out. NP will come to place a new NGT. Pt is still on mitten restraints. Will continue to monitor.
NP made aware. Awaiting reinsertion.
No orders as in yet
Awaiting NP to advance tube.
Multiple attempts made by multiple RN's to obtain peripheral access. NP attempt unsuccessful. IV team contacted
Will continue to monitor, diet to be adjusted, JAYSON Jerry made aware.
JAYSON Colon aware, will wait for IV team.

## 2020-05-02 NOTE — CHART NOTE - NSCHARTNOTEFT_GEN_A_CORE
Medicine PA NG Tube Note    Patient was seen and examined at the bedside. NG tube placement procedure was explained to the patient and the patient agreed to proceed with the plan. Head of the bed was elevated. Placement of tube was attempted in the left nostril. NG tube was unsuccessful at being inserted into L nare as pt. did not tolerate procedure well whilst NG tube was being inserted.      Plan:  #Failed NGT insertion  - Will follow up as needed  - Nurse instructed to suction mouth PRN  - ? plan for PEG in future  - Signed out to NP will f/u, to attempt NGT insertion w/ CXR to conf. placement    Manoj Nguyễn PA-C  Department of Medicine  [facility] [address] Medicine PA NG Tube Note    Patient was seen and examined at the bedside. NG tube placement procedure was explained to the patient however, pt. is currently cognitively impaired. Head of the bed was elevated. Placement of tube was attempted in the left nostril. NG tube was unsuccessful at being inserted into L nare as pt. was moving her head back and forth, and unwilling to have NGT inserted. NGT insertion was aborted as pt. was unwilling to tolerate.    Plan:  #Failed NGT insertion  - Will follow up as needed  - Nurse instructed to suction mouth PRN  - ? plan for PEG in future  - Signed out to NP will f/u, to attempt NGT insertion w/ CXR to conf. placement    LAKESHIA Riggs-C  Department of Medicine  [facility] [address] Medicine PA NG Tube Note    Patient was seen and examined at the bedside. NG tube placement procedure was explained to the patient however, pt. is currently cognitively impaired. Head of the bed was elevated. Placement of tube was attempted in the left nostril. NG tube was unsuccessful at being placed into L nare as pt. was moving her head back and forth, and unwilling to have NGT inserted. After multiple attempts, NGT insertion was aborted as pt. was unwilling to tolerate.    Plan:  #Failed NGT insertion  - Will follow up as needed  - Nurse instructed to suction mouth PRN  - ? plan for PEG in future  - Signed out to NP will f/u, to attempt NGT insertion w/ CXR to conf. placement    JOSE RiggsC  Department of Medicine  [facility] [id number] Medicine PA NG Tube Note    Notified by RN of pt. removing her NGT. Patient was seen and examined at the bedside. NG tube placement procedure was explained to the patient however, pt. is currently cognitively impaired. Head of the bed was elevated. Placement of tube was attempted in the left nostril. NG tube was unsuccessful at being placed into L nare as pt. was moving her head back and forth, and unwilling to have NGT inserted. After multiple attempts, NGT insertion was aborted as pt. was unwilling to tolerate.    Plan:  #Failed NGT insertion  - Will follow up as needed  - Nurse instructed to suction mouth PRN  - ? plan for PEG in future  - Signed out to day NP will f/u, to attempt NGT insertion w/ CXR to conf. placement    Manoj Nguyễn PA-C  Department of Medicine  [facility] [id number]

## 2020-05-02 NOTE — PROGRESS NOTE ADULT - SUBJECTIVE AND OBJECTIVE BOX
CARDIOLOGY     PROGRESS  NOTE   ________________________________________________    CHIEF COMPLAINT:Patient is a 89y old  Female who presents with a chief complaint of sob/ hypotension (02 May 2020 10:15)  more alert is fighting.  	  REVIEW OF SYSTEMS:  CONSTITUTIONAL: No fever, weight loss, or fatigue  EYES: No eye pain, visual disturbances, or discharge  ENT:  No difficulty hearing, tinnitus, vertigo; No sinus or throat pain  NECK: No pain or stiffness  RESPIRATORY: No cough, wheezing, chills or hemoptysis; No Shortness of Breath  CARDIOVASCULAR: No chest pain, palpitations, passing out, dizziness, or leg swelling  GASTROINTESTINAL: No abdominal or epigastric pain. No nausea, vomiting, or hematemesis; No diarrhea or constipation. No melena or hematochezia.  GENITOURINARY: No dysuria, frequency, hematuria, or incontinence  NEUROLOGICAL: No headaches, memory loss, loss of strength, numbness, or tremors  SKIN: No itching, burning, rashes, or lesions   LYMPH Nodes: No enlarged glands  ENDOCRINE: No heat or cold intolerance; No hair loss  MUSCULOSKELETAL: No joint pain or swelling; No muscle, back, or extremity pain  PSYCHIATRIC: No depression, anxiety, mood swings, or difficulty sleeping  HEME/LYMPH: No easy bruising, or bleeding gums  ALLERGY AND IMMUNOLOGIC: No hives or eczema	    [ ] All others negative	  [x ] Unable to obtain    PHYSICAL EXAM:  T(C): 36.3 (05-02-20 @ 08:55), Max: 37.5 (05-02-20 @ 00:19)  HR: 71 (05-02-20 @ 08:55) (59 - 72)  BP: 97/53 (05-02-20 @ 08:55) (97/53 - 147/76)  RR: 20 (05-02-20 @ 08:55) (20 - 24)  SpO2: 95% (05-02-20 @ 08:55) (95% - 98%)  Wt(kg): --  I&O's Summary      Appearance: Normal	  HEENT:   Normal oral mucosa, PERRL, EOMI	  Lymphatic: No lymphadenopathy  Cardiovascular: Normal S1 S2, No JVD, + murmurs, No edema  Respiratory: Lungs clear to auscultation	  Psychiatry: A & O x 3, Mood & affect appropriate  Gastrointestinal:  Soft, Non-tender, + BS	  Skin: No rashes, No ecchymoses, No cyanosis	  Neurologic: Non-focal  Extremities: Normal range of motion, No clubbing, cyanosis or edema  Vascular: Peripheral pulses palpable 2+ bilaterally    MEDICATIONS  (STANDING):  heparin  Injectable 5000 Unit(s) SubCutaneous every 12 hours  midodrine 10 milliGRAM(s) Oral every 8 hours  multivitamin/minerals/iron Oral Solution (CENTRUM) 15 milliLiter(s) Enteral Tube daily  thiamine 100 milliGRAM(s) Enteral Tube daily      TELEMETRY: 	    ECG:  	  RADIOLOGY:  OTHER: 	  	  LABS:	 	    CARDIAC MARKERS:                                8.1    10.74 )-----------( 255      ( 02 May 2020 07:07 )             25.7     05-01    134<L>  |  98  |  33<H>  ----------------------------<  122<H>  4.6   |  24  |  0.86    Ca    8.7      01 May 2020 07:03  Mg     2.4     05-01    TPro  6.7  /  Alb  2.6<L>  /  TBili  0.6  /  DBili  x   /  AST  42<H>  /  ALT  34  /  AlkPhos  102  05-01    proBNP: Serum Pro-Brain Natriuretic Peptide: 4277 pg/mL (04-17 @ 05:13)    Lipid Profile:   HgA1c:   TSH: Thyroid Stimulating Hormone, Serum: 3.19 uIU/mL (04-24 @ 12:11)          Assessment and plan  ---------------------------  89 year-old female with history of CVA, atrial fibrillation on Xarelto, dementia presents to the Emergency Department for bradycardia.  Patient presents from the [facility]; on vitals was found to be bradycardic to 30s-40s and sent to the ED.  Patient with decreased responsiveness and PO intake for the past few days; recently started on Azithromycin due to unknown etiology/NH paperwork w/o such information.  Tested for COVID on 4/3/20 and negative.  Patient with hypoxia at NH down to 89% on RA.  pt with sig cardiac/ medical history with increasing sob and hypotension .  pt at time is on pressors will continue, and will taper slowly  covid + will add hydroxychloroquine if pt becomes responsive  spoke to the son Mina wants full medical therapy including intubation, clinical review team does not feel aggressive therapy with intubation will change outcome  Covid + now with o2 demand on 2 L nc  continue ngt feeding  son wants all medical therapy  will consider possible peg placement, as son request  check stool guaiac awaiting   fu cbc  start dysphagia 2/ bed side swallow test  start gentle hydration

## 2020-05-02 NOTE — CHART NOTE - NSCHARTNOTEFT_GEN_A_CORE
Patient pulled out NGT.  Briefly Mrs Present is a 89 yrs old female with COVID positive. Currently on 2 liters nasal cannula o2 sat 98 percent, not on any clinical trial. Pt has PAST MEDICAL HISTORY: afib, copd, cva, gi bleed HLD and glaucoma. Admitted with  bradycardia which has resolved  Pt seen lying in bed ,NGT out , attempted to feed patient applesauce but cough noted after spoonful. Patient does not follow commands, she will tell you to wipe her mouth but does not carry a full conversation.   NGT reinserted, cxr ordered to verify placement. Pt. noted to have yellow purulent drainage from bilateral eyes. Pt did not respond to question as such of pain or blurred vision.  Vital Signs Last 24 Hrs  T(C): 36.3 (02 May 2020 08:55), Max: 37.5 (02 May 2020 00:19)  T(F): 97.3 (02 May 2020 08:55), Max: 99.5 (02 May 2020 00:19)  HR: 71 (02 May 2020 08:55) (59 - 72)  BP: 97/53 (02 May 2020 08:55) (97/53 - 147/76)  BP(mean): --  RR: 20 (02 May 2020 08:55) (20 - 24)  SpO2: 95% (02 May 2020 08:55) (95% - 98%)                        8.1    10.74 )-----------( 255      ( 02 May 2020 07:07 )             25.7    EXAM:  XR CHEST PORTABLE URGENT 1V                            PROCEDURE DATE:  05/02/2020            INTERPRETATION:  Date of study: 5/2/2020    Prior: 4/20/20    Examination: XR CHEST URGENT    History: ADMDIAG1: J18.9 MICHELLE/, ngt placement    Findings:  The study is limited with artifactual material obscuring lower lateral right chest.  NG tube tip in duodenal bulb or descending duodenum.  The cardiomediastinal silhouette is within normal limits.  Stable small left pleural effusion.  Intervalimprovement in bilateral lower lung opacities.  No pneumothorax.    Impression:  1.  Stable small left pleural effusion.  2.  Interval improvement in bilateral lower lung opacities since 4/20/20.      DISCRETE X-RAY DATA:  Percent of LEFT lung opacification: 1-33%  Percent of RIGHT lung opacification: 1-33%  Change in lung opacification from most recent x-ray (<=3 days): No Prior  Change from prior dated 3 or more days (same admission): Decrease  A/P 89 yrs. old female with COVID, pulled out NGT but needed reinsertion  cxr results indicated it needs repositioned , NGT repositioned and will repeat cxr  bacterial conjunctivitis: start bacitracin.

## 2020-05-02 NOTE — PROGRESS NOTE ADULT - SUBJECTIVE AND OBJECTIVE BOX
non verbal/  weak  REVIEW OF SYSTEMS:  GEN: no fever,    no chills  RESP: no SOB,   no cough  CVS: no chest pain,   no palpitations  GI: no abdominal pain,   no nausea,   no vomiting,   no constipation,   no diarrhea  : no dysuria,   no frequency  NEURO: no headache,   no dizziness  PSYCH: no depression,   not anxious  Derm : no rash    MEDICATIONS  (STANDING):  heparin  Injectable 5000 Unit(s) SubCutaneous every 12 hours  midodrine 10 milliGRAM(s) Oral every 8 hours  multivitamin/minerals/iron Oral Solution (CENTRUM) 15 milliLiter(s) Enteral Tube daily  thiamine 100 milliGRAM(s) Enteral Tube daily    MEDICATIONS  (PRN):  acetaminophen   Tablet .. 650 milliGRAM(s) Oral every 6 hours PRN Temp greater or equal to 38C (100.4F), Moderate Pain (4 - 6)  guaiFENesin   Syrup  (Sugar-Free) 200 milliGRAM(s) Oral every 6 hours PRN Cough      Vital Signs Last 24 Hrs  T(C): 36.3 (02 May 2020 08:55), Max: 37.5 (02 May 2020 00:19)  T(F): 97.3 (02 May 2020 08:55), Max: 99.5 (02 May 2020 00:19)  HR: 71 (02 May 2020 08:55) (59 - 72)  BP: 97/53 (02 May 2020 08:55) (97/53 - 147/76)  BP(mean): --  RR: 20 (02 May 2020 08:55) (20 - 24)  SpO2: 95% (02 May 2020 08:55) (95% - 98%)  CAPILLARY BLOOD GLUCOSE        I&O's Summary      PHYSICAL EXAM:  HEAD:  Atraumatic, Normocephalic  NECK: Supple, No   JVD  CHEST/LUNG:   no     rales,     no,    rhonchi  HEART: Regular rate and rhythm;         murmur  ABDOMEN: Soft, Nontender, ;   EXTREMITIES:     no   edema  NEUROLOGY:   non verbal    LABS:                        8.1    10.74 )-----------( 255      ( 02 May 2020 07:07 )             25.7     05-01    134<L>  |  98  |  33<H>  ----------------------------<  122<H>  4.6   |  24  |  0.86    Ca    8.7      01 May 2020 07:03  Mg     2.4     05-01    TPro  6.7  /  Alb  2.6<L>  /  TBili  0.6  /  DBili  x   /  AST  42<H>  /  ALT  34  /  AlkPhos  102  05-01                    Thyroid Stimulating Hormone, Serum: 3.19 uIU/mL (04-24 @ 12:11)          Consultant(s) Notes Reviewed:      Care Discussed with Consultants/Other Providers: non verbal/   ng tube, out  REVIEW OF SYSTEMS:  GEN: no fever,    no chills  RESP: no SOB,   no cough  CVS: no chest pain,   no palpitations  GI: no abdominal pain,   no nausea,   no vomiting,   no constipation,   no diarrhea  : no dysuria,   no frequency  NEURO: no headache,   no dizziness  PSYCH: no depression,   not anxious  Derm : no rash    MEDICATIONS  (STANDING):  heparin  Injectable 5000 Unit(s) SubCutaneous every 12 hours  midodrine 10 milliGRAM(s) Oral every 8 hours  multivitamin/minerals/iron Oral Solution (CENTRUM) 15 milliLiter(s) Enteral Tube daily  thiamine 100 milliGRAM(s) Enteral Tube daily    MEDICATIONS  (PRN):  acetaminophen   Tablet .. 650 milliGRAM(s) Oral every 6 hours PRN Temp greater or equal to 38C (100.4F), Moderate Pain (4 - 6)  guaiFENesin   Syrup  (Sugar-Free) 200 milliGRAM(s) Oral every 6 hours PRN Cough      Vital Signs Last 24 Hrs  T(C): 36.3 (02 May 2020 08:55), Max: 37.5 (02 May 2020 00:19)  T(F): 97.3 (02 May 2020 08:55), Max: 99.5 (02 May 2020 00:19)  HR: 71 (02 May 2020 08:55) (59 - 72)  BP: 97/53 (02 May 2020 08:55) (97/53 - 147/76)  BP(mean): --  RR: 20 (02 May 2020 08:55) (20 - 24)  SpO2: 95% (02 May 2020 08:55) (95% - 98%)  CAPILLARY BLOOD GLUCOSE        I&O's Summary      PHYSICAL EXAM:  HEAD:  Atraumatic, Normocephalic  NECK: Supple, No   JVD  CHEST/LUNG:   no     rales,     no,    rhonchi  HEART: Regular rate and rhythm;         murmur  ABDOMEN: Soft, Nontender, ;   EXTREMITIES:     no   edema  NEUROLOGY:   non verbal    LABS:                        8.1    10.74 )-----------( 255      ( 02 May 2020 07:07 )             25.7     05-01    134<L>  |  98  |  33<H>  ----------------------------<  122<H>  4.6   |  24  |  0.86    Ca    8.7      01 May 2020 07:03  Mg     2.4     05-01    TPro  6.7  /  Alb  2.6<L>  /  TBili  0.6  /  DBili  x   /  AST  42<H>  /  ALT  34  /  AlkPhos  102  05-01                    Thyroid Stimulating Hormone, Serum: 3.19 uIU/mL (04-24 @ 12:11)          Consultant(s) Notes Reviewed:      Care Discussed with Consultants/Other Providers:

## 2020-05-02 NOTE — PROGRESS NOTE ADULT - ASSESSMENT
89   yr pt,   pt  with  h/o  afib,   not  on   xarelto,  due  to falls,   cva,   copd, hld,     alzheimers  dementia,    pud/  h/o left ribf xs. 8/ 9 th,. left hip surg  h/o mlple falls.  echo, normal ef/ T2  comp  deformity    sent  to  er  from   [facility], for  weakness/ sob  ct head, no cva//   ct  chest  with  goo   COVID  positive  elevated  troponin/ CRP/  Ferritin  , from Covid   pt  with  advanced  age/    comfort/   supportive  care is  ideal  however, son  wants  all  done. seen by icu  and palliative care  hypernatremia  resolved/   CNS,  in 1/2  blood   c/s,  which is  a  contaminant  palliative/ comfort care,  ought to  be goal  in this pt   on    ng tube  feeds,   on midodrine/  son   wishes  to  continue   current care  on 2  liters  oxygen/    anemia,     stable  at 8    on ngt feeds  now    spoke  with son,  he  is  still  deciding  on goc  eventually needs rehab        < from: CT Chest No Cont (04.17.20 @ 04:24) >  IMPRESSION:   Pattern of GGO suggests infection including atypical pneumonia/viral infection from atypical agents including COVID-19 (C19V-1)  < end of copied text >     < from: CT Thoracic Spine Reform No Cont (01.07.20 @ 16:43) >  IMPRESSION:  Volume loss, microvascular disease, no acute hemorrhage or midline shift. If symptoms persist consider follow up head CT or MRI contraindications.  Cervical and thoracic spine are unchanged from prior, no obvious fracture or dislocation, multilevel degenerative changes as noted previously with diffuse osteopenia and small unchanged mild superior endplate T2 compression deformity.  < end of copied text      < from: Limited Transthoracic Echo (10.26.18 @ 14:07) >  Conclusions:  Limited tranthoracic echocardiogram at patients request to  end exam.  1. Mitral annular calcification.  2. The aortic valve opens well.  3. Limited images acquired at the patients request. Based  upon the parasternal long axis view only;  grossly normal  left ventricular systolic function.  < end of copied text > 89   yr pt,   pt  with  h/o  afib,   not  on   xarelto,  due  to falls,   cva,   copd, hld,     alzheimers  dementia,    pud/  h/o left ribf xs. 8/ 9 th,. left hip surg  h/o mlple falls.  echo, normal ef/ T2  comp  deformity    sent  to  er  from   [facility], for  weakness/ sob  ct head, no cva//   ct  chest  with  goo   COVID  positive  elevated  troponin/ CRP/  Ferritin  , from Covid   pt  with  advanced  age/    comfort/   supportive  care is  ideal  however, son  wants  all  done. seen by icu  and palliative care  hypernatremia  resolved/   CNS,  in 1/2  blood   c/s,  which is  a  contaminant  palliative/ comfort care,  ought to  be goal  in this pt    wa s on   ng tube  feeds,   on midodrine/  son   wishes  to  continue   current care  on 2  liters  oxygen/    anemia,     stable  at 8   ng tube  pulled  out by pt.  attempts  to  re isnert  unsuccessful    bedside  swallow  evak            < from: CT Chest No Cont (04.17.20 @ 04:24) >  IMPRESSION:   Pattern of GGO suggests infection including atypical pneumonia/viral infection from atypical agents including COVID-19 (C19V-1)  < end of copied text >     < from: CT Thoracic Spine Reform No Cont (01.07.20 @ 16:43) >  IMPRESSION:  Volume loss, microvascular disease, no acute hemorrhage or midline shift. If symptoms persist consider follow up head CT or MRI contraindications.  Cervical and thoracic spine are unchanged from prior, no obvious fracture or dislocation, multilevel degenerative changes as noted previously with diffuse osteopenia and small unchanged mild superior endplate T2 compression deformity.  < end of copied text      < from: Limited Transthoracic Echo (10.26.18 @ 14:07) >  Conclusions:  Limited tranthoracic echocardiogram at patients request to  end exam.  1. Mitral annular calcification.  2. The aortic valve opens well.  3. Limited images acquired at the patients request. Based  upon the parasternal long axis view only;  grossly normal  left ventricular systolic function.  < end of copied text >

## 2020-05-03 DIAGNOSIS — R41.0 DISORIENTATION, UNSPECIFIED: ICD-10-CM

## 2020-05-03 LAB
ANION GAP SERPL CALC-SCNC: 12 MMOL/L — SIGNIFICANT CHANGE UP (ref 5–17)
BUN SERPL-MCNC: 24 MG/DL — HIGH (ref 7–23)
CALCIUM SERPL-MCNC: 8.9 MG/DL — SIGNIFICANT CHANGE UP (ref 8.4–10.5)
CHLORIDE SERPL-SCNC: 104 MMOL/L — SIGNIFICANT CHANGE UP (ref 96–108)
CO2 SERPL-SCNC: 25 MMOL/L — SIGNIFICANT CHANGE UP (ref 22–31)
CREAT SERPL-MCNC: 0.67 MG/DL — SIGNIFICANT CHANGE UP (ref 0.5–1.3)
GLUCOSE SERPL-MCNC: 174 MG/DL — HIGH (ref 70–99)
HCT VFR BLD CALC: 25.8 % — LOW (ref 34.5–45)
HGB BLD-MCNC: 8.1 G/DL — LOW (ref 11.5–15.5)
MCHC RBC-ENTMCNC: 31.4 GM/DL — LOW (ref 32–36)
MCHC RBC-ENTMCNC: 33.2 PG — SIGNIFICANT CHANGE UP (ref 27–34)
MCV RBC AUTO: 105.7 FL — HIGH (ref 80–100)
NRBC # BLD: 0 /100 WBCS — SIGNIFICANT CHANGE UP (ref 0–0)
PLATELET # BLD AUTO: 248 K/UL — SIGNIFICANT CHANGE UP (ref 150–400)
POTASSIUM SERPL-MCNC: 4.7 MMOL/L — SIGNIFICANT CHANGE UP (ref 3.5–5.3)
POTASSIUM SERPL-SCNC: 4.7 MMOL/L — SIGNIFICANT CHANGE UP (ref 3.5–5.3)
RBC # BLD: 2.44 M/UL — LOW (ref 3.8–5.2)
RBC # FLD: 15.5 % — HIGH (ref 10.3–14.5)
SODIUM SERPL-SCNC: 141 MMOL/L — SIGNIFICANT CHANGE UP (ref 135–145)
WBC # BLD: 6.11 K/UL — SIGNIFICANT CHANGE UP (ref 3.8–10.5)
WBC # FLD AUTO: 6.11 K/UL — SIGNIFICANT CHANGE UP (ref 3.8–10.5)

## 2020-05-03 PROCEDURE — 71045 X-RAY EXAM CHEST 1 VIEW: CPT | Mod: 26

## 2020-05-03 PROCEDURE — 93010 ELECTROCARDIOGRAM REPORT: CPT

## 2020-05-03 PROCEDURE — 99223 1ST HOSP IP/OBS HIGH 75: CPT | Mod: CS,GC,95

## 2020-05-03 PROCEDURE — 99232 SBSQ HOSP IP/OBS MODERATE 35: CPT | Mod: CS

## 2020-05-03 RX ORDER — LANOLIN ALCOHOL/MO/W.PET/CERES
3 CREAM (GRAM) TOPICAL AT BEDTIME
Refills: 0 | Status: DISCONTINUED | OUTPATIENT
Start: 2020-05-03 | End: 2020-05-08

## 2020-05-03 RX ADMIN — Medication 1 DROP(S): at 22:12

## 2020-05-03 RX ADMIN — MIDODRINE HYDROCHLORIDE 10 MILLIGRAM(S): 2.5 TABLET ORAL at 11:01

## 2020-05-03 RX ADMIN — MIDODRINE HYDROCHLORIDE 10 MILLIGRAM(S): 2.5 TABLET ORAL at 02:13

## 2020-05-03 RX ADMIN — BACITRACIN 1 APPLICATION(S): 500 OINTMENT OPHTHALMIC at 05:23

## 2020-05-03 RX ADMIN — BACITRACIN 1 APPLICATION(S): 500 OINTMENT OPHTHALMIC at 11:01

## 2020-05-03 RX ADMIN — MIDODRINE HYDROCHLORIDE 10 MILLIGRAM(S): 2.5 TABLET ORAL at 23:12

## 2020-05-03 RX ADMIN — Medication 1 DROP(S): at 17:28

## 2020-05-03 RX ADMIN — BACITRACIN 1 APPLICATION(S): 500 OINTMENT OPHTHALMIC at 22:12

## 2020-05-03 RX ADMIN — Medication 15 MILLILITER(S): at 11:02

## 2020-05-03 RX ADMIN — Medication 1 DROP(S): at 11:01

## 2020-05-03 RX ADMIN — SODIUM CHLORIDE 50 MILLILITER(S): 9 INJECTION INTRAMUSCULAR; INTRAVENOUS; SUBCUTANEOUS at 11:02

## 2020-05-03 RX ADMIN — HEPARIN SODIUM 5000 UNIT(S): 5000 INJECTION INTRAVENOUS; SUBCUTANEOUS at 05:22

## 2020-05-03 RX ADMIN — Medication 1 DROP(S): at 05:23

## 2020-05-03 RX ADMIN — Medication 100 MILLIGRAM(S): at 11:02

## 2020-05-03 RX ADMIN — BACITRACIN 1 APPLICATION(S): 500 OINTMENT OPHTHALMIC at 17:28

## 2020-05-03 RX ADMIN — HEPARIN SODIUM 5000 UNIT(S): 5000 INJECTION INTRAVENOUS; SUBCUTANEOUS at 17:28

## 2020-05-03 RX ADMIN — MIDODRINE HYDROCHLORIDE 10 MILLIGRAM(S): 2.5 TABLET ORAL at 18:24

## 2020-05-03 RX ADMIN — Medication 3 MILLIGRAM(S): at 22:12

## 2020-05-03 NOTE — BEHAVIORAL HEALTH ASSESSMENT NOTE - NSBHCHARTREVIEWIMAGING_PSY_A_CORE FT
EXAM:  CT BRAIN                            PROCEDURE DATE:  04/17/2020            INTERPRETATION:  CLINICAL INDICATION:  Altered mental status. Covid 19 positive. Hypoxia.    TECHNIQUE : Axial CT scanning of the brain was obtained from the skull base to the vertex without the administration of intravenous contrast. Coronal and sagittal reformatted images were subsequently obtained.       COMPARISON: CT brain dated 1/7/2020.    FINDINGS:      No acute intracranial hemorrhage, midline shift or hydrocephalus. No CT evidence of acute, territorial infarct. Senescent changes, including extensive chronic microvascular ischemic change, are in keeping with the patient's age. Chronic lacunar infarct in the right cerebellum. Small chronic infarct in the right parietotemporal region..    The visualized paranasal sinuses and mastoid air cells are clear.    The calvarium is intact. Patient is status post left orbital cataract surgery.    IMPRESSION:    No acute intracranial CT abnormality.

## 2020-05-03 NOTE — CHART NOTE - NSCHARTNOTEFT_GEN_A_CORE
PT ordered for bilateral wrist restraint after multiple episodes of pulling out NGT with failed attempts with mittens   awaiting call back from attending for notification       Department of Medicine   BETZAIDA VALLADARESP-c [id number] PT ordered for bilateral wrist restraint after multiple episodes of pulling out NGT with failed attempts with mittens   Awaiting for call back from Dr Ferro     Addendum   Dr Ferro aware   Behavioral consult to be called for management       Department of Medicine   BETZAIDA COCHRAN-c [id number]

## 2020-05-03 NOTE — BEHAVIORAL HEALTH ASSESSMENT NOTE - CASE SUMMARY
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 fib on Xeralto (now holding), presented to ED from Nursing home for SOB and weakness, found to be COVID +, now on RA, has NG tube placed for poor oral intake. Psychiatry consulted for waxing and waning mental status and agitation recommendations.    I have seen and evaluated this patient myself. Chart, labs, meds reviewed. I agree with resident's assessment and plan.

## 2020-05-03 NOTE — BEHAVIORAL HEALTH ASSESSMENT NOTE - HPI (INCLUDE ILLNESS QUALITY, SEVERITY, DURATION, TIMING, CONTEXT, MODIFYING FACTORS, ASSOCIATED SIGNS AND SYMPTOMS)
89 year old  female, 89 year old  female, domiciled in nursing home, with no known prior psychiatric history, with PMH of CVA, dementia, COPD, a. fib on Xeralto (now holding), presented to ED from Nursing home for SOB and weakness, found to be COVID +, has NG tube placed for poor oral intake. Psychiatry consulted for waxing and waning mental status and agitation recommendations.     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 fib on Xeralto (now holding), presented to ED from Nursing home for SOB and weakness, found to be COVID +, has NG tube placed for poor oral intake. Psychiatry consulted for waxing and waning mental status and agitation recommendations.     On exam, patient was not arousable to verbal stimuli, patient appeared to be calm and lethargic. Due to COVID+ status, [facility] Per nursing staff, patient will be calm and lethargic, then becomes agitated and attempts to pull off NG tube, patient now on 2-point manual soft restraints.  Patient was placed on NG tube due to lack of PO intake.     Per collateral information, son Mina, [phone]. Mother suffers from Alzheimer's dementia, with intact long term-memory, son used to visit her daily at assisted living. Orientation has been very challenging, disorientation. Son believes that her agitation, is a sign that she is improving clinically. Son attempted to Facetime son but that she did not recognize him. Patient at baseline is A&Ox0, though recognizes her son and his job and . No known prior psychiatric history, though believes that she was undiagnosed OCD disorder, with frequent checking behaviors and agoraphobia. No prior psychiatric treatment. Denies any prior SA, no prior inpatient hospitalizations.

## 2020-05-03 NOTE — PROVIDER CONTACT NOTE (CHANGE IN STATUS NOTIFICATION) - BACKGROUND
pt is a 89 y/o female covid positive female. pt pulled out NG tube at night X 2 nights despite mittens.

## 2020-05-03 NOTE — PROGRESS NOTE ADULT - SUBJECTIVE AND OBJECTIVE BOX
CARDIOLOGY     PROGRESS  NOTE   ________________________________________________    CHIEF COMPLAINT:Patient is a 89y old  Female who presents with a chief complaint of sob/ hypotension (03 May 2020 10:26)  no complain, agitated  	  REVIEW OF SYSTEMS:  CONSTITUTIONAL: No fever, weight loss, or fatigue  EYES: No eye pain, visual disturbances, or discharge  ENT:  No difficulty hearing, tinnitus, vertigo; No sinus or throat pain  NECK: No pain or stiffness  RESPIRATORY: No cough, wheezing, chills or hemoptysis; No Shortness of Breath  CARDIOVASCULAR: No chest pain, palpitations, passing out, dizziness, or leg swelling  GASTROINTESTINAL: No abdominal or epigastric pain. No nausea, vomiting, or hematemesis; No diarrhea or constipation. No melena or hematochezia.  GENITOURINARY: No dysuria, frequency, hematuria, or incontinence  NEUROLOGICAL: No headaches, memory loss, loss of strength, numbness, or tremors  SKIN: No itching, burning, rashes, or lesions   LYMPH Nodes: No enlarged glands  ENDOCRINE: No heat or cold intolerance; No hair loss  MUSCULOSKELETAL: No joint pain or swelling; No muscle, back, or extremity pain  PSYCHIATRIC: No depression, anxiety, mood swings, or difficulty sleeping  HEME/LYMPH: No easy bruising, or bleeding gums  ALLERGY AND IMMUNOLOGIC: No hives or eczema	    [ ] All others negative	  [x ] Unable to obtain    PHYSICAL EXAM:  T(C): 36.3 (05-03-20 @ 08:20), Max: 36.8 (05-02-20 @ 16:34)  HR: 88 (05-03-20 @ 08:20) (76 - 90)  BP: 124/75 (05-03-20 @ 08:20) (119/74 - 138/73)  RR: 20 (05-03-20 @ 08:20) (20 - 20)  SpO2: 97% (05-03-20 @ 08:20) (94% - 98%)  Wt(kg): --  I&O's Summary    02 May 2020 07:01  -  03 May 2020 07:00  --------------------------------------------------------  IN: 0 mL / OUT: 650 mL / NET: -650 mL        Appearance: Normal	  HEENT:   Normal oral mucosa, PERRL, EOMI	  Lymphatic: No lymphadenopathy  Cardiovascular: Normal S1 S2, No JVD,+ murmurs, No edema  Respiratory: Lungs clear to auscultation	  Psychiatry: A & O x 3, Mood & affect appropriate  Gastrointestinal:  Soft, Non-tender, + BS	  Skin: No rashes, No ecchymoses, No cyanosis	  Neurologic: Non-focal  Extremities: Normal range of motion, No clubbing, cyanosis or edema  Vascular: Peripheral pulses palpable 2+ bilaterally    MEDICATIONS  (STANDING):  artificial  tears Solution 1 Drop(s) Both EYES four times a day  BACItracin   Ophthalmic Ointment 1 Application(s) Both EYES four times a day  heparin  Injectable 5000 Unit(s) SubCutaneous every 12 hours  midodrine 10 milliGRAM(s) Oral every 8 hours  multivitamin/minerals/iron Oral Solution (CENTRUM) 15 milliLiter(s) Enteral Tube daily  sodium chloride 0.9%. 1000 milliLiter(s) (50 mL/Hr) IV Continuous <Continuous>  thiamine 100 milliGRAM(s) Enteral Tube daily      TELEMETRY: 	    ECG:  	  RADIOLOGY:  OTHER: 	  	  LABS:	 	    CARDIAC MARKERS:                                8.1    6.11  )-----------( 248      ( 03 May 2020 08:54 )             25.8     05-03    141  |  104  |  24<H>  ----------------------------<  174<H>  4.7   |  25  |  0.67    Ca    8.9      03 May 2020 08:54      proBNP: Serum Pro-Brain Natriuretic Peptide: 4277 pg/mL (04-17 @ 05:13)    Lipid Profile:   HgA1c:   TSH: Thyroid Stimulating Hormone, Serum: 3.19 uIU/mL (04-24 @ 12:11)          Assessment and plan  ---------------------------  89 year-old female with history of CVA, atrial fibrillation on Xarelto, dementia presents to the Emergency Department for bradycardia.  Patient presents from the [facility]; on vitals was found to be bradycardic to 30s-40s and sent to the ED.  Patient with decreased responsiveness and PO intake for the past few days; recently started on Azithromycin due to unknown etiology/NH paperwork w/o such information.  Tested for COVID on 4/3/20 and negative.  Patient with hypoxia at NH down to 89% on RA.  pt with sig cardiac/ medical history with increasing sob and hypotension .  pt at time is on pressors will continue, and will taper slowly  covid + will add hydroxychloroquine if pt becomes responsive  spoke to the son Mina wants full medical therapy including intubation, clinical review team does not feel aggressive therapy with intubation will change outcome  Covid + now with o2 demand on 2 L nc  continue ngt feeding  son wants all medical therapy  will consider possible peg placement, as son request  check stool guaiac awaiting   fu cbc  start dysphagia 2/ bed side swallow test failed  ?need of peg  o2 sat has improved  start gentle hydration  psych consult CARDIOLOGY     PROGRESS  NOTE   ________________________________________________    CHIEF COMPLAINT:Patient is a 89y old  Female who presents with a chief complaint of sob/ hypotension (03 May 2020 10:26)  no complain, agitated  	  REVIEW OF SYSTEMS:  CONSTITUTIONAL: No fever, weight loss, or fatigue  EYES: No eye pain, visual disturbances, or discharge  ENT:  No difficulty hearing, tinnitus, vertigo; No sinus or throat pain  NECK: No pain or stiffness  RESPIRATORY: No cough, wheezing, chills or hemoptysis; No Shortness of Breath  CARDIOVASCULAR: No chest pain, palpitations, passing out, dizziness, or leg swelling  GASTROINTESTINAL: No abdominal or epigastric pain. No nausea, vomiting, or hematemesis; No diarrhea or constipation. No melena or hematochezia.  GENITOURINARY: No dysuria, frequency, hematuria, or incontinence  NEUROLOGICAL: No headaches, memory loss, loss of strength, numbness, or tremors  SKIN: No itching, burning, rashes, or lesions   LYMPH Nodes: No enlarged glands  ENDOCRINE: No heat or cold intolerance; No hair loss  MUSCULOSKELETAL: No joint pain or swelling; No muscle, back, or extremity pain  PSYCHIATRIC: No depression, anxiety, mood swings, or difficulty sleeping  HEME/LYMPH: No easy bruising, or bleeding gums  ALLERGY AND IMMUNOLOGIC: No hives or eczema	    [ ] All others negative	  [x ] Unable to obtain    PHYSICAL EXAM:  T(C): 36.3 (05-03-20 @ 08:20), Max: 36.8 (05-02-20 @ 16:34)  HR: 88 (05-03-20 @ 08:20) (76 - 90)  BP: 124/75 (05-03-20 @ 08:20) (119/74 - 138/73)  RR: 20 (05-03-20 @ 08:20) (20 - 20)  SpO2: 97% (05-03-20 @ 08:20) (94% - 98%)  Wt(kg): --  I&O's Summary    02 May 2020 07:01  -  03 May 2020 07:00  --------------------------------------------------------  IN: 0 mL / OUT: 650 mL / NET: -650 mL        Appearance: Normal	  HEENT:   Normal oral mucosa, PERRL, EOMI	  Lymphatic: No lymphadenopathy  Cardiovascular: Normal S1 S2, No JVD,+ murmurs, No edema  Respiratory: Lungs clear to auscultation	  Psychiatry: A & O x 3, Mood & affect appropriate  Gastrointestinal:  Soft, Non-tender, + BS	  Skin: No rashes, No ecchymoses, No cyanosis	  Neurologic: Non-focal  Extremities: Normal range of motion, No clubbing, cyanosis or edema  Vascular: Peripheral pulses palpable 2+ bilaterally    MEDICATIONS  (STANDING):  artificial  tears Solution 1 Drop(s) Both EYES four times a day  BACItracin   Ophthalmic Ointment 1 Application(s) Both EYES four times a day  heparin  Injectable 5000 Unit(s) SubCutaneous every 12 hours  midodrine 10 milliGRAM(s) Oral every 8 hours  multivitamin/minerals/iron Oral Solution (CENTRUM) 15 milliLiter(s) Enteral Tube daily  sodium chloride 0.9%. 1000 milliLiter(s) (50 mL/Hr) IV Continuous <Continuous>  thiamine 100 milliGRAM(s) Enteral Tube daily      TELEMETRY: 	    ECG:  	  RADIOLOGY:  OTHER: 	  	  LABS:	 	    CARDIAC MARKERS:                                8.1    6.11  )-----------( 248      ( 03 May 2020 08:54 )             25.8     05-03    141  |  104  |  24<H>  ----------------------------<  174<H>  4.7   |  25  |  0.67    Ca    8.9      03 May 2020 08:54      proBNP: Serum Pro-Brain Natriuretic Peptide: 4277 pg/mL (04-17 @ 05:13)    Lipid Profile:   HgA1c:   TSH: Thyroid Stimulating Hormone, Serum: 3.19 uIU/mL (04-24 @ 12:11)          Assessment and plan  ---------------------------  89 year-old female with history of CVA, atrial fibrillation on Xarelto, dementia presents to the Emergency Department for bradycardia.  Patient presents from the [facility]; on vitals was found to be bradycardic to 30s-40s and sent to the ED.  Patient with decreased responsiveness and PO intake for the past few days; recently started on Azithromycin due to unknown etiology/NH paperwork w/o such information.  Tested for COVID on 4/3/20 and negative.  Patient with hypoxia at NH down to 89% on RA.  pt with sig cardiac/ medical history with increasing sob and hypotension .  pt at time is on pressors will continue, and will taper slowly  covid + will add hydroxychloroquine if pt becomes responsive  spoke to the son Mina wants full medical therapy including intubation, clinical review team does not feel aggressive therapy with intubation will change outcome  Covid + now with o2 demand on 2 L nc  continue ngt feeding  son wants all medical therapy  will consider possible peg placement, as son request  check stool guaiac awaiting   fu cbc  start dysphagia 2/ bed side swallow test failed  ?need of peg  o2 sat has improved  start gentle hydration  psych consult   no ac for A.fib sec to decrease hgb

## 2020-05-03 NOTE — PROGRESS NOTE ADULT - SUBJECTIVE AND OBJECTIVE BOX
non verbal    REVIEW OF SYSTEMS:  GEN: no fever,    no chills  RESP: no SOB,   no cough  CVS: no chest pain,   no palpitations  GI: no abdominal pain,   no nausea,   no vomiting,   no constipation,   no diarrhea  : no dysuria,   no frequency  NEURO: no headache,   no dizziness  PSYCH: no depression,   not anxious  Derm : no rash    MEDICATIONS  (STANDING):  artificial  tears Solution 1 Drop(s) Both EYES four times a day  BACItracin   Ophthalmic Ointment 1 Application(s) Both EYES four times a day  heparin  Injectable 5000 Unit(s) SubCutaneous every 12 hours  midodrine 10 milliGRAM(s) Oral every 8 hours  multivitamin/minerals/iron Oral Solution (CENTRUM) 15 milliLiter(s) Enteral Tube daily  sodium chloride 0.9%. 1000 milliLiter(s) (50 mL/Hr) IV Continuous <Continuous>  thiamine 100 milliGRAM(s) Enteral Tube daily    MEDICATIONS  (PRN):  acetaminophen   Tablet .. 650 milliGRAM(s) Oral every 6 hours PRN Temp greater or equal to 38C (100.4F), Moderate Pain (4 - 6)  guaiFENesin   Syrup  (Sugar-Free) 200 milliGRAM(s) Oral every 6 hours PRN Cough      Vital Signs Last 24 Hrs  T(C): 36.3 (03 May 2020 08:20), Max: 36.8 (02 May 2020 16:34)  T(F): 97.4 (03 May 2020 08:20), Max: 98.3 (02 May 2020 16:34)  HR: 88 (03 May 2020 08:20) (76 - 90)  BP: 124/75 (03 May 2020 08:20) (119/74 - 138/73)  BP(mean): --  RR: 20 (03 May 2020 08:20) (20 - 20)  SpO2: 97% (03 May 2020 08:20) (94% - 98%)  CAPILLARY BLOOD GLUCOSE        I&O's Summary    02 May 2020 07:01  -  03 May 2020 07:00  --------------------------------------------------------  IN: 0 mL / OUT: 650 mL / NET: -650 mL        PHYSICAL EXAM:  HEAD:  Atraumatic, Normocephalic  NECK: Supple, No   JVD  CHEST/LUNG:   no     rales,     no,    rhonchi  HEART: Regular rate and rhythm;         murmur  ABDOMEN: Soft, Nontender, ;   EXTREMITIES:    no    edema  NEUROLOGY:   non verbal    LABS:                        8.1    6.11  )-----------( 248      ( 03 May 2020 08:54 )             25.8     05-03    141  |  104  |  24<H>  ----------------------------<  174<H>  4.7   |  25  |  0.67    Ca    8.9      03 May 2020 08:54                      Thyroid Stimulating Hormone, Serum: 3.19 uIU/mL (04-24 @ 12:11)          Consultant(s) Notes Reviewed:      Care Discussed with Consultants/Other Providers:

## 2020-05-03 NOTE — BEHAVIORAL HEALTH ASSESSMENT NOTE - NSBHCONSULTMEDAGITATION_PSY_A_CORE FT
If QTc less than 500 ms, Haldol 0.25 mg PO/IV/IM q6h   If QTc greater than 500 ms, Depakote 250 mg PO/IV q6h If QTc less than 500 ms, Haldol 0.25 mg PO/IV/IM q6h

## 2020-05-03 NOTE — PROVIDER CONTACT NOTE (CHANGE IN STATUS NOTIFICATION) - ASSESSMENT
VSS. /70 HR 65 temp 97.5 O2 sat 96 room air resp 20. pt responsive to stimuli but showing increased lethargy.

## 2020-05-03 NOTE — BEHAVIORAL HEALTH ASSESSMENT NOTE - NSBHCHARTREVIEWINVESTIGATE_PSY_A_CORE FT
Ventricular Rate 122 BPM    Atrial Rate 178 BPM    QRS Duration 82 ms    Q-T Interval 350 ms    QTC Calculation(Bezet) 498 ms    R Axis -45 degrees    T Axis 70 degrees    Diagnosis Line ATRIAL FIBRILLATION WITH RAPID VENTRICULAR RESPONSE  LEFT AXIS DEVIATION  NONSPECIFIC ST ABNORMALITY , PROBABLY DIGITALIS EFFECT    Confirmed by YANDY QUINTERO ADAM ([id number]) on 4/20/2020 6:18:17 PM

## 2020-05-03 NOTE — BEHAVIORAL HEALTH ASSESSMENT NOTE - NSBHCONSULTRECOMMENDOTHER_PSY_A_CORE FT
The patient would also benefit from maintenance of regular sleep/wake cycles, frequent re-orientation, Video visits w/ family member at bedside,  ensuring personal eyeglasses or hearing aides available if used.

## 2020-05-03 NOTE — CHART NOTE - NSCHARTNOTEFT_GEN_A_CORE
Notified by RN pt pulled out NGT despite mittens on.   Patient seen at bedside. Procedure was explained to patient. NGT placed via right nostril, 50cm depth, secured well. Location was checked by auscultation. Pt was cooperative and tolerated procedure well.   CXR done and radiologist Dr. Sterling Luz confirmed NGT placement. Per report, enteric tube with tip in the stomach.  c/w mittens to prevent removal of NGT  monitor closely    Lexi Laura, NP  Medicine  [id number]

## 2020-05-03 NOTE — BEHAVIORAL HEALTH ASSESSMENT NOTE - RISK ASSESSMENT
Low Acute Suicide Risk Risk factors: Age, acute medical illness. Protective factors: Residential stability, social supports, no access to firearms, unable to access for other protective factors.     Overall, pt is a low risk of harm to self/others and does not require psychiatric admission for safety and stabilization.

## 2020-05-03 NOTE — PROGRESS NOTE ADULT - ASSESSMENT
89   yr pt,   pt  with  h/o  afib,   not  on   xarelto,  due  to falls,   cva,   copd, hld,     alzheimers  dementia,    pud/  h/o left ribf xs. 8/ 9 th,. left hip surg  h/o mlple falls.  echo, normal ef/ T2  comp  deformity    sent  to  er  from   [facility], for  weakness/ sob  ct head, no cva//   ct  chest  with  goo   COVID  positive  elevated  troponin/ CRP/  Ferritin  , from Covid   pt  with  advanced  age/    comfort/   supportive  care is  ideal  however, son  wants  all  done. seen by icu  and palliative care  hypernatremia  resolved/   CNS,  in 1/2  blood   c/s,  which is  a  contaminant  palliative/ comfort care,  ought to  be goal  in this pt    wa s on   ng tube  feeds,   on midodrine/  son   wishes  to  continue   current care  on 2  liters  oxygen/    anemia,     stable  at 8   ng tube  pulled  out by pt. on 5. 2/  then re  inserted/  on ng feeds  spoke  to son,  still  indecided               < from: CT Chest No Cont (04.17.20 @ 04:24) >  IMPRESSION:   Pattern of GGO suggests infection including atypical pneumonia/viral infection from atypical agents including COVID-19 (C19V-1)  < end of copied text >     < from: CT Thoracic Spine Reform No Cont (01.07.20 @ 16:43) >  IMPRESSION:  Volume loss, microvascular disease, no acute hemorrhage or midline shift. If symptoms persist consider follow up head CT or MRI contraindications.  Cervical and thoracic spine are unchanged from prior, no obvious fracture or dislocation, multilevel degenerative changes as noted previously with diffuse osteopenia and small unchanged mild superior endplate T2 compression deformity.  < end of copied text      < from: Limited Transthoracic Echo (10.26.18 @ 14:07) >  Conclusions:  Limited tranthoracic echocardiogram at patients request to  end exam.  1. Mitral annular calcification.  2. The aortic valve opens well.  3. Limited images acquired at the patients request. Based  upon the parasternal long axis view only;  grossly normal  left ventricular systolic function.  < end of copied text >

## 2020-05-03 NOTE — BEHAVIORAL HEALTH ASSESSMENT NOTE - NSBHCHARTREVIEWVS_PSY_A_CORE FT
Vital Signs Last 24 Hrs  T(C): 36.3 (03 May 2020 08:20), Max: 36.8 (02 May 2020 16:34)  T(F): 97.4 (03 May 2020 08:20), Max: 98.3 (02 May 2020 16:34)  HR: 88 (03 May 2020 08:20) (76 - 90)  BP: 124/75 (03 May 2020 08:20) (119/74 - 138/73)  BP(mean): --  RR: 20 (03 May 2020 08:20) (20 - 20)  SpO2: 97% (03 May 2020 08:20) (94% - 98%)

## 2020-05-03 NOTE — BEHAVIORAL HEALTH ASSESSMENT NOTE - SUMMARY
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 fib on Xeralto (now holding), presented to ED from Nursing home for SOB and weakness, found to be COVID +, now on RA, has NG tube placed for poor oral intake. Psychiatry consulted for waxing and waning mental status and agitation recommendations.     Patient likely with agitation secondary to delirium, pulling out NG tube and requiring 2-point soft restraints. Patient at baseline A&Ox0 with impairments in vision and hearing per collateral sources. Given COVID + status, would consider COVID encephalopathy.

## 2020-05-03 NOTE — BEHAVIORAL HEALTH ASSESSMENT NOTE - NSBHCONSULTMEDS_PSY_A_CORE FT
Recommendations:   Please repeat EKG given borderline prior EKG.   1) Start standing melatonin 3 mg PO for sleep   If QTc is less than 500 ms,   2) Consider 0.25 mg Haldol PO/IV/IM qhs Recommendations:   Please repeat EKG given borderline prior EKG.   1) Start standing melatonin 3 mg PO for sleep   If QTc is less than 500 ms   2) Consider 0.25 mg Haldol PO/IV/IM qhs

## 2020-05-03 NOTE — BEHAVIORAL HEALTH ASSESSMENT NOTE - NSBHCHARTREVIEWLAB_PSY_A_CORE FT
8.1    6.11  )-----------( 248      ( 03 May 2020 08:54 )             25.8   05-03    141  |  104  |  24<H>  ----------------------------<  174<H>  4.7   |  25  |  0.67    Ca    8.9      03 May 2020 08:54

## 2020-05-04 DIAGNOSIS — F05 DELIRIUM DUE TO KNOWN PHYSIOLOGICAL CONDITION: ICD-10-CM

## 2020-05-04 DIAGNOSIS — G30.1 ALZHEIMER'S DISEASE WITH LATE ONSET: ICD-10-CM

## 2020-05-04 PROCEDURE — 99232 SBSQ HOSP IP/OBS MODERATE 35: CPT

## 2020-05-04 PROCEDURE — 99232 SBSQ HOSP IP/OBS MODERATE 35: CPT | Mod: CS

## 2020-05-04 RX ORDER — HALOPERIDOL DECANOATE 100 MG/ML
0.25 INJECTION INTRAMUSCULAR AT BEDTIME
Refills: 0 | Status: DISCONTINUED | OUTPATIENT
Start: 2020-05-04 | End: 2020-05-08

## 2020-05-04 RX ADMIN — Medication 1 DROP(S): at 22:20

## 2020-05-04 RX ADMIN — Medication 1 DROP(S): at 12:36

## 2020-05-04 RX ADMIN — BACITRACIN 1 APPLICATION(S): 500 OINTMENT OPHTHALMIC at 22:20

## 2020-05-04 RX ADMIN — Medication 100 MILLIGRAM(S): at 12:37

## 2020-05-04 RX ADMIN — BACITRACIN 1 APPLICATION(S): 500 OINTMENT OPHTHALMIC at 17:02

## 2020-05-04 RX ADMIN — HEPARIN SODIUM 5000 UNIT(S): 5000 INJECTION INTRAVENOUS; SUBCUTANEOUS at 05:06

## 2020-05-04 RX ADMIN — SODIUM CHLORIDE 50 MILLILITER(S): 9 INJECTION INTRAMUSCULAR; INTRAVENOUS; SUBCUTANEOUS at 05:06

## 2020-05-04 RX ADMIN — BACITRACIN 1 APPLICATION(S): 500 OINTMENT OPHTHALMIC at 12:36

## 2020-05-04 RX ADMIN — HALOPERIDOL DECANOATE 0.25 MILLIGRAM(S): 100 INJECTION INTRAMUSCULAR at 22:20

## 2020-05-04 RX ADMIN — Medication 3 MILLIGRAM(S): at 22:20

## 2020-05-04 RX ADMIN — Medication 15 MILLILITER(S): at 12:37

## 2020-05-04 RX ADMIN — HEPARIN SODIUM 5000 UNIT(S): 5000 INJECTION INTRAVENOUS; SUBCUTANEOUS at 17:02

## 2020-05-04 RX ADMIN — BACITRACIN 1 APPLICATION(S): 500 OINTMENT OPHTHALMIC at 05:06

## 2020-05-04 RX ADMIN — MIDODRINE HYDROCHLORIDE 10 MILLIGRAM(S): 2.5 TABLET ORAL at 17:03

## 2020-05-04 RX ADMIN — Medication 1 DROP(S): at 17:03

## 2020-05-04 RX ADMIN — MIDODRINE HYDROCHLORIDE 10 MILLIGRAM(S): 2.5 TABLET ORAL at 12:37

## 2020-05-04 RX ADMIN — Medication 1 DROP(S): at 05:05

## 2020-05-04 NOTE — PROGRESS NOTE ADULT - ASSESSMENT
89   yr pt,   pt  with  h/o  afib,   not  on   xarelto,  due  to falls,   cva,   copd, hld,     alzheimers  dementia,    pud/  h/o left ribf xs. 8/ 9 th,. left hip surg  h/o mlple falls.  echo, normal ef/ T2  comp  deformity    sent  to  er  from   [facility], for  weakness/ sob  ct head, no cva//   ct  chest  with  goo   COVID  positive  elevated  troponin/ CRP/  Ferritin  , from Covid   pt  with  advanced  age/    comfort/   supportive  care is  ideal  however, son  wants  all  done. seen by icu  and palliative care  hypernatremia  resolved/   CNS,  in 1/2  blood   c/s,  which is  a  contaminant  palliative/ comfort care,  ought to  be goal  in this pt    wa s on   ng tube  feeds,   on midodrine/  son   wishes  to  continue   current care  on 2  liters  oxygen/    anemia,     stable  at 8   ng tube  pulled  out by pt. on 5. 2/  then re  inserted/  on ng feeds  spoke  to son,   stable  labs/   awaiting decision  from son on goc            < from: CT Chest No Cont (04.17.20 @ 04:24) >  IMPRESSION:   Pattern of GGO suggests infection including atypical pneumonia/viral infection from atypical agents including COVID-19 (C19V-1)  < end of copied text >     < from: CT Thoracic Spine Reform No Cont (01.07.20 @ 16:43) >  IMPRESSION:  Volume loss, microvascular disease, no acute hemorrhage or midline shift. If symptoms persist consider follow up head CT or MRI contraindications.  Cervical and thoracic spine are unchanged from prior, no obvious fracture or dislocation, multilevel degenerative changes as noted previously with diffuse osteopenia and small unchanged mild superior endplate T2 compression deformity.  < end of copied text      < from: Limited Transthoracic Echo (10.26.18 @ 14:07) >  Conclusions:  Limited tranthoracic echocardiogram at patients request to  end exam.  1. Mitral annular calcification.  2. The aortic valve opens well.  3. Limited images acquired at the patients request. Based  upon the parasternal long axis view only;  grossly normal  left ventricular systolic function.  < end of copied text >

## 2020-05-04 NOTE — PROGRESS NOTE BEHAVIORAL HEALTH - NSBHCONSULTOBSREASON_PSY_A_CORE FT
For agitation/impulsive behavior, consider 1:1 if patient has self-harming or aggressive behaviors For agitation/impulsive behavior

## 2020-05-04 NOTE — PROGRESS NOTE BEHAVIORAL HEALTH - NSBHCONSULTMEDAGITATION_PSY_A_CORE FT
If QTc less than 500 ms, Haldol 0.25 mg PO/IV/IM q6h If QTc less than 500 ms, Haldol 0.25 mg PO/IV/IM q6h PRN

## 2020-05-04 NOTE — PROGRESS NOTE BEHAVIORAL HEALTH - CASE SUMMARY
Pt is a 89 y/o WF with advanced dementia, [facility], found to be more confused, with agitation on admission. agree with above plan, continue haldol PRN as ordered above for acute agitation. pt non-verbal on presentation today, no agitation or behavioral issues reported by staff. no si/hi.

## 2020-05-04 NOTE — PROGRESS NOTE BEHAVIORAL HEALTH - SUMMARY
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 fib on Xeralto (now holding), presented to ED from Nursing home for SOB and weakness, found to be COVID +, now on RA, has NG tube placed for poor oral intake. Psychiatry consulted for waxing and waning mental status and agitation recommendations.     Patient likely with agitation secondary to delirium, pulling out NG tube and requiring 2-point soft restraints. Patient at baseline A&Ox0 with impairments in vision and hearing per collateral sources. Given COVID + status, would consider COVID encephalopathy. 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 fib on Xeralto (now holding), presented to ED from Nursing home for SOB and weakness, found to be COVID +, now on RA, has NG tube placed for poor oral intake. Psychiatry consulted for waxing and waning mental status and agitation recommendations.     Patient previously agitated, likely 2/2 delirium, pulling out NG tube and requiring 2-point soft restraints. Today, pt is not arousable by verbal stimuli and remains lethargic. Patient at baseline A&Ox0 with impairments in vision and hearing per collateral sources. Given COVID + status, would consider COVID encephalopathy.

## 2020-05-04 NOTE — PROGRESS NOTE BEHAVIORAL HEALTH - NSBHCHARTREVIEWVS_PSY_A_CORE FT
T(C): 36.5 (05-04-20 @ 08:56), Max: 36.6 (05-04-20 @ 00:40)  HR: 71 (05-04-20 @ 08:56) (62 - 71)  BP: 132/72 (05-04-20 @ 08:56) (132/72 - 152/70)  RR: 18 (05-04-20 @ 08:56) (18 - 20)  SpO2: 95% (05-04-20 @ 08:56) (95% - 98%)

## 2020-05-04 NOTE — PROGRESS NOTE BEHAVIORAL HEALTH - NSBHCHARTREVIEWLAB_PSY_A_CORE FT
CBC Full  -  ( 03 May 2020 08:54 )  WBC Count : 6.11 K/uL  RBC Count : 2.44 M/uL  Hemoglobin : 8.1 g/dL  Hematocrit : 25.8 %  Platelet Count - Automated : 248 K/uL  Mean Cell Volume : 105.7 fl  Mean Cell Hemoglobin : 33.2 pg  Mean Cell Hemoglobin Concentration : 31.4 gm/dL  Auto Neutrophil # : x  Auto Lymphocyte # : x  Auto Monocyte # : x  Auto Eosinophil # : x  Auto Basophil # : x  Auto Neutrophil % : x  Auto Lymphocyte % : x  Auto Monocyte % : x  Auto Eosinophil % : x  Auto Basophil % : x    05-03    141  |  104  |  24<H>  ----------------------------<  174<H>  4.7   |  25  |  0.67    Ca    8.9      03 May 2020 08:54

## 2020-05-04 NOTE — PROGRESS NOTE BEHAVIORAL HEALTH - NSBHFUPINTERVALHXFT_PSY_A_CORE
On exam, patient was not arousable to verbal stimuli, patient appeared to be calm and lethargic. Due to COVID+ status, [facility] Per nursing staff, patient will be calm and lethargic, then becomes agitated and attempts to pull off NG tube, patient now on 2-point manual soft restraints.  Patient was placed on NG tube due to lack of PO intake. On exam, patient was not arousable to verbal stimuli, patient appeared to be calm and lethargic. Due to COVID+ status, [facility]. Per nursing staff, patient will be calm and lethargic, then becomes agitated and attempts to pull off NG tube. Patient was placed on NG tube due to lack of PO intake.

## 2020-05-04 NOTE — PROGRESS NOTE ADULT - SUBJECTIVE AND OBJECTIVE BOX
be dbound/  non verbal    REVIEW OF SYSTEMS:  GEN: no fever,    no chills  RESP: no SOB,   no cough  CVS: no chest pain,   no palpitations  GI: no abdominal pain,   no nausea,   no vomiting,   no constipation,   no diarrhea  : no dysuria,   no frequency  NEURO: no headache,   no dizziness  PSYCH: no depression,   not anxious  Derm : no rash    MEDICATIONS  (STANDING):  artificial  tears Solution 1 Drop(s) Both EYES four times a day  BACItracin   Ophthalmic Ointment 1 Application(s) Both EYES four times a day  haloperidol     Tablet 0.25 milliGRAM(s) Oral at bedtime  heparin  Injectable 5000 Unit(s) SubCutaneous every 12 hours  melatonin 3 milliGRAM(s) Oral at bedtime  midodrine 10 milliGRAM(s) Oral every 8 hours  multivitamin/minerals/iron Oral Solution (CENTRUM) 15 milliLiter(s) Enteral Tube daily  sodium chloride 0.9%. 1000 milliLiter(s) (50 mL/Hr) IV Continuous <Continuous>  thiamine 100 milliGRAM(s) Enteral Tube daily    MEDICATIONS  (PRN):  acetaminophen   Tablet .. 650 milliGRAM(s) Oral every 6 hours PRN Temp greater or equal to 38C (100.4F), Moderate Pain (4 - 6)  guaiFENesin   Syrup  (Sugar-Free) 200 milliGRAM(s) Oral every 6 hours PRN Cough      Vital Signs Last 24 Hrs  T(C): 36.6 (04 May 2020 00:40), Max: 36.6 (04 May 2020 00:40)  T(F): 97.9 (04 May 2020 00:40), Max: 97.9 (04 May 2020 00:40)  HR: 62 (04 May 2020 00:40) (62 - 65)  BP: 144/83 (04 May 2020 00:40) (144/83 - 152/70)  BP(mean): --  RR: 20 (04 May 2020 00:40) (20 - 20)  SpO2: 98% (04 May 2020 00:40) (96% - 98%)  CAPILLARY BLOOD GLUCOSE        I&O's Summary    03 May 2020 07:01  -  04 May 2020 07:00  --------------------------------------------------------  IN: 2548 mL / OUT: 1100 mL / NET: 1448 mL        PHYSICAL EXAM:  HEAD:  Atraumatic, Normocephalic  NECK: Supple, No   JVD  CHEST/LUNG:   no     rales,     no,    rhonchi  HEART: Regular rate and rhythm;         murmur  ABDOMEN: Soft, Nontender, ;   EXTREMITIES:    no    edema  NEUROLOGY:  non verbal    LABS:                        8.1    6.11  )-----------( 248      ( 03 May 2020 08:54 )             25.8     05-03    141  |  104  |  24<H>  ----------------------------<  174<H>  4.7   |  25  |  0.67    Ca    8.9      03 May 2020 08:54                      Thyroid Stimulating Hormone, Serum: 3.19 uIU/mL (04-24 @ 12:11)          Consultant(s) Notes Reviewed:      Care Discussed with Consultants/Other Providers:

## 2020-05-04 NOTE — PROGRESS NOTE ADULT - SUBJECTIVE AND OBJECTIVE BOX
CARDIOLOGY     PROGRESS  NOTE   ________________________________________________    CHIEF COMPLAINT:Patient is a 89y old  Female who presents with a chief complaint of sob/ hypotension (04 May 2020 08:39)  no complain.  	  REVIEW OF SYSTEMS:  CONSTITUTIONAL: No fever, weight loss, or fatigue  EYES: No eye pain, visual disturbances, or discharge  ENT:  No difficulty hearing, tinnitus, vertigo; No sinus or throat pain  NECK: No pain or stiffness  RESPIRATORY: No cough, wheezing, chills or hemoptysis; decrease  Shortness of Breath  CARDIOVASCULAR: No chest pain, palpitations, passing out, dizziness, or leg swelling  GASTROINTESTINAL: No abdominal or epigastric pain. No nausea, vomiting, or hematemesis; No diarrhea or constipation. No melena or hematochezia.  GENITOURINARY: No dysuria, frequency, hematuria, or incontinence  NEUROLOGICAL: No headaches, memory loss, loss of strength, numbness, or tremors  SKIN: No itching, burning, rashes, or lesions   LYMPH Nodes: No enlarged glands  ENDOCRINE: No heat or cold intolerance; No hair loss  MUSCULOSKELETAL: No joint pain or swelling; No muscle, back, or extremity pain  PSYCHIATRIC: No depression, anxiety, mood swings, or difficulty sleeping  HEME/LYMPH: No easy bruising, or bleeding gums  ALLERGY AND IMMUNOLOGIC: No hives or eczema	    [ ] All others negative	  [ ] Unable to obtain    PHYSICAL EXAM:  T(C): 36.6 (05-04-20 @ 00:40), Max: 36.6 (05-04-20 @ 00:40)  HR: 62 (05-04-20 @ 00:40) (62 - 65)  BP: 144/83 (05-04-20 @ 00:40) (144/83 - 152/70)  RR: 20 (05-04-20 @ 00:40) (20 - 20)  SpO2: 98% (05-04-20 @ 00:40) (96% - 98%)  Wt(kg): --  I&O's Summary    03 May 2020 07:01  -  04 May 2020 07:00  --------------------------------------------------------  IN: 2548 mL / OUT: 1100 mL / NET: 1448 mL        Appearance: Normal	  HEENT:   Normal oral mucosa, PERRL, EOMI	  Lymphatic: No lymphadenopathy  Cardiovascular: Normal S1 S2, No JVD,+ murmurs, No edema  Respiratory: Lungs clear to auscultation	  Psychiatry: A & O x 3, Mood & affect appropriate  Gastrointestinal:  Soft, Non-tender, + BS	  Skin: No rashes, No ecchymoses, No cyanosis	  Neurologic: Non-focal  Extremities: Normal range of motion, No clubbing, cyanosis or edema  Vascular: Peripheral pulses palpable 2+ bilaterally    MEDICATIONS  (STANDING):  artificial  tears Solution 1 Drop(s) Both EYES four times a day  BACItracin   Ophthalmic Ointment 1 Application(s) Both EYES four times a day  haloperidol     Tablet 0.25 milliGRAM(s) Oral at bedtime  heparin  Injectable 5000 Unit(s) SubCutaneous every 12 hours  melatonin 3 milliGRAM(s) Oral at bedtime  midodrine 10 milliGRAM(s) Oral every 8 hours  multivitamin/minerals/iron Oral Solution (CENTRUM) 15 milliLiter(s) Enteral Tube daily  sodium chloride 0.9%. 1000 milliLiter(s) (50 mL/Hr) IV Continuous <Continuous>  thiamine 100 milliGRAM(s) Enteral Tube daily      TELEMETRY: 	    ECG:  	  RADIOLOGY:  OTHER: 	  	  LABS:	 	    CARDIAC MARKERS:                                8.1    6.11  )-----------( 248      ( 03 May 2020 08:54 )             25.8     05-03    141  |  104  |  24<H>  ----------------------------<  174<H>  4.7   |  25  |  0.67    Ca    8.9      03 May 2020 08:54      proBNP: Serum Pro-Brain Natriuretic Peptide: 4277 pg/mL (04-17 @ 05:13)    Lipid Profile:   HgA1c:   TSH: Thyroid Stimulating Hormone, Serum: 3.19 uIU/mL (04-24 @ 12:11)          Assessment and plan  ---------------------------  89 year-old female with history of CVA, atrial fibrillation on Xarelto, dementia presents to the Emergency Department for bradycardia.  Patient presents from the [facility]; on vitals was found to be bradycardic to 30s-40s and sent to the ED.  Patient with decreased responsiveness and PO intake for the past few days; recently started on Azithromycin due to unknown etiology/NH paperwork w/o such information.  Tested for COVID on 4/3/20 and negative.  Patient with hypoxia at NH down to 89% on RA.  pt with sig cardiac/ medical history with increasing sob and hypotension .  pt at time is on pressors will continue, and will taper slowly  covid + will add hydroxychloroquine if pt becomes responsive  spoke to the son Mina wants full medical therapy including intubation, clinical review team does not feel aggressive therapy with intubation will change outcome  Covid + now with o2 demand on 2 L nc  continue ngt feeding  son wants all medical therapy  will consider possible peg placement, as son request  check stool guaiac awaiting   fu cbc  start dysphagia 2/ bed side swallow test failed  ?need of peg  o2 sat has improved  start gentle hydration  psych consult appreciated  no ac for A.fib sec to decrease hgb  spoke to the son he wants all medical measure, speech and swallow will consider peg

## 2020-05-04 NOTE — PROGRESS NOTE BEHAVIORAL HEALTH - NSBHCONSULTOBS_PSY_A_CORE
Fever in Children 4 Years and Older: Care Instructions  Your Care Instructions    A fever is a high body temperature. Fever is the body's normal reaction to infection and other illnesses, both minor and serious. Fevers help the body fight infection. In most cases, fever means your child has a minor illness. Often you must look at your child's other symptoms to determine how serious the illness is. Children with a fever often have an infection caused by a virus, such as a cold or the flu. Infections caused by bacteria, such as strep throat or an ear infection, also can cause a fever. Follow-up care is a key part of your child's treatment and safety. Be sure to make and go to all appointments, and call your doctor if your child is having problems. It's also a good idea to know your child's test results and keep a list of the medicines your child takes. How can you care for your child at home? · Don't use temperature alone to  how sick your child is. Instead, look at how your child acts. Care at home is often all that is needed if your child is:  ¨ Comfortable and alert. ¨ Eating well. ¨ Drinking enough fluid. ¨ Urinating as usual.  ¨ Starting to feel better. · Give your child extra fluids or flavored ice pops to suck on. This will help prevent dehydration. · Dress your child in light clothes or pajamas. Don't wrap your child in blankets. · If your child has a fever and is uncomfortable, give an over-the-counter medicine such as acetaminophen (Tylenol) or ibuprofen (Advil, Motrin). Be safe with medicines. Read and follow all instructions on the label. Do not give aspirin to anyone younger than 20. It has been linked to Reye syndrome, a serious illness. · Be careful when giving your child over-the-counter cold or flu medicines and Tylenol at the same time. Many of these medicines have acetaminophen, which is Tylenol.  Read the labels to make sure that you are not giving your child more than the recommended dose. Too much acetaminophen (Tylenol) can be harmful. When should you call for help? Call 911 anytime you think your child may need emergency care. For example, call if:  · Your child seems very sick or is hard to wake up. Call your doctor now or seek immediate medical care if:  · Your child seems to be getting sicker. · The fever gets much higher. · There are new or worse symptoms along with the fever. These may include a cough, a rash, or ear pain. Watch closely for changes in your child's health, and be sure to contact your doctor if:  · The fever hasn't gone down after 48 hours. · Your child does not get better as expected. Where can you learn more? Go to http://emory-lesley.info/. Enter O473 in the search box to learn more about \"Fever in Children 4 Years and Older: Care Instructions. \"  Current as of: May 27, 2016  Content Version: 11.1  © 3000-2739 [employer]. Care instructions adapted under license by [employer] (which disclaims liability or warranty for this information). If you have questions about a medical condition or this instruction, always ask your healthcare professional. [place] [address] any warranty or liability for your use of this information. Ear Infection (Otitis Media): Care Instructions  Your Care Instructions    An ear infection may start with a cold and affect the middle ear (otitis media). It can hurt a lot. Most ear infections clear up on their own in a couple of days. Most often you will not need antibiotics. This is because many ear infections are caused by a virus. Antibiotics don't work against a virus. Regular doses of pain medicines are the best way to reduce your fever and help you feel better. Follow-up care is a key part of your treatment and safety. Be sure to make and go to all appointments, and call your doctor if you are having problems.  It's also a good idea to know your test results and keep a list of the medicines you take. How can you care for yourself at home? · Take pain medicines exactly as directed. ¨ If the doctor gave you a prescription medicine for pain, take it as prescribed. ¨ If you are not taking a prescription pain medicine, take an over-the-counter medicine, such as acetaminophen (Tylenol), ibuprofen (Advil, Motrin), or naproxen (Aleve). Read and follow all instructions on the label. ¨ Do not take two or more pain medicines at the same time unless the doctor told you to. Many pain medicines have acetaminophen, which is Tylenol. Too much acetaminophen (Tylenol) can be harmful. · Plan to take a full dose of pain reliever before bedtime. Getting enough sleep will help you get better. · Try a warm, moist washcloth on the ear. It may help relieve pain. · If your doctor prescribed antibiotics, take them as directed. Do not stop taking them just because you feel better. You need to take the full course of antibiotics. When should you call for help? Call your doctor now or seek immediate medical care if:  · You have new or increasing ear pain. · You have new or increasing pus or blood draining from your ear. · You have a fever with a stiff neck or a severe headache. Watch closely for changes in your health, and be sure to contact your doctor if:  · You have new or worse symptoms. · You are not getting better after taking an antibiotic for 2 days. Where can you learn more? Go to http://emory-lesley.info/. Enter A462 in the search box to learn more about \"Ear Infection (Otitis Media): Care Instructions. \"  Current as of: July 29, 2016  Content Version: 11.1  © 9255-6940 [employer]. Care instructions adapted under license by [employer] (which disclaims liability or warranty for this information).  If you have questions about a medical condition or this instruction, always ask your healthcare professional. [employer] disclaims any warranty or liability for your use of this information. Enhanced supervision

## 2020-05-04 NOTE — PROGRESS NOTE BEHAVIORAL HEALTH - NSBHCONSULTMEDS_PSY_A_CORE FT
Recommendations:   Please repeat EKG given borderline prior EKG.   1) Start standing melatonin 3 mg PO for sleep   If QTc is less than 500 ms   2) Consider 0.25 mg Haldol PO/IV/IM qhs Recommendations:   Please repeat EKG given borderline prior EKG.   1) Start standing melatonin 3 mg PO for sleep   If QTc is less than 500 ms   2) D/c standing haldol if pt remains in good control

## 2020-05-05 LAB
ANION GAP SERPL CALC-SCNC: 13 MMOL/L — SIGNIFICANT CHANGE UP (ref 5–17)
BUN SERPL-MCNC: 21 MG/DL — SIGNIFICANT CHANGE UP (ref 7–23)
CALCIUM SERPL-MCNC: 9.3 MG/DL — SIGNIFICANT CHANGE UP (ref 8.4–10.5)
CHLORIDE SERPL-SCNC: 104 MMOL/L — SIGNIFICANT CHANGE UP (ref 96–108)
CO2 SERPL-SCNC: 21 MMOL/L — LOW (ref 22–31)
CREAT SERPL-MCNC: 0.56 MG/DL — SIGNIFICANT CHANGE UP (ref 0.5–1.3)
GLUCOSE SERPL-MCNC: 94 MG/DL — SIGNIFICANT CHANGE UP (ref 70–99)
HCT VFR BLD CALC: 30.5 % — LOW (ref 34.5–45)
HGB BLD-MCNC: 9.2 G/DL — LOW (ref 11.5–15.5)
MCHC RBC-ENTMCNC: 30.2 GM/DL — LOW (ref 32–36)
MCHC RBC-ENTMCNC: 32.5 PG — SIGNIFICANT CHANGE UP (ref 27–34)
MCV RBC AUTO: 107.8 FL — HIGH (ref 80–100)
NRBC # BLD: 0 /100 WBCS — SIGNIFICANT CHANGE UP (ref 0–0)
PLATELET # BLD AUTO: 257 K/UL — SIGNIFICANT CHANGE UP (ref 150–400)
POTASSIUM SERPL-MCNC: 4.8 MMOL/L — SIGNIFICANT CHANGE UP (ref 3.5–5.3)
POTASSIUM SERPL-SCNC: 4.8 MMOL/L — SIGNIFICANT CHANGE UP (ref 3.5–5.3)
RBC # BLD: 2.83 M/UL — LOW (ref 3.8–5.2)
RBC # FLD: 15.6 % — HIGH (ref 10.3–14.5)
SODIUM SERPL-SCNC: 138 MMOL/L — SIGNIFICANT CHANGE UP (ref 135–145)
WBC # BLD: 6.93 K/UL — SIGNIFICANT CHANGE UP (ref 3.8–10.5)
WBC # FLD AUTO: 6.93 K/UL — SIGNIFICANT CHANGE UP (ref 3.8–10.5)

## 2020-05-05 PROCEDURE — 99232 SBSQ HOSP IP/OBS MODERATE 35: CPT | Mod: CS

## 2020-05-05 PROCEDURE — 71045 X-RAY EXAM CHEST 1 VIEW: CPT | Mod: 26

## 2020-05-05 RX ORDER — MIRTAZAPINE 45 MG/1
7.5 TABLET, ORALLY DISINTEGRATING ORAL AT BEDTIME
Refills: 0 | Status: DISCONTINUED | OUTPATIENT
Start: 2020-05-05 | End: 2020-05-08

## 2020-05-05 RX ORDER — HALOPERIDOL DECANOATE 100 MG/ML
0.25 INJECTION INTRAMUSCULAR ONCE
Refills: 0 | Status: COMPLETED | OUTPATIENT
Start: 2020-05-05 | End: 2020-05-05

## 2020-05-05 RX ADMIN — Medication 1 DROP(S): at 14:53

## 2020-05-05 RX ADMIN — Medication 1 DROP(S): at 17:15

## 2020-05-05 RX ADMIN — SODIUM CHLORIDE 50 MILLILITER(S): 9 INJECTION INTRAMUSCULAR; INTRAVENOUS; SUBCUTANEOUS at 01:57

## 2020-05-05 RX ADMIN — MIDODRINE HYDROCHLORIDE 10 MILLIGRAM(S): 2.5 TABLET ORAL at 00:00

## 2020-05-05 RX ADMIN — Medication 15 MILLILITER(S): at 14:54

## 2020-05-05 RX ADMIN — HEPARIN SODIUM 5000 UNIT(S): 5000 INJECTION INTRAVENOUS; SUBCUTANEOUS at 17:34

## 2020-05-05 RX ADMIN — Medication 100 MILLIGRAM(S): at 14:54

## 2020-05-05 RX ADMIN — Medication 1 DROP(S): at 05:18

## 2020-05-05 RX ADMIN — SODIUM CHLORIDE 50 MILLILITER(S): 9 INJECTION INTRAMUSCULAR; INTRAVENOUS; SUBCUTANEOUS at 18:57

## 2020-05-05 RX ADMIN — BACITRACIN 1 APPLICATION(S): 500 OINTMENT OPHTHALMIC at 05:18

## 2020-05-05 RX ADMIN — MIRTAZAPINE 7.5 MILLIGRAM(S): 45 TABLET, ORALLY DISINTEGRATING ORAL at 23:49

## 2020-05-05 RX ADMIN — HEPARIN SODIUM 5000 UNIT(S): 5000 INJECTION INTRAVENOUS; SUBCUTANEOUS at 05:18

## 2020-05-05 RX ADMIN — BACITRACIN 1 APPLICATION(S): 500 OINTMENT OPHTHALMIC at 14:53

## 2020-05-05 RX ADMIN — HALOPERIDOL DECANOATE 0.25 MILLIGRAM(S): 100 INJECTION INTRAMUSCULAR at 23:49

## 2020-05-05 RX ADMIN — BACITRACIN 1 APPLICATION(S): 500 OINTMENT OPHTHALMIC at 23:49

## 2020-05-05 RX ADMIN — Medication 3 MILLIGRAM(S): at 23:49

## 2020-05-05 RX ADMIN — BACITRACIN 1 APPLICATION(S): 500 OINTMENT OPHTHALMIC at 17:16

## 2020-05-05 NOTE — PROGRESS NOTE ADULT - SUBJECTIVE AND OBJECTIVE BOX
non  verbal    REVIEW OF SYSTEMS:  GEN: no fever,    no chills  RESP: no SOB,   no cough  CVS: no chest pain,   no palpitations  GI: no abdominal pain,   no nausea,   no vomiting,   no constipation,   no diarrhea  : no dysuria,   no frequency  NEURO: no headache,   no dizziness  PSYCH: no depression,   not anxious  Derm : no rash    MEDICATIONS  (STANDING):  artificial  tears Solution 1 Drop(s) Both EYES four times a day  BACItracin   Ophthalmic Ointment 1 Application(s) Both EYES four times a day  haloperidol     Tablet 0.25 milliGRAM(s) Oral at bedtime  heparin  Injectable 5000 Unit(s) SubCutaneous every 12 hours  melatonin 3 milliGRAM(s) Oral at bedtime  midodrine 10 milliGRAM(s) Oral every 8 hours  multivitamin/minerals/iron Oral Solution (CENTRUM) 15 milliLiter(s) Enteral Tube daily  sodium chloride 0.9%. 1000 milliLiter(s) (50 mL/Hr) IV Continuous <Continuous>  thiamine 100 milliGRAM(s) Enteral Tube daily    MEDICATIONS  (PRN):  acetaminophen   Tablet .. 650 milliGRAM(s) Oral every 6 hours PRN Temp greater or equal to 38C (100.4F), Moderate Pain (4 - 6)  guaiFENesin   Syrup  (Sugar-Free) 200 milliGRAM(s) Oral every 6 hours PRN Cough      Vital Signs Last 24 Hrs  T(C): 36.4 (05 May 2020 00:09), Max: 36.5 (04 May 2020 08:56)  T(F): 97.5 (05 May 2020 00:09), Max: 97.7 (04 May 2020 08:56)  HR: 74 (05 May 2020 00:09) (71 - 76)  BP: 152/68 (05 May 2020 00:09) (128/70 - 152/68)  BP(mean): --  RR: 18 (05 May 2020 00:09) (18 - 19)  SpO2: 98% (05 May 2020 00:09) (95% - 98%)  CAPILLARY BLOOD GLUCOSE        I&O's Summary    04 May 2020 07:01  -  05 May 2020 07:00  --------------------------------------------------------  IN: 1074 mL / OUT: 750 mL / NET: 324 mL        PHYSICAL EXAM:  HEAD:  Atraumatic, Normocephalic  NECK: Supple, No   JVD  CHEST/LUNG:   no     rales,     no,    rhonchi  HEART: Regular rate and rhythm;         murmur  ABDOMEN: Soft, Nontender, ;   EXTREMITIES:     no   edema  NEUROLOGY:   non verbal    LABS:                        8.1    6.11  )-----------( 248      ( 03 May 2020 08:54 )             25.8     05-03    141  |  104  |  24<H>  ----------------------------<  174<H>  4.7   |  25  |  0.67    Ca    8.9      03 May 2020 08:54                      Thyroid Stimulating Hormone, Serum: 3.19 uIU/mL (04-24 @ 12:11)          Consultant(s) Notes Reviewed:      Care Discussed with Consultants/Other Providers:

## 2020-05-05 NOTE — SWALLOW BEDSIDE ASSESSMENT ADULT - SWALLOW EVAL: RECOMMENDED DIET
Unable to make dietary recommendation as pt refused to open oral cavity despite max verbal/tactile cues. Given evidence of reduced secretion management, pt likely at high risk for aspiration of secretions at baseline.

## 2020-05-05 NOTE — PROGRESS NOTE ADULT - ASSESSMENT
89   yr pt,   pt  with  h/o  afib,   not  on   xarelto,  due  to falls,   cva,   copd, hld,     alzheimers  dementia,    pud/  h/o left ribf xs. 8/ 9 th,. left hip surg  h/o mlple falls.  echo, normal ef/ T2  comp  deformity    sent  to  er  from   [facility], for  weakness/ sob  ct head, no cva//   ct  chest  with  goo   COVID  positive  elevated  troponin/ CRP/  Ferritin  , from Covid   pt  with  advanced  age/    comfort/   supportive  care is  ideal  however, son  wants  all  done. seen by icu  and palliative care  hypernatremia  resolved/   CNS,  in 1/2  blood   c/s,  which is  a  contaminant  palliative/ comfort care,  ought to  be goal  in this pt    wa s on   ng tube  feeds,   on midodrine/  son   wishes  to  continue   current care  on 2  liters  oxygen/    anemia,     stable  at 8   ng tube  pulled  out by pt. on 5. 2/  then re  inserted/  on ng feeds  spoke  to son,   stable  labs/  son  wishes  peg/  gi  to  f/p              < from: CT Chest No Cont (04.17.20 @ 04:24) >  IMPRESSION:   Pattern of GGO suggests infection including atypical pneumonia/viral infection from atypical agents including COVID-19 (C19V-1)  < end of copied text >     < from: CT Thoracic Spine Reform No Cont (01.07.20 @ 16:43) >  IMPRESSION:  Volume loss, microvascular disease, no acute hemorrhage or midline shift. If symptoms persist consider follow up head CT or MRI contraindications.  Cervical and thoracic spine are unchanged from prior, no obvious fracture or dislocation, multilevel degenerative changes as noted previously with diffuse osteopenia and small unchanged mild superior endplate T2 compression deformity.  < end of copied text      < from: Limited Transthoracic Echo (10.26.18 @ 14:07) >  Conclusions:  Limited tranthoracic echocardiogram at patients request to  end exam.  1. Mitral annular calcification.  2. The aortic valve opens well.  3. Limited images acquired at the patients request. Based  upon the parasternal long axis view only;  grossly normal  left ventricular systolic function.  < end of copied text >

## 2020-05-05 NOTE — CHART NOTE - NSCHARTNOTEFT_GEN_A_CORE
Nutrition Follow-up Note  Patient seen for: Patient seen for routine length of stay follow up.     Unable to conduct a face to face interview or nutrition-focused physical exam due to limited contact restrictions related to Pt's medical condition and isolation precautions. Information obtained via phone call with pt and from EMR.      Source of Information: RN.  Patient with dementia.    Current Diet: Jevity 1.2 @237cc 4 times per day via NGT  This regimen at goal provides 1140 Kcal, 52.8g protein, 764 mL free water (23 Kcal/Kg, 1g protein/Kg based on dosing wt 50 Kg)       Subjective Information: As per RN, Patient is doing well on NGT feeds as ordered.  No GI issues. Patient has been noted to pull out NGT and team replaced and Patient with mitts on to reduce risk for dislodging.  Feeds resumed.     Objective Information:   Dxd- Covid 19+ with hypoxia.  As per EMR, son would like everything done for mother and now considering a PEG placement as Patient unable to accept po diet.  Clinical review team does not feel aggressive therapy with intubation will change outcome.  Patient is not a DNR or DNI status.    Weights- No follow up weights available.  BMS- Last recorded BM on April 30th? Monitor      Previous Nutrition Diagnosis: Mild malnutrition and addressed with ongoing NGT feeds.  NPO and for possible PEG.       Nutrition Care Plan:  [ ] In progress   [ x] Achieved as Patient is tolerating feeds at goal.    Nutrition Interventions:  Continue NGT feeds of Jevity 1.2@ 237cc 4 times per day at goal.    Monitoring and Evaluation:   Continue to monitor tolerance to feeds,  weights, labs, skin integrity and BMs.    RDN remains available for follow up   Zarina Benjamin MA,RD,CHES,CDN  Beeper [phone]     RD remains available upon request and will follow up per protocol

## 2020-05-05 NOTE — PROGRESS NOTE ADULT - SUBJECTIVE AND OBJECTIVE BOX
CARDIOLOGY     PROGRESS  NOTE   ________________________________________________    CHIEF COMPLAINT:Patient is a 89y old  Female who presents with a chief complaint of sob/ hypotension (05 May 2020 08:49)  no complain.  	  REVIEW OF SYSTEMS:  CONSTITUTIONAL: No fever, weight loss, or fatigue  EYES: No eye pain, visual disturbances, or discharge  ENT:  No difficulty hearing, tinnitus, vertigo; No sinus or throat pain  NECK: No pain or stiffness  RESPIRATORY: No cough, wheezing, chills or hemoptysis; No Shortness of Breath  CARDIOVASCULAR: No chest pain, palpitations, passing out, dizziness, or leg swelling  GASTROINTESTINAL: No abdominal or epigastric pain. No nausea, vomiting, or hematemesis; No diarrhea or constipation. No melena or hematochezia.  GENITOURINARY: No dysuria, frequency, hematuria, or incontinence  NEUROLOGICAL: No headaches, memory loss, loss of strength, numbness, or tremors  SKIN: No itching, burning, rashes, or lesions   LYMPH Nodes: No enlarged glands  ENDOCRINE: No heat or cold intolerance; No hair loss  MUSCULOSKELETAL: No joint pain or swelling; No muscle, back, or extremity pain  PSYCHIATRIC: No depression, anxiety, mood swings, or difficulty sleeping  HEME/LYMPH: No easy bruising, or bleeding gums  ALLERGY AND IMMUNOLOGIC: No hives or eczema	    [ ] All others negative	  [ x] Unable to obtain    PHYSICAL EXAM:  T(C): 36.4 (05-05-20 @ 00:09), Max: 36.5 (05-04-20 @ 16:03)  HR: 74 (05-05-20 @ 00:09) (74 - 76)  BP: 152/68 (05-05-20 @ 00:09) (128/70 - 152/68)  RR: 18 (05-05-20 @ 00:09) (18 - 19)  SpO2: 98% (05-05-20 @ 00:09) (95% - 98%)  Wt(kg): --  I&O's Summary    04 May 2020 07:01  -  05 May 2020 07:00  --------------------------------------------------------  IN: 1074 mL / OUT: 750 mL / NET: 324 mL        Appearance: Normal	  HEENT:   Normal oral mucosa, PERRL, EOMI	  Lymphatic: No lymphadenopathy  Cardiovascular: Normal S1 S2, No JVD, + murmurs, No edema  Respiratory: Lungs clear to auscultation	  Psychiatry: A & O x 3, Mood & affect appropriate  Gastrointestinal:  Soft, Non-tender, + BS	  Skin: No rashes, No ecchymoses, No cyanosis	  Neurologic: Non-focal  Extremities: Normal range of motion, No clubbing, cyanosis or edema  Vascular: Peripheral pulses palpable 2+ bilaterally    MEDICATIONS  (STANDING):  artificial  tears Solution 1 Drop(s) Both EYES four times a day  BACItracin   Ophthalmic Ointment 1 Application(s) Both EYES four times a day  haloperidol     Tablet 0.25 milliGRAM(s) Oral at bedtime  heparin  Injectable 5000 Unit(s) SubCutaneous every 12 hours  melatonin 3 milliGRAM(s) Oral at bedtime  midodrine 10 milliGRAM(s) Oral every 8 hours  multivitamin/minerals/iron Oral Solution (CENTRUM) 15 milliLiter(s) Enteral Tube daily  sodium chloride 0.9%. 1000 milliLiter(s) (50 mL/Hr) IV Continuous <Continuous>  thiamine 100 milliGRAM(s) Enteral Tube daily      TELEMETRY: 	    ECG:  	  RADIOLOGY:  OTHER: 	  	  LABS:	 	    CARDIAC MARKERS:                  proBNP: Serum Pro-Brain Natriuretic Peptide: 4277 pg/mL (04-17 @ 05:13)    Lipid Profile:   HgA1c:   TSH: Thyroid Stimulating Hormone, Serum: 3.19 uIU/mL (04-24 @ 12:11)          Assessment and plan  ---------------------------  89 year-old female with history of CVA, atrial fibrillation on Xarelto, dementia presents to the Emergency Department for bradycardia.  Patient presents from the [facility]; on vitals was found to be bradycardic to 30s-40s and sent to the ED.  Patient with decreased responsiveness and PO intake for the past few days; recently started on Azithromycin due to unknown etiology/NH paperwork w/o such information.  Tested for COVID on 4/3/20 and negative.  Patient with hypoxia at NH down to 89% on RA.  pt with sig cardiac/ medical history with increasing sob and hypotension .  pt at time is on pressors will continue, and will taper slowly  covid + will add hydroxychloroquine if pt becomes responsive  spoke to the son Mina wants full medical therapy including intubation, clinical review team does not feel aggressive therapy with intubation will change outcome  Covid + now with o2 demand on 2 L nc  continue ngt feeding  son wants all medical therapy  will consider possible peg placement, as son request  check stool guaiac awaiting   fu cbc  start dysphagia 2/ bed side swallow test failed  ?need of peg  o2 sat has improved  start gentle hydration  psych consult appreciated  no ac for A.fib sec to decrease hgb  spoke to the son he wants all medical measure, speech and swallow will consider peg  dc midodrine

## 2020-05-05 NOTE — SWALLOW BEDSIDE ASSESSMENT ADULT - SWALLOW EVAL: DIAGNOSIS
Patient presented with bradycardia and poor PO intake - found to be COVID+. Patient presents with suspected reduced secretion management evidenced by wet/gurgly vocal quality when moaning. Pt refused to open oral cavity when presented with dry teaspoon, therefore unable to assess for a true oropharyngeal dysphagia.

## 2020-05-05 NOTE — SWALLOW BEDSIDE ASSESSMENT ADULT - SLP PERTINENT HISTORY OF CURRENT PROBLEM
89 year-old female with history of CVA, atrial fibrillation on Xarelto, dementia presents to the Emergency Department for bradycardia.  Patient presents from the [facility]; on vitals was found to be bradycardic to 30s-40s and sent to the ED.  Patient with decreased responsiveness and PO intake for the past few days; recently started on Azithromycin due to unknown etiology/NH paperwork w/o such information.  Tested for COVID on 4/3/20 and negative.  Patient with hypoxia at NH down to 89% on RA. Admitted to [facility] on 4/17 and found to be COVID-19 positive -> pt deemed not to be an ICU candidate as mechanical ventilation and cpr would not change overall outcome. Palliative consulted – family wishes for full code. As per ID: Given overall poor prognosis, would not recommend plaquenil for COVID it is not going to change her overall outcomes.

## 2020-05-05 NOTE — CHART NOTE - NSCHARTNOTEFT_GEN_A_CORE
Notified by RN pt pulled out NGT despite being on b/l wrist restraints.     Patient seen at bedside. Procedure was explained to patient. NGT placed via left nostril, 50cm depth effortlessly without difficulty or resistance, secured well. Location was checked by auscultation. Pt was cooperative and tolerated procedure well.     - CXR ordered for placement will f/u when preformed.   - d/w attending Dr. Ferro, who instructed haldol 0.25mg IVP for agitation (psych on board)  - will monitor patient closely   - will endorse events to day team    Josr Quiros PA-C  Department of Medicine Notified by RN pt pulled out NGT despite being on b/l wrist restraints.     Patient seen at bedside. Procedure was explained to patient. NGT placed via left nostril, effortlessly without difficulty or resistance, secured well. Location was checked by auscultation. Pt was cooperative and tolerated procedure well.     - CXR ordered for placement will f/u when preformed.   - d/w attending Dr. Ferro, who instructed haldol 0.25mg IVP for agitation (psych on board)  - will monitor patient closely   - will endorse events to day team    Josr Quiros PA-C  Department of Medicine Notified by RN pt pulled out NGT despite being on b/l wrist restraints.     Patient seen at bedside. Procedure was explained to patient. NGT placed via left nostril, effortlessly without difficulty or resistance, secured well. Location was checked by auscultation. Pt was cooperative and tolerated procedure well.     - CXR ordered for placement will f/u when preformed. --> CXR prelim done, radiology resident Jelani stating feeding tube in proper place.   - d/w attending Dr. Ferro, who instructed haldol 0.25mg IVP for agitation (psych on board)  - will monitor patient closely   - will endorse events to day team    Josr Quiros PA-C  Department of Medicine

## 2020-05-06 PROCEDURE — 99232 SBSQ HOSP IP/OBS MODERATE 35: CPT | Mod: CS

## 2020-05-06 PROCEDURE — 99221 1ST HOSP IP/OBS SF/LOW 40: CPT | Mod: CS,GC

## 2020-05-06 RX ORDER — SODIUM CHLORIDE 9 MG/ML
1000 INJECTION, SOLUTION INTRAVENOUS
Refills: 0 | Status: DISCONTINUED | OUTPATIENT
Start: 2020-05-06 | End: 2020-05-08

## 2020-05-06 RX ADMIN — BACITRACIN 1 APPLICATION(S): 500 OINTMENT OPHTHALMIC at 22:36

## 2020-05-06 RX ADMIN — SODIUM CHLORIDE 50 MILLILITER(S): 9 INJECTION, SOLUTION INTRAVENOUS at 15:36

## 2020-05-06 RX ADMIN — MIRTAZAPINE 7.5 MILLIGRAM(S): 45 TABLET, ORALLY DISINTEGRATING ORAL at 22:29

## 2020-05-06 RX ADMIN — HEPARIN SODIUM 5000 UNIT(S): 5000 INJECTION INTRAVENOUS; SUBCUTANEOUS at 05:05

## 2020-05-06 RX ADMIN — BACITRACIN 1 APPLICATION(S): 500 OINTMENT OPHTHALMIC at 15:36

## 2020-05-06 RX ADMIN — Medication 3 MILLIGRAM(S): at 22:29

## 2020-05-06 RX ADMIN — HEPARIN SODIUM 5000 UNIT(S): 5000 INJECTION INTRAVENOUS; SUBCUTANEOUS at 17:10

## 2020-05-06 RX ADMIN — HALOPERIDOL DECANOATE 0.25 MILLIGRAM(S): 100 INJECTION INTRAMUSCULAR at 22:29

## 2020-05-06 RX ADMIN — Medication 1 DROP(S): at 15:36

## 2020-05-06 RX ADMIN — BACITRACIN 1 APPLICATION(S): 500 OINTMENT OPHTHALMIC at 17:10

## 2020-05-06 RX ADMIN — Medication 1 DROP(S): at 05:05

## 2020-05-06 RX ADMIN — BACITRACIN 1 APPLICATION(S): 500 OINTMENT OPHTHALMIC at 05:05

## 2020-05-06 NOTE — CONSULT NOTE ADULT - ATTENDING COMMENTS
As above    Pt resting comfortably As above    Unable to obtain history due to dementia/delirium    Vital signs stable.  Pt observed resting comfortably  Abdomen nondistended.  Labs reviewed.    Impression:  Malnutrition due to limited intake, likely due to altered mental status.  COVID-19 infection    Recommendations:  Reassess speech/swallow   Family discussion regarding PEG, favor avoiding due to potential for patient dislodgement of PEG and also very limited benefits of PEG in elderly patients with advanced dementia.

## 2020-05-06 NOTE — SWALLOW BEDSIDE ASSESSMENT ADULT - COMMENTS
4/20: KFT placed. Per medicine 4/29: febrile, wbc of 15,000/suspect aspiration/if fever continues, then consider zosyn. 5/2: pt removed NGT – team unable to replace. As per psych: Patient likely with agitation secondary to delirium. 5/5 per medicine: son wishes peg.
4/20: KFT placed. Per medicine 4/29: febrile, wbc of 15,000/suspect aspiration/if fever continues, then consider zosyn. 5/2: pt removed NGT – team unable to replace. As per psych: Patient likely with agitation secondary to delirium. 5/5 per medicine: son wishes peg.

## 2020-05-06 NOTE — SWALLOW BEDSIDE ASSESSMENT ADULT - SWALLOW EVAL: RECOMMENDED FEEDING/EATING TECHNIQUES
allow for swallow between intakes/no straws/small sips/bites/alternate food with liquid/if pt to be placed on oral diet/check mouth frequently for oral residue/pocketing/crush medication (when feasible)/maintain upright posture during/after eating for 30 mins

## 2020-05-06 NOTE — SWALLOW BEDSIDE ASSESSMENT ADULT - SLP PERTINENT HISTORY OF CURRENT PROBLEM
89 year-old female with history of CVA, atrial fibrillation on Xarelto, dementia presents to the Emergency Department for bradycardia.  Patient presents from the [facility]; on vitals was found to be bradycardic to 30s-40s and sent to the ED.  Patient with decreased responsiveness and PO intake for the past few days; recently started on Azithromycin due to unknown etiology/NH paperwork w/o such information.  Tested for COVID on 4/3/20 and negative.  Patient with hypoxia at NH down to 89% on RA. Admitted to [facility] on 4/17 and found to be COVID-19 positive -> pt deemed not to be an ICU candidate as mechanical ventilation and cpr would not change overall outcome. Palliative consulted – family wishes for full code. As per ID: Given overall poor prognosis, would not recommend plaquenil for COVID it is not going to change her overall outcomes.

## 2020-05-06 NOTE — PROGRESS NOTE ADULT - ASSESSMENT
89   yr pt,   pt  with  h/o  afib,   not  on   xarelto,  due  to falls,   cva,   copd, hld,     alzheimers  dementia,    pud/  h/o left ribf xs. 8/ 9 th,. left hip surg  h/o mlple falls.  echo, normal ef/ T2  comp  deformity    sent  to  er  from   [facility], for  weakness/ sob  ct head, no cva//   ct  chest  with  goo   COVID  positive  elevated  troponin/ CRP/  Ferritin  , from Covid   pt  with  advanced  age/    comfort/   supportive  care is  ideal  however, son  wants  all  done. seen by icu  and palliative care  hypernatremia  resolved/   CNS,  in 1/2  blood   c/s,  which is  a  contaminant  palliative/ comfort care,  ought to  be goal  in this pt    wa s on   ng tube  feeds,   on midodrine/  son   wishes  to  continue   current care  on 2  liters  oxygen/    anemia,     stable     ng tube  pulled  out by pt. on 5. 2/  and  on  5/5,  then re  inserted/  on ng feeds    stable  labs/  son  wishes  peg/  gi  to  f/p  per  swallow  eval, pt  not  an ideal  candidate  for  swallow trials              < from: CT Chest No Cont (04.17.20 @ 04:24) >  IMPRESSION:   Pattern of GGO suggests infection including atypical pneumonia/viral infection from atypical agents including COVID-19 (C19V-1)  < end of copied text >     < from: CT Thoracic Spine Reform No Cont (01.07.20 @ 16:43) >  IMPRESSION:  Volume loss, microvascular disease, no acute hemorrhage or midline shift. If symptoms persist consider follow up head CT or MRI contraindications.  Cervical and thoracic spine are unchanged from prior, no obvious fracture or dislocation, multilevel degenerative changes as noted previously with diffuse osteopenia and small unchanged mild superior endplate T2 compression deformity.  < end of copied text      < from: Limited Transthoracic Echo (10.26.18 @ 14:07) >  Conclusions:  Limited tranthoracic echocardiogram at patients request to  end exam.  1. Mitral annular calcification.  2. The aortic valve opens well.  3. Limited images acquired at the patients request. Based  upon the parasternal long axis view only;  grossly normal  left ventricular systolic function.  < end of copied text > 89   yr pt,   pt  with  h/o  afib,   not  on   xarelto,  due  to falls,   cva,   copd, hld,     alzheimers  dementia,    pud/  h/o left ribf xs. 8/ 9 th,. left hip surg  h/o mlple falls.  echo, normal ef/ T2  comp  deformity    sent  to  [facility]  from   [facility], for  weakness/ sob  ct head, no cva//   ct  chest  with  goo   COVID  positive  elevated  troponin/ CRP/  Ferritin  , from Covid   pt  with  advanced  age/    comfort/   supportive  care is  ideal  however, son  wants  all  done. seen by icu  and palliative care  hypernatremia  resolved/   CNS,  in 1/2  blood   c/s,  which is  a  contaminant  palliative/ comfort care,  ought to  be goal  in this pt    wa s on   ng tube  feeds,   on midodrine/  son   wishes  to  continue   current care  on 2  liters  oxygen/    anemia,     stable     ng tube  pulled  out by pt. on 5. 2/  and  on  5/5,  reinseted/  now  out  again  stable  labs/  son  wishes  peg/  gi  to  f/p  per  swallow  eval, pt  not  an ideal  candidate  for  swallow trials          < from: CT Chest No Cont (04.17.20 @ 04:24) >  IMPRESSION:   Pattern of GGO suggests infection including atypical pneumonia/viral infection from atypical agents including COVID-19 (C19V-1)  < end of copied text >     < from: CT Thoracic Spine Reform No Cont (01.07.20 @ 16:43) >  IMPRESSION:  Volume loss, microvascular disease, no acute hemorrhage or midline shift. If symptoms persist consider follow up head CT or MRI contraindications.  Cervical and thoracic spine are unchanged from prior, no obvious fracture or dislocation, multilevel degenerative changes as noted previously with diffuse osteopenia and small unchanged mild superior endplate T2 compression deformity.  < end of copied text      < from: Limited Transthoracic Echo (10.26.18 @ 14:07) >  Conclusions:  Limited tranthoracic echocardiogram at patients request to  end exam.  1. Mitral annular calcification.  2. The aortic valve opens well.  3. Limited images acquired at the patients request. Based  upon the parasternal long axis view only;  grossly normal  left ventricular systolic function.  < end of copied text >

## 2020-05-06 NOTE — SWALLOW BEDSIDE ASSESSMENT ADULT - CONSISTENCIES ADMINISTERED
pt accepted 2 small sips/thin liquid puree thick/puree thin/pt accepted ~2 teaspoons of each pt accepted 2 small sips/nectar thick no

## 2020-05-06 NOTE — PROGRESS NOTE ADULT - SUBJECTIVE AND OBJECTIVE BOX
CARDIOLOGY     PROGRESS  NOTE   ________________________________________________    CHIEF COMPLAINT:Patient is a 89y old  Female who presents with a chief complaint of sob/ hypotension (06 May 2020 09:27)  intermittent agitation.  	  REVIEW OF SYSTEMS:  CONSTITUTIONAL: No fever, weight loss, or fatigue  EYES: No eye pain, visual disturbances, or discharge  ENT:  No difficulty hearing, tinnitus, vertigo; No sinus or throat pain  NECK: No pain or stiffness  RESPIRATORY: No cough, wheezing, chills or hemoptysis; No Shortness of Breath  CARDIOVASCULAR: No chest pain, palpitations, passing out, dizziness, or leg swelling  GASTROINTESTINAL: No abdominal or epigastric pain. No nausea, vomiting, or hematemesis; No diarrhea or constipation. No melena or hematochezia.  GENITOURINARY: No dysuria, frequency, hematuria, or incontinence  NEUROLOGICAL: No headaches, memory loss, loss of strength, numbness, or tremors  SKIN: No itching, burning, rashes, or lesions   LYMPH Nodes: No enlarged glands  ENDOCRINE: No heat or cold intolerance; No hair loss  MUSCULOSKELETAL: No joint pain or swelling; No muscle, back, or extremity pain  PSYCHIATRIC: No depression, anxiety, mood swings, or difficulty sleeping  HEME/LYMPH: No easy bruising, or bleeding gums  ALLERGY AND IMMUNOLOGIC: No hives or eczema	    [ ] All others negative	  [x ] Unable to obtain    PHYSICAL EXAM:  T(C): 36.7 (05-06-20 @ 06:05), Max: 36.7 (05-06-20 @ 06:05)  HR: 84 (05-06-20 @ 06:05) (83 - 93)  BP: 110/45 (05-06-20 @ 06:05) (106/60 - 110/45)  RR: 18 (05-06-20 @ 06:05) (18 - 18)  SpO2: 93% (05-06-20 @ 06:05) (93% - 96%)  Wt(kg): --  I&O's Summary    05 May 2020 07:01  -  06 May 2020 07:00  --------------------------------------------------------  IN: 237 mL / OUT: 650 mL / NET: -413 mL    06 May 2020 07:01  -  06 May 2020 09:49  --------------------------------------------------------  IN: 100 mL / OUT: 0 mL / NET: 100 mL        Appearance: Normal	  HEENT:   Normal oral mucosa, PERRL, EOMI	  Lymphatic: No lymphadenopathy  Cardiovascular: Normal S1 S2, No JVD, +murmurs, No edema  Respiratory: Lungs clear to auscultation	  Psychiatry: A & O x 3, Mood & affect appropriate  Gastrointestinal:  Soft, Non-tender, + BS	  Skin: No rashes, No ecchymoses, No cyanosis	  Neurologic: Non-focal  Extremities: Normal range of motion, No clubbing, cyanosis or edema  Vascular: Peripheral pulses palpable 2+ bilaterally    MEDICATIONS  (STANDING):  artificial  tears Solution 1 Drop(s) Both EYES four times a day  BACItracin   Ophthalmic Ointment 1 Application(s) Both EYES four times a day  haloperidol     Tablet 0.25 milliGRAM(s) Oral at bedtime  heparin  Injectable 5000 Unit(s) SubCutaneous every 12 hours  melatonin 3 milliGRAM(s) Oral at bedtime  mirtazapine 7.5 milliGRAM(s) Oral at bedtime  multivitamin/minerals/iron Oral Solution (CENTRUM) 15 milliLiter(s) Enteral Tube daily  sodium chloride 0.9%. 1000 milliLiter(s) (50 mL/Hr) IV Continuous <Continuous>  thiamine 100 milliGRAM(s) Enteral Tube daily      TELEMETRY: 	    ECG:  	  RADIOLOGY:  OTHER: 	  	  LABS:	 	    CARDIAC MARKERS:                                9.2    6.93  )-----------( 257      ( 05 May 2020 09:00 )             30.5     05-05    138  |  104  |  21  ----------------------------<  94  4.8   |  21<L>  |  0.56    Ca    9.3      05 May 2020 09:00      proBNP: Serum Pro-Brain Natriuretic Peptide: 4277 pg/mL (04-17 @ 05:13)    Lipid Profile:   HgA1c:   TSH: Thyroid Stimulating Hormone, Serum: 3.19 uIU/mL (04-24 @ 12:11)    - CXR ordered for placement will f/u when preformed. --> CXR prelim done, radiology resident Jelani stating feeding tube in proper place.   - d/w attending Dr. Ferro, who instructed haldol 0.25mg IVP for agitation (psych on board)  - will monitor patient closely   - will endorse events to day team      Assessment and plan  ---------------------------  89 year-old female with history of CVA, atrial fibrillation on Xarelto, dementia presents to the Emergency Department for bradycardia.  Patient presents from the [facility]; on vitals was found to be bradycardic to 30s-40s and sent to the ED.  Patient with decreased responsiveness and PO intake for the past few days; recently started on Azithromycin due to unknown etiology/NH paperwork w/o such information.  Tested for COVID on 4/3/20 and negative.  Patient with hypoxia at NH down to 89% on RA.  pt with sig cardiac/ medical history with increasing sob and hypotension .  pt at time is on pressors will continue, and will taper slowly  covid + will add hydroxychloroquine if pt becomes responsive  spoke to the son Mina wants full medical therapy including intubation, clinical review team does not feel aggressive therapy with intubation will change outcome  Covid + now with o2 demand on 2 L nc  continue ngt feeding  son wants all medical therapy  will consider possible peg placement, as son request  check stool guaiac awaiting   fu cbc  start dysphagia 2/ bed side swallow test failed  ?need of peg  o2 sat has improved  start gentle hydration  psych consult appreciated  no ac for A.fib sec to decrease hgb  spoke to the son he wants all medical measure, speech and swallow will consider peg  dc midodrine  gi called for possible peg placement CARDIOLOGY     PROGRESS  NOTE   ________________________________________________    CHIEF COMPLAINT:Patient is a 89y old  Female who presents with a chief complaint of sob/ hypotension (06 May 2020 09:27)  intermittent agitation. awake and alert today.  	  REVIEW OF SYSTEMS:  CONSTITUTIONAL: No fever, weight loss, or fatigue  EYES: No eye pain, visual disturbances, or discharge  ENT:  No difficulty hearing, tinnitus, vertigo; No sinus or throat pain  NECK: No pain or stiffness  RESPIRATORY: No cough, wheezing, chills or hemoptysis; No Shortness of Breath  CARDIOVASCULAR: No chest pain, palpitations, passing out, dizziness, or leg swelling  GASTROINTESTINAL: No abdominal or epigastric pain. No nausea, vomiting, or hematemesis; No diarrhea or constipation. No melena or hematochezia.  GENITOURINARY: No dysuria, frequency, hematuria, or incontinence  NEUROLOGICAL: No headaches, memory loss, loss of strength, numbness, or tremors  SKIN: No itching, burning, rashes, or lesions   LYMPH Nodes: No enlarged glands  ENDOCRINE: No heat or cold intolerance; No hair loss  MUSCULOSKELETAL: No joint pain or swelling; No muscle, back, or extremity pain  PSYCHIATRIC: No depression, anxiety, mood swings, or difficulty sleeping  HEME/LYMPH: No easy bruising, or bleeding gums  ALLERGY AND IMMUNOLOGIC: No hives or eczema	    [ ] All others negative	  [x ] Unable to obtain    PHYSICAL EXAM:  T(C): 36.7 (05-06-20 @ 06:05), Max: 36.7 (05-06-20 @ 06:05)  HR: 84 (05-06-20 @ 06:05) (83 - 93)  BP: 110/45 (05-06-20 @ 06:05) (106/60 - 110/45)  RR: 18 (05-06-20 @ 06:05) (18 - 18)  SpO2: 93% (05-06-20 @ 06:05) (93% - 96%)  Wt(kg): --  I&O's Summary    05 May 2020 07:01  -  06 May 2020 07:00  --------------------------------------------------------  IN: 237 mL / OUT: 650 mL / NET: -413 mL    06 May 2020 07:01  -  06 May 2020 09:49  --------------------------------------------------------  IN: 100 mL / OUT: 0 mL / NET: 100 mL        Appearance: Normal	  HEENT:   Normal oral mucosa, PERRL, EOMI	  Lymphatic: No lymphadenopathy  Cardiovascular: Normal S1 S2, No JVD, +murmurs, No edema  Respiratory: Lungs clear to auscultation	  Psychiatry: A & O x 3, Mood & affect appropriate  Gastrointestinal:  Soft, Non-tender, + BS	  Skin: No rashes, No ecchymoses, No cyanosis	  Neurologic: Non-focal  Extremities: Normal range of motion, No clubbing, cyanosis or edema  Vascular: Peripheral pulses palpable 2+ bilaterally    MEDICATIONS  (STANDING):  artificial  tears Solution 1 Drop(s) Both EYES four times a day  BACItracin   Ophthalmic Ointment 1 Application(s) Both EYES four times a day  haloperidol     Tablet 0.25 milliGRAM(s) Oral at bedtime  heparin  Injectable 5000 Unit(s) SubCutaneous every 12 hours  melatonin 3 milliGRAM(s) Oral at bedtime  mirtazapine 7.5 milliGRAM(s) Oral at bedtime  multivitamin/minerals/iron Oral Solution (CENTRUM) 15 milliLiter(s) Enteral Tube daily  sodium chloride 0.9%. 1000 milliLiter(s) (50 mL/Hr) IV Continuous <Continuous>  thiamine 100 milliGRAM(s) Enteral Tube daily      TELEMETRY: 	    ECG:  	  RADIOLOGY:  OTHER: 	  	  LABS:	 	    CARDIAC MARKERS:                                9.2    6.93  )-----------( 257      ( 05 May 2020 09:00 )             30.5     05-05    138  |  104  |  21  ----------------------------<  94  4.8   |  21<L>  |  0.56    Ca    9.3      05 May 2020 09:00      proBNP: Serum Pro-Brain Natriuretic Peptide: 4277 pg/mL (04-17 @ 05:13)    Lipid Profile:   HgA1c:   TSH: Thyroid Stimulating Hormone, Serum: 3.19 uIU/mL (04-24 @ 12:11)    - CXR ordered for placement will f/u when preformed. --> CXR prelim done, radiology resident Jelani stating feeding tube in proper place.   - d/w attending Dr. Ferro, who instructed haldol 0.25mg IVP for agitation (psych on board)  - will monitor patient closely   - will endorse events to day team      Assessment and plan  ---------------------------  89 year-old female with history of CVA, atrial fibrillation on Xarelto, dementia presents to the Emergency Department for bradycardia.  Patient presents from the [facility]; on vitals was found to be bradycardic to 30s-40s and sent to the ED.  Patient with decreased responsiveness and PO intake for the past few days; recently started on Azithromycin due to unknown etiology/NH paperwork w/o such information.  Tested for COVID on 4/3/20 and negative.  Patient with hypoxia at NH down to 89% on RA.  pt with sig cardiac/ medical history with increasing sob and hypotension .  pt at time is on pressors will continue, and will taper slowly  covid + will add hydroxychloroquine if pt becomes responsive  spoke to the son Mina wants full medical therapy including intubation, clinical review team does not feel aggressive therapy with intubation will change outcome  Covid + now with o2 demand on 2 L nc  continue ngt feeding  son wants all medical therapy  will consider possible peg placement, as son request  check stool guaiac awaiting   fu cbc  start dysphagia 2/ bed side swallow test failed  ?need of peg  o2 sat has improved  start gentle hydration  psych consult appreciated  no ac for A.fib sec to decrease hgb  spoke to the son he wants all medical measure, speech and swallow will consider peg  dc midodrine  pt alert and oriented today and wants to eat , sig different than yesterday  called speech and swallow to reassess her again today, so hope fully can dc pt  will speak to the son again

## 2020-05-06 NOTE — PROGRESS NOTE ADULT - SUBJECTIVE AND OBJECTIVE BOX
events noted/ ng t pulled  out/  re inserted    REVIEW OF SYSTEMS:  GEN: no fever,    no chills  RESP: no SOB,   no cough  CVS: no chest pain,   no palpitations  GI: no abdominal pain,   no nausea,   no vomiting,   no constipation,   no diarrhea  : no dysuria,   no frequency  NEURO: no headache,   no dizziness  PSYCH: no depression,   not anxious  Derm : no rash    MEDICATIONS  (STANDING):  artificial  tears Solution 1 Drop(s) Both EYES four times a day  BACItracin   Ophthalmic Ointment 1 Application(s) Both EYES four times a day  haloperidol     Tablet 0.25 milliGRAM(s) Oral at bedtime  heparin  Injectable 5000 Unit(s) SubCutaneous every 12 hours  melatonin 3 milliGRAM(s) Oral at bedtime  mirtazapine 7.5 milliGRAM(s) Oral at bedtime  multivitamin/minerals/iron Oral Solution (CENTRUM) 15 milliLiter(s) Enteral Tube daily  sodium chloride 0.9%. 1000 milliLiter(s) (50 mL/Hr) IV Continuous <Continuous>  thiamine 100 milliGRAM(s) Enteral Tube daily    MEDICATIONS  (PRN):  acetaminophen   Tablet .. 650 milliGRAM(s) Oral every 6 hours PRN Temp greater or equal to 38C (100.4F), Moderate Pain (4 - 6)  guaiFENesin   Syrup  (Sugar-Free) 200 milliGRAM(s) Oral every 6 hours PRN Cough      Vital Signs Last 24 Hrs  T(C): 36.7 (06 May 2020 06:05), Max: 36.7 (06 May 2020 06:05)  T(F): 98 (06 May 2020 06:05), Max: 98 (06 May 2020 06:05)  HR: 84 (06 May 2020 06:05) (73 - 93)  BP: 110/45 (06 May 2020 06:05) (106/60 - 132/71)  BP(mean): --  RR: 18 (06 May 2020 06:05) (18 - 18)  SpO2: 93% (06 May 2020 06:05) (93% - 96%)  CAPILLARY BLOOD GLUCOSE        I&O's Summary    05 May 2020 07:01  -  06 May 2020 07:00  --------------------------------------------------------  IN: 237 mL / OUT: 650 mL / NET: -413 mL        PHYSICAL EXAM:  HEAD:  Atraumatic, Normocephalic  NECK: Supple, No   JVD  CHEST/LUNG:   no     rales,     no,    rhonchi  HEART: Regular rate and rhythm;         murmur  ABDOMEN: Soft, Nontender, ;   EXTREMITIES:    no    edema  NEUROLOGY:  alert    LABS:                        9.2    6.93  )-----------( 257      ( 05 May 2020 09:00 )             30.5     05-05    138  |  104  |  21  ----------------------------<  94  4.8   |  21<L>  |  0.56    Ca    9.3      05 May 2020 09:00                      Thyroid Stimulating Hormone, Serum: 3.19 uIU/mL (04-24 @ 12:11)          Consultant(s) Notes Reviewed:      Care Discussed with Consultants/Other Providers:

## 2020-05-06 NOTE — SWALLOW BEDSIDE ASSESSMENT ADULT - SWALLOW EVAL: PATIENT/FAMILY GOALS STATEMENT
Pt confused. Requested water. Stated she needed to have a bowel movement - RN present in room and aware of same.

## 2020-05-06 NOTE — SWALLOW BEDSIDE ASSESSMENT ADULT - SLP GENERAL OBSERVATIONS
Patient encountered with eyes closed in bed, +2L/NC, +KFT.  +Intermittent moaning, otherwise nonverbal.  Did not follow commands or open eyes despite max cues.
Patient encountered awake and alert, upright in bed, A&Ox1. Did not follow commands for evaluation purposes. Confused. Intermittently answered simple questions (e.g., "Do you have a son?"). At other times pt did not verbally respond to questions, but always remained alert. Pt accepted limited trials before refusing verbally, as well as refusing to open mouth.

## 2020-05-06 NOTE — CONSULT NOTE ADULT - ASSESSMENT
Impression:  # PEG Evaluation: Given the patient has pulled out multiple NG tubes despite mittens & restraints; she is a poor candidate for PEG placement at this time  # Hypoxia secondary to COVID-19 infection  # AFib: Not on AC  # CVA  # Dementia    Recommendation:  - Given the patient has pulled out multiple NG tubes despite mittens & restraints; she is a poor candidate for PEG placement at this time  - Attempted to call family; no response this AM  - Palliative consult  - Supportive care per primary team    Sara Sanchez MD  Gastroenterology Fellow  [phone]  Please page on call fellow on weekends and after 5pm on weekdays Impression:  # PEG Evaluation: Given the patient has pulled out multiple NG tubes despite mittens & restraints; she is a poor candidate for PEG placement at this time  # Hypoxia secondary to COVID-19 infection  # AFib: Not on AC  # CVA  # Dementia    Recommendation:  - Given the patient has pulled out multiple NG tubes despite mittens & restraints; she is a poor candidate for PEG placement at this time. If patient were to remove PEG within the first month of placement - increased risk of peritonitis leading to increase mortality  - Attempted to call family; no response this AM  - Palliative consult  - Supportive care per primary team    Sara Sanchez MD  Gastroenterology Fellow  [phone]  Please page on call fellow on weekends and after 5pm on weekdays

## 2020-05-06 NOTE — SWALLOW BEDSIDE ASSESSMENT ADULT - SWALLOW EVAL: RECOMMENDED DIET
Unable to make formal dietary recommendation given pt only accepted minimal amount of PO. If pt/family opt to initiate PO feeding given GI report stating pt is a poor candidate for PEG placement, would suggest considering a more conservative diet texture (e.g., Dysphagia 1 with nectar thick liquids) to reduce overall risk of aspiration.

## 2020-05-06 NOTE — SWALLOW BEDSIDE ASSESSMENT ADULT - SWALLOW EVAL: DIAGNOSIS
Patient seen for swallow reevaluation to determine candidacy for PO diet per MD request given report of improved mentation.  Swallow evaluation limited as pt accepted very minimal amounts of PO.  For trials accepted pt noted with delayed oral transit time, delayed trigger of the swallow, and cough post intake of thin liquids.

## 2020-05-06 NOTE — CONSULT NOTE ADULT - SUBJECTIVE AND OBJECTIVE BOX
Chief Complaint:  Patient is a 89y old  Female who presents with a chief complaint of sob/ hypotension (06 May 2020 08:04)    HPI:  89 year old female with hx of CVA, Afib (previously on Xarelto) and Dementia was initially sent from [facility] for Bradycardia. For several days, the patient has had decreased responsiveness and PO intake - was found to be COVID-19 positive. Patient underwent a speech & swallow evaluation & refused to open her mouth. Reviewed chart - Patient has pulled out NGT 4 times in the past week despite mittens and restraints.    Allergies:  aspirin (Unknown)  penicillin (Unknown)    Home Medications:  Reviewed    Hospital Medications:  acetaminophen   Tablet .. 650 milliGRAM(s) Oral every 6 hours PRN  artificial  tears Solution 1 Drop(s) Both EYES four times a day  BACItracin   Ophthalmic Ointment 1 Application(s) Both EYES four times a day  guaiFENesin   Syrup  (Sugar-Free) 200 milliGRAM(s) Oral every 6 hours PRN  haloperidol     Tablet 0.25 milliGRAM(s) Oral at bedtime  heparin  Injectable 5000 Unit(s) SubCutaneous every 12 hours  melatonin 3 milliGRAM(s) Oral at bedtime  mirtazapine 7.5 milliGRAM(s) Oral at bedtime  multivitamin/minerals/iron Oral Solution (CENTRUM) 15 milliLiter(s) Enteral Tube daily  sodium chloride 0.9%. 1000 milliLiter(s) IV Continuous <Continuous>  thiamine 100 milliGRAM(s) Enteral Tube daily    PMHX/PSHX:  Atrial fibrillation, unspecified type  GERD (gastroesophageal reflux disease)  COPD (chronic obstructive pulmonary disease)  GI bleed  Hyperlipemia  PUD (peptic ulcer disease)  CVA (cerebral infarction)  Glaucoma  H/O abdominal surgery    Family history:  No pertinent family history in first degree relatives    Social History:   Unable to Assess     ROS:   Unable to Assess     PHYSICAL EXAM:     Vital Signs:  Vital Signs Last 24 Hrs  T(C): 36.7 (06 May 2020 06:05), Max: 36.7 (06 May 2020 06:05)  T(F): 98 (06 May 2020 06:05), Max: 98 (06 May 2020 06:05)  HR: 84 (06 May 2020 06:05) (83 - 93)  BP: 110/45 (06 May 2020 06:05) (106/60 - 110/45)  BP(mean): --  RR: 18 (06 May 2020 06:05) (18 - 18)  SpO2: 93% (06 May 2020 06:05) (93% - 96%)  Daily     Daily     LABS:                        9.2    6.93  )-----------( 257      ( 05 May 2020 09:00 )             30.5       05-05    138  |  104  |  21  ----------------------------<  94  4.8   |  21<L>  |  0.56    Ca    9.3      05 May 2020 09:00                          9.2    6.93  )-----------( 257      ( 05 May 2020 09:00 )             30.5     Imaging:

## 2020-05-06 NOTE — SWALLOW BEDSIDE ASSESSMENT ADULT - ADDITIONAL RECOMMENDATIONS
Maintain good oral hygiene.  Recommend continued GOC discussion re: provision of nutrition in this pt.
Maintain good oral hygiene.  Suggest monitoring caloric intake if pt placed on oral diet.

## 2020-05-07 LAB — SARS-COV-2 RNA SPEC QL NAA+PROBE: SIGNIFICANT CHANGE UP

## 2020-05-07 PROCEDURE — 99232 SBSQ HOSP IP/OBS MODERATE 35: CPT | Mod: CS

## 2020-05-07 PROCEDURE — 99231 SBSQ HOSP IP/OBS SF/LOW 25: CPT | Mod: CS,GC

## 2020-05-07 RX ADMIN — HALOPERIDOL DECANOATE 0.25 MILLIGRAM(S): 100 INJECTION INTRAMUSCULAR at 22:51

## 2020-05-07 RX ADMIN — HEPARIN SODIUM 5000 UNIT(S): 5000 INJECTION INTRAVENOUS; SUBCUTANEOUS at 17:05

## 2020-05-07 RX ADMIN — MIRTAZAPINE 7.5 MILLIGRAM(S): 45 TABLET, ORALLY DISINTEGRATING ORAL at 22:54

## 2020-05-07 RX ADMIN — BACITRACIN 1 APPLICATION(S): 500 OINTMENT OPHTHALMIC at 23:07

## 2020-05-07 RX ADMIN — BACITRACIN 1 APPLICATION(S): 500 OINTMENT OPHTHALMIC at 17:05

## 2020-05-07 RX ADMIN — Medication 3 MILLIGRAM(S): at 22:51

## 2020-05-07 RX ADMIN — BACITRACIN 1 APPLICATION(S): 500 OINTMENT OPHTHALMIC at 04:52

## 2020-05-07 RX ADMIN — Medication 1 DROP(S): at 17:05

## 2020-05-07 RX ADMIN — BACITRACIN 1 APPLICATION(S): 500 OINTMENT OPHTHALMIC at 14:01

## 2020-05-07 RX ADMIN — Medication 1 DROP(S): at 14:01

## 2020-05-07 RX ADMIN — HEPARIN SODIUM 5000 UNIT(S): 5000 INJECTION INTRAVENOUS; SUBCUTANEOUS at 04:52

## 2020-05-07 RX ADMIN — Medication 1 DROP(S): at 23:07

## 2020-05-07 NOTE — CHART NOTE - NSCHARTNOTEFT_GEN_A_CORE
Nutrition Services  Diet rxd- Dysphagia 1 with Nectar Thick Liquids  Dxd- Covid 19+ with hypoxia, Alzheimer's dementia and dysphagia  Nutrition follow up completed on 05/05/2020.  At that time, Patient was receiving NGT feeds.  Patient noted to pull out NGT 4 times in one week despite using mitten restraints.   Son requesting a PEG placement but as per GI, Patient is not a good candidate related to risks.  SLP evaluation requested and Patient noted not to be able to fully participate.  Noted with a delayed swallow, refusing to open up mouth and not an ideal candidate for swallow trials.  Patient did ask for water.  Patient is at risk for aspiration but despite this, Patient started on conservative diet of Dysphagia 1 with nectar thick liquids as of last night.  This RDN will add Mighty Shakes to tray to optimize calorie and protein intake.  Patient is on enhanced supervision and will need complete assistance with feeding and aspiration precautions taken.  Previous diagnosis of Mild malnutrition persists. Patient noted with DTI to left heel.  Patient has been see by palliative care.  PLAN:  Provide Dysphagia 1 with Nectar Thick liquids  RDN to provide Mighty Shakes 1 container at each meal.  Monitor diet tolerance, po intake, GI tolerance, weight trends, labs, and skin integrity  RDN remains available for follow up   Zarina Benjamin MA,RD,CHES,CDN  Beeper [phone]

## 2020-05-07 NOTE — PROGRESS NOTE ADULT - ASSESSMENT
Impression:  # PEG Evaluation: Given the patient has pulled out multiple NG tubes despite mittens & restraints; she is a poor candidate for PEG placement at this time  # Hypoxia secondary to COVID-19 infection  # AFib: Not on AC  # CVA  # Dementia    Recommendation:  - Discussed the risks of PEG placement with son given patient is high probability to pull and remove the PEG prematurely. He states he is viewing the PEG as a 'last resort' & is requesting if he can come in when S+S are evaluating his mother. -  Given COVID pandemic; this would likely be difficult.   - Agree with started conservative diet at this time given patient reportedly 'wants to eat' per notes. While patient has risks of aspiration, PEG placement is known to not change risks of aspiration in end state dementia patients.   - Supportive care per primary team    Sara Sanchez MD  Gastroenterology Fellow  [phone]  Please page on call fellow on weekends and after 5pm on weekdays

## 2020-05-07 NOTE — PROGRESS NOTE ADULT - SUBJECTIVE AND OBJECTIVE BOX
weak/  in bed  REVIEW OF SYSTEMS:  GEN: no fever,    no chills  RESP: no SOB,   no cough  CVS: no chest pain,   no palpitations  GI: no abdominal pain,   no nausea,   no vomiting,   no constipation,   no diarrhea  : no dysuria,   no frequency  NEURO: no headache,   no dizziness  PSYCH: no depression,   not anxious  Derm : no rash    MEDICATIONS  (STANDING):  artificial  tears Solution 1 Drop(s) Both EYES four times a day  BACItracin   Ophthalmic Ointment 1 Application(s) Both EYES four times a day  dextrose 5% + sodium chloride 0.45%. 1000 milliLiter(s) (50 mL/Hr) IV Continuous <Continuous>  haloperidol     Tablet 0.25 milliGRAM(s) Oral at bedtime  heparin  Injectable 5000 Unit(s) SubCutaneous every 12 hours  melatonin 3 milliGRAM(s) Oral at bedtime  mirtazapine 7.5 milliGRAM(s) Oral at bedtime  multivitamin/minerals/iron Oral Solution (CENTRUM) 15 milliLiter(s) Enteral Tube daily  thiamine 100 milliGRAM(s) Enteral Tube daily    MEDICATIONS  (PRN):  acetaminophen   Tablet .. 650 milliGRAM(s) Oral every 6 hours PRN Temp greater or equal to 38C (100.4F), Moderate Pain (4 - 6)  guaiFENesin   Syrup  (Sugar-Free) 200 milliGRAM(s) Oral every 6 hours PRN Cough      Vital Signs Last 24 Hrs  T(C): 36.9 (07 May 2020 05:42), Max: 37.2 (06 May 2020 17:10)  T(F): 98.4 (07 May 2020 05:42), Max: 99 (06 May 2020 17:10)  HR: 78 (07 May 2020 05:42) (76 - 96)  BP: 122/69 (07 May 2020 05:42) (122/69 - 158/83)  BP(mean): --  RR: 20 (07 May 2020 05:42) (18 - 20)  SpO2: 95% (07 May 2020 05:42) (94% - 97%)  CAPILLARY BLOOD GLUCOSE        I&O's Summary    06 May 2020 07:01  -  07 May 2020 07:00  --------------------------------------------------------  IN: 600 mL / OUT: 150 mL / NET: 450 mL        PHYSICAL EXAM:  HEAD:  Atraumatic, Normocephalic  NECK: Supple, No   JVD  CHEST/LUNG:   no     rales,     no,    rhonchi  HEART: Regular rate and rhythm;         murmur  ABDOMEN: Soft, Nontender, ;   EXTREMITIES:   no     edema  NEUROLOGY:   dementia    LABS:                        9.2    6.93  )-----------( 257      ( 05 May 2020 09:00 )             30.5     05-05    138  |  104  |  21  ----------------------------<  94  4.8   |  21<L>  |  0.56    Ca    9.3      05 May 2020 09:00                      Thyroid Stimulating Hormone, Serum: 3.19 uIU/mL (04-24 @ 12:11)          Consultant(s) Notes Reviewed:      Care Discussed with Consultants/Other Providers:

## 2020-05-07 NOTE — PROGRESS NOTE ADULT - ASSESSMENT
89   yr pt,   pt  with  h/o  afib,   not  on   xarelto,  due  to falls,   cva,   copd, hld,     alzheimers  dementia,    pud/  h/o left ribf xs. 8/ 9 th,. left hip surg  h/o mlple falls.  echo, normal ef/ T2  comp  deformity    sent  to  er  from   [facility], for  weakness/ sob  ct head, no cva//   ct  chest  with  goo   COVID  positive  elevated  troponin/ CRP/  Ferritin  , from Covid   pt  with  advanced  age/    comfort/   supportive  care is  ideal  however, son  wants  all  done. seen by icu  and palliative care  hypernatremia  resolved/   CNS,  in 1/2  blood   c/s,  which is  a  contaminant  palliative/ comfort care,  ought to  be goal  in this pt     on midodrine/  son   wishes  to  continue   current care   anemia,     stable     ng tube  pulled  out by pt. on 5. 2/  and  on  5/5,  reinseted/  now  out  again   son  wishes  peg/  however  declined  by  gi   per  swallow  eval, pt  not  an ideal  candidate  for  swallow trials on initial  eval/  re  eval, noted   on dysphagia  diet now/  pt at risk   for   aspiration          < from: CT Chest No Cont (04.17.20 @ 04:24) >  IMPRESSION:   Pattern of GGO suggests infection including atypical pneumonia/viral infection from atypical agents including COVID-19 (C19V-1)  < end of copied text >     < from: CT Thoracic Spine Reform No Cont (01.07.20 @ 16:43) >  IMPRESSION:  Volume loss, microvascular disease, no acute hemorrhage or midline shift. If symptoms persist consider follow up head CT or MRI contraindications.  Cervical and thoracic spine are unchanged from prior, no obvious fracture or dislocation, multilevel degenerative changes as noted previously with diffuse osteopenia and small unchanged mild superior endplate T2 compression deformity.  < end of copied text      < from: Limited Transthoracic Echo (10.26.18 @ 14:07) >  Conclusions:  Limited tranthoracic echocardiogram at patients request to  end exam.  1. Mitral annular calcification.  2. The aortic valve opens well.  3. Limited images acquired at the patients request. Based  upon the parasternal long axis view only;  grossly normal  left ventricular systolic function.  < end of copied text >

## 2020-05-07 NOTE — PROGRESS NOTE ADULT - SUBJECTIVE AND OBJECTIVE BOX
Chief Complaint:  Patient is a 89y old  Female who presents with a chief complaint of sob/ hypotension (07 May 2020 09:24)    Interval Events:   No acute overnight events  Started on Dysphagia diet    Allergies:  aspirin (Unknown)  penicillin (Unknown)    Hospital Medications:  acetaminophen   Tablet .. 650 milliGRAM(s) Oral every 6 hours PRN  artificial  tears Solution 1 Drop(s) Both EYES four times a day  BACItracin   Ophthalmic Ointment 1 Application(s) Both EYES four times a day  dextrose 5% + sodium chloride 0.45%. 1000 milliLiter(s) IV Continuous <Continuous>  guaiFENesin   Syrup  (Sugar-Free) 200 milliGRAM(s) Oral every 6 hours PRN  haloperidol     Tablet 0.25 milliGRAM(s) Oral at bedtime  heparin  Injectable 5000 Unit(s) SubCutaneous every 12 hours  melatonin 3 milliGRAM(s) Oral at bedtime  mirtazapine 7.5 milliGRAM(s) Oral at bedtime  multivitamin/minerals/iron Oral Solution (CENTRUM) 15 milliLiter(s) Enteral Tube daily  thiamine 100 milliGRAM(s) Enteral Tube daily    PMHX/PSHX:  Atrial fibrillation, unspecified type  GERD (gastroesophageal reflux disease)  COPD (chronic obstructive pulmonary disease)  GI bleed  Hyperlipemia  PUD (peptic ulcer disease)  CVA (cerebral infarction)  Glaucoma  H/O abdominal surgery    ROS:   Unable to Assess     PHYSICAL EXAM:   Vital Signs:  Vital Signs Last 24 Hrs  T(C): 36.9 (07 May 2020 05:42), Max: 37.2 (06 May 2020 17:10)  T(F): 98.4 (07 May 2020 05:42), Max: 99 (06 May 2020 17:10)  HR: 78 (07 May 2020 05:42) (76 - 96)  BP: 122/69 (07 May 2020 05:42) (122/69 - 158/83)  BP(mean): --  RR: 20 (07 May 2020 05:42) (18 - 20)  SpO2: 95% (07 May 2020 05:42) (94% - 97%)  Daily     Daily       LABS:                        9.2    6.93  )-----------( 257      ( 05 May 2020 09:00 )             30.5       Imaging:

## 2020-05-07 NOTE — PROGRESS NOTE ADULT - SUBJECTIVE AND OBJECTIVE BOX
CARDIOLOGY     PROGRESS  NOTE   ________________________________________________    CHIEF COMPLAINT:Patient is a 89y old  Female who presents with a chief complaint of sob/ hypotension (07 May 2020 08:33)  no complain.  	  REVIEW OF SYSTEMS:  CONSTITUTIONAL: No fever, weight loss, or fatigue  EYES: No eye pain, visual disturbances, or discharge  ENT:  No difficulty hearing, tinnitus, vertigo; No sinus or throat pain  NECK: No pain or stiffness  RESPIRATORY: No cough, wheezing, chills or hemoptysis; No Shortness of Breath  CARDIOVASCULAR: No chest pain, palpitations, passing out, dizziness, or leg swelling  GASTROINTESTINAL: No abdominal or epigastric pain. No nausea, vomiting, or hematemesis; No diarrhea or constipation. No melena or hematochezia.  GENITOURINARY: No dysuria, frequency, hematuria, or incontinence  NEUROLOGICAL: No headaches, memory loss, loss of strength, numbness, or tremors  SKIN: No itching, burning, rashes, or lesions   LYMPH Nodes: No enlarged glands  ENDOCRINE: No heat or cold intolerance; No hair loss  MUSCULOSKELETAL: No joint pain or swelling; No muscle, back, or extremity pain  PSYCHIATRIC: No depression, anxiety, mood swings, or difficulty sleeping  HEME/LYMPH: No easy bruising, or bleeding gums  ALLERGY AND IMMUNOLOGIC: No hives or eczema	    [ ] All others negative	  [ ] Unable to obtain    PHYSICAL EXAM:  T(C): 36.9 (05-07-20 @ 05:42), Max: 37.2 (05-06-20 @ 17:10)  HR: 78 (05-07-20 @ 05:42) (76 - 96)  BP: 122/69 (05-07-20 @ 05:42) (122/69 - 158/83)  RR: 20 (05-07-20 @ 05:42) (18 - 20)  SpO2: 95% (05-07-20 @ 05:42) (94% - 97%)  Wt(kg): --  I&O's Summary    06 May 2020 07:01  -  07 May 2020 07:00  --------------------------------------------------------  IN: 600 mL / OUT: 150 mL / NET: 450 mL    07 May 2020 07:01  -  07 May 2020 09:24  --------------------------------------------------------  IN: 150 mL / OUT: 50 mL / NET: 100 mL        Appearance: Normal	  HEENT:   Normal oral mucosa, PERRL, EOMI	  Lymphatic: No lymphadenopathy  Cardiovascular: Normal S1 S2, No JVD, + murmurs, No edema  Respiratory: Lungs clear to auscultation	  Psychiatry: A & O x 3, Mood & affect appropriate  Gastrointestinal:  Soft, Non-tender, + BS	  Skin: No rashes, No ecchymoses, No cyanosis	  Neurologic: Non-focal  Extremities: Normal range of motion, No clubbing, cyanosis or edema  Vascular: Peripheral pulses palpable 2+ bilaterally    MEDICATIONS  (STANDING):  artificial  tears Solution 1 Drop(s) Both EYES four times a day  BACItracin   Ophthalmic Ointment 1 Application(s) Both EYES four times a day  dextrose 5% + sodium chloride 0.45%. 1000 milliLiter(s) (50 mL/Hr) IV Continuous <Continuous>  haloperidol     Tablet 0.25 milliGRAM(s) Oral at bedtime  heparin  Injectable 5000 Unit(s) SubCutaneous every 12 hours  melatonin 3 milliGRAM(s) Oral at bedtime  mirtazapine 7.5 milliGRAM(s) Oral at bedtime  multivitamin/minerals/iron Oral Solution (CENTRUM) 15 milliLiter(s) Enteral Tube daily  thiamine 100 milliGRAM(s) Enteral Tube daily      TELEMETRY: 	    ECG:  	  RADIOLOGY:  OTHER: 	  	  LABS:	 	    CARDIAC MARKERS:                  proBNP: Serum Pro-Brain Natriuretic Peptide: 4277 pg/mL (04-17 @ 05:13)    Lipid Profile:   HgA1c:   TSH: Thyroid Stimulating Hormone, Serum: 3.19 uIU/mL (04-24 @ 12:11)  	  yes  if pt to be placed on oral diet	  dependent  if pt to be placed on oral diet	  allow for swallow between intakes; alternate food with liquid; check mouth frequently for oral residue/pocketing; crush medication (when feasible); maintain upright posture during/after eating for 30 mins; no straws; small sips/bites; if pt to be placed on oral diet	  Unable to make formal dietary recommendation given pt only accepted minimal amount of PO. If pt/family opt to initiate PO feeding given GI report stating pt is a poor candidate for PEG placement, would suggest considering a more conservative diet texture (e.g., Dysphagia 1 with nectar thick liquids) to reduce overall risk of aspiration.	          Assessment and plan  ---------------------------  89 year-old female with history of CVA, atrial fibrillation on Xarelto, dementia presents to the Emergency Department for bradycardia.  Patient presents from the [facility]; on vitals was found to be bradycardic to 30s-40s and sent to the ED.  Patient with decreased responsiveness and PO intake for the past few days; recently started on Azithromycin due to unknown etiology/NH paperwork w/o such information.  Tested for COVID on 4/3/20 and negative.  Patient with hypoxia at NH down to 89% on RA.  pt with sig cardiac/ medical history with increasing sob and hypotension .  pt at time is on pressors will continue, and will taper slowly  covid + will add hydroxychloroquine if pt becomes responsive  spoke to the son Mina wants full medical therapy including intubation, clinical review team does not feel aggressive therapy with intubation will change outcome  Covid + now with o2 demand on 2 L nc  continue ngt feeding  son wants all medical therapy  will consider possible peg placement, as son request  check stool guaiac awaiting   fu cbc  start dysphagia 2/ bed side swallow test failed  ?need of peg  o2 sat has improved  start gentle hydration  psych consult appreciated  no ac for A.fib sec to decrease hgb  spoke to the son he wants all medical measure, speech and swallow will consider peg  dc midodrine  pt alert and oriented today and wants to eat , sig different than yesterday  called speech and swallow to reassess her again today, so hope fully can dc pt  will speak to the son regarding son to come to feed her or dc her back to [facility]

## 2020-05-08 ENCOUNTER — TRANSCRIPTION ENCOUNTER (OUTPATIENT)
Age: 85
End: 2020-05-08

## 2020-05-08 VITALS
SYSTOLIC BLOOD PRESSURE: 131 MMHG | RESPIRATION RATE: 19 BRPM | TEMPERATURE: 98 F | DIASTOLIC BLOOD PRESSURE: 73 MMHG | HEART RATE: 75 BPM | OXYGEN SATURATION: 94 %

## 2020-05-08 PROCEDURE — 71045 X-RAY EXAM CHEST 1 VIEW: CPT

## 2020-05-08 PROCEDURE — 84132 ASSAY OF SERUM POTASSIUM: CPT

## 2020-05-08 PROCEDURE — 86901 BLOOD TYPING SEROLOGIC RH(D): CPT

## 2020-05-08 PROCEDURE — 97162 PT EVAL MOD COMPLEX 30 MIN: CPT

## 2020-05-08 PROCEDURE — 96366 THER/PROPH/DIAG IV INF ADDON: CPT | Mod: XU

## 2020-05-08 PROCEDURE — 93005 ELECTROCARDIOGRAM TRACING: CPT

## 2020-05-08 PROCEDURE — 82435 ASSAY OF BLOOD CHLORIDE: CPT

## 2020-05-08 PROCEDURE — 83605 ASSAY OF LACTIC ACID: CPT

## 2020-05-08 PROCEDURE — 84145 PROCALCITONIN (PCT): CPT

## 2020-05-08 PROCEDURE — 99232 SBSQ HOSP IP/OBS MODERATE 35: CPT | Mod: CS

## 2020-05-08 PROCEDURE — 82746 ASSAY OF FOLIC ACID SERUM: CPT

## 2020-05-08 PROCEDURE — 71250 CT THORAX DX C-: CPT

## 2020-05-08 PROCEDURE — 87150 DNA/RNA AMPLIFIED PROBE: CPT

## 2020-05-08 PROCEDURE — 86850 RBC ANTIBODY SCREEN: CPT

## 2020-05-08 PROCEDURE — 86900 BLOOD TYPING SEROLOGIC ABO: CPT

## 2020-05-08 PROCEDURE — 70450 CT HEAD/BRAIN W/O DYE: CPT

## 2020-05-08 PROCEDURE — 87635 SARS-COV-2 COVID-19 AMP PRB: CPT

## 2020-05-08 PROCEDURE — 80048 BASIC METABOLIC PNL TOTAL CA: CPT

## 2020-05-08 PROCEDURE — 80053 COMPREHEN METABOLIC PANEL: CPT

## 2020-05-08 PROCEDURE — 84100 ASSAY OF PHOSPHORUS: CPT

## 2020-05-08 PROCEDURE — 82947 ASSAY GLUCOSE BLOOD QUANT: CPT

## 2020-05-08 PROCEDURE — 81001 URINALYSIS AUTO W/SCOPE: CPT

## 2020-05-08 PROCEDURE — 85730 THROMBOPLASTIN TIME PARTIAL: CPT

## 2020-05-08 PROCEDURE — 82607 VITAMIN B-12: CPT

## 2020-05-08 PROCEDURE — 85610 PROTHROMBIN TIME: CPT

## 2020-05-08 PROCEDURE — 84484 ASSAY OF TROPONIN QUANT: CPT

## 2020-05-08 PROCEDURE — 92610 EVALUATE SWALLOWING FUNCTION: CPT

## 2020-05-08 PROCEDURE — 87040 BLOOD CULTURE FOR BACTERIA: CPT

## 2020-05-08 PROCEDURE — 83880 ASSAY OF NATRIURETIC PEPTIDE: CPT

## 2020-05-08 PROCEDURE — 99284 EMERGENCY DEPT VISIT MOD MDM: CPT | Mod: 25

## 2020-05-08 PROCEDURE — 96367 TX/PROPH/DG ADDL SEQ IV INF: CPT | Mod: XU

## 2020-05-08 PROCEDURE — 96375 TX/PRO/DX INJ NEW DRUG ADDON: CPT | Mod: XU

## 2020-05-08 PROCEDURE — 84443 ASSAY THYROID STIM HORMONE: CPT

## 2020-05-08 PROCEDURE — 96376 TX/PRO/DX INJ SAME DRUG ADON: CPT | Mod: XU

## 2020-05-08 PROCEDURE — 82962 GLUCOSE BLOOD TEST: CPT

## 2020-05-08 PROCEDURE — 83735 ASSAY OF MAGNESIUM: CPT

## 2020-05-08 PROCEDURE — 85014 HEMATOCRIT: CPT

## 2020-05-08 PROCEDURE — 51701 INSERT BLADDER CATHETER: CPT

## 2020-05-08 PROCEDURE — 82533 TOTAL CORTISOL: CPT

## 2020-05-08 PROCEDURE — 82330 ASSAY OF CALCIUM: CPT

## 2020-05-08 PROCEDURE — 82728 ASSAY OF FERRITIN: CPT

## 2020-05-08 PROCEDURE — 96365 THER/PROPH/DIAG IV INF INIT: CPT | Mod: XU

## 2020-05-08 PROCEDURE — 87086 URINE CULTURE/COLONY COUNT: CPT

## 2020-05-08 PROCEDURE — 82553 CREATINE MB FRACTION: CPT

## 2020-05-08 PROCEDURE — 82550 ASSAY OF CK (CPK): CPT

## 2020-05-08 PROCEDURE — 85384 FIBRINOGEN ACTIVITY: CPT

## 2020-05-08 PROCEDURE — 85379 FIBRIN DEGRADATION QUANT: CPT

## 2020-05-08 PROCEDURE — 83615 LACTATE (LD) (LDH) ENZYME: CPT

## 2020-05-08 PROCEDURE — 85027 COMPLETE CBC AUTOMATED: CPT

## 2020-05-08 PROCEDURE — 84295 ASSAY OF SERUM SODIUM: CPT

## 2020-05-08 PROCEDURE — 97110 THERAPEUTIC EXERCISES: CPT

## 2020-05-08 PROCEDURE — 86140 C-REACTIVE PROTEIN: CPT

## 2020-05-08 PROCEDURE — 82803 BLOOD GASES ANY COMBINATION: CPT

## 2020-05-08 RX ORDER — MULTIVIT-MIN/FERROUS GLUCONATE 9 MG/15 ML
15 LIQUID (ML) ORAL
Qty: 0 | Refills: 0 | DISCHARGE
Start: 2020-05-08

## 2020-05-08 RX ORDER — CLOTRIMAZOLE AND BETAMETHASONE DIPROPIONATE 10; .5 MG/G; MG/G
0 CREAM TOPICAL
Qty: 0 | Refills: 0 | DISCHARGE

## 2020-05-08 RX ORDER — MIRTAZAPINE 45 MG/1
1 TABLET, ORALLY DISINTEGRATING ORAL
Qty: 0 | Refills: 0 | DISCHARGE
Start: 2020-05-08

## 2020-05-08 RX ADMIN — HEPARIN SODIUM 5000 UNIT(S): 5000 INJECTION INTRAVENOUS; SUBCUTANEOUS at 05:02

## 2020-05-08 RX ADMIN — Medication 1 DROP(S): at 05:03

## 2020-05-08 NOTE — DISCHARGE NOTE PROVIDER - NSDCMRMEDTOKEN_GEN_ALL_CORE_FT
acetaminophen 325 mg oral tablet: 2 tab(s) orally every 6 hours, As needed, Mild Pain (1 - 3)  atorvastatin 20 mg oral tablet: 1 tab(s) orally once a day MDD:1  brimonidine 0.2% ophthalmic solution: 1 drop(s) to each affected eye 2 times a day  docusate sodium 10 mg/mL oral liquid: 30 milliliter(s) orally once a day (at bedtime)  Lotrisone 1%-0.05% topical cream: Apply topically to affected area , As Needed  Multiple Vitamins oral tablet: 1 tab(s) orally once a day MDD:1  timolol maleate 0.5% ophthalmic gel forming solution: 1 drop(s) to each affected eye 2 times a day  vitamin A &amp; D topical ointment: Apply topically to affected area once a day ( Right Heel)  Xarelto 15 mg oral tablet: 1 tab(s) orally once a day (in the morning) MDD:1 atorvastatin 20 mg oral tablet: 1 tab(s) orally once a day MDD:1  brimonidine 0.2% ophthalmic solution: 1 drop(s) to each affected eye 2 times a day  docusate sodium 10 mg/mL oral liquid: 30 milliliter(s) orally once a day (at bedtime)  mirtazapine 7.5 mg oral tablet: 1 tab(s) orally once a day (at bedtime)  Multiple Vitamins with Minerals oral liquid: 15 milliliter(s) orally once a day  timolol maleate 0.5% ophthalmic gel forming solution: 1 drop(s) to each affected eye 2 times a day  Xarelto 10 mg oral tablet: 1 tab(s) orally once a day

## 2020-05-08 NOTE — PROGRESS NOTE ADULT - SUBJECTIVE AND OBJECTIVE BOX
resting in bed    REVIEW OF SYSTEMS:  GEN: no fever,    no chills  RESP: no SOB,   no cough  CVS: no chest pain,   no palpitations  GI: no abdominal pain,   no nausea,   no vomiting,   no constipation,   no diarrhea  : no dysuria,   no frequency  NEURO: no headache,   no dizziness  PSYCH: no depression,   not anxious  Derm : no rash    MEDICATIONS  (STANDING):  artificial  tears Solution 1 Drop(s) Both EYES four times a day  BACItracin   Ophthalmic Ointment 1 Application(s) Both EYES four times a day  dextrose 5% + sodium chloride 0.45%. 1000 milliLiter(s) (50 mL/Hr) IV Continuous <Continuous>  haloperidol     Tablet 0.25 milliGRAM(s) Oral at bedtime  heparin  Injectable 5000 Unit(s) SubCutaneous every 12 hours  melatonin 3 milliGRAM(s) Oral at bedtime  mirtazapine 7.5 milliGRAM(s) Oral at bedtime  multivitamin/minerals/iron Oral Solution (CENTRUM) 15 milliLiter(s) Enteral Tube daily  thiamine 100 milliGRAM(s) Enteral Tube daily    MEDICATIONS  (PRN):  acetaminophen   Tablet .. 650 milliGRAM(s) Oral every 6 hours PRN Temp greater or equal to 38C (100.4F), Moderate Pain (4 - 6)  guaiFENesin   Syrup  (Sugar-Free) 200 milliGRAM(s) Oral every 6 hours PRN Cough      Vital Signs Last 24 Hrs  T(C): 36.7 (08 May 2020 05:10), Max: 36.8 (07 May 2020 22:02)  T(F): 98.1 (08 May 2020 05:10), Max: 98.2 (07 May 2020 22:02)  HR: 75 (08 May 2020 05:10) (71 - 76)  BP: 108/51 (08 May 2020 05:10) (108/51 - 123/54)  BP(mean): --  RR: 19 (08 May 2020 05:10) (19 - 20)  SpO2: 95% (08 May 2020 05:10) (92% - 95%)  CAPILLARY BLOOD GLUCOSE        I&O's Summary    06 May 2020 07:01  -  07 May 2020 07:00  --------------------------------------------------------  IN: 600 mL / OUT: 150 mL / NET: 450 mL    07 May 2020 07:01  -  08 May 2020 06:59  --------------------------------------------------------  IN: 1200 mL / OUT: 475 mL / NET: 725 mL        PHYSICAL EXAM:  HEAD:  Atraumatic, Normocephalic  NECK: Supple, No   JVD  CHEST/LUNG:   no     rales,     no,    rhonchi  HEART: Regular rate and rhythm;         murmur  ABDOMEN: Soft, Nontender, ;   EXTREMITIES:   no     edema  NEUROLOGY:  alert    LABS:                          Thyroid Stimulating Hormone, Serum: 3.19 uIU/mL (04-24 @ 12:11)          Consultant(s) Notes Reviewed:      Care Discussed with Consultants/Other Providers:

## 2020-05-08 NOTE — PROGRESS NOTE ADULT - SUBJECTIVE AND OBJECTIVE BOX
CARDIOLOGY     PROGRESS  NOTE   ________________________________________________    CHIEF COMPLAINT:Patient is a 89y old  Female who presents with a chief complaint of COVID 19 viral illness (08 May 2020 09:18)  no complain, comfortable  	  REVIEW OF SYSTEMS:  CONSTITUTIONAL: No fever, weight loss, or fatigue  EYES: No eye pain, visual disturbances, or discharge  ENT:  No difficulty hearing, tinnitus, vertigo; No sinus or throat pain  NECK: No pain or stiffness  RESPIRATORY: No cough, wheezing, chills or hemoptysis; no Shortness of Breath  CARDIOVASCULAR: No chest pain, palpitations, passing out, dizziness, or leg swelling  GASTROINTESTINAL: No abdominal or epigastric pain. No nausea, vomiting, or hematemesis; No diarrhea or constipation. No melena or hematochezia.  GENITOURINARY: No dysuria, frequency, hematuria, or incontinence  NEUROLOGICAL: No headaches, memory loss, loss of strength, numbness, or tremors  SKIN: No itching, burning, rashes, or lesions   LYMPH Nodes: No enlarged glands  ENDOCRINE: No heat or cold intolerance; No hair loss  MUSCULOSKELETAL: No joint pain or swelling; No muscle, back, or extremity pain  PSYCHIATRIC: No depression, anxiety, mood swings, or difficulty sleeping  HEME/LYMPH: No easy bruising, or bleeding gums  ALLERGY AND IMMUNOLOGIC: No hives or eczema	    [ ] All others negative	  [ ] Unable to obtain    PHYSICAL EXAM:  T(C): 36.7 (05-08-20 @ 05:10), Max: 36.8 (05-07-20 @ 22:02)  HR: 75 (05-08-20 @ 05:10) (71 - 76)  BP: 108/51 (05-08-20 @ 05:10) (108/51 - 123/54)  RR: 19 (05-08-20 @ 05:10) (19 - 20)  SpO2: 95% (05-08-20 @ 05:10) (92% - 95%)  Wt(kg): --  I&O's Summary    07 May 2020 07:01  -  08 May 2020 07:00  --------------------------------------------------------  IN: 1200 mL / OUT: 475 mL / NET: 725 mL        Appearance: Normal	  HEENT:   Normal oral mucosa, PERRL, EOMI	  Lymphatic: No lymphadenopathy  Cardiovascular: Normal S1 S2, No JVD, + murmurs, No edema  Respiratory: Lungs clear to auscultation	  Psychiatry: A & O x 3, Mood & affect appropriate  Gastrointestinal:  Soft, Non-tender, + BS	  Skin: No rashes, No ecchymoses, No cyanosis	  Neurologic: Non-focal  Extremities: Normal range of motion, No clubbing, cyanosis or edema  Vascular: Peripheral pulses palpable 2+ bilaterally    MEDICATIONS  (STANDING):  artificial  tears Solution 1 Drop(s) Both EYES four times a day  BACItracin   Ophthalmic Ointment 1 Application(s) Both EYES four times a day  dextrose 5% + sodium chloride 0.45%. 1000 milliLiter(s) (50 mL/Hr) IV Continuous <Continuous>  haloperidol     Tablet 0.25 milliGRAM(s) Oral at bedtime  heparin  Injectable 5000 Unit(s) SubCutaneous every 12 hours  melatonin 3 milliGRAM(s) Oral at bedtime  mirtazapine 7.5 milliGRAM(s) Oral at bedtime  multivitamin/minerals/iron Oral Solution (CENTRUM) 15 milliLiter(s) Enteral Tube daily  thiamine 100 milliGRAM(s) Enteral Tube daily      TELEMETRY: 	    ECG:  	  RADIOLOGY:  OTHER: 	  	  LABS:	 	    CARDIAC MARKERS:                  proBNP: Serum Pro-Brain Natriuretic Peptide: 4277 pg/mL (04-17 @ 05:13)    Lipid Profile:   HgA1c:   TSH: Thyroid Stimulating Hormone, Serum: 3.19 uIU/mL (04-24 @ 12:11)          Assessment and plan  ---------------------------    89 year-old female with history of CVA, atrial fibrillation on Xarelto, dementia presents to the Emergency Department for bradycardia.  Patient presents from the [facility]; on vitals was found to be bradycardic to 30s-40s and sent to the ED.  Patient with decreased responsiveness and PO intake for the past few days; recently started on Azithromycin due to unknown etiology/NH paperwork w/o such information.  Tested for COVID on 4/3/20 and negative.  Patient with hypoxia at NH down to 89% on RA.  pt with sig cardiac/ medical history with increasing sob and hypotension .  pt at time is on pressors will continue, and will taper slowly  covid + will add hydroxychloroquine if pt becomes responsive  spoke to the son Mina wants full medical therapy including intubation, clinical review team does not feel aggressive therapy with intubation will change outcome  pt is doing better  discussed with son agrees with dc ,will need TLC and feeding by help at [facility]

## 2020-05-08 NOTE — DISCHARGE NOTE PROVIDER - HOSPITAL COURSE
4/17 Bradycardic-50s, hypotensive SBP 70s on arrival now PAF 80s-90s s/p levo & Midodrine, maintaining MAP> 65. MICU consulted & Hospital Clinical Review Team          --patient is not candidate for ICU, DNR/DNI as per team, but not according to son  c/w Midodrine TID           CT chest GGO, 4LNC->98%, QTc 438                      PALLATIVE CALLED TO BEDSIDE FOR EVAL    transfer to 8m  at 6pm as per hospitalist  note pt at time is on pressors will continue, and will taper slowly ...ivf &midodrine...will add hydroxychloroquine if pt becomes responsive. patient will need a central line for levophed    4/18    Na 153, Started D5%1/2 ns 50 cc                 BP trending up--Levophed decreased to 0.04                 (+) bld cx 1st bottle GMP, 2nd bottle no growth                 Vanco 1 dose, seen by id--CONTAMINATED AS PER PCR    4/19	hypotensive SBP 60's. increased levophed 0.04 to 0.06mcg/kg/min. dr. Ferro notified.     4/20	still hypernatremia, changed IVF from D51/2 to D5. NGT placement, CXR ordered.  lowered levophed from 0.06 to 0.04, midodrin will be given via NGT., able to tolerate 2LPM NC    4/21	Levophed taper from 0.04 to 0.02mcg hyperkalemia. HyperNatremia resolved    	Nutrition consult placed for NGT feeding recommendation     4/22 PMPA: Levo D/C'd @00:23, as d/w Dr. Ferro            /66    4/22     pt with malnutrition; Had nutrition consult, started bolus feed(not enough pump available)    4/23     Hold off on any PEG discussions for now.     4/25: 2 L NC %.     4/26	down-trending Hgb @ 8. Monitor H&H. Pending FOBT     	Son to decide feeding tube vs. pleasure feeding tomorrow     	Midodrine taper - decreased to 10 TID         4/27: Bolus feeds increased as per Nutrition             2L 95%    4/30 96% on 2L NC     5/1/2020 remained on 2 liters nasal cannula o2 sat 98 percent, pt's hct dropped to 7.7 , occult blood sent, blood pressure stable with midirone, contine NGT feeds , peg being considered     5/2/2020 ngt tube out, bedside swallowing eval done but failed, NGT reinserted, pt will proably need a peg, needs to speak with son as per MD note, pt has b/l conjunctivits, started on bacitracin opthalmic ointment    5/2 night; pt pulled out NGT, reinserted and confirmed by radiologist    5/4	Bennett wrist restraint for safety sitll attempting to pull lines NGT     	    5/6-  S/S asked to reassess- no risk for aspiration, can try dysphagia 1 diet , GI- no PEG     5/7 dc planning to [facility] in the am on dysphagia 1 diet; sating on room air

## 2020-05-08 NOTE — PROGRESS NOTE ADULT - REASON FOR ADMISSION
sob/ hypotension

## 2020-05-08 NOTE — DISCHARGE NOTE NURSING/CASE MANAGEMENT/SOCIAL WORK - PATIENT PORTAL LINK FT
You can access the FollowMyHealth Patient Portal offered by [facility] by registering at the following website: [email or web address]. By joining [employer]’s FollowMyHealth portal, you will also be able to view your health information using other applications (apps) compatible with our system.

## 2020-05-08 NOTE — DISCHARGE NOTE PROVIDER - NSDCCPCAREPLAN_GEN_ALL_CORE_FT
PRINCIPAL DISCHARGE DIAGNOSIS  Diagnosis: Dysphagia  Assessment and Plan of Treatment: continue a dysphagia 1 nectar thick diet.  you must be fed in an upright postition by a helper to ensure you swallow appropriately   please crush pills if possible      SECONDARY DISCHARGE DIAGNOSES  Diagnosis: Atrial fibrillation  Assessment and Plan of Treatment: continue xarelto 10mg daily    Diagnosis: Alzheimer's dementia  Assessment and Plan of Treatment: please continue remeron which was started in the hospital    Diagnosis: COVID-19  Assessment and Plan of Treatment: your repeat test was negative  Stay hydrated   WEAR A FACE MASK   Cover your cough and sneezes   Clean your hands often   Avoid sharing personal house hold items   Clean all high touch surfaces- everyday items like table tops , door knobs, cell phones etc   You should restrict activities outside your home except for getting medical care   Avoid using public transportation  Do not go to work, school, or Public areas   Monitor your oxygen saturation

## 2020-05-08 NOTE — PROGRESS NOTE ADULT - PROVIDER SPECIALTY LIST ADULT
Cardiology
Gastroenterology
Infectious Disease
Internal Medicine

## 2020-05-08 NOTE — PROGRESS NOTE ADULT - ASSESSMENT
89   yr pt,   pt  with  h/o  afib,   not  on   xarelto,  due  to falls,   cva,   copd, hld,     alzheimers  dementia,    pud/  h/o left ribf xs. 8/ 9 th,. left hip surg  h/o mlple falls.  echo, normal ef/ T2  comp  deformity    sent  to  er  from   [facility], for  weakness/ sob  ct head, no cva//   ct  chest  with  goo   COVID  positive  elevated  troponin/ CRP/  Ferritin  , from Covid   pt  with  advanced  age/    comfort/   supportive  care is  ideal  however, son  wants  all  done. seen by icu  and palliative care  hypernatremia  resolved/   CNS,  in 1/2  blood   c/s,  which is  a  contaminant  palliative/ comfort care,  ought to  be goal  in this pt,  however   son  wants  to  continue  current care   anemia,     stable     ng tube  pulled  out by pt. on 5. 2/  and  on  5/5,  reinseted/  now  out  again   son  wishes  peg/  however  declined  by  gi   per  swallow  eval, pt  not  an ideal  candidate  for  swallow trials on initial  eval/  re  eval, noted   on dysphagia  diet now/  pt at risk   for   aspiration  lengthy  conversation with son held/  aware  of  options   currently,  awaiting transfer  to  n home          < from: CT Chest No Cont (04.17.20 @ 04:24) >  IMPRESSION:   Pattern of GGO suggests infection including atypical pneumonia/viral infection from atypical agents including COVID-19 (C19V-1)  < end of copied text >     < from: CT Thoracic Spine Reform No Cont (01.07.20 @ 16:43) >  IMPRESSION:  Volume loss, microvascular disease, no acute hemorrhage or midline shift. If symptoms persist consider follow up head CT or MRI contraindications.  Cervical and thoracic spine are unchanged from prior, no obvious fracture or dislocation, multilevel degenerative changes as noted previously with diffuse osteopenia and small unchanged mild superior endplate T2 compression deformity.  < end of copied text      < from: Limited Transthoracic Echo (10.26.18 @ 14:07) >  Conclusions:  Limited tranthoracic echocardiogram at patients request to  end exam.  1. Mitral annular calcification.  2. The aortic valve opens well.  3. Limited images acquired at the patients request. Based  upon the parasternal long axis view only;  grossly normal  left ventricular systolic function.  < end of copied text >

## 2020-05-14 ENCOUNTER — INPATIENT (INPATIENT)
Facility: HOSPITAL | Age: 85
LOS: 12 days | Discharge: NURSING, REHAB OR LONG-TERM CARE | DRG: 56 | End: 2020-05-27
Attending: INTERNAL MEDICINE | Admitting: INTERNAL MEDICINE
Payer: MEDICARE

## 2020-05-14 VITALS
RESPIRATION RATE: 20 BRPM | DIASTOLIC BLOOD PRESSURE: 77 MMHG | HEIGHT: 64 IN | OXYGEN SATURATION: 98 % | HEART RATE: 89 BPM | SYSTOLIC BLOOD PRESSURE: 116 MMHG | WEIGHT: 89.95 LBS

## 2020-05-14 DIAGNOSIS — E86.0 DEHYDRATION: ICD-10-CM

## 2020-05-14 LAB
ALBUMIN SERPL ELPH-MCNC: 3.8 G/DL — SIGNIFICANT CHANGE UP (ref 3.3–5)
ALP SERPL-CCNC: 112 U/L — SIGNIFICANT CHANGE UP (ref 40–120)
ALT FLD-CCNC: 24 U/L — SIGNIFICANT CHANGE UP (ref 10–45)
ANION GAP SERPL CALC-SCNC: 20 MMOL/L — HIGH (ref 5–17)
APPEARANCE UR: CLEAR — SIGNIFICANT CHANGE UP
AST SERPL-CCNC: 26 U/L — SIGNIFICANT CHANGE UP (ref 10–40)
BACTERIA # UR AUTO: NEGATIVE — SIGNIFICANT CHANGE UP
BASOPHILS # BLD AUTO: 0 K/UL — SIGNIFICANT CHANGE UP (ref 0–0.2)
BASOPHILS NFR BLD AUTO: 0 % — SIGNIFICANT CHANGE UP (ref 0–2)
BILIRUB SERPL-MCNC: 1.2 MG/DL — SIGNIFICANT CHANGE UP (ref 0.2–1.2)
BILIRUB UR-MCNC: NEGATIVE — SIGNIFICANT CHANGE UP
BUN SERPL-MCNC: 30 MG/DL — HIGH (ref 7–23)
CALCIUM SERPL-MCNC: 9.8 MG/DL — SIGNIFICANT CHANGE UP (ref 8.4–10.5)
CHLORIDE SERPL-SCNC: 107 MMOL/L — SIGNIFICANT CHANGE UP (ref 96–108)
CO2 SERPL-SCNC: 20 MMOL/L — LOW (ref 22–31)
COLOR SPEC: YELLOW — SIGNIFICANT CHANGE UP
CREAT SERPL-MCNC: 0.75 MG/DL — SIGNIFICANT CHANGE UP (ref 0.5–1.3)
DIFF PNL FLD: NEGATIVE — SIGNIFICANT CHANGE UP
EOSINOPHIL # BLD AUTO: 0 K/UL — SIGNIFICANT CHANGE UP (ref 0–0.5)
EOSINOPHIL NFR BLD AUTO: 0 % — SIGNIFICANT CHANGE UP (ref 0–6)
EPI CELLS # UR: 0 /HPF — SIGNIFICANT CHANGE UP
GLUCOSE SERPL-MCNC: 103 MG/DL — HIGH (ref 70–99)
GLUCOSE UR QL: NEGATIVE — SIGNIFICANT CHANGE UP
HCT VFR BLD CALC: 37.4 % — SIGNIFICANT CHANGE UP (ref 34.5–45)
HGB BLD-MCNC: 11.3 G/DL — LOW (ref 11.5–15.5)
HYALINE CASTS # UR AUTO: 0 /LPF — SIGNIFICANT CHANGE UP (ref 0–2)
KETONES UR-MCNC: ABNORMAL
LEUKOCYTE ESTERASE UR-ACNC: NEGATIVE — SIGNIFICANT CHANGE UP
LYMPHOCYTES # BLD AUTO: 0.98 K/UL — LOW (ref 1–3.3)
LYMPHOCYTES # BLD AUTO: 9.6 % — LOW (ref 13–44)
MCHC RBC-ENTMCNC: 30.2 GM/DL — LOW (ref 32–36)
MCHC RBC-ENTMCNC: 32.5 PG — SIGNIFICANT CHANGE UP (ref 27–34)
MCV RBC AUTO: 107.5 FL — HIGH (ref 80–100)
MONOCYTES # BLD AUTO: 0.27 K/UL — SIGNIFICANT CHANGE UP (ref 0–0.9)
MONOCYTES NFR BLD AUTO: 2.6 % — SIGNIFICANT CHANGE UP (ref 2–14)
NEUTROPHILS # BLD AUTO: 8.98 K/UL — HIGH (ref 1.8–7.4)
NEUTROPHILS NFR BLD AUTO: 87.8 % — HIGH (ref 43–77)
NITRITE UR-MCNC: NEGATIVE — SIGNIFICANT CHANGE UP
PH UR: 5.5 — SIGNIFICANT CHANGE UP (ref 5–8)
PLATELET # BLD AUTO: 266 K/UL — SIGNIFICANT CHANGE UP (ref 150–400)
POTASSIUM SERPL-MCNC: 4.2 MMOL/L — SIGNIFICANT CHANGE UP (ref 3.5–5.3)
POTASSIUM SERPL-SCNC: 4.2 MMOL/L — SIGNIFICANT CHANGE UP (ref 3.5–5.3)
PROT SERPL-MCNC: 8.2 G/DL — SIGNIFICANT CHANGE UP (ref 6–8.3)
PROT UR-MCNC: NEGATIVE — SIGNIFICANT CHANGE UP
RBC # BLD: 3.48 M/UL — LOW (ref 3.8–5.2)
RBC # FLD: 16.3 % — HIGH (ref 10.3–14.5)
RBC CASTS # UR COMP ASSIST: 1 /HPF — SIGNIFICANT CHANGE UP (ref 0–4)
SARS-COV-2 RNA SPEC QL NAA+PROBE: SIGNIFICANT CHANGE UP
SODIUM SERPL-SCNC: 147 MMOL/L — HIGH (ref 135–145)
SP GR SPEC: 1.02 — SIGNIFICANT CHANGE UP (ref 1.01–1.02)
UROBILINOGEN FLD QL: NEGATIVE — SIGNIFICANT CHANGE UP
WBC # BLD: 10.23 K/UL — SIGNIFICANT CHANGE UP (ref 3.8–10.5)
WBC # FLD AUTO: 10.23 K/UL — SIGNIFICANT CHANGE UP (ref 3.8–10.5)
WBC UR QL: 0 /HPF — SIGNIFICANT CHANGE UP (ref 0–5)

## 2020-05-14 PROCEDURE — 93010 ELECTROCARDIOGRAM REPORT: CPT | Mod: CS

## 2020-05-14 PROCEDURE — 71045 X-RAY EXAM CHEST 1 VIEW: CPT | Mod: 26

## 2020-05-14 PROCEDURE — 99285 EMERGENCY DEPT VISIT HI MDM: CPT | Mod: CS,GC

## 2020-05-14 RX ORDER — TIMOLOL 0.5 %
1 DROPS OPHTHALMIC (EYE)
Refills: 0 | Status: DISCONTINUED | OUTPATIENT
Start: 2020-05-14 | End: 2020-05-27

## 2020-05-14 RX ORDER — BRIMONIDINE TARTRATE 2 MG/MG
1 SOLUTION/ DROPS OPHTHALMIC
Refills: 0 | Status: DISCONTINUED | OUTPATIENT
Start: 2020-05-14 | End: 2020-05-27

## 2020-05-14 RX ORDER — RIVAROXABAN 15 MG-20MG
10 KIT ORAL DAILY
Refills: 0 | Status: DISCONTINUED | OUTPATIENT
Start: 2020-05-14 | End: 2020-05-16

## 2020-05-14 RX ORDER — SODIUM CHLORIDE 9 MG/ML
1000 INJECTION INTRAMUSCULAR; INTRAVENOUS; SUBCUTANEOUS
Refills: 0 | Status: DISCONTINUED | OUTPATIENT
Start: 2020-05-14 | End: 2020-05-15

## 2020-05-14 RX ORDER — ATORVASTATIN CALCIUM 80 MG/1
20 TABLET, FILM COATED ORAL AT BEDTIME
Refills: 0 | Status: DISCONTINUED | OUTPATIENT
Start: 2020-05-14 | End: 2020-05-27

## 2020-05-14 RX ORDER — SODIUM CHLORIDE 9 MG/ML
500 INJECTION INTRAMUSCULAR; INTRAVENOUS; SUBCUTANEOUS ONCE
Refills: 0 | Status: COMPLETED | OUTPATIENT
Start: 2020-05-14 | End: 2020-05-14

## 2020-05-14 RX ADMIN — Medication 1 DROP(S): at 19:01

## 2020-05-14 RX ADMIN — ATORVASTATIN CALCIUM 20 MILLIGRAM(S): 80 TABLET, FILM COATED ORAL at 21:33

## 2020-05-14 RX ADMIN — RIVAROXABAN 10 MILLIGRAM(S): KIT at 19:02

## 2020-05-14 RX ADMIN — SODIUM CHLORIDE 500 MILLILITER(S): 9 INJECTION INTRAMUSCULAR; INTRAVENOUS; SUBCUTANEOUS at 14:01

## 2020-05-14 RX ADMIN — SODIUM CHLORIDE 500 MILLILITER(S): 9 INJECTION INTRAMUSCULAR; INTRAVENOUS; SUBCUTANEOUS at 16:34

## 2020-05-14 RX ADMIN — SODIUM CHLORIDE 50 MILLILITER(S): 9 INJECTION INTRAMUSCULAR; INTRAVENOUS; SUBCUTANEOUS at 21:33

## 2020-05-14 RX ADMIN — BRIMONIDINE TARTRATE 1 DROP(S): 2 SOLUTION/ DROPS OPHTHALMIC at 19:01

## 2020-05-14 NOTE — ED ADULT NURSE NOTE - NSIMPLEMENTINTERV_GEN_ALL_ED
Implemented All Fall with Harm Risk Interventions:  [place] to call system. Call bell, personal items and telephone within reach. Instruct patient to call for assistance. Room bathroom lighting operational. Non-slip footwear when patient is off stretcher. Physically safe environment: no spills, clutter or unnecessary equipment. Stretcher in lowest position, wheels locked, appropriate side rails in place. Provide visual cue, wrist band, yellow gown, etc. Monitor gait and stability. Monitor for mental status changes and reorient to person, place, and time. Review medications for side effects contributing to fall risk. Reinforce activity limits and safety measures with patient and family. Provide visual clues: red socks.

## 2020-05-14 NOTE — H&P ADULT - ASSESSMENT
89 y/o female with hx of dementia, CAD, CVA, COPD, afib, and recent extensive stay for COVID requiring pressor support presents to the ED with decreased PO intake. Patient discharge to the [facility] on 4/8 and was tolerating spoon feeds at that time, per son has declining mental status but was able to say his name a few days ago. Now over past few days has not been tolerating spoon feeds and is refusing to eat, also has been difficult to arouse  no chest pain / sob.  pt with failure to thrive  dc mirtazepine ?cause of sleepiness  assist with meals   will consider PEG placement  a.fib ,hr controlled, continue ac  pt is not DNR

## 2020-05-14 NOTE — H&P ADULT - HISTORY OF PRESENT ILLNESS
CHIEF COMPLAINT:Patient is a 89y old  Female who presents with a chief complaint of failur to thrive.    HPI:  89 y/o female with hx of dementia, CAD, CVA, COPD, afib, and recent extensive stay for COVID requiring pressor support presents to the ED with decreased PO intake. Patient discharge to the [facility] on 4/8 and was tolerating spoon feeds at that time, per son has declining mental status but was able to say his name a few days ago. Now over past few days has not been tolerating spoon feeds and is refusing to eat, also has been difficult to arouse  no chest mary/ sob.    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      MEDICATIONS  (STANDING):  as order    MEDICATIONS  (PRN):      FAMILY HISTORY:  No pertinent family history in first degree relatives      SOCIAL HISTORY:    [ ] Non-smoker  [ ] Smoker  [ ] Alcohol    Allergies    aspirin (Unknown)  penicillin (Unknown)    Intolerances    	    REVIEW OF SYSTEMS:  CONSTITUTIONAL: No fever, weight loss, or fatigue  EYES: No eye pain, visual disturbances, or discharge  ENT:  No difficulty hearing, tinnitus, vertigo; No sinus or throat pain  NECK: No pain or stiffness  RESPIRATORY: No cough, wheezing, chills or hemoptysis; No Shortness of Breath  CARDIOVASCULAR: No chest pain, palpitations, passing out, dizziness, or leg swelling  GASTROINTESTINAL: No abdominal or epigastric pain. No nausea, vomiting, or hematemesis; No diarrhea or constipation. No melena or hematochezia.  GENITOURINARY: No dysuria, frequency, hematuria, or incontinence  NEUROLOGICAL: No headaches, memory loss, loss of strength, numbness, or tremors  SKIN: No itching, burning, rashes, or lesions   LYMPH Nodes: No enlarged glands  ENDOCRINE: No heat or cold intolerance; No hair loss  MUSCULOSKELETAL: No joint pain or swelling; No muscle, back, or extremity pain  PSYCHIATRIC: No depression, anxiety, mood swings, or difficulty sleeping  HEME/LYMPH: No easy bruising, or bleeding gums  ALLERGY AND IMMUNOLOGIC: No hives or eczema	    [ ] All others negative	  [x ] Unable to obtain    PHYSICAL EXAM:  T(C): 37.1 (05-14-20 @ 16:48), Max: 37.1 (05-14-20 @ 16:48)  HR: 79 (05-14-20 @ 16:48) (79 - 89)  BP: 118/79 (05-14-20 @ 16:48) (116/77 - 120/76)  RR: 18 (05-14-20 @ 16:48) (18 - 20)  SpO2: 98% (05-14-20 @ 16:48) (98% - 98%)  Wt(kg): --  I&O's Summary      Appearance: Normal	  HEENT:   Normal oral mucosa, PERRL, EOMI	  Lymphatic: No lymphadenopathy  Cardiovascular: Normal S1 S2, No JVD, + murmurs, No edema  Respiratory: Lungs clear to auscultation	  Psychiatry: arouseable  Gastrointestinal:  Soft, Non-tender, + BS	  Skin: No rashes, No ecchymoses, No cyanosis	  Neurologic: Non-focal  Extremities: Normal range of motion, No clubbing, cyanosis or edema  Vascular: Peripheral pulses palpable 2+ bilaterally    TELEMETRY: 	    ECG:  	  RADIOLOGY:  OTHER: 	  	  LABS:	 	    CARDIAC MARKERS:                              11.3   10.23 )-----------( 266      ( 14 May 2020 13:42 )             37.4     05-14    147<H>  |  107  |  30<H>  ----------------------------<  103<H>  4.2   |  20<L>  |  0.75    Ca    9.8      14 May 2020 13:41  Phos  3.7     05-14  Mg     2.2     05-14    TPro  8.2  /  Alb  3.8  /  TBili  1.2  /  DBili  x   /  AST  26  /  ALT  24  /  AlkPhos  112  05-14    proBNP:   Lipid Profile:   HgA1c:   TSH:       PREVIOUS DIAGNOSTIC TESTING:    < from: 12 Lead ECG (05.03.20 @ 17:23) >  Ventricular Rate 68 BPM    Atrial Rate 68 BPM    P-R Interval 134 ms    QRS Duration 74 ms    Q-T Interval 464 ms    QTC Calculation(Bezet) 493 ms    P Axis 68 degrees    R Axis -12 degrees    T Axis 42 degrees    Diagnosis Line NORMAL SINUS RHYTHM  SEPTAL INFARCT , AGE UNDETERMINED    < from: Limited Transthoracic Echo (10.26.18 @ 14:07) >  Mitral Valve: Mitral annular calcification.  Aortic Valve/Aorta: The aortic valve opens well.  Left Ventricle: Limited images acquired at the patients  request. Based upon the parasternal long axis view only;  grossly normal left ventricular systolic function.  Right Heart: The right ventricle is not well visualized.  Pericardium/Pleura: Based upon the parasternal long axis  single view; normal pericardium with no pericardial  effusion.  ------------------------------------------------------------------------  Conclusions:  Limited tranthoracic echocardiogram at patients request to  end exam.  1. Mitral annular calcification.  2. The aortic valve opens well.  3. Limited images acquired at the patients request. Based  upon the parasternal long axis view only;  grossly normal  left ventricular systolic function.  < from: Xray Chest 1 View- PORTABLE-Urgent (05.14.20 @ 14:39) >  Examination: XR CHEST URGENT    History: Decreased p.o. intake.    Comparison: 5/5/2020    Findings:  The cardiac silhouette is normal in size. There are no focal consolidations or pleural effusions. The hilar and mediastinal structures appear unremarkable.    Impression:  1.  Clear lungs.    < end of copied text >

## 2020-05-14 NOTE — ED PROVIDER NOTE - PHYSICAL EXAMINATION
GENERAL: Arousable to painful stimuli, NAD  HEENT: NC/AT, dry mucus membranes, PEERL  LUNGS: CTAB, no wheezes or crackles   CARDIAC: RRR, no m/r/g  ABDOMEN: Soft, normal BS, non tender, non distended, no rebound, no guarding  EXT: No edema, no calf tenderness, 2+ DP pulses bilaterally, no deformities.  NEURO: A&Ox0. Responds to painful stimuli.   SKIN: Warm and dry. No rash.  PSYCH: Normal affect.

## 2020-05-14 NOTE — ED ADULT NURSE NOTE - OBJECTIVE STATEMENT
Pt w/ hx CAD, CVA, Dementia, not speaking on exam, pt responds to painful stimuli with moaning. Pt has no facial grimacing indictaive of pain. Pt skin intact, IV initiated to left forearm, labs drawn and sent. Pt assessed by MD and nurse; awaiting med orders. Pt fr assissted living, they say pt has poor PO intake since xfriday; pt has no taste and smell reported by assisted living facility

## 2020-05-14 NOTE — ED PROVIDER NOTE - OBJECTIVE STATEMENT
89 y/o female with hx of dementia, CAD, CVA, COPD, afib, and recent extensive stay for COVID requiring pressor support presents to the ED with decreased PO intake. Patient discharge to the [facility] on 4/8 and was tolerating spoon feeds at that time, per son has declining mental status but was able to say his name a few days ago. Now over past few days has not been tolerating spoon feeds and is refusing to eat, also has been difficult to arouse.

## 2020-05-14 NOTE — ED PROVIDER NOTE - CARE PLAN
Principal Discharge DX:	Dehydration  Secondary Diagnosis:	Failure to thrive in adult  Secondary Diagnosis:	Dementia with behavioral disturbance, unspecified dementia type

## 2020-05-15 DIAGNOSIS — R62.7 ADULT FAILURE TO THRIVE: ICD-10-CM

## 2020-05-15 LAB
ANION GAP SERPL CALC-SCNC: 18 MMOL/L — HIGH (ref 5–17)
BUN SERPL-MCNC: 28 MG/DL — HIGH (ref 7–23)
CALCIUM SERPL-MCNC: 9.4 MG/DL — SIGNIFICANT CHANGE UP (ref 8.4–10.5)
CHLORIDE SERPL-SCNC: 112 MMOL/L — HIGH (ref 96–108)
CO2 SERPL-SCNC: 21 MMOL/L — LOW (ref 22–31)
CREAT SERPL-MCNC: 0.64 MG/DL — SIGNIFICANT CHANGE UP (ref 0.5–1.3)
GLUCOSE SERPL-MCNC: 77 MG/DL — SIGNIFICANT CHANGE UP (ref 70–99)
HCT VFR BLD CALC: 32.4 % — LOW (ref 34.5–45)
HGB BLD-MCNC: 9.8 G/DL — LOW (ref 11.5–15.5)
MCHC RBC-ENTMCNC: 30.2 GM/DL — LOW (ref 32–36)
MCHC RBC-ENTMCNC: 32.7 PG — SIGNIFICANT CHANGE UP (ref 27–34)
MCV RBC AUTO: 108 FL — HIGH (ref 80–100)
NRBC # BLD: 0 /100 WBCS — SIGNIFICANT CHANGE UP (ref 0–0)
PLATELET # BLD AUTO: 224 K/UL — SIGNIFICANT CHANGE UP (ref 150–400)
POTASSIUM SERPL-MCNC: 4 MMOL/L — SIGNIFICANT CHANGE UP (ref 3.5–5.3)
POTASSIUM SERPL-SCNC: 4 MMOL/L — SIGNIFICANT CHANGE UP (ref 3.5–5.3)
RBC # BLD: 3 M/UL — LOW (ref 3.8–5.2)
RBC # FLD: 16.2 % — HIGH (ref 10.3–14.5)
SARS-COV-2 RNA SPEC QL NAA+PROBE: SIGNIFICANT CHANGE UP
SODIUM SERPL-SCNC: 151 MMOL/L — HIGH (ref 135–145)
WBC # BLD: 7.69 K/UL — SIGNIFICANT CHANGE UP (ref 3.8–10.5)
WBC # FLD AUTO: 7.69 K/UL — SIGNIFICANT CHANGE UP (ref 3.8–10.5)

## 2020-05-15 PROCEDURE — 99232 SBSQ HOSP IP/OBS MODERATE 35: CPT

## 2020-05-15 RX ORDER — SODIUM CHLORIDE 9 MG/ML
1000 INJECTION, SOLUTION INTRAVENOUS
Refills: 0 | Status: DISCONTINUED | OUTPATIENT
Start: 2020-05-15 | End: 2020-05-16

## 2020-05-15 RX ORDER — SODIUM CHLORIDE 9 MG/ML
1000 INJECTION, SOLUTION INTRAVENOUS
Refills: 0 | Status: DISCONTINUED | OUTPATIENT
Start: 2020-05-15 | End: 2020-05-15

## 2020-05-15 RX ADMIN — ATORVASTATIN CALCIUM 20 MILLIGRAM(S): 80 TABLET, FILM COATED ORAL at 21:44

## 2020-05-15 RX ADMIN — BRIMONIDINE TARTRATE 1 DROP(S): 2 SOLUTION/ DROPS OPHTHALMIC at 05:41

## 2020-05-15 RX ADMIN — SODIUM CHLORIDE 50 MILLILITER(S): 9 INJECTION, SOLUTION INTRAVENOUS at 11:36

## 2020-05-15 RX ADMIN — RIVAROXABAN 10 MILLIGRAM(S): KIT at 13:12

## 2020-05-15 RX ADMIN — Medication 1 DROP(S): at 05:40

## 2020-05-15 RX ADMIN — SODIUM CHLORIDE 70 MILLILITER(S): 9 INJECTION, SOLUTION INTRAVENOUS at 22:47

## 2020-05-15 RX ADMIN — Medication 1 DROP(S): at 17:05

## 2020-05-15 RX ADMIN — BRIMONIDINE TARTRATE 1 DROP(S): 2 SOLUTION/ DROPS OPHTHALMIC at 17:04

## 2020-05-15 NOTE — CONSULT NOTE ADULT - ASSESSMENT
89 y/o female with hx of dementia, CAD, CVA, COPD, afib, and recent extensive stay for COVID requiring pressor support presents to the ED with decreased PO intake. GI consulted for PEG evaluation

## 2020-05-15 NOTE — CONSULT NOTE ADULT - ASSESSMENT
89   yr pt,   pt  with  h/o  afib,   not  on   xarelto,  due  to falls,   cva,   copd, hld,     alzheimers  dementia,    pud/  h/o left ribf xs. 8/ 9 th,. left hip surg  h/o mlple falls.  echo, normal ef/ T2  comp  deformity, ct head, no cva//   ct  chest  with  goo, on last  visit    pt  was COVID  positive,  on 4/17/20,  had  elevated  troponin/ CRP/  Ferritin  , from Covid   pt  with  advanced  age/    comfort/   supportive  care is  ideal  however, son  wants  all  done.       sent  to  er  from   [facility], for  weakness/ not  eating/FTT  palliative/ comfort care,  ought to  be goal  in this pt,  however   son  wants  to  continue  current care   was  seen  by swallow  eval  on last visit/ oral feeds  at high risk  for  aspiration  now  with hypernatremia/  dehydration   on iv  fluids  Goc, to  be  visited  again,  given  advanced  age  of  pt/ Alzheimers  dementia       < from: CT Chest No Cont (04.17.20 @ 04:24) >  IMPRESSION:   Pattern of GGO suggests infection including atypical pneumonia/viral infection from atypical agents including COVID-19 (C19V-1)  < end of copied text >     < from: CT Thoracic Spine Reform No Cont (01.07.20 @ 16:43) >  IMPRESSION:  Volume loss, microvascular disease, no acute hemorrhage or midline shift. If symptoms persist consider follow up head CT or MRI contraindications.  Cervical and thoracic spine are unchanged from prior, no obvious fracture or dislocation, multilevel degenerative changes as noted previously with diffuse osteopenia and small unchanged mild superior endplate T2 compression deformity.  < end of copied text      < from: Limited Transthoracic Echo (10.26.18 @ 14:07) >  Conclusions:  Limited tranthoracic echocardiogram at patients request to  end exam.  1. Mitral annular calcification.  2. The aortic valve opens well.  3. Limited images acquired at the patients request. Based  upon the parasternal long axis view only;  grossly normal  left ventricular systolic function.  < end of copied text >

## 2020-05-15 NOTE — CONSULT NOTE ADULT - SUBJECTIVE AND OBJECTIVE BOX
presents with a chief complaint of failur to thrive.    HPI:  89 y/o female with hx of dementia, CAD, CVA, COPD, afib, and recent extensive stay for COVID requiring pressor support presents to the ED with decreased PO intake. Patient was  discharged  to the [facility] on  and was tolerating spoon feeds at that time, per son has declining mental status but was able to say his name a few days ago.   Now over past few days has not been tolerating spoon feeds and is refusing to eat, also has been difficult to arouse  no chest mary/ sob.    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      MEDICATIONS  (STANDING):  as order    MEDICATIONS  (PRN):      FAMILY HISTORY:  No pertinent family history in first degree relatives      SOCIAL HISTORY:    [ ] Non-smoker  [ ] Smoker  [ ] Alcohol    Allergies    aspirin (Unknown)  penicillin (Unknown)    Intolerances    	    REVIEW OF SYSTEMS:  CONSTITUTIONAL: No fever, weight loss, or fatigue  EYES: No eye pain, visual disturbances, or discharge  ENT:  No difficulty hearing, tinnitus, vertigo; No sinus or throat pain  NECK: No pain or stiffness  RESPIRATORY: No cough, wheezing, chills or hemoptysis; No Shortness of Breath  CARDIOVASCULAR: No chest pain, palpitations, passing out, dizziness, or leg swelling  GASTROINTESTINAL: No abdominal or epigastric pain. No nausea, vomiting, or hematemesis; No diarrhea or constipation. No melena or hematochezia.  GENITOURINARY: No dysuria, frequency, hematuria, or incontinence  NEUROLOGICAL: No headaches, memory loss, loss of strength, numbness, or tremors  SKIN: No itching, burning, rashes, or lesions   LYMPH Nodes: No enlarged glands  ENDOCRINE: No heat or cold intolerance; No hair loss  MUSCULOSKELETAL: No joint pain or swelling; No muscle, back, or extremity pain  PSYCHIATRIC: No depression, anxiety, mood swings, or difficulty sleeping  HEME/LYMPH: No easy bruising, or bleeding gums  ALLERGY AND IMMUNOLOGIC: No hives or eczema	    [ ] All others negative	  [x ] Unable to obtain    PHYSICAL EXAM:  T(C): 37.1 (20 @ 16:48), Max: 37.1 (20 @ 16:48)  HR: 79 (20 @ 16:48) (79 - 89)  BP: 118/79 (20 @ 16:48) (116/77 - 120/76)  RR: 18 (20 @ 16:48) (18 - 20)  SpO2: 98% (20 @ 16:48) (98% - 98%)  Wt(kg): --  I&O's Summary      Appearance: Normal	  HEENT:   Normal oral mucosa, PERRL, EOMI	  Lymphatic: No lymphadenopathy  Cardiovascular: Normal S1 S2, No JVD, + murmurs, No edema  Respiratory: Lungs clear to auscultation	  Psychiatry: arouseable  Gastrointestinal:  Soft, Non-tender, + BS	  Skin: No rashes, No ecchymoses, No cyanosis	  Neurologic: Non-focal  Extremities: Normal range of motion, No clubbing, cyanosis or edema  Vascular: Peripheral pulses palpable 2+ bilaterally    TELEMETRY: 	    ECG:  	  RADIOLOGY:  OTHER: 	  	  LABS:	 	    CARDIAC MARKERS:                              11.3   10.23 )-----------( 266      ( 14 May 2020 13:42 )             37.4     -    147<H>  |  107  |  30<H>  ----------------------------<  103<H>  4.2   |  20<L>  |  0.75    Ca    9.8      14 May 2020 13:41  Phos  3.7       Mg     2.2         TPro  8.2  /  Alb  3.8  /  TBili  1.2  /  DBili  x   /  AST  26  /  ALT  24  /  AlkPhos  112  -    proBNP:   Lipid Profile:   HgA1c:   TSH:       PREVIOUS DIAGNOSTIC TESTING:    < from: 12 Lead ECG (20 @ 17:23) >  Ventricular Rate 68 BPM    Atrial Rate 68 BPM    P-R Interval 134 ms    QRS Duration 74 ms    Q-T Interval 464 ms    QTC Calculation(Bezet) 493 ms    P Axis 68 degrees    R Axis -12 degrees    T Axis 42 degrees    Diagnosis Line NORMAL SINUS RHYTHM  SEPTAL INFARCT , AGE UNDETERMINED    < from: Limited Transthoracic Echo (10.26.18 @ 14:07) >  Mitral Valve: Mitral annular calcification.  Aortic Valve/Aorta: The aortic valve opens well.  Left Ventricle: Limited images acquired at the patients  request. Based upon the parasternal long axis view only;  grossly normal left ventricular systolic function.  Right Heart: The right ventricle is not well visualized.  Pericardium/Pleura: Based upon the parasternal long axis  single view; normal pericardium with no pericardial  effusion.  ------------------------------------------------------------------------  Conclusions:  Limited tranthoracic echocardiogram at patients request to  end exam.  1. Mitral annular calcification.  2. The aortic valve opens well.  3. Limited images acquired at the patients request. Based  upon the parasternal long axis view only;  grossly normal  left ventricular systolic function.  < from: Xray Chest 1 View- PORTABLE-Urgent (20 @ 14:39) >  Examination: XR CHEST URGENT    History: Decreased p.o. intake.    Comparison: 2020    Findings:  The cardiac silhouette is normal in size. There are no focal consolidations or pleural effusions. The hilar and mediastinal structures appear unremarkable.    Impression:  1.  Clear lungs.    < end of copied text > (14 May 2020 17:06)    CAPILLARY BLOOD GLUCOSE        I&O's Summary    14 May 2020 07:01  -  15 May 2020 07:00  --------------------------------------------------------  IN: 550 mL / OUT: 1 mL / NET: 549 mL        Vital Signs Last 24 Hrs  T(C): 36.4 (15 May 2020 05:39), Max: 37.1 (14 May 2020 16:48)  T(F): 97.5 (15 May 2020 05:39), Max: 98.7 (14 May 2020 16:48)  HR: 78 (15 May 2020 05:39) (78 - 96)  BP: 135/82 (15 May 2020 05:39) (100/51 - 135/82)  BP(mean): --  RR: 18 (15 May 2020 05:39) (18 - 20)  SMEDICATIONS  (STANDING):  atorvastatin 20 milliGRAM(s) Oral at bedtime  brimonidine 0.2% Ophthalmic Solution 1 Drop(s) Both EYES two times a day  rivaroxaban 10 milliGRAM(s) Oral daily  sodium chloride 0.9%. 1000 milliLiter(s) (50 mL/Hr) IV Continuous <Continuous>  timolol 0.5% Solution 1 Drop(s) Both EYES two times a day    MEDICATIONS  (PRN):    LABS:                        11.3   10.23 )-----------( 266      ( 14 May 2020 13:42 )             37.4     05-15    151<H>  |  112<H>  |  28<H>  ----------------------------<  77  4.0   |  21<L>  |  0.64    Ca    9.4      15 May 2020 06:42  Phos  3.7     -  Mg     2.2         TPro  8.2  /  Alb  3.8  /  TBili  1.2  /  DBili  x   /  AST  26  /  ALT  24  /  AlkPhos  112  05-14          Urinalysis Basic - ( 14 May 2020 13:58 )    Color: Yellow / Appearance: Clear / S.018 / pH: x  Gluc: x / Ketone: Small  / Bili: Negative / Urobili: Negative   Blood: x / Protein: Negative / Nitrite: Negative   Leuk Esterase: Negative / RBC: 1 /hpf / WBC 0 /HPF   Sq Epi: x / Non Sq Epi: 0 /hpf / Bacteria: Negative              Thyroid Stimulating Hormone, Serum: 3.19 uIU/mL ( @ 12:11)          Consultant(s) Notes Reviewed:      Care Discussed with Consultants/Other Providers:  Plan:

## 2020-05-15 NOTE — CONSULT NOTE ADULT - SUBJECTIVE AND OBJECTIVE BOX
Chief Complaint:  Patient is a 89y old  Female who presents with a chief complaint of failure to thrive/ a.fib (15 May 2020 09:37)      HPI: 89 y/o female with hx of dementia, CAD, CVA, COPD, afib, and recent extensive stay for COVID requiring pressor support presents to the ED with decreased PO intake. Patient discharge to the [facility] on  and was tolerating spoon feeds at that time, per son has declining mental status but was able to say his name a few days ago. Now over past few days has not been tolerating spoon feeds and is refusing to eat, also has been difficult to arouse.    GI consulted for PEG eval     Allergies:  aspirin (Unknown)  penicillin (Unknown)      Medications:  atorvastatin 20 milliGRAM(s) Oral at bedtime  brimonidine 0.2% Ophthalmic Solution 1 Drop(s) Both EYES two times a day  dextrose 5%. 1000 milliLiter(s) IV Continuous <Continuous>  rivaroxaban 10 milliGRAM(s) Oral daily  timolol 0.5% Solution 1 Drop(s) Both EYES two times a day      PMHX/PSHX:  Atrial fibrillation, unspecified type  GERD (gastroesophageal reflux disease)  COPD (chronic obstructive pulmonary disease)  GI bleed  Hyperlipemia  PUD (peptic ulcer disease)  CVA (cerebral infarction)  Glaucoma  H/O abdominal surgery      Family history:  No pertinent family history in first degree relatives      Social History:     ROS: Defer         PHYSICAL EXAM: Defer     Vital Signs:  Vital Signs Last 24 Hrs  T(C): 36.8 (15 May 2020 11:23), Max: 37.1 (14 May 2020 16:48)  T(F): 98.2 (15 May 2020 11:23), Max: 98.7 (14 May 2020 16:48)  HR: 50 (15 May 2020 11:23) (50 - 96)  BP: 92/48 (15 May 2020 11:23) (92/48 - 135/82)  BP(mean): --  RR: 18 (15 May 2020 11:23) (18 - 18)  SpO2: 97% (15 May 2020 11:23) (95% - 98%)  Daily     Daily       LABS:                        11.3   10.23 )-----------( 266      ( 14 May 2020 13:42 )             37.4     05-15    151<H>  |  112<H>  |  28<H>  ----------------------------<  77  4.0   |  21<L>  |  0.64    Ca    9.4      15 May 2020 06:42  Phos  3.7     -  Mg     2.2     -    TPro  8.2  /  Alb  3.8  /  TBili  1.2  /  DBili  x   /  AST  26  /  ALT  24  /  AlkPhos  112  -    LIVER FUNCTIONS - ( 14 May 2020 13:41 )  Alb: 3.8 g/dL / Pro: 8.2 g/dL / ALK PHOS: 112 U/L / ALT: 24 U/L / AST: 26 U/L / GGT: x             Urinalysis Basic - ( 14 May 2020 13:58 )    Color: Yellow / Appearance: Clear / S.018 / pH: x  Gluc: x / Ketone: Small  / Bili: Negative / Urobili: Negative   Blood: x / Protein: Negative / Nitrite: Negative   Leuk Esterase: Negative / RBC: 1 /hpf / WBC 0 /HPF   Sq Epi: x / Non Sq Epi: 0 /hpf / Bacteria: Negative          Imaging:

## 2020-05-15 NOTE — CHART NOTE - NSCHARTNOTEFT_GEN_A_CORE
I called Dr Ferro to discuss , bp 90s/60s, pulse 50. Pt's son also requesting a psych. consult.   As per Dr Ferro D5W can be increased to 70cc/ hr.   LL

## 2020-05-15 NOTE — CONSULT NOTE ADULT - PROBLEM SELECTOR RECOMMENDATION 9
- On most recent admission, patient was seen by SLP for speech and swallow evaluation on 05/06. Patient was unable to participate in PO trials, and was subsequently started on dysphagia diet despite high risk of aspiration as PEG placement was not being considered by house GI given patient's history of frequently pulling out NG tube despite mittens and restraints.   - supportive care/ pleasure feeds would be the most ideal plan given patient's age and co-morbidities, however based on chart, son would like full work up.   - will tentatively plan for PEG on Monday 05/18 pending discussion with son   - continue current diet  - aspiration precautions  - will follow

## 2020-05-15 NOTE — PROGRESS NOTE ADULT - SUBJECTIVE AND OBJECTIVE BOX
CARDIOLOGY     PROGRESS  NOTE   ________________________________________________    CHIEF COMPLAINT:Patient is a 89y old  Female who presents with a chief complaint of failure to thrive/ a.fib (15 May 2020 08:32)  still sleeping  	  REVIEW OF SYSTEMS:  CONSTITUTIONAL: No fever, weight loss, or fatigue  EYES: No eye pain, visual disturbances, or discharge  ENT:  No difficulty hearing, tinnitus, vertigo; No sinus or throat pain  NECK: No pain or stiffness  RESPIRATORY: No cough, wheezing, chills or hemoptysis; No Shortness of Breath  CARDIOVASCULAR: No chest pain, palpitations, passing out, dizziness, or leg swelling  GASTROINTESTINAL: No abdominal or epigastric pain. No nausea, vomiting, or hematemesis; No diarrhea or constipation. No melena or hematochezia.  GENITOURINARY: No dysuria, frequency, hematuria, or incontinence  NEUROLOGICAL: No headaches, memory loss, loss of strength, numbness, or tremors  SKIN: No itching, burning, rashes, or lesions   LYMPH Nodes: No enlarged glands  ENDOCRINE: No heat or cold intolerance; No hair loss  MUSCULOSKELETAL: No joint pain or swelling; No muscle, back, or extremity pain  PSYCHIATRIC: No depression, anxiety, mood swings, or difficulty sleeping  HEME/LYMPH: No easy bruising, or bleeding gums  ALLERGY AND IMMUNOLOGIC: No hives or eczema	    [ ] All others negative	  [ x] Unable to obtain    PHYSICAL EXAM:  T(C): 36.4 (05-15-20 @ 05:39), Max: 37.1 (05-14-20 @ 16:48)  HR: 78 (05-15-20 @ 05:39) (78 - 96)  BP: 135/82 (05-15-20 @ 05:39) (100/51 - 135/82)  RR: 18 (05-15-20 @ 05:39) (18 - 20)  SpO2: 98% (05-15-20 @ 05:39) (95% - 98%)  Wt(kg): --  I&O's Summary    14 May 2020 07:01  -  15 May 2020 07:00  --------------------------------------------------------  IN: 550 mL / OUT: 1 mL / NET: 549 mL        Appearance: Normal	  HEENT:   Normal oral mucosa, PERRL, EOMI	  Lymphatic: No lymphadenopathy  Cardiovascular: Normal S1 S2, No JVD, + murmurs, No edema  Respiratory: Lungs clear to auscultation	  Psychiatry: A & O x 3, Mood & affect appropriate  Gastrointestinal:  Soft, Non-tender, + BS	  Skin: No rashes, No ecchymoses, No cyanosis	  Neurologic: Non-focal  Extremities: Normal range of motion, No clubbing, cyanosis or edema  Vascular: Peripheral pulses palpable 2+ bilaterally    MEDICATIONS  (STANDING):  atorvastatin 20 milliGRAM(s) Oral at bedtime  brimonidine 0.2% Ophthalmic Solution 1 Drop(s) Both EYES two times a day  dextrose 5%. 1000 milliLiter(s) (50 mL/Hr) IV Continuous <Continuous>  rivaroxaban 10 milliGRAM(s) Oral daily  timolol 0.5% Solution 1 Drop(s) Both EYES two times a day      TELEMETRY: 	    ECG:  	  RADIOLOGY:  OTHER: 	  	  LABS:	 	    CARDIAC MARKERS:                                11.3   10.23 )-----------( 266      ( 14 May 2020 13:42 )             37.4     05-15    151<H>  |  112<H>  |  28<H>  ----------------------------<  77  4.0   |  21<L>  |  0.64    Ca    9.4      15 May 2020 06:42  Phos  3.7     05-14  Mg     2.2     05-14    TPro  8.2  /  Alb  3.8  /  TBili  1.2  /  DBili  x   /  AST  26  /  ALT  24  /  AlkPhos  112  05-14    proBNP: Serum Pro-Brain Natriuretic Peptide: 4277 pg/mL (04-17 @ 05:13)    Lipid Profile:   HgA1c:   TSH: Thyroid Stimulating Hormone, Serum: 3.19 uIU/mL (04-24 @ 12:11)    COVID-19 PCR . (05.14.20 @ 13:51)    COVID-19 PCR: NotDetec: This test has been validated by [facility] to be accurate;  though it has not been FDA cleared/approved by the usual pathway.  As with all laboratory tests, results should be correlated with clinical  findings.  https://www.fda.gov/media/701147/download  https://www.fda.gov/media/645025/download          Assessment and plan  ---------------------------  89 y/o female with hx of dementia, CAD, CVA, COPD, afib, and recent extensive stay for COVID requiring pressor support presents to the ED with decreased PO intake. Patient discharge to the [facility] on 4/8 and was tolerating spoon feeds at that time, per son has declining mental status but was able to say his name a few days ago. Now over past few days has not been tolerating spoon feeds and is refusing to eat, also has been difficult to arouse  no chest pain / sob.  pt with failure to thrive  dc mirtazepine ?cause of sleepiness  assist with meals   will consider PEG placement Dr cazares called  a.fib ,hr controlled, continue ac  pt is not DNR

## 2020-05-16 LAB
ANION GAP SERPL CALC-SCNC: 11 MMOL/L — SIGNIFICANT CHANGE UP (ref 5–17)
BUN SERPL-MCNC: 22 MG/DL — SIGNIFICANT CHANGE UP (ref 7–23)
CALCIUM SERPL-MCNC: 8.9 MG/DL — SIGNIFICANT CHANGE UP (ref 8.4–10.5)
CHLORIDE SERPL-SCNC: 109 MMOL/L — HIGH (ref 96–108)
CO2 SERPL-SCNC: 22 MMOL/L — SIGNIFICANT CHANGE UP (ref 22–31)
CREAT SERPL-MCNC: 0.66 MG/DL — SIGNIFICANT CHANGE UP (ref 0.5–1.3)
GLUCOSE SERPL-MCNC: 103 MG/DL — HIGH (ref 70–99)
POTASSIUM SERPL-MCNC: 3.5 MMOL/L — SIGNIFICANT CHANGE UP (ref 3.5–5.3)
POTASSIUM SERPL-SCNC: 3.5 MMOL/L — SIGNIFICANT CHANGE UP (ref 3.5–5.3)
SODIUM SERPL-SCNC: 142 MMOL/L — SIGNIFICANT CHANGE UP (ref 135–145)

## 2020-05-16 PROCEDURE — 99232 SBSQ HOSP IP/OBS MODERATE 35: CPT

## 2020-05-16 RX ORDER — SODIUM CHLORIDE 9 MG/ML
1000 INJECTION, SOLUTION INTRAVENOUS
Refills: 0 | Status: DISCONTINUED | OUTPATIENT
Start: 2020-05-16 | End: 2020-05-16

## 2020-05-16 RX ORDER — SODIUM CHLORIDE 9 MG/ML
1000 INJECTION INTRAMUSCULAR; INTRAVENOUS; SUBCUTANEOUS
Refills: 0 | Status: DISCONTINUED | OUTPATIENT
Start: 2020-05-16 | End: 2020-05-20

## 2020-05-16 RX ORDER — HEPARIN SODIUM 5000 [USP'U]/ML
5000 INJECTION INTRAVENOUS; SUBCUTANEOUS EVERY 12 HOURS
Refills: 0 | Status: DISCONTINUED | OUTPATIENT
Start: 2020-05-16 | End: 2020-05-19

## 2020-05-16 RX ADMIN — Medication 1 DROP(S): at 05:43

## 2020-05-16 RX ADMIN — SODIUM CHLORIDE 50 MILLILITER(S): 9 INJECTION, SOLUTION INTRAVENOUS at 09:52

## 2020-05-16 RX ADMIN — SODIUM CHLORIDE 50 MILLILITER(S): 9 INJECTION INTRAMUSCULAR; INTRAVENOUS; SUBCUTANEOUS at 18:47

## 2020-05-16 RX ADMIN — BRIMONIDINE TARTRATE 1 DROP(S): 2 SOLUTION/ DROPS OPHTHALMIC at 05:43

## 2020-05-16 RX ADMIN — BRIMONIDINE TARTRATE 1 DROP(S): 2 SOLUTION/ DROPS OPHTHALMIC at 18:47

## 2020-05-16 RX ADMIN — Medication 1 DROP(S): at 18:46

## 2020-05-16 RX ADMIN — HEPARIN SODIUM 5000 UNIT(S): 5000 INJECTION INTRAVENOUS; SUBCUTANEOUS at 18:46

## 2020-05-16 NOTE — SWALLOW BEDSIDE ASSESSMENT ADULT - SLP GENERAL OBSERVATIONS
Pt found in bed, sleeping upon encounter. Pt on room air. Eyes open briefly to verbal stimulation. Pt does not establish eye contact or follow commands. Pt is non-verbal and does not interact or demonstrate awareness of environment.

## 2020-05-16 NOTE — SWALLOW BEDSIDE ASSESSMENT ADULT - COMMENTS
Continued: Pt admitted with failure to thrive.  Mirtazepine discontinued as ? cause of sleepiness. GI consulted for PEG eval. Tentatively planned for PEG on Monday 05/18. Son wants full work-up.     SWALLOW HISTORY: PT KNOWN TO THIS SERVICE. BEDSIDE SWALLOW EVALUATION COMPLETED 5/6/20: IMPRESSIONS: Patient seen for swallow reevaluation to determine candidacy for PO diet per MD request given report of improved mentation.  Swallow evaluation limited as pt accepted very minimal amounts of PO.  For trials accepted pt noted with delayed oral transit time, delayed trigger of the swallow, and cough post intake of thin liquids. RECOMMENDATIONS FROM EVALUATION 5/6/20 INCLUDED: Unable to make formal dietary recommendation given pt only accepted minimal amount of PO. If pt/family opt to initiate PO feeding given GI report stating pt is a poor candidate for PEG placement, would suggest considering a more conservative diet texture (e.g., Dysphagia 1 with nectar thick liquids) to reduce overall risk of aspiration.

## 2020-05-16 NOTE — PROGRESS NOTE ADULT - ASSESSMENT
89   yr pt,   pt  with  h/o  afib,   not  on   xarelto,  due  to falls,   cva,   copd, hld,     alzheimers  dementia,    pud/  h/o left ribf xs. 8/ 9 th,. left hip surg  h/o mlple falls.  echo, normal ef/ T2  comp  deformity, ct head, no cva//   ct  chest  with  goo, on last  visit    pt  was COVID  positive,  on 4/17/20,  had  elevated  troponin/ CRP/  Ferritin  , from Covid   pt  with  advanced  age/    comfort/   supportive  care is  ideal  however, son  wants  all  done.       sent  to  er  from   [facility], for  weakness/ not  eating/FTT  palliative/ comfort care,  ought to  be goal  in this pt,  however   son  wants  to  continue  current care   was  seen  by swallow  eval  on last visit/ oral feeds  at high risk  for  aspiration    hypernatremia, resolved / Na  is  142   on iv  fluids  Goc, to  be  visited  again,  given  advanced  age  of  pt/ Alzheimers  dementia  son wants   all  done/  per  Gi,  peg scheduled  for  monday       < from: CT Chest No Cont (04.17.20 @ 04:24) >  IMPRESSION:   Pattern of GGO suggests infection including atypical pneumonia/viral infection from atypical agents including COVID-19 (C19V-1)  < end of copied text >     < from: CT Thoracic Spine Reform No Cont (01.07.20 @ 16:43) >  IMPRESSION:  Volume loss, microvascular disease, no acute hemorrhage or midline shift. If symptoms persist consider follow up head CT or MRI contraindications.  Cervical and thoracic spine are unchanged from prior, no obvious fracture or dislocation, multilevel degenerative changes as noted previously with diffuse osteopenia and small unchanged mild superior endplate T2 compression deformity.  < end of copied text      < from: Limited Transthoracic Echo (10.26.18 @ 14:07) >  Conclusions:  Limited tranthoracic echocardiogram at patients request to  end exam.  1. Mitral annular calcification.  2. The aortic valve opens well.  3. Limited images acquired at the patients request. Based  upon the parasternal long axis view only;  grossly normal  left ventricular systolic function.  < end of copied text >

## 2020-05-16 NOTE — SWALLOW BEDSIDE ASSESSMENT ADULT - SLP PERTINENT HISTORY OF CURRENT PROBLEM
H&P: 89 y/o female with hx of dementia, CAD, CVA, COPD, afib, and recent extensive stay for COVID requiring pressor support presents to the ED with decreased PO intake. Patient discharge to the [facility] on 4/8 and was tolerating spoon feeds at that time, per son has declining mental status but was able to say his name a few days ago. Now over past few days has not been tolerating spoon feeds and is refusing to eat, also has been difficult to arouse

## 2020-05-16 NOTE — SWALLOW BEDSIDE ASSESSMENT ADULT - SWALLOW EVAL: DIAGNOSIS
89 y/o female with hx of dementia, CAD, CVA, COPD, afib, and recent extensive stay for COVID requiring pressor support admitted with failure to thrive. Pt now presents with an absent orientation to feeding precess despite verbal/tactile cues.

## 2020-05-16 NOTE — SWALLOW BEDSIDE ASSESSMENT ADULT - SWALLOW EVAL: RECOMMENDED DIET
Unable to make dietary recommendations at this time. Defer decision to patient/family wishes and MD. If family chooses to continue oral feeding, consider a more conservative diet of dysphagia 1 and honey thick liquids to reduce overall risk of aspiration.

## 2020-05-16 NOTE — PROGRESS NOTE ADULT - PROBLEM SELECTOR PLAN 1
- On most recent admission, patient was seen by SLP for speech and swallow evaluation on 05/06. Patient was unable to participate in PO trials, and was subsequently started on dysphagia diet despite high risk of aspiration as PEG placement was not being considered by house GI given patient's history of frequently pulling out NG tube despite mittens and restraints.   - supportive care/ pleasure feeds would be the most ideal plan given patient's age and co-morbidities, however based on chart, son would like full work up.   - tentatively planning for PEG on Monday 05/18   - continue current diet  - aspiration precautions  - will follow.

## 2020-05-16 NOTE — SWALLOW BEDSIDE ASSESSMENT ADULT - ORAL PREPARATORY PHASE
absent orientation to feeding process, minimal labial movement to tactile presentation, some facial grimacing when cold spoon placed on lips, eyes remain closed.

## 2020-05-16 NOTE — SWALLOW BEDSIDE ASSESSMENT ADULT - ORAL PHASE
Patient information and order for bp cuff faxed to Pharmacy counter at [facility]
unable to assess

## 2020-05-16 NOTE — PROGRESS NOTE ADULT - SUBJECTIVE AND OBJECTIVE BOX
INTERVAL HPI/OVERNIGHT EVENTS:    overnight events noted  interval history d/w RN   Na improved this morning   D5W d/c'd, 1/2 NS started  h/h trend noted; no overt signs of GIB       MEDICATIONS  (STANDING):  atorvastatin 20 milliGRAM(s) Oral at bedtime  brimonidine 0.2% Ophthalmic Solution 1 Drop(s) Both EYES two times a day  heparin   Injectable 5000 Unit(s) SubCutaneous every 12 hours  sodium chloride 0.9%. 1000 milliLiter(s) (50 mL/Hr) IV Continuous <Continuous>  timolol 0.5% Solution 1 Drop(s) Both EYES two times a day    MEDICATIONS  (PRN):      Allergies    aspirin (Unknown)  penicillin (Unknown)    Intolerances        Review of Systems: Defer           Vital Signs Last 24 Hrs  T(C): 36.6 (16 May 2020 05:48), Max: 36.6 (16 May 2020 05:48)  T(F): 97.8 (16 May 2020 05:48), Max: 97.8 (16 May 2020 05:48)  HR: 57 (16 May 2020 05:48) (50 - 57)  BP: 105/56 (16 May 2020 05:48) (96/60 - 110/56)  BP(mean): --  RR: 18 (16 May 2020 05:48) (18 - 18)  SpO2: 97% (16 May 2020 05:48) (97% - 98%)    PHYSICAL EXAM: Defer           LABS:                        9.8    7.69  )-----------( 224      ( 15 May 2020 17:21 )             32.4     05-16    142  |  109<H>  |  22  ----------------------------<  103<H>  3.5   |  22  |  0.66    Ca    8.9      16 May 2020 06:32  Phos  3.7     05-14  Mg     2.2     05-14    TPro  8.2  /  Alb  3.8  /  TBili  1.2  /  DBili  x   /  AST  26  /  ALT  24  /  AlkPhos  112  05-14      Urinalysis Basic - ( 14 May 2020 13:58 )    Color: Yellow / Appearance: Clear / S.018 / pH: x  Gluc: x / Ketone: Small  / Bili: Negative / Urobili: Negative   Blood: x / Protein: Negative / Nitrite: Negative   Leuk Esterase: Negative / RBC: 1 /hpf / WBC 0 /HPF   Sq Epi: x / Non Sq Epi: 0 /hpf / Bacteria: Negative        RADIOLOGY & ADDITIONAL TESTS:

## 2020-05-16 NOTE — PROGRESS NOTE ADULT - SUBJECTIVE AND OBJECTIVE BOX
CARDIOLOGY     PROGRESS  NOTE   ________________________________________________    CHIEF COMPLAINT:Patient is a 89y old  Female who presents with a chief complaint of failure to thrive/ a.fib (16 May 2020 10:20)  no complain.  	  REVIEW OF SYSTEMS:  CONSTITUTIONAL: No fever, weight loss, or fatigue  EYES: No eye pain, visual disturbances, or discharge  ENT:  No difficulty hearing, tinnitus, vertigo; No sinus or throat pain  NECK: No pain or stiffness  RESPIRATORY: No cough, wheezing, chills or hemoptysis; No Shortness of Breath  CARDIOVASCULAR: No chest pain, palpitations, passing out, dizziness, or leg swelling  GASTROINTESTINAL: No abdominal or epigastric pain. No nausea, vomiting, or hematemesis; No diarrhea or constipation. No melena or hematochezia.  GENITOURINARY: No dysuria, frequency, hematuria, or incontinence  NEUROLOGICAL: No headaches, memory loss, loss of strength, numbness, or tremors  SKIN: No itching, burning, rashes, or lesions   LYMPH Nodes: No enlarged glands  ENDOCRINE: No heat or cold intolerance; No hair loss  MUSCULOSKELETAL: No joint pain or swelling; No muscle, back, or extremity pain  PSYCHIATRIC: No depression, anxiety, mood swings, or difficulty sleeping  HEME/LYMPH: No easy bruising, or bleeding gums  ALLERGY AND IMMUNOLOGIC: No hives or eczema	    [ ] All others negative	  [x ] Unable to obtain    PHYSICAL EXAM:  T(C): 36.6 (05-16-20 @ 05:48), Max: 36.8 (05-15-20 @ 11:23)  HR: 57 (05-16-20 @ 05:48) (50 - 57)  BP: 105/56 (05-16-20 @ 05:48) (92/48 - 110/56)  RR: 18 (05-16-20 @ 05:48) (18 - 18)  SpO2: 97% (05-16-20 @ 05:48) (97% - 98%)  Wt(kg): --  I&O's Summary    15 May 2020 07:01  -  16 May 2020 07:00  --------------------------------------------------------  IN: 1020 mL / OUT: 500 mL / NET: 520 mL    16 May 2020 07:01  -  16 May 2020 10:43  --------------------------------------------------------  IN: 0 mL / OUT: 0 mL / NET: 0 mL        Appearance: Normal	  HEENT:   Normal oral mucosa, PERRL, EOMI	  Lymphatic: No lymphadenopathy  Cardiovascular: Normal S1 S2, No JVD, No murmurs, No edema  Respiratory: Lungs clear to auscultation	  Psychiatry: A & O x 3, Mood & affect appropriate  Gastrointestinal:  Soft, Non-tender, + BS	  Skin: No rashes, No ecchymoses, No cyanosis	  Neurologic: Non-focal  Extremities: Normal range of motion, No clubbing, cyanosis or edema  Vascular: Peripheral pulses palpable 2+ bilaterally    MEDICATIONS  (STANDING):  atorvastatin 20 milliGRAM(s) Oral at bedtime  brimonidine 0.2% Ophthalmic Solution 1 Drop(s) Both EYES two times a day  rivaroxaban 10 milliGRAM(s) Oral daily  sodium chloride 0.45%. 1000 milliLiter(s) (50 mL/Hr) IV Continuous <Continuous>  timolol 0.5% Solution 1 Drop(s) Both EYES two times a day      TELEMETRY: 	    ECG:  	  RADIOLOGY:  OTHER: 	  	  LABS:	 	    CARDIAC MARKERS:                                9.8    7.69  )-----------( 224      ( 15 May 2020 17:21 )             32.4     05-16    142  |  109<H>  |  22  ----------------------------<  103<H>  3.5   |  22  |  0.66    Ca    8.9      16 May 2020 06:32  Phos  3.7     05-14  Mg     2.2     05-14    TPro  8.2  /  Alb  3.8  /  TBili  1.2  /  DBili  x   /  AST  26  /  ALT  24  /  AlkPhos  112  05-14    proBNP: Serum Pro-Brain Natriuretic Peptide: 4277 pg/mL (04-17 @ 05:13)    Lipid Profile:   HgA1c:   TSH: Thyroid Stimulating Hormone, Serum: 3.19 uIU/mL (04-24 @ 12:11)          Assessment and plan  ---------------------------  89 y/o female with hx of dementia, CAD, CVA, COPD, afib, and recent extensive stay for COVID requiring pressor support presents to the ED with decreased PO intake. Patient discharge to the [facility] on 4/8 and was tolerating spoon feeds at that time, per son has declining mental status but was able to say his name a few days ago. Now over past few days has not been tolerating spoon feeds and is refusing to eat, also has been difficult to arouse  no chest pain / sob.  pt with failure to thrive  dc mirtazepine ?cause of sleepiness  assist with meals   will consider PEG placement Dr cazares called on Monday  spoke to the son agreed with peg placement  a.fib ,hr controlled, continue ac  pt is not DNR  dc Xarelto start sq heparin for dvt prophylaxis

## 2020-05-16 NOTE — PROGRESS NOTE ADULT - SUBJECTIVE AND OBJECTIVE BOX
resting in bed/  non verbal  REVIEW OF SYSTEMS:  GEN: no fever,    no chills  RESP: no SOB,   no cough  CVS: no chest pain,   no palpitations  GI: no abdominal pain,   no nausea,   no vomiting,   no constipation,   no diarrhea  : no dysuria,   no frequency  NEURO: no headache,   no dizziness  PSYCH: no depression,   not anxious  Derm : no rash    MEDICATIONS  (STANDING):  atorvastatin 20 milliGRAM(s) Oral at bedtime  brimonidine 0.2% Ophthalmic Solution 1 Drop(s) Both EYES two times a day  rivaroxaban 10 milliGRAM(s) Oral daily  sodium chloride 0.45%. 1000 milliLiter(s) (50 mL/Hr) IV Continuous <Continuous>  timolol 0.5% Solution 1 Drop(s) Both EYES two times a day    MEDICATIONS  (PRN):      Vital Signs Last 24 Hrs  T(C): 36.6 (16 May 2020 05:48), Max: 36.8 (15 May 2020 11:23)  T(F): 97.8 (16 May 2020 05:48), Max: 98.2 (15 May 2020 11:23)  HR: 57 (16 May 2020 05:48) (50 - 57)  BP: 105/56 (16 May 2020 05:48) (92/48 - 110/56)  BP(mean): --  RR: 18 (16 May 2020 05:48) (18 - 18)  SpO2: 97% (16 May 2020 05:48) (97% - 98%)  CAPILLARY BLOOD GLUCOSE        I&O's Summary    15 May 2020 07:01  -  16 May 2020 07:00  --------------------------------------------------------  IN: 1020 mL / OUT: 500 mL / NET: 520 mL        PHYSICAL EXAM:  HEAD:  Atraumatic, Normocephalic  NECK: Supple, No   JVD  CHEST/LUNG:   no     rales,     no,    rhonchi  HEART: Regular rate and rhythm;         murmur  ABDOMEN: Soft, Nontender, ;   EXTREMITIES:    no    edema  NEUROLOGY:   dementia    LABS:                        9.8    7.69  )-----------( 224      ( 15 May 2020 17:21 )             32.4     05-16    142  |  109<H>  |  22  ----------------------------<  103<H>  3.5   |  22  |  0.66    Ca    8.9      16 May 2020 06:32  Phos  3.7     -  Mg     2.2     -    TPro  8.2  /  Alb  3.8  /  TBili  1.2  /  DBili  x   /  AST  26  /  ALT  24  /  AlkPhos  112  -          Urinalysis Basic - ( 14 May 2020 13:58 )    Color: Yellow / Appearance: Clear / S.018 / pH: x  Gluc: x / Ketone: Small  / Bili: Negative / Urobili: Negative   Blood: x / Protein: Negative / Nitrite: Negative   Leuk Esterase: Negative / RBC: 1 /hpf / WBC 0 /HPF   Sq Epi: x / Non Sq Epi: 0 /hpf / Bacteria: Negative              Thyroid Stimulating Hormone, Serum: 3.19 uIU/mL ( @ 12:11)          Consultant(s) Notes Reviewed:      Care Discussed with Consultants/Other Providers:

## 2020-05-16 NOTE — CHART NOTE - NSCHARTNOTEFT_GEN_A_CORE
Na at 6:32am 142, was 151 yesterday. IV fluids d/c'd for now. Dr Fabiola campbell. Na at 6:32am 142, was 151 yesterday. IV fluids d/c'd for now. Dr Fabiola campbell.  ADD: I spoke to Dr Ferro - he agreed to stop d5W. He ordered 1/2 Normal Saline at 50cc/ hr. Recheck BMP in AM .  LL

## 2020-05-17 LAB
ANION GAP SERPL CALC-SCNC: 15 MMOL/L — SIGNIFICANT CHANGE UP (ref 5–17)
BUN SERPL-MCNC: 18 MG/DL — SIGNIFICANT CHANGE UP (ref 7–23)
CALCIUM SERPL-MCNC: 8.9 MG/DL — SIGNIFICANT CHANGE UP (ref 8.4–10.5)
CHLORIDE SERPL-SCNC: 111 MMOL/L — HIGH (ref 96–108)
CO2 SERPL-SCNC: 19 MMOL/L — LOW (ref 22–31)
CREAT SERPL-MCNC: 0.59 MG/DL — SIGNIFICANT CHANGE UP (ref 0.5–1.3)
GLUCOSE SERPL-MCNC: 77 MG/DL — SIGNIFICANT CHANGE UP (ref 70–99)
POTASSIUM SERPL-MCNC: 3.5 MMOL/L — SIGNIFICANT CHANGE UP (ref 3.5–5.3)
POTASSIUM SERPL-SCNC: 3.5 MMOL/L — SIGNIFICANT CHANGE UP (ref 3.5–5.3)
SODIUM SERPL-SCNC: 145 MMOL/L — SIGNIFICANT CHANGE UP (ref 135–145)

## 2020-05-17 PROCEDURE — 99232 SBSQ HOSP IP/OBS MODERATE 35: CPT

## 2020-05-17 RX ADMIN — BRIMONIDINE TARTRATE 1 DROP(S): 2 SOLUTION/ DROPS OPHTHALMIC at 17:55

## 2020-05-17 RX ADMIN — Medication 1 DROP(S): at 05:54

## 2020-05-17 RX ADMIN — SODIUM CHLORIDE 50 MILLILITER(S): 9 INJECTION INTRAMUSCULAR; INTRAVENOUS; SUBCUTANEOUS at 05:54

## 2020-05-17 RX ADMIN — BRIMONIDINE TARTRATE 1 DROP(S): 2 SOLUTION/ DROPS OPHTHALMIC at 05:56

## 2020-05-17 RX ADMIN — HEPARIN SODIUM 5000 UNIT(S): 5000 INJECTION INTRAVENOUS; SUBCUTANEOUS at 17:47

## 2020-05-17 RX ADMIN — HEPARIN SODIUM 5000 UNIT(S): 5000 INJECTION INTRAVENOUS; SUBCUTANEOUS at 05:54

## 2020-05-17 RX ADMIN — Medication 1 DROP(S): at 17:46

## 2020-05-17 RX ADMIN — ATORVASTATIN CALCIUM 20 MILLIGRAM(S): 80 TABLET, FILM COATED ORAL at 21:20

## 2020-05-17 NOTE — PROGRESS NOTE ADULT - SUBJECTIVE AND OBJECTIVE BOX
non verbla  REVIEW OF SYSTEMS:  GEN: no fever,    no chills  RESP: no SOB,   no cough  CVS: no chest pain,   no palpitations  GI: no abdominal pain,   no nausea,   no vomiting,   no constipation,   no diarrhea  : no dysuria,   no frequency  NEURO: no headache,   no dizziness  PSYCH: no depression,   not anxious  Derm : no rash    MEDICATIONS  (STANDING):  atorvastatin 20 milliGRAM(s) Oral at bedtime  brimonidine 0.2% Ophthalmic Solution 1 Drop(s) Both EYES two times a day  heparin   Injectable 5000 Unit(s) SubCutaneous every 12 hours  sodium chloride 0.9%. 1000 milliLiter(s) (50 mL/Hr) IV Continuous <Continuous>  timolol 0.5% Solution 1 Drop(s) Both EYES two times a day    MEDICATIONS  (PRN):      Vital Signs Last 24 Hrs  T(C): 36.4 (17 May 2020 04:48), Max: 36.8 (16 May 2020 13:47)  T(F): 97.5 (17 May 2020 04:48), Max: 98.2 (16 May 2020 13:47)  HR: 63 (17 May 2020 04:48) (58 - 63)  BP: 124/59 (17 May 2020 04:48) (109/54 - 136/62)  BP(mean): --  RR: 18 (17 May 2020 04:48) (18 - 18)  SpO2: 91% (17 May 2020 04:48) (91% - 98%)  CAPILLARY BLOOD GLUCOSE        I&O's Summary    16 May 2020 07:01  -  17 May 2020 07:00  --------------------------------------------------------  IN: 0 mL / OUT: 0 mL / NET: 0 mL        PHYSICAL EXAM:  HEAD:  Atraumatic, Normocephalic  NECK: Supple, No   JVD  CHEST/LUNG:   no     rales,     no,    rhonchi  HEART: Regular rate and rhythm;         murmur  ABDOMEN: Soft, Nontender, ;   EXTREMITIES:   no     edema  NEUROLOGY:  alert/  dementia    LABS:                        9.8    7.69  )-----------( 224      ( 15 May 2020 17:21 )             32.4     05-16    142  |  109<H>  |  22  ----------------------------<  103<H>  3.5   |  22  |  0.66    Ca    8.9      16 May 2020 06:32                      Thyroid Stimulating Hormone, Serum: 3.19 uIU/mL (04-24 @ 12:11)          Consultant(s) Notes Reviewed:      Care Discussed with Consultants/Other Providers:

## 2020-05-17 NOTE — PROGRESS NOTE ADULT - PROBLEM SELECTOR PLAN 1
- On most recent admission, patient was seen by SLP for speech and swallow evaluation on 05/06. Patient was unable to participate in PO trials, and was subsequently started on dysphagia diet despite high risk of aspiration as PEG placement was not being considered by house GI given patient's history of frequently pulling out NG tube despite mittens and restraints.   - supportive care/ pleasure feeds would be the most ideal plan given patient's age and co-morbidities, however based on chart, son would like full work up.   - tentatively planning for PEG tomorrow, 05/18  - cardiology eval noted; cleared for PEG from cardiac standpoint  - NPO p mn  - will follow.

## 2020-05-17 NOTE — PROGRESS NOTE ADULT - ASSESSMENT
89   yr pt,   pt  with  h/o  afib,   not  on   xarelto,  due  to falls,   cva,   copd, hld,     alzheimers  dementia,    pud/  h/o left ribf xs. 8/ 9 th,. left hip surg  h/o mlple falls.  echo, normal ef/ T2  comp  deformity, ct head, no cva//   ct  chest  with  goo, on last  visit    pt  was COVID  positive,  on 4/17/20,  had  elevated  troponin/ CRP/  Ferritin  , from Covid   pt  with  advanced  age/    comfort/   supportive  care is  ideal  however, son  wants  all  done.       sent  to  [facility]  from   [facility], for  weakness/ not  eating/FTT  palliative/ comfort care,  ought to  be goal  in this pt,  however   son  wants  to  continue  current care   was  seen  by swallow  eval  on last visit/ oral feeds  at high risk  for  aspiration    hypernatremia, resolved / Na  is  142   on iv  fluids  Goc, to  be  visited  again,  given  advanced  age  of  pt/ Alzheimers  dementia  son wants   all  done/  per  Gi,  peg scheduled  for  monday  afebrile/ seen by swallow  eval       < from: CT Chest No Cont (04.17.20 @ 04:24) >  IMPRESSION:   Pattern of GGO suggests infection including atypical pneumonia/viral infection from atypical agents including COVID-19 (C19V-1)  < end of copied text >     < from: CT Thoracic Spine Reform No Cont (01.07.20 @ 16:43) >  IMPRESSION:  Volume loss, microvascular disease, no acute hemorrhage or midline shift. If symptoms persist consider follow up head CT or MRI contraindications.  Cervical and thoracic spine are unchanged from prior, no obvious fracture or dislocation, multilevel degenerative changes as noted previously with diffuse osteopenia and small unchanged mild superior endplate T2 compression deformity.  < end of copied text      < from: Limited Transthoracic Echo (10.26.18 @ 14:07) >  Conclusions:  Limited tranthoracic echocardiogram at patients request to  end exam.  1. Mitral annular calcification.  2. The aortic valve opens well.  3. Limited images acquired at the patients request. Based  upon the parasternal long axis view only;  grossly normal  left ventricular systolic function.  < end of copied text >

## 2020-05-17 NOTE — PROGRESS NOTE ADULT - SUBJECTIVE AND OBJECTIVE BOX
INTERVAL HPI/OVERNIGHT EVENTS:    no acute overnight events   for EGD with PEG placement tomorrow       MEDICATIONS  (STANDING):  atorvastatin 20 milliGRAM(s) Oral at bedtime  brimonidine 0.2% Ophthalmic Solution 1 Drop(s) Both EYES two times a day  heparin   Injectable 5000 Unit(s) SubCutaneous every 12 hours  sodium chloride 0.9%. 1000 milliLiter(s) (50 mL/Hr) IV Continuous <Continuous>  timolol 0.5% Solution 1 Drop(s) Both EYES two times a day    MEDICATIONS  (PRN):      Allergies    aspirin (Unknown)  penicillin (Unknown)    Intolerances        Review of Systems: Defer           Vital Signs Last 24 Hrs  T(C): 36.6 (16 May 2020 05:48), Max: 36.6 (16 May 2020 05:48)  T(F): 97.8 (16 May 2020 05:48), Max: 97.8 (16 May 2020 05:48)  HR: 57 (16 May 2020 05:48) (50 - 57)  BP: 105/56 (16 May 2020 05:48) (96/60 - 110/56)  BP(mean): --  RR: 18 (16 May 2020 05:48) (18 - 18)  SpO2: 97% (16 May 2020 05:48) (97% - 98%)    PHYSICAL EXAM: Defer           LABS:                        9.8    7.69  )-----------( 224      ( 15 May 2020 17:21 )             32.4     05-16    142  |  109<H>  |  22  ----------------------------<  103<H>  3.5   |  22  |  0.66    Ca    8.9      16 May 2020 06:32  Phos  3.7     05-14  Mg     2.2     05-14    TPro  8.2  /  Alb  3.8  /  TBili  1.2  /  DBili  x   /  AST  26  /  ALT  24  /  AlkPhos  112  05-14      Urinalysis Basic - ( 14 May 2020 13:58 )    Color: Yellow / Appearance: Clear / S.018 / pH: x  Gluc: x / Ketone: Small  / Bili: Negative / Urobili: Negative   Blood: x / Protein: Negative / Nitrite: Negative   Leuk Esterase: Negative / RBC: 1 /hpf / WBC 0 /HPF   Sq Epi: x / Non Sq Epi: 0 /hpf / Bacteria: Negative        RADIOLOGY & ADDITIONAL TESTS:

## 2020-05-17 NOTE — PROGRESS NOTE ADULT - SUBJECTIVE AND OBJECTIVE BOX
CARDIOLOGY     PROGRESS  NOTE   ________________________________________________    CHIEF COMPLAINT:Patient is a 89y old  Female who presents with a chief complaint of failure to thrive/ a.fib (17 May 2020 09:19)  no complain.  	  REVIEW OF SYSTEMS:  CONSTITUTIONAL: No fever, weight loss, or fatigue  EYES: No eye pain, visual disturbances, or discharge  ENT:  No difficulty hearing, tinnitus, vertigo; No sinus or throat pain  NECK: No pain or stiffness  RESPIRATORY: No cough, wheezing, chills or hemoptysis; No Shortness of Breath  CARDIOVASCULAR: No chest pain, palpitations, passing out, dizziness, or leg swelling  GASTROINTESTINAL: No abdominal or epigastric pain. No nausea, vomiting, or hematemesis; No diarrhea or constipation. No melena or hematochezia.  GENITOURINARY: No dysuria, frequency, hematuria, or incontinence  NEUROLOGICAL: No headaches, memory loss, loss of strength, numbness, or tremors  SKIN: No itching, burning, rashes, or lesions   LYMPH Nodes: No enlarged glands  ENDOCRINE: No heat or cold intolerance; No hair loss  MUSCULOSKELETAL: No joint pain or swelling; No muscle, back, or extremity pain  PSYCHIATRIC: No depression, anxiety, mood swings, or difficulty sleeping  HEME/LYMPH: No easy bruising, or bleeding gums  ALLERGY AND IMMUNOLOGIC: No hives or eczema	    [ ] All others negative	  [x ] Unable to obtain    PHYSICAL EXAM:  T(C): 36.4 (05-17-20 @ 04:48), Max: 36.8 (05-16-20 @ 13:47)  HR: 63 (05-17-20 @ 04:48) (58 - 63)  BP: 124/59 (05-17-20 @ 04:48) (109/54 - 136/62)  RR: 18 (05-17-20 @ 04:48) (18 - 18)  SpO2: 91% (05-17-20 @ 04:48) (91% - 98%)  Wt(kg): --  I&O's Summary    16 May 2020 07:01  -  17 May 2020 07:00  --------------------------------------------------------  IN: 0 mL / OUT: 0 mL / NET: 0 mL        Appearance: Normal	  HEENT:   Normal oral mucosa, PERRL, EOMI	  Lymphatic: No lymphadenopathy  Cardiovascular: Normal S1 S2, No JVD,+ murmurs, No edema  Respiratory: Lungs clear to auscultation	  Psychiatry: A & O x 3, Mood & affect appropriate  Gastrointestinal:  Soft, Non-tender, + BS	  Skin: No rashes, No ecchymoses, No cyanosis	  Neurologic: Non-focal  Extremities: Normal range of motion, No clubbing, cyanosis or edema  Vascular: Peripheral pulses palpable 2+ bilaterally    MEDICATIONS  (STANDING):  atorvastatin 20 milliGRAM(s) Oral at bedtime  brimonidine 0.2% Ophthalmic Solution 1 Drop(s) Both EYES two times a day  heparin   Injectable 5000 Unit(s) SubCutaneous every 12 hours  sodium chloride 0.9%. 1000 milliLiter(s) (50 mL/Hr) IV Continuous <Continuous>  timolol 0.5% Solution 1 Drop(s) Both EYES two times a day      TELEMETRY: 	    ECG:  	  RADIOLOGY:  OTHER: 	  	  LABS:	 	    CARDIAC MARKERS:                                9.8    7.69  )-----------( 224      ( 15 May 2020 17:21 )             32.4     05-16    142  |  109<H>  |  22  ----------------------------<  103<H>  3.5   |  22  |  0.66    Ca    8.9      16 May 2020 06:32      proBNP:   Lipid Profile:   HgA1c:   TSH: Thyroid Stimulating Hormone, Serum: 3.19 uIU/mL (04-24 @ 12:11)          Assessment and plan  ---------------------------  89 y/o female with hx of dementia, CAD, CVA, COPD, afib, and recent extensive stay for COVID requiring pressor support presents to the ED with decreased PO intake. Patient discharge to the [facility] on 4/8 and was tolerating spoon feeds at that time, per son has declining mental status but was able to say his name a few days ago. Now over past few days has not been tolerating spoon feeds and is refusing to eat, also has been difficult to arouse  no chest pain / sob.  pt with failure to thrive  dc mirtazepine ?cause of sleepiness  assist with meals   will consider PEG placement Dr cazares called on Monday  spoke to the son agreed with peg placement  a.fib ,hr controlled, continue ac  pt is not DNR  dc Xarelto start sq heparin for dvt prophylaxis  pt is clear for peg placement in am

## 2020-05-18 PROCEDURE — 99232 SBSQ HOSP IP/OBS MODERATE 35: CPT

## 2020-05-18 RX ORDER — ACETAMINOPHEN 500 MG
650 TABLET ORAL ONCE
Refills: 0 | Status: DISCONTINUED | OUTPATIENT
Start: 2020-05-18 | End: 2020-05-18

## 2020-05-18 RX ORDER — ACETAMINOPHEN 500 MG
600 TABLET ORAL ONCE
Refills: 0 | Status: COMPLETED | OUTPATIENT
Start: 2020-05-18 | End: 2020-05-18

## 2020-05-18 RX ADMIN — HEPARIN SODIUM 5000 UNIT(S): 5000 INJECTION INTRAVENOUS; SUBCUTANEOUS at 17:03

## 2020-05-18 RX ADMIN — HEPARIN SODIUM 5000 UNIT(S): 5000 INJECTION INTRAVENOUS; SUBCUTANEOUS at 05:45

## 2020-05-18 RX ADMIN — Medication 1 DROP(S): at 17:04

## 2020-05-18 RX ADMIN — Medication 240 MILLIGRAM(S): at 17:57

## 2020-05-18 RX ADMIN — BRIMONIDINE TARTRATE 1 DROP(S): 2 SOLUTION/ DROPS OPHTHALMIC at 05:46

## 2020-05-18 RX ADMIN — SODIUM CHLORIDE 50 MILLILITER(S): 9 INJECTION INTRAMUSCULAR; INTRAVENOUS; SUBCUTANEOUS at 21:28

## 2020-05-18 RX ADMIN — Medication 1 DROP(S): at 05:45

## 2020-05-18 RX ADMIN — ATORVASTATIN CALCIUM 20 MILLIGRAM(S): 80 TABLET, FILM COATED ORAL at 21:28

## 2020-05-18 RX ADMIN — BRIMONIDINE TARTRATE 1 DROP(S): 2 SOLUTION/ DROPS OPHTHALMIC at 17:04

## 2020-05-18 NOTE — DIETITIAN INITIAL EVALUATION ADULT. - REASON INDICATOR FOR ASSESSMENT
Pt seen for consult received for TF and BMI <19 referral.   Information obtained from: medical record, RN, and pt's son (called to [phone]).   Pt confused/disoriented as per documentation, sleeping.

## 2020-05-18 NOTE — PROGRESS NOTE ADULT - SUBJECTIVE AND OBJECTIVE BOX
CARDIOLOGY     PROGRESS  NOTE   ________________________________________________    CHIEF COMPLAINT:Patient is a 89y old  Female who presents with a chief complaint of failure to thrive/ a.fib (17 May 2020 12:15)  no complain.  	  REVIEW OF SYSTEMS:  CONSTITUTIONAL: No fever, weight loss, or fatigue  EYES: No eye pain, visual disturbances, or discharge  ENT:  No difficulty hearing, tinnitus, vertigo; No sinus or throat pain  NECK: No pain or stiffness  RESPIRATORY: No cough, wheezing, chills or hemoptysis; No Shortness of Breath  CARDIOVASCULAR: No chest pain, palpitations, passing out, dizziness, or leg swelling  GASTROINTESTINAL: No abdominal or epigastric pain. No nausea, vomiting, or hematemesis; No diarrhea or constipation. No melena or hematochezia.  GENITOURINARY: No dysuria, frequency, hematuria, or incontinence  NEUROLOGICAL: No headaches, memory loss, loss of strength, numbness, or tremors  SKIN: No itching, burning, rashes, or lesions   LYMPH Nodes: No enlarged glands  ENDOCRINE: No heat or cold intolerance; No hair loss  MUSCULOSKELETAL: No joint pain or swelling; No muscle, back, or extremity pain  PSYCHIATRIC: No depression, anxiety, mood swings, or difficulty sleeping  HEME/LYMPH: No easy bruising, or bleeding gums  ALLERGY AND IMMUNOLOGIC: No hives or eczema	    [ ] All others negative	  [ ] Unable to obtain    PHYSICAL EXAM:  T(C): 36.3 (05-18-20 @ 04:04), Max: 36.4 (05-17-20 @ 12:33)  HR: 68 (05-18-20 @ 04:04) (63 - 68)  BP: 123/73 (05-18-20 @ 04:04) (101/69 - 143/82)  RR: 18 (05-18-20 @ 04:04) (18 - 18)  SpO2: 93% (05-18-20 @ 04:04) (91% - 94%)  Wt(kg): --  I&O's Summary    17 May 2020 07:01  -  18 May 2020 07:00  --------------------------------------------------------  IN: 1050 mL / OUT: 910 mL / NET: 140 mL        Appearance: Normal	  HEENT:   Normal oral mucosa, PERRL, EOMI	  Lymphatic: No lymphadenopathy  Cardiovascular: Normal S1 S2, No JVD, + murmurs, No edema  Respiratory: Lungs clear to auscultation	  Psychiatry: A & O x 3, Mood & affect appropriate  Gastrointestinal:  Soft, Non-tender, + BS	  Skin: No rashes, No ecchymoses, No cyanosis	  Neurologic: Non-focal  Extremities: Normal range of motion, No clubbing, cyanosis or edema  Vascular: Peripheral pulses palpable 2+ bilaterally    MEDICATIONS  (STANDING):  atorvastatin 20 milliGRAM(s) Oral at bedtime  brimonidine 0.2% Ophthalmic Solution 1 Drop(s) Both EYES two times a day  heparin   Injectable 5000 Unit(s) SubCutaneous every 12 hours  sodium chloride 0.9%. 1000 milliLiter(s) (50 mL/Hr) IV Continuous <Continuous>  timolol 0.5% Solution 1 Drop(s) Both EYES two times a day      TELEMETRY: 	    ECG:  	  RADIOLOGY:  OTHER: 	  	  LABS:	 	    CARDIAC MARKERS:            05-17    145  |  111<H>  |  18  ----------------------------<  77  3.5   |  19<L>  |  0.59    Ca    8.9      17 May 2020 09:01      proBNP:   Lipid Profile:   HgA1c:   TSH: Thyroid Stimulating Hormone, Serum: 3.19 uIU/mL (04-24 @ 12:11)    morbidities, however based on chart, son would like full work up.   - tentatively planning for PEG tomorrow, 05/18  - cardiology yelena noted; cleared for PEG from cardiac standpoint  - NPO p mn  - will follow.       Assessment and plan  ---------------------------  89 y/o female with hx of dementia, CAD, CVA, COPD, afib, and recent extensive stay for COVID requiring pressor support presents to the ED with decreased PO intake. Patient discharge to the [facility] on 4/8 and was tolerating spoon feeds at that time, per son has declining mental status but was able to say his name a few days ago. Now over past few days has not been tolerating spoon feeds and is refusing to eat, also has been difficult to arouse  no chest pain / sob.  pt with failure to thrive  dc mirtazepine ?cause of sleepiness  assist with meals   will consider PEG placement Dr cazares called on Monday  spoke to the son agreed with peg placement  a.fib ,hr controlled, continue ac  pt is not DNR  dc Xarelto start sq heparin for dvt prophylaxis  pt is clear for peg placement in am

## 2020-05-18 NOTE — CHART NOTE - NSCHARTNOTEFT_GEN_A_CORE
Upon Nutritional Assessment by the Registered Dietitian your patient was determined to meet criteria / has evidence of the following diagnosis/diagnoses:          [ ]  Mild Protein Calorie Malnutrition        [ ]  Moderate Protein Calorie Malnutrition        [x] Severe Protein Calorie Malnutrition        [ ] Unspecified Protein Calorie Malnutrition        [x] Underweight / BMI <19        [ ] Morbid Obesity / BMI > 40      Findings as based on:  [x] Comprehensive nutrition assessment   [ ] Nutrition Focused Physical Exam  [x] Other: PO intake <50% x 1 month with weight loss; visual moderate muscle loss in temporales; BMI 16.3      Nutrition Plan/Recommendations:    1. Recommend Jevity 1.2 through continuous feedings starting at 10 ml/hr and increase as tolerated to goal of 40 ml/hr x 24 hours to provide 960 ml, 1152 kcal, 53 g protein, and 775 ml water (28 kcal/kg and 1.3 g/kg protein based on dosing weight 40.8 kg); defer fluids to team. Will continue to monitor and adjust as needed.   2. Monitor and replete electrolytes if needed.   3. Consider Multivitamin if medically feasible to optimize nutrient intake.    RD remains available  Meme Davis MS RDN CDN #[id number]    PROVIDER Section:     By signing this assessment you are acknowledging and agree with the diagnosis/diagnoses assigned by the Registered Dietitian    Comments:

## 2020-05-18 NOTE — DIETITIAN INITIAL EVALUATION ADULT. - ADD RECOMMEND
1. Will continue to monitor EN provision/tolerance, weight, labs, skin, GI status. 2. Monitor and replete electrolytes if needed. 3. Consider Multivitamin if medically feasible to optimize nutrient intake. 4. Malnutrition/BMI <19 notification placed in chart - spoke to PA.

## 2020-05-18 NOTE — DIETITIAN INITIAL EVALUATION ADULT. - NS FNS WEIGHT CHANGE REASON
Son reports pt with weight loss x 1 month PTA due to poor PO intake, unable to recall pounds lost or UBW; states previously weight was stable. Weight as per previous RD note (10/26/2018) 93.2 pounds. Weight as per flow sheets (05/14) 89 pounds - suggests 4.2 pounds weight loss, will continue to monitor.

## 2020-05-18 NOTE — PROGRESS NOTE ADULT - SUBJECTIVE AND OBJECTIVE BOX
dementia    REVIEW OF SYSTEMS:  GEN: no fever,    no chills  RESP: no SOB,   no cough  CVS: no chest pain,   no palpitations  GI: no abdominal pain,   no nausea,   no vomiting,   no constipation,   no diarrhea  : no dysuria,   no frequency  NEURO: no headache,   no dizziness  PSYCH: no depression,   not anxious  Derm : no rash    MEDICATIONS  (STANDING):  atorvastatin 20 milliGRAM(s) Oral at bedtime  brimonidine 0.2% Ophthalmic Solution 1 Drop(s) Both EYES two times a day  heparin   Injectable 5000 Unit(s) SubCutaneous every 12 hours  sodium chloride 0.9%. 1000 milliLiter(s) (50 mL/Hr) IV Continuous <Continuous>  timolol 0.5% Solution 1 Drop(s) Both EYES two times a day    MEDICATIONS  (PRN):      Vital Signs Last 24 Hrs  T(C): 36.3 (18 May 2020 04:04), Max: 36.4 (17 May 2020 12:33)  T(F): 97.4 (18 May 2020 04:04), Max: 97.6 (17 May 2020 12:33)  HR: 68 (18 May 2020 04:04) (63 - 68)  BP: 123/73 (18 May 2020 04:04) (101/69 - 143/82)  BP(mean): --  RR: 18 (18 May 2020 04:04) (18 - 18)  SpO2: 93% (18 May 2020 04:04) (91% - 94%)  CAPILLARY BLOOD GLUCOSE        I&O's Summary    17 May 2020 07:01  -  18 May 2020 07:00  --------------------------------------------------------  IN: 1050 mL / OUT: 910 mL / NET: 140 mL        PHYSICAL EXAM:  HEAD:  Atraumatic, Normocephalic  NECK: Supple, No   JVD  CHEST/LUNG:   no     rales,     no,    rhonchi  HEART: Regular rate and rhythm;         murmur  ABDOMEN: Soft, Nontender, ;   EXTREMITIES:     no   edema  NEUROLOGY:  alert    LABS:    05-17    145  |  111<H>  |  18  ----------------------------<  77  3.5   |  19<L>  |  0.59    Ca    8.9      17 May 2020 09:01                      Thyroid Stimulating Hormone, Serum: 3.19 uIU/mL (04-24 @ 12:11)          Consultant(s) Notes Reviewed:      Care Discussed with Consultants/Other Providers:

## 2020-05-18 NOTE — DIETITIAN INITIAL EVALUATION ADULT. - PHYSICAL APPEARANCE
other (specify)/Noted visual signs of moderate muscle loss in temporales, unable to visually assess other areas as pt covered in blankets; Nutrition focused physical exam deferred at this time 2/2 precautionary measures surrounding the current COVID-19 pandemic. Ht: 62 inches stated per son Wt: 89 pounds BMI: 16.3 kg/m2 IBW: 110 (+/-10%) 80.9 %IBW  No noted edema as per flow sheets.   Skin: no noted pressure injuries as per documentation.

## 2020-05-18 NOTE — PROGRESS NOTE ADULT - ASSESSMENT
89   yr pt,   pt  with  h/o  afib,   not  on   xarelto,  due  to falls,   cva,   copd, hld,     alzheimers  dementia,    pud/  h/o left ribf xs. 8/ 9 th,. left hip surg  h/o mlple falls.  echo, normal ef/ T2  comp  deformity, ct head, no cva//   ct  chest  with  goo, on last  visit    pt  was COVID  positive,  on 4/17/20,  had  elevated  troponin/ CRP/  Ferritin  , from Covid   pt  with  advanced  age/    comfort/   supportive  care is  ideal  however, son  wants  all  done.       sent  to  er  from   [facility], for  weakness/ not  eating/FTT  palliative/ comfort care,  ought to  be goal  in this pt,  however   son  wants  to  continue  current care   was  seen  by swallow  eval  on last visit/ oral feeds  at high risk  for  aspiration    hypernatremia, resolved / Na  is  145    Goc,   visited  again,  given  advanced  age  of  pt/ Alzheimers  dementia.  and  son wants  peg  son wants   all  done/  per  Gi,  peg scheduled  for  5/18         < from: CT Chest No Cont (04.17.20 @ 04:24) >  IMPRESSION:   Pattern of GGO suggests infection including atypical pneumonia/viral infection from atypical agents including COVID-19 (C19V-1)  < end of copied text >     < from: CT Thoracic Spine Reform No Cont (01.07.20 @ 16:43) >  IMPRESSION:  Volume loss, microvascular disease, no acute hemorrhage or midline shift. If symptoms persist consider follow up head CT or MRI contraindications.  Cervical and thoracic spine are unchanged from prior, no obvious fracture or dislocation, multilevel degenerative changes as noted previously with diffuse osteopenia and small unchanged mild superior endplate T2 compression deformity.  < end of copied text      < from: Limited Transthoracic Echo (10.26.18 @ 14:07) >  Conclusions:  Limited tranthoracic echocardiogram at patients request to  end exam.  1. Mitral annular calcification.  2. The aortic valve opens well.  3. Limited images acquired at the patients request. Based  upon the parasternal long axis view only;  grossly normal  left ventricular systolic function.  < end of copied text >

## 2020-05-18 NOTE — DIETITIAN INITIAL EVALUATION ADULT. - ENERGY NEEDS
Pertinent information as per chart: Pt 89 y/o F with PMH: dementia, CAD, CVA, HTN, HLD, GI bleed, COPD, Afib, recent extensive stay for COVID requiring pressor support presents to the ED with decreased PO intake, found with failure to thrive, dysphagia with risk of aspiration, S/P PEG placed today (05/18).

## 2020-05-18 NOTE — DIETITIAN INITIAL EVALUATION ADULT. - ENTERAL
Recommend Jevity 1.2 through continuous feedings starting at 10 ml/hr and increase as tolerated to goal of 40 ml/hr x 24 hours to provide 960 ml, 1152 kcal, 53 g protein, and 775 ml water (28 kcal/kg and 1.3 g/kg protein based on dosing weight 40.8 kg); defer fluids to team. Will continue to monitor and adjust as needed. Spoke to PA.

## 2020-05-18 NOTE — DIETITIAN INITIAL EVALUATION ADULT. - OTHER INFO
Son reports pt with poor appetite and PO intake x 1 month PTA since diagnosed with covid-19; previously with "very" good appetite and PO intake. Confirms pt with NKFA. Reports pt not following any type of diet or restriction at nursing home; states pt was consuming food and liquids with regular texture. Reports pt taking Multivitamin PTA; denies pt drinking any nutritional supplement.     Pt NPO after midnight - S/P PEG placed today (05/18). RN reports pt previously with poor appetite and PO intake. Noted 0-20% PO intake as per flow sheets. Denies pt with nausea, vomiting, diarrhea, or constipation, last BM yesterday (05/17).     Son denies having questions/concerns about EN and nutrition at this time; made aware RD remains available.

## 2020-05-19 LAB
ANION GAP SERPL CALC-SCNC: 21 MMOL/L — HIGH (ref 5–17)
BUN SERPL-MCNC: 12 MG/DL — SIGNIFICANT CHANGE UP (ref 7–23)
CALCIUM SERPL-MCNC: 8.4 MG/DL — SIGNIFICANT CHANGE UP (ref 8.4–10.5)
CHLORIDE SERPL-SCNC: 110 MMOL/L — HIGH (ref 96–108)
CO2 SERPL-SCNC: 14 MMOL/L — LOW (ref 22–31)
CREAT SERPL-MCNC: 0.61 MG/DL — SIGNIFICANT CHANGE UP (ref 0.5–1.3)
GLUCOSE SERPL-MCNC: 58 MG/DL — LOW (ref 70–99)
HCT VFR BLD CALC: 34.7 % — SIGNIFICANT CHANGE UP (ref 34.5–45)
HGB BLD-MCNC: 10.6 G/DL — LOW (ref 11.5–15.5)
MCHC RBC-ENTMCNC: 30.5 GM/DL — LOW (ref 32–36)
MCHC RBC-ENTMCNC: 32.9 PG — SIGNIFICANT CHANGE UP (ref 27–34)
MCV RBC AUTO: 107.8 FL — HIGH (ref 80–100)
NRBC # BLD: 0 /100 WBCS — SIGNIFICANT CHANGE UP (ref 0–0)
PLATELET # BLD AUTO: 144 K/UL — LOW (ref 150–400)
POTASSIUM SERPL-MCNC: 3.4 MMOL/L — LOW (ref 3.5–5.3)
POTASSIUM SERPL-SCNC: 3.4 MMOL/L — LOW (ref 3.5–5.3)
RBC # BLD: 3.22 M/UL — LOW (ref 3.8–5.2)
RBC # FLD: 16.4 % — HIGH (ref 10.3–14.5)
SODIUM SERPL-SCNC: 145 MMOL/L — SIGNIFICANT CHANGE UP (ref 135–145)
WBC # BLD: 14.4 K/UL — HIGH (ref 3.8–10.5)
WBC # FLD AUTO: 14.4 K/UL — HIGH (ref 3.8–10.5)

## 2020-05-19 PROCEDURE — 99232 SBSQ HOSP IP/OBS MODERATE 35: CPT

## 2020-05-19 RX ORDER — RIVAROXABAN 15 MG-20MG
10 KIT ORAL DAILY
Refills: 0 | Status: DISCONTINUED | OUTPATIENT
Start: 2020-05-19 | End: 2020-05-22

## 2020-05-19 RX ORDER — SODIUM CHLORIDE 9 MG/ML
250 INJECTION INTRAMUSCULAR; INTRAVENOUS; SUBCUTANEOUS ONCE
Refills: 0 | Status: COMPLETED | OUTPATIENT
Start: 2020-05-19 | End: 2020-05-19

## 2020-05-19 RX ORDER — POLYETHYLENE GLYCOL 3350 17 G/17G
17 POWDER, FOR SOLUTION ORAL DAILY
Refills: 0 | Status: DISCONTINUED | OUTPATIENT
Start: 2020-05-19 | End: 2020-05-25

## 2020-05-19 RX ORDER — POTASSIUM CHLORIDE 20 MEQ
40 PACKET (EA) ORAL ONCE
Refills: 0 | Status: DISCONTINUED | OUTPATIENT
Start: 2020-05-19 | End: 2020-05-19

## 2020-05-19 RX ORDER — POTASSIUM CHLORIDE 20 MEQ
40 PACKET (EA) ORAL ONCE
Refills: 0 | Status: COMPLETED | OUTPATIENT
Start: 2020-05-19 | End: 2020-05-19

## 2020-05-19 RX ADMIN — POLYETHYLENE GLYCOL 3350 17 GRAM(S): 17 POWDER, FOR SOLUTION ORAL at 17:21

## 2020-05-19 RX ADMIN — ATORVASTATIN CALCIUM 20 MILLIGRAM(S): 80 TABLET, FILM COATED ORAL at 22:11

## 2020-05-19 RX ADMIN — BRIMONIDINE TARTRATE 1 DROP(S): 2 SOLUTION/ DROPS OPHTHALMIC at 17:21

## 2020-05-19 RX ADMIN — BRIMONIDINE TARTRATE 1 DROP(S): 2 SOLUTION/ DROPS OPHTHALMIC at 05:14

## 2020-05-19 RX ADMIN — RIVAROXABAN 10 MILLIGRAM(S): KIT at 09:33

## 2020-05-19 RX ADMIN — SODIUM CHLORIDE 250 MILLILITER(S): 9 INJECTION INTRAMUSCULAR; INTRAVENOUS; SUBCUTANEOUS at 22:00

## 2020-05-19 RX ADMIN — SODIUM CHLORIDE 50 MILLILITER(S): 9 INJECTION INTRAMUSCULAR; INTRAVENOUS; SUBCUTANEOUS at 09:32

## 2020-05-19 RX ADMIN — Medication 1 DROP(S): at 05:14

## 2020-05-19 RX ADMIN — Medication 1 DROP(S): at 17:15

## 2020-05-19 RX ADMIN — HEPARIN SODIUM 5000 UNIT(S): 5000 INJECTION INTRAVENOUS; SUBCUTANEOUS at 05:12

## 2020-05-19 RX ADMIN — SODIUM CHLORIDE 50 MILLILITER(S): 9 INJECTION INTRAMUSCULAR; INTRAVENOUS; SUBCUTANEOUS at 22:11

## 2020-05-19 RX ADMIN — SODIUM CHLORIDE 50 MILLILITER(S): 9 INJECTION INTRAMUSCULAR; INTRAVENOUS; SUBCUTANEOUS at 17:15

## 2020-05-19 RX ADMIN — Medication 40 MILLIEQUIVALENT(S): at 09:33

## 2020-05-19 NOTE — PROGRESS NOTE ADULT - SUBJECTIVE AND OBJECTIVE BOX
CARDIOLOGY     PROGRESS  NOTE   ________________________________________________    CHIEF COMPLAINT:Patient is a 89y old  Female who presents with a chief complaint of failure to thrive/ a.fib (18 May 2020 09:13)  no complain  	  REVIEW OF SYSTEMS:  CONSTITUTIONAL: No fever, weight loss, or fatigue  EYES: No eye pain, visual disturbances, or discharge  ENT:  No difficulty hearing, tinnitus, vertigo; No sinus or throat pain  NECK: No pain or stiffness  RESPIRATORY: No cough, wheezing, chills or hemoptysis; No Shortness of Breath  CARDIOVASCULAR: No chest pain, palpitations, passing out, dizziness, or leg swelling  GASTROINTESTINAL: No abdominal or epigastric pain. No nausea, vomiting, or hematemesis; No diarrhea or constipation. No melena or hematochezia.  GENITOURINARY: No dysuria, frequency, hematuria, or incontinence  NEUROLOGICAL: No headaches, memory loss, loss of strength, numbness, or tremors  SKIN: No itching, burning, rashes, or lesions   LYMPH Nodes: No enlarged glands  ENDOCRINE: No heat or cold intolerance; No hair loss  MUSCULOSKELETAL: No joint pain or swelling; No muscle, back, or extremity pain  PSYCHIATRIC: No depression, anxiety, mood swings, or difficulty sleeping  HEME/LYMPH: No easy bruising, or bleeding gums  ALLERGY AND IMMUNOLOGIC: No hives or eczema	    [ ] All others negative	  [x ] Unable to obtain    PHYSICAL EXAM:  T(C): 36.8 (05-19-20 @ 04:56), Max: 36.8 (05-19-20 @ 04:56)  HR: 89 (05-19-20 @ 04:56) (76 - 94)  BP: 92/60 (05-19-20 @ 04:56) (92/60 - 147/85)  RR: 17 (05-19-20 @ 04:56) (17 - 18)  SpO2: 98% (05-19-20 @ 04:56) (93% - 98%)  Wt(kg): --  I&O's Summary    18 May 2020 07:01  -  19 May 2020 07:00  --------------------------------------------------------  IN: 1200 mL / OUT: 825 mL / NET: 375 mL        Appearance: Normal	  HEENT:   Normal oral mucosa, PERRL, EOMI	  Lymphatic: No lymphadenopathy  Cardiovascular: Normal S1 S2, No JVD, + murmurs, No edema  Respiratory: Lungs clear to auscultation	  Psychiatry: A & O x 3, Mood & affect appropriate  Gastrointestinal:  Soft, Non-tender, + BS, peg  Skin: No rashes, No ecchymoses, No cyanosis	  Neurologic: Non-focal  Extremities: Normal range of motion, No clubbing, cyanosis or edema  Vascular: Peripheral pulses palpable 2+ bilaterally    MEDICATIONS  (STANDING):  atorvastatin 20 milliGRAM(s) Oral at bedtime  brimonidine 0.2% Ophthalmic Solution 1 Drop(s) Both EYES two times a day  heparin   Injectable 5000 Unit(s) SubCutaneous every 12 hours  sodium chloride 0.9%. 1000 milliLiter(s) (50 mL/Hr) IV Continuous <Continuous>  timolol 0.5% Solution 1 Drop(s) Both EYES two times a day      TELEMETRY: 	    ECG:  	  RADIOLOGY:  OTHER: 	  	  LABS:	 	    CARDIAC MARKERS:            05-19    145  |  110<H>  |  12  ----------------------------<  58<L>  3.4<L>   |  14<L>  |  0.61    Ca    8.4      19 May 2020 06:53      proBNP:   Lipid Profile:   HgA1c:   TSH: Thyroid Stimulating Hormone, Serum: 3.19 uIU/mL (04-24 @ 12:11)          Assessment and plan  ---------------------------  89 y/o female with hx of dementia, CAD, CVA, COPD, afib, and recent extensive stay for COVID requiring pressor support presents to the ED with decreased PO intake. Patient discharge to the [facility] on 4/8 and was tolerating spoon feeds at that time, per son has declining mental status but was able to say his name a few days ago. Now over past few days has not been tolerating spoon feeds and is refusing to eat, also has been difficult to arouse  no chest pain / sob.  pt with failure to thrive  dc mirtazepine ?cause of sleepiness  assist with meals   will consider PEG placement Dr cazares called on Monday  spoke to the son agreed with peg placement  a.fib ,hr controlled, continue ac  pt is not DNR  dc Xarelto start sq heparin for dvt prophylaxis  s/p  peg placement , start feeding today  restart on xaralto CARDIOLOGY     PROGRESS  NOTE   ________________________________________________    CHIEF COMPLAINT:Patient is a 89y old  Female who presents with a chief complaint of failure to thrive/ a.fib (18 May 2020 09:13)  no complain  	  REVIEW OF SYSTEMS:  CONSTITUTIONAL: No fever, weight loss, or fatigue  EYES: No eye pain, visual disturbances, or discharge  ENT:  No difficulty hearing, tinnitus, vertigo; No sinus or throat pain  NECK: No pain or stiffness  RESPIRATORY: No cough, wheezing, chills or hemoptysis; No Shortness of Breath  CARDIOVASCULAR: No chest pain, palpitations, passing out, dizziness, or leg swelling  GASTROINTESTINAL: No abdominal or epigastric pain. No nausea, vomiting, or hematemesis; No diarrhea or constipation. No melena or hematochezia.  GENITOURINARY: No dysuria, frequency, hematuria, or incontinence  NEUROLOGICAL: No headaches, memory loss, loss of strength, numbness, or tremors  SKIN: No itching, burning, rashes, or lesions   LYMPH Nodes: No enlarged glands  ENDOCRINE: No heat or cold intolerance; No hair loss  MUSCULOSKELETAL: No joint pain or swelling; No muscle, back, or extremity pain  PSYCHIATRIC: No depression, anxiety, mood swings, or difficulty sleeping  HEME/LYMPH: No easy bruising, or bleeding gums  ALLERGY AND IMMUNOLOGIC: No hives or eczema	    [ ] All others negative	  [x ] Unable to obtain    PHYSICAL EXAM:  T(C): 36.8 (05-19-20 @ 04:56), Max: 36.8 (05-19-20 @ 04:56)  HR: 89 (05-19-20 @ 04:56) (76 - 94)  BP: 92/60 (05-19-20 @ 04:56) (92/60 - 147/85)  RR: 17 (05-19-20 @ 04:56) (17 - 18)  SpO2: 98% (05-19-20 @ 04:56) (93% - 98%)  Wt(kg): --  I&O's Summary    18 May 2020 07:01  -  19 May 2020 07:00  --------------------------------------------------------  IN: 1200 mL / OUT: 825 mL / NET: 375 mL        Appearance: Normal	  HEENT:   Normal oral mucosa, PERRL, EOMI	  Lymphatic: No lymphadenopathy  Cardiovascular: Normal S1 S2, No JVD, + murmurs, No edema  Respiratory: Lungs clear to auscultation	  Gastrointestinal:  Soft, Non-tender, + BS, peg  Skin: No rashes, No ecchymoses, No cyanosis	  Neurologic: Non-focal  Extremities: Normal range of motion, No clubbing, cyanosis or edema  Vascular: Peripheral pulses palpable 2+ bilaterally    MEDICATIONS  (STANDING):  atorvastatin 20 milliGRAM(s) Oral at bedtime  brimonidine 0.2% Ophthalmic Solution 1 Drop(s) Both EYES two times a day  heparin   Injectable 5000 Unit(s) SubCutaneous every 12 hours  sodium chloride 0.9%. 1000 milliLiter(s) (50 mL/Hr) IV Continuous <Continuous>  timolol 0.5% Solution 1 Drop(s) Both EYES two times a day      TELEMETRY: 	    ECG:  	  RADIOLOGY:  OTHER: 	  	  LABS:	 	    CARDIAC MARKERS:            05-19    145  |  110<H>  |  12  ----------------------------<  58<L>  3.4<L>   |  14<L>  |  0.61    Ca    8.4      19 May 2020 06:53      proBNP:   Lipid Profile:   HgA1c:   TSH: Thyroid Stimulating Hormone, Serum: 3.19 uIU/mL (04-24 @ 12:11)          Assessment and plan  ---------------------------  89 y/o female with hx of dementia, CAD, CVA, COPD, afib, and recent extensive stay for COVID requiring pressor support presents to the ED with decreased PO intake. Patient discharge to the [facility] on 4/8 and was tolerating spoon feeds at that time, per son has declining mental status but was able to say his name a few days ago. Now over past few days has not been tolerating spoon feeds and is refusing to eat, also has been difficult to arouse  no chest pain / sob.  pt with failure to thrive  dc mirtazepine ?cause of sleepiness  a.fib ,hr controlled, continue ac  pt is not DNR  s/p  peg placement , start feeding today  restart on Xarelto  dc planning

## 2020-05-19 NOTE — PROGRESS NOTE ADULT - SUBJECTIVE AND OBJECTIVE BOX
s/p  peg  REVIEW OF SYSTEMS:  GEN: no fever,    no chills  RESP: no SOB,   no cough  CVS: no chest pain,   no palpitations  GI: no abdominal pain,   no nausea,   no vomiting,   no constipation,   no diarrhea  : no dysuria,   no frequency  NEURO: no headache,   no dizziness  PSYCH: no depression,   not anxious  Derm : no rash    MEDICATIONS  (STANDING):  atorvastatin 20 milliGRAM(s) Oral at bedtime  brimonidine 0.2% Ophthalmic Solution 1 Drop(s) Both EYES two times a day  potassium chloride   Powder 40 milliEquivalent(s) Enteral Tube once  rivaroxaban 10 milliGRAM(s) Oral daily  sodium chloride 0.9%. 1000 milliLiter(s) (50 mL/Hr) IV Continuous <Continuous>  timolol 0.5% Solution 1 Drop(s) Both EYES two times a day    MEDICATIONS  (PRN):      Vital Signs Last 24 Hrs  T(C): 36.8 (19 May 2020 04:56), Max: 36.8 (19 May 2020 04:56)  T(F): 98.3 (19 May 2020 04:56), Max: 98.3 (19 May 2020 04:56)  HR: 89 (19 May 2020 04:56) (76 - 94)  BP: 92/60 (19 May 2020 04:56) (92/60 - 147/85)  BP(mean): --  RR: 17 (19 May 2020 04:56) (17 - 18)  SpO2: 98% (19 May 2020 04:56) (93% - 98%)  CAPILLARY BLOOD GLUCOSE        I&O's Summary    18 May 2020 07:01  -  19 May 2020 07:00  --------------------------------------------------------  IN: 1200 mL / OUT: 825 mL / NET: 375 mL        PHYSICAL EXAM:  HEAD:  Atraumatic, Normocephalic  NECK: Supple, No   JVD  CHEST/LUNG:   no     rales,     no,    rhonchi  HEART: Regular rate and rhythm;         murmur  ABDOMEN: Soft, Nontender, ;   EXTREMITIES:    no    edema  NEUROLOGY:   dementia    LABS:                        10.6   14.40 )-----------( 144      ( 19 May 2020 06:53 )             34.7     05-19    145  |  110<H>  |  12  ----------------------------<  58<L>  3.4<L>   |  14<L>  |  0.61    Ca    8.4      19 May 2020 06:53                      Thyroid Stimulating Hormone, Serum: 3.19 uIU/mL (04-24 @ 12:11)          Consultant(s) Notes Reviewed:      Care Discussed with Consultants/Other Providers:

## 2020-05-19 NOTE — PROGRESS NOTE ADULT - SUBJECTIVE AND OBJECTIVE BOX
INTERVAL HPI/OVERNIGHT EVENTS:    patient seen and examined this morning  s/p PEG   tube feeds to start today     MEDICATIONS  (STANDING):  atorvastatin 20 milliGRAM(s) Oral at bedtime  brimonidine 0.2% Ophthalmic Solution 1 Drop(s) Both EYES two times a day  rivaroxaban 10 milliGRAM(s) Oral daily  sodium chloride 0.9%. 1000 milliLiter(s) (50 mL/Hr) IV Continuous <Continuous>  timolol 0.5% Solution 1 Drop(s) Both EYES two times a day    MEDICATIONS  (PRN):      Allergies    aspirin (Unknown)  penicillin (Unknown)    Intolerances        Review of Systems:    General:  No wt loss, fevers, chills, night sweats,fatigue,   Eyes:  Good vision, no reported pain  ENT:  No sore throat, pain, runny nose, dysphagia  CV:  No pain, palpitatioins, hypo/hypertension  Resp:  No dyspnea, cough, tachypnea, wheezing  GI:  No pain, No nausea, No vomiting, No diarrhea, No constipatiion, No weight loss, No fever, No pruritis, No rectal bleeding, No tarry stools, No dysphagia,  :  No pain, bleeding, incontinence, nocturia  Muscle:  No pain, weakness  Neuro:  No weakness, tingling, memory problems  Psych:  No fatigue, insomnia, mood problems, depression  Endocrine:  No polyuria, polydypsia, cold/heat intolerance  Heme:  No petechiae, ecchymosis, easy bruisability  Skin:  No rash, tattoos, scars, edema      Vital Signs Last 24 Hrs  T(C): 36.8 (19 May 2020 04:56), Max: 36.8 (19 May 2020 04:56)  T(F): 98.3 (19 May 2020 04:56), Max: 98.3 (19 May 2020 04:56)  HR: 89 (19 May 2020 04:56) (76 - 94)  BP: 92/60 (19 May 2020 04:56) (92/60 - 147/85)  BP(mean): --  RR: 17 (19 May 2020 04:56) (17 - 18)  SpO2: 98% (19 May 2020 04:56) (93% - 98%)    PHYSICAL EXAM:    Constitutional: weak, frail, NAD   HEENT: EOMI, throat clear  Neck: No LAD, supple  Respiratory: respirations nonlabored   Cardiovascular: S1 and S2, RRR, no M  Gastrointestinal: BS+, soft, NT/ND, +PEG   Extremities: No peripheral edema  Neurological: awake      LABS:                        10.6   14.40 )-----------( 144      ( 19 May 2020 06:53 )             34.7     05-19    145  |  110<H>  |  12  ----------------------------<  58<L>  3.4<L>   |  14<L>  |  0.61    Ca    8.4      19 May 2020 06:53            RADIOLOGY & ADDITIONAL TESTS:  < from: Upper Endoscopy (05.18.20 @ 10:28) >    [facility]  ____________________________________________________________________________________________________  Patient Name: Jessica Present                      MRN: [id number]  Account Number: [id number]                     YOB: 1925  Room: Endoscopy Room 1                           Gender: Female  Attending MD: Simeon Swanson MD                 Procedure Date No Time: 5/18/2020  ____________________________________________________________________________________________________     Procedure:           Upper GI endoscopy  Indications:         Place PEG because patient is unable to eat  Providers:           Simeon Swanson MD, Homer Ramirez PA-C  Medicines:           Monitored Anesthesia Care  Complications:       No immediate complications.  ____________________________________________________________________________________________________  Procedure:           After obtaining informed consent, the endoscope was passed under direct            vision. Throughout the procedure, the patient's blood pressure, pulse, and                        oxygen saturations were monitored continuously. The was introduced through                        the mouth, and advanced to the second part ofduodenum. The upper GI                        endoscopy was accomplished without difficulty. The patient tolerated the                        procedure well.            Findings:       The examined esophagus was normal.       Mild inflammation was found in the gastric antrum.       Inflammation was found in the duodenal bulb.       The patient was placed in the supine position for PEG placement. The stomach was insufflated        to appose gastric and abdominal walls. A site was located in the body of the stomach with        excellent transillumination for placement. The abdominal wall was marked and prepped in a        sterile manner. The area was anesthetized with 5 mL of 0.5% lidocaine. The trocar needle was        introduced through the abdominal wall and into the stomach under direct endoscopic view. A        snare was introduced through the endoscope and opened in the gastric lumen. The guidewire        was passed through the trocar and into the open snare. The snare was closed around the guide        wire. The endoscope and snare were removed, pulling the wire out through the mouth. A skin        incision was made at the site of needle insertion. The endoscopically removable 20 Fr Bard        gastrostomy tube was lubricated. The G-tube was tied to the guide wire and pulled through the        mouth and into the stomach. The trocar needle was removed, and the gastrostomy tube was     pulled out from the stomach through the skin. The external bumper was attached to the        gastrostomy tube, and the tube was cut to remove the guide wire. The final position of the        gastrostomy tube was confirmed by relook endoscopy, and skin marking noted to be 2.5 cm at        the external bumper. The final tension and compression of the abdominal wall by the PEG tube        and external bumper were checked and revealed that the bumper was moderately tight and mildly        deformingthe skin. The feeding tube was capped, and the tube site cleaned and dressed.                                                                                                        Impression:          - Normal esophagus.                       - Gastritis.                       - Duodenitis.                       - An endoscopically removable PEG placement was successfully completed.                       - No specimens collected.  Recommendation:      - Please follow the post-PEG recommendationsincluding: may use PEG today for                        meds and water and may use PEG tomorrow for feedings.                       - Please follow the post-PEG recommendations including: Have abdominal binder                        on at ALL TIMES to prevent inadvertent removal.                                                                                                        Attending Participation:       I personally performed the entire procedure.                                                                   _________________  Simeon Swanson MD  5/18/2020 3:01:59 PM  Number of Addenda: 0    Note Initiated On: 5/18/2020 10:28 AM    < end of copied text >

## 2020-05-19 NOTE — PROGRESS NOTE ADULT - PROBLEM SELECTOR PLAN 1
s/p PEG   tube feeds to start today  aspiration precautions  daily peg care  monitor residuals  monitor GI function  miralax daily  will follow

## 2020-05-19 NOTE — PROGRESS NOTE ADULT - ASSESSMENT
89   yr pt,   pt  with  h/o  afib,   not  on   xarelto,  due  to falls,   cva,   copd, hld,     alzheimers  dementia,    pud/  h/o left ribf xs. 8/ 9 th,. left hip surg  h/o mlple falls.  echo, normal ef/ T2  comp  deformity, ct head, no cva//   ct  chest  with  goo, on last  visit    pt  was COVID  positive,  on 4/17/20,  had  elevated  troponin/ CRP/  Ferritin  , from Covid   pt  with  advanced  age/    comfort/   supportive  care is  ideal  however, son  wants  all  done.       sent  to  er  from   [facility], for  weakness/ not  eating/FTT  palliative/ comfort care,  ought to  be goal  in this pt,  however   son  wants  to  continue  current care   was  seen  by swallow  eval  on last visit/ oral feeds  at high risk  for  aspiration    hypernatremia, resolved / Na  is  145    Goc,   visited  again,  given  advanced  age  of  pt/ Alzheimers  dementia.  and  son wants  peg  son wants   all  done/ peg  on  5/18,  start  feeds  today  follow  wbc/ may be  reactive         < from: CT Chest No Cont (04.17.20 @ 04:24) >  IMPRESSION:   Pattern of GGO suggests infection including atypical pneumonia/viral infection from atypical agents including COVID-19 (C19V-1)  < end of copied text >     < from: CT Thoracic Spine Reform No Cont (01.07.20 @ 16:43) >  IMPRESSION:  Volume loss, microvascular disease, no acute hemorrhage or midline shift. If symptoms persist consider follow up head CT or MRI contraindications.  Cervical and thoracic spine are unchanged from prior, no obvious fracture or dislocation, multilevel degenerative changes as noted previously with diffuse osteopenia and small unchanged mild superior endplate T2 compression deformity.  < end of copied text      < from: Limited Transthoracic Echo (10.26.18 @ 14:07) >  Conclusions:  Limited tranthoracic echocardiogram at patients request to  end exam.  1. Mitral annular calcification.  2. The aortic valve opens well.  3. Limited images acquired at the patients request. Based  upon the parasternal long axis view only;  grossly normal  left ventricular systolic function.  < end of copied text >

## 2020-05-19 NOTE — PHYSICAL THERAPY INITIAL EVALUATION ADULT - GAIT DEVIATIONS NOTED, PT EVAL
decreased romie/decreased step length/decreased velocity of limb motion/decreased weight-shifting ability

## 2020-05-19 NOTE — PROGRESS NOTE ADULT - ASSESSMENT
89 y/o female with hx of dementia, CAD, CVA, COPD, afib, and recent extensive stay for COVID requiring pressor support presents to the ED with decreased PO intake. GI consulted for PEG evaluation

## 2020-05-19 NOTE — PHYSICAL THERAPY INITIAL EVALUATION ADULT - PERTINENT HX OF CURRENT PROBLEM, REHAB EVAL
Pt is a 89 y/o female admitted to [facility] on 5/14/20 hx of dementia, CAD, CVA, COPD, afib, and recent extensive stay for COVID requiring pressor support presents to the ED with decreased PO intake. Patient discharge to the [facility] on 4/8 and was tolerating spoon feeds at that time, per son has declining mental status but was able to say his name a few days ago. Now over past few days has not been tolerating spoon feeds and is refusing to eat, also has been difficult to arouse.

## 2020-05-19 NOTE — DISCHARGE NOTE ADULT - FUNCTIONAL SCREEN CURRENT LEVEL: DRESSING, MLM
Please contact patient with their testing results: Your test for COVID-19, also known as novel coronavirus, came back negative. No virus was detected from the sample collected. Until your symptoms are fully resolved, you may still be contagious. We recommend that you remain isolated for 7 days minimum or 72 hours after your symptoms have completely resolved, whichever is longer. Continually monitor symptoms. Contact a medical provider if symptoms are worsening. If you have any additional questions, contact your PCP.     For additional information, please visit the Centers for Disease Control and Prevention ([email or web address]
(4) completely dependent

## 2020-05-19 NOTE — PHYSICAL THERAPY INITIAL EVALUATION ADULT - STRENGTHENING, PT EVAL
GOAL: Pt will improve bilateral LE strength to 3/5, for increased limb stability, to improve transfers  in 4 weeks.

## 2020-05-20 LAB
ANION GAP SERPL CALC-SCNC: 13 MMOL/L — SIGNIFICANT CHANGE UP (ref 5–17)
APPEARANCE UR: ABNORMAL
BACTERIA # UR AUTO: NEGATIVE — SIGNIFICANT CHANGE UP
BILIRUB UR-MCNC: ABNORMAL
BUN SERPL-MCNC: 16 MG/DL — SIGNIFICANT CHANGE UP (ref 7–23)
CALCIUM SERPL-MCNC: 8.6 MG/DL — SIGNIFICANT CHANGE UP (ref 8.4–10.5)
CHLORIDE SERPL-SCNC: 116 MMOL/L — HIGH (ref 96–108)
CO2 SERPL-SCNC: 17 MMOL/L — LOW (ref 22–31)
COLOR SPEC: YELLOW — SIGNIFICANT CHANGE UP
CREAT SERPL-MCNC: 0.64 MG/DL — SIGNIFICANT CHANGE UP (ref 0.5–1.3)
DIFF PNL FLD: ABNORMAL
EPI CELLS # UR: 8 /HPF — HIGH (ref 0–5)
GLUCOSE BLDC GLUCOMTR-MCNC: 194 MG/DL — HIGH (ref 70–99)
GLUCOSE SERPL-MCNC: 206 MG/DL — HIGH (ref 70–99)
GLUCOSE UR QL: NEGATIVE — SIGNIFICANT CHANGE UP
HYALINE CASTS # UR AUTO: 0 /LPF — SIGNIFICANT CHANGE UP (ref 0–7)
KETONES UR-MCNC: ABNORMAL
LEUKOCYTE ESTERASE UR-ACNC: ABNORMAL
NITRITE UR-MCNC: NEGATIVE — SIGNIFICANT CHANGE UP
PH UR: 6 — SIGNIFICANT CHANGE UP (ref 5–8)
POTASSIUM SERPL-MCNC: 4.3 MMOL/L — SIGNIFICANT CHANGE UP (ref 3.5–5.3)
POTASSIUM SERPL-SCNC: 4.3 MMOL/L — SIGNIFICANT CHANGE UP (ref 3.5–5.3)
PROT UR-MCNC: ABNORMAL
RBC CASTS # UR COMP ASSIST: 6 /HPF — HIGH (ref 0–4)
SARS-COV-2 RNA SPEC QL NAA+PROBE: SIGNIFICANT CHANGE UP
SODIUM SERPL-SCNC: 146 MMOL/L — HIGH (ref 135–145)
SP GR SPEC: 1.02 — SIGNIFICANT CHANGE UP (ref 1.01–1.02)
UROBILINOGEN FLD QL: ABNORMAL
WBC UR QL: >720 /HPF — HIGH (ref 0–5)

## 2020-05-20 PROCEDURE — 99232 SBSQ HOSP IP/OBS MODERATE 35: CPT

## 2020-05-20 RX ORDER — SODIUM CHLORIDE 9 MG/ML
250 INJECTION INTRAMUSCULAR; INTRAVENOUS; SUBCUTANEOUS ONCE
Refills: 0 | Status: COMPLETED | OUTPATIENT
Start: 2020-05-20 | End: 2020-05-20

## 2020-05-20 RX ORDER — SODIUM CHLORIDE 9 MG/ML
1000 INJECTION, SOLUTION INTRAVENOUS
Refills: 0 | Status: DISCONTINUED | OUTPATIENT
Start: 2020-05-20 | End: 2020-05-22

## 2020-05-20 RX ADMIN — ATORVASTATIN CALCIUM 20 MILLIGRAM(S): 80 TABLET, FILM COATED ORAL at 21:07

## 2020-05-20 RX ADMIN — SODIUM CHLORIDE 50 MILLILITER(S): 9 INJECTION, SOLUTION INTRAVENOUS at 12:42

## 2020-05-20 RX ADMIN — SODIUM CHLORIDE 250 MILLILITER(S): 9 INJECTION INTRAMUSCULAR; INTRAVENOUS; SUBCUTANEOUS at 20:30

## 2020-05-20 RX ADMIN — RIVAROXABAN 10 MILLIGRAM(S): KIT at 12:42

## 2020-05-20 RX ADMIN — POLYETHYLENE GLYCOL 3350 17 GRAM(S): 17 POWDER, FOR SOLUTION ORAL at 12:42

## 2020-05-20 NOTE — PROGRESS NOTE ADULT - PROBLEM/PLAN-1
[place] [facility]OV[facility]  PROGRESS NOTE    Patient: Krystle Cabral Date of Service: 7/14/2018   YOB: 1922 Admission Date: 7/11/2018   MRN: [id number] Attending: Luis Powell MD   PCP: Daphnie Bowman MD   Hospital Day: Hospital Day: 4     PRIMARY CARDIOLOGIST: Dr. Mcknight    SUMMARY OF HOSPITALIZATION:  Krystle Cabral is a 89 year old female who was presented  to [facility] [place] with SOB, orthopenia and leg swelling. SOB at rest and worse with walking. She has a history of Aortic stenosis s/p balloon valvuloplasty on 2016 where Aortic valve gradient was reduced from 35 mmHg to 15 mmHg. Echo done showed severe AS, mean gradient 44.5 mmHg and an aortic valve area of 0.56 cm2, EF 75 %. She also has acute diastolic heart failure, acute on chronic renal dysfunction ( creatinine 1.50 and estimated GFR 29 ) , Atrial fibrillation, and Anemia.       INTERVAL EVENTS:  7/12/18  -Patient had a Successful aortic valvuloplasty with reduction in mean gradient from 36 to 17 mmHg and partially successful application of two Perclose devices.  -  She is intubated ( volume support AC mode with an fiO2 of .45 ) and sedated on Propofol.   - Patients Hgb of 7.7 g/dl was discussed with the cardiology fellow Dr. Olsen.    7/13/18  - Patient is still intubated on mechanical ventilation, AC mode and fiO2 .45  - Her Hgb is 6.9 today and the result discussed with Dr. Fang who advised to transfuse her with one unit of RBC.  No active bleeding, catheter insertion sites are free of hematoma, had history of Diverticulosis, Hemorrhoid and Colitis in the past. She was taking Ferrous sulfate before admission. The Hgb and further diagnostic plan discussed with Dr. Fang.  - Heparin infusion restarted this morning.   - Her UOP is 2595 ml from 7/12 to 7/13.  - TAVR team will see her on Monday and reassess for candidacy for any treatment options.    7/14/18  - Denies chest pain or chest tightness. Has mild SOB for which she is on  BIPAP.  - She was extubated yesterday and is maintaining her SpO2 to 97 % on BIPAP.  - Had a UOP of 1115 ml in 24 hrs from 7/13 to 7/14.  - Took 1 unit of RBC transfusion today and her Hgb today is 8.2 g/dl and Hct of 26.9 %.  - Discussed with Dr. Gamboa who recommended that the Lasix that was held yesterday for low BP be restarted.   RHYTHM / TELEMETRY EVENTS:  Atrial Fibrillation    CARDIAC STUDIES:   Date: 7/9/18  Atrial fibrillation with rapid ventricular response ST And T waveabnormality, consider inferior ischemia or digitalis effect ST And T wave abnormality, consider anterolateral ischemia or digitalis effect Abnormal ECG When compared with ECG of 16-NOV-2016 10:33, Atrial fibrillation has replaced Sinus rhythm heart rate is faster St-T wave abnormality mildly worse.     Date: 7/12/18  Atrial fibrillation Left ventricular hypertrophy with repolarization abnormality Cannot rule out Septal infarct ,age undetermined When compared with ECG of 09-JUL-2018 13:13, Minimal criteria for Septal infarct are now present.     Date: 7/14/18  Sinus rhythm with premature atrial complexes with aberrant conduction Septal infarct (cited on or before   12-JUL-2018) When compared with ECG of 12-JUL-2018 04:18, Sinus rhythm has replaced Atrial fibrillation Serial changes of evolving Septal infarct present.         ECHO:   Date: 7/10/18  Normal left ventricular systolic function.  Moderate left ventricular hypertrophy.  Grade III/IV diastolic dysfunction.  Left ventricular ejection fraction, 75 %.  Normal right ventricular systolic function.  Severely increased left atrial volume 65.2 ml/m².  Mildly increased right atrial size.  Mild mitral and aortic valve regurgitation.  Severe aortic valve stenosis, mean gradient 44.5 mmHg, MEG 0.56 cm².  Mild tricuspid and pulmonic valve regurgitation.  Moderate pulmonary hypertension, RVSP 50.8 mmHg.  Normal pericardium without effusion.  Since the prior study from 11/16/2016 , the calculated  aortic valve area is smaller on today's study (s/p aortic valvuloplasty). LV function is more hyperdynamic.   LVOT peak velocities are slightly different whcih affects the calculation. Peak estimated pulmonary  pressures are higher on today's study.     STRESS TEST:  None     CARDIAC CATH AND INTERVENTIONS:   Date: 7/12/18  Successful aortic valvuloplasty with reduction in mean gradient from 36 to 17 mmHg. Partially successful application of two Perclose devices.     CURRENT MEDICATIONS:   Scheduled:  • ferrous sulfate  325 mg Oral BID WC       Infusions:  • furosemide (LASIX) 250 mg in sodium chloride 0.9 % 125 mL total volume infusion 5 mg/hr (07/13/18 0813)   • norepinephrine (LEVOPHED) 4 mg in sodium chloride 0.9% 250 mL infusion 5 mcg/min (07/13/18 0639)   • heparin (porcine) 72231 units/500 mL dextrose 5% standard infusion 12 Units/kg/hr (07/13/18 0813)       PHYSICAL EXAM:   Blood pressure 109/52, pulse 67, temperature 97.6 °F (36.4 °C), temperature source Oral, resp. rate 24, height 5' (1.524 m), weight 64.5 kg, SpO2 97 %.    Intake/Output Summary (Last 24 hours) at 07/14/18 1151  Last data filed at 07/14/18 1000   Gross per 24 hour   Intake             2195 ml   Output             1065 ml   Net             1130 ml     General Appearance:  She is awake, alert and oriented, on BIPAP, not in distress  Eyes: Anicteric sclerae  Mouth: Moist mucous membranes   Cardiovascular: RRR, S1 and S2 heard, has systolic murmur at the aortic area heard throughout the precordium  Lungs: Has faint crackles heard basally bilaterally, improved findings from the previous days  Abdomen: Soft, non-tender, non-distended  Extremities: Has bilateral pedal and pretibial edema  Skin: Warm and dry  Psych: Alert, mood appropriate    LABS:     CARDIAC MARKERS:     Recent Labs  Lab 07/12/18  0805 07/09/18  1819 07/09/18  1317   RAPDTR 0.02 0.02 0.02       BNP:     Recent Labs  Lab 07/12/18  0805 07/09/18  1317   * 205*       CBC:      Recent Labs  Lab 07/14/18  0424 07/13/18  2225 07/13/18  1802 07/13/18  0300   WBC 10.1 11.0  --  8.2   HGB 8.2* 8.1* 8.1* 6.9*   HCT 26.9* 26.5*  --  23.9*    216  --  296       CMP:  Recent Labs  Lab 07/14/18  0424 07/13/18  1025 07/13/18  0300 07/12/18  1630 07/12/18  0805  07/09/18  1317   SODIUM 146*  --  147* 145 144  < > 140   POTASSIUM 3.0* 3.8 3.0* 3.0* 3.7  < > 3.8   CHLORIDE 104  --  104 104 103  < > 103   CO2 32  --  30 29 34*  < > 28   BUN 39*  --  39* 39* 38*  < > 44*   CREATININE 1.54*  --  1.46* 1.42* 1.50*  < > 1.37*   GLUCOSE 145*  --  115* 112* 113*  < > 104*   ALBUMIN  --   --   --   --  2.8*  --  2.6*   GPT  --   --   --   --  12  --  13   ALKPT  --   --   --   --  98  --  107   BILIRUBIN  --   --   --   --  0.5  --  0.4   MG 2.0  --  1.8 2.0 2.4  < >  --    < > = values in this interval not displayed.    LIPID PANEL:     Recent Labs  Lab 07/12/18  1520 07/10/18  0445   CHOLESTEROL 95 92   TRIGLYCERIDE 112 69   HDL 35* 37*   CALCLDL 38 41       HbA1c: No results found for: HGBA1C    COAGULATION STUDIES:   Recent Labs  Lab 07/14/18  0424 07/13/18  2059 07/13/18  1025  07/12/18  0805   PT  --   --   --   --  11.5   PTT 40* 38* 33*  < > 40*   INR  --   --   --   --  1.1   < > = values in this interval not displayed.    BEST PRACTICE BOX     AMI WITH Heart Failure with Reduced LVEF (<40%)?    no  AMI?  No  Heart Failure with Reduced LVEF (< 40%)?    Yes    LVEF assessment: yes - EF% and date attached  Ejection Fraction   Date Value Ref Range Status   07/10/2018 75.0 % Final            LVEF < or equal to 35%: no  ACE/ARB for LVEF<40%: not indicated EF greater than 40%  Angiotensin Receptor Neprilysin Inhibitor for LVEF<40%: not indicated EF > 40%  Beta blocker (evidence based) for LVEF <40%: no - contraindicated: Beta-blocker not ordered due to other: optimizing other medications  Aldosterone blocking agent/Antagonist prescribed for LVEF < or equal to 35%: not indicated EF >  35%  Hydralazine and Nitrate ordered for  patients w LVEF < 40%: not indicated - patient not   Hx of or current Atrial fibrillation/ Atrial flutter: history of and/or current atrial fibrillation/atrial flutter anti-thrombotic ordered (not aspirin or Plavix)  Post discharge follow-up within 7 days scheduled: no, to be addressed prior to discharge    ASSESSMENT/PROBLEM LIST:   Krystle Cabral is a 89 year old female with history of Aortic stenosis s/p balloon valvuloplasty on 2016 and atrial fibrillation, admitted with acute diastolic heart failure. She had a successful aortic valvuloplasty with reduction in mean gradient from 36 to 17 mmHg and partially successful application of two Perclose devices.    # Aortic stenosis s/p aortic valvuloplasty, TAVR team will assess her for planned TAVR.  # Acute diastolic heart failure, improving  # Acute on chronic renal dysfunction, estimated GFR 28  # Atrial fibrillation, changed to Sinus rhythm today  # Anemia, Hgb 8.2 g/dl  # Diverticulosis  # Hemorrhoid    PLAN:     # Continue Heparin infusion, Ferrous sulfate  # Do CXR today  # Resume Lasix that was held yesterday for low BP.  # TAVR team will see her on Monday and reassess for candidacy for any treatment options.     Patient seen with Dr. Powell who formulated the plan.     Lorri Lopez MD  IM PGY-I  7/14/2018 11:51 AM  Pager [phone]    To chair! Spry and responsive and oriented BUT RR 24, no rales -> CXR at my request family present 10 min discussion  Luis Powell MD     DISPLAY PLAN FREE TEXT

## 2020-05-20 NOTE — PROGRESS NOTE ADULT - ASSESSMENT
89   yr pt,   pt  with  h/o  afib,   not  on   xarelto,  due  to falls,   cva,   copd, hld,     alzheimers  dementia,    pud/  h/o left ribf xs. 8/ 9 th,. left hip surg  h/o mlple falls.  echo, normal ef/ T2  comp  deformity, ct head, no cva//   ct  chest  with  goo, on last  visit    pt  was COVID  positive,  on 4/17/20,  had  elevated  troponin/ CRP/  Ferritin  , from Covid   pt  with  advanced  age/    comfort/   supportive  care is  ideal  however, son  wants  all  done.       sent  to  er  from   [facility], for  weakness/ not  eating/FTT  palliative/ comfort care,  ought to  be goal  in this pt,  however   son  wants  to  continue  current care   was  seen  by swallow  eval  on last visit/ oral feeds  at high risk  for  aspiration  Goc,   visited  again,  given  advanced  age  of  pt/ Alzheimers  dementia.  son wants   all  done/ peg  placed  on  5/18,    follow  wbc/ may be  reactive  tolerating feeds  hypernatremia/  on iv fluids  bmp in am     < from: CT Chest No Cont (04.17.20 @ 04:24) >  IMPRESSION:   Pattern of GGO suggests infection including atypical pneumonia/viral infection from atypical agents including COVID-19 (C19V-1)  < end of copied text >     < from: CT Thoracic Spine Reform No Cont (01.07.20 @ 16:43) >  IMPRESSION:  Volume loss, microvascular disease, no acute hemorrhage or midline shift. If symptoms persist consider follow up head CT or MRI contraindications.  Cervical and thoracic spine are unchanged from prior, no obvious fracture or dislocation, multilevel degenerative changes as noted previously with diffuse osteopenia and small unchanged mild superior endplate T2 compression deformity.  < end of copied text      < from: Limited Transthoracic Echo (10.26.18 @ 14:07) >  Conclusions:  Limited tranthoracic echocardiogram at patients request to  end exam.  1. Mitral annular calcification.  2. The aortic valve opens well.  3. Limited images acquired at the patients request. Based  upon the parasternal long axis view only;  grossly normal  left ventricular systolic function.  < end of copied text >

## 2020-05-20 NOTE — PROGRESS NOTE ADULT - SUBJECTIVE AND OBJECTIVE BOX
weak/     REVIEW OF SYSTEMS:  GEN: no fever,    no chills  RESP: no SOB,   no cough  CVS: no chest pain,   no palpitations  GI: no abdominal pain,   no nausea,   no vomiting,   no constipation,   no diarrhea  : no dysuria,   no frequency  NEURO: no headache,   no dizziness  PSYCH: no depression,   not anxious  Derm : no rash    MEDICATIONS  (STANDING):  atorvastatin 20 milliGRAM(s) Oral at bedtime  brimonidine 0.2% Ophthalmic Solution 1 Drop(s) Both EYES two times a day  dextrose 5% + sodium chloride 0.45%. 1000 milliLiter(s) (50 mL/Hr) IV Continuous <Continuous>  polyethylene glycol 3350 17 Gram(s) Oral daily  rivaroxaban 10 milliGRAM(s) Oral daily  timolol 0.5% Solution 1 Drop(s) Both EYES two times a day    MEDICATIONS  (PRN):      Vital Signs Last 24 Hrs  T(C): 36.6 (20 May 2020 05:08), Max: 37 (19 May 2020 14:46)  T(F): 97.9 (20 May 2020 05:08), Max: 98.6 (19 May 2020 14:46)  HR: 86 (20 May 2020 05:08) (83 - 86)  BP: 106/52 (20 May 2020 05:08) (74/40 - 115/74)  BP(mean): --  RR: 18 (20 May 2020 05:08) (18 - 18)  SpO2: 99% (20 May 2020 05:08) (98% - 100%)  CAPILLARY BLOOD GLUCOSE        I&O's Summary    19 May 2020 07:01  -  20 May 2020 07:00  --------------------------------------------------------  IN: 1110 mL / OUT: 525 mL / NET: 585 mL        PHYSICAL EXAM:  HEAD:  Atraumatic, Normocephalic  NECK: Supple, No   JVD  CHEST/LUNG:   no     rales,     no,    rhonchi  HEART: Regular rate and rhythm;         murmur  ABDOMEN: Soft, Nontender, ;   EXTREMITIES:   no     edema  NEUROLOGY:  dementia    LABS:                        10.6   14.40 )-----------( 144      ( 19 May 2020 06:53 )             34.7     05-20    146<H>  |  116<H>  |  16  ----------------------------<  206<H>  4.3   |  17<L>  |  0.64    Ca    8.6      20 May 2020 08:59            Urinalysis Basic - ( 19 May 2020 23:59 )    Color: Yellow / Appearance: Turbid / S.023 / pH: x  Gluc: x / Ketone: Large  / Bili: Moderate / Urobili: 6 mg/dL   Blood: x / Protein: 100 mg/dL / Nitrite: Negative   Leuk Esterase: Large / RBC: 6 /HPF / WBC >720 /HPF   Sq Epi: x / Non Sq Epi: 8 /HPF / Bacteria: Negative              Thyroid Stimulating Hormone, Serum: 3.19 uIU/mL ( @ 12:11)          Consultant(s) Notes Reviewed:      Care Discussed with Consultants/Other Providers:

## 2020-05-20 NOTE — PROGRESS NOTE ADULT - PROBLEM SELECTOR PLAN 1
s/p PEG   continue tube feeds as tolerated   aspiration precautions  daily peg care  monitor residuals  monitor GI function  miralax daily  will follow

## 2020-05-20 NOTE — PROGRESS NOTE ADULT - SUBJECTIVE AND OBJECTIVE BOX
INTERVAL HPI/OVERNIGHT EVENTS:    patient seen and examined this morning  tf running @ 40cc    MEDICATIONS  (STANDING):  atorvastatin 20 milliGRAM(s) Oral at bedtime  brimonidine 0.2% Ophthalmic Solution 1 Drop(s) Both EYES two times a day  rivaroxaban 10 milliGRAM(s) Oral daily  sodium chloride 0.9%. 1000 milliLiter(s) (50 mL/Hr) IV Continuous <Continuous>  timolol 0.5% Solution 1 Drop(s) Both EYES two times a day    MEDICATIONS  (PRN):      Allergies    aspirin (Unknown)  penicillin (Unknown)    Intolerances        Review of Systems:    General:  No wt loss, fevers, chills, night sweats,fatigue,   Eyes:  Good vision, no reported pain  ENT:  No sore throat, pain, runny nose, dysphagia  CV:  No pain, palpitatioins, hypo/hypertension  Resp:  No dyspnea, cough, tachypnea, wheezing  GI:  No pain, No nausea, No vomiting, No diarrhea, No constipatiion, No weight loss, No fever, No pruritis, No rectal bleeding, No tarry stools, No dysphagia,  :  No pain, bleeding, incontinence, nocturia  Muscle:  No pain, weakness  Neuro:  No weakness, tingling, memory problems  Psych:  No fatigue, insomnia, mood problems, depression  Endocrine:  No polyuria, polydypsia, cold/heat intolerance  Heme:  No petechiae, ecchymosis, easy bruisability  Skin:  No rash, tattoos, scars, edema      Vital Signs Last 24 Hrs  T(C): 36.8 (19 May 2020 04:56), Max: 36.8 (19 May 2020 04:56)  T(F): 98.3 (19 May 2020 04:56), Max: 98.3 (19 May 2020 04:56)  HR: 89 (19 May 2020 04:56) (76 - 94)  BP: 92/60 (19 May 2020 04:56) (92/60 - 147/85)  BP(mean): --  RR: 17 (19 May 2020 04:56) (17 - 18)  SpO2: 98% (19 May 2020 04:56) (93% - 98%)    PHYSICAL EXAM:    Constitutional: weak, frail, NAD   HEENT: EOMI, throat clear  Neck: No LAD, supple  Respiratory: respirations nonlabored   Cardiovascular: S1 and S2, RRR, no M  Gastrointestinal: BS+, soft, NT/ND, +PEG   Extremities: No peripheral edema  Neurological: awake      LABS:                        10.6   14.40 )-----------( 144      ( 19 May 2020 06:53 )             34.7     05-19    145  |  110<H>  |  12  ----------------------------<  58<L>  3.4<L>   |  14<L>  |  0.61    Ca    8.4      19 May 2020 06:53            RADIOLOGY & ADDITIONAL TESTS:  < from: Upper Endoscopy (05.18.20 @ 10:28) >    [facility]  ____________________________________________________________________________________________________  Patient Name: Jessica Present                      MRN: [id number]  Account Number: [id number]                     YOB: 1925  Room: Endoscopy Room 1                           Gender: Female  Attending MD: Simeon Swanson MD                 Procedure Date No Time: 5/18/2020  ____________________________________________________________________________________________________     Procedure:           Upper GI endoscopy  Indications:         Place PEG because patient is unable to eat  Providers:           Simeon Swanson MD, Homer Ramirez PA-C  Medicines:           Monitored Anesthesia Care  Complications:       No immediate complications.  ____________________________________________________________________________________________________  Procedure:           After obtaining informed consent, the endoscope was passed under direct            vision. Throughout the procedure, the patient's blood pressure, pulse, and                        oxygen saturations were monitored continuously. The was introduced through                        the mouth, and advanced to the second part ofduodenum. The upper GI                        endoscopy was accomplished without difficulty. The patient tolerated the                        procedure well.            Findings:       The examined esophagus was normal.       Mild inflammation was found in the gastric antrum.       Inflammation was found in the duodenal bulb.       The patient was placed in the supine position for PEG placement. The stomach was insufflated        to appose gastric and abdominal walls. A site was located in the body of the stomach with        excellent transillumination for placement. The abdominal wall was marked and prepped in a        sterile manner. The area was anesthetized with 5 mL of 0.5% lidocaine. The trocar needle was        introduced through the abdominal wall and into the stomach under direct endoscopic view. A        snare was introduced through the endoscope and opened in the gastric lumen. The guidewire        was passed through the trocar and into the open snare. The snare was closed around the guide        wire. The endoscope and snare were removed, pulling the wire out through the mouth. A skin        incision was made at the site of needle insertion. The endoscopically removable 20 Fr Bard        gastrostomy tube was lubricated. The G-tube was tied to the guide wire and pulled through the        mouth and into the stomach. The trocar needle was removed, and the gastrostomy tube was     pulled out from the stomach through the skin. The external bumper was attached to the        gastrostomy tube, and the tube was cut to remove the guide wire. The final position of the        gastrostomy tube was confirmed by relook endoscopy, and skin marking noted to be 2.5 cm at        the external bumper. The final tension and compression of the abdominal wall by the PEG tube        and external bumper were checked and revealed that the bumper was moderately tight and mildly        deformingthe skin. The feeding tube was capped, and the tube site cleaned and dressed.                                                                                                        Impression:          - Normal esophagus.                       - Gastritis.                       - Duodenitis.                       - An endoscopically removable PEG placement was successfully completed.                       - No specimens collected.  Recommendation:      - Please follow the post-PEG recommendationsincluding: may use PEG today for                        meds and water and may use PEG tomorrow for feedings.                       - Please follow the post-PEG recommendations including: Have abdominal binder                        on at ALL TIMES to prevent inadvertent removal.                                                                                                        Attending Participation:       I personally performed the entire procedure.                                                                   _________________  Simeon Swanson MD  5/18/2020 3:01:59 PM  Number of Addenda: 0    Note Initiated On: 5/18/2020 10:28 AM    < end of copied text >

## 2020-05-20 NOTE — PROGRESS NOTE ADULT - SUBJECTIVE AND OBJECTIVE BOX
CARDIOLOGY     PROGRESS  NOTE   ________________________________________________    CHIEF COMPLAINT:Patient is a 89y old  Female who presents with a chief complaint of failure to thrive/ a.fib (19 May 2020 12:44)  no complain.  	  REVIEW OF SYSTEMS:  CONSTITUTIONAL: No fever, weight loss, or fatigue  EYES: No eye pain, visual disturbances, or discharge  ENT:  No difficulty hearing, tinnitus, vertigo; No sinus or throat pain  NECK: No pain or stiffness  RESPIRATORY: No cough, wheezing, chills or hemoptysis; No Shortness of Breath  CARDIOVASCULAR: No chest pain, palpitations, passing out, dizziness, or leg swelling  GASTROINTESTINAL: No abdominal or epigastric pain. No nausea, vomiting, or hematemesis; No diarrhea or constipation. No melena or hematochezia.  GENITOURINARY: No dysuria, frequency, hematuria, or incontinence  NEUROLOGICAL: No headaches, memory loss, loss of strength, numbness, or tremors  SKIN: No itching, burning, rashes, or lesions   LYMPH Nodes: No enlarged glands  ENDOCRINE: No heat or cold intolerance; No hair loss  MUSCULOSKELETAL: No joint pain or swelling; No muscle, back, or extremity pain  PSYCHIATRIC: No depression, anxiety, mood swings, or difficulty sleeping  HEME/LYMPH: No easy bruising, or bleeding gums  ALLERGY AND IMMUNOLOGIC: No hives or eczema	    [ ] All others negative	  [ ] Unable to obtain    PHYSICAL EXAM:  T(C): 36.6 (05-20-20 @ 05:08), Max: 37 (05-19-20 @ 14:46)  HR: 86 (05-20-20 @ 05:08) (83 - 86)  BP: 106/52 (05-20-20 @ 05:08) (74/40 - 115/74)  RR: 18 (05-20-20 @ 05:08) (18 - 18)  SpO2: 99% (05-20-20 @ 05:08) (98% - 100%)  Wt(kg): --  I&O's Summary    19 May 2020 07:01  -  20 May 2020 07:00  --------------------------------------------------------  IN: 1110 mL / OUT: 525 mL / NET: 585 mL        Appearance: Normal	  HEENT:   Normal oral mucosa, PERRL, EOMI	  Lymphatic: No lymphadenopathy  Cardiovascular: Normal S1 S2, No JVD, + murmurs, No edema  Respiratory: Lungs clear to auscultation	  Psychiatry: A & O x 3, Mood & affect appropriate  Gastrointestinal:  Soft, Non-tender, + BS	  Skin: No rashes, No ecchymoses, No cyanosis	  Neurologic: Non-focal  Extremities: Normal range of motion, No clubbing, cyanosis or edema  Vascular: Peripheral pulses palpable 2+ bilaterally    MEDICATIONS  (STANDING):  atorvastatin 20 milliGRAM(s) Oral at bedtime  brimonidine 0.2% Ophthalmic Solution 1 Drop(s) Both EYES two times a day  polyethylene glycol 3350 17 Gram(s) Oral daily  rivaroxaban 10 milliGRAM(s) Oral daily  sodium chloride 0.9%. 1000 milliLiter(s) (50 mL/Hr) IV Continuous <Continuous>  timolol 0.5% Solution 1 Drop(s) Both EYES two times a day      TELEMETRY: 	    ECG:  	  RADIOLOGY:  OTHER: 	  	  LABS:	 	    CARDIAC MARKERS:                                10.6   14.40 )-----------( 144      ( 19 May 2020 06:53 )             34.7     05-19    145  |  110<H>  |  12  ----------------------------<  58<L>  3.4<L>   |  14<L>  |  0.61    Ca    8.4      19 May 2020 06:53      proBNP:   Lipid Profile:   HgA1c:   TSH: Thyroid Stimulating Hormone, Serum: 3.19 uIU/mL (04-24 @ 12:11)          Assessment and plan  ---------------------------  89 y/o female with hx of dementia, CAD, CVA, COPD, afib, and recent extensive stay for COVID requiring pressor support presents to the ED with decreased PO intake. Patient discharge to the [facility] on 4/8 and was tolerating spoon feeds at that time, per son has declining mental status but was able to say his name a few days ago. Now over past few days has not been tolerating spoon feeds and is refusing to eat, also has been difficult to arouse  no chest pain / sob.  pt with failure to thrive  dc mirtazepine ?cause of sleepiness  a.fib ,hr controlled, continue ac  pt is not DNR  s/p  peg placement , start feeding today  restart on Xarelto  dc planning  discussed with son wants rehab   voiding trial check urinalysis

## 2020-05-21 LAB
ANION GAP SERPL CALC-SCNC: 8 MMOL/L — SIGNIFICANT CHANGE UP (ref 5–17)
BUN SERPL-MCNC: 15 MG/DL — SIGNIFICANT CHANGE UP (ref 7–23)
CALCIUM SERPL-MCNC: 8.4 MG/DL — SIGNIFICANT CHANGE UP (ref 8.4–10.5)
CHLORIDE SERPL-SCNC: 113 MMOL/L — HIGH (ref 96–108)
CO2 SERPL-SCNC: 17 MMOL/L — LOW (ref 22–31)
CREAT SERPL-MCNC: 0.64 MG/DL — SIGNIFICANT CHANGE UP (ref 0.5–1.3)
CULTURE RESULTS: SIGNIFICANT CHANGE UP
GLUCOSE SERPL-MCNC: 118 MG/DL — HIGH (ref 70–99)
POTASSIUM SERPL-MCNC: 3.9 MMOL/L — SIGNIFICANT CHANGE UP (ref 3.5–5.3)
POTASSIUM SERPL-SCNC: 3.9 MMOL/L — SIGNIFICANT CHANGE UP (ref 3.5–5.3)
SODIUM SERPL-SCNC: 138 MMOL/L — SIGNIFICANT CHANGE UP (ref 135–145)
SPECIMEN SOURCE: SIGNIFICANT CHANGE UP

## 2020-05-21 PROCEDURE — 99232 SBSQ HOSP IP/OBS MODERATE 35: CPT

## 2020-05-21 RX ADMIN — RIVAROXABAN 10 MILLIGRAM(S): KIT at 11:14

## 2020-05-21 RX ADMIN — BRIMONIDINE TARTRATE 1 DROP(S): 2 SOLUTION/ DROPS OPHTHALMIC at 17:10

## 2020-05-21 RX ADMIN — Medication 1 DROP(S): at 05:16

## 2020-05-21 RX ADMIN — POLYETHYLENE GLYCOL 3350 17 GRAM(S): 17 POWDER, FOR SOLUTION ORAL at 11:15

## 2020-05-21 RX ADMIN — ATORVASTATIN CALCIUM 20 MILLIGRAM(S): 80 TABLET, FILM COATED ORAL at 21:08

## 2020-05-21 RX ADMIN — SODIUM CHLORIDE 50 MILLILITER(S): 9 INJECTION, SOLUTION INTRAVENOUS at 11:15

## 2020-05-21 RX ADMIN — Medication 1 DROP(S): at 17:09

## 2020-05-21 RX ADMIN — BRIMONIDINE TARTRATE 1 DROP(S): 2 SOLUTION/ DROPS OPHTHALMIC at 05:16

## 2020-05-21 NOTE — PROGRESS NOTE ADULT - SUBJECTIVE AND OBJECTIVE BOX
afebrile    REVIEW OF SYSTEMS:  GEN: no fever,    no chills  RESP: no SOB,   no cough  CVS: no chest pain,   no palpitations  GI: no abdominal pain,   no nausea,   no vomiting,   no constipation,   no diarrhea  : no dysuria,   no frequency  NEURO: no headache,   no dizziness  PSYCH: no depression,   not anxious  Derm : no rash    MEDICATIONS  (STANDING):  atorvastatin 20 milliGRAM(s) Oral at bedtime  brimonidine 0.2% Ophthalmic Solution 1 Drop(s) Both EYES two times a day  dextrose 5% + sodium chloride 0.45%. 1000 milliLiter(s) (50 mL/Hr) IV Continuous <Continuous>  polyethylene glycol 3350 17 Gram(s) Oral daily  rivaroxaban 10 milliGRAM(s) Oral daily  timolol 0.5% Solution 1 Drop(s) Both EYES two times a day    MEDICATIONS  (PRN):      Vital Signs Last 24 Hrs  T(C): 36.6 (20 May 2020 21:25), Max: 36.6 (20 May 2020 21:25)  T(F): 97.8 (20 May 2020 21:25), Max: 97.8 (20 May 2020 21:25)  HR: 84 (20 May 2020 21:25) (84 - 99)  BP: 102/58 (20 May 2020 21:25) (80/50 - 102/58)  BP(mean): --  RR: 18 (20 May 2020 21:25) (18 - 18)  SpO2: 93% (20 May 2020 21:25) (93% - 97%)  CAPILLARY BLOOD GLUCOSE      POCT Blood Glucose.: 194 mg/dL (20 May 2020 17:28)    I&O's Summary    20 May 2020 07:01  -  21 May 2020 07:00  --------------------------------------------------------  IN: 2130 mL / OUT: 0 mL / NET: 2130 mL        PHYSICAL EXAM:  HEAD:  Atraumatic, Normocephalic  NECK: Supple, No   JVD  CHEST/LUNG:   no     rales,     no,    rhonchi  HEART: Regular rate and rhythm;         murmur  ABDOMEN: Soft, Nontender, ;   EXTREMITIES:   no     edema  NEUROLOGY:  dementia    LABS:        146<H>  |  116<H>  |  16  ----------------------------<  206<H>  4.3   |  17<L>  |  0.64    Ca    8.6      20 May 2020 08:59            Urinalysis Basic - ( 19 May 2020 23:59 )    Color: Yellow / Appearance: Turbid / S.023 / pH: x  Gluc: x / Ketone: Large  / Bili: Moderate / Urobili: 6 mg/dL   Blood: x / Protein: 100 mg/dL / Nitrite: Negative   Leuk Esterase: Large / RBC: 6 /HPF / WBC >720 /HPF   Sq Epi: x / Non Sq Epi: 8 /HPF / Bacteria: Negative              Thyroid Stimulating Hormone, Serum: 3.19 uIU/mL ( @ 12:11)        Culture - Urine (collected 20 @ 09:02)  Source: .Urine Catheterized  Final Report (20 @ 07:13):    <10,000 CFU/mL Normal Urogenital Dulce        Consultant(s) Notes Reviewed:      Care Discussed with Consultants/Other Providers:

## 2020-05-21 NOTE — PROGRESS NOTE ADULT - ASSESSMENT
89   yr pt,   pt  with  h/o  afib,   not  on   xarelto,  due  to falls,   cva,   copd, hld,     alzheimers  dementia,    pud/  h/o left ribf xs. 8/ 9 th,. left hip surg  h/o mlple falls.  echo, normal ef/ T2  comp  deformity, ct head, no cva//   ct  chest  with  goo, on last  visit    pt  was COVID  positive,  on 4/17/20,  had  elevated  troponin/ CRP/  Ferritin  , from Covid   pt  with  advanced  age/    comfort/   supportive  care is  ideal  however, son  wants  all  done.       sent  to  er  from   [facility], for  weakness/ not  eating/FTT  palliative/ comfort care,  ought to  be goal  in this pt,  however   son  wants  to  continue  current care   was  seen  by swallow  eval  on last visit/ oral feeds  at high risk  for  aspiration  Goc,   visited  again,  given  advanced  age  of  pt/ Alzheimers  dementia.  son wants   all  done/ peg  placed  on  5/18,    follow  wbc/ may be  reactive  tolerating feeds  hypernatremia/  on iv fluids  follow  wbc  and  sodium today/  pending  bmp in am     < from: CT Chest No Cont (04.17.20 @ 04:24) >  IMPRESSION:   Pattern of GGO suggests infection including atypical pneumonia/viral infection from atypical agents including COVID-19 (C19V-1)  < end of copied text >     < from: CT Thoracic Spine Reform No Cont (01.07.20 @ 16:43) >  IMPRESSION:  Volume loss, microvascular disease, no acute hemorrhage or midline shift. If symptoms persist consider follow up head CT or MRI contraindications.  Cervical and thoracic spine are unchanged from prior, no obvious fracture or dislocation, multilevel degenerative changes as noted previously with diffuse osteopenia and small unchanged mild superior endplate T2 compression deformity.  < end of copied text      < from: Limited Transthoracic Echo (10.26.18 @ 14:07) >  Conclusions:  Limited tranthoracic echocardiogram at patients request to  end exam.  1. Mitral annular calcification.  2. The aortic valve opens well.  3. Limited images acquired at the patients request. Based  upon the parasternal long axis view only;  grossly normal  left ventricular systolic function.  < end of copied text >

## 2020-05-21 NOTE — PROGRESS NOTE ADULT - SUBJECTIVE AND OBJECTIVE BOX
CARDIOLOGY     PROGRESS  NOTE   ________________________________________________    CHIEF COMPLAINT:Patient is a 89y old  Female who presents with a chief complaint of failure to thrive/ a.fib (20 May 2020 14:49)  no complain.  	  REVIEW OF SYSTEMS:  CONSTITUTIONAL: No fever, weight loss, or fatigue  EYES: No eye pain, visual disturbances, or discharge  ENT:  No difficulty hearing, tinnitus, vertigo; No sinus or throat pain  NECK: No pain or stiffness  RESPIRATORY: No cough, wheezing, chills or hemoptysis; No Shortness of Breath  CARDIOVASCULAR: No chest pain, palpitations, passing out, dizziness, or leg swelling  GASTROINTESTINAL: No abdominal or epigastric pain. No nausea, vomiting, or hematemesis; No diarrhea or constipation. No melena or hematochezia.  GENITOURINARY: No dysuria, frequency, hematuria, or incontinence  NEUROLOGICAL: No headaches, memory loss, loss of strength, numbness, or tremors  SKIN: No itching, burning, rashes, or lesions   LYMPH Nodes: No enlarged glands  ENDOCRINE: No heat or cold intolerance; No hair loss  MUSCULOSKELETAL: No joint pain or swelling; No muscle, back, or extremity pain  PSYCHIATRIC: No depression, anxiety, mood swings, or difficulty sleeping  HEME/LYMPH: No easy bruising, or bleeding gums  ALLERGY AND IMMUNOLOGIC: No hives or eczema	    [ ] All others negative	  [x ] Unable to obtain    PHYSICAL EXAM:  T(C): 36.6 (05-20-20 @ 21:25), Max: 36.6 (05-20-20 @ 21:25)  HR: 84 (05-20-20 @ 21:25) (84 - 99)  BP: 102/58 (05-20-20 @ 21:25) (80/50 - 102/58)  RR: 18 (05-20-20 @ 21:25) (18 - 18)  SpO2: 93% (05-20-20 @ 21:25) (93% - 97%)  Wt(kg): --  I&O's Summary    19 May 2020 07:01  -  20 May 2020 07:00  --------------------------------------------------------  IN: 1110 mL / OUT: 525 mL / NET: 585 mL    20 May 2020 07:01  -  21 May 2020 06:50  --------------------------------------------------------  IN: 2130 mL / OUT: 0 mL / NET: 2130 mL        Appearance: Normal	  HEENT:   Normal oral mucosa, PERRL, EOMI	  Lymphatic: No lymphadenopathy  Cardiovascular: Normal S1 S2, No JVD, + murmurs, No edema  Respiratory: Lungs clear to auscultation	  Gastrointestinal:  Soft, Non-tender, + BS, +peg	  Skin: No rashes, No ecchymoses, No cyanosis	  Neurologic: Non-focal  Extremities: Normal range of motion, No clubbing, cyanosis or edema  Vascular: Peripheral pulses palpable 2+ bilaterally    MEDICATIONS  (STANDING):  atorvastatin 20 milliGRAM(s) Oral at bedtime  brimonidine 0.2% Ophthalmic Solution 1 Drop(s) Both EYES two times a day  dextrose 5% + sodium chloride 0.45%. 1000 milliLiter(s) (50 mL/Hr) IV Continuous <Continuous>  polyethylene glycol 3350 17 Gram(s) Oral daily  rivaroxaban 10 milliGRAM(s) Oral daily  timolol 0.5% Solution 1 Drop(s) Both EYES two times a day      TELEMETRY: 	    ECG:  	  RADIOLOGY:  OTHER: 	  	  LABS:	 	    CARDIAC MARKERS:                                10.6   14.40 )-----------( 144      ( 19 May 2020 06:53 )             34.7     05-20    146<H>  |  116<H>  |  16  ----------------------------<  206<H>  4.3   |  17<L>  |  0.64    Ca    8.6      20 May 2020 08:59      proBNP:   Lipid Profile:   HgA1c:   TSH: Thyroid Stimulating Hormone, Serum: 3.19 uIU/mL (04-24 @ 12:11)          Assessment and plan  ---------------------------  89 y/o female with hx of dementia, CAD, CVA, COPD, afib, and recent extensive stay for COVID requiring pressor support presents to the ED with decreased PO intake. Patient discharge to the [facility] on 4/8 and was tolerating spoon feeds at that time, per son has declining mental status but was able to say his name a few days ago. Now over past few days has not been tolerating spoon feeds and is refusing to eat, also has been difficult to arouse  no chest pain / sob.  pt with failure to thrive  dc mirtazepine ?cause of sleepiness  a.fib ,hr controlled, continue ac  pt is not DNR  s/p  peg placement , start feeding today  restart on Xarelto  dc planning  discussed with son [facility]   voiding trial check urinalysis  tolerating feeding

## 2020-05-21 NOTE — PROGRESS NOTE ADULT - SUBJECTIVE AND OBJECTIVE BOX
INTERVAL HPI/OVERNIGHT EVENTS:    patient seen and examined   no new events    MEDICATIONS  (STANDING):  atorvastatin 20 milliGRAM(s) Oral at bedtime  brimonidine 0.2% Ophthalmic Solution 1 Drop(s) Both EYES two times a day  rivaroxaban 10 milliGRAM(s) Oral daily  sodium chloride 0.9%. 1000 milliLiter(s) (50 mL/Hr) IV Continuous <Continuous>  timolol 0.5% Solution 1 Drop(s) Both EYES two times a day    MEDICATIONS  (PRN):      Allergies    aspirin (Unknown)  penicillin (Unknown)    Intolerances        Review of Systems:    General:  No wt loss, fevers, chills, night sweats,fatigue,   Eyes:  Good vision, no reported pain  ENT:  No sore throat, pain, runny nose, dysphagia  CV:  No pain, palpitatioins, hypo/hypertension  Resp:  No dyspnea, cough, tachypnea, wheezing  GI:  No pain, No nausea, No vomiting, No diarrhea, No constipatiion, No weight loss, No fever, No pruritis, No rectal bleeding, No tarry stools, No dysphagia,  :  No pain, bleeding, incontinence, nocturia  Muscle:  No pain, weakness  Neuro:  No weakness, tingling, memory problems  Psych:  No fatigue, insomnia, mood problems, depression  Endocrine:  No polyuria, polydypsia, cold/heat intolerance  Heme:  No petechiae, ecchymosis, easy bruisability  Skin:  No rash, tattoos, scars, edema      Vital Signs Last 24 Hrs  T(C): 36.8 (19 May 2020 04:56), Max: 36.8 (19 May 2020 04:56)  T(F): 98.3 (19 May 2020 04:56), Max: 98.3 (19 May 2020 04:56)  HR: 89 (19 May 2020 04:56) (76 - 94)  BP: 92/60 (19 May 2020 04:56) (92/60 - 147/85)  BP(mean): --  RR: 17 (19 May 2020 04:56) (17 - 18)  SpO2: 98% (19 May 2020 04:56) (93% - 98%)    PHYSICAL EXAM:    Constitutional: weak, frail, NAD   HEENT: EOMI, throat clear  Neck: No LAD, supple  Respiratory: respirations nonlabored   Cardiovascular: S1 and S2, RRR, no M  Gastrointestinal: BS+, soft, NT/ND, +PEG   Extremities: No peripheral edema  Neurological: awake      LABS:                        10.6   14.40 )-----------( 144      ( 19 May 2020 06:53 )             34.7     05-19    145  |  110<H>  |  12  ----------------------------<  58<L>  3.4<L>   |  14<L>  |  0.61    Ca    8.4      19 May 2020 06:53            RADIOLOGY & ADDITIONAL TESTS:  < from: Upper Endoscopy (05.18.20 @ 10:28) >    [facility]  ____________________________________________________________________________________________________  Patient Name: Jessica Present                      MRN: [id number]  Account Number: [id number]                     YOB: 1925  Room: Endoscopy Room 1                           Gender: Female  Attending MD: Simeon Swanson MD                 Procedure Date No Time: 5/18/2020  ____________________________________________________________________________________________________     Procedure:           Upper GI endoscopy  Indications:         Place PEG because patient is unable to eat  Providers:           Simeon Swanson MD, Homer Ramirez PA-C  Medicines:           Monitored Anesthesia Care  Complications:       No immediate complications.  ____________________________________________________________________________________________________  Procedure:           After obtaining informed consent, the endoscope was passed under direct            vision. Throughout the procedure, the patient's blood pressure, pulse, and                        oxygen saturations were monitored continuously. The was introduced through                        the mouth, and advanced to the second part ofduodenum. The upper GI                        endoscopy was accomplished without difficulty. The patient tolerated the                        procedure well.            Findings:       The examined esophagus was normal.       Mild inflammation was found in the gastric antrum.       Inflammation was found in the duodenal bulb.       The patient was placed in the supine position for PEG placement. The stomach was insufflated        to appose gastric and abdominal walls. A site was located in the body of the stomach with        excellent transillumination for placement. The abdominal wall was marked and prepped in a        sterile manner. The area was anesthetized with 5 mL of 0.5% lidocaine. The trocar needle was        introduced through the abdominal wall and into the stomach under direct endoscopic view. A        snare was introduced through the endoscope and opened in the gastric lumen. The guidewire        was passed through the trocar and into the open snare. The snare was closed around the guide        wire. The endoscope and snare were removed, pulling the wire out through the mouth. A skin        incision was made at the site of needle insertion. The endoscopically removable 20 Fr Bard        gastrostomy tube was lubricated. The G-tube was tied to the guide wire and pulled through the        mouth and into the stomach. The trocar needle was removed, and the gastrostomy tube was     pulled out from the stomach through the skin. The external bumper was attached to the        gastrostomy tube, and the tube was cut to remove the guide wire. The final position of the        gastrostomy tube was confirmed by relook endoscopy, and skin marking noted to be 2.5 cm at        the external bumper. The final tension and compression of the abdominal wall by the PEG tube        and external bumper were checked and revealed that the bumper was moderately tight and mildly        deformingthe skin. The feeding tube was capped, and the tube site cleaned and dressed.                                                                                                        Impression:          - Normal esophagus.                       - Gastritis.                       - Duodenitis.                       - An endoscopically removable PEG placement was successfully completed.                       - No specimens collected.  Recommendation:      - Please follow the post-PEG recommendationsincluding: may use PEG today for                        meds and water and may use PEG tomorrow for feedings.                       - Please follow the post-PEG recommendations including: Have abdominal binder                        on at ALL TIMES to prevent inadvertent removal.                                                                                                        Attending Participation:       I personally performed the entire procedure.                                                                   _________________  Simeon Swanson MD  5/18/2020 3:01:59 PM  Number of Addenda: 0    Note Initiated On: 5/18/2020 10:28 AM    < end of copied text >

## 2020-05-21 NOTE — CHART NOTE - NSCHARTNOTEFT_GEN_A_CORE
Nutrition follow up     Pt seen for malnutrition follow up     Hospital course as per chart: Pt 89 y/o F with PMH: dementia, CAD, CVA, HTN, HLD, GI bleed, COPD, Afib, recent extensive stay for COVID requiring pressor support presents to the ED with decreased PO intake, found with failure to thrive, dysphagia with risk of aspiration, S/P PEG placed (05/18); as per PA, son requests pt to continue with PO diet while receiving EN.    Source: Patient [ ]    Family [ ]     other [x]; Medical record, RN     Pt confused/disoriented as per documentation, sleeping. RN reports pt not consuming anything by mouth, states pt is refusing to eat. Reports pt tolerating Jevity 1.2 at goal of 40 ml/hr. Denies pt with nausea or vomiting. Last BM 4 days ago (05/17) as per flow sheets -?accuracy, RN aware and will monitor.     Diet: dysphagia 1, pureed + honey thickened liquids + tube feedings     Enteral Nutrition: Jevity 1.2 through continuous feedings starting at 10 ml/hr and increase 10 ml/hr every 8 hours to goal of 40 ml/hr x 24 hours provides 960 ml, 1152 kcal, 53 g protein, and 775 ml water (28 kcal/kg and 1.3 g/kg protein based on dosing weight 40.8 kg)    Weight as per flow sheets: (05/14) 40.8 kg - will continue to monitor (pt sleeping in chair at this time).    % Weight Change: n/a    Pertinent Medications: MEDICATIONS  (STANDING):  atorvastatin 20 milliGRAM(s) Oral at bedtime  brimonidine 0.2% Ophthalmic Solution 1 Drop(s) Both EYES two times a day  dextrose 5% + sodium chloride 0.45%. 1000 milliLiter(s) (50 mL/Hr) IV Continuous <Continuous>  polyethylene glycol 3350 17 Gram(s) Oral daily  rivaroxaban 10 milliGRAM(s) Oral daily  timolol 0.5% Solution 1 Drop(s) Both EYES two times a day    Pertinent Labs: (05/21) Glu 118 mg/dL<H>     Skin: no noted pressure injuries as per documentation.   Noted +2 left arm edema as per flow sheets (previously with no edema).     Estimated Needs:   [x] no change since previous assessment, based on dosing weight 40.8 kg:   [ ] recalculated:     1020 - 1224 kcal/day (25 - 30 kcal/kg)   49 - 57 g protein/day (1.2 - 1.4 g/kg)   1020 - 1224 ml/day (25 - 30 ml/kg)    Previous Nutrition Diagnosis: [x] Malnutrition; severe      Nutrition Diagnosis is [x] ongoing - addressed with tube feedings.   Care plan achieved; pt currently at goal: pt to meet >75% of estimated nutritional needs during hospital stay.     New Nutrition Diagnosis: [x] not applicable    Interventions:     1. Continue Jevity 1.2 through continuous feedings at 40 ml/hr x 24 hours to provide 960 ml, 1152 kcal, 53 g protein, and 775 ml water (28 kcal/kg and 1.3 g/kg protein based on dosing weight 40.8 kg); defer fluids to team. Will continue to monitor and adjust as needed.   2. Recommend continue dysphagia 1, pureed diet + honey thickened liquids as per pt's son preference. Defer diet/fluid consistencies to medical team/SLP recommendations.   3. Consider Multivitamin if medically feasible to optimize nutrient intake.  4. Obtain current weight to identify changes if any.     Monitoring and Evaluation:     [x] PO intake [x] Tolerance to diet prescription [x] weights [x] follow up per protocol    [x] other: EN provision/tolerance     RD remains available.  Meme Davis MS RDN CDN #[phone].

## 2020-05-22 LAB
ALBUMIN SERPL ELPH-MCNC: 2.2 G/DL — LOW (ref 3.3–5)
ALP SERPL-CCNC: 68 U/L — SIGNIFICANT CHANGE UP (ref 40–120)
ALT FLD-CCNC: 15 U/L — SIGNIFICANT CHANGE UP (ref 10–45)
ANION GAP SERPL CALC-SCNC: 9 MMOL/L — SIGNIFICANT CHANGE UP (ref 5–17)
AST SERPL-CCNC: 21 U/L — SIGNIFICANT CHANGE UP (ref 10–40)
BASE EXCESS BLDA CALC-SCNC: 0.5 MMOL/L — SIGNIFICANT CHANGE UP (ref -2–2)
BILIRUB SERPL-MCNC: 1 MG/DL — SIGNIFICANT CHANGE UP (ref 0.2–1.2)
BLD GP AB SCN SERPL QL: NEGATIVE — SIGNIFICANT CHANGE UP
BUN SERPL-MCNC: 18 MG/DL — SIGNIFICANT CHANGE UP (ref 7–23)
CALCIUM SERPL-MCNC: 8.1 MG/DL — LOW (ref 8.4–10.5)
CHLORIDE SERPL-SCNC: 110 MMOL/L — HIGH (ref 96–108)
CO2 BLDA-SCNC: 25 MMOL/L — SIGNIFICANT CHANGE UP (ref 22–30)
CO2 SERPL-SCNC: 21 MMOL/L — LOW (ref 22–31)
CREAT SERPL-MCNC: 0.57 MG/DL — SIGNIFICANT CHANGE UP (ref 0.5–1.3)
GLUCOSE BLDC GLUCOMTR-MCNC: 124 MG/DL — HIGH (ref 70–99)
GLUCOSE SERPL-MCNC: 156 MG/DL — HIGH (ref 70–99)
HCO3 BLDA-SCNC: 24 MMOL/L — SIGNIFICANT CHANGE UP (ref 21–29)
HCT VFR BLD CALC: 19.2 % — CRITICAL LOW (ref 34.5–45)
HCT VFR BLD CALC: 20.8 % — CRITICAL LOW (ref 34.5–45)
HGB BLD-MCNC: 6 G/DL — CRITICAL LOW (ref 11.5–15.5)
HGB BLD-MCNC: 6.6 G/DL — CRITICAL LOW (ref 11.5–15.5)
MAGNESIUM SERPL-MCNC: 1.6 MG/DL — SIGNIFICANT CHANGE UP (ref 1.6–2.6)
MCHC RBC-ENTMCNC: 31.3 GM/DL — LOW (ref 32–36)
MCHC RBC-ENTMCNC: 31.7 GM/DL — LOW (ref 32–36)
MCHC RBC-ENTMCNC: 32.8 PG — SIGNIFICANT CHANGE UP (ref 27–34)
MCHC RBC-ENTMCNC: 32.8 PG — SIGNIFICANT CHANGE UP (ref 27–34)
MCV RBC AUTO: 103.5 FL — HIGH (ref 80–100)
MCV RBC AUTO: 104.9 FL — HIGH (ref 80–100)
NRBC # BLD: 0 /100 WBCS — SIGNIFICANT CHANGE UP (ref 0–0)
NRBC # BLD: 0 /100 WBCS — SIGNIFICANT CHANGE UP (ref 0–0)
PCO2 BLDA: 33 MMHG — SIGNIFICANT CHANGE UP (ref 32–46)
PH BLDA: 7.47 — HIGH (ref 7.35–7.45)
PHOSPHATE SERPL-MCNC: 2.2 MG/DL — LOW (ref 2.5–4.5)
PLATELET # BLD AUTO: 121 K/UL — LOW (ref 150–400)
PLATELET # BLD AUTO: 129 K/UL — LOW (ref 150–400)
PO2 BLDA: 100 MMHG — SIGNIFICANT CHANGE UP (ref 74–108)
POTASSIUM SERPL-MCNC: 3.5 MMOL/L — SIGNIFICANT CHANGE UP (ref 3.5–5.3)
POTASSIUM SERPL-SCNC: 3.5 MMOL/L — SIGNIFICANT CHANGE UP (ref 3.5–5.3)
PROT SERPL-MCNC: 5.2 G/DL — LOW (ref 6–8.3)
RBC # BLD: 1.83 M/UL — LOW (ref 3.8–5.2)
RBC # BLD: 2.01 M/UL — LOW (ref 3.8–5.2)
RBC # FLD: 17.1 % — HIGH (ref 10.3–14.5)
RBC # FLD: 17.2 % — HIGH (ref 10.3–14.5)
RH IG SCN BLD-IMP: POSITIVE — SIGNIFICANT CHANGE UP
SAO2 % BLDA: 98 % — HIGH (ref 92–96)
SODIUM SERPL-SCNC: 140 MMOL/L — SIGNIFICANT CHANGE UP (ref 135–145)
WBC # BLD: 4.89 K/UL — SIGNIFICANT CHANGE UP (ref 3.8–10.5)
WBC # BLD: 5.79 K/UL — SIGNIFICANT CHANGE UP (ref 3.8–10.5)
WBC # FLD AUTO: 4.89 K/UL — SIGNIFICANT CHANGE UP (ref 3.8–10.5)
WBC # FLD AUTO: 5.79 K/UL — SIGNIFICANT CHANGE UP (ref 3.8–10.5)

## 2020-05-22 PROCEDURE — 72191 CT ANGIOGRAPH PELV W/O&W/DYE: CPT | Mod: 26

## 2020-05-22 PROCEDURE — 99232 SBSQ HOSP IP/OBS MODERATE 35: CPT

## 2020-05-22 RX ADMIN — Medication 1 DROP(S): at 05:31

## 2020-05-22 RX ADMIN — POLYETHYLENE GLYCOL 3350 17 GRAM(S): 17 POWDER, FOR SOLUTION ORAL at 11:52

## 2020-05-22 RX ADMIN — RIVAROXABAN 10 MILLIGRAM(S): KIT at 11:53

## 2020-05-22 RX ADMIN — BRIMONIDINE TARTRATE 1 DROP(S): 2 SOLUTION/ DROPS OPHTHALMIC at 05:32

## 2020-05-22 RX ADMIN — Medication 1 DROP(S): at 19:00

## 2020-05-22 RX ADMIN — BRIMONIDINE TARTRATE 1 DROP(S): 2 SOLUTION/ DROPS OPHTHALMIC at 19:01

## 2020-05-22 NOTE — PROGRESS NOTE ADULT - SUBJECTIVE AND OBJECTIVE BOX
afebrile  REVIEW OF SYSTEMS:  GEN: no fever,    no chills  RESP: no SOB,   no cough  CVS: no chest pain,   no palpitations  GI: no abdominal pain,   no nausea,   no vomiting,   no constipation,   no diarrhea  : no dysuria,   no frequency  NEURO: no headache,   no dizziness  PSYCH: no depression,   not anxious  Derm : no rash    MEDICATIONS  (STANDING):  atorvastatin 20 milliGRAM(s) Oral at bedtime  brimonidine 0.2% Ophthalmic Solution 1 Drop(s) Both EYES two times a day  dextrose 5% + sodium chloride 0.45%. 1000 milliLiter(s) (50 mL/Hr) IV Continuous <Continuous>  polyethylene glycol 3350 17 Gram(s) Oral daily  rivaroxaban 10 milliGRAM(s) Oral daily  timolol 0.5% Solution 1 Drop(s) Both EYES two times a day    MEDICATIONS  (PRN):      Vital Signs Last 24 Hrs  T(C): 36.7 (22 May 2020 04:33), Max: 37.4 (21 May 2020 20:47)  T(F): 98 (22 May 2020 04:33), Max: 99.4 (21 May 2020 20:47)  HR: 80 (22 May 2020 04:33) (65 - 84)  BP: 114/59 (22 May 2020 04:33) (85/52 - 114/59)  BP(mean): --  RR: 18 (22 May 2020 04:33) (18 - 18)  SpO2: 99% (22 May 2020 04:33) (93% - 99%)  CAPILLARY BLOOD GLUCOSE        I&O's Summary    21 May 2020 07:01  -  22 May 2020 07:00  --------------------------------------------------------  IN: 0 mL / OUT: 800 mL / NET: -800 mL        PHYSICAL EXAM:  HEAD:  Atraumatic, Normocephalic  NECK: Supple, No   JVD  CHEST/LUNG:   no     rales,     no,    rhonchi  HEART: Regular rate and rhythm;         murmur  ABDOMEN: Soft, Nontender, ;   EXTREMITIES:     no   edema  NEUROLOGY:  alert    LABS:    05-21    138  |  113<H>  |  15  ----------------------------<  118<H>  3.9   |  17<L>  |  0.64    Ca    8.4      21 May 2020 07:10                      Thyroid Stimulating Hormone, Serum: 3.19 uIU/mL (04-24 @ 12:11)          Consultant(s) Notes Reviewed:      Care Discussed with Consultants/Other Providers:

## 2020-05-22 NOTE — PROGRESS NOTE ADULT - ASSESSMENT
89   yr pt,   pt  with  h/o  afib,   not  on   xarelto,  due  to falls,   cva,   copd, hld,     alzheimers  dementia,    pud/  h/o left ribf xs. 8/ 9 th,. left hip surg  h/o mlple falls.  echo, normal ef/ T2  comp  deformity, ct head, no cva//   ct  chest  with  goo, on last  visit    pt  was COVID  positive,  on 4/17/20,  had  elevated  troponin/ CRP/  Ferritin  , from Covid   pt  with  advanced  age/    comfort/   supportive  care is  ideal  however, son  wants  all  done.       sent  to  er  from   [facility], for  weakness/ not  eating/FTT  palliative/ comfort care,  ought to  be goal  in this pt,  however   son  wants  to  continue  current care   was  seen  by swallow  eval  on last visit/ oral feeds  at high risk  for  aspiration  Goc,   visited  again,  given  advanced  age  of  pt/ Alzheimers  dementia.  son wants   all  done/ peg  placed  on  5/18,    follow  wbc/ may be  reactive  tolerating feeds  follow  wbc  /  afebrile       < from: CT Chest No Cont (04.17.20 @ 04:24) >  IMPRESSION:   Pattern of GGO suggests infection including atypical pneumonia/viral infection from atypical agents including COVID-19 (C19V-1)  < end of copied text >     < from: CT Thoracic Spine Reform No Cont (01.07.20 @ 16:43) >  IMPRESSION:  Volume loss, microvascular disease, no acute hemorrhage or midline shift. If symptoms persist consider follow up head CT or MRI contraindications.  Cervical and thoracic spine are unchanged from prior, no obvious fracture or dislocation, multilevel degenerative changes as noted previously with diffuse osteopenia and small unchanged mild superior endplate T2 compression deformity.  < end of copied text      < from: Limited Transthoracic Echo (10.26.18 @ 14:07) >  Conclusions:  Limited tranthoracic echocardiogram at patients request to  end exam.  1. Mitral annular calcification.  2. The aortic valve opens well.  3. Limited images acquired at the patients request. Based  upon the parasternal long axis view only;  grossly normal  left ventricular systolic function.  < end of copied text >

## 2020-05-22 NOTE — PROVIDER CONTACT NOTE (CRITICAL VALUE NOTIFICATION) - ASSESSMENT
Pt lethargic. Skin color pale warm and dry to touch. Respirations regular and unlabored. No signs or symptoms of pain or discomfort. /64-84-18-97.9-pulse ox 100% on room air.

## 2020-05-22 NOTE — PROGRESS NOTE ADULT - SUBJECTIVE AND OBJECTIVE BOX
CARDIOLOGY     PROGRESS  NOTE   ________________________________________________    CHIEF COMPLAINT:Patient is a 89y old  Female who presents with a chief complaint of failure to thrive/ a.fib (21 May 2020 10:35)  no complain.  	  REVIEW OF SYSTEMS:  CONSTITUTIONAL: No fever, weight loss, or fatigue  EYES: No eye pain, visual disturbances, or discharge  ENT:  No difficulty hearing, tinnitus, vertigo; No sinus or throat pain  NECK: No pain or stiffness  RESPIRATORY: No cough, wheezing, chills or hemoptysis; No Shortness of Breath  CARDIOVASCULAR: No chest pain, palpitations, passing out, dizziness, or leg swelling  GASTROINTESTINAL: No abdominal or epigastric pain. No nausea, vomiting, or hematemesis; No diarrhea or constipation. No melena or hematochezia.  GENITOURINARY: No dysuria, frequency, hematuria, or incontinence  NEUROLOGICAL: No headaches, memory loss, loss of strength, numbness, or tremors  SKIN: No itching, burning, rashes, or lesions   LYMPH Nodes: No enlarged glands  ENDOCRINE: No heat or cold intolerance; No hair loss  MUSCULOSKELETAL: No joint pain or swelling; No muscle, back, or extremity pain  PSYCHIATRIC: No depression, anxiety, mood swings, or difficulty sleeping  HEME/LYMPH: No easy bruising, or bleeding gums  ALLERGY AND IMMUNOLOGIC: No hives or eczema	    [ ] All others negative	  [ x] Unable to obtain    PHYSICAL EXAM:  T(C): 36.7 (05-22-20 @ 04:33), Max: 37.4 (05-21-20 @ 20:47)  HR: 80 (05-22-20 @ 04:33) (65 - 84)  BP: 114/59 (05-22-20 @ 04:33) (85/52 - 114/59)  RR: 18 (05-22-20 @ 04:33) (18 - 18)  SpO2: 99% (05-22-20 @ 04:33) (93% - 99%)  Wt(kg): --  I&O's Summary    21 May 2020 07:01  -  22 May 2020 07:00  --------------------------------------------------------  IN: 0 mL / OUT: 400 mL / NET: -400 mL        Appearance: Normal	  HEENT:   Normal oral mucosa, PERRL, EOMI	  Lymphatic: No lymphadenopathy  Cardiovascular: Normal S1 S2, No JVD, +murmurs, No edema  Respiratory: Lungs clear to auscultation	  Gastrointestinal:  Soft, Non-tender, + BS	  Skin: No rashes, No ecchymoses, No cyanosis	  Neurologic: Non-focal  Extremities: Normal range of motion, No clubbing, cyanosis or edema  Vascular: Peripheral pulses palpable 2+ bilaterally    MEDICATIONS  (STANDING):  atorvastatin 20 milliGRAM(s) Oral at bedtime  brimonidine 0.2% Ophthalmic Solution 1 Drop(s) Both EYES two times a day  dextrose 5% + sodium chloride 0.45%. 1000 milliLiter(s) (50 mL/Hr) IV Continuous <Continuous>  polyethylene glycol 3350 17 Gram(s) Oral daily  rivaroxaban 10 milliGRAM(s) Oral daily  timolol 0.5% Solution 1 Drop(s) Both EYES two times a day      TELEMETRY: 	    ECG:  	  RADIOLOGY:  OTHER: 	  	  LABS:	 	    CARDIAC MARKERS:            05-21    138  |  113<H>  |  15  ----------------------------<  118<H>  3.9   |  17<L>  |  0.64    Ca    8.4      21 May 2020 07:10      proBNP:   Lipid Profile:   HgA1c:   TSH: Thyroid Stimulating Hormone, Serum: 3.19 uIU/mL (04-24 @ 12:11)  Notes: Chart reviewed. Patient due to void post quinones catheter removal.  Anticipated discharge date is tomorrow. Patients peter Enriquez ([phone])  continues to request [facility] as first choice for rehab but as  per [facility] admissions there is no female bed available today and no  anticipated discharges. Mina has agreed for patient to go to [facility] if there is no bed available at [facility] on day of discharge. CM will  continue to remain available.        Assessment and plan  ---------------------------  89 y/o female with hx of dementia, CAD, CVA, COPD, afib, and recent extensive stay for COVID requiring pressor support presents to the ED with decreased PO intake. Patient discharge to the [facility] on 4/8 and was tolerating spoon feeds at that time, per son has declining mental status but was able to say his name a few days ago. Now over past few days has not been tolerating spoon feeds and is refusing to eat, also has been difficult to arouse  no chest pain / sob.  pt with failure to thrive  dc mirtazepine ?cause of sleepiness  a.fib ,hr controlled, continue ac  pt is not DNR  s/p  peg placement , start feeding today  restart on Xarelto  dc planning  discussed with son wants rehab   voiding trial check urinalysis  tolerating feeding  son is looking for rehab placement

## 2020-05-22 NOTE — CHART NOTE - NSCHARTNOTEFT_GEN_A_CORE
Medicine PA Note  Event Note    Name: LYNNE DUQUE  MRN: [id number]  Age: 89y  Gender: Female    CC: Hematoma      Present is a 89 year old female with a PMH significant for AFib (on Xarelto) who was admitted for FTT and is now s/p PEG. This morning patient was found to have a drop in hgb from 10.6 to 6.6. During this evening assessment a ~10in x 6in hematoma was noted distal to the gluteus muscle on the left posterior thigh. Upon chart review, a gradual decrease in BP was noted. Urgent CTA of pelvis extending to mid thigh was ordered to assess for active bleed. Preliminary read shows no extravasation of contrast to suggest active bleeding. Patient to be transfused 2u pRBCs as ordered by day PA.     Follow-up:  - official CT read  - f/u rpt CBC in AM  - Vascular checks q4h  - Vitals q4H  - Continue to hold Xarelto - will need to discuss risks vs benefits with patient and son prior to reinstatement    The above was discussed with nursing staff, Dr. Ferro, and Mrs. Duque's son, Shayla. Will endorse to day team.    JOSE FarrellC  Department of Medicine  [facility]

## 2020-05-22 NOTE — PROGRESS NOTE ADULT - SUBJECTIVE AND OBJECTIVE BOX
INTERVAL HPI/OVERNIGHT EVENTS:    patient seen and examined   sarkis tf   no new events    MEDICATIONS  (STANDING):  atorvastatin 20 milliGRAM(s) Oral at bedtime  brimonidine 0.2% Ophthalmic Solution 1 Drop(s) Both EYES two times a day  rivaroxaban 10 milliGRAM(s) Oral daily  sodium chloride 0.9%. 1000 milliLiter(s) (50 mL/Hr) IV Continuous <Continuous>  timolol 0.5% Solution 1 Drop(s) Both EYES two times a day    MEDICATIONS  (PRN):      Allergies    aspirin (Unknown)  penicillin (Unknown)    Intolerances        Review of Systems:    General:  No wt loss, fevers, chills, night sweats,fatigue,   Eyes:  Good vision, no reported pain  ENT:  No sore throat, pain, runny nose, dysphagia  CV:  No pain, palpitatioins, hypo/hypertension  Resp:  No dyspnea, cough, tachypnea, wheezing  GI:  No pain, No nausea, No vomiting, No diarrhea, No constipatiion, No weight loss, No fever, No pruritis, No rectal bleeding, No tarry stools, No dysphagia,  :  No pain, bleeding, incontinence, nocturia  Muscle:  No pain, weakness  Neuro:  No weakness, tingling, memory problems  Psych:  No fatigue, insomnia, mood problems, depression  Endocrine:  No polyuria, polydypsia, cold/heat intolerance  Heme:  No petechiae, ecchymosis, easy bruisability  Skin:  No rash, tattoos, scars, edema      Vital Signs Last 24 Hrs  T(C): 36.8 (19 May 2020 04:56), Max: 36.8 (19 May 2020 04:56)  T(F): 98.3 (19 May 2020 04:56), Max: 98.3 (19 May 2020 04:56)  HR: 89 (19 May 2020 04:56) (76 - 94)  BP: 92/60 (19 May 2020 04:56) (92/60 - 147/85)  BP(mean): --  RR: 17 (19 May 2020 04:56) (17 - 18)  SpO2: 98% (19 May 2020 04:56) (93% - 98%)    PHYSICAL EXAM:    Constitutional: weak, frail, NAD   HEENT: EOMI, throat clear  Neck: No LAD, supple  Respiratory: respirations nonlabored   Cardiovascular: S1 and S2, RRR, no M  Gastrointestinal: BS+, soft, NT/ND, +PEG   Extremities: No peripheral edema  Neurological: awake      LABS:                        10.6   14.40 )-----------( 144      ( 19 May 2020 06:53 )             34.7     05-19    145  |  110<H>  |  12  ----------------------------<  58<L>  3.4<L>   |  14<L>  |  0.61    Ca    8.4      19 May 2020 06:53            RADIOLOGY & ADDITIONAL TESTS:  < from: Upper Endoscopy (05.18.20 @ 10:28) >    [facility]  ____________________________________________________________________________________________________  Patient Name: Jessica Present                      MRN: [id number]  Account Number: [id number]                     YOB: 1925  Room: Endoscopy Room 1                           Gender: Female  Attending MD: Simeon Swanson MD                 Procedure Date No Time: 5/18/2020  ____________________________________________________________________________________________________     Procedure:           Upper GI endoscopy  Indications:         Place PEG because patient is unable to eat  Providers:           Simeon Swanson MD, Homer Ramirez PA-C  Medicines:           Monitored Anesthesia Care  Complications:       No immediate complications.  ____________________________________________________________________________________________________  Procedure:           After obtaining informed consent, the endoscope was passed under direct            vision. Throughout the procedure, the patient's blood pressure, pulse, and                        oxygen saturations were monitored continuously. The was introduced through                        the mouth, and advanced to the second part ofduodenum. The upper GI                        endoscopy was accomplished without difficulty. The patient tolerated the                        procedure well.            Findings:       The examined esophagus was normal.       Mild inflammation was found in the gastric antrum.       Inflammation was found in the duodenal bulb.       The patient was placed in the supine position for PEG placement. The stomach was insufflated        to appose gastric and abdominal walls. A site was located in the body of the stomach with        excellent transillumination for placement. The abdominal wall was marked and prepped in a        sterile manner. The area was anesthetized with 5 mL of 0.5% lidocaine. The trocar needle was        introduced through the abdominal wall and into the stomach under direct endoscopic view. A        snare was introduced through the endoscope and opened in the gastric lumen. The guidewire        was passed through the trocar and into the open snare. The snare was closed around the guide        wire. The endoscope and snare were removed, pulling the wire out through the mouth. A skin        incision was made at the site of needle insertion. The endoscopically removable 20 Fr Bard        gastrostomy tube was lubricated. The G-tube was tied to the guide wire and pulled through the        mouth and into the stomach. The trocar needle was removed, and the gastrostomy tube was     pulled out from the stomach through the skin. The external bumper was attached to the        gastrostomy tube, and the tube was cut to remove the guide wire. The final position of the        gastrostomy tube was confirmed by relook endoscopy, and skin marking noted to be 2.5 cm at        the external bumper. The final tension and compression of the abdominal wall by the PEG tube        and external bumper were checked and revealed that the bumper was moderately tight and mildly        deformingthe skin. The feeding tube was capped, and the tube site cleaned and dressed.                                                                                                        Impression:          - Normal esophagus.                       - Gastritis.                       - Duodenitis.                       - An endoscopically removable PEG placement was successfully completed.                       - No specimens collected.  Recommendation:      - Please follow the post-PEG recommendationsincluding: may use PEG today for                        meds and water and may use PEG tomorrow for feedings.                       - Please follow the post-PEG recommendations including: Have abdominal binder                        on at ALL TIMES to prevent inadvertent removal.                                                                                                        Attending Participation:       I personally performed the entire procedure.                                                                   _________________  Simeon Swanson MD  5/18/2020 3:01:59 PM  Number of Addenda: 0    Note Initiated On: 5/18/2020 10:28 AM    < end of copied text >

## 2020-05-22 NOTE — CHART NOTE - NSCHARTNOTEFT_GEN_A_CORE
MEDICINE PA NOTE    Stat labs drawn to ensure patient stable for discharge to rehab. CBC with drop in Hgb to 6.0, however vital signs stable. Per attending ordered repeat CBC, T&S, 2 units of PRBCs, put Xarelto on hold and called/left message for GI Dr Swanson. Will continue to monitor closely and f/u post transfusion CBC.     LAKESHIA Okeefe MEDICINE PA NOTE    Stat labs drawn to ensure patient stable for discharge to rehab. CBC with drop in Hgb to 6.0, however vital signs stable. Per attending ordered repeat CBC, T&S, 2 units of PRBCs, put Xarelto on hold and notified GI Dr Swanson. No recently reported hematochezia or melena, last recorded BM on 5/17. Will continue to monitor closely and f/u post transfusion CBC.     LAKESHIA Okeefe

## 2020-05-23 DIAGNOSIS — D64.9 ANEMIA, UNSPECIFIED: ICD-10-CM

## 2020-05-23 LAB
ALBUMIN SERPL ELPH-MCNC: 2.5 G/DL — LOW (ref 3.3–5)
ALP SERPL-CCNC: 72 U/L — SIGNIFICANT CHANGE UP (ref 40–120)
ALT FLD-CCNC: 16 U/L — SIGNIFICANT CHANGE UP (ref 10–45)
ANION GAP SERPL CALC-SCNC: 10 MMOL/L — SIGNIFICANT CHANGE UP (ref 5–17)
AST SERPL-CCNC: 22 U/L — SIGNIFICANT CHANGE UP (ref 10–40)
BILIRUB SERPL-MCNC: 1.9 MG/DL — HIGH (ref 0.2–1.2)
BUN SERPL-MCNC: 15 MG/DL — SIGNIFICANT CHANGE UP (ref 7–23)
CALCIUM SERPL-MCNC: 8.8 MG/DL — SIGNIFICANT CHANGE UP (ref 8.4–10.5)
CHLORIDE SERPL-SCNC: 107 MMOL/L — SIGNIFICANT CHANGE UP (ref 96–108)
CO2 SERPL-SCNC: 23 MMOL/L — SIGNIFICANT CHANGE UP (ref 22–31)
CREAT SERPL-MCNC: 0.51 MG/DL — SIGNIFICANT CHANGE UP (ref 0.5–1.3)
GLUCOSE SERPL-MCNC: 128 MG/DL — HIGH (ref 70–99)
HCT VFR BLD CALC: 33.4 % — LOW (ref 34.5–45)
HGB BLD-MCNC: 10.8 G/DL — LOW (ref 11.5–15.5)
MCHC RBC-ENTMCNC: 30.7 PG — SIGNIFICANT CHANGE UP (ref 27–34)
MCHC RBC-ENTMCNC: 32.3 GM/DL — SIGNIFICANT CHANGE UP (ref 32–36)
MCV RBC AUTO: 94.9 FL — SIGNIFICANT CHANGE UP (ref 80–100)
NRBC # BLD: 0 /100 WBCS — SIGNIFICANT CHANGE UP (ref 0–0)
PLATELET # BLD AUTO: 118 K/UL — LOW (ref 150–400)
POTASSIUM SERPL-MCNC: 3.6 MMOL/L — SIGNIFICANT CHANGE UP (ref 3.5–5.3)
POTASSIUM SERPL-SCNC: 3.6 MMOL/L — SIGNIFICANT CHANGE UP (ref 3.5–5.3)
PROT SERPL-MCNC: 5.6 G/DL — LOW (ref 6–8.3)
RBC # BLD: 3.52 M/UL — LOW (ref 3.8–5.2)
RBC # FLD: 19.6 % — HIGH (ref 10.3–14.5)
SODIUM SERPL-SCNC: 140 MMOL/L — SIGNIFICANT CHANGE UP (ref 135–145)
WBC # BLD: 6.3 K/UL — SIGNIFICANT CHANGE UP (ref 3.8–10.5)
WBC # FLD AUTO: 6.3 K/UL — SIGNIFICANT CHANGE UP (ref 3.8–10.5)

## 2020-05-23 PROCEDURE — 99232 SBSQ HOSP IP/OBS MODERATE 35: CPT

## 2020-05-23 RX ORDER — MUPIROCIN 20 MG/G
1 OINTMENT TOPICAL
Refills: 0 | Status: DISCONTINUED | OUTPATIENT
Start: 2020-05-23 | End: 2020-05-27

## 2020-05-23 RX ADMIN — BRIMONIDINE TARTRATE 1 DROP(S): 2 SOLUTION/ DROPS OPHTHALMIC at 05:47

## 2020-05-23 RX ADMIN — BRIMONIDINE TARTRATE 1 DROP(S): 2 SOLUTION/ DROPS OPHTHALMIC at 18:27

## 2020-05-23 RX ADMIN — ATORVASTATIN CALCIUM 20 MILLIGRAM(S): 80 TABLET, FILM COATED ORAL at 21:42

## 2020-05-23 RX ADMIN — Medication 1 DROP(S): at 18:27

## 2020-05-23 RX ADMIN — Medication 1 DROP(S): at 05:47

## 2020-05-23 RX ADMIN — ATORVASTATIN CALCIUM 20 MILLIGRAM(S): 80 TABLET, FILM COATED ORAL at 00:05

## 2020-05-23 RX ADMIN — MUPIROCIN 1 APPLICATION(S): 20 OINTMENT TOPICAL at 11:18

## 2020-05-23 NOTE — PROGRESS NOTE ADULT - SUBJECTIVE AND OBJECTIVE BOX
events noted.  fall in hb   no  rectal bleed  REVIEW OF SYSTEMS:  GEN: no fever,    no chills  RESP: no SOB,   no cough  CVS: no chest pain,   no palpitations  GI: no abdominal pain,   no nausea,   no vomiting,   no constipation,   no diarrhea  : no dysuria,   no frequency  NEURO: no headache,   no dizziness  PSYCH: no depression,   not anxious  Derm : no rash    MEDICATIONS  (STANDING):  atorvastatin 20 milliGRAM(s) Oral at bedtime  brimonidine 0.2% Ophthalmic Solution 1 Drop(s) Both EYES two times a day  polyethylene glycol 3350 17 Gram(s) Oral daily  timolol 0.5% Solution 1 Drop(s) Both EYES two times a day    MEDICATIONS  (PRN):      Vital Signs Last 24 Hrs  T(C): 36.4 (23 May 2020 06:55), Max: 36.8 (22 May 2020 21:13)  T(F): 97.5 (23 May 2020 06:55), Max: 98.2 (22 May 2020 21:13)  HR: 65 (23 May 2020 06:55) (65 - 88)  BP: 131/60 (23 May 2020 06:55) (103/64 - 150/81)  BP(mean): --  RR: 18 (23 May 2020 06:55) (18 - 18)  SpO2: 99% (23 May 2020 06:55) (97% - 100%)  CAPILLARY BLOOD GLUCOSE      POCT Blood Glucose.: 124 mg/dL (22 May 2020 14:23)    I&O's Summary    22 May 2020 07:01  -  23 May 2020 07:00  --------------------------------------------------------  IN: 1205 mL / OUT: 850 mL / NET: 355 mL        PHYSICAL EXAM:  HEAD:  Atraumatic, Normocephalic  NECK: Supple, No   JVD  CHEST/LUNG:   no     rales,     no,    rhonchi  HEART: Regular rate and rhythm;         murmur  ABDOMEN: Soft, Nontender, ;   EXTREMITIES:    no  pedal   left gluteal hamatoma    edema  NEUROLOGY:  dementia    LABS:                        10.8   6.30  )-----------( 118      ( 23 May 2020 07:10 )             33.4     05-23    140  |  107  |  15  ----------------------------<  128<H>  3.6   |  23  |  0.51    Ca    8.8      23 May 2020 07:10  Phos  2.2     05-22  Mg     1.6     05-22    TPro  5.6<L>  /  Alb  2.5<L>  /  TBili  1.9<H>  /  DBili  x   /  AST  22  /  ALT  16  /  AlkPhos  72  05-23              ABG - ( 22 May 2020 15:26 )  pH, Arterial: 7.47  pH, Blood: x     /  pCO2: 33    /  pO2: 100   / HCO3: 24    / Base Excess: .5    /  SaO2: 98                    Thyroid Stimulating Hormone, Serum: 3.19 uIU/mL (04-24 @ 12:11)          Consultant(s) Notes Reviewed:      Care Discussed with Consultants/Other Providers:

## 2020-05-23 NOTE — PROGRESS NOTE ADULT - PROBLEM SELECTOR PLAN 1
s/p PEG   continue tube feeds as tolerated   apply bactroban BID to PEG site   aspiration precautions  daily peg care  monitor residuals  monitor GI function  miralax daily  will follow

## 2020-05-23 NOTE — PROGRESS NOTE ADULT - PROBLEM SELECTOR PLAN 2
acute drop in h/h of unknown etiology  no overt signs of bleeding  s/p 2uprbc with adequate rise in hemoglobin   hold xarelto for 1 week  monitor cbc, transfuse as needed  monitor for signs of bleeding acute drop in h/h of unknown etiology  no overt signs of gastrointestinal bleeding  CTA negative for active bleeding  s/p 2uprbc with adequate rise in hemoglobin   hold xarelto for 1 week  monitor cbc, transfuse as needed  monitor for signs of bleeding

## 2020-05-23 NOTE — PROGRESS NOTE ADULT - ASSESSMENT
89   yr pt,   pt  with  h/o  afib,   not  on   xarelto,  due  to falls,   cva,   copd, hld,     alzheimers  dementia,    pud/  h/o left ribf xs. 8/ 9 th,. left hip surg  h/o mlple falls.  echo, normal ef/ T2  comp  deformity, ct head, no cva//   ct  chest  with  goo, on last  visit    pt  was COVID  positive,  on 4/17/20,  had  elevated  troponin/ CRP/  Ferritin  , from Covid   pt  with  advanced  age/    comfort/   supportive  care is  ideal  however, son  wants  all  done.       sent  to  er  from   [facility], for  weakness/ not  eating/FTT  palliative/ comfort care,  ought to  be goal  in this pt,  however   son  wants  to  continue  current care   was  seen  by swallow  eval  on last visit/ oral feeds  at high risk  for  aspiration  Goc,   visited  again,  given  advanced  age  of  pt/ Alzheimers  dementia.  son wants   all  done/ peg  placed  on  5/18,    tolerating feeds  hb of  6  on 5/22/  s/p  prbc, hb on 5/ 23  is  10  anemia  from left gluteal   hematoma ,   ct angio, no  active bleed   follow  hb         < from: CT Chest No Cont (04.17.20 @ 04:24) >  IMPRESSION:   Pattern of GGO suggests infection including atypical pneumonia/viral infection from atypical agents including COVID-19 (C19V-1)  < end of copied text >     < from: CT Thoracic Spine Reform No Cont (01.07.20 @ 16:43) >  IMPRESSION:  Volume loss, microvascular disease, no acute hemorrhage or midline shift. If symptoms persist consider follow up head CT or MRI contraindications.  Cervical and thoracic spine are unchanged from prior, no obvious fracture or dislocation, multilevel degenerative changes as noted previously with diffuse osteopenia and small unchanged mild superior endplate T2 compression deformity.  < end of copied text      < from: Limited Transthoracic Echo (10.26.18 @ 14:07) >  Conclusions:  Limited tranthoracic echocardiogram at patients request to  end exam.  1. Mitral annular calcification.  2. The aortic valve opens well.  3. Limited images acquired at the patients request. Based  upon the parasternal long axis view only;  grossly normal  left ventricular systolic function.  < end of copied text >

## 2020-05-23 NOTE — PROGRESS NOTE ADULT - SUBJECTIVE AND OBJECTIVE BOX
INTERVAL HPI/OVERNIGHT EVENTS:    patient seen and examined   acute drop in h/h yesterday without overt signs of bleeding and hemodynamically stable   s/p 2uprbc with appropriate rise in hemoglobin  no bms overnight   PEG site with erythema and induration    MEDICATIONS  (STANDING):  atorvastatin 20 milliGRAM(s) Oral at bedtime  brimonidine 0.2% Ophthalmic Solution 1 Drop(s) Both EYES two times a day  rivaroxaban 10 milliGRAM(s) Oral daily  sodium chloride 0.9%. 1000 milliLiter(s) (50 mL/Hr) IV Continuous <Continuous>  timolol 0.5% Solution 1 Drop(s) Both EYES two times a day    MEDICATIONS  (PRN):      Allergies    aspirin (Unknown)  penicillin (Unknown)    Intolerances        Review of Systems: unable to obtain           Vital Signs Last 24 Hrs  T(C): 36.8 (19 May 2020 04:56), Max: 36.8 (19 May 2020 04:56)  T(F): 98.3 (19 May 2020 04:56), Max: 98.3 (19 May 2020 04:56)  HR: 89 (19 May 2020 04:56) (76 - 94)  BP: 92/60 (19 May 2020 04:56) (92/60 - 147/85)  BP(mean): --  RR: 17 (19 May 2020 04:56) (17 - 18)  SpO2: 98% (19 May 2020 04:56) (93% - 98%)    PHYSICAL EXAM:    Constitutional: weak, frail, NAD   HEENT: EOMI, throat clear  Neck: No LAD, supple  Respiratory: respirations nonlabored   Cardiovascular: S1 and S2, RRR, no M  Gastrointestinal: BS+, soft, NT/ND, +PEG with mild erythema and induration   Extremities: No peripheral edema  Neurological: awake      LABS:                        10.6   14.40 )-----------( 144      ( 19 May 2020 06:53 )             34.7     05-19    145  |  110<H>  |  12  ----------------------------<  58<L>  3.4<L>   |  14<L>  |  0.61    Ca    8.4      19 May 2020 06:53            RADIOLOGY & ADDITIONAL TESTS:  < from: Upper Endoscopy (05.18.20 @ 10:28) [facility]  ____________________________________________________________________________________________________  Patient Name: Jessica Present                      MRN: [id number]  Account Number: [id number]                     YOB: 1925  Room: Endoscopy Room 1                           Gender: Female  Attending MD: Simeon Swanson MD                 Procedure Date No Time: 5/18/2020  ____________________________________________________________________________________________________     Procedure:           Upper GI endoscopy  Indications:         Place PEG because patient is unable to eat  Providers:           Simeon Swanson MD, Homer Ramirez PA-C  Medicines:           Monitored Anesthesia Care  Complications:       No immediate complications.  ____________________________________________________________________________________________________  Procedure:           After obtaining informed consent, the endoscope was passed under direct            vision. Throughout the procedure, the patient's blood pressure, pulse, and                        oxygen saturations were monitored continuously. The was introduced through                        the mouth, and advanced to the second part ofduodenum. The upper GI                        endoscopy was accomplished without difficulty. The patient tolerated the                        procedure well.            Findings:       The examined esophagus was normal.       Mild inflammation was found in the gastric antrum.       Inflammation was found in the duodenal bulb.       The patient was placed in the supine position for PEG placement. The stomach was insufflated        to appose gastric and abdominal walls. A site was located in the body of the stomach with        excellent transillumination for placement. The abdominal wall was marked and prepped in a        sterile manner. The area was anesthetized with 5 mL of 0.5% lidocaine. The trocar needle was        introduced through the abdominal wall and into the stomach under direct endoscopic view. A        snare was introduced through the endoscope and opened in the gastric lumen. The guidewire        was passed through the trocar and into the open snare. The snare was closed around the guide        wire. The endoscope and snare were removed, pulling the wire out through the mouth. A skin        incision was made at the site of needle insertion. The endoscopically removable 20 Fr Bard        gastrostomy tube was lubricated. The G-tube was tied to the guide wire and pulled through the        mouth and into the stomach. The trocar needle was removed, and the gastrostomy tube was     pulled out from the stomach through the skin. The external bumper was attached to the        gastrostomy tube, and the tube was cut to remove the guide wire. The final position of the        gastrostomy tube was confirmed by relook endoscopy, and skin marking noted to be 2.5 cm at        the external bumper. The final tension and compression of the abdominal wall by the PEG tube        and external bumper were checked and revealed that the bumper was moderately tight and mildly        deformingthe skin. The feeding tube was capped, and the tube site cleaned and dressed.                                                                                                        Impression:          - Normal esophagus.                       - Gastritis.                       - Duodenitis.                       - An endoscopically removable PEG placement was successfully completed.                       - No specimens collected.  Recommendation:      - Please follow the post-PEG recommendationsincluding: may use PEG today for                        meds and water and may use PEG tomorrow for feedings.                       - Please follow the post-PEG recommendations including: Have abdominal binder                        on at ALL TIMES to prevent inadvertent removal.                                                                                                        Attending Participation:       I personally performed the entire procedure.                                                                   _________________  Simeon Swanson MD  5/18/2020 3:01:59 PM  Number of Addenda: 0    Note Initiated On: 5/18/2020 10:28 AM    < end of copied text > INTERVAL HPI/OVERNIGHT EVENTS:    patient seen and examined   acute drop in h/h yesterday without overt signs of bleeding and hemodynamically stable   s/p 2uprbc with appropriate rise in hemoglobin  no bms overnight   CTA completed last night with no evidence of active bleeding   PEG site with erythema and induration    MEDICATIONS  (STANDING):  atorvastatin 20 milliGRAM(s) Oral at bedtime  brimonidine 0.2% Ophthalmic Solution 1 Drop(s) Both EYES two times a day  rivaroxaban 10 milliGRAM(s) Oral daily  sodium chloride 0.9%. 1000 milliLiter(s) (50 mL/Hr) IV Continuous <Continuous>  timolol 0.5% Solution 1 Drop(s) Both EYES two times a day    MEDICATIONS  (PRN):      Allergies    aspirin (Unknown)  penicillin (Unknown)    Intolerances        Review of Systems: unable to obtain           Vital Signs Last 24 Hrs  T(C): 36.8 (19 May 2020 04:56), Max: 36.8 (19 May 2020 04:56)  T(F): 98.3 (19 May 2020 04:56), Max: 98.3 (19 May 2020 04:56)  HR: 89 (19 May 2020 04:56) (76 - 94)  BP: 92/60 (19 May 2020 04:56) (92/60 - 147/85)  BP(mean): --  RR: 17 (19 May 2020 04:56) (17 - 18)  SpO2: 98% (19 May 2020 04:56) (93% - 98%)    PHYSICAL EXAM:    Constitutional: weak, frail, NAD   HEENT: EOMI, throat clear  Neck: No LAD, supple  Respiratory: respirations nonlabored   Cardiovascular: S1 and S2, RRR, no M  Gastrointestinal: BS+, soft, NT/ND, +PEG with mild erythema and induration   Extremities: No peripheral edema  Neurological: awake      LABS:                        10.6   14.40 )-----------( 144      ( 19 May 2020 06:53 )             34.7     05-19    145  |  110<H>  |  12  ----------------------------<  58<L>  3.4<L>   |  14<L>  |  0.61    Ca    8.4      19 May 2020 06:53            RADIOLOGY & ADDITIONAL TESTS:  < from: Upper Endoscopy (05.18.20 @ 10:28) [facility]  ____________________________________________________________________________________________________  Patient Name: Jessica Duque                      MRN: [id number]  Account Number: [id number]                     YOB: 1925  Room: Endoscopy Room 1                           Gender: Female  Attending MD: Simeon Swanson MD                 Procedure Date No Time: 5/18/2020  ____________________________________________________________________________________________________     Procedure:           Upper GI endoscopy  Indications:         Place PEG because patient is unable to eat  Providers:           Simeon Swanson MD, Homer Ramirez PA-C  Medicines:           Monitored Anesthesia Care  Complications:       No immediate complications.  ____________________________________________________________________________________________________  Procedure:           After obtaining informed consent, the endoscope was passed under direct            vision. Throughout the procedure, the patient's blood pressure, pulse, and                        oxygen saturations were monitored continuously. The was introduced through                        the mouth, and advanced to the second part ofduodenum. The upper GI                        endoscopy was accomplished without difficulty. The patient tolerated the                        procedure well.            Findings:       The examined esophagus was normal.       Mild inflammation was found in the gastric antrum.       Inflammation was found in the duodenal bulb.       The patient was placed in the supine position for PEG placement. The stomach was insufflated        to appose gastric and abdominal walls. A site was located in the body of the stomach with        excellent transillumination for placement. The abdominal wall was marked and prepped in a        sterile manner. The area was anesthetized with 5 mL of 0.5% lidocaine. The trocar needle was        introduced through the abdominal wall and into the stomach under direct endoscopic view. A        snare was introduced through the endoscope and opened in the gastric lumen. The guidewire        was passed through the trocar and into the open snare. The snare was closed around the guide        wire. The endoscope and snare were removed, pulling the wire out through the mouth. A skin        incision was made at the site of needle insertion. The endoscopically removable 20 Fr Bard        gastrostomy tube was lubricated. The G-tube was tied to the guide wire and pulled through the        mouth and into the stomach. The trocar needle was removed, and the gastrostomy tube was     pulled out from the stomach through the skin. The external bumper was attached to the        gastrostomy tube, and the tube was cut to remove the guide wire. The final position of the        gastrostomy tube was confirmed by relook endoscopy, and skin marking noted to be 2.5 cm at        the external bumper. The final tension and compression of the abdominal wall by the PEG tube        and external bumper were checked and revealed that the bumper was moderately tight and mildly        deformingthe skin. The feeding tube was capped, and the tube site cleaned and dressed.                                                                                                        Impression:          - Normal esophagus.                       - Gastritis.                       - Duodenitis.                       - An endoscopically removable PEG placement was successfully completed.                       - No specimens collected.  Recommendation:      - Please follow the post-PEG recommendationsincluding: may use PEG today for                        meds and water and may use PEG tomorrow for feedings.                       - Please follow the post-PEG recommendations including: Have abdominal binder                        on at ALL TIMES to prevent inadvertent removal.                                                                                                        Attending Participation:       I personally performed the entire procedure.                                                                   _________________  Simeon Swanson MD  5/18/2020 3:01:59 PM  Number of Addenda: 0    Note Initiated On: 5/18/2020 10:28 AM    < end of copied text >

## 2020-05-23 NOTE — PROGRESS NOTE ADULT - SUBJECTIVE AND OBJECTIVE BOX
CARDIOLOGY     PROGRESS  NOTE   ________________________________________________    CHIEF COMPLAINT:Patient is a 89y old  Female who presents with a chief complaint of failure to thrive/ a.fib (22 May 2020 12:04)  no complain.  	  REVIEW OF SYSTEMS:  CONSTITUTIONAL: No fever, weight loss, or fatigue  EYES: No eye pain, visual disturbances, or discharge  ENT:  No difficulty hearing, tinnitus, vertigo; No sinus or throat pain  NECK: No pain or stiffness  RESPIRATORY: No cough, wheezing, chills or hemoptysis; No Shortness of Breath  CARDIOVASCULAR: No chest pain, palpitations, passing out, dizziness, or leg swelling  GASTROINTESTINAL: No abdominal or epigastric pain. No nausea, vomiting, or hematemesis; No diarrhea or constipation. No melena or hematochezia.  GENITOURINARY: No dysuria, frequency, hematuria, or incontinence  NEUROLOGICAL: No headaches, memory loss, loss of strength, numbness, or tremors  SKIN: No itching, burning, rashes, or lesions   LYMPH Nodes: No enlarged glands  ENDOCRINE: No heat or cold intolerance; No hair loss  MUSCULOSKELETAL: No joint pain or swelling; No muscle, back, or extremity pain  PSYCHIATRIC: No depression, anxiety, mood swings, or difficulty sleeping  HEME/LYMPH: No easy bruising, or bleeding gums  ALLERGY AND IMMUNOLOGIC: No hives or eczema	    [ ] All others negative	  [ ] Unable to obtain    PHYSICAL EXAM:  T(C): 36.4 (05-23-20 @ 06:55), Max: 36.8 (05-22-20 @ 21:13)  HR: 65 (05-23-20 @ 06:55) (65 - 88)  BP: 131/60 (05-23-20 @ 06:55) (103/64 - 150/81)  RR: 18 (05-23-20 @ 06:55) (18 - 18)  SpO2: 99% (05-23-20 @ 06:55) (97% - 100%)  Wt(kg): --  I&O's Summary    22 May 2020 07:01  -  23 May 2020 07:00  --------------------------------------------------------  IN: 1205 mL / OUT: 850 mL / NET: 355 mL        Appearance: Normal	  HEENT:   Normal oral mucosa, PERRL, EOMI	  Lymphatic: No lymphadenopathy  Cardiovascular: Normal S1 S2, No JVD,+ murmurs, No edema  Respiratory: Lungs clear to auscultation	  Psychiatry: A & O x 3, Mood & affect appropriate  Gastrointestinal:  Soft, Non-tender, + BS	  Skin: No rashes, No ecchymoses, No cyanosis, +gluteal hematoma ?	  Neurologic: Non-focal  Extremities: Normal range of motion, No clubbing, cyanosis or edema  Vascular: Peripheral pulses palpable 2+ bilaterally    MEDICATIONS  (STANDING):  atorvastatin 20 milliGRAM(s) Oral at bedtime  brimonidine 0.2% Ophthalmic Solution 1 Drop(s) Both EYES two times a day  polyethylene glycol 3350 17 Gram(s) Oral daily  timolol 0.5% Solution 1 Drop(s) Both EYES two times a day      TELEMETRY: 	    ECG:  	  RADIOLOGY:  OTHER: 	  	  LABS:	 	    CARDIAC MARKERS:                                10.8   6.30  )-----------( 118      ( 23 May 2020 07:10 )             33.4     05-23    140  |  107  |  15  ----------------------------<  128<H>  3.6   |  23  |  0.51    Ca    8.8      23 May 2020 07:10  Phos  2.2     05-22  Mg     1.6     05-22    TPro  5.6<L>  /  Alb  2.5<L>  /  TBili  1.9<H>  /  DBili  x   /  AST  22  /  ALT  16  /  AlkPhos  72  05-23    proBNP:   Lipid Profile:   HgA1c:   TSH: Thyroid Stimulating Hormone, Serum: 3.19 uIU/mL (04-24 @ 12:11)    < from: CT Angio Pelvis w/ IV Cont (05.22.20 @ 23:20) >  INTERPRETATION:  No evidence for contrast extravasation to suggest a bleed.  Please f/u official report in AM.    < end of copied text >        Assessment and plan  ---------------------------  89 y/o female with hx of dementia, CAD, CVA, COPD, afib, and recent extensive stay for COVID requiring pressor support presents to the ED with decreased PO intake. Patient discharge to the [facility] on 4/8 and was tolerating spoon feeds at that time, per son has declining mental status but was able to say his name a few days ago. Now over past few days has not been tolerating spoon feeds and is refusing to eat, also has been difficult to arouse  no chest pain / sob.  pt with failure to thrive  dc mirtazepine ?cause of sleepiness  a.fib ,hr controlled, continue ac  pt is not DNR  s/p  peg placement , start feeding today  discussed with son wants rehab   voiding trial check urinalysis  tolerating feeding  son is looking for rehab placement  events has noted yesterday, s/p blood transfusion  official ct report  fu cbc

## 2020-05-24 LAB
ALBUMIN SERPL ELPH-MCNC: 2.3 G/DL — LOW (ref 3.3–5)
ALP SERPL-CCNC: 78 U/L — SIGNIFICANT CHANGE UP (ref 40–120)
ALT FLD-CCNC: 12 U/L — SIGNIFICANT CHANGE UP (ref 10–45)
ANION GAP SERPL CALC-SCNC: 11 MMOL/L — SIGNIFICANT CHANGE UP (ref 5–17)
AST SERPL-CCNC: 17 U/L — SIGNIFICANT CHANGE UP (ref 10–40)
BILIRUB SERPL-MCNC: 1.8 MG/DL — HIGH (ref 0.2–1.2)
BUN SERPL-MCNC: 16 MG/DL — SIGNIFICANT CHANGE UP (ref 7–23)
CALCIUM SERPL-MCNC: 8.4 MG/DL — SIGNIFICANT CHANGE UP (ref 8.4–10.5)
CHLORIDE SERPL-SCNC: 105 MMOL/L — SIGNIFICANT CHANGE UP (ref 96–108)
CO2 SERPL-SCNC: 24 MMOL/L — SIGNIFICANT CHANGE UP (ref 22–31)
CREAT SERPL-MCNC: 0.55 MG/DL — SIGNIFICANT CHANGE UP (ref 0.5–1.3)
GLUCOSE SERPL-MCNC: 117 MG/DL — HIGH (ref 70–99)
HCT VFR BLD CALC: 33 % — LOW (ref 34.5–45)
HGB BLD-MCNC: 10.6 G/DL — LOW (ref 11.5–15.5)
MCHC RBC-ENTMCNC: 30.6 PG — SIGNIFICANT CHANGE UP (ref 27–34)
MCHC RBC-ENTMCNC: 32.1 GM/DL — SIGNIFICANT CHANGE UP (ref 32–36)
MCV RBC AUTO: 95.4 FL — SIGNIFICANT CHANGE UP (ref 80–100)
NRBC # BLD: 0 /100 WBCS — SIGNIFICANT CHANGE UP (ref 0–0)
PLATELET # BLD AUTO: 122 K/UL — LOW (ref 150–400)
POTASSIUM SERPL-MCNC: 3.7 MMOL/L — SIGNIFICANT CHANGE UP (ref 3.5–5.3)
POTASSIUM SERPL-SCNC: 3.7 MMOL/L — SIGNIFICANT CHANGE UP (ref 3.5–5.3)
PROT SERPL-MCNC: 5.7 G/DL — LOW (ref 6–8.3)
RBC # BLD: 3.46 M/UL — LOW (ref 3.8–5.2)
RBC # FLD: 19.9 % — HIGH (ref 10.3–14.5)
SODIUM SERPL-SCNC: 140 MMOL/L — SIGNIFICANT CHANGE UP (ref 135–145)
WBC # BLD: 6.61 K/UL — SIGNIFICANT CHANGE UP (ref 3.8–10.5)
WBC # FLD AUTO: 6.61 K/UL — SIGNIFICANT CHANGE UP (ref 3.8–10.5)

## 2020-05-24 PROCEDURE — 99232 SBSQ HOSP IP/OBS MODERATE 35: CPT

## 2020-05-24 RX ORDER — ACETAMINOPHEN 500 MG
650 TABLET ORAL ONCE
Refills: 0 | Status: COMPLETED | OUTPATIENT
Start: 2020-05-24 | End: 2020-05-24

## 2020-05-24 RX ADMIN — MUPIROCIN 1 APPLICATION(S): 20 OINTMENT TOPICAL at 00:58

## 2020-05-24 RX ADMIN — MUPIROCIN 1 APPLICATION(S): 20 OINTMENT TOPICAL at 12:12

## 2020-05-24 RX ADMIN — Medication 1 DROP(S): at 05:43

## 2020-05-24 RX ADMIN — ATORVASTATIN CALCIUM 20 MILLIGRAM(S): 80 TABLET, FILM COATED ORAL at 20:55

## 2020-05-24 RX ADMIN — Medication 1 DROP(S): at 17:16

## 2020-05-24 RX ADMIN — Medication 650 MILLIGRAM(S): at 05:43

## 2020-05-24 RX ADMIN — BRIMONIDINE TARTRATE 1 DROP(S): 2 SOLUTION/ DROPS OPHTHALMIC at 17:17

## 2020-05-24 RX ADMIN — BRIMONIDINE TARTRATE 1 DROP(S): 2 SOLUTION/ DROPS OPHTHALMIC at 05:44

## 2020-05-24 NOTE — PROGRESS NOTE ADULT - SUBJECTIVE AND OBJECTIVE BOX
afebrile  REVIEW OF SYSTEMS:  GEN: no fever,    no chills  RESP: no SOB,   no cough  CVS: no chest pain,   no palpitations  GI: no abdominal pain,   no nausea,   no vomiting,   no constipation,   no diarrhea  : no dysuria,   no frequency  NEURO: no headache,   no dizziness  PSYCH: no depression,   not anxious  Derm : no rash    MEDICATIONS  (STANDING):  atorvastatin 20 milliGRAM(s) Oral at bedtime  brimonidine 0.2% Ophthalmic Solution 1 Drop(s) Both EYES two times a day  mupirocin 2% Ointment 1 Application(s) Topical two times a day  polyethylene glycol 3350 17 Gram(s) Oral daily  timolol 0.5% Solution 1 Drop(s) Both EYES two times a day    MEDICATIONS  (PRN):      Vital Signs Last 24 Hrs  T(C): 37.2 (24 May 2020 05:32), Max: 37.2 (23 May 2020 21:28)  T(F): 99 (24 May 2020 05:32), Max: 99 (24 May 2020 05:32)  HR: 89 (24 May 2020 05:32) (83 - 89)  BP: 101/70 (24 May 2020 05:32) (101/70 - 133/74)  BP(mean): --  RR: 18 (24 May 2020 05:32) (18 - 18)  SpO2: 96% (24 May 2020 05:32) (96% - 98%)  CAPILLARY BLOOD GLUCOSE        I&O's Summary    23 May 2020 07:01  -  24 May 2020 07:00  --------------------------------------------------------  IN: 440 mL / OUT: 550 mL / NET: -110 mL        PHYSICAL EXAM:  HEAD:  Atraumatic, Normocephalic  NECK: Supple, No   JVD  CHEST/LUNG:   no     rales,     no,    rhonchi  HEART: Regular rate and rhythm;         murmur  ABDOMEN: Soft, Nontender, ;   EXTREMITIES:    no    edema  NEUROLOGY:  alert    LABS:                        10.6   6.61  )-----------( 122      ( 24 May 2020 07:05 )             33.0     05-24    140  |  105  |  16  ----------------------------<  117<H>  3.7   |  24  |  0.55    Ca    8.4      24 May 2020 07:05  Phos  2.2     05-22  Mg     1.6     05-22    TPro  5.7<L>  /  Alb  2.3<L>  /  TBili  1.8<H>  /  DBili  x   /  AST  17  /  ALT  12  /  AlkPhos  78  05-24              ABG - ( 22 May 2020 15:26 )  pH, Arterial: 7.47  pH, Blood: x     /  pCO2: 33    /  pO2: 100   / HCO3: 24    / Base Excess: .5    /  SaO2: 98                    Thyroid Stimulating Hormone, Serum: 3.19 uIU/mL (04-24 @ 12:11)          Consultant(s) Notes Reviewed:      Care Discussed with Consultants/Other Providers:

## 2020-05-24 NOTE — PROGRESS NOTE ADULT - SUBJECTIVE AND OBJECTIVE BOX
INTERVAL HPI/OVERNIGHT EVENTS:    patient seen and examined   acute drop in h/h yesterday without overt signs of bleeding and hemodynamically stable   s/p 2uprbc with appropriate rise in hemoglobin  no bms overnight   CTA completed last night with no evidence of active bleeding   PEG site with erythema and induration    MEDICATIONS  (STANDING):  atorvastatin 20 milliGRAM(s) Oral at bedtime  brimonidine 0.2% Ophthalmic Solution 1 Drop(s) Both EYES two times a day  rivaroxaban 10 milliGRAM(s) Oral daily  sodium chloride 0.9%. 1000 milliLiter(s) (50 mL/Hr) IV Continuous <Continuous>  timolol 0.5% Solution 1 Drop(s) Both EYES two times a day    MEDICATIONS  (PRN):      Allergies    aspirin (Unknown)  penicillin (Unknown)    Intolerances        Review of Systems: unable to obtain           Vital Signs Last 24 Hrs  T(C): 36.8 (19 May 2020 04:56), Max: 36.8 (19 May 2020 04:56)  T(F): 98.3 (19 May 2020 04:56), Max: 98.3 (19 May 2020 04:56)  HR: 89 (19 May 2020 04:56) (76 - 94)  BP: 92/60 (19 May 2020 04:56) (92/60 - 147/85)  BP(mean): --  RR: 17 (19 May 2020 04:56) (17 - 18)  SpO2: 98% (19 May 2020 04:56) (93% - 98%)    PHYSICAL EXAM:    Constitutional: weak, frail, NAD   HEENT: EOMI, throat clear  Neck: No LAD, supple  Respiratory: respirations nonlabored   Cardiovascular: S1 and S2, RRR, no M  Gastrointestinal: BS+, soft, NT/ND, +PEG with mild erythema and induration   Extremities: No peripheral edema  Neurological: awake      LABS:                        10.6   14.40 )-----------( 144      ( 19 May 2020 06:53 )             34.7     05-19    145  |  110<H>  |  12  ----------------------------<  58<L>  3.4<L>   |  14<L>  |  0.61    Ca    8.4      19 May 2020 06:53            RADIOLOGY & ADDITIONAL TESTS:  < from: Upper Endoscopy (05.18.20 @ 10:28) [facility]  ____________________________________________________________________________________________________  Patient Name: Jessica Duque                      MRN: [id number]  Account Number: [id number]                     YOB: 1925  Room: Endoscopy Room 1                           Gender: Female  Attending MD: Simeon Swanson MD                 Procedure Date No Time: 5/18/2020  ____________________________________________________________________________________________________     Procedure:           Upper GI endoscopy  Indications:         Place PEG because patient is unable to eat  Providers:           Simeon Swanson MD, Homer Ramirez PA-C  Medicines:           Monitored Anesthesia Care  Complications:       No immediate complications.  ____________________________________________________________________________________________________  Procedure:           After obtaining informed consent, the endoscope was passed under direct            vision. Throughout the procedure, the patient's blood pressure, pulse, and                        oxygen saturations were monitored continuously. The was introduced through                        the mouth, and advanced to the second part ofduodenum. The upper GI                        endoscopy was accomplished without difficulty. The patient tolerated the                        procedure well.            Findings:       The examined esophagus was normal.       Mild inflammation was found in the gastric antrum.       Inflammation was found in the duodenal bulb.       The patient was placed in the supine position for PEG placement. The stomach was insufflated        to appose gastric and abdominal walls. A site was located in the body of the stomach with        excellent transillumination for placement. The abdominal wall was marked and prepped in a        sterile manner. The area was anesthetized with 5 mL of 0.5% lidocaine. The trocar needle was        introduced through the abdominal wall and into the stomach under direct endoscopic view. A        snare was introduced through the endoscope and opened in the gastric lumen. The guidewire        was passed through the trocar and into the open snare. The snare was closed around the guide        wire. The endoscope and snare were removed, pulling the wire out through the mouth. A skin        incision was made at the site of needle insertion. The endoscopically removable 20 Fr Bard        gastrostomy tube was lubricated. The G-tube was tied to the guide wire and pulled through the        mouth and into the stomach. The trocar needle was removed, and the gastrostomy tube was     pulled out from the stomach through the skin. The external bumper was attached to the        gastrostomy tube, and the tube was cut to remove the guide wire. The final position of the        gastrostomy tube was confirmed by relook endoscopy, and skin marking noted to be 2.5 cm at        the external bumper. The final tension and compression of the abdominal wall by the PEG tube        and external bumper were checked and revealed that the bumper was moderately tight and mildly        deformingthe skin. The feeding tube was capped, and the tube site cleaned and dressed.                                                                                                        Impression:          - Normal esophagus.                       - Gastritis.                       - Duodenitis.                       - An endoscopically removable PEG placement was successfully completed.                       - No specimens collected.  Recommendation:      - Please follow the post-PEG recommendationsincluding: may use PEG today for                        meds and water and may use PEG tomorrow for feedings.                       - Please follow the post-PEG recommendations including: Have abdominal binder                        on at ALL TIMES to prevent inadvertent removal.                                                                                                        Attending Participation:       I personally performed the entire procedure.                                                                   _________________  Simeon Swanson MD  5/18/2020 3:01:59 PM  Number of Addenda: 0    Note Initiated On: 5/18/2020 10:28 AM    < end of copied text >

## 2020-05-24 NOTE — PROGRESS NOTE ADULT - ASSESSMENT
89   yr pt,   pt  with  h/o  afib,   not  on   xarelto,  due  to falls,   cva,   copd, hld,     alzheimers  dementia,    pud/  h/o left ribf xs. 8/ 9 th,. left hip surg  h/o mlple falls.  echo, normal ef/ T2  comp  deformity, ct head, no cva//   ct  chest  with  goo, on last  visit    pt  was COVID  positive,  on 4/17/20,  had  elevated  troponin/ CRP/  Ferritin  , from Covid   pt  with  advanced  age/    comfort/   supportive  care is  ideal  however, son  wants  all  done.       sent  to  er  from   [facility], for  weakness/ not  eating/FTT  palliative/ comfort care,  ought to  be goal  in this pt,  however   son  wants  to  continue  current care   was  seen  by swallow  eval  on last visit/ oral feeds  at high risk  for  aspiration  Goc,   visited  again,  given  advanced  age  of  pt/ Alzheimers  dementia.  son wants   all  done/ peg  placed  on  5/18,    tolerating feeds  hb of  6  on 5/22/  s/p  prbc, hb on 5/ 23  is  10  anemia  from  ?  left gluteal   hematoma ,/ no  melena/  brbpe/    ct angio, no  active bleed   hb stable  at  10/  pe r gi, no w/p           < from: CT Chest No Cont (04.17.20 @ 04:24) >  IMPRESSION:   Pattern of GGO suggests infection including atypical pneumonia/viral infection from atypical agents including COVID-19 (C19V-1)  < end of copied text >     < from: CT Thoracic Spine Reform No Cont (01.07.20 @ 16:43) >  IMPRESSION:  Volume loss, microvascular disease, no acute hemorrhage or midline shift. If symptoms persist consider follow up head CT or MRI contraindications.  Cervical and thoracic spine are unchanged from prior, no obvious fracture or dislocation, multilevel degenerative changes as noted previously with diffuse osteopenia and small unchanged mild superior endplate T2 compression deformity.  < end of copied text      < from: Limited Transthoracic Echo (10.26.18 @ 14:07) >  Conclusions:  Limited tranthoracic echocardiogram at patients request to  end exam.  1. Mitral annular calcification.  2. The aortic valve opens well.  3. Limited images acquired at the patients request. Based  upon the parasternal long axis view only;  grossly normal  left ventricular systolic function.  < end of copied text >

## 2020-05-24 NOTE — PROGRESS NOTE ADULT - PROBLEM SELECTOR PLAN 2
acute drop in h/h of unknown etiology  no overt signs of gastrointestinal bleeding  CTA negative for active bleeding  s/p 2uprbc with adequate rise in hemoglobin   hold xarelto for 1 week  monitor cbc, transfuse as needed  monitor for signs of bleeding

## 2020-05-25 LAB
ANION GAP SERPL CALC-SCNC: 8 MMOL/L — SIGNIFICANT CHANGE UP (ref 5–17)
APTT BLD: 22.2 SEC — LOW (ref 27.5–36.3)
BUN SERPL-MCNC: 15 MG/DL — SIGNIFICANT CHANGE UP (ref 7–23)
CALCIUM SERPL-MCNC: 8.6 MG/DL — SIGNIFICANT CHANGE UP (ref 8.4–10.5)
CHLORIDE SERPL-SCNC: 105 MMOL/L — SIGNIFICANT CHANGE UP (ref 96–108)
CO2 SERPL-SCNC: 25 MMOL/L — SIGNIFICANT CHANGE UP (ref 22–31)
CREAT SERPL-MCNC: 0.54 MG/DL — SIGNIFICANT CHANGE UP (ref 0.5–1.3)
GLUCOSE SERPL-MCNC: 130 MG/DL — HIGH (ref 70–99)
HCT VFR BLD CALC: 31.5 % — LOW (ref 34.5–45)
HCT VFR BLD CALC: 36.4 % — SIGNIFICANT CHANGE UP (ref 34.5–45)
HGB BLD-MCNC: 11.4 G/DL — LOW (ref 11.5–15.5)
HGB BLD-MCNC: 9.9 G/DL — LOW (ref 11.5–15.5)
INR BLD: 1.08 RATIO — SIGNIFICANT CHANGE UP (ref 0.88–1.16)
MCHC RBC-ENTMCNC: 30.3 PG — SIGNIFICANT CHANGE UP (ref 27–34)
MCHC RBC-ENTMCNC: 30.6 PG — SIGNIFICANT CHANGE UP (ref 27–34)
MCHC RBC-ENTMCNC: 31.3 GM/DL — LOW (ref 32–36)
MCHC RBC-ENTMCNC: 31.4 GM/DL — LOW (ref 32–36)
MCV RBC AUTO: 96.3 FL — SIGNIFICANT CHANGE UP (ref 80–100)
MCV RBC AUTO: 97.6 FL — SIGNIFICANT CHANGE UP (ref 80–100)
NRBC # BLD: 0 /100 WBCS — SIGNIFICANT CHANGE UP (ref 0–0)
NRBC # BLD: 0 /100 WBCS — SIGNIFICANT CHANGE UP (ref 0–0)
PLATELET # BLD AUTO: 122 K/UL — LOW (ref 150–400)
PLATELET # BLD AUTO: 130 K/UL — LOW (ref 150–400)
POTASSIUM SERPL-MCNC: 4 MMOL/L — SIGNIFICANT CHANGE UP (ref 3.5–5.3)
POTASSIUM SERPL-SCNC: 4 MMOL/L — SIGNIFICANT CHANGE UP (ref 3.5–5.3)
PROTHROM AB SERPL-ACNC: 12.5 SEC — SIGNIFICANT CHANGE UP (ref 10–12.9)
RBC # BLD: 3.27 M/UL — LOW (ref 3.8–5.2)
RBC # BLD: 3.73 M/UL — LOW (ref 3.8–5.2)
RBC # FLD: 18.8 % — HIGH (ref 10.3–14.5)
RBC # FLD: 19 % — HIGH (ref 10.3–14.5)
SODIUM SERPL-SCNC: 138 MMOL/L — SIGNIFICANT CHANGE UP (ref 135–145)
WBC # BLD: 6.68 K/UL — SIGNIFICANT CHANGE UP (ref 3.8–10.5)
WBC # BLD: 8.13 K/UL — SIGNIFICANT CHANGE UP (ref 3.8–10.5)
WBC # FLD AUTO: 6.68 K/UL — SIGNIFICANT CHANGE UP (ref 3.8–10.5)
WBC # FLD AUTO: 8.13 K/UL — SIGNIFICANT CHANGE UP (ref 3.8–10.5)

## 2020-05-25 PROCEDURE — 99232 SBSQ HOSP IP/OBS MODERATE 35: CPT

## 2020-05-25 RX ORDER — PANTOPRAZOLE SODIUM 20 MG/1
40 TABLET, DELAYED RELEASE ORAL
Refills: 0 | Status: DISCONTINUED | OUTPATIENT
Start: 2020-05-25 | End: 2020-05-26

## 2020-05-25 RX ORDER — SENNA PLUS 8.6 MG/1
2 TABLET ORAL AT BEDTIME
Refills: 0 | Status: DISCONTINUED | OUTPATIENT
Start: 2020-05-25 | End: 2020-05-27

## 2020-05-25 RX ADMIN — MUPIROCIN 1 APPLICATION(S): 20 OINTMENT TOPICAL at 23:02

## 2020-05-25 RX ADMIN — BRIMONIDINE TARTRATE 1 DROP(S): 2 SOLUTION/ DROPS OPHTHALMIC at 04:58

## 2020-05-25 RX ADMIN — BRIMONIDINE TARTRATE 1 DROP(S): 2 SOLUTION/ DROPS OPHTHALMIC at 17:20

## 2020-05-25 RX ADMIN — Medication 1 DROP(S): at 17:18

## 2020-05-25 RX ADMIN — Medication 1 DROP(S): at 04:57

## 2020-05-25 RX ADMIN — MUPIROCIN 1 APPLICATION(S): 20 OINTMENT TOPICAL at 11:06

## 2020-05-25 RX ADMIN — MUPIROCIN 1 APPLICATION(S): 20 OINTMENT TOPICAL at 00:52

## 2020-05-25 RX ADMIN — ATORVASTATIN CALCIUM 20 MILLIGRAM(S): 80 TABLET, FILM COATED ORAL at 23:01

## 2020-05-25 NOTE — PROGRESS NOTE ADULT - SUBJECTIVE AND OBJECTIVE BOX
CARDIOLOGY     PROGRESS  NOTE   ________________________________________________    CHIEF COMPLAINT:Patient is a 89y old  Female who presents with a chief complaint of failure to thrive/ a.fib (25 May 2020 11:20)  no complain.  	  REVIEW OF SYSTEMS:  CONSTITUTIONAL: No fever, weight loss, or fatigue  EYES: No eye pain, visual disturbances, or discharge  ENT:  No difficulty hearing, tinnitus, vertigo; No sinus or throat pain  NECK: No pain or stiffness  RESPIRATORY: No cough, wheezing, chills or hemoptysis; No Shortness of Breath  CARDIOVASCULAR: No chest pain, palpitations, passing out, dizziness, or leg swelling  GASTROINTESTINAL: No abdominal or epigastric pain. No nausea, vomiting, or hematemesis; No diarrhea or constipation. No melena or hematochezia.  GENITOURINARY: No dysuria, frequency, hematuria, or incontinence  NEUROLOGICAL: No headaches, memory loss, loss of strength, numbness, or tremors  SKIN: No itching, burning, rashes, or lesions   LYMPH Nodes: No enlarged glands  ENDOCRINE: No heat or cold intolerance; No hair loss  MUSCULOSKELETAL: No joint pain or swelling; No muscle, back, or extremity pain  PSYCHIATRIC: No depression, anxiety, mood swings, or difficulty sleeping  HEME/LYMPH: No easy bruising, or bleeding gums  ALLERGY AND IMMUNOLOGIC: No hives or eczema	    [ ] All others negative	  [x ] Unable to obtain    PHYSICAL EXAM:  T(C): 36.4 (05-25-20 @ 04:44), Max: 36.7 (05-24-20 @ 21:27)  HR: 75 (05-25-20 @ 04:44) (73 - 75)  BP: 112/71 (05-25-20 @ 04:44) (112/71 - 119/79)  RR: 18 (05-25-20 @ 04:44) (18 - 18)  SpO2: 94% (05-25-20 @ 04:44) (94% - 95%)  Wt(kg): --  I&O's Summary      Appearance: Normal	  HEENT:   Normal oral mucosa, PERRL, EOMI	  Lymphatic: No lymphadenopathy  Cardiovascular: Normal S1 S2, No JVD, + murmurs, No edema  Respiratory: Lungs clear to auscultation	  Psychiatry: A & O x 3, Mood & affect appropriate  Gastrointestinal:  Soft, Non-tender, + BS	  Skin: No rashes, No ecchymoses, No cyanosis	  Neurologic: Non-focal  Extremities: Normal range of motion, No clubbing, cyanosis or edema  Vascular: Peripheral pulses palpable 2+ bilaterally    MEDICATIONS  (STANDING):  atorvastatin 20 milliGRAM(s) Oral at bedtime  brimonidine 0.2% Ophthalmic Solution 1 Drop(s) Both EYES two times a day  mupirocin 2% Ointment 1 Application(s) Topical two times a day  pantoprazole    Tablet 40 milliGRAM(s) Oral before breakfast  senna 2 Tablet(s) Oral at bedtime  timolol 0.5% Solution 1 Drop(s) Both EYES two times a day      TELEMETRY: 	    ECG:  	  RADIOLOGY:  OTHER: 	  	  LABS:	 	    CARDIAC MARKERS:                                9.9    6.68  )-----------( 130      ( 25 May 2020 03:57 )             31.5     05-25    138  |  105  |  15  ----------------------------<  130<H>  4.0   |  25  |  0.54    Ca    8.6      25 May 2020 03:57    TPro  5.7<L>  /  Alb  2.3<L>  /  TBili  1.8<H>  /  DBili  x   /  AST  17  /  ALT  12  /  AlkPhos  78  05-24    proBNP:   Lipid Profile:   HgA1c:   TSH:   PT/INR - ( 25 May 2020 03:57 )   PT: 12.5 sec;   INR: 1.08 ratio         PTT - ( 25 May 2020 03:57 )  PTT:22.2 sec  continue tube feeds as tolerated   apply bactroban BID to PEG site   aspiration precautions  daily peg care  monitor residuals  monitor GI function  miralax daily  will follow.     Assessment and plan  ---------------------------  89 y/o female with hx of dementia, CAD, CVA, COPD, afib, and recent extensive stay for COVID requiring pressor support presents to the ED with decreased PO intake. Patient discharge to the [facility] on 4/8 and was tolerating spoon feeds at that time, per son has declining mental status but was able to say his name a few days ago. Now over past few days has not been tolerating spoon feeds and is refusing to eat, also has been difficult to arouse  no chest pain / sob.  pt with failure to thrive  dc mirtazepine ?cause of sleepiness  a.fib ,hr controlled, continue ac  pt is not DNR  s/p  peg placement , start feeding today  discussed with son wants rehab   voiding trial check urinalysis  tolerating feeding  son is looking for rehab placement  cbc stable  official ct report  kamilah balderrama

## 2020-05-25 NOTE — PROGRESS NOTE ADULT - SUBJECTIVE AND OBJECTIVE BOX
be dbound/  mostly,  non verbal  REVIEW OF SYSTEMS:  GEN: no fever,    no chills  RESP: no SOB,   no cough  CVS: no chest pain,   no palpitations  GI: no abdominal pain,   no nausea,   no vomiting,   no constipation,   no diarrhea  : no dysuria,   no frequency  NEURO: no headache,   no dizziness  PSYCH: no depression,   not anxious  Derm : no rash    MEDICATIONS  (STANDING):  atorvastatin 20 milliGRAM(s) Oral at bedtime  brimonidine 0.2% Ophthalmic Solution 1 Drop(s) Both EYES two times a day  mupirocin 2% Ointment 1 Application(s) Topical two times a day  polyethylene glycol 3350 17 Gram(s) Oral daily  timolol 0.5% Solution 1 Drop(s) Both EYES two times a day    MEDICATIONS  (PRN):      Vital Signs Last 24 Hrs  T(C): 36.4 (25 May 2020 04:44), Max: 36.9 (24 May 2020 12:19)  T(F): 97.6 (25 May 2020 04:44), Max: 98.4 (24 May 2020 12:19)  HR: 75 (25 May 2020 04:44) (73 - 84)  BP: 112/71 (25 May 2020 04:44) (112/71 - 122/72)  BP(mean): --  RR: 18 (25 May 2020 04:44) (18 - 18)  SpO2: 94% (25 May 2020 04:44) (94% - 95%)  CAPILLARY BLOOD GLUCOSE        I&O's Summary      PHYSICAL EXAM:  HEAD:  Atraumatic, Normocephalic  NECK: Supple, No   JVD  CHEST/LUNG:   no     rales,     no,    rhonchi  HEART: Regular rate and rhythm;         murmur  ABDOMEN: Soft, Nontender, ;   EXTREMITIES:    no    edema  NEUROLOGY:  alert    LABS:                        9.9    6.68  )-----------( 130      ( 25 May 2020 03:57 )             31.5     05-25    138  |  105  |  15  ----------------------------<  130<H>  4.0   |  25  |  0.54    Ca    8.6      25 May 2020 03:57    TPro  5.7<L>  /  Alb  2.3<L>  /  TBili  1.8<H>  /  DBili  x   /  AST  17  /  ALT  12  /  AlkPhos  78  05-24    PT/INR - ( 25 May 2020 03:57 )   PT: 12.5 sec;   INR: 1.08 ratio         PTT - ( 25 May 2020 03:57 )  PTT:22.2 sec                Thyroid Stimulating Hormone, Serum: 3.19 uIU/mL (04-24 @ 12:11)          Consultant(s) Notes Reviewed:      Care Discussed with Consultants/Other Providers:

## 2020-05-25 NOTE — PROVIDER CONTACT NOTE (OTHER) - ASSESSMENT
Peg site is slightly red. Gauzes where absorbed by some blood. Unable to determine where blood is coming from.

## 2020-05-25 NOTE — CHART NOTE - NSCHARTNOTEFT_GEN_A_CORE
Notified by RN patients PEG dressing is saturated with blood from unknown source.  Peg was placed yesterday. Pt seen and examined at bedside, lying comfortably in NAD.  Dressing changed and no active bleeding was observed.  Skin surrounding the peg is erythematous but dry and intact. Site has been redressed. Will continue to monitor.    PLAN  #PEG - possible bleed  -CBC stat  -PT/PTT INR stat  -Abdominal binder  -Continue to hold Xarleto  -Active T&S    Will continue to monitor site for bleed  Will transfuse as needed    Aida Ford PA-C  [facility]  #[id number] Notified by RN patients PEG dressing is saturated with blood from unknown source.  PEG was placed yesterday. Pt seen and examined at bedside, lying comfortably in NAD.  Dressing changed and no active bleeding was observed.  Skin surrounding the peg is erythematous but dry and intact. Site has been redressed. Will continue to monitor.    PLAN  #PEG - possible bleed  -CBC stat  -PT/PTT INR stat  -Abdominal binder  -Continue to hold Xarleto  -Active T&S  -Monitor site for bleed  -Transfuse as needed    Will endorse to day team  Consult GI as needed if bleed worsens or h+h decreases.    Aida Ford PA-C  Medicine  #[id number] Notified by RN patients PEG dressing is saturated with blood from unknown source.  PEG was placed yesterday. Pt seen and examined at bedside, lying comfortably in NAD. VSS. Dressing changed and no active bleeding was observed.  Skin surrounding the peg is erythematous but dry and intact. Site has been redressed. Will continue to monitor.    PLAN  #PEG - possible bleed  -CBC stat  -PT/PTT INR stat  -Abdominal binder  -Continue to hold Xarleto  -Active T&S  -Monitor site for bleed  -Transfuse as needed    Will endorse to day team  Consult GI as needed if bleed worsens or h+h decreases.    Aida Ford PA-C  Medicine  #[id number] Notified by RN patients PEG dressing is saturated with blood from unknown source.  PEG was placed yesterday. Pt seen and examined at bedside, lying comfortably in NAD. VSS. Dressing changed and no active bleeding was observed.  Skin surrounding the peg is erythematous but dry and intact. Site has been redressed. Will continue to monitor.    PLAN  #PEG - possible bleed?  -CBC stat  -PT/PTT INR stat  -Abdominal binder  -Continue to hold Xarleto  -Active T&S  -Monitor site for bleed  -Transfuse as needed    Will endorse to day team  Consult GI as needed if bleed worsens or h+h decreases.    Aida Ford PA-C  Medicine  #[id number]

## 2020-05-25 NOTE — CHART NOTE - NSCHARTNOTEFT_GEN_A_CORE
Notified by RN patients PEG dressing and abdominal binder is saturated with blood   PEG was placed 2 days ago. Pt seen and examined at bedside, lying comfortably in NAD. VSS. Dressing changed and no active bleeding was observed.  Skin surrounding the peg is erythematous but dry and intact. Site has been redressed.  Dr. Swanson consulted, images of bleed sent and reviewed.  Dr. Swanson to see pt in AM.    PLAN  #PEG - possible bleed?  -CBC stat  -Abdominal binder  -Continue to hold Xarelto  -Active T&S  -Monitor site for bleed  -Transfuse as needed  - Dressing changes as needed      Will endorse to day team  Consult GI as needed if bleed worsens or h+h decreases.    Will notify attending.    Aida Ford PA-C  [facility]  #[id number].

## 2020-05-25 NOTE — PROVIDER CONTACT NOTE (OTHER) - ACTION/TREATMENT ORDERED:
JAYSON Silva made aware. Will put in order to bladder scan/straight cath pt per routine. Will continue to monitor overnight
NP will come see patient. Continue to monitor and assess.
Will come see patient. Continue to monitor and assess.

## 2020-05-25 NOTE — PROGRESS NOTE ADULT - SUBJECTIVE AND OBJECTIVE BOX
INTERVAL HPI/OVERNIGHT EVENTS:    patient seen and examined   acute drop in h/h yesterday without overt signs of bleeding and hemodynamically stable   s/p 2uprbc with appropriate rise in hemoglobin  no bms overnight   CTA completed last night with no evidence of active bleeding   PEG site with erythema and induration    MEDICATIONS  (STANDING):  atorvastatin 20 milliGRAM(s) Oral at bedtime  brimonidine 0.2% Ophthalmic Solution 1 Drop(s) Both EYES two times a day  rivaroxaban 10 milliGRAM(s) Oral daily  sodium chloride 0.9%. 1000 milliLiter(s) (50 mL/Hr) IV Continuous <Continuous>  timolol 0.5% Solution 1 Drop(s) Both EYES two times a day    MEDICATIONS  (PRN):      Allergies    aspirin (Unknown)  penicillin (Unknown)    Intolerances        Review of Systems: unable to obtain           Vital Signs Last 24 Hrs  T(C): 36.8 (19 May 2020 04:56), Max: 36.8 (19 May 2020 04:56)  T(F): 98.3 (19 May 2020 04:56), Max: 98.3 (19 May 2020 04:56)  HR: 89 (19 May 2020 04:56) (76 - 94)  BP: 92/60 (19 May 2020 04:56) (92/60 - 147/85)  BP(mean): --  RR: 17 (19 May 2020 04:56) (17 - 18)  SpO2: 98% (19 May 2020 04:56) (93% - 98%)    PHYSICAL EXAM:    Constitutional: weak, frail, NAD   HEENT: EOMI, throat clear  Neck: No LAD, supple  Respiratory: respirations nonlabored   Cardiovascular: S1 and S2, RRR, no M  Gastrointestinal: BS+, soft, NT/ND, +PEG with mild erythema and induration   Extremities: No peripheral edema  Neurological: awake      LABS:                        10.6   14.40 )-----------( 144      ( 19 May 2020 06:53 )             34.7     05-19    145  |  110<H>  |  12  ----------------------------<  58<L>  3.4<L>   |  14<L>  |  0.61    Ca    8.4      19 May 2020 06:53            RADIOLOGY & ADDITIONAL TESTS:  < from: Upper Endoscopy (05.18.20 @ 10:28) [facility]  ____________________________________________________________________________________________________  Patient Name: Jessica Duque                      MRN: [id number]  Account Number: [id number]                     YOB: 1925  Room: Endoscopy Room 1                           Gender: Female  Attending MD: Simeon Swanson MD                 Procedure Date No Time: 5/18/2020  ____________________________________________________________________________________________________     Procedure:           Upper GI endoscopy  Indications:         Place PEG because patient is unable to eat  Providers:           Simeon Swanson MD, Homer Ramirez PA-C  Medicines:           Monitored Anesthesia Care  Complications:       No immediate complications.  ____________________________________________________________________________________________________  Procedure:           After obtaining informed consent, the endoscope was passed under direct            vision. Throughout the procedure, the patient's blood pressure, pulse, and                        oxygen saturations were monitored continuously. The was introduced through                        the mouth, and advanced to the second part ofduodenum. The upper GI                        endoscopy was accomplished without difficulty. The patient tolerated the                        procedure well.            Findings:       The examined esophagus was normal.       Mild inflammation was found in the gastric antrum.       Inflammation was found in the duodenal bulb.       The patient was placed in the supine position for PEG placement. The stomach was insufflated        to appose gastric and abdominal walls. A site was located in the body of the stomach with        excellent transillumination for placement. The abdominal wall was marked and prepped in a        sterile manner. The area was anesthetized with 5 mL of 0.5% lidocaine. The trocar needle was        introduced through the abdominal wall and into the stomach under direct endoscopic view. A        snare was introduced through the endoscope and opened in the gastric lumen. The guidewire        was passed through the trocar and into the open snare. The snare was closed around the guide        wire. The endoscope and snare were removed, pulling the wire out through the mouth. A skin        incision was made at the site of needle insertion. The endoscopically removable 20 Fr Bard        gastrostomy tube was lubricated. The G-tube was tied to the guide wire and pulled through the        mouth and into the stomach. The trocar needle was removed, and the gastrostomy tube was     pulled out from the stomach through the skin. The external bumper was attached to the        gastrostomy tube, and the tube was cut to remove the guide wire. The final position of the        gastrostomy tube was confirmed by relook endoscopy, and skin marking noted to be 2.5 cm at        the external bumper. The final tension and compression of the abdominal wall by the PEG tube        and external bumper were checked and revealed that the bumper was moderately tight and mildly        deformingthe skin. The feeding tube was capped, and the tube site cleaned and dressed.                                                                                                        Impression:          - Normal esophagus.                       - Gastritis.                       - Duodenitis.                       - An endoscopically removable PEG placement was successfully completed.                       - No specimens collected.  Recommendation:      - Please follow the post-PEG recommendationsincluding: may use PEG today for                        meds and water and may use PEG tomorrow for feedings.                       - Please follow the post-PEG recommendations including: Have abdominal binder                        on at ALL TIMES to prevent inadvertent removal.                                                                                                        Attending Participation:       I personally performed the entire procedure.                                                                   _________________  Simeon Swanson MD  5/18/2020 3:01:59 PM  Number of Addenda: 0    Note Initiated On: 5/18/2020 10:28 AM    < end of copied text >

## 2020-05-26 LAB
ALBUMIN SERPL ELPH-MCNC: 2.3 G/DL — LOW (ref 3.3–5)
ALP SERPL-CCNC: 67 U/L — SIGNIFICANT CHANGE UP (ref 40–120)
ALT FLD-CCNC: 12 U/L — SIGNIFICANT CHANGE UP (ref 10–45)
ANION GAP SERPL CALC-SCNC: 10 MMOL/L — SIGNIFICANT CHANGE UP (ref 5–17)
AST SERPL-CCNC: 18 U/L — SIGNIFICANT CHANGE UP (ref 10–40)
BILIRUB SERPL-MCNC: 1.7 MG/DL — HIGH (ref 0.2–1.2)
BUN SERPL-MCNC: 14 MG/DL — SIGNIFICANT CHANGE UP (ref 7–23)
CALCIUM SERPL-MCNC: 8.8 MG/DL — SIGNIFICANT CHANGE UP (ref 8.4–10.5)
CHLORIDE SERPL-SCNC: 102 MMOL/L — SIGNIFICANT CHANGE UP (ref 96–108)
CO2 SERPL-SCNC: 26 MMOL/L — SIGNIFICANT CHANGE UP (ref 22–31)
CREAT SERPL-MCNC: 0.57 MG/DL — SIGNIFICANT CHANGE UP (ref 0.5–1.3)
GLUCOSE SERPL-MCNC: 121 MG/DL — HIGH (ref 70–99)
HCT VFR BLD CALC: 30.5 % — LOW (ref 34.5–45)
HGB BLD-MCNC: 9.8 G/DL — LOW (ref 11.5–15.5)
MCHC RBC-ENTMCNC: 31.1 PG — SIGNIFICANT CHANGE UP (ref 27–34)
MCHC RBC-ENTMCNC: 32.1 GM/DL — SIGNIFICANT CHANGE UP (ref 32–36)
MCV RBC AUTO: 96.8 FL — SIGNIFICANT CHANGE UP (ref 80–100)
NRBC # BLD: 0 /100 WBCS — SIGNIFICANT CHANGE UP (ref 0–0)
PLATELET # BLD AUTO: 140 K/UL — LOW (ref 150–400)
POTASSIUM SERPL-MCNC: 4.4 MMOL/L — SIGNIFICANT CHANGE UP (ref 3.5–5.3)
POTASSIUM SERPL-SCNC: 4.4 MMOL/L — SIGNIFICANT CHANGE UP (ref 3.5–5.3)
PROT SERPL-MCNC: 6.1 G/DL — SIGNIFICANT CHANGE UP (ref 6–8.3)
RBC # BLD: 3.15 M/UL — LOW (ref 3.8–5.2)
RBC # FLD: 18.6 % — HIGH (ref 10.3–14.5)
SODIUM SERPL-SCNC: 138 MMOL/L — SIGNIFICANT CHANGE UP (ref 135–145)
WBC # BLD: 6.7 K/UL — SIGNIFICANT CHANGE UP (ref 3.8–10.5)
WBC # FLD AUTO: 6.7 K/UL — SIGNIFICANT CHANGE UP (ref 3.8–10.5)

## 2020-05-26 PROCEDURE — 99232 SBSQ HOSP IP/OBS MODERATE 35: CPT

## 2020-05-26 RX ORDER — PANTOPRAZOLE SODIUM 20 MG/1
40 TABLET, DELAYED RELEASE ORAL
Refills: 0 | Status: DISCONTINUED | OUTPATIENT
Start: 2020-05-26 | End: 2020-05-27

## 2020-05-26 RX ADMIN — BRIMONIDINE TARTRATE 1 DROP(S): 2 SOLUTION/ DROPS OPHTHALMIC at 05:02

## 2020-05-26 RX ADMIN — ATORVASTATIN CALCIUM 20 MILLIGRAM(S): 80 TABLET, FILM COATED ORAL at 23:03

## 2020-05-26 RX ADMIN — SENNA PLUS 2 TABLET(S): 8.6 TABLET ORAL at 23:03

## 2020-05-26 RX ADMIN — Medication 1 DROP(S): at 18:12

## 2020-05-26 RX ADMIN — PANTOPRAZOLE SODIUM 40 MILLIGRAM(S): 20 TABLET, DELAYED RELEASE ORAL at 05:01

## 2020-05-26 RX ADMIN — Medication 1 DROP(S): at 05:01

## 2020-05-26 RX ADMIN — BRIMONIDINE TARTRATE 1 DROP(S): 2 SOLUTION/ DROPS OPHTHALMIC at 18:13

## 2020-05-26 RX ADMIN — MUPIROCIN 1 APPLICATION(S): 20 OINTMENT TOPICAL at 11:35

## 2020-05-26 NOTE — PROGRESS NOTE ADULT - SUBJECTIVE AND OBJECTIVE BOX
CARDIOLOGY     PROGRESS  NOTE   ________________________________________________    CHIEF COMPLAINT:Patient is a 89y old  Female who presents with a chief complaint of failure to thrive/ a.fib (26 May 2020 08:55)  no complain.  	  REVIEW OF SYSTEMS:  CONSTITUTIONAL: No fever, weight loss, or fatigue  EYES: No eye pain, visual disturbances, or discharge  ENT:  No difficulty hearing, tinnitus, vertigo; No sinus or throat pain  NECK: No pain or stiffness  RESPIRATORY: No cough, wheezing, chills or hemoptysis; No Shortness of Breath  CARDIOVASCULAR: No chest pain, palpitations, passing out, dizziness, or leg swelling  GASTROINTESTINAL: No abdominal or epigastric pain. No nausea, vomiting, or hematemesis; No diarrhea or constipation. No melena or hematochezia.  GENITOURINARY: No dysuria, frequency, hematuria, or incontinence  NEUROLOGICAL: No headaches, memory loss, loss of strength, numbness, or tremors  SKIN: No itching, burning, rashes, or lesions   LYMPH Nodes: No enlarged glands  ENDOCRINE: No heat or cold intolerance; No hair loss  MUSCULOSKELETAL: No joint pain or swelling; No muscle, back, or extremity pain  PSYCHIATRIC: No depression, anxiety, mood swings, or difficulty sleeping  HEME/LYMPH: No easy bruising, or bleeding gums  ALLERGY AND IMMUNOLOGIC: No hives or eczema	    [ ] All others negative	  [x ] Unable to obtain    PHYSICAL EXAM:  T(C): 36.6 (05-26-20 @ 08:43), Max: 37.1 (05-25-20 @ 13:50)  HR: 74 (05-26-20 @ 08:43) (72 - 85)  BP: 143/57 (05-26-20 @ 08:43) (108/67 - 144/80)  RR: 18 (05-26-20 @ 08:43) (18 - 18)  SpO2: 95% (05-26-20 @ 08:43) (93% - 100%)  Wt(kg): --  I&O's Summary    25 May 2020 07:01  -  26 May 2020 07:00  --------------------------------------------------------  IN: 0 mL / OUT: 1300 mL / NET: -1300 mL        Appearance: Normal	  HEENT:   Normal oral mucosa, PERRL, EOMI	  Lymphatic: No lymphadenopathy  Cardiovascular: Normal S1 S2, No JVD, + murmurs, No edema  Respiratory: Lungs clear to auscultation	  Psychiatry: A & O x 3, Mood & affect appropriate  Gastrointestinal:  Soft, Non-tender, + BS	  Skin: No rashes, No ecchymoses, No cyanosis	  Neurologic: Non-focal  Extremities: Normal range of motion, No clubbing, cyanosis or edema  Vascular: Peripheral pulses palpable 2+ bilaterally    MEDICATIONS  (STANDING):  atorvastatin 20 milliGRAM(s) Oral at bedtime  brimonidine 0.2% Ophthalmic Solution 1 Drop(s) Both EYES two times a day  mupirocin 2% Ointment 1 Application(s) Topical two times a day  pantoprazole    Tablet 40 milliGRAM(s) Oral before breakfast  senna 2 Tablet(s) Oral at bedtime  timolol 0.5% Solution 1 Drop(s) Both EYES two times a day      TELEMETRY: 	    ECG:  	  RADIOLOGY:  OTHER: 	  	  LABS:	 	    CARDIAC MARKERS:                                9.8    6.70  )-----------( 140      ( 26 May 2020 07:09 )             30.5     05-26    138  |  102  |  14  ----------------------------<  121<H>  4.4   |  26  |  0.57    Ca    8.8      26 May 2020 07:09    TPro  6.1  /  Alb  2.3<L>  /  TBili  1.7<H>  /  DBili  x   /  AST  18  /  ALT  12  /  AlkPhos  67  05-26    proBNP:   Lipid Profile:   HgA1c:   TSH:   PT/INR - ( 25 May 2020 03:57 )   PT: 12.5 sec;   INR: 1.08 ratio         PTT - ( 25 May 2020 03:57 )  PTT:22.2 sec  Notes    Notes: Chart reviewed. Patient remains acute. Patient lethargic. Labs drawn  this afternoon and H/H is  6.0/19.2. Discharge is on hold. Discharge plan  remains for [facility] when medically cleared. CM will continue to remain available.    Assessment and plan  ---------------------------  89 y/o female with hx of dementia, CAD, CVA, COPD, afib, and recent extensive stay for COVID requiring pressor support presents to the ED with decreased PO intake. Patient discharge to the [facility] on 4/8 and was tolerating spoon feeds at that time, per son has declining mental status but was able to say his name a few days ago. Now over past few days has not been tolerating spoon feeds and is refusing to eat, also has been difficult to arouse  no chest pain / sob.  pt with failure to thrive  dc mirtazepine ?cause of sleepiness  a.fib ,hr controlled, continue ac  pt is not DNR  s/p  peg placement , start feeding today  discussed with son wants rehab   voiding trial check urinalysis  tolerating feeding  son is looking for rehab placement  cbc stable  official ct report  bl pas stocking  will consider neuro eval

## 2020-05-26 NOTE — PROGRESS NOTE ADULT - SUBJECTIVE AND OBJECTIVE BOX
afebrile/  s/p  peg dressing, with blood    REVIEW OF SYSTEMS:  GEN: no fever,    no chills  RESP: no SOB,   no cough  CVS: no chest pain,   no palpitations  GI: no abdominal pain,   no nausea,   no vomiting,   no constipation,   no diarrhea  : no dysuria,   no frequency  NEURO: no headache,   no dizziness  PSYCH: no depression,   not anxious  Derm : no rash    MEDICATIONS  (STANDING):  atorvastatin 20 milliGRAM(s) Oral at bedtime  brimonidine 0.2% Ophthalmic Solution 1 Drop(s) Both EYES two times a day  mupirocin 2% Ointment 1 Application(s) Topical two times a day  pantoprazole    Tablet 40 milliGRAM(s) Oral before breakfast  senna 2 Tablet(s) Oral at bedtime  timolol 0.5% Solution 1 Drop(s) Both EYES two times a day    MEDICATIONS  (PRN):      Vital Signs Last 24 Hrs  T(C): 36.6 (26 May 2020 08:43), Max: 37.1 (25 May 2020 13:50)  T(F): 97.8 (26 May 2020 08:43), Max: 98.8 (25 May 2020 13:50)  HR: 74 (26 May 2020 08:43) (72 - 85)  BP: 143/57 (26 May 2020 08:43) (108/67 - 144/80)  BP(mean): --  RR: 18 (26 May 2020 08:43) (18 - 18)  SpO2: 95% (26 May 2020 08:43) (93% - 100%)  CAPILLARY BLOOD GLUCOSE        I&O's Summary    25 May 2020 07:01  -  26 May 2020 07:00  --------------------------------------------------------  IN: 0 mL / OUT: 1300 mL / NET: -1300 mL        PHYSICAL EXAM:  HEAD:  Atraumatic, Normocephalic  NECK: Supple, No   JVD  CHEST/LUNG:   no     rales,     no,    rhonchi  HEART: Regular rate and rhythm;         murmur  ABDOMEN: Soft, Nontender, ;   EXTREMITIES:     no   edema  NEUROLOGY:  dementia    LABS:                        9.8    6.70  )-----------( 140      ( 26 May 2020 07:09 )             30.5     05-26    138  |  102  |  14  ----------------------------<  121<H>  4.4   |  26  |  0.57    Ca    8.8      26 May 2020 07:09    TPro  6.1  /  Alb  2.3<L>  /  TBili  1.7<H>  /  DBili  x   /  AST  18  /  ALT  12  /  AlkPhos  67  05-26    PT/INR - ( 25 May 2020 03:57 )   PT: 12.5 sec;   INR: 1.08 ratio         PTT - ( 25 May 2020 03:57 )  PTT:22.2 sec                Thyroid Stimulating Hormone, Serum: 3.19 uIU/mL (04-24 @ 12:11)          Consultant(s) Notes Reviewed:      Care Discussed with Consultants/Other Providers:

## 2020-05-26 NOTE — PROGRESS NOTE ADULT - PROBLEM SELECTOR PLAN 2
acute drop in h/h of unknown etiology (05/22); suspected related to L gluteal hematoma   CTA negative   no overt signs of gastrointestinal bleeding, having brown stool   s/p 2uprbc with adequate rise in hemoglobin (05/23)  would continue to hold xarelto until 5/29   monitor cbc, transfuse as needed  monitor for signs of bleeding

## 2020-05-26 NOTE — PROGRESS NOTE ADULT - SUBJECTIVE AND OBJECTIVE BOX
INTERVAL HPI/OVERNIGHT EVENTS:    patient seen and examined  overnight events noted; PEG dressing saturated with dark, brown fluid   had brown stool yesterday as per discussion with RN  no overt signs of bleeding     MEDICATIONS  (STANDING):  atorvastatin 20 milliGRAM(s) Oral at bedtime  brimonidine 0.2% Ophthalmic Solution 1 Drop(s) Both EYES two times a day  mupirocin 2% Ointment 1 Application(s) Topical two times a day  pantoprazole   Suspension 40 milliGRAM(s) Oral before breakfast  senna 2 Tablet(s) Oral at bedtime  timolol 0.5% Solution 1 Drop(s) Both EYES two times a day    MEDICATIONS  (PRN):      Allergies    aspirin (Unknown)  penicillin (Unknown)    Intolerances        Review of Systems: unable to obtain           Vital Signs Last 24 Hrs  T(C): 36.9 (26 May 2020 12:23), Max: 36.9 (26 May 2020 12:23)  T(F): 98.5 (26 May 2020 12:23), Max: 98.5 (26 May 2020 12:23)  HR: 72 (26 May 2020 12:23) (72 - 85)  BP: 116/57 (26 May 2020 12:23) (108/67 - 144/80)  BP(mean): --  RR: 18 (26 May 2020 12:23) (18 - 18)  SpO2: 94% (26 May 2020 12:23) (93% - 100%)    PHYSICAL EXAM:    Constitutional: weak, frail, NAD   HEENT: ncat   Respiratory: respirations nonlabored   Gastrointestinal: BS+, soft, NT/ND, +PEG with improving erythema, dressings recently changed   Extremities: No peripheral edema  Neurological: non-focal         LABS:                        9.8    6.70  )-----------( 140      ( 26 May 2020 07:09 )             30.5     05-26    138  |  102  |  14  ----------------------------<  121<H>  4.4   |  26  |  0.57    Ca    8.8      26 May 2020 07:09    TPro  6.1  /  Alb  2.3<L>  /  TBili  1.7<H>  /  DBili  x   /  AST  18  /  ALT  12  /  AlkPhos  67  05-26    PT/INR - ( 25 May 2020 03:57 )   PT: 12.5 sec;   INR: 1.08 ratio         PTT - ( 25 May 2020 03:57 )  PTT:22.2 sec      RADIOLOGY & ADDITIONAL TESTS:

## 2020-05-26 NOTE — CHART NOTE - NSCHARTNOTESELECT_GEN_ALL_CORE
[facility]  Gastroenterology  Progress Note    Patient Name: Danyel Rangel  MRN: [id number]  Admission Date: 5/23/2020  Hospital Length of Stay: 0 days  Code Status: DNR   Attending Provider: Nelida Mcdaniel*  Consulting Provider: Janice Phelps MD  Primary Care Physician: Pa Spence MD  Principal Problem: Cholecystitis without cholelithiasis      Subjective:     Interval History: Worsening sob, transferred to ICU. No vomiting. No melena. [place] ERCP yesterday    Review of Systems   Constitutional: Positive for fatigue. Negative for unexpected weight change.   Respiratory: Positive for shortness of breath. Negative for cough.    Cardiovascular: Negative for chest pain and palpitations.   Gastrointestinal: Negative for anal bleeding and blood in stool.     Objective:     Vital Signs (Most Recent):  Temp: 98.5 °F (36.9 °C) (05/25/20 1445)  Pulse: 72 (05/25/20 1834)  Resp: 19 (05/25/20 1834)  BP: 106/79 (05/25/20 1834)  SpO2: (!) 94 % (05/25/20 1957) Vital Signs (24h Range):  Temp:  [97.1 °F (36.2 °C)-99.1 °F (37.3 °C)] 98.5 °F (36.9 °C)  Pulse:  [] 72  Resp:  [17-33] 19  SpO2:  [67 %-100 %] 94 %  BP: ()/(50-84) 106/79     Weight: 81.7 kg (180 lb 1.9 oz) (05/25/20 1445)  Body mass index is 27.39 kg/m².      Intake/Output Summary (Last 24 hours) at 5/25/2020 2055  Last data filed at 5/25/2020 1808  Gross per 24 hour   Intake 1243.75 ml   Output 81 ml   Net 1162.75 ml       Lines/Drains/Airways     Central Venous Catheter Line            Trialysis (Dialysis) Catheter 05/25/20 1538 left subclavian less than 1 day          Drain                 Urethral Catheter 05/24/20 1720 Straight-tip 14 Fr. 1 day          Peripheral Intravenous Line                 Peripheral IV - Single Lumen 05/24/20 1426 20 G Posterior;Right Hand 1 day                Physical Exam   Constitutional: He appears well-developed.   HENT:   Head: Normocephalic and atraumatic.   Eyes: Conjunctivae are normal. Scleral  [employer] icterus is present.   Cardiovascular: Normal rate. Exam reveals no friction rub.   Pulmonary/Chest: No stridor. He exhibits no tenderness.   Abdominal: Soft. He exhibits no distension. There is no guarding.   Musculoskeletal: He exhibits no tenderness or deformity.   Neurological: He is alert.   Skin: He is not diaphoretic.   Nursing note and vitals reviewed.      Significant Labs:  CMP:   Recent Labs   Lab 05/25/20  0450  05/25/20  1506 05/25/20  1957   *  113*  113*   < > 176*  173* 130*   CALCIUM 8.4*  8.4*  8.4*   < > 8.4*  8.3* 8.0*   ALBUMIN 2.6*  --  2.5*  --    PROT 6.4  --   --   --      136  136   < > 135*  134* 135*   K 5.0  5.0  5.0  5.0   < > 5.9*  5.8* 5.0   CO2 18*  18*  18*   < > 20*  19* 22*     104  104   < > 102  101 103   BUN 68*  68*  68*   < > 78*  77* 64*   CREATININE 3.8*  3.8*  3.8*   < > 4.4*  4.5* 3.4*   ALKPHOS 52*  --   --   --    ALT 19  --   --   --    AST 26  --   --   --    BILITOT 5.6*  --   --   --     < > = values in this interval not displayed.         Significant Imaging:  Imaging results within the past 24 hours have been reviewed.    Assessment/Plan:     Abnormal finding on GI tract imaging  - s/p ERCP with sphincterotomy and stone extraction 5/24  - worsening leukocytosis, blood cx with enterococcus  - renal failure is concerning  - no purulent drainage noted from cbd  - monitor lfts, transaminases normal; fractionate bili in am  - monitor clinically  - on abx        Thank you for your consult. I will follow-up with patient. Please contact us if you have any additional questions.    Janice Phelps MD  Gastroenterology  [facility]

## 2020-05-26 NOTE — PROGRESS NOTE ADULT - ASSESSMENT
89   yr pt,   pt  with  h/o  afib,   not  on   xarelto,  due  to falls,   cva,   copd, hld,     alzheimers  dementia,    pud/  h/o left ribf xs. 8/ 9 th,. left hip surg  h/o mlple falls.  echo, normal ef/ T2  comp  deformity, ct head, no cva//   ct  chest  with  goo, on last  visit    pt  was COVID  positive,  on 4/17/20,  had  elevated  troponin/ CRP/  Ferritin  , from Covid   pt  with  advanced  age/    comfort/   supportive  care is  ideal  however, son  wants  all  done.       sent  to  er  from   [facility], for  weakness/ not  eating/FTT  palliative/ comfort care,  ought to  be goal  in this pt,  however   son  wants  to  continue  current care   was  seen  by swallow  eval  on last visit/ oral feeds  at high risk  for  aspiration  Goc,   visited  again,  given  advanced  age  of  pt/ Alzheimers  dementia.  son wants   all  done/ peg  placed  on  5/18,    tolerating feeds  hb of  6  on 5/22/  s/p  prbc, hb on 5/ 23  is  10  anemia /  seen by gi /  from  ? gi  bleed  doubt,  low  hb is  from   left gluteal   hematoma ,/ no  melena/  brbpe/      ct angio, no  active bleed  restart   xarelto,  when  ok  with gi  s/p  bleeding at  peg  site/  hb has been stable,  9/30,    GI to  f.p         < from: CT Chest No Cont (04.17.20 @ 04:24) >  IMPRESSION:   Pattern of GGO suggests infection including atypical pneumonia/viral infection from atypical agents including COVID-19 (C19V-1)  < end of copied text >     < from: CT Thoracic Spine Reform No Cont (01.07.20 @ 16:43) >  IMPRESSION:  Volume loss, microvascular disease, no acute hemorrhage or midline shift. If symptoms persist consider follow up head CT or MRI contraindications.  Cervical and thoracic spine are unchanged from prior, no obvious fracture or dislocation, multilevel degenerative changes as noted previously with diffuse osteopenia and small unchanged mild superior endplate T2 compression deformity.  < end of copied text      < from: Limited Transthoracic Echo (10.26.18 @ 14:07) >  Conclusions:  Limited tranthoracic echocardiogram at patients request to  end exam.  1. Mitral annular calcification.  2. The aortic valve opens well.  3. Limited images acquired at the patients request. Based  upon the parasternal long axis view only;  grossly normal  left ventricular systolic function.  < end of copied text >

## 2020-05-27 ENCOUNTER — TRANSCRIPTION ENCOUNTER (OUTPATIENT)
Age: 85
End: 2020-05-27

## 2020-05-27 VITALS
TEMPERATURE: 98 F | SYSTOLIC BLOOD PRESSURE: 111 MMHG | OXYGEN SATURATION: 94 % | HEART RATE: 74 BPM | RESPIRATION RATE: 18 BRPM | DIASTOLIC BLOOD PRESSURE: 52 MMHG

## 2020-05-27 LAB
ALBUMIN SERPL ELPH-MCNC: 2.5 G/DL — LOW (ref 3.3–5)
ALP SERPL-CCNC: 70 U/L — SIGNIFICANT CHANGE UP (ref 40–120)
ALT FLD-CCNC: 12 U/L — SIGNIFICANT CHANGE UP (ref 10–45)
ANION GAP SERPL CALC-SCNC: 8 MMOL/L — SIGNIFICANT CHANGE UP (ref 5–17)
AST SERPL-CCNC: 18 U/L — SIGNIFICANT CHANGE UP (ref 10–40)
BASOPHILS # BLD AUTO: 0.01 K/UL — SIGNIFICANT CHANGE UP (ref 0–0.2)
BASOPHILS NFR BLD AUTO: 0.1 % — SIGNIFICANT CHANGE UP (ref 0–2)
BILIRUB SERPL-MCNC: 1.6 MG/DL — HIGH (ref 0.2–1.2)
BUN SERPL-MCNC: 19 MG/DL — SIGNIFICANT CHANGE UP (ref 7–23)
CALCIUM SERPL-MCNC: 8.9 MG/DL — SIGNIFICANT CHANGE UP (ref 8.4–10.5)
CHLORIDE SERPL-SCNC: 100 MMOL/L — SIGNIFICANT CHANGE UP (ref 96–108)
CO2 SERPL-SCNC: 28 MMOL/L — SIGNIFICANT CHANGE UP (ref 22–31)
CREAT SERPL-MCNC: 0.6 MG/DL — SIGNIFICANT CHANGE UP (ref 0.5–1.3)
EOSINOPHIL # BLD AUTO: 0.1 K/UL — SIGNIFICANT CHANGE UP (ref 0–0.5)
EOSINOPHIL NFR BLD AUTO: 1.4 % — SIGNIFICANT CHANGE UP (ref 0–6)
GLUCOSE SERPL-MCNC: 120 MG/DL — HIGH (ref 70–99)
HCT VFR BLD CALC: 31.6 % — LOW (ref 34.5–45)
HGB BLD-MCNC: 10.1 G/DL — LOW (ref 11.5–15.5)
IMM GRANULOCYTES NFR BLD AUTO: 0.4 % — SIGNIFICANT CHANGE UP (ref 0–1.5)
LYMPHOCYTES # BLD AUTO: 1.31 K/UL — SIGNIFICANT CHANGE UP (ref 1–3.3)
LYMPHOCYTES # BLD AUTO: 18.2 % — SIGNIFICANT CHANGE UP (ref 13–44)
MCHC RBC-ENTMCNC: 30.9 PG — SIGNIFICANT CHANGE UP (ref 27–34)
MCHC RBC-ENTMCNC: 32 GM/DL — SIGNIFICANT CHANGE UP (ref 32–36)
MCV RBC AUTO: 96.6 FL — SIGNIFICANT CHANGE UP (ref 80–100)
MONOCYTES # BLD AUTO: 0.4 K/UL — SIGNIFICANT CHANGE UP (ref 0–0.9)
MONOCYTES NFR BLD AUTO: 5.5 % — SIGNIFICANT CHANGE UP (ref 2–14)
NEUTROPHILS # BLD AUTO: 5.36 K/UL — SIGNIFICANT CHANGE UP (ref 1.8–7.4)
NEUTROPHILS NFR BLD AUTO: 74.4 % — SIGNIFICANT CHANGE UP (ref 43–77)
NRBC # BLD: 0 /100 WBCS — SIGNIFICANT CHANGE UP (ref 0–0)
PLATELET # BLD AUTO: 145 K/UL — LOW (ref 150–400)
POTASSIUM SERPL-MCNC: 4.4 MMOL/L — SIGNIFICANT CHANGE UP (ref 3.5–5.3)
POTASSIUM SERPL-SCNC: 4.4 MMOL/L — SIGNIFICANT CHANGE UP (ref 3.5–5.3)
PROT SERPL-MCNC: 5.6 G/DL — LOW (ref 6–8.3)
RBC # BLD: 3.27 M/UL — LOW (ref 3.8–5.2)
RBC # FLD: 18.2 % — HIGH (ref 10.3–14.5)
SARS-COV-2 RNA SPEC QL NAA+PROBE: SIGNIFICANT CHANGE UP
SODIUM SERPL-SCNC: 136 MMOL/L — SIGNIFICANT CHANGE UP (ref 135–145)
WBC # BLD: 7.21 K/UL — SIGNIFICANT CHANGE UP (ref 3.8–10.5)
WBC # FLD AUTO: 7.21 K/UL — SIGNIFICANT CHANGE UP (ref 3.8–10.5)

## 2020-05-27 PROCEDURE — 86923 COMPATIBILITY TEST ELECTRIC: CPT

## 2020-05-27 PROCEDURE — 36430 TRANSFUSION BLD/BLD COMPNT: CPT

## 2020-05-27 PROCEDURE — 82962 GLUCOSE BLOOD TEST: CPT

## 2020-05-27 PROCEDURE — 87086 URINE CULTURE/COLONY COUNT: CPT

## 2020-05-27 PROCEDURE — 84100 ASSAY OF PHOSPHORUS: CPT

## 2020-05-27 PROCEDURE — 97116 GAIT TRAINING THERAPY: CPT

## 2020-05-27 PROCEDURE — P9016: CPT

## 2020-05-27 PROCEDURE — P9011: CPT

## 2020-05-27 PROCEDURE — 72191 CT ANGIOGRAPH PELV W/O&W/DYE: CPT

## 2020-05-27 PROCEDURE — 97530 THERAPEUTIC ACTIVITIES: CPT

## 2020-05-27 PROCEDURE — 71045 X-RAY EXAM CHEST 1 VIEW: CPT

## 2020-05-27 PROCEDURE — 92610 EVALUATE SWALLOWING FUNCTION: CPT

## 2020-05-27 PROCEDURE — 97161 PT EVAL LOW COMPLEX 20 MIN: CPT

## 2020-05-27 PROCEDURE — 80048 BASIC METABOLIC PNL TOTAL CA: CPT

## 2020-05-27 PROCEDURE — 97110 THERAPEUTIC EXERCISES: CPT

## 2020-05-27 PROCEDURE — 82803 BLOOD GASES ANY COMBINATION: CPT

## 2020-05-27 PROCEDURE — 85730 THROMBOPLASTIN TIME PARTIAL: CPT

## 2020-05-27 PROCEDURE — 80053 COMPREHEN METABOLIC PANEL: CPT

## 2020-05-27 PROCEDURE — 99285 EMERGENCY DEPT VISIT HI MDM: CPT

## 2020-05-27 PROCEDURE — 93005 ELECTROCARDIOGRAM TRACING: CPT

## 2020-05-27 PROCEDURE — 85610 PROTHROMBIN TIME: CPT

## 2020-05-27 PROCEDURE — 81001 URINALYSIS AUTO W/SCOPE: CPT

## 2020-05-27 PROCEDURE — 83735 ASSAY OF MAGNESIUM: CPT

## 2020-05-27 PROCEDURE — 85027 COMPLETE CBC AUTOMATED: CPT

## 2020-05-27 PROCEDURE — 99232 SBSQ HOSP IP/OBS MODERATE 35: CPT

## 2020-05-27 PROCEDURE — 86900 BLOOD TYPING SEROLOGIC ABO: CPT

## 2020-05-27 PROCEDURE — 86850 RBC ANTIBODY SCREEN: CPT

## 2020-05-27 PROCEDURE — 86901 BLOOD TYPING SEROLOGIC RH(D): CPT

## 2020-05-27 RX ORDER — RIVAROXABAN 15 MG-20MG
1 KIT ORAL
Qty: 0 | Refills: 0 | DISCHARGE

## 2020-05-27 RX ORDER — SENNA PLUS 8.6 MG/1
2 TABLET ORAL
Qty: 0 | Refills: 0 | DISCHARGE
Start: 2020-05-27

## 2020-05-27 RX ORDER — MUPIROCIN 20 MG/G
1 OINTMENT TOPICAL
Qty: 0 | Refills: 0 | DISCHARGE
Start: 2020-05-27

## 2020-05-27 RX ORDER — RIVAROXABAN 15 MG-20MG
10 KIT ORAL DAILY
Refills: 0 | Status: DISCONTINUED | OUTPATIENT
Start: 2020-05-27 | End: 2020-05-27

## 2020-05-27 RX ORDER — RIVAROXABAN 15 MG-20MG
15 KIT ORAL DAILY
Refills: 0 | Status: DISCONTINUED | OUTPATIENT
Start: 2020-05-27 | End: 2020-05-27

## 2020-05-27 RX ORDER — ATORVASTATIN CALCIUM 80 MG/1
1 TABLET, FILM COATED ORAL
Qty: 0 | Refills: 0 | DISCHARGE
Start: 2020-05-27

## 2020-05-27 RX ORDER — ATORVASTATIN CALCIUM 80 MG/1
1 TABLET, FILM COATED ORAL
Qty: 30 | Refills: 0
Start: 2020-05-27 | End: 2020-06-25

## 2020-05-27 RX ORDER — DOCUSATE SODIUM 100 MG
30 CAPSULE ORAL
Qty: 900 | Refills: 0
Start: 2020-05-27 | End: 2020-06-25

## 2020-05-27 RX ORDER — PANTOPRAZOLE SODIUM 20 MG/1
0 TABLET, DELAYED RELEASE ORAL
Qty: 0 | Refills: 0 | DISCHARGE
Start: 2020-05-27

## 2020-05-27 RX ORDER — BRIMONIDINE TARTRATE 2 MG/MG
0 SOLUTION/ DROPS OPHTHALMIC
Qty: 0 | Refills: 0 | DISCHARGE
Start: 2020-05-27

## 2020-05-27 RX ADMIN — RIVAROXABAN 10 MILLIGRAM(S): KIT at 13:21

## 2020-05-27 RX ADMIN — MUPIROCIN 1 APPLICATION(S): 20 OINTMENT TOPICAL at 00:26

## 2020-05-27 RX ADMIN — PANTOPRAZOLE SODIUM 40 MILLIGRAM(S): 20 TABLET, DELAYED RELEASE ORAL at 07:14

## 2020-05-27 RX ADMIN — MUPIROCIN 1 APPLICATION(S): 20 OINTMENT TOPICAL at 11:18

## 2020-05-27 NOTE — CHART NOTE - NSCHARTNOTEFT_GEN_A_CORE
PA Note      As per Dr. Ferro, may start patient on Xarelto 10mg today and   discharge to Rehab today.  Covid test results pending-->will f/u.        LAKESHIA Allen  [id number]

## 2020-05-27 NOTE — CHART NOTE - NSCHARTNOTEFT_GEN_A_CORE
Nutrition follow up     Pt seen for malnutrition follow up     Hospital course as per chart: Pt 89 y/o F with PMH: dementia, CAD, CVA, HTN, HLD, GI bleed, COPD, Afib, recent extensive stay for COVID requiring pressor support presents to the ED with decreased PO intake, found with failure to thrive, dysphagia with risk of aspiration, S/P PEG placed (05/18); as per PA, son requested pt to continue with PO diet while receiving EN; bleeding at PEG site - resolved.    Source: Patient [ ]    Family [ ]     other [x]; Medical record, RN     Pt confused/disoriented/lethargic as per documentation. RN reports pt not consuming anything by mouth, states pt is refusing to eat. Reports pt tolerating Jevity 1.2 at goal of 40 ml/hr. Denies pt with nausea, vomiting, diarrhea, or constipation, last BM today (05/17).     Diet: dysphagia 1, pureed + honey thickened liquids + tube feedings     Enteral Nutrition: Jevity 1.2 through continuous feedings starting at 10 ml/hr and increase 10 ml/hr every 8 hours to goal of 40 ml/hr x 24 hours provides 960 ml, 1152 kcal, 53 g protein, and 775 ml water (28 kcal/kg and 1.3 g/kg protein based on dosing weight 40.8 kg)    Weight as per flow sheets: (05/14) 40.8 kg - will continue to monitor.  % Weight Change: n/a    Pertinent Medications: MEDICATIONS  (STANDING):  atorvastatin 20 milliGRAM(s) Oral at bedtime  brimonidine 0.2% Ophthalmic Solution 1 Drop(s) Both EYES two times a day  mupirocin 2% Ointment 1 Application(s) Topical two times a day  pantoprazole   Suspension 40 milliGRAM(s) Oral before breakfast  rivaroxaban 10 milliGRAM(s) Oral daily  senna 2 Tablet(s) Oral at bedtime  timolol 0.5% Solution 1 Drop(s) Both EYES two times a day    Pertinent Labs: (05/27) Glu 120 mg/dL<H>     Skin: no noted pressure injuries as per documentation.   No noted edema as per flow sheets at this time (pt previously with +2 left arm edema).     Estimated Needs:   [x] no change since previous assessment, based on dosing weight 40.8 kg:   [ ] recalculated:     1020 - 1224 kcal/day (25 - 30 kcal/kg)   49 - 57 g protein/day (1.2 - 1.4 g/kg)   1020 - 1224 ml/day (25 - 30 ml/kg)    Previous Nutrition Diagnosis: [x] Malnutrition; severe      Nutrition Diagnosis is [x] ongoing - addressed with tube feedings.   Care plan achieved; pt currently at goal: pt to meet >75% of estimated nutritional needs during hospital stay.     New Nutrition Diagnosis: [x] not applicable    Interventions:     1. Continue Jevity 1.2 through continuous feedings at 40 ml/hr x 24 hours to provide 960 ml, 1152 kcal, 53 g protein, and 775 ml water (28 kcal/kg and 1.3 g/kg protein based on dosing weight 40.8 kg); defer fluids to team. Will continue to monitor and adjust as needed.   2. Recommend continue dysphagia 1, pureed diet + honey thickened liquids as per pt's son preference. Defer diet/fluid consistencies to medical team/SLP recommendations.   3. Consider Multivitamin if medically feasible to optimize nutrient intake.  4. Obtain current weight to identify changes if any.     Monitoring and Evaluation:     [x] PO intake [x] Tolerance to diet prescription [x] weights [x] follow up per protocol    [x] other: EN provision/tolerance     RD remains available.  Meme Davis MS RDN CDN #[id number]. Nutrition follow up     Pt seen for malnutrition follow up     Hospital course as per chart: Pt 89 y/o F with PMH: dementia, CAD, CVA, HTN, HLD, GI bleed, COPD, Afib, recent extensive stay for COVID requiring pressor support presents to the ED with decreased PO intake, found with failure to thrive, dysphagia with risk of aspiration, S/P PEG placed (05/18); as per PA, son requested pt to continue with PO diet while receiving EN; bleeding at PEG site - resolved.    Source: Patient [ ]    Family [ ]     other [x]; Medical record, RN     Pt confused/disoriented/lethargic as per documentation. RN reports pt not consuming anything by mouth, states pt is refusing to eat. Reports pt tolerating Jevity 1.2 at goal of 40 ml/hr. Denies pt with nausea, vomiting, diarrhea, or constipation, last BM today (05/27).     Diet: dysphagia 1, pureed + honey thickened liquids + tube feedings     Enteral Nutrition: Jevity 1.2 through continuous feedings starting at 10 ml/hr and increase 10 ml/hr every 8 hours to goal of 40 ml/hr x 24 hours provides 960 ml, 1152 kcal, 53 g protein, and 775 ml water (28 kcal/kg and 1.3 g/kg protein based on dosing weight 40.8 kg)    Weight as per flow sheets: (05/14) 40.8 kg - will continue to monitor.  % Weight Change: n/a    Pertinent Medications: MEDICATIONS  (STANDING):  atorvastatin 20 milliGRAM(s) Oral at bedtime  brimonidine 0.2% Ophthalmic Solution 1 Drop(s) Both EYES two times a day  mupirocin 2% Ointment 1 Application(s) Topical two times a day  pantoprazole   Suspension 40 milliGRAM(s) Oral before breakfast  rivaroxaban 10 milliGRAM(s) Oral daily  senna 2 Tablet(s) Oral at bedtime  timolol 0.5% Solution 1 Drop(s) Both EYES two times a day    Pertinent Labs: (05/27) Glu 120 mg/dL<H>     Skin: no noted pressure injuries as per documentation.   No noted edema as per flow sheets at this time (pt previously with +2 left arm edema).     Estimated Needs:   [x] no change since previous assessment, based on dosing weight 40.8 kg:   [ ] recalculated:     1020 - 1224 kcal/day (25 - 30 kcal/kg)   49 - 57 g protein/day (1.2 - 1.4 g/kg)   1020 - 1224 ml/day (25 - 30 ml/kg)    Previous Nutrition Diagnosis: [x] Malnutrition; severe      Nutrition Diagnosis is [x] ongoing - addressed with tube feedings.   Care plan achieved; pt currently at goal: pt to meet >75% of estimated nutritional needs during hospital stay.     New Nutrition Diagnosis: [x] not applicable    Interventions:     1. Continue Jevity 1.2 through continuous feedings at 40 ml/hr x 24 hours to provide 960 ml, 1152 kcal, 53 g protein, and 775 ml water (28 kcal/kg and 1.3 g/kg protein based on dosing weight 40.8 kg); defer fluids to team. Will continue to monitor and adjust as needed.   2. Recommend continue dysphagia 1, pureed diet + honey thickened liquids as per pt's son preference. Defer diet/fluid consistencies to medical team/SLP recommendations.   3. Consider Multivitamin if medically feasible to optimize nutrient intake.  4. Obtain current weight to identify changes if any.     Monitoring and Evaluation:     [x] PO intake [x] Tolerance to diet prescription [x] weights [x] follow up per protocol    [x] other: EN provision/tolerance     RD remains available.  Meme Davis MS RDN CDN #[phone].

## 2020-05-27 NOTE — PROGRESS NOTE ADULT - PROBLEM SELECTOR PLAN 1
s/p PEG   continue tube feeds as tolerated   apply bactroban BID to PEG site   aspiration precautions  daily peg care  monitor residuals  monitor GI function  miralax daily  no GI objection to d/c planning   will follow

## 2020-05-27 NOTE — DISCHARGE NOTE PROVIDER - NSDCACTIVITY_GEN_ALL_CORE
Walking - Outdoors allowed/Stairs allowed/Do not drive or operate machinery/Do not make important decisions/Walking - Indoors allowed/No heavy lifting/straining

## 2020-05-27 NOTE — PROGRESS NOTE ADULT - REASON FOR ADMISSION
failure to thrive/ a.fib

## 2020-05-27 NOTE — DISCHARGE NOTE PROVIDER - NSDCCPCAREPLAN_GEN_ALL_CORE_FT
PRINCIPAL DISCHARGE DIAGNOSIS  Diagnosis: Dehydration  Assessment and Plan of Treatment:       SECONDARY DISCHARGE DIAGNOSES  Diagnosis: Dementia with behavioral disturbance, unspecified dementia type  Assessment and Plan of Treatment:     Diagnosis: Failure to thrive in adult  Assessment and Plan of Treatment:

## 2020-05-27 NOTE — PROGRESS NOTE ADULT - PROBLEM SELECTOR PLAN 2
acute drop in h/h of unknown etiology (05/22); suspected related to L gluteal hematoma. H/h now stable  CTA negative   no overt signs of gastrointestinal bleeding, having brown stool   s/p 2uprbc with adequate rise in hemoglobin (05/23)  xarelto resumed   monitor cbc, transfuse as needed

## 2020-05-27 NOTE — PROGRESS NOTE ADULT - SUBJECTIVE AND OBJECTIVE BOX
CARDIOLOGY     PROGRESS  NOTE   ________________________________________________    CHIEF COMPLAINT:Patient is a 89y old  Female who presents with a chief complaint of failure to thrive/ a.fib (26 May 2020 15:15)  no complain.  	  REVIEW OF SYSTEMS:  CONSTITUTIONAL: No fever, weight loss, or fatigue  EYES: No eye pain, visual disturbances, or discharge  ENT:  No difficulty hearing, tinnitus, vertigo; No sinus or throat pain  NECK: No pain or stiffness  RESPIRATORY: No cough, wheezing, chills or hemoptysis; No Shortness of Breath  CARDIOVASCULAR: No chest pain, palpitations, passing out, dizziness, or leg swelling  GASTROINTESTINAL: No abdominal or epigastric pain. No nausea, vomiting, or hematemesis; No diarrhea or constipation. No melena or hematochezia.  GENITOURINARY: No dysuria, frequency, hematuria, or incontinence  NEUROLOGICAL: No headaches, memory loss, loss of strength, numbness, or tremors  SKIN: No itching, burning, rashes, or lesions   LYMPH Nodes: No enlarged glands  ENDOCRINE: No heat or cold intolerance; No hair loss  MUSCULOSKELETAL: No joint pain or swelling; No muscle, back, or extremity pain  PSYCHIATRIC: No depression, anxiety, mood swings, or difficulty sleeping  HEME/LYMPH: No easy bruising, or bleeding gums  ALLERGY AND IMMUNOLOGIC: No hives or eczema	    [ ] All others negative	  [x ] Unable to obtain    PHYSICAL EXAM:  T(C): 36.6 (05-27-20 @ 04:25), Max: 36.9 (05-26-20 @ 12:23)  HR: 73 (05-27-20 @ 04:25) (65 - 78)  BP: 114/72 (05-27-20 @ 04:25) (112/65 - 143/57)  RR: 18 (05-27-20 @ 04:25) (18 - 18)  SpO2: 97% (05-27-20 @ 04:25) (94% - 97%)  Wt(kg): --  I&O's Summary    26 May 2020 07:01  -  27 May 2020 07:00  --------------------------------------------------------  IN: 0 mL / OUT: 1500 mL / NET: -1500 mL        Appearance: Normal	  HEENT:   Normal oral mucosa, PERRL, EOMI	  Lymphatic: No lymphadenopathy  Cardiovascular: Normal S1 S2, No JVD, + murmurs, No edema  Respiratory: Lungs clear to auscultation	  Psychiatry: A & O x 3, Mood & affect appropriate  Gastrointestinal:  Soft, Non-tender, + BS	  Skin: No rashes, No ecchymoses, No cyanosis	  Neurologic: Non-focal  Extremities: Normal range of motion, No clubbing, cyanosis or edema  Vascular: Peripheral pulses palpable 2+ bilaterally    MEDICATIONS  (STANDING):  atorvastatin 20 milliGRAM(s) Oral at bedtime  brimonidine 0.2% Ophthalmic Solution 1 Drop(s) Both EYES two times a day  mupirocin 2% Ointment 1 Application(s) Topical two times a day  pantoprazole   Suspension 40 milliGRAM(s) Oral before breakfast  senna 2 Tablet(s) Oral at bedtime  timolol 0.5% Solution 1 Drop(s) Both EYES two times a day      TELEMETRY: 	    ECG:  	  RADIOLOGY:  OTHER: 	  	  LABS:	 	    CARDIAC MARKERS:                                10.1   7.21  )-----------( 145      ( 27 May 2020 07:03 )             31.6     05-27    136  |  100  |  19  ----------------------------<  120<H>  4.4   |  28  |  0.60    Ca    8.9      27 May 2020 07:03    TPro  5.6<L>  /  Alb  2.5<L>  /  TBili  1.6<H>  /  DBili  x   /  AST  18  /  ALT  12  /  AlkPhos  70  05-27    proBNP:   Lipid Profile:   HgA1c:   TSH:         Assessment and plan  ---------------------------  89 y/o female with hx of dementia, CAD, CVA, COPD, afib, and recent extensive stay for COVID requiring pressor support presents to the ED with decreased PO intake. Patient discharge to the [facility] on 4/8 and was tolerating spoon feeds at that time, per son has declining mental status but was able to say his name a few days ago. Now over past few days has not been tolerating spoon feeds and is refusing to eat, also has been difficult to arouse  no chest pain / sob.  pt with failure to thrive  dc mirtazepine ?cause of sleepiness  a.fib ,hr controlled, continue ac  pt is not DNR  s/p  peg placement , start feeding today  discussed with son wants rehab   voiding trial check urinalysis  tolerating feeding  son is looking for rehab placement  cbc stable  official ct report  bl amanda abdiing  oob to chair  discussed with son regarding rehab is looking for place for her

## 2020-05-27 NOTE — DISCHARGE NOTE PROVIDER - NSDCMRMEDTOKEN_GEN_ALL_CORE_FT
atorvastatin 20 mg oral tablet: 1 tab(s) orally once a day (at bedtime)  brimonidine 0.2% ophthalmic solution: in each eye 2 times a day  clotrimazole-betamethasone dipropionate 1%-0.05% topical cream: Apply topically to affected area 2 times a day, As Needed to buttocks  docusate sodium 10 mg/mL oral liquid: 30 milliliter(s) by gastrostomy tube once a day (at bedtime)   mirtazapine 7.5 mg oral tablet: 1 tab(s) by gastrostomy tube once a day (at bedtime)  Multiple Vitamins with Minerals oral liquid: 15 milliliter(s) by gastrostomy tube once a day  mupirocin 2% topical ointment: 1 application topically 2 times a day around peg site  pantoprazole 40 mg oral granule, delayed release: by gastrostomy tube once a day  senna oral tablet: 2 tab(s) by gastrostomy tube once a day (at bedtime)  timolol maleate 0.5% ophthalmic gel forming solution: 1 drop(s) to both eyes 2 times a day  Xarelto 10 mg oral tablet: 1 tab(s) by gastrostomy tube once a day

## 2020-05-27 NOTE — DISCHARGE NOTE PROVIDER - HOSPITAL COURSE
History of Present Illness:     CHIEF COMPLAINT:Patient is a 89y old  Female who presents with a chief complaint of sob.        HPI:    89 year-old female with history of CVA, atrial fibrillation on Xarelto, dementia presents to the Emergency Department for bradycardia.  Patient presents from the [facility]; on vitals was found to be bradycardic to 30s-40s and sent to the ED.  Patient with decreased responsiveness and PO intake for the past few days; recently started on Azithromycin due to unknown etiology/NH paperwork w/o such information.  Tested for COVID on 4/3/20 and negative.  Patient with hypoxia at NH down to 89% on RA.            Hospital Course:    Patient presented to [facility] with failure to thrive. Patient had PEG placement on 5/18/20. Quinones for urinary retention placed on 5/19 and removed on 5/21, but failed TOV and quinones reinserted on 5/22. Quinones may attempt TOV once patient is more ambulatory.

## 2020-05-27 NOTE — PROGRESS NOTE ADULT - ASSESSMENT
89   yr pt,   pt  with  h/o  afib,   not  on   xarelto,  due  to falls,   cva,   copd, hld,     alzheimers  dementia,    pud/  h/o left ribf xs. 8/ 9 th,. left hip surg  h/o mlple falls.  echo, normal ef/ T2  comp  deformity, ct head, no cva//   ct  chest  with  goo, on last  visit    pt  was COVID  positive,  on 4/17/20,  had  elevated  troponin/ CRP/  Ferritin  , from Covid   pt  with  advanced  age/    comfort/   supportive  care is  ideal  however, son  wants  all  done.       sent  to  er  from   [facility], for  weakness/ not  eating/FTT  palliative/ comfort care,  ought to  be goal  in this pt,  however   son  wants  to  continue  current care   was  seen  by swallow  eval  on last visit/ oral feeds  at high risk  for  aspiration  Goc,   visited  again,  given  advanced  age  of  pt/ Alzheimers  dementia.  son wants   all  done/ peg  placed  on  5/18,    tolerating feeds  hb of  6  on 5/22/  s/p  prbc, hb on 5/ 23  is  10  anemia /  seen by gi /  from  ? gi  bleed  doubt,  low  hb is  from   left gluteal   hematoma ,/ no  melena/  brbpe/      ct angio, no  active bleed  restart   xarelto,  when  ok  with gi  s/p  bleeding at  peg  site/ ha s resolved.  hb is  10/ 31         < from: CT Chest No Cont (04.17.20 @ 04:24) >  IMPRESSION:   Pattern of GGO suggests infection including atypical pneumonia/viral infection from atypical agents including COVID-19 (C19V-1)  < end of copied text >     < from: CT Thoracic Spine Reform No Cont (01.07.20 @ 16:43) >  IMPRESSION:  Volume loss, microvascular disease, no acute hemorrhage or midline shift. If symptoms persist consider follow up head CT or MRI contraindications.  Cervical and thoracic spine are unchanged from prior, no obvious fracture or dislocation, multilevel degenerative changes as noted previously with diffuse osteopenia and small unchanged mild superior endplate T2 compression deformity.  < end of copied text      < from: Limited Transthoracic Echo (10.26.18 @ 14:07) >  Conclusions:  Limited tranthoracic echocardiogram at patients request to  end exam.  1. Mitral annular calcification.  2. The aortic valve opens well.  3. Limited images acquired at the patients request. Based  upon the parasternal long axis view only;  grossly normal  left ventricular systolic function.  < end of copied text >

## 2020-05-27 NOTE — PROGRESS NOTE ADULT - SUBJECTIVE AND OBJECTIVE BOX
INTERVAL HPI/OVERNIGHT EVENTS:    patient seen and examined  tolerating PEG feeds  h/h stable      MEDICATIONS  (STANDING):  atorvastatin 20 milliGRAM(s) Oral at bedtime  brimonidine 0.2% Ophthalmic Solution 1 Drop(s) Both EYES two times a day  mupirocin 2% Ointment 1 Application(s) Topical two times a day  pantoprazole   Suspension 40 milliGRAM(s) Oral before breakfast  senna 2 Tablet(s) Oral at bedtime  timolol 0.5% Solution 1 Drop(s) Both EYES two times a day    MEDICATIONS  (PRN):      Allergies    aspirin (Unknown)  penicillin (Unknown)    Intolerances        Review of Systems: unable to obtain           Vital Signs Last 24 Hrs  T(C): 36.9 (26 May 2020 12:23), Max: 36.9 (26 May 2020 12:23)  T(F): 98.5 (26 May 2020 12:23), Max: 98.5 (26 May 2020 12:23)  HR: 72 (26 May 2020 12:23) (72 - 85)  BP: 116/57 (26 May 2020 12:23) (108/67 - 144/80)  BP(mean): --  RR: 18 (26 May 2020 12:23) (18 - 18)  SpO2: 94% (26 May 2020 12:23) (93% - 100%)    PHYSICAL EXAM:    Constitutional: weak, frail, NAD   HEENT: ncat   Respiratory: respirations nonlabored   Gastrointestinal: BS+, soft, NT/ND, +PEG with improving erythema, dressings recently changed   Extremities: No peripheral edema  Neurological: non-focal         LABS:                        9.8    6.70  )-----------( 140      ( 26 May 2020 07:09 )             30.5     05-26    138  |  102  |  14  ----------------------------<  121<H>  4.4   |  26  |  0.57    Ca    8.8      26 May 2020 07:09    TPro  6.1  /  Alb  2.3<L>  /  TBili  1.7<H>  /  DBili  x   /  AST  18  /  ALT  12  /  AlkPhos  67  05-26    PT/INR - ( 25 May 2020 03:57 )   PT: 12.5 sec;   INR: 1.08 ratio         PTT - ( 25 May 2020 03:57 )  PTT:22.2 sec      RADIOLOGY & ADDITIONAL TESTS:

## 2020-05-27 NOTE — PROGRESS NOTE ADULT - SUBJECTIVE AND OBJECTIVE BOX
dementia  REVIEW OF SYSTEMS:  GEN: no fever,    no chills  RESP: no SOB,   no cough  CVS: no chest pain,   no palpitations  GI: no abdominal pain,   no nausea,   no vomiting,   no constipation,   no diarrhea  : no dysuria,   no frequency  NEURO: no headache,   no dizziness  PSYCH: no depression,   not anxious  Derm : no rash    MEDICATIONS  (STANDING):  atorvastatin 20 milliGRAM(s) Oral at bedtime  brimonidine 0.2% Ophthalmic Solution 1 Drop(s) Both EYES two times a day  mupirocin 2% Ointment 1 Application(s) Topical two times a day  pantoprazole   Suspension 40 milliGRAM(s) Oral before breakfast  senna 2 Tablet(s) Oral at bedtime  timolol 0.5% Solution 1 Drop(s) Both EYES two times a day    MEDICATIONS  (PRN):      Vital Signs Last 24 Hrs  T(C): 36.6 (27 May 2020 04:25), Max: 36.9 (26 May 2020 12:23)  T(F): 97.8 (27 May 2020 04:25), Max: 98.5 (26 May 2020 12:23)  HR: 73 (27 May 2020 04:25) (65 - 78)  BP: 114/72 (27 May 2020 04:25) (112/65 - 143/57)  BP(mean): --  RR: 18 (27 May 2020 04:25) (18 - 18)  SpO2: 97% (27 May 2020 04:25) (94% - 97%)  CAPILLARY BLOOD GLUCOSE        I&O's Summary    26 May 2020 07:01  -  27 May 2020 07:00  --------------------------------------------------------  IN: 0 mL / OUT: 1500 mL / NET: -1500 mL        PHYSICAL EXAM:  HEAD:  Atraumatic, Normocephalic  NECK: Supple, No   JVD  CHEST/LUNG:   no     rales,     no,    rhonchi  HEART: Regular rate and rhythm;         murmur  ABDOMEN: Soft, Nontender, ;   EXTREMITIES:  no      edema  NEUROLOGY:  dementia    LABS:                        10.1   7.21  )-----------( 145      ( 27 May 2020 07:03 )             31.6     05-27    136  |  100  |  19  ----------------------------<  120<H>  4.4   |  28  |  0.60    Ca    8.9      27 May 2020 07:03    TPro  5.6<L>  /  Alb  2.5<L>  /  TBili  1.6<H>  /  DBili  x   /  AST  18  /  ALT  12  /  AlkPhos  70  05-27                    Thyroid Stimulating Hormone, Serum: 3.19 uIU/mL (04-24 @ 12:11)          Consultant(s) Notes Reviewed:      Care Discussed with Consultants/Other Providers:

## 2020-05-27 NOTE — DISCHARGE NOTE NURSING/CASE MANAGEMENT/SOCIAL WORK - PATIENT PORTAL LINK FT
You can access the FollowMyHealth Patient Portal offered by [facility] by registering at the following website: [email or web address]. By joining [employer]’s FollowMyHealth portal, you will also be able to view your health information using other applications (apps) compatible with our system.

## 2020-06-06 NOTE — DISCHARGE NOTE ADULT - PRINCIPAL DIAGNOSIS
Report rec'd pt stating she wants to leave AMA before MRI, ERMD at bedside, will follow.........HUGH Fall from standing, initial encounter

## 2020-06-12 ENCOUNTER — EMERGENCY (EMERGENCY)
Facility: HOSPITAL | Age: 85
LOS: 1 days | Discharge: HOME | End: 2020-06-12
Attending: EMERGENCY MEDICINE
Payer: MEDICARE

## 2020-06-12 VITALS
DIASTOLIC BLOOD PRESSURE: 54 MMHG | SYSTOLIC BLOOD PRESSURE: 99 MMHG | TEMPERATURE: 98 F | WEIGHT: 119.93 LBS | HEART RATE: 92 BPM | HEIGHT: 62 IN | OXYGEN SATURATION: 97 % | RESPIRATION RATE: 20 BRPM

## 2020-06-12 VITALS
DIASTOLIC BLOOD PRESSURE: 46 MMHG | OXYGEN SATURATION: 98 % | HEART RATE: 87 BPM | RESPIRATION RATE: 16 BRPM | SYSTOLIC BLOOD PRESSURE: 98 MMHG

## 2020-06-12 LAB
ALBUMIN SERPL ELPH-MCNC: 3 G/DL — LOW (ref 3.3–5)
ALP SERPL-CCNC: 83 U/L — SIGNIFICANT CHANGE UP (ref 40–120)
ALT FLD-CCNC: 11 U/L — SIGNIFICANT CHANGE UP (ref 10–45)
ANION GAP SERPL CALC-SCNC: 10 MMOL/L — SIGNIFICANT CHANGE UP (ref 5–17)
APPEARANCE UR: ABNORMAL
APTT BLD: 26.4 SEC — LOW (ref 27.5–36.3)
AST SERPL-CCNC: 22 U/L — SIGNIFICANT CHANGE UP (ref 10–40)
BACTERIA # UR AUTO: ABNORMAL
BASOPHILS # BLD AUTO: 0.01 K/UL — SIGNIFICANT CHANGE UP (ref 0–0.2)
BASOPHILS NFR BLD AUTO: 0.2 % — SIGNIFICANT CHANGE UP (ref 0–2)
BILIRUB SERPL-MCNC: 1.6 MG/DL — HIGH (ref 0.2–1.2)
BILIRUB UR-MCNC: NEGATIVE — SIGNIFICANT CHANGE UP
BUN SERPL-MCNC: 25 MG/DL — HIGH (ref 7–23)
CALCIUM SERPL-MCNC: 8.4 MG/DL — SIGNIFICANT CHANGE UP (ref 8.4–10.5)
CHLORIDE SERPL-SCNC: 98 MMOL/L — SIGNIFICANT CHANGE UP (ref 96–108)
CO2 SERPL-SCNC: 29 MMOL/L — SIGNIFICANT CHANGE UP (ref 22–31)
COLOR SPEC: YELLOW — SIGNIFICANT CHANGE UP
CREAT SERPL-MCNC: 0.75 MG/DL — SIGNIFICANT CHANGE UP (ref 0.5–1.3)
DIFF PNL FLD: ABNORMAL
EOSINOPHIL # BLD AUTO: 0.03 K/UL — SIGNIFICANT CHANGE UP (ref 0–0.5)
EOSINOPHIL NFR BLD AUTO: 0.5 % — SIGNIFICANT CHANGE UP (ref 0–6)
EPI CELLS # UR: 3 — SIGNIFICANT CHANGE UP
GLUCOSE SERPL-MCNC: 121 MG/DL — HIGH (ref 70–99)
GLUCOSE UR QL: NEGATIVE — SIGNIFICANT CHANGE UP
HCT VFR BLD CALC: 26.9 % — LOW (ref 34.5–45)
HGB BLD-MCNC: 8.4 G/DL — LOW (ref 11.5–15.5)
IMM GRANULOCYTES NFR BLD AUTO: 0.8 % — SIGNIFICANT CHANGE UP (ref 0–1.5)
INR BLD: 1.16 RATIO — SIGNIFICANT CHANGE UP (ref 0.88–1.16)
KETONES UR-MCNC: NEGATIVE — SIGNIFICANT CHANGE UP
LEUKOCYTE ESTERASE UR-ACNC: ABNORMAL
LYMPHOCYTES # BLD AUTO: 1.01 K/UL — SIGNIFICANT CHANGE UP (ref 1–3.3)
LYMPHOCYTES # BLD AUTO: 15.8 % — SIGNIFICANT CHANGE UP (ref 13–44)
MCHC RBC-ENTMCNC: 31.2 GM/DL — LOW (ref 32–36)
MCHC RBC-ENTMCNC: 31.5 PG — SIGNIFICANT CHANGE UP (ref 27–34)
MCV RBC AUTO: 100.7 FL — HIGH (ref 80–100)
MONOCYTES # BLD AUTO: 0.57 K/UL — SIGNIFICANT CHANGE UP (ref 0–0.9)
MONOCYTES NFR BLD AUTO: 8.9 % — SIGNIFICANT CHANGE UP (ref 2–14)
NEUTROPHILS # BLD AUTO: 4.74 K/UL — SIGNIFICANT CHANGE UP (ref 1.8–7.4)
NEUTROPHILS NFR BLD AUTO: 73.8 % — SIGNIFICANT CHANGE UP (ref 43–77)
NITRITE UR-MCNC: NEGATIVE — SIGNIFICANT CHANGE UP
NRBC # BLD: 0 /100 WBCS — SIGNIFICANT CHANGE UP (ref 0–0)
PH UR: 8.5 — HIGH (ref 5–8)
PLATELET # BLD AUTO: 179 K/UL — SIGNIFICANT CHANGE UP (ref 150–400)
POTASSIUM SERPL-MCNC: 4.3 MMOL/L — SIGNIFICANT CHANGE UP (ref 3.5–5.3)
POTASSIUM SERPL-SCNC: 4.3 MMOL/L — SIGNIFICANT CHANGE UP (ref 3.5–5.3)
PROT SERPL-MCNC: 7.2 G/DL — SIGNIFICANT CHANGE UP (ref 6–8.3)
PROT UR-MCNC: ABNORMAL
PROTHROM AB SERPL-ACNC: 13.4 SEC — HIGH (ref 10–12.9)
RBC # BLD: 2.67 M/UL — LOW (ref 3.8–5.2)
RBC # FLD: 19.9 % — HIGH (ref 10.3–14.5)
RBC CASTS # UR COMP ASSIST: 3 /HPF — SIGNIFICANT CHANGE UP (ref 0–4)
SARS-COV-2 RNA SPEC QL NAA+PROBE: SIGNIFICANT CHANGE UP
SODIUM SERPL-SCNC: 137 MMOL/L — SIGNIFICANT CHANGE UP (ref 135–145)
SP GR SPEC: 1.01 — SIGNIFICANT CHANGE UP (ref 1.01–1.02)
UROBILINOGEN FLD QL: ABNORMAL
WBC # BLD: 6.41 K/UL — SIGNIFICANT CHANGE UP (ref 3.8–10.5)
WBC # FLD AUTO: 6.41 K/UL — SIGNIFICANT CHANGE UP (ref 3.8–10.5)
WBC UR QL: >50

## 2020-06-12 PROCEDURE — 90715 TDAP VACCINE 7 YRS/> IM: CPT

## 2020-06-12 PROCEDURE — U0003: CPT

## 2020-06-12 PROCEDURE — 80053 COMPREHEN METABOLIC PANEL: CPT

## 2020-06-12 PROCEDURE — 81001 URINALYSIS AUTO W/SCOPE: CPT

## 2020-06-12 PROCEDURE — 82962 GLUCOSE BLOOD TEST: CPT

## 2020-06-12 PROCEDURE — 72125 CT NECK SPINE W/O DYE: CPT

## 2020-06-12 PROCEDURE — 93010 ELECTROCARDIOGRAM REPORT: CPT

## 2020-06-12 PROCEDURE — 71045 X-RAY EXAM CHEST 1 VIEW: CPT

## 2020-06-12 PROCEDURE — 99284 EMERGENCY DEPT VISIT MOD MDM: CPT | Mod: CS

## 2020-06-12 PROCEDURE — 70450 CT HEAD/BRAIN W/O DYE: CPT

## 2020-06-12 PROCEDURE — 87186 SC STD MICRODIL/AGAR DIL: CPT

## 2020-06-12 PROCEDURE — 90471 IMMUNIZATION ADMIN: CPT

## 2020-06-12 PROCEDURE — 72170 X-RAY EXAM OF PELVIS: CPT

## 2020-06-12 PROCEDURE — 72170 X-RAY EXAM OF PELVIS: CPT | Mod: 26

## 2020-06-12 PROCEDURE — 93005 ELECTROCARDIOGRAM TRACING: CPT

## 2020-06-12 PROCEDURE — 99284 EMERGENCY DEPT VISIT MOD MDM: CPT | Mod: 25

## 2020-06-12 PROCEDURE — 70450 CT HEAD/BRAIN W/O DYE: CPT | Mod: 26,CS

## 2020-06-12 PROCEDURE — 71045 X-RAY EXAM CHEST 1 VIEW: CPT | Mod: 26

## 2020-06-12 PROCEDURE — 85730 THROMBOPLASTIN TIME PARTIAL: CPT

## 2020-06-12 PROCEDURE — 87086 URINE CULTURE/COLONY COUNT: CPT

## 2020-06-12 PROCEDURE — 85610 PROTHROMBIN TIME: CPT

## 2020-06-12 PROCEDURE — 85027 COMPLETE CBC AUTOMATED: CPT

## 2020-06-12 PROCEDURE — 72125 CT NECK SPINE W/O DYE: CPT | Mod: 26,CS

## 2020-06-12 RX ORDER — SODIUM CHLORIDE 9 MG/ML
500 INJECTION INTRAMUSCULAR; INTRAVENOUS; SUBCUTANEOUS ONCE
Refills: 0 | Status: DISCONTINUED | OUTPATIENT
Start: 2020-06-12 | End: 2020-06-16

## 2020-06-12 RX ORDER — CEPHALEXIN 500 MG
1 CAPSULE ORAL
Qty: 21 | Refills: 0
Start: 2020-06-12 | End: 2020-06-18

## 2020-06-12 RX ORDER — TETANUS TOXOID, REDUCED DIPHTHERIA TOXOID AND ACELLULAR PERTUSSIS VACCINE, ADSORBED 5; 2.5; 8; 8; 2.5 [IU]/.5ML; [IU]/.5ML; UG/.5ML; UG/.5ML; UG/.5ML
0.5 SUSPENSION INTRAMUSCULAR ONCE
Refills: 0 | Status: COMPLETED | OUTPATIENT
Start: 2020-06-12 | End: 2020-06-12

## 2020-06-12 RX ADMIN — TETANUS TOXOID, REDUCED DIPHTHERIA TOXOID AND ACELLULAR PERTUSSIS VACCINE, ADSORBED 0.5 MILLILITER(S): 5; 2.5; 8; 8; 2.5 SUSPENSION INTRAMUSCULAR at 11:32

## 2020-06-12 NOTE — ED PROVIDER NOTE - CARE PLAN
Principal Discharge DX:	Fall, initial encounter  Secondary Diagnosis:	Acute cystitis without hematuria  Secondary Diagnosis:	Anemia, unspecified type

## 2020-06-12 NOTE — ED ADULT NURSE REASSESSMENT NOTE - NS ED NURSE REASSESS COMMENT FT1
pt is sleeping, condition improved, vitals stable, resting comfortably, iv in place no signs of infiltration, skin is warm dry pink intact, denies any pain

## 2020-06-12 NOTE — ED PROVIDER NOTE - CLINICAL SUMMARY MEDICAL DECISION MAKING FREE TEXT BOX
89 year-old female with history of CVA, atrial fibrillation on Xarelto, dementia BIBA from nursing facility sp fall. Patient AOx0. Skin tear noted on Left elbow. Patient bladder distended. Will obtain ekg, finger stick, rectal temp, labs, xray and ct. Will give tdap. Obtain COVID swab for nursing home. Disp pending results

## 2020-06-12 NOTE — ED PROVIDER NOTE - OBJECTIVE STATEMENT
89 year-old female with history of CVA, atrial fibrillation on Xarelto, dementia BIBA from nursing facility sp fall. As per EMS, patient normally AOx1 and was found on the floor with left arm skin tear. Patient recently discharged from facility. Is nonambulatory at baseline. Patient currently AOx0 89 year-old female with history of CVA, atrial fibrillation on Xarelto, dementia BIBA from nursing facility sp fall. As per EMS, patient normally AOx1 and was found on the floor with left arm skin tear. Patient recently discharged from facility. Is nonambulatory at baseline. Patient currently AOx0    Attendinyo female presents s/p fall out of bed at nursing home.  pt is on xarelto.  alert and oriented x1 at baseline.  no fever.  has peg tube for feeding

## 2020-06-12 NOTE — ED ADULT NURSE NOTE - OBJECTIVE STATEMENT
89 y/o female BIBA from nursing home. Per ems pt had an unwitnessed fall this morning and is on Xarelto. Per ems and last visit record pt is A&Ox1. A&Ox1, vss, skin tear noted to left elbow, contracted upper extremities, lungs CTA, abd soft nondistended. Patient's bed in the lowest position, explained plan of care to patient and family members. Will continue to reassess.

## 2020-06-12 NOTE — ED PROVIDER NOTE - NSFOLLOWUPINSTRUCTIONS_ED_ALL_ED_FT
Keflex 500mg every 8 hours for 7 days  Follow up CBC and Follow up with primary doctor.   Return patient for worsening altered mental status, decreased CBC, or fever

## 2020-06-12 NOTE — ED PROVIDER NOTE - PATIENT PORTAL LINK FT
You can access the FollowMyHealth Patient Portal offered by [facility] by registering at the following website: [email or web address]. By joining [employer]’s FollowMyHealth portal, you will also be able to view your health information using other applications (apps) compatible with our system.

## 2020-06-12 NOTE — ED PROVIDER NOTE - GASTROINTESTINAL, MLM
Abdomen soft, non-tender, no guarding. Suprapubic distension. G tube in place. No signs of infection

## 2020-06-25 NOTE — PROVIDER CONTACT NOTE (OTHER) - DATE AND TIME:
Received refill request for probiotic       Amlodipine      Benazepril   Last office visit: 1/13/0  Last filled 5/29/20  Last Lab 4/15/20  Received refill request for fenofibrate      Oxybutynin      Omeprazole       Meloxicam       Aspirin    Last filled 1/13/20    According to the [facility] refill policy, Mary Casanova's Prescription refill request Approved for 90 day supply    08-Jan-2020 15:42

## 2020-07-06 NOTE — CHART NOTE - NSCHARTNOTEFT_GEN_A_CORE
I, Brittany Seo MD attest that, to the best of my knowledge based on clinical presentation and findings documented in the medical record, this patient had sepsis with shock due to COVID-19 pneumonia present on admission: in ED, Temp , HR , RR 14-16, WBC 6.87, BP 73/46 that did not respond to fluid challenge; required continuous vasopressors to maintain BP.

## 2020-09-15 NOTE — H&P ADULT. - NS NEC GEN PE MLT EXAM PC
No bruits; no thyromegaly or nodules Ear Wedge Repair Text: A wedge excision was completed by carrying down an excision through the full thickness of the ear and cartilage with an inward facing Burow's triangle. The wound was then closed in a layered fashion.

## 2020-11-02 NOTE — PATIENT PROFILE ADULT. - PROVIDER NOTIFICATION
Yes O-T Plasty Text: The defect edges were debeveled with a #15 scalpel blade.  Given the location of the defect, shape of the defect and the proximity to free margins an O-T plasty was deemed most appropriate.  Using a sterile surgical marker, an appropriate O-T plasty was drawn incorporating the defect and placing the expected incisions within the relaxed skin tension lines where possible.    The area thus outlined was incised deep to adipose tissue with a #15 scalpel blade.  The skin margins were undermined to an appropriate distance in all directions utilizing iris scissors.

## 2020-12-07 NOTE — ED ADULT NURSE NOTE - RESPIRATORY ASSESSMENT
Vaginal Bleeding in Nonpregnant Women: Care Instructions  Your Care Instructions     Many women have bleeding or spotting between periods. Lots of things can cause it. You may bleed because of hormone problems, stress, or ovulation. Fibroids and IUDs (intrauterine devices) can also cause bleeding. If your bleeding or spotting is caused by one of these things and is not heavy or doesn't happen often, you probably don't need to worry. But in rare cases, infection, cancer, or other serious conditions can cause bleeding. So you may need more tests to find the cause of your bleeding. The doctor has checked you carefully, but problems can develop later. If you notice any problems or new symptoms, get medical treatment right away. Follow-up care is a key part of your treatment and safety. Be sure to make and go to all appointments, and call your doctor if you are having problems. It's also a good idea to know your test results and keep a list of the medicines you take. How can you care for yourself at home? · Take pain medicines exactly as directed. ? If the doctor gave you a prescription medicine for pain, take it as prescribed. ? If you are not taking a prescription pain medicine, ask your doctor if you can take an over-the-counter medicine. Do not take aspirin, which may make bleeding worse. · If your doctor prescribed birth control pills for your bleeding, take them as directed. · Eat foods that are high in iron and vitamin C. Foods high in iron include red meat, shellfish, eggs, beans, and leafy green vegetables. Foods high in vitamin C include citrus fruits, tomatoes, and broccoli. Ask your doctor if you need to take iron pills or a multivitamin. · Ask your doctor when it is okay to have sex. When should you call for help? Call 911 anytime you think you may need emergency care. For example, call if:    · You passed out (lost consciousness).    Call your doctor now or seek immediate medical care if:    · You have severe vaginal bleeding.     · You are dizzy or lightheaded, or you feel like you may faint.     · You have new or worse belly or pelvic pain. Watch closely for changes in your health, and be sure to contact your doctor if:    · Your bleeding gets worse.     · You think you might be pregnant.     · You do not get better as expected. Where can you learn more? Go to http://www.gray.com/  Enter L814 in the search box to learn more about \"Vaginal Bleeding in Nonpregnant Women: Care Instructions. \"  Current as of: November 8, 2019               Content Version: 12.6  © 1777-5778 [employer], [employer]. Care instructions adapted under license by [employer] (which disclaims liability or warranty for this information). If you have questions about a medical condition or this instruction, always ask your healthcare professional. [address] any warranty or liability for your use of this information. WDL

## 2021-04-02 NOTE — PHYSICAL THERAPY INITIAL EVALUATION ADULT - ORIENTATION, REHAB EVAL
person Principal Discharge DX:	Atrial fibrillation with RVR  Secondary Diagnosis:	PVC (premature ventricular contraction)  Secondary Diagnosis:	Ventricular tachycardia, nonsustained  Secondary Diagnosis:	Rhabdomyolysis

## 2021-04-29 NOTE — ED PROVIDER NOTE - VASCULAR COMPROMISE
No, we just screen people for hiv  She should not have had to pay for the test  If she did, who do we send the bill to as we were told insurance companies pay for screenings  no vascular compromise

## 2021-09-13 NOTE — PROVIDER CONTACT NOTE (OTHER) - RECOMMENDATIONS
Followup labds? Will continue to monitor. repeat CBC? Will continue to monitor. Spironolactone Counseling: Patient advised regarding risks of diarrhea, abdominal pain, hyperkalemia, birth defects (for female patients), liver toxicity and renal toxicity. The patient may need blood work to monitor liver and kidney function and potassium levels while on therapy. The patient verbalized understanding of the proper use and possible adverse effects of spironolactone.  All of the patient's questions and concerns were addressed.

## 2021-12-08 NOTE — ED ADULT NURSE NOTE - PT NEEDS ASSIST
Patient calling again on below. Writer could not connect with nurse. Please call her back at #[phone] ([phone]). Thank you.   yes

## 2022-01-04 NOTE — DIETITIAN INITIAL EVALUATION ADULT. - PT NOT SOURCE
3 = A little assistance Pt was asleep during time of visit, tried to wake her. Family not present at bedside during time of visit. As per nursing, pt is very lethargic./other (specify)/confused

## 2022-04-13 ENCOUNTER — INPATIENT (INPATIENT)
Facility: HOSPITAL | Age: 87
LOS: 1 days | Discharge: NURSING, REHAB OR LONG-TERM CARE | End: 2022-04-15
Attending: INTERNAL MEDICINE | Admitting: INTERNAL MEDICINE
Payer: MEDICARE

## 2022-04-13 VITALS
OXYGEN SATURATION: 98 % | TEMPERATURE: 98 F | HEART RATE: 85 BPM | RESPIRATION RATE: 18 BRPM | SYSTOLIC BLOOD PRESSURE: 118 MMHG | DIASTOLIC BLOOD PRESSURE: 65 MMHG

## 2022-04-13 DIAGNOSIS — N39.0 URINARY TRACT INFECTION, SITE NOT SPECIFIED: ICD-10-CM

## 2022-04-13 LAB
ALBUMIN SERPL ELPH-MCNC: 3.7 G/DL — SIGNIFICANT CHANGE UP (ref 3.3–5)
ALP SERPL-CCNC: 101 U/L — SIGNIFICANT CHANGE UP (ref 40–120)
ALT FLD-CCNC: 11 U/L — SIGNIFICANT CHANGE UP (ref 4–33)
ANION GAP SERPL CALC-SCNC: 14 MMOL/L — SIGNIFICANT CHANGE UP (ref 7–14)
APPEARANCE UR: ABNORMAL
APTT BLD: 24.4 SEC — LOW (ref 27–36.3)
AST SERPL-CCNC: 21 U/L — SIGNIFICANT CHANGE UP (ref 4–32)
BASOPHILS # BLD AUTO: 0.02 K/UL — SIGNIFICANT CHANGE UP (ref 0–0.2)
BASOPHILS NFR BLD AUTO: 0.2 % — SIGNIFICANT CHANGE UP (ref 0–2)
BILIRUB SERPL-MCNC: 1.5 MG/DL — HIGH (ref 0.2–1.2)
BILIRUB UR-MCNC: NEGATIVE — SIGNIFICANT CHANGE UP
BLD GP AB SCN SERPL QL: NEGATIVE — SIGNIFICANT CHANGE UP
BUN SERPL-MCNC: 31 MG/DL — HIGH (ref 7–23)
CALCIUM SERPL-MCNC: 9.5 MG/DL — SIGNIFICANT CHANGE UP (ref 8.4–10.5)
CHLORIDE SERPL-SCNC: 104 MMOL/L — SIGNIFICANT CHANGE UP (ref 98–107)
CO2 SERPL-SCNC: 27 MMOL/L — SIGNIFICANT CHANGE UP (ref 22–31)
COLOR SPEC: YELLOW — SIGNIFICANT CHANGE UP
CREAT SERPL-MCNC: 0.83 MG/DL — SIGNIFICANT CHANGE UP (ref 0.5–1.3)
DIFF PNL FLD: NEGATIVE — SIGNIFICANT CHANGE UP
EGFR: 64 ML/MIN/1.73M2 — SIGNIFICANT CHANGE UP
EOSINOPHIL # BLD AUTO: 0.09 K/UL — SIGNIFICANT CHANGE UP (ref 0–0.5)
EOSINOPHIL NFR BLD AUTO: 1.1 % — SIGNIFICANT CHANGE UP (ref 0–6)
FLUAV AG NPH QL: SIGNIFICANT CHANGE UP
FLUBV AG NPH QL: SIGNIFICANT CHANGE UP
GLUCOSE SERPL-MCNC: 124 MG/DL — HIGH (ref 70–99)
GLUCOSE UR QL: NEGATIVE — SIGNIFICANT CHANGE UP
HCT VFR BLD CALC: 38.1 % — SIGNIFICANT CHANGE UP (ref 34.5–45)
HGB BLD-MCNC: 12.6 G/DL — SIGNIFICANT CHANGE UP (ref 11.5–15.5)
IANC: 6.13 K/UL — SIGNIFICANT CHANGE UP (ref 1.8–7.4)
IMM GRANULOCYTES NFR BLD AUTO: 0.6 % — SIGNIFICANT CHANGE UP (ref 0–1.5)
INR BLD: 1.18 RATIO — HIGH (ref 0.88–1.16)
KETONES UR-MCNC: NEGATIVE — SIGNIFICANT CHANGE UP
LEUKOCYTE ESTERASE UR-ACNC: ABNORMAL
LIDOCAIN IGE QN: 22 U/L — SIGNIFICANT CHANGE UP (ref 7–60)
LYMPHOCYTES # BLD AUTO: 1.45 K/UL — SIGNIFICANT CHANGE UP (ref 1–3.3)
LYMPHOCYTES # BLD AUTO: 17.5 % — SIGNIFICANT CHANGE UP (ref 13–44)
MCHC RBC-ENTMCNC: 33.1 GM/DL — SIGNIFICANT CHANGE UP (ref 32–36)
MCHC RBC-ENTMCNC: 33.3 PG — SIGNIFICANT CHANGE UP (ref 27–34)
MCV RBC AUTO: 100.8 FL — HIGH (ref 80–100)
MONOCYTES # BLD AUTO: 0.55 K/UL — SIGNIFICANT CHANGE UP (ref 0–0.9)
MONOCYTES NFR BLD AUTO: 6.6 % — SIGNIFICANT CHANGE UP (ref 2–14)
NEUTROPHILS # BLD AUTO: 6.13 K/UL — SIGNIFICANT CHANGE UP (ref 1.8–7.4)
NEUTROPHILS NFR BLD AUTO: 74 % — SIGNIFICANT CHANGE UP (ref 43–77)
NITRITE UR-MCNC: NEGATIVE — SIGNIFICANT CHANGE UP
NRBC # BLD: 0 /100 WBCS — SIGNIFICANT CHANGE UP
NRBC # FLD: 0 K/UL — SIGNIFICANT CHANGE UP
PH UR: 8 — SIGNIFICANT CHANGE UP (ref 5–8)
PLATELET # BLD AUTO: 170 K/UL — SIGNIFICANT CHANGE UP (ref 150–400)
POTASSIUM SERPL-MCNC: 5.3 MMOL/L — SIGNIFICANT CHANGE UP (ref 3.5–5.3)
POTASSIUM SERPL-SCNC: 5.3 MMOL/L — SIGNIFICANT CHANGE UP (ref 3.5–5.3)
PROT SERPL-MCNC: 7.4 G/DL — SIGNIFICANT CHANGE UP (ref 6–8.3)
PROT UR-MCNC: ABNORMAL
PROTHROM AB SERPL-ACNC: 13.7 SEC — HIGH (ref 10.5–13.4)
RBC # BLD: 3.78 M/UL — LOW (ref 3.8–5.2)
RBC # FLD: 15 % — HIGH (ref 10.3–14.5)
RH IG SCN BLD-IMP: POSITIVE — SIGNIFICANT CHANGE UP
RSV RNA NPH QL NAA+NON-PROBE: SIGNIFICANT CHANGE UP
SARS-COV-2 RNA SPEC QL NAA+PROBE: SIGNIFICANT CHANGE UP
SODIUM SERPL-SCNC: 145 MMOL/L — SIGNIFICANT CHANGE UP (ref 135–145)
SP GR SPEC: 1.02 — SIGNIFICANT CHANGE UP (ref 1–1.05)
UROBILINOGEN FLD QL: ABNORMAL
WBC # BLD: 8.29 K/UL — SIGNIFICANT CHANGE UP (ref 3.8–10.5)
WBC # FLD AUTO: 8.29 K/UL — SIGNIFICANT CHANGE UP (ref 3.8–10.5)

## 2022-04-13 PROCEDURE — 99284 EMERGENCY DEPT VISIT MOD MDM: CPT | Mod: FS

## 2022-04-13 PROCEDURE — 74177 CT ABD & PELVIS W/CONTRAST: CPT | Mod: 26,MA

## 2022-04-13 RX ORDER — HALOPERIDOL DECANOATE 100 MG/ML
5 INJECTION INTRAMUSCULAR ONCE
Refills: 0 | Status: DISCONTINUED | OUTPATIENT
Start: 2022-04-13 | End: 2022-04-13

## 2022-04-13 RX ORDER — CEFTRIAXONE 500 MG/1
1000 INJECTION, POWDER, FOR SOLUTION INTRAMUSCULAR; INTRAVENOUS ONCE
Refills: 0 | Status: COMPLETED | OUTPATIENT
Start: 2022-04-13 | End: 2022-04-13

## 2022-04-13 RX ADMIN — CEFTRIAXONE 100 MILLIGRAM(S): 500 INJECTION, POWDER, FOR SOLUTION INTRAMUSCULAR; INTRAVENOUS at 23:04

## 2022-04-13 NOTE — ED PROVIDER NOTE - ATTENDING CONTRIBUTION TO CARE
Attending Statement: I have reviewed and agree with all pertinent clinical information, including history and physical exam and agree with treatment plan of the PA, except as noted.  89yoF PMHX of COPD, CVA, GERD, Afib on xarelto. dementia BIBS from NH for "peg replacement" pt unable to give hx. Transfer form states that Gtube need to be replaced. no family at bedside. will need to obtain collaterol  Vital signs noted. elderly female in bed, alert to name, confused. no sign of head/facial trauma. no resp distress. grimace and yells when palpate abdomen janett LUQ. +gtube in place,  noted to have tape around the tube, formula in the tubing. insertion site not red/swollen/ no discharge. +tender to light touch.   plan ekg, labs, urine, ct abdomen-pelvis, tba

## 2022-04-13 NOTE — ED PROVIDER NOTE - NS ED ATTENDING STATEMENT MOD
This was a shared visit with the SUSAN. I reviewed and verified the documentation and independently performed the documented:

## 2022-04-13 NOTE — ED PROVIDER NOTE - OBJECTIVE STATEMENT
89 yo F Dementia, A&O x 0, alert, disoriented,   Transferred from [facility] for G tube replacement. PMHX of COPD, CVA, GERD, Afib on xarelto.

## 2022-04-13 NOTE — ED ADULT NURSE REASSESSMENT NOTE - NS ED NURSE REASSESS COMMENT FT1
report received from day RN, pt A&Ox0, non-verbal, sleeping comfortably in bed. received on puwick. respiration even and non-labored. in nad. awaiting for CT result. report received from day RN, pt A&Ox0, non-verbal, sleeping comfortably in bed. received on puwi. PEG tube noted to right upper abdomen,  no drainage noted at site. respiration even and non-labored. in nad. awaiting for CT result.

## 2022-04-13 NOTE — ED ADULT NURSE NOTE - NSIMPLEMENTINTERV_GEN_ALL_ED
Implemented All Fall with Harm Risk Interventions:  [place] to call system. Call bell, personal items and telephone within reach. Instruct patient to call for assistance. Room bathroom lighting operational. Non-slip footwear when patient is off stretcher. Physically safe environment: no spills, clutter or unnecessary equipment. Stretcher in lowest position, wheels locked, appropriate side rails in place. Provide visual cue, wrist band, yellow gown, etc. Monitor gait and stability. Monitor for mental status changes and reorient to person, place, and time. Review medications for side effects contributing to fall risk. Reinforce activity limits and safety measures with patient and family. Provide visual clues: red socks.

## 2022-04-13 NOTE — ED PROVIDER NOTE - CLINICAL SUMMARY MEDICAL DECISION MAKING FREE TEXT BOX
89 yo F Dementia, A&O x 0, alert, disoriented,   [facility] [place] [facility] G [facility] replacement. PMHX of COPD, CVA, GERD, Afib on xarelto.  patient with TTP on abdominal exam.   CT to evaluated   if wnl, replace PEG.

## 2022-04-13 NOTE — ED ADULT NURSE REASSESSMENT NOTE - NS ED NURSE REASSESS COMMENT FT1
Patient at baseline mental status. Denies chest pain, headache and dizziness at this time. Breathing even and unlabored. No pallor or diaphoresis noted at this time. Patient taken to CT of abdomen as per MD order.

## 2022-04-13 NOTE — ED ADULT NURSE NOTE - OBJECTIVE STATEMENT
Pt alert and verbal but not answering questions or following commands.  Skin intact.  Sent from NH for non functioning peg tube.  Peg intact.  ADL's performed.  Awaiting MD kirk. Pt alert and verbal but not answering questions or following commands.  Skin intact.  Sent from NH for non functioning peg tube.  Peg intact.  ADL's performed. Primafit applied.  Wet cough noted and PA notified.   Awaiting MD kirk.

## 2022-04-13 NOTE — ED PROVIDER NOTE - NSICDXPASTMEDICALHX_GEN_ALL_CORE_FT
PAST MEDICAL HISTORY:  Afib     COPD without exacerbation     CVA (cerebrovascular accident)     Dementia     GERD (gastroesophageal reflux disease)

## 2022-04-14 DIAGNOSIS — N39.0 URINARY TRACT INFECTION, SITE NOT SPECIFIED: ICD-10-CM

## 2022-04-14 DIAGNOSIS — K59.00 CONSTIPATION, UNSPECIFIED: ICD-10-CM

## 2022-04-14 DIAGNOSIS — K21.9 GASTRO-ESOPHAGEAL REFLUX DISEASE WITHOUT ESOPHAGITIS: ICD-10-CM

## 2022-04-14 DIAGNOSIS — K56.41 FECAL IMPACTION: ICD-10-CM

## 2022-04-14 DIAGNOSIS — F03.90 UNSPECIFIED DEMENTIA WITHOUT BEHAVIORAL DISTURBANCE: ICD-10-CM

## 2022-04-14 DIAGNOSIS — I48.91 UNSPECIFIED ATRIAL FIBRILLATION: ICD-10-CM

## 2022-04-14 DIAGNOSIS — Z93.1 GASTROSTOMY STATUS: Chronic | ICD-10-CM

## 2022-04-14 DIAGNOSIS — K94.23 GASTROSTOMY MALFUNCTION: ICD-10-CM

## 2022-04-14 DIAGNOSIS — Z93.1 GASTROSTOMY STATUS: ICD-10-CM

## 2022-04-14 DIAGNOSIS — Z29.9 ENCOUNTER FOR PROPHYLACTIC MEASURES, UNSPECIFIED: ICD-10-CM

## 2022-04-14 LAB
ANION GAP SERPL CALC-SCNC: 15 MMOL/L — HIGH (ref 7–14)
BUN SERPL-MCNC: 27 MG/DL — HIGH (ref 7–23)
CALCIUM SERPL-MCNC: 8.9 MG/DL — SIGNIFICANT CHANGE UP (ref 8.4–10.5)
CHLORIDE SERPL-SCNC: 101 MMOL/L — SIGNIFICANT CHANGE UP (ref 98–107)
CO2 SERPL-SCNC: 22 MMOL/L — SIGNIFICANT CHANGE UP (ref 22–31)
CREAT SERPL-MCNC: 0.79 MG/DL — SIGNIFICANT CHANGE UP (ref 0.5–1.3)
EGFR: 68 ML/MIN/1.73M2 — SIGNIFICANT CHANGE UP
GLUCOSE BLDC GLUCOMTR-MCNC: 106 MG/DL — HIGH (ref 70–99)
GLUCOSE SERPL-MCNC: 96 MG/DL — SIGNIFICANT CHANGE UP (ref 70–99)
MAGNESIUM SERPL-MCNC: 2.4 MG/DL — SIGNIFICANT CHANGE UP (ref 1.6–2.6)
PHOSPHATE SERPL-MCNC: 3.6 MG/DL — SIGNIFICANT CHANGE UP (ref 2.5–4.5)
POTASSIUM SERPL-MCNC: 4.5 MMOL/L — SIGNIFICANT CHANGE UP (ref 3.5–5.3)
POTASSIUM SERPL-SCNC: 4.5 MMOL/L — SIGNIFICANT CHANGE UP (ref 3.5–5.3)
SODIUM SERPL-SCNC: 138 MMOL/L — SIGNIFICANT CHANGE UP (ref 135–145)
TSH SERPL-MCNC: 3.04 UIU/ML — SIGNIFICANT CHANGE UP (ref 0.27–4.2)

## 2022-04-14 PROCEDURE — 12345: CPT | Mod: NC,GC

## 2022-04-14 PROCEDURE — 93010 ELECTROCARDIOGRAM REPORT: CPT

## 2022-04-14 RX ORDER — PANTOPRAZOLE SODIUM 20 MG/1
40 TABLET, DELAYED RELEASE ORAL DAILY
Refills: 0 | Status: DISCONTINUED | OUTPATIENT
Start: 2022-04-14 | End: 2022-04-15

## 2022-04-14 RX ORDER — SODIUM CHLORIDE 9 MG/ML
1000 INJECTION, SOLUTION INTRAVENOUS
Refills: 0 | Status: DISCONTINUED | OUTPATIENT
Start: 2022-04-14 | End: 2022-04-14

## 2022-04-14 RX ORDER — CEFTRIAXONE 500 MG/1
1000 INJECTION, POWDER, FOR SOLUTION INTRAMUSCULAR; INTRAVENOUS EVERY 24 HOURS
Refills: 0 | Status: DISCONTINUED | OUTPATIENT
Start: 2022-04-14 | End: 2022-04-15

## 2022-04-14 RX ORDER — BRIMONIDINE TARTRATE 2 MG/MG
1 SOLUTION/ DROPS OPHTHALMIC
Refills: 0 | Status: DISCONTINUED | OUTPATIENT
Start: 2022-04-14 | End: 2022-04-15

## 2022-04-14 RX ORDER — SODIUM CHLORIDE 9 MG/ML
1000 INJECTION, SOLUTION INTRAVENOUS
Refills: 0 | Status: DISCONTINUED | OUTPATIENT
Start: 2022-04-14 | End: 2022-04-15

## 2022-04-14 RX ADMIN — SODIUM CHLORIDE 60 MILLILITER(S): 9 INJECTION, SOLUTION INTRAVENOUS at 06:15

## 2022-04-14 RX ADMIN — PANTOPRAZOLE SODIUM 40 MILLIGRAM(S): 20 TABLET, DELAYED RELEASE ORAL at 12:17

## 2022-04-14 RX ADMIN — BRIMONIDINE TARTRATE 1 DROP(S): 2 SOLUTION/ DROPS OPHTHALMIC at 23:01

## 2022-04-14 RX ADMIN — SODIUM CHLORIDE 50 MILLILITER(S): 9 INJECTION, SOLUTION INTRAVENOUS at 18:27

## 2022-04-14 RX ADMIN — CEFTRIAXONE 100 MILLIGRAM(S): 500 INJECTION, POWDER, FOR SOLUTION INTRAMUSCULAR; INTRAVENOUS at 23:01

## 2022-04-14 NOTE — H&P ADULT - PROBLEM SELECTOR PLAN 6
on xarelto on xarelto which will be on hold now given in anticipation for G-Tube replacement Bedbound  PEG tube dependent due to aspiration  Aspiration precautions, 45 deg HOB elev  with feeds  Bedrest Converted Protonix po to IV daily

## 2022-04-14 NOTE — H&P ADULT - NS ATTEND AMEND GEN_ALL_CORE FT
89 y/o Female, bedbound, with hx of Afib (on Xarelto), COPD, GERD, CVA, glaucoma, s/p Left hip arthroplasty, advanced dementia, referred by NH for malfunctioning G-tube a/w malfunctioning G-tube in PEG-tube dependent patient, and likely UTI; Found to have stool impaction in the rectum; 89 y/o Female, bedbound, with hx of Afib (on Xarelto), COPD, GERD, CVA, glaucoma, s/p Left hip arthroplasty, advanced dementia, referred by NH for malfunctioning G-tube a/w malfunctioning G-tube in PEG-tube dependent patient, and likely UTI; Found to have stool impaction in the rectum;    Assessment and plan supplemented and modified by attending where indicated;

## 2022-04-14 NOTE — PATIENT PROFILE ADULT - FALL HARM RISK - HARM RISK INTERVENTIONS
Assistance with ambulation/Assistance OOB with selected safe patient handling equipment/Communicate Risk of Fall with Harm to all staff/Reinforce activity limits and safety measures with patient and family/Tailored Fall Risk Interventions/Use of alarms - bed, chair and/or voice tab/Visual Cue: Yellow wristband and red socks/Bed in lowest position, wheels locked, appropriate side rails in place/Call bell, personal items and telephone in reach/Instruct patient to call for assistance before getting out of bed or chair/Non-slip footwear when patient is out of bed/[place] to call system/Physically safe environment - no spills, clutter or unnecessary equipment/Purposeful Proactive Rounding/Room/bathroom lighting operational, light cord in reach

## 2022-04-14 NOTE — PROGRESS NOTE ADULT - ATTENDING COMMENTS
89F with PMH of AFib on Xarelto, COPD, GERD, CVA, glaucoma, advanced dementia here w/ malfunctioning G-tube. Initial labs notable for positive UA. CT A/P showed appropriately placed G-tube. Admit for PEG exchange.    Pt seen at bedside. Comfortable, nonverbal. Exam showed PEG in place w/ no peripheral erythema/drainage. Labs concerning for poss UTI.    #PEG malfunction  #Acute UTI  #Functional quadraplegia  #Chronic AFib on Xarelto  #COPD  #GERD  #CVA  #Advance dementia    -GI c/s for exchange.  -IVF for now.  -Abx for UTI.    Rest of plan as above. 89F with PMH of AFib on Xarelto, COPD, GERD, CVA, glaucoma, advanced dementia here w/ malfunctioning G-tube. Initial labs notable for positive UA. CT A/P showed appropriately placed G-tube. Admit for PEG exchange.    Pt seen at bedside. Comfortable, nonverbal. Exam showed PEG in place w/ no peripheral erythema/drainage. Labs concerning for poss UTI.    #PEG malfunction  #Acute UTI  #Functional quadraplegia  #Chronic AFib on Xarelto  #COPD  #GERD  #CVA  #Advance dementia    -GI c/s for exchange.  -IVF for now.  -Abx for UTI.  -Stool impaction - reportedly had BM earlier - would give enema amt of stool seen on CT.    Rest of plan as above.

## 2022-04-14 NOTE — H&P ADULT - PROBLEM SELECTOR PLAN 5
cont protonix Converted Protonix po to IV daily Bedbound  PEG tube dependent due to aspiration  Aspiration precautions, 45 deg HOB elev  with feeds  Bedrest

## 2022-04-14 NOTE — H&P ADULT - NSHPPHYSICALEXAM_GEN_ALL_CORE
Vital Signs Last 24 Hrs  T(C): 36.4 (14 Apr 2022 04:45), Max: 36.7 (13 Apr 2022 13:45)  T(F): 97.6 (14 Apr 2022 04:45), Max: 98.1 (13 Apr 2022 20:56)  HR: 89 (14 Apr 2022 04:45) (68 - 89)  BP: 140/63 (14 Apr 2022 04:45) (118/65 - 144/60)  BP(mean): --  RR: 18 (14 Apr 2022 04:45) (17 - 18)  SpO2: 96% (14 Apr 2022 04:45) (95% - 100%)

## 2022-04-14 NOTE — PROGRESS NOTE ADULT - PROBLEM SELECTOR PLAN 4
Paroxysmal Afib; [id number] score of 5  Screening EKG: NSR  on Xarelto - on hold given g-tube malfunction

## 2022-04-14 NOTE — PROGRESS NOTE ADULT - ASSESSMENT
89 y/o Female, bedbound, with hx of Afib (on Xarelto), COPD, GERD, CVA, glaucoma, s/p Left hip arthroplasty, advanced dementia, referred by NH for malfunctioning G-tube a/w malfunctioning G-tube in PEG-tube dependent patient, and likely UTI; Found to have stool impaction in the rectum;  89 y/o Female, bedbound, with hx of Afib (on Xarelto), COPD, GERD, CVA, glaucoma, s/p Left hip arthroplasty, advanced dementia, referred by NH for malfunctioning G-tube a/w malfunctioning G-tube in PEG-tube dependent patient, and likely UTI; Found to have stool impaction in the rectum;

## 2022-04-14 NOTE — H&P ADULT - PROBLEM SELECTOR PLAN 4
on xarelto  [id number] score of 5 on xarelto  [id number] score of 5  Will hold now given g-tube malfunction on Xarelto  [id number] score of 5  On hold given g-tube malfunction Paroxysmal Afib; [id number] score of 5  Screening EKG: NSR  on Xarelto - on hold given g-tube malfunction

## 2022-04-14 NOTE — PROGRESS NOTE ADULT - SUBJECTIVE AND OBJECTIVE BOX
Maggi Pelayo MD  PGY 2 Department of Internal Medicine  Pager: [phone] ([facility]) /[phone] ([facility])       Patient is a 89y old  Female who presents with a chief complaint of Referred for G-tube replacement (2022 04:00)      SUBJECTIVE / OVERNIGHT EVENTS: Pt seen and examined. No acute overnight events. Non-verbal         MEDICATIONS  (STANDING):  brimonidine 0.2% Ophthalmic Solution 1 Drop(s) Both EYES two times a day  cefTRIAXone   IVPB 1000 milliGRAM(s) IV Intermittent every 24 hours  dextrose 5% + sodium chloride 0.45%. 1000 milliLiter(s) (60 mL/Hr) IV Continuous <Continuous>  pantoprazole  Injectable 40 milliGRAM(s) IV Push daily    MEDICATIONS  (PRN):      I&O's Summary      Vital Signs Last 24 Hrs  T(C): 36.4 (2022 04:45), Max: 36.7 (2022 13:45)  T(F): 97.6 (2022 04:45), Max: 98.1 (2022 20:56)  HR: 89 (2022 04:45) (68 - 89)  BP: 140/63 (2022 04:45) (118/65 - 144/60)  BP(mean): --  RR: 18 (2022 04:45) (17 - 18)  SpO2: 96% (2022 04:45) (95% - 100%)    CAPILLARY BLOOD GLUCOSE          PHYSICAL EXAM:         LABS:                        12.6   8.29  )-----------( 170      ( 2022 15:12 )             38.1     Auto Eosinophil # 0.09  / Auto Eosinophil % 1.1   / Auto Neutrophil # 6.13  / Auto Neutrophil % 74.0  / BANDS % x        04-13    145  |  104  |  31<H>  ----------------------------<  124<H>  5.3   |  27  |  0.83    Ca    9.5      2022 15:12  TPro  7.4  /  Alb  3.7  /  TBili  1.5<H>  /  DBili  x   /  AST  21  /  ALT  11  /  AlkPhos  101  04-13    PT/INR - ( 2022 15:27 )   PT: 13.7 sec;   INR: 1.18 ratio         PTT - ( 2022 15:27 )  PTT:24.4 sec      Urinalysis Basic - ( 2022 17:22 )    Color: Yellow / Appearance: Slightly Turbid / S.019 / pH: x  Gluc: x / Ketone: Negative  / Bili: Negative / Urobili: 3 mg/dL   Blood: x / Protein: 30 mg/dL / Nitrite: Negative   Leuk Esterase: Large / RBC: 3 /HPF / WBC >10 /HPF   Sq Epi: x / Non Sq Epi: 2 /HPF / Bacteria: Many        RADIOLOGY & ADDITIONAL TESTS:    ACC: [id number] EXAM:  CT ABDOMEN AND PELVIS IC                          PROCEDURE DATE:  2022          INTERPRETATION:  CLINICAL INFORMATION: Abdominal pain around PEG site    COMPARISON: None.    CONTRAST/COMPLICATIONS:  IV Contrast: Omnipaque 350  90 cc administered   10 cc discarded  Oral Contrast: NONE  Complications: None reported at time of study completion    PROCEDURE:  CT of the Abdomen and Pelvis was performed.  Sagittal and coronal reformats were performed.    FINDINGS:  LOWER CHEST: Within normal limits.    LIVER: Within normal limits.  BILE DUCTS: Normal caliber.  GALLBLADDER: Cholecystectomy.  SPLEEN: A few hypervascular splenic lesions measuring up to 7 mm..  PANCREAS: Within normal limits.  ADRENALS: Within normal limits.  KIDNEYS/URETERS: Within normal limits.    BLADDER: Minimally distended.  REPRODUCTIVE ORGANS: Fibroid uterus.    BOWEL: PEG tube noted. No bowel obstruction. There is a large amount of   stool in the rectum.  PERITONEUM: No ascites.  VESSELS: Atherosclerotic changes.  RETROPERITONEUM/LYMPH NODES: No lymphadenopathy.  ABDOMINAL WALL: Fat-containing left inguinal hernia.  BONES: Degenerative changes. Left hip arthroplasty.    IMPRESSION:  PEG tube in appropriate position with no fluid collection or other   abnormality along the tubing.    Markedly distended and full stool-filled rectum    --- End of Report ---   Maggi Pelayo MD  PGY 2 Department of Internal Medicine  Pager: [phone] ([facility]) /[phone] ([facility])       Patient is a 89y old  Female who presents with a chief complaint of Referred for G-tube replacement (2022 04:00)      SUBJECTIVE / OVERNIGHT EVENTS: Pt seen and examined. No acute overnight events. Non-verbal         MEDICATIONS  (STANDING):  brimonidine 0.2% Ophthalmic Solution 1 Drop(s) Both EYES two times a day  cefTRIAXone   IVPB 1000 milliGRAM(s) IV Intermittent every 24 hours  dextrose 5% + sodium chloride 0.45%. 1000 milliLiter(s) (60 mL/Hr) IV Continuous <Continuous>  pantoprazole  Injectable 40 milliGRAM(s) IV Push daily    MEDICATIONS  (PRN):      I&O's Summary      Vital Signs Last 24 Hrs  T(C): 36.4 (2022 04:45), Max: 36.7 (2022 13:45)  T(F): 97.6 (2022 04:45), Max: 98.1 (2022 20:56)  HR: 89 (2022 04:45) (68 - 89)  BP: 140/63 (2022 04:45) (118/65 - 144/60)  BP(mean): --  RR: 18 (2022 04:45) (17 - 18)  SpO2: 96% (2022 04:45) (95% - 100%)    CAPILLARY BLOOD GLUCOSE          PHYSICAL EXAM:  GENERAL: NAD, elderly eyes closed, non-verbal   HEAD:  Atraumatic, Normocephalic  EYES: conjunctiva and sclera clear  CHEST/LUNG: Clear to auscultation bilaterally; No wheeze  HEART: Regular rate and rhythm; No murmurs, rubs, or gallops  ABDOMEN: PEG tube present, non-tender, non-distended, no erythema or pustulence around PEG tube site   EXTREMITIES:  2+ Peripheral Pulses, no edema   PSYCH: Non-verbal        LABS:                        12.6   8.29  )-----------( 170      ( 2022 15:12 )             38.1     Auto Eosinophil # 0.09  / Auto Eosinophil % 1.1   / Auto Neutrophil # 6.13  / Auto Neutrophil % 74.0  / BANDS % x        04-13    145  |  104  |  31<H>  ----------------------------<  124<H>  5.3   |  27  |  0.83    Ca    9.5      2022 15:12  TPro  7.4  /  Alb  3.7  /  TBili  1.5<H>  /  DBili  x   /  AST  21  /  ALT  11  /  AlkPhos  101  04-13    PT/INR - ( 2022 15:27 )   PT: 13.7 sec;   INR: 1.18 ratio         PTT - ( 2022 15:27 )  PTT:24.4 sec      Urinalysis Basic - ( 2022 17:22 )    Color: Yellow / Appearance: Slightly Turbid / S.019 / pH: x  Gluc: x / Ketone: Negative  / Bili: Negative / Urobili: 3 mg/dL   Blood: x / Protein: 30 mg/dL / Nitrite: Negative   Leuk Esterase: Large / RBC: 3 /HPF / WBC >10 /HPF   Sq Epi: x / Non Sq Epi: 2 /HPF / Bacteria: Many        RADIOLOGY & ADDITIONAL TESTS:    ACC: [id number] EXAM:  CT ABDOMEN AND PELVIS IC                          PROCEDURE DATE:  2022          INTERPRETATION:  CLINICAL INFORMATION: Abdominal pain around PEG site    COMPARISON: None.    CONTRAST/COMPLICATIONS:  IV Contrast: Omnipaque 350  90 cc administered   10 cc discarded  Oral Contrast: NONE  Complications: None reported at time of study completion    PROCEDURE:  CT of the Abdomen and Pelvis was performed.  Sagittal and coronal reformats were performed.    FINDINGS:  LOWER CHEST: Within normal limits.    LIVER: Within normal limits.  BILE DUCTS: Normal caliber.  GALLBLADDER: Cholecystectomy.  SPLEEN: A few hypervascular splenic lesions measuring up to 7 mm..  PANCREAS: Within normal limits.  ADRENALS: Within normal limits.  KIDNEYS/URETERS: Within normal limits.    BLADDER: Minimally distended.  REPRODUCTIVE ORGANS: Fibroid uterus.    BOWEL: PEG tube noted. No bowel obstruction. There is a large amount of   stool in the rectum.  PERITONEUM: No ascites.  VESSELS: Atherosclerotic changes.  RETROPERITONEUM/LYMPH NODES: No lymphadenopathy.  ABDOMINAL WALL: Fat-containing left inguinal hernia.  BONES: Degenerative changes. Left hip arthroplasty.    IMPRESSION:  PEG tube in appropriate position with no fluid collection or other   abnormality along the tubing.    Markedly distended and full stool-filled rectum    --- End of Report ---

## 2022-04-14 NOTE — H&P ADULT - PROBLEM SELECTOR PLAN 2
CT abdomen shows:  PEG tube in appropriate position with no fluid collection or other   abnormality along the tubing.  Will obtain IR consult to make sure the peg is functioning  Will hold CT abdomen shows:  PEG tube in appropriate position with no fluid collection or other   abnormality along the tubing.  Will obtain GI consult to make sure the tube is functioning properly  Will hold all meds until GI eval  IVF when feeds are on hold CT abdomen shows:  PEG tube in appropriate position with no fluid collection or other   abnormality along the tubing. G-tube lumen collapsed on exam;  Will obtain GI consult to replace G-tube  Will hold all medications pending G-tube replacement   IVF hydration, D5+1/2NS while G-tube cannot be accessed CT A/P: PEG tube in appropriate position with no fluid collection or other abnormality along the tubing.  G-tube lumen collapsed on exam;  Will obtain GI consult to replace G-tube  Will hold all medications pending G-tube replacement   IVF hydration, D5+1/2NS while G-tube cannot be accessed

## 2022-04-14 NOTE — H&P ADULT - NSHPSOCIALHISTORY_GEN_ALL_CORE
No current use of smoking, alcohol or drug use    lIves at [facility] No h/o smoking, alcohol or drug use  Resident of [facility], [address]  Requires full assistance with ADL  Bedbound No h/o smoking, alcohol or drug use  Resident of [facility], [address]  Requires full assistance with ADL  Bedbound    Retired teacher

## 2022-04-14 NOTE — H&P ADULT - PROBLEM SELECTOR PROBLEM 4
PHYSICIAN NEXT STEPS:  Review Only    CHIEF COMPLAINT:  Chief Complaint/Protocol Used: Patient Declines Triage  Onset: Today      ASSESSMENT:  ? Onset: Today  ? Normal True  ? Normal, no trouble breathing True  -------------------------------------------------------    DISPOSITION:  Disposition Recommendation: Unspecified  Patient Directed To: Unspecified  Patient Intended Action: Other      CALL NOTES:  03/16/2020 at 12:37 PM by Michelle Garcia  ? Declined triage;  had general questions regarding COVID-19, such as if he can drink water in forest in [place], [place] or if some little kids may have touched the water fountain.  And had many other questions.    DISPOSITION OVERRIDE/PROVIDER CONSULT:  Disposition Override: N/A  Override Source: Unspecified  Consulted with PCP: No  Consulted with On-Call Physician: No    CALLER CONTACT INFO:  Name: Aleksey Yanes (Self)  Phone 1: [phone] (Home Phone) - Preferred      ENCOUNTER STARTED:  03/16/20 12:19:21 PM  ENCOUNTER ASSIGNED TO/CLOSED BY:  Michelle Garcia @ 03/16/20 12:38:00 PM      -------------------------------------------------------    UNDERSTANDS CARE ADVICE: No    AGREES WITH CARE ADVICE: No    WILL FOLLOW CARE ADVICE: No    -------------------------------------------------------  
Atrial fibrillation

## 2022-04-14 NOTE — H&P ADULT - HISTORY OF PRESENT ILLNESS
89 y/o F with hx of afib on xarelto, COPD, GERD, dementia presents for G-tube replacement. Patient is A&Ox0 and non verbtal. Patient cannot provide any history. History obtained from nursing home documents. Per charts: Patient was sent in for non functioning G tube. Patient receives Jevity 1.2 1000mL per day. She receives 95mL/HR. ROS limited 89 y/o Female, bedbound, with hx of Afib (on Xarelto), COPD, GERD, CVA, glaucoma, s/p Left hip arthroplasty, advanced dementia, referred by NH for malfunctioning G-tube and possible replacement. Patient is A&Ox0 and non verbal Patient cannot provide any history. History obtained from nursing home documents. Per charts: Patient was sent in for observed non-functioning G tube x 1 day. Patient receives Jevity 1.2 1000mL per day. She receives 95mL/HR. Spoke with NH staff member, Min, [phone] at 6:00am, she reports no associated fevers, diarrhea or vomiting, and that the pt did not signal or manifest distress. Min stated that the G-tube has a collapsed lumen which does not allow for fluid flow and believes that it requires to be replaced.       9S course: moderate size nonbloody semi-formed BM per RN;  89 y/o Female, bedbound, with hx of Afib (on Xarelto), COPD, GERD, CVA, glaucoma, s/p Left hip arthroplasty, advanced dementia, referred by NH for malfunctioning G-tube and possible replacement. Patient is A&Ox0 and non verbal Patient cannot provide any history. History obtained from nursing home documents. Per charts: Patient was sent in for observed non-functioning G tube x 1 day. Patient receives Jevity 1.2 1000mL per day. She receives 95mL/HR. Spoke with NH staff member, Min, [phone] at 6:00am, she reports no associated fevers, diarrhea or vomiting, stated that the pt did not signal or manifest any distress. Min stated that the G-tube has a collapsed lumen which does not allow for fluid flow and believes that it requires to be replaced.       9S course: moderate size nonbloody semi-formed BM per RN;

## 2022-04-14 NOTE — H&P ADULT - GASTROINTESTINAL COMMENTS
G-Tube is place. no erythema noted. G-Tube is place, no erythema noted, limited exam due to dementia, no grimacing on exam

## 2022-04-14 NOTE — H&P ADULT - ASSESSMENT
89 y/o F with hx of afib on xarelto, COPD, GERD, dementia presents for G-tube replacement 89 y/o Female, bedbound, with hx of Afib (on Xarelto), COPD, GERD, CVA, glaucoma, s/p Left hip arthroplasty, advanced dementia, referred by NH for malfunctioning G-tube a/w malfunctioning G-tube in PEG-tube dependent patient, and likely UTI; Found to have rectal stool impaction and likely constipation;  89 y/o Female, bedbound, with hx of Afib (on Xarelto), COPD, GERD, CVA, glaucoma, s/p Left hip arthroplasty, advanced dementia, referred by NH for malfunctioning G-tube a/w malfunctioning G-tube in PEG-tube dependent patient, and likely UTI; Found to have stool impaction in the rectum;

## 2022-04-14 NOTE — H&P ADULT - NSHPLABSRESULTS_GEN_ALL_CORE
EKG: afib with RVR TWI in AVR. QTC: 486                           12.6   8.29  )-----------( 170      ( 13 Apr 2022 15:12 )             38.1     04-13    145  |  104  |  31<H>  ----------------------------<  124<H>  5.3   |  27  |  0.83    Ca    9.5      13 Apr 2022 15:12    TPro  7.4  /  Alb  3.7  /  TBili  1.5<H>  /  DBili  x   /  AST  21  /  ALT  11  /  AlkPhos  101  04-13      < from: CT Abdomen and Pelvis w/ IV Cont (04.13.22 @ 19:21) >    IMPRESSION:  PEG tube in appropriate position with no fluid collectionor other   abnormality along the tubing.    Markedly distended and full stool-filled rectum --personally interpreted EKG:       --personally reviewed labs:             12.6   8.29  )-----------( 170      ( 13 Apr 2022 15:12 )             38.1     04-13    145  |  104  |  31<H>  ----------------------------<  124<H>  5.3   |  27  |  0.83    Ca    9.5      13 Apr 2022 15:12    TPro  7.4  /  Alb  3.7  /  TBili  1.5<H>  /  DBili  x   /  AST  21  /  ALT  11  /  AlkPhos  101  04-13      --personally reviewed CT Abdomen and Pelvis   PROCEDURE DATE: 04/13/2022    FINDINGS:  LOWER CHEST: Within normal limits.  LIVER: Within normal limits.  BILE DUCTS: Normal caliber.  GALLBLADDER: Cholecystectomy.  SPLEEN: A few hypervascular splenic lesions measuring up to 7 mm..  PANCREAS: Within normal limits.  ADRENALS: Within normal limits.  KIDNEYS/URETERS: Within normal limits.  BLADDER: Minimally distended.  REPRODUCTIVE ORGANS: Fibroid uterus.  BOWEL: PEG tube noted. No bowel obstruction. There is a large amount of stool in the rectum.  PERITONEUM: No ascites.  VESSELS: Atherosclerotic changes.  RETROPERITONEUM/LYMPH NODES: No lymphadenopathy.  ABDOMINAL WALL: Fat-containing left inguinal hernia.  BONES: Degenerative changes. Left hip arthroplasty.  IMPRESSION:  PEG tube in appropriate position with no fluid collection or other abnormality along the tubing.  Markedly distended and full stool-filled rectum --personally interpreted EKG, 4/14/22, nsr 84bpm, qtc 453, no acute Tw or ST changes       --personally reviewed labs:             12.6   8.29  )-----------( 170      ( 13 Apr 2022 15:12 )             38.1     04-13    145  |  104  |  31<H>  ----------------------------<  124<H>  5.3   |  27  |  0.83    Ca    9.5      13 Apr 2022 15:12    TPro  7.4  /  Alb  3.7  /  TBili  1.5<H>  /  DBili  x   /  AST  21  /  ALT  11  /  AlkPhos  101  04-13      --personally reviewed CT Abdomen and Pelvis   PROCEDURE DATE: 04/13/2022    FINDINGS:  LOWER CHEST: Within normal limits.  LIVER: Within normal limits.  BILE DUCTS: Normal caliber.  GALLBLADDER: Cholecystectomy.  SPLEEN: A few hypervascular splenic lesions measuring up to 7 mm..  PANCREAS: Within normal limits.  ADRENALS: Within normal limits.  KIDNEYS/URETERS: Within normal limits.  BLADDER: Minimally distended.  REPRODUCTIVE ORGANS: Fibroid uterus.  BOWEL: PEG tube noted. No bowel obstruction. There is a large amount of stool in the rectum.  PERITONEUM: No ascites.  VESSELS: Atherosclerotic changes.  RETROPERITONEUM/LYMPH NODES: No lymphadenopathy.  ABDOMINAL WALL: Fat-containing left inguinal hernia.  BONES: Degenerative changes. Left hip arthroplasty.  IMPRESSION:  PEG tube in appropriate position with no fluid collection or other abnormality along the tubing.  Markedly distended and full stool-filled rectum

## 2022-04-14 NOTE — ED ADULT NURSE REASSESSMENT NOTE - NS ED NURSE REASSESS COMMENT FT1
report given to essu 1. pt in no apparent distress, mumbling to herself. respiration even and non-labored.

## 2022-04-14 NOTE — H&P ADULT - MENTAL STATUS
A&Ox0 --> patient is awake, but non verbal alert, closed eyes, not oriented, does not follow commands

## 2022-04-14 NOTE — H&P ADULT - PROBLEM SELECTOR PLAN 1
UA shows: large leuk, neg nitrite, many bacteria  Follow UC  s/p IV ceftriaxone in ED --> cont IV ceftriaxone UA (+) large leuk, many bacteria c/w likely UTI; Given debility and advanced dementia will treat at acute cystitis empirically; No evidence of sepsis;   Follow UCx  -ordered Ceftriaxone IV

## 2022-04-14 NOTE — H&P ADULT - PROBLEM SELECTOR PLAN 3
CT abdomen showed:  Markedly distended and full stool-filled rectum  As per charts: last BM on 4/13/22 CT A/P: Markedly distended and full stool-filled rectum  As per charts: last BM on 4/13/22  9S course: moderate size nonbloody semi-formed BM per RN;   -no need for disimpaction at this time  -restart bowel regimen once new PEG tube in place Acute; CT A/P: Markedly distended and full stool-filled rectum  As per charts: last BM on 4/13/22  9S course: moderate size nonbloody semi-formed BM per RN;   -no need for disimpaction at this time  -restart bowel regimen once new PEG tube in place

## 2022-04-15 ENCOUNTER — TRANSCRIPTION ENCOUNTER (OUTPATIENT)
Age: 87
End: 2022-04-15

## 2022-04-15 VITALS
DIASTOLIC BLOOD PRESSURE: 58 MMHG | HEART RATE: 81 BPM | SYSTOLIC BLOOD PRESSURE: 100 MMHG | RESPIRATION RATE: 17 BRPM | TEMPERATURE: 98 F | OXYGEN SATURATION: 100 %

## 2022-04-15 LAB
ANION GAP SERPL CALC-SCNC: 10 MMOL/L — SIGNIFICANT CHANGE UP (ref 7–14)
BUN SERPL-MCNC: 19 MG/DL — SIGNIFICANT CHANGE UP (ref 7–23)
CALCIUM SERPL-MCNC: 8.7 MG/DL — SIGNIFICANT CHANGE UP (ref 8.4–10.5)
CHLORIDE SERPL-SCNC: 109 MMOL/L — HIGH (ref 98–107)
CO2 SERPL-SCNC: 23 MMOL/L — SIGNIFICANT CHANGE UP (ref 22–31)
CREAT SERPL-MCNC: 0.81 MG/DL — SIGNIFICANT CHANGE UP (ref 0.5–1.3)
EGFR: 66 ML/MIN/1.73M2 — SIGNIFICANT CHANGE UP
GLUCOSE BLDC GLUCOMTR-MCNC: 88 MG/DL — SIGNIFICANT CHANGE UP (ref 70–99)
GLUCOSE BLDC GLUCOMTR-MCNC: 98 MG/DL — SIGNIFICANT CHANGE UP (ref 70–99)
GLUCOSE SERPL-MCNC: 104 MG/DL — HIGH (ref 70–99)
HCT VFR BLD CALC: 33.7 % — LOW (ref 34.5–45)
HGB BLD-MCNC: 11 G/DL — LOW (ref 11.5–15.5)
MAGNESIUM SERPL-MCNC: 2.3 MG/DL — SIGNIFICANT CHANGE UP (ref 1.6–2.6)
MCHC RBC-ENTMCNC: 32.6 GM/DL — SIGNIFICANT CHANGE UP (ref 32–36)
MCHC RBC-ENTMCNC: 32.9 PG — SIGNIFICANT CHANGE UP (ref 27–34)
MCV RBC AUTO: 100.9 FL — HIGH (ref 80–100)
NRBC # BLD: 0 /100 WBCS — SIGNIFICANT CHANGE UP
NRBC # FLD: 0 K/UL — SIGNIFICANT CHANGE UP
PHOSPHATE SERPL-MCNC: 3.6 MG/DL — SIGNIFICANT CHANGE UP (ref 2.5–4.5)
PHOSPHATE SERPL-MCNC: 3.7 MG/DL — SIGNIFICANT CHANGE UP (ref 2.5–4.5)
PLATELET # BLD AUTO: 142 K/UL — LOW (ref 150–400)
POTASSIUM SERPL-MCNC: 4.6 MMOL/L — SIGNIFICANT CHANGE UP (ref 3.5–5.3)
POTASSIUM SERPL-SCNC: 4.6 MMOL/L — SIGNIFICANT CHANGE UP (ref 3.5–5.3)
RBC # BLD: 3.34 M/UL — LOW (ref 3.8–5.2)
RBC # FLD: 14.7 % — HIGH (ref 10.3–14.5)
SODIUM SERPL-SCNC: 142 MMOL/L — SIGNIFICANT CHANGE UP (ref 135–145)
WBC # BLD: 6.65 K/UL — SIGNIFICANT CHANGE UP (ref 3.8–10.5)
WBC # FLD AUTO: 6.65 K/UL — SIGNIFICANT CHANGE UP (ref 3.8–10.5)

## 2022-04-15 PROCEDURE — 74018 RADEX ABDOMEN 1 VIEW: CPT | Mod: 26

## 2022-04-15 PROCEDURE — 99239 HOSP IP/OBS DSCHRG MGMT >30: CPT

## 2022-04-15 RX ORDER — POLYETHYLENE GLYCOL 3350 17 G/17G
17 POWDER, FOR SOLUTION ORAL
Refills: 0 | Status: DISCONTINUED | OUTPATIENT
Start: 2022-04-15 | End: 2022-04-15

## 2022-04-15 RX ORDER — RIVAROXABAN 15 MG-20MG
10 KIT ORAL ONCE
Refills: 0 | Status: DISCONTINUED | OUTPATIENT
Start: 2022-04-15 | End: 2022-04-15

## 2022-04-15 RX ORDER — RIVAROXABAN 15 MG-20MG
20 KIT ORAL DAILY
Refills: 0 | Status: DISCONTINUED | OUTPATIENT
Start: 2022-04-15 | End: 2022-04-15

## 2022-04-15 RX ORDER — SENNA PLUS 8.6 MG/1
2 TABLET ORAL AT BEDTIME
Refills: 0 | Status: DISCONTINUED | OUTPATIENT
Start: 2022-04-15 | End: 2022-04-15

## 2022-04-15 RX ADMIN — BRIMONIDINE TARTRATE 1 DROP(S): 2 SOLUTION/ DROPS OPHTHALMIC at 09:58

## 2022-04-15 RX ADMIN — PANTOPRAZOLE SODIUM 40 MILLIGRAM(S): 20 TABLET, DELAYED RELEASE ORAL at 13:56

## 2022-04-15 NOTE — DISCHARGE NOTE NURSING/CASE MANAGEMENT/SOCIAL WORK - PATIENT PORTAL LINK FT
You can access the FollowMyHealth Patient Portal offered by [facility] by registering at the following website: [email or web address]. By joining [employer]’s FollowMyHealth portal, you will also be able to view your health information using other applications (apps) compatible with our system.

## 2022-04-15 NOTE — PROGRESS NOTE ADULT - PROBLEM SELECTOR PLAN 5
Bedbound  PEG tube dependent due to aspiration  Aspiration precautions, 45 deg HOB elev  with feeds  Bedrest
Bedbound  PEG tube dependent due to aspiration  Aspiration precautions, 45 deg HOB elev  with feeds  Bedrest

## 2022-04-15 NOTE — PROGRESS NOTE ADULT - SUBJECTIVE AND OBJECTIVE BOX
Maggi Pelayo MD  PGY 2 Department of Internal Medicine  Pager: [phone] ([facility]) /[phone] ([facility])       Patient is a 89y old  Female who presents with a chief complaint of Referred for G-tube replacement (2022 07:56)      SUBJECTIVE / OVERNIGHT EVENTS: Pt seen and examined. No acute overnight events. Non-verbal     MEDICATIONS  (STANDING):  brimonidine 0.2% Ophthalmic Solution 1 Drop(s) Both EYES two times a day  cefTRIAXone   IVPB 1000 milliGRAM(s) IV Intermittent every 24 hours  dextrose 5% + lactated ringers. 1000 milliLiter(s) (50 mL/Hr) IV Continuous <Continuous>  pantoprazole  Injectable 40 milliGRAM(s) IV Push daily    MEDICATIONS  (PRN):      I&O's Summary    2022 07:01  -  15 Apr 2022 07:00  --------------------------------------------------------  IN: 0 mL / OUT: 2 mL / NET: -2 mL        Vital Signs Last 24 Hrs  T(C): 36.3 (15 Apr 2022 05:35), Max: 36.6 (2022 14:34)  T(F): 97.3 (15 Apr 2022 05:35), Max: 97.8 (2022 14:34)  HR: 76 (15 Apr 2022 05:35) (67 - 76)  BP: 128/51 (15 Apr 2022 05:35) (124/59 - 135/50)  BP(mean): --  RR: 18 (15 Apr 2022 05:35) (17 - 18)  SpO2: 97% (15 Apr 2022 05:35) (96% - 99%)    CAPILLARY BLOOD GLUCOSE      POCT Blood Glucose.: 88 mg/dL (15 Apr 2022 02:06)  POCT Blood Glucose.: 106 mg/dL (2022 18:26)      PHYSICAL EXAM:  GENERAL: NAD, elderly eyes closed, non-verbal   HEAD:  Atraumatic, Normocephalic  EYES: conjunctiva and sclera clear  CHEST/LUNG: Clear to auscultation bilaterally; No wheeze  HEART: Regular rate and rhythm; No murmurs, rubs, or gallops  ABDOMEN: PEG tube present, non-tender, non-distended, no erythema or pustulence around PEG tube site   EXTREMITIES:  2+ Peripheral Pulses, no edema   PSYCH: Non-verbal          LABS:                        12.6   8.29  )-----------( 170      ( 2022 15:12 )             38.1     Auto Eosinophil # 0.09  / Auto Eosinophil % 1.1   / Auto Neutrophil # 6.13  / Auto Neutrophil % 74.0  / BANDS % x        04-14    138  |  101  |  27<H>  ----------------------------<  96  4.5   |  22  |  0.79  -13    145  |  104  |  31<H>  ----------------------------<  124<H>  5.3   |  27  |  0.83    Ca    8.9      2022 07:35  Mg     2.40     -  Phos  3.6     -14  TPro  7.4  /  Alb  3.7  /  TBili  1.5<H>  /  DBili  x   /  AST  21  /  ALT  11  /  AlkPhos  101  04-13    PT/INR - ( 2022 15:27 )   PT: 13.7 sec;   INR: 1.18 ratio         PTT - ( 2022 15:27 )  PTT:24.4 sec      Urinalysis Basic - ( 2022 17:22 )    Color: Yellow / Appearance: Slightly Turbid / S.019 / pH: x  Gluc: x / Ketone: Negative  / Bili: Negative / Urobili: 3 mg/dL   Blood: x / Protein: 30 mg/dL / Nitrite: Negative   Leuk Esterase: Large / RBC: 3 /HPF / WBC >10 /HPF   Sq Epi: x / Non Sq Epi: 2 /HPF / Bacteria: Many

## 2022-04-15 NOTE — CONSULT NOTE ADULT - ASSESSMENT
peg dsyfunction    plan  peg removed at bedside and replaced with a 22french peg tube  gastric contents came out of the tube  ppi once a day dressing changes  gerd precautions  check cbc    Advanced care planning was discussed with patient and family.  Advanced care planning forms were reviewed and discussed.  Risks, benefits and alternatives of gastroenterologic procedures were discussed in detail and all questions were answered.    30 minutes spent.

## 2022-04-15 NOTE — DIETITIAN INITIAL EVALUATION ADULT - ORAL INTAKE PTA/DIET HISTORY
Pt unable to participate in interview, A&Ox0 at baseline secondary to history of dementia.   Pt presenting from [facility], transfer records reviewed. Medications PTA inclusive of ferrous sulfate, liquid multivitamin, furosemide, Remeron and senna. Pt unable to participate in interview, A&Ox0 at baseline secondary to history of dementia.   Pt presenting from [facility], transfer records reviewed. Notable medications PTA inclusive of ferrous sulfate, liquid multivitamin, furosemide, Remeron and senna. Pt PEG dependent, EN regimen PTA inclusive of Jevity 1.2 @ 95 mL/hr to provide a total of 1200 mL formula/day, 1440 kcal/day, 66.6 gm protein/day, 2L free water/day (in total, with consideration for additional free water provision).

## 2022-04-15 NOTE — DISCHARGE NOTE PROVIDER - HOSPITAL COURSE
89 y/o Female, bedbound, with hx of Afib (on Xarelto), COPD, GERD, CVA, glaucoma, s/p Left hip arthroplasty, advanced dementia, referred by NH for malfunctioning G-tube and possible replacement. Patient is A&Ox0 and non verbal Patient cannot provide any history. History obtained from nursing home documents. Per charts: Patient was sent in for observed non-functioning G tube x 1 day. Patient receives Jevity 1.2 1000mL per day. She receives 95mL/HR. Spoke with NH staff member, Min, [phone] at 6:00am, she reports no associated fevers, diarrhea or vomiting, stated that the pt did not signal or manifest any distress. Min stated that the G-tube has a collapsed lumen which does not allow for fluid flow and believes that it requires to be replaced.       Hospital Course:   GI consulted, exchanged PEG tube with 22 [place], recommended PPI and dressing changes   Patient can be resumed on all PO meds including Xarelto.   CT a/p has shown PEG tube in place, but large stool burden, patient was given tap water enema and two BM after.   In addition U/A was positive, and urine cultures grew GNR, speciation pending, Patient unable to verbalize symptoms. Patient received two doses of Ceftriaxone in hospital and will be discharged on Bactrim to complete treatment.     SonMina Present was notified and updated.       Patient stable for discharge back to facility.

## 2022-04-15 NOTE — DIETITIAN INITIAL EVALUATION ADULT - PERTINENT LABORATORY DATA
04-15    142  |  109<H>  |  19  ----------------------------<  104<H>  4.6   |  23  |  0.81    Ca    8.7      15 Apr 2022 10:07  Phos  3.6     04-15  Mg     2.30     04-15    TPro  7.4  /  Alb  3.7  /  TBili  1.5<H>  /  DBili  x   /  AST  21  /  ALT  11  /  AlkPhos  101  04-13  POCT Blood Glucose.: 98 mg/dL (04-15-22 @ 08:26)   (4/15) Na 142, BUN 19, Cr 0.81, <H>, K+ 4.6, Phos 3.6, Mg 2.30  POCT: (4/15) 88-98, (4/14) 106

## 2022-04-15 NOTE — PROGRESS NOTE ADULT - ATTENDING COMMENTS
89F with PMH of AFib on Xarelto, COPD, GERD, CVA, glaucoma, advanced dementia here w/ malfunctioning G-tube. Initial labs notable for positive UA. CT A/P showed appropriately placed G-tube. Admit for PEG exchange.    Pt seen at bedside. Comfortable, nonverbal. Exam showed PEG in place w/ no peripheral erythema/drainage. Labs concerning for poss UTI.    #PEG malfunction  #Acute UTI  #Functional quadraplegia  #Chronic AFib on Xarelto  #COPD  #GERD  #CVA  #Advance dementia    -Appreciate GI recs - PEG exchanged.  Resume Xarelto. CBC in AM.  -Stop IVF. Resume home TF regimen.  -Complete 3d course of abx for uncomplicated UTI.   -Will need better bowel regimen on DC - refer to DC sum.    Rest of plan as above. 35 minutes spent preparing DC, counseling pt, and coordination of care.

## 2022-04-15 NOTE — DIETITIAN INITIAL EVALUATION ADULT - OTHER CALCULATIONS
No height available via chart at this time.  Dosing weight (4/14/22) 149 lbs. No weight history available via chart.  BMI and IBW to be calculated pending obtainment of height.

## 2022-04-15 NOTE — DISCHARGE NOTE PROVIDER - NSDCMRMEDTOKEN_GEN_ALL_CORE_FT
brimonidine 0.2% ophthalmic solution: 1 drop(s) to each affected eye 2 times a day  ferrous sulfate: Elixer 220mg/5mL  7.5mL (330mg) via G tube once daily  Lasix 20 mg oral tablet: 1 tab(s) orally once a day  Lipitor 10 mg oral tablet: 1 tab(s) orally once a day  MiraLax oral powder for reconstitution: 17 gram(s) by gastrostomy tube 2 times a day -Tube twice daily hold for diarrhea  Multiple Vitamins oral tablet: 1 tab(s) orally once a day  pantoprazole: 20mL via G tube once daily  pro-stat sugar free-cherry: 30 milliliter(s) orally  Senna 8.6 mg oral tablet: 2 tab(s) orally once a day (at bedtime)  timolol maleate 0.5% ophthalmic gel forming solution: 1 drop(s) to each affected eye 2 times a day  Xarelto 10 mg oral tablet: 1 tab(s) orally once a day   brimonidine 0.2% ophthalmic solution: 1 drop(s) to each affected eye 2 times a day  ferrous sulfate: Elixer 220mg/5mL  7.5mL (330mg) via G tube once daily  Lasix 20 mg oral tablet: 1 tab(s) orally once a day  Lipitor 10 mg oral tablet: 1 tab(s) orally once a day  MiraLax oral powder for reconstitution: 17 gram(s) by gastrostomy tube 2 times a day -Tube twice daily hold for diarrhea  Multiple Vitamins oral tablet: 1 tab(s) orally once a day  pantoprazole: 20mL via G tube once daily  pro-stat sugar free-cherry: 30 milliliter(s) orally  Senna 8.6 mg oral tablet: 2 tab(s) orally once a day (at bedtime)  sulfamethoxazole-trimethoprim 200 mg-40 mg/5 mL oral suspension: 20 milliliter(s) by gastrostomy tube once a day   timolol maleate 0.5% ophthalmic gel forming solution: 1 drop(s) to each affected eye 2 times a day  Xarelto 10 mg oral tablet: 1 tab(s) orally once a day

## 2022-04-15 NOTE — PROGRESS NOTE ADULT - PROBLEM SELECTOR PLAN 3
Acute; CT A/P: Markedly distended and full stool-filled rectum  As per charts: last BM on 4/13/22  9S course: moderate size nonbloody semi-formed BM per RN;   -no need for disimpaction at this time  -restart bowel regimen once new PEG tube in place
Acute; CT A/P: Markedly distended and full stool-filled rectum  As per charts: last BM on 4/13/22  9S course: moderate size nonbloody semi-formed BM per RN; 4/14    -restart bowel regimen once new PEG tube in place  - tap water enema 4/14  - stool count last BM 4/14 5 pm

## 2022-04-15 NOTE — PROGRESS NOTE ADULT - ASSESSMENT
89 y/o Female, bedbound, with hx of Afib (on Xarelto), COPD, GERD, CVA, glaucoma, s/p Left hip arthroplasty, advanced dementia, referred by NH for malfunctioning G-tube a/w malfunctioning G-tube in PEG-tube dependent patient, and likely UTI; Found to have stool impaction in the rectum;

## 2022-04-15 NOTE — DIETITIAN INITIAL EVALUATION ADULT - PHYSCIAL ASSESSMENT
Pt with no overt signs of muscle wasting/fat loss upon visualization. Pt appears overweight upon visualization.

## 2022-04-15 NOTE — PROGRESS NOTE ADULT - PROBLEM SELECTOR PLAN 7
Restart Xarelto once new G-Tube in place and not contraindicated by GI team  Will hold DVT ppx in case GI intervention, awaiting GI recs    Diet: NPO while awaiting GI rec s    Dispo: back to facility once medical work-up complete Restart Xarelto once new G-Tube in place and not contraindicated by GI team    Diet: TF per NH regimen.    Dispo: Back to NH.

## 2022-04-15 NOTE — DIETITIAN INITIAL EVALUATION ADULT - PERTINENT MEDS FT
MEDICATIONS  (STANDING):  brimonidine 0.2% Ophthalmic Solution 1 Drop(s) Both EYES two times a day  cefTRIAXone   IVPB 1000 milliGRAM(s) IV Intermittent every 24 hours  dextrose 5% + lactated ringers. 1000 milliLiter(s) (50 mL/Hr) IV Continuous <Continuous>  pantoprazole  Injectable 40 milliGRAM(s) IV Push daily    MEDICATIONS  (PRN):   cefTRIAXone IVPB, dextrose 5% + lactated ringers IV Continuous, pantoprazole Injectable

## 2022-04-15 NOTE — DISCHARGE NOTE PROVIDER - NSDCCPCAREPLAN_GEN_ALL_CORE_FT
PRINCIPAL DISCHARGE DIAGNOSIS  Diagnosis: PEG tube malfunction  Assessment and Plan of Treatment: You came to the ED due to concern for your tube tube malfunctioning.   You called gastroenterologists who exchanged your PEG tube to 22 [place].   We were able to run tube feeds through the PEG tube.   Please have your doctor check repeat CBC to make sure blood count is stable. Please change your dressing for your PEG tube every three days.   If you experience belly pain, fevers, puss or redness at PEG site please call your doctors or go to the ED      SECONDARY DISCHARGE DIAGNOSES  Diagnosis: Impacted stool in rectum  Assessment and Plan of Treatment: When you scanned your belly with a CT scan, you had a lot of stool in your belly. Therefore we gave you an enema to help you have bowel movements. Please take your miralax twice a day and take senna at bedtime    Diagnosis: UTI (urinary tract infection)  Assessment and Plan of Treatment: When you came to the ED, your urine was concerning for infection. It is unclear whether you had symptoms. Therefore we treated with you IV antibiotics here and will be discharging you on oral antibiotics, Bactrim. Please continue to take your antibiotics and call you doctor if you experience fevers, back pain

## 2022-04-15 NOTE — PROGRESS NOTE ADULT - PROBLEM SELECTOR PLAN 2
CT A/P: PEG tube in appropriate position with no fluid collection or other abnormality along the tubing.  G-tube lumen collapsed on exam;  GI consulted, will follow recs to  replace G-tube  Will hold all medications pending G-tube replacement   IVF hydration, D5+1/2NS while G-tube cannot be accessed
CT A/P: PEG tube in appropriate position with no fluid collection or other abnormality along the tubing.  G-tube lumen collapsed on exam;  GI consulted, will follow recs to  replace G-tube- Dr Swanson office contacted   Will hold all medications pending G-tube replacement   IVF hydration, D5+LR while G-tube cannot be accessed  Monitor FS q8h

## 2022-04-15 NOTE — DIETITIAN INITIAL EVALUATION ADULT - ADD RECOMMEND
1) Obtain current height.  2) When plan to initiate EN, recommend Jevity 1.2 via PEG initiated at 10 mL/hr increased by 5 mL q4 hrs or as tolerated until goal rate of 50 mL/hr x24 hrs to provide 1200 mL formula, 1440 kcal, 66.6 gm protein, 968.4 mL free water (meets 21.3 kcal/kg, 0.99 gm protein/kg @ dosing weight 67.6 kg). Additional provision of free water per medical discretion.   3) Monitor BMs, provide bowel regimen as appropriate.  4)  1) Obtain current height.  2) When plan to initiate EN, recommend Jevity 1.2 via PEG initiated at 10 mL/hr increased by 5 mL q4 hrs or as tolerated until goal rate of 50 mL/hr x24 hrs to provide 1200 mL formula, 1440 kcal, 66.6 gm protein, 968.4 mL free water (meets 21.3 kcal/kg, 0.99 gm protein/kg @ dosing weight 67.6 kg). Additional provision of free water per medical discretion.   3) Monitor BMs, provide bowel regimen as appropriate.

## 2022-04-15 NOTE — PROGRESS NOTE ADULT - PROBLEM SELECTOR PLAN 4
Paroxysmal Afib; [id number] score of 5  Screening EKG: NSR  on Xarelto - on hold given g-tube malfunction Paroxysmal Afib; [id number] score of 5  Screening EKG: NSR  on Xarelto - restart s/p PEG replacement.

## 2022-04-15 NOTE — PROGRESS NOTE ADULT - PROBLEM SELECTOR PLAN 1
UA (+) large leuk, many bacteria c/w likely UTI; Given debility and advanced dementia will treat at acute cystitis empirically; No evidence of sepsis;   - Follow UCx  -ordered Ceftriaxone IV
UA (+) large leuk, many bacteria c/w likely UTI; Given debility and advanced dementia will treat at acute cystitis empirically; No evidence of sepsis;   - Follow UCx growing GNR   -ordered Ceftriaxone IV

## 2022-04-15 NOTE — CONSULT NOTE ADULT - SUBJECTIVE AND OBJECTIVE BOX
Chief Complaint:  Patient is a 89y old  Female who presents with a chief complaint of Urinary tract infection called to see patient with a leaking dysfunctional PEG  89 y/o Female, bedbound, with hx of Afib (on Xarelto), COPD, GERD, CVA, glaucoma, s/p Left hip arthroplasty, advanced dementia, referred by NH for malfunctioning G-tube and possible replacement. Patient is A&Ox0 and non verbal Patient cannot provide any history. History obtained from nursing home documents. Per charts: Patient was sent in for observed non-functioning G tube x 1 day. Patient receives Jevity 1.2 1000mL per day. She receives 95mL/HR. Spoke with NH staff member, Min, [phone] at 6:00am, she reports no associated fevers, diarrhea or vomiting, stated that the pt did not signal or manifest any distress. Min stated that the G-tube has a collapsed lumen which does not allow for fluid flow and believes that it requires to be replaced.       9S course: moderate size nonbloody semi-formed BM per RN;      Review of Systems:  Review of Systems: Advanced dementia; Pt not verbal;     Review of Systems:  · Additional ROS	ROS unable to obtain given patient's hx of dementia      HPI:    Allergies:  aspirin (Unknown)  penicillin (Unknown)      Medications:  brimonidine 0.2% Ophthalmic Solution 1 Drop(s) Both EYES two times a day  cefTRIAXone   IVPB 1000 milliGRAM(s) IV Intermittent every 24 hours  dextrose 5% + lactated ringers. 1000 milliLiter(s) IV Continuous <Continuous>  pantoprazole  Injectable 40 milliGRAM(s) IV Push daily      PMHX/PSHX:  COPD without exacerbation    GERD (gastroesophageal reflux disease)    Afib    CVA (cerebrovascular accident)    Dementia    Feeding by G-tube        Family history:  No pertinent family history in first degree relatives        Social History:   snf resident no etoh  no cigs no ivda  ROS:     General:  No wt loss, fevers, chills, night sweats, fatigue,   Eyes:  Good vision, no reported pain  ENT:  No sore throat, pain, runny nose, dysphagia  CV:  No pain, palpitations, hypo/hypertension  Resp:  No dyspnea, cough, tachypnea, wheezing  GI:  No pain, No nausea, No vomiting, No diarrhea, No constipation, No weight loss, No fever, No pruritis, No rectal bleeding, No tarry stools, No dysphagia,  :  No pain, bleeding, incontinence, nocturia  Muscle:  No pain, weakness  Neuro:  No weakness, tingling, memory problems  Psych:  No fatigue, insomnia, mood problems, depression  Endocrine:  No polyuria, polydipsia, cold/heat intolerance  Heme:  No petechiae, ecchymosis, easy bruisability  Skin:  No rash, tattoos, scars, edema      PHYSICAL EXAM:   Vital Signs:  Vital Signs Last 24 Hrs  T(C): 36.3 (15 Apr 2022 05:35), Max: 36.6 (2022 14:34)  T(F): 97.3 (15 Apr 2022 05:35), Max: 97.8 (2022 14:34)  HR: 76 (15 Apr 2022 05:35) (67 - 76)  BP: 128/51 (15 Apr 2022 05:35) (124/59 - 135/50)  BP(mean): --  RR: 18 (15 Apr 2022 05:35) (17 - 18)  SpO2: 97% (15 Apr 2022 05:35) (96% - 99%)  Daily     Daily     GENERAL:  Appears stated age, well-groomed, well-nourished, no distress  HEENT:  NC/AT,  conjunctivae clear and pink, no thyromegaly, nodules, adenopathy, no JVD, sclera -anicteric  CHEST:  Full & symmetric excursion, no increased effort, breath sounds clear  HEART:  Regular rhythm, S1, S2, no murmur/rub/S3/S4, no abdominal bruit, no edema  ABDOMEN:  Soft, non-tender, non-distended, normoactive bowel sounds,  no masses ,no hepato-splenomegaly, no signs of chronic liver disease  EXTEREMITIES:  no cyanosis,clubbing or edema  SKIN:  No rash/erythema/ecchymoses/petechiae/wounds/abscess/warm/dry  NEURO:  Alert, oriented, no asterixis, no tremor, no encephalopathy    LABS:                        11.0   6.65  )-----------( 142      ( 15 Apr 2022 08:46 )             33.7     04-15    142  |  109<H>  |  19  ----------------------------<  104<H>  4.6   |  23  |  0.81    Ca    8.7      15 Apr 2022 10:07  Phos  3.6     04-15  Mg     2.30     04-15    TPro  7.4  /  Alb  3.7  /  TBili  1.5<H>  /  DBili  x   /  AST  21  /  ALT  11  /  AlkPhos  101  04-13    LIVER FUNCTIONS - ( 2022 15:12 )  Alb: 3.7 g/dL / Pro: 7.4 g/dL / ALK PHOS: 101 U/L / ALT: 11 U/L / AST: 21 U/L / GGT: x           PT/INR - ( 2022 15:27 )   PT: 13.7 sec;   INR: 1.18 ratio         PTT - ( 2022 15:27 )  PTT:24.4 sec  Urinalysis Basic - ( 2022 17:22 )    Color: Yellow / Appearance: Slightly Turbid / S.019 / pH: x  Gluc: x / Ketone: Negative  / Bili: Negative / Urobili: 3 mg/dL   Blood: x / Protein: 30 mg/dL / Nitrite: Negative   Leuk Esterase: Large / RBC: 3 /HPF / WBC >10 /HPF   Sq Epi: x / Non Sq Epi: 2 /HPF / Bacteria: Many          Imaging:

## 2022-05-29 NOTE — ED PROVIDER NOTE - NS ED ATTENDING STATEMENT MOD
I have personally seen and examined this patient.  I have fully participated in the care of this patient. I have reviewed all pertinent clinical information, including history, physical exam, plan and the Resident’s note and agree except as noted. 1 Principal Discharge DX:	Acute UTI  Secondary Diagnosis:	Visual hallucination  Secondary Diagnosis:	Fecal impaction in rectum

## 2022-05-31 NOTE — DISCHARGE NOTE PROVIDER - CARE PROVIDER_API CALL
Balaji Ferro  CARDIOVASCULAR DISEASE  [address], [address]  [place] [address]  Phone: [phone]  Fax: [phone]  Follow Up Time: no

## 2022-06-24 NOTE — DIETITIAN INITIAL EVALUATION ADULT - PROBLEM/PLAN-2
6/24/2022 Gail is a 57-year-old female who has a history of rectal adenocarcinoma diagnosed in 2015 by Dr. Jerome.  She underwent chemo and radiation status post diverting ileostomy and rectal resection with reanastomosis.  Her last colonoscopy was in 2016 by Dr. Jerome with intact anastomosis and no recurrent disease.  She is does have a repeat colonoscopy in 2019 at the 3-year interval however she did not due to the pandemic.  Her bowels are alternating between constipation and diarrhea.  She does not take anything for this regularly.  Today she agrees to pursue repeat surveillance colonoscopy.  MB  DISPLAY PLAN FREE TEXT

## 2022-07-01 NOTE — DISCHARGE NOTE ADULT - FUNCTIONAL SCREEN CURRENT LEVEL: DRESSING, MLM
Patient: Don Deluca    Procedure: Procedure(s):  INSERTION, PULSE GENERATOR, NEUROSTIMULATOR       Diagnosis: Moderate obstructive sleep apnea [G47.33]  Intolerance of continuous positive airway pressure (CPAP) ventilation [Z78.9]  BMI 25.0-25.9,adult [Z68.25]  Diagnosis Additional Information: No value filed.    Anesthesia Type:   No value filed.     Note:    Oropharynx: oropharynx clear of all foreign objects and spontaneously breathing  Level of Consciousness: awake  Oxygen Supplementation: face mask    Independent Airway: airway patency not satisfactory and stable  Dentition: dentition changed  Vital Signs Stable: post-procedure vital signs reviewed and stable  Report to RN Given: handoff report given  Patient transferred to: PACU    Handoff Report: Identifed the Patient, Identified the Reponsible Provider, Reviewed the pertinent medical history, Discussed the surgical course, Reviewed Intra-OP anesthesia mangement and issues during anesthesia, Set expectations for post-procedure period and Allowed opportunity for questions and acknowledgement of understanding      Vitals:  Vitals Value Taken Time   BP     Temp     Pulse     Resp     SpO2         Electronically Signed By: Charles Butler CRNA, APRN CRNA  July 1, 2022  10:58 AM   (2) assistive person

## 2022-08-01 NOTE — ED PROVIDER NOTE - CADM POA CENTRAL LINE
Vivien Rosas  : 1978  Primary: Berta Saldivar Sc  Secondary:  [place]  [address] [facility] [address]  Phone: [phone]  Fax: [phone] Plan Frequency: 1x/week    Plan of Care/Certification Expiration Date: 22      PT Visit Info: Total # of Visits to Date: 4      OUTPATIENT PHYSICAL THERAPY:OP NOTE TYPE: Treatment Note 2022       Episode  }Appt Desk              Treatment Diagnosis:   Lack of coordination (muscle incoordination) (R27.8)  Mixed incontinence (Urge and stress incontinence) (N39.46)  Pelvic and perineal pain (R10.2)  Myalgia (M79.1)   Medical/Referring Diagnosis:  Pelvic and perineal pain [R10.2]  Referring Physician:  MAGGIE Wheatley MD Orders:  PT Eval and Treat   Date of Onset:  No data recorded   Allergies:   Patient has no allergy information on record. Restrictions/Precautions:  No data recordedNo data recorded   Interventions Planned (Treatment may consist of any combination of the following):    Current Treatment Recommendations: Strengthening; ROM; Neuromuscular re-education; Manual Therapy - Soft Tissue Mobilization; Pain management; Home exercise program     Subjective Comments:    Pt reports following her previous treatment session she started her menses. She had a large bowel movement which caused an anal fissure. She had cramping in her low back due to her menses. She has her last days of her menses yesterday. She is no longer spotting.   0/10 pelvic pain today. She is experiencing dizziness related to vestibular issues. She is ambulating with a cane currently. UI has been worse. She is experiencing UI with ambulation. She reports strong urgency with she needs to use the restroom with little warning. She has not been consistent with her HEP due to illness.       Initial:}     0/10Post Session:        0/10  Medications Last Reviewed:  2022  Updated Objective Findings:   Tender to palpation at iliococcygeus (B). Superficial PFM non-tender PFM contraction graded as 2/5. Posture: excessive lumbar lordosis, forward shoulders, hypertonic erector spinae    Treatment   THERAPEUTIC ACTIVITY: (5 minutes): Functional activity education regarding anatomy, pathology and role of pelvic floor muscle (PFM) function in relation to presenting symptoms and role of pelvic floor therapy in conservative treatment. and Instruction on coordinated pelvic floor and diaphragmatic breathing to improve kinesthetic awareness of pelvic muscle mobility and restore proper motor recruitment patterns with breathing, posture, and functional movement (e.g. appropriate lift/contraction with increased IAP such as a cough, laugh, sneeze to prevent incontinence as well as appropriate relaxation/drop to reduce pain with vaginal penetration). TA Educational Topic Notes Date Completed   Pathology/Anatomy/PFM Function Completed 6/13/22   Bladder health education     Urinary urge suppression     The knack     Voiding strategies     Nocturia tips     Bowel/Bladder log     Bowel health education     Constipation care     Diarrhea/Fecal leakage     Colonic massage     Toilet positioning     Defecation dynamics     Sources of fiber     Return to intercourse/Dilator training     Sexual positioning     Lubricants/vaginal moisturizers     Vaginal irritants     Body mechanics     Posture/ergonomics reviewed 8/1/22   Diaphragmatic breathing Completed 6/13/22   Resources and technology         THERAPEUTIC EXERCISE: (15 minutes):    Exercises per grid below to improve coordination. Required moderate verbal and manual cues to promote proper body mechanics and promote proper body breathing techniques. Progressed range and repetitions as indicated.      Date:  6/20/22 Date:  7/6/22 Date:  8/1/22   Activity/Exercise Parameters Parameters Parameters   PF drops with digital cues X 10  Digital cues and with sEMG biofeedback Digital cues   PF drop --> sub max contraction  With sEMG biofeedback x 10    Quick flicks 4 x 5 reps Digital cues   Janina pose stretch  X 2 min    Single knee to chest  X 2 min    Piriformis stretch  X 2 min    HEP  Review POC Review POC     Access Code: [email or web address]  URL: [email or web address]  Date: 07/06/2022  Prepared by: [facility]  Child's Pose Stretch - 2 x daily - 7 x weekly - 3 sets - 30 hold  Supine Figure 4 Piriformis Stretch - 2 x daily - 7 x weekly - 3 sets - 30 hold  Supine Pelvic Floor Stretch - Hands on Knees - 1 x daily - 7 x weekly - 3 sets - 30 hold      MANUAL THERAPY: (30 minutes): Soft tissue mobilization was utilized and necessary because of the patient's restricted motion of soft tissue. Date Type Location Comments   8/1/2022 Internal assessment/treatment Via vaginal canal  moderate pressure, C/R, SP, STM, SCS     Proximal adductors through ischiorectal fossa                                  (Used abbreviations: MET - muscle energy technique; SCS- Strain counter strain; CTM-Connective tissue mobilizations; CR- Contract/Relax; SP- Sustained pressure; PIT- Positional inhibition techniques; STM Soft -tissue mobilization; MM- Myofascial mobilization; TrP-Trigger point release; IASTM- Instrument assisted soft tissue mobilizations, TDN-Trigger point dry needling)    Pt gives verbal consent to internal vaginal assessment/treatment without chaperon present. Treatment/Session Summary:    Treatment Assessment: Pt with continued PFM overactivity present. Today, she reported reduced pain with palpation of her superficial PFM, however increased discomfort with palpation of levator ani muscles. Pt has difficulty producing a PFM contraction without excessive gluteal use. I reviewed the importance of consistency with PF check-ins for tension reduction and overall flexibility.       Communication/Consultation:  None today  Equipment provided today:  None  Recommendations/Intent for next treatment session: Next visit will focus on MT to PFM (moderate pressure) coordination of PFM contraction, MT to erector spinae, review flexibility home program      Total Treatment Billable Duration:  50 minutes   Time In: 1015  Time Out: Radha Bran 8305, PT       Charge Capture  }Post Session Pain  PT Visit [address] [facility]  MD [address]    Future Appointments   Date Time Provider Teresa Miller   8/15/2022  2:00 PM Matthew jarrett, [facility]   8/22/2022  3:00 PM Matthew jarrett PT [facility]   8/29/2022  9:00 AM Matthew jarrett PT [facility] No

## 2022-08-12 RX ORDER — BRIMONIDINE TARTRATE 2 MG/MG
1 SOLUTION/ DROPS OPHTHALMIC
Qty: 0 | Refills: 0 | DISCHARGE

## 2022-08-12 RX ORDER — POLYETHYLENE GLYCOL 3350 17 G/17G
0 POWDER, FOR SOLUTION ORAL
Qty: 0 | Refills: 0 | DISCHARGE

## 2022-08-16 NOTE — ED ADULT NURSE NOTE - OBJECTIVE STATEMENT
(1) Outpatient Area 89 y/o Female alert and responsive to name BIBEMS from [facility] for unwitnessed fall. As per EMS pt found on the ground, c/o pain in either her L or R side. Pt with baseline dementia, responds to few verbal commands, states she is having R elbow and back pain unsure that she firsts states is on the right then on the left. hx of afib, on xarelto

## 2022-08-20 NOTE — BEHAVIORAL HEALTH ASSESSMENT NOTE - FAMILY HISTORY OF SUBSTANCE ABUSE
Unable to assess
Orientation to room/Bed in low position, brakes on/Assess eliminations need, assist as needed/Call light is within reach, educate patient/family on its functionality

## 2022-08-27 NOTE — ED ADULT TRIAGE NOTE - ADDITIONAL SAFETY/BANDS...
Additional Safety/Bands: patient came in ED from home with c/o lower abdominal pain X yesterday. patient added the pain is felt across the lower abdomen but worst on the RLQ.  abdominal pain patient came in ED from home with c/o lower abdominal pain X yesterday. patient added the pain is felt across the lower abdomen but worst on the RLQ.  abdominal pain x 48 hours , migrating to the RLQ, feeling  bloated , had BM, no urinary symptoms, no back pain   Dr SAMANO. h/o ventral hernia repair 81 y/o male, patient came in ED from home with c/o lower abdominal pain X yesterday. patient added the pain is felt across the lower abdomen and localizing at the RLQ.  He is experiencing abdominal pain x 48 hours , now migrating to the RLQ, he is feeling  bloated , had normal BM, no urinary symptoms, no back pain   Dr SAMANO. h/o ventral hernia repair

## 2023-06-09 ENCOUNTER — INPATIENT (INPATIENT)
Facility: HOSPITAL | Age: 88
LOS: 3 days | Discharge: NURSING, REHAB OR LONG-TERM CARE | End: 2023-06-13
Attending: FAMILY MEDICINE | Admitting: FAMILY MEDICINE
Payer: MEDICARE

## 2023-06-09 VITALS
HEART RATE: 85 BPM | TEMPERATURE: 99 F | RESPIRATION RATE: 17 BRPM | SYSTOLIC BLOOD PRESSURE: 136 MMHG | DIASTOLIC BLOOD PRESSURE: 66 MMHG | OXYGEN SATURATION: 100 %

## 2023-06-09 DIAGNOSIS — Z93.1 GASTROSTOMY STATUS: Chronic | ICD-10-CM

## 2023-06-09 PROBLEM — F03.90 UNSPECIFIED DEMENTIA WITHOUT BEHAVIORAL DISTURBANCE: Chronic | Status: ACTIVE | Noted: 2022-04-13

## 2023-06-09 LAB
ALBUMIN SERPL ELPH-MCNC: 3.8 G/DL — SIGNIFICANT CHANGE UP (ref 3.3–5)
ALP SERPL-CCNC: 111 U/L — SIGNIFICANT CHANGE UP (ref 40–120)
ALT FLD-CCNC: 33 U/L — SIGNIFICANT CHANGE UP (ref 4–33)
ANION GAP SERPL CALC-SCNC: 15 MMOL/L — HIGH (ref 7–14)
APPEARANCE UR: ABNORMAL
AST SERPL-CCNC: 32 U/L — SIGNIFICANT CHANGE UP (ref 4–32)
B PERT DNA SPEC QL NAA+PROBE: SIGNIFICANT CHANGE UP
B PERT+PARAPERT DNA PNL SPEC NAA+PROBE: SIGNIFICANT CHANGE UP
BACTERIA # UR AUTO: ABNORMAL
BASE EXCESS BLDV CALC-SCNC: 7.8 MMOL/L — HIGH (ref -2–3)
BASOPHILS # BLD AUTO: 0.02 K/UL — SIGNIFICANT CHANGE UP (ref 0–0.2)
BASOPHILS NFR BLD AUTO: 0.2 % — SIGNIFICANT CHANGE UP (ref 0–2)
BILIRUB SERPL-MCNC: 1.7 MG/DL — HIGH (ref 0.2–1.2)
BILIRUB UR-MCNC: NEGATIVE — SIGNIFICANT CHANGE UP
BLOOD GAS VENOUS COMPREHENSIVE RESULT: SIGNIFICANT CHANGE UP
BORDETELLA PARAPERTUSSIS (RAPRVP): SIGNIFICANT CHANGE UP
BUN SERPL-MCNC: 35 MG/DL — HIGH (ref 7–23)
C PNEUM DNA SPEC QL NAA+PROBE: SIGNIFICANT CHANGE UP
CALCIUM SERPL-MCNC: 9.4 MG/DL — SIGNIFICANT CHANGE UP (ref 8.4–10.5)
CHLORIDE BLDV-SCNC: 104 MMOL/L — SIGNIFICANT CHANGE UP (ref 96–108)
CHLORIDE SERPL-SCNC: 103 MMOL/L — SIGNIFICANT CHANGE UP (ref 98–107)
CO2 BLDV-SCNC: 38.8 MMOL/L — HIGH (ref 22–26)
CO2 SERPL-SCNC: 28 MMOL/L — SIGNIFICANT CHANGE UP (ref 22–31)
COLOR SPEC: YELLOW — SIGNIFICANT CHANGE UP
CREAT SERPL-MCNC: 0.84 MG/DL — SIGNIFICANT CHANGE UP (ref 0.5–1.3)
DIFF PNL FLD: ABNORMAL
EGFR: 63 ML/MIN/1.73M2 — SIGNIFICANT CHANGE UP
EOSINOPHIL # BLD AUTO: 0.03 K/UL — SIGNIFICANT CHANGE UP (ref 0–0.5)
EOSINOPHIL NFR BLD AUTO: 0.3 % — SIGNIFICANT CHANGE UP (ref 0–6)
EPI CELLS # UR: >27 /HPF — HIGH (ref 0–5)
FLUAV SUBTYP SPEC NAA+PROBE: SIGNIFICANT CHANGE UP
FLUBV RNA SPEC QL NAA+PROBE: SIGNIFICANT CHANGE UP
GAS PNL BLDV: 141 MMOL/L — SIGNIFICANT CHANGE UP (ref 136–145)
GAS PNL BLDV: SIGNIFICANT CHANGE UP
GLUCOSE BLDV-MCNC: 130 MG/DL — HIGH (ref 70–99)
GLUCOSE SERPL-MCNC: 132 MG/DL — HIGH (ref 70–99)
GLUCOSE UR QL: NEGATIVE — SIGNIFICANT CHANGE UP
HADV DNA SPEC QL NAA+PROBE: SIGNIFICANT CHANGE UP
HCO3 BLDV-SCNC: 37 MMOL/L — HIGH (ref 22–29)
HCOV 229E RNA SPEC QL NAA+PROBE: SIGNIFICANT CHANGE UP
HCOV HKU1 RNA SPEC QL NAA+PROBE: SIGNIFICANT CHANGE UP
HCOV NL63 RNA SPEC QL NAA+PROBE: SIGNIFICANT CHANGE UP
HCOV OC43 RNA SPEC QL NAA+PROBE: SIGNIFICANT CHANGE UP
HCT VFR BLD CALC: 41.5 % — SIGNIFICANT CHANGE UP (ref 34.5–45)
HCT VFR BLDA CALC: 41 % — SIGNIFICANT CHANGE UP (ref 34.5–46.5)
HGB BLD CALC-MCNC: 13.6 G/DL — SIGNIFICANT CHANGE UP (ref 11.7–16.1)
HGB BLD-MCNC: 13.1 G/DL — SIGNIFICANT CHANGE UP (ref 11.5–15.5)
HMPV RNA SPEC QL NAA+PROBE: SIGNIFICANT CHANGE UP
HPIV1 RNA SPEC QL NAA+PROBE: SIGNIFICANT CHANGE UP
HPIV2 RNA SPEC QL NAA+PROBE: SIGNIFICANT CHANGE UP
HPIV3 RNA SPEC QL NAA+PROBE: SIGNIFICANT CHANGE UP
HPIV4 RNA SPEC QL NAA+PROBE: SIGNIFICANT CHANGE UP
IANC: 8.72 K/UL — HIGH (ref 1.8–7.4)
IMM GRANULOCYTES NFR BLD AUTO: 0.5 % — SIGNIFICANT CHANGE UP (ref 0–0.9)
KETONES UR-MCNC: NEGATIVE — SIGNIFICANT CHANGE UP
LACTATE BLDV-MCNC: 3.4 MMOL/L — HIGH (ref 0.5–2)
LEUKOCYTE ESTERASE UR-ACNC: ABNORMAL
LYMPHOCYTES # BLD AUTO: 0.98 K/UL — LOW (ref 1–3.3)
LYMPHOCYTES # BLD AUTO: 9.2 % — LOW (ref 13–44)
M PNEUMO DNA SPEC QL NAA+PROBE: SIGNIFICANT CHANGE UP
MCHC RBC-ENTMCNC: 31.6 GM/DL — LOW (ref 32–36)
MCHC RBC-ENTMCNC: 32.8 PG — SIGNIFICANT CHANGE UP (ref 27–34)
MCV RBC AUTO: 104 FL — HIGH (ref 80–100)
MONOCYTES # BLD AUTO: 0.84 K/UL — SIGNIFICANT CHANGE UP (ref 0–0.9)
MONOCYTES NFR BLD AUTO: 7.9 % — SIGNIFICANT CHANGE UP (ref 2–14)
NEUTROPHILS # BLD AUTO: 8.72 K/UL — HIGH (ref 1.8–7.4)
NEUTROPHILS NFR BLD AUTO: 81.9 % — HIGH (ref 43–77)
NITRITE UR-MCNC: NEGATIVE — SIGNIFICANT CHANGE UP
NRBC # BLD: 0 /100 WBCS — SIGNIFICANT CHANGE UP (ref 0–0)
NRBC # FLD: 0 K/UL — SIGNIFICANT CHANGE UP (ref 0–0)
PCO2 BLDV: 71 MMHG — HIGH (ref 39–52)
PH BLDV: 7.32 — SIGNIFICANT CHANGE UP (ref 7.32–7.43)
PH UR: 6.5 — SIGNIFICANT CHANGE UP (ref 5–8)
PLATELET # BLD AUTO: 171 K/UL — SIGNIFICANT CHANGE UP (ref 150–400)
PO2 BLDV: 36 MMHG — SIGNIFICANT CHANGE UP (ref 25–45)
POTASSIUM BLDV-SCNC: 4.4 MMOL/L — SIGNIFICANT CHANGE UP (ref 3.5–5.1)
POTASSIUM SERPL-MCNC: 4.7 MMOL/L — SIGNIFICANT CHANGE UP (ref 3.5–5.3)
POTASSIUM SERPL-SCNC: 4.7 MMOL/L — SIGNIFICANT CHANGE UP (ref 3.5–5.3)
PROCALCITONIN SERPL-MCNC: 0.14 NG/ML — HIGH (ref 0.02–0.1)
PROT SERPL-MCNC: 7.8 G/DL — SIGNIFICANT CHANGE UP (ref 6–8.3)
PROT UR-MCNC: ABNORMAL
RAPID RVP RESULT: SIGNIFICANT CHANGE UP
RBC # BLD: 3.99 M/UL — SIGNIFICANT CHANGE UP (ref 3.8–5.2)
RBC # FLD: 14.3 % — SIGNIFICANT CHANGE UP (ref 10.3–14.5)
RBC CASTS # UR COMP ASSIST: 5 /HPF — HIGH (ref 0–4)
RSV RNA SPEC QL NAA+PROBE: SIGNIFICANT CHANGE UP
RV+EV RNA SPEC QL NAA+PROBE: SIGNIFICANT CHANGE UP
SAO2 % BLDV: 52.1 % — LOW (ref 67–88)
SARS-COV-2 RNA SPEC QL NAA+PROBE: SIGNIFICANT CHANGE UP
SODIUM SERPL-SCNC: 146 MMOL/L — HIGH (ref 135–145)
SP GR SPEC: 1.03 — SIGNIFICANT CHANGE UP (ref 1.01–1.05)
UROBILINOGEN FLD QL: ABNORMAL
WBC # BLD: 10.64 K/UL — HIGH (ref 3.8–10.5)
WBC # FLD AUTO: 10.64 K/UL — HIGH (ref 3.8–10.5)
WBC UR QL: 26 /HPF — HIGH (ref 0–5)

## 2023-06-09 PROCEDURE — 99285 EMERGENCY DEPT VISIT HI MDM: CPT

## 2023-06-09 PROCEDURE — 71045 X-RAY EXAM CHEST 1 VIEW: CPT | Mod: 26

## 2023-06-09 RX ORDER — CEFTRIAXONE 500 MG/1
1000 INJECTION, POWDER, FOR SOLUTION INTRAMUSCULAR; INTRAVENOUS ONCE
Refills: 0 | Status: COMPLETED | OUTPATIENT
Start: 2023-06-09 | End: 2023-06-09

## 2023-06-09 RX ADMIN — Medication 125 MILLIGRAM(S): at 23:42

## 2023-06-09 RX ADMIN — CEFTRIAXONE 100 MILLIGRAM(S): 500 INJECTION, POWDER, FOR SOLUTION INTRAMUSCULAR; INTRAVENOUS at 22:00

## 2023-06-09 NOTE — ED PROVIDER NOTE - NSICDXPASTMEDICALHX_GEN_ALL_CORE_FT
PAST MEDICAL HISTORY:  Afib     Atrial fibrillation, unspecified type on rivaroxaban    COPD (chronic obstructive pulmonary disease)     COPD without exacerbation     CVA (cerebral infarction)     CVA (cerebrovascular accident)     Dementia     GERD (gastroesophageal reflux disease)     GERD (gastroesophageal reflux disease)     GI bleed     Glaucoma     Hyperlipemia     PUD (peptic ulcer disease)

## 2023-06-09 NOTE — ED PROVIDER NOTE - WR ORDER STATUS 1
Resulted
PAST MEDICAL HISTORY:  Alcohol abuse     Atrial fibrillation     Diabetes     HTN (hypertension)     Non-ischemic cardiomyopathy     Pancreatitis

## 2023-06-09 NOTE — ED ADULT TRIAGE NOTE - BP NONINVASIVE SYSTOLIC (MM HG)
Visit Information    Have you changed or started any medications since your last visit including any over-the-counter medicines, vitamins, or herbal medicines? yes - vitamin d,    Have you stopped taking any of your medications? Is so, why? -  yes, effexor  Are you having any side effects from any of your medications? - no    Have you seen any other physician or provider since your last visit? Pain management   Have you had any other diagnostic tests since your last visit?  no   Have you been seen in the emergency room and/or had an admission in a hospital since we last saw you?  no   Have you had your routine dental cleaning in the past 6 months?  yes - 12/2022     Do you have an active MyChart account? If no, what is the barrier?   Yes    Patient Care Team:  Danica Cash MD as PCP - General (Family Medicine)  Danica Cash MD as PCP - Empaneled Provider  Jamir Fried MD (Obstetrics & Gynecology)  Niurka Castro MD as Consulting Physician (Pulmonary Disease)  TABITHA Atwood - CNP as Nurse Practitioner (Nurse Practitioner Family)    Medical History Review  Past Medical, Family, and Social History reviewed and does not contribute to the patient presenting condition    Health Maintenance   Topic Date Due    DTaP/Tdap/Td vaccine (1 - Tdap) Never done    Breast cancer screen  05/09/2023    Depression Monitoring  11/17/2023    Cervical cancer screen  02/19/2026    Lipids  03/21/2027    Flu vaccine  Completed    COVID-19 Vaccine  Completed    Hepatitis C screen  Completed    HIV screen  Completed    Hepatitis A vaccine  Aged Out    Hib vaccine  Aged Out    Meningococcal (ACWY) vaccine  Aged Out    Pneumococcal 0-64 years Vaccine  Aged Out    Varicella vaccine  Discontinued 136

## 2023-06-09 NOTE — ED PROVIDER NOTE - OBJECTIVE STATEMENT
89 y/o F, bedbound, PMH of Advance Dementia (nonverbal baseline), Afib (on Xarelto), COPD (no O2), GERD, CVA, and glaucoma, BIBEMS from [facility] for PEG tube replacement. Per the accompanying paperwork, the PEG tube is dislodged. Pt also sent d/t requiring O2 at NH, although pt not usually O2 dependent. Pt was noted to have productive cough. Prior CXR done last month showed concern for possible retrocardiac opacity, although not treated as PNA (believe to be atelectasis).

## 2023-06-09 NOTE — ED ADULT TRIAGE NOTE - CHIEF COMPLAINT QUOTE
Pt sent in from [facility] for peg tube replacement. Pt non verbal non ambulatory at baseline, PMH COPD currently on 2L 02 sating at 97%. As per facility pt is not normally 02 dependent. Pt also noted to have wet cough in triage.

## 2023-06-09 NOTE — ED PROVIDER NOTE - PROGRESS NOTE DETAILS
Patient admitted to hospitalist in stable condition - treated for UTI, HAP with levaquin.     Earline Angeles MD, PGY2 PEG tube flushes - CT scan negative for acute intraabdominal pathology    Earline Angeles MD, PGY2

## 2023-06-09 NOTE — ED PROVIDER NOTE - NSICDXPASTSURGICALHX_GEN_ALL_CORE_FT
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

## 2023-06-09 NOTE — ED ADULT NURSE NOTE - CHIEF COMPLAINT QUOTE
Pt sent in from [facility] for peg tube replacement. Pt non verbal non ambulatory at baseline, PMH COPD currently on 2L 02 sating at 97%. As per facility pt is not normally 02 dependent. Pt also noted to have wet cough in triage.

## 2023-06-09 NOTE — ED PROVIDER NOTE - PHYSICAL EXAMINATION
PHYSICAL EXAM:  GENERAL: chronically ill appearing; making consant sonorous sounds; in no respiratory distress  HEENT: Atraumatic, Normocephalic, DMM, refusing to open eyes  NECK: No JVD; FROM  CHEST/LUNG: poor respiratory effort  HEART: RRR no murmur/gallops/rubs  ABDOMEN: +BS, soft, mildly distended, NT; PEG tube appears to be in place and c/d/i; flushes w/o resistance or leakage  EXTREMITIES: No LE edema, +2 radial pulses b/l, +2 DP/PT pulses b/l  NERVOUS SYSTEM:  A&Ox0, no focal neurologic deficits  SKIN:  No new rashes

## 2023-06-09 NOTE — ED ADULT NURSE NOTE - OBJECTIVE STATEMENT
Receive pt. in ER room 17 non verbal respond to tactile stimuli, presenting to the ER from [facility] for PEG tube replacement. Labs sent, no indication of pain or discomfort. Pt. arrived with PEG tube in place on left upper abdomen.

## 2023-06-09 NOTE — ED PROVIDER NOTE - ATTENDING CONTRIBUTION TO CARE
I have personally performed a face to face medical and diagnostic evaluation of the patient. I have discussed with and reviewed the Resident's note and agree with the History, ROS, Physical Exam and MDM unless otherwise indicated. A brief summary of my personal evaluation and impression can be found below.    89y F w/ PMHx of Advance Dementia (nonverbal baseline and bedbound), Afib (on Xarelto), COPD (no home O2), GERD, CVA, and glaucoma, BIBEMS from [facility] for PEG tube replacement. Per the accompanying paperwork, the PEG tube is dislodged, upon exam, PEG tube in place. Pt also sent d/t requiring O2 at NH, although pt not usually O2 dependent was found to be hypoxic. Pt was noted to have productive cough. Prior CXR done last month showed concern for possible retrocardiac opacity, although not treated as PNA (believe to be atelectasis). Patient at baseline will open eyes, but upon arrival to ED, does not follow commands or open eyes to verbal stimuli.     Physical Exam:  Gen: NAD, contracted, will not open eyes to verbal commands but withdrawing arm when touched  Head: NCAT  HEENT: PEERL, normal conjunctiva, tongue midline, oral mucosa moist  Lung: CTAB, mildly tachypneic no wheezes/rhonchi/rales B/L, speaking in full sentences  CV: RRR, no murmurs, rubs or gallops  Abd: soft, +distended with PEG tube in place, no visible leak or resistance with flush, no guarding, no rigidity  MSK: no visible deformities   Neuro: Moving extremities spontaneously and withdraws when touched  Skin: Warm, well perfused, no rash, no leg swelling    PEG tube in place and easily flushed w/o leak, as pt is poor historian and abd distended will obtain CT A/P to assess for intra-abd etiology, c/f COPD exacerbation vs. pneumonia with recent 02 requirement, placed on 2L satting 99%, intermittently tachypneic to 30s but no acute respiratory distress, no LE swelling, will obtain labs, CXR, will cover for HAP, check UA and reassess. TBA.

## 2023-06-09 NOTE — ED PROVIDER NOTE - CLINICAL SUMMARY MEDICAL DECISION MAKING FREE TEXT BOX
89 y/o F, bedbound, PMH of Advance Dementia (nonverbal baseline), Afib (on Xarelto), COPD (no O2), GERD, CVA, and glaucoma, BIBEMS from [facility] for PEG tube replacement d/t concern for dislodgement. Pt also noted to be requiring O2 (doesn't usually) and have productive cough.    VSS. Rectal w/ low grade temp. Physical exam significant for altered appearing patient w/ mild abd distension and poor resp effort. PEG tube appears to be in place and flushes w/o resistance or leakage.    DDx includes PNA vs. COPD exacerbation vs. Viral Syndrome vs. obstruction.    Plan to check basic labs, CXR, UA, and CT A/P to confirm placement of PEG and assess for obstruction/intra-abdominal process. Dispo per results.

## 2023-06-10 DIAGNOSIS — Z29.9 ENCOUNTER FOR PROPHYLACTIC MEASURES, UNSPECIFIED: ICD-10-CM

## 2023-06-10 DIAGNOSIS — I48.20 CHRONIC ATRIAL FIBRILLATION, UNSPECIFIED: ICD-10-CM

## 2023-06-10 DIAGNOSIS — J18.9 PNEUMONIA, UNSPECIFIED ORGANISM: ICD-10-CM

## 2023-06-10 DIAGNOSIS — Z86.69 PERSONAL HISTORY OF OTHER DISEASES OF THE NERVOUS SYSTEM AND SENSE ORGANS: ICD-10-CM

## 2023-06-10 DIAGNOSIS — Z93.1 GASTROSTOMY STATUS: ICD-10-CM

## 2023-06-10 DIAGNOSIS — J69.0 PNEUMONITIS DUE TO INHALATION OF FOOD AND VOMIT: ICD-10-CM

## 2023-06-10 LAB
ANION GAP SERPL CALC-SCNC: 14 MMOL/L — SIGNIFICANT CHANGE UP (ref 7–14)
BUN SERPL-MCNC: 35 MG/DL — HIGH (ref 7–23)
CALCIUM SERPL-MCNC: 9.2 MG/DL — SIGNIFICANT CHANGE UP (ref 8.4–10.5)
CHLORIDE SERPL-SCNC: 104 MMOL/L — SIGNIFICANT CHANGE UP (ref 98–107)
CO2 SERPL-SCNC: 27 MMOL/L — SIGNIFICANT CHANGE UP (ref 22–31)
CREAT SERPL-MCNC: 0.69 MG/DL — SIGNIFICANT CHANGE UP (ref 0.5–1.3)
CULTURE RESULTS: SIGNIFICANT CHANGE UP
EGFR: 78 ML/MIN/1.73M2 — SIGNIFICANT CHANGE UP
GLUCOSE SERPL-MCNC: 168 MG/DL — HIGH (ref 70–99)
HCT VFR BLD CALC: 44.1 % — SIGNIFICANT CHANGE UP (ref 34.5–45)
HGB BLD-MCNC: 14 G/DL — SIGNIFICANT CHANGE UP (ref 11.5–15.5)
LACTATE SERPL-SCNC: 2.6 MMOL/L — HIGH (ref 0.5–2)
MAGNESIUM SERPL-MCNC: 2.7 MG/DL — HIGH (ref 1.6–2.6)
MCHC RBC-ENTMCNC: 31.7 GM/DL — LOW (ref 32–36)
MCHC RBC-ENTMCNC: 32.7 PG — SIGNIFICANT CHANGE UP (ref 27–34)
MCV RBC AUTO: 103 FL — HIGH (ref 80–100)
NRBC # BLD: 0 /100 WBCS — SIGNIFICANT CHANGE UP (ref 0–0)
NRBC # FLD: 0 K/UL — SIGNIFICANT CHANGE UP (ref 0–0)
PHOSPHATE SERPL-MCNC: 4.4 MG/DL — SIGNIFICANT CHANGE UP (ref 2.5–4.5)
PLATELET # BLD AUTO: 167 K/UL — SIGNIFICANT CHANGE UP (ref 150–400)
POTASSIUM SERPL-MCNC: 4.9 MMOL/L — SIGNIFICANT CHANGE UP (ref 3.5–5.3)
POTASSIUM SERPL-SCNC: 4.9 MMOL/L — SIGNIFICANT CHANGE UP (ref 3.5–5.3)
RBC # BLD: 4.28 M/UL — SIGNIFICANT CHANGE UP (ref 3.8–5.2)
RBC # FLD: 14.1 % — SIGNIFICANT CHANGE UP (ref 10.3–14.5)
SODIUM SERPL-SCNC: 145 MMOL/L — SIGNIFICANT CHANGE UP (ref 135–145)
SPECIMEN SOURCE: SIGNIFICANT CHANGE UP
WBC # BLD: 5.14 K/UL — SIGNIFICANT CHANGE UP (ref 3.8–10.5)
WBC # FLD AUTO: 5.14 K/UL — SIGNIFICANT CHANGE UP (ref 3.8–10.5)

## 2023-06-10 PROCEDURE — 71260 CT THORAX DX C+: CPT | Mod: 26,MA

## 2023-06-10 PROCEDURE — 99223 1ST HOSP IP/OBS HIGH 75: CPT

## 2023-06-10 PROCEDURE — 74177 CT ABD & PELVIS W/CONTRAST: CPT | Mod: 26,MA

## 2023-06-10 RX ORDER — CHLORHEXIDINE GLUCONATE 213 G/1000ML
1 SOLUTION TOPICAL DAILY
Refills: 0 | Status: DISCONTINUED | OUTPATIENT
Start: 2023-06-10 | End: 2023-06-13

## 2023-06-10 RX ORDER — ACETAMINOPHEN 500 MG
650 TABLET ORAL ONCE
Refills: 0 | Status: COMPLETED | OUTPATIENT
Start: 2023-06-10 | End: 2023-06-10

## 2023-06-10 RX ORDER — RIVAROXABAN 15 MG-20MG
1 KIT ORAL
Qty: 0 | Refills: 0 | DISCHARGE

## 2023-06-10 RX ORDER — SENNA PLUS 8.6 MG/1
2 TABLET ORAL AT BEDTIME
Refills: 0 | Status: DISCONTINUED | OUTPATIENT
Start: 2023-06-10 | End: 2023-06-13

## 2023-06-10 RX ORDER — TIMOLOL 0.5 %
1 DROPS OPHTHALMIC (EYE)
Qty: 0 | Refills: 0 | DISCHARGE

## 2023-06-10 RX ORDER — FERROUS SULFATE 325(65) MG
0 TABLET ORAL
Qty: 0 | Refills: 0 | DISCHARGE

## 2023-06-10 RX ORDER — BRIMONIDINE TARTRATE 2 MG/MG
1 SOLUTION/ DROPS OPHTHALMIC
Refills: 0 | Status: DISCONTINUED | OUTPATIENT
Start: 2023-06-10 | End: 2023-06-13

## 2023-06-10 RX ORDER — POLYETHYLENE GLYCOL 3350 17 G/17G
17 POWDER, FOR SOLUTION ORAL DAILY
Refills: 0 | Status: DISCONTINUED | OUTPATIENT
Start: 2023-06-10 | End: 2023-06-13

## 2023-06-10 RX ORDER — SENNA PLUS 8.6 MG/1
2 TABLET ORAL
Qty: 0 | Refills: 0 | DISCHARGE

## 2023-06-10 RX ORDER — POLYETHYLENE GLYCOL 3350 17 G/17G
17 POWDER, FOR SOLUTION ORAL
Refills: 0 | DISCHARGE

## 2023-06-10 RX ORDER — PANTOPRAZOLE SODIUM 20 MG/1
0 TABLET, DELAYED RELEASE ORAL
Qty: 0 | Refills: 0 | DISCHARGE

## 2023-06-10 RX ORDER — TIMOLOL 0.5 %
1 DROPS OPHTHALMIC (EYE)
Refills: 0 | Status: DISCONTINUED | OUTPATIENT
Start: 2023-06-10 | End: 2023-06-13

## 2023-06-10 RX ORDER — BRIMONIDINE TARTRATE 2 MG/MG
1 SOLUTION/ DROPS OPHTHALMIC
Qty: 0 | Refills: 0 | DISCHARGE

## 2023-06-10 RX ORDER — RIVAROXABAN 15 MG-20MG
10 KIT ORAL DAILY
Refills: 0 | Status: DISCONTINUED | OUTPATIENT
Start: 2023-06-10 | End: 2023-06-13

## 2023-06-10 RX ORDER — ATORVASTATIN CALCIUM 80 MG/1
10 TABLET, FILM COATED ORAL AT BEDTIME
Refills: 0 | Status: DISCONTINUED | OUTPATIENT
Start: 2023-06-10 | End: 2023-06-13

## 2023-06-10 RX ORDER — CLOTRIMAZOLE AND BETAMETHASONE DIPROPIONATE 10; .5 MG/G; MG/G
1 CREAM TOPICAL
Qty: 0 | Refills: 0 | DISCHARGE

## 2023-06-10 RX ORDER — ATORVASTATIN CALCIUM 80 MG/1
1 TABLET, FILM COATED ORAL
Qty: 0 | Refills: 0 | DISCHARGE

## 2023-06-10 RX ORDER — FUROSEMIDE 40 MG
20 TABLET ORAL DAILY
Refills: 0 | Status: DISCONTINUED | OUTPATIENT
Start: 2023-06-10 | End: 2023-06-13

## 2023-06-10 RX ORDER — PANTOPRAZOLE SODIUM 20 MG/1
20 TABLET, DELAYED RELEASE ORAL DAILY
Refills: 0 | Status: DISCONTINUED | OUTPATIENT
Start: 2023-06-10 | End: 2023-06-13

## 2023-06-10 RX ORDER — POLYETHYLENE GLYCOL 3350 17 G/17G
17 POWDER, FOR SOLUTION ORAL
Qty: 0 | Refills: 0 | DISCHARGE

## 2023-06-10 RX ORDER — FUROSEMIDE 40 MG
1 TABLET ORAL
Qty: 0 | Refills: 0 | DISCHARGE

## 2023-06-10 RX ADMIN — BRIMONIDINE TARTRATE 1 DROP(S): 2 SOLUTION/ DROPS OPHTHALMIC at 19:16

## 2023-06-10 RX ADMIN — POLYETHYLENE GLYCOL 3350 17 GRAM(S): 17 POWDER, FOR SOLUTION ORAL at 19:09

## 2023-06-10 RX ADMIN — Medication 20 MILLIGRAM(S): at 19:10

## 2023-06-10 RX ADMIN — ATORVASTATIN CALCIUM 10 MILLIGRAM(S): 80 TABLET, FILM COATED ORAL at 22:46

## 2023-06-10 RX ADMIN — Medication 260 MILLIGRAM(S): at 10:37

## 2023-06-10 RX ADMIN — RIVAROXABAN 10 MILLIGRAM(S): KIT at 19:16

## 2023-06-10 RX ADMIN — CHLORHEXIDINE GLUCONATE 1 APPLICATION(S): 213 SOLUTION TOPICAL at 19:18

## 2023-06-10 RX ADMIN — Medication 1 TABLET(S): at 19:10

## 2023-06-10 RX ADMIN — Medication 650 MILLIGRAM(S): at 11:00

## 2023-06-10 RX ADMIN — Medication 1 DROP(S): at 19:15

## 2023-06-10 NOTE — H&P ADULT - NSHPPHYSICALEXAM_GEN_ALL_CORE
Vital Signs Last 24 Hrs  T(C): 36.4 (10 Gordon 2023 12:55), Max: 37.1 (10 Gordon 2023 07:58)  T(F): 97.6 (10 Gordon 2023 12:55), Max: 98.8 (10 Gordon 2023 07:58)  HR: 87 (10 Gordon 2023 12:55) (77 - 87)  BP: 131/82 (10 Gordon 2023 12:55) (123/70 - 147/96)  BP(mean): --  RR: 19 (10 Gordon 2023 12:55) (18 - 22)  SpO2: 100% (10 Gordon 2023 12:55) (96% - 100%)    Parameters below as of 10 Gordon 2023 12:55  Patient On (Oxygen Delivery Method): nasal cannula    GENERAL: NAD  EYES:  conjunctiva and sclera clear  NECK: Supple, No JVD  CHEST/LUNG: Clear to auscultation bilaterally; No wheeze  HEART: Regular rate and rhythm; No murmurs, rubs, or gallops  ABDOMEN: Soft, Nontender, Nondistended; Bowel sounds present. PEG in place CDI  EXTREMITIES:  2+ Peripheral Pulses, No clubbing, cyanosis, or edema  NEUROLOGY: non-focal, nonverbal cannot follow commands  SKIN: No rashes or lesions

## 2023-06-10 NOTE — ED ADULT NURSE REASSESSMENT NOTE - NS ED NURSE REASSESS COMMENT FT1
Pt laying comfortably on stretcher, no acute distress noted, productive cough noted.
Pt received on stretcher, non verbal, no indicators of pain present, no acute distress noted, vital signs stable. pt is awake in bed.

## 2023-06-10 NOTE — H&P ADULT - ASSESSMENT
89F dementia (nonverbal baseline) afib on xarelto, COPD (normally no O2) GERD hx CVA glaucoma sent from [facility] for evaluation of PEG tube, admitted with hypoxic respiratory failure secondary to likely aspiration pneumonia

## 2023-06-10 NOTE — PATIENT PROFILE ADULT - FALL HARM RISK - HARM RISK INTERVENTIONS
Assistance with ambulation/Assistance OOB with selected safe patient handling equipment/Communicate Risk of Fall with Harm to all staff/Discuss with provider need for PT consult/Monitor gait and stability/Reinforce activity limits and safety measures with patient and family/Tailored Fall Risk Interventions/Visual Cue: Yellow wristband and red socks/Bed in lowest position, wheels locked, appropriate side rails in place/Call bell, personal items and telephone in reach/Instruct patient to call for assistance before getting out of bed or chair/Non-slip footwear when patient is out of bed/[place] to call system/Physically safe environment - no spills, clutter or unnecessary equipment/Purposeful Proactive Rounding/Room/bathroom lighting operational, light cord in reach

## 2023-06-10 NOTE — H&P ADULT - PROBLEM SELECTOR PLAN 2
PEG tube appears to be in place and functioning  Resume PEG feeds and free water boluses  Jevity 1.2 1000cc/day @ 95cc/hour, 60cc water flush before and after feeds  250 cc water bolus q6h

## 2023-06-10 NOTE — H&P ADULT - NSHPLABSRESULTS_GEN_ALL_CORE
< from: CT Chest w/ IV Cont (06.10.23 @ 01:32) >      ACC: [id number] EXAM:  CT ABDOMEN AND PELVIS IC   ORDERED BY: CHRISTIANNE REILLY     ACC: [id number] EXAM:  CT CHEST IC   ORDERED BY: BRANDON MONTGOMERY     PROCEDURE DATE:  06/10/2023          INTERPRETATION:  CLINICAL INFORMATION: History of advanced dementia   (bedbound) presenting with shortness of breath with a retrocardiac   consolidation on outpatient x-ray    COMPARISON: CT abdomen pelvis from 4/13/2022 and CT chest from 4/17/2020.    CONTRAST/COMPLICATIONS:  IV Contrast: Omnipaque 350  90 cc administered   10 cc discarded  Oral Contrast: NONE  Complications: None reported at time of study completion    PROCEDURE:  CT of the Chest, Abdomen and Pelvis was performed.  Sagittal and coronal reformats were performed.    FINDINGS:  CHEST:  LUNGS AND LARGE AIRWAYS: Low-density occlusive opacity in the left lower   lobe bronchus and much of the left basilar segment bronchi. Consolidation   in the posterior segment left lower lobe. Patchy tree-in-bud nodular   infiltrates in the bilateral upper lobes..  PLEURA: Trace left effusion  VESSELS: Mild atherosclerotic change of the thoracic aorta without   aneurysm or dissection. Main pulmonary artery is normal in caliber.   Pulmonary arteries are opacified to the distal lobar level, no acute   thromboembolus within the limitations of motion artifact. Coronary artery   calcifications..  HEART: Mild cardiomegaly with right atrial enlargement. No pericardial   effusion.  MEDIASTINUM AND SARA: Mild mediastinal adenopathy. Bubbly fluid in the   thoracic esophagus.  CHEST WALL AND LOWER NECK: Within normal limits.    ABDOMEN AND PELVIS:  LIVER: Within normal limits.  BILE DUCTS: Common duct measures 7 mm, the upper limit of normal  GALLBLADDER: Cholecystectomy.  SPLEEN: 2 homogeneously enhancingnodules, the largest measures up to 1.5   cm, similar to the prior.  PANCREAS: Fatty atrophy  ADRENALS: Within normal limits.  KIDNEYS/URETERS: Atrophic left kidney. No hydronephrosis or perinephric   edema    BLADDER: Decompressed, limiting evaluation  REPRODUCTIVE ORGANS: Atrophic, concordant with the patient's age.   Scattered calcifications in the uterus, could represent small calcified   leiomyomata    BOWEL: Percutaneous gastrostomy with balloon tip in the gastric body. No   bowel obstruction. Appendix is normal.    PERITONEUM: No free air or free fluid  VESSELS: Atherosclerotic changes.  RETROPERITONEUM/LYMPH NODES: No retroperitoneal hemorrhage  ABDOMINAL WALL: Fat-containing left inguinal hernia.  BONES: Osteopenia. Extensive degenerative change. Left total hip   arthroplasty. Healed fracture of the right humeral neck.    IMPRESSION:    1. Findings are suspicious for left lower lobe bronchopneumonia, possibly   related to aspiration.  2. Scattered tree-in-bud nodular infiltrates in the bilateral upper   lobes, represent infectious or inflammatory etiology.  3. Stable nonacute findings in the abdomen and pelvis.        --- End of Report ---          AQUILINO FIGUEROA MD; Resident Radiologist  This document has been electronically signed.  YESI LUNDY MD; Attending Radiologist  This document has been electronically signed. Gordon 10 2023  3:09AM    < end of copied text >

## 2023-06-10 NOTE — H&P ADULT - PROBLEM SELECTOR PLAN 1
New hypoxia and LLL infiltrates c/w aspiration pneumonia  Continue levaquin  Does not meet SIRS criteria

## 2023-06-10 NOTE — H&P ADULT - NSICDXPASTMEDICALHX_GEN_ALL_CORE_FT
PAST MEDICAL HISTORY:  Atrial fibrillation, unspecified type on rivaroxaban    COPD (chronic obstructive pulmonary disease)     CVA (cerebral infarction)     Dementia     GERD (gastroesophageal reflux disease)     GI bleed     Glaucoma     Hyperlipemia     PUD (peptic ulcer disease)

## 2023-06-10 NOTE — H&P ADULT - HISTORY OF PRESENT ILLNESS
Pt is 89F dementia (nonverbal baseline) afib on xarelto, COPD (normally no O2) GERD hx CVA glaucoma sent from [facility] for evaluation of PEg tube suspected dislodged.  Patient also recently started requiring 2L NC O2 and was treated with 5 day course of azithromycin 6/5-6/9.  In ED received IV tylenol, ceftriaxone, levaquin, solumedrol 125mg.  PEG tube noted to be in place and flush normally.

## 2023-06-11 LAB
ALBUMIN SERPL ELPH-MCNC: 3.4 G/DL — SIGNIFICANT CHANGE UP (ref 3.3–5)
ALP SERPL-CCNC: 108 U/L — SIGNIFICANT CHANGE UP (ref 40–120)
ALT FLD-CCNC: 41 U/L — HIGH (ref 4–33)
ANION GAP SERPL CALC-SCNC: 13 MMOL/L — SIGNIFICANT CHANGE UP (ref 7–14)
AST SERPL-CCNC: 35 U/L — HIGH (ref 4–32)
BILIRUB SERPL-MCNC: 0.8 MG/DL — SIGNIFICANT CHANGE UP (ref 0.2–1.2)
BUN SERPL-MCNC: 50 MG/DL — HIGH (ref 7–23)
CALCIUM SERPL-MCNC: 9.2 MG/DL — SIGNIFICANT CHANGE UP (ref 8.4–10.5)
CHLORIDE SERPL-SCNC: 104 MMOL/L — SIGNIFICANT CHANGE UP (ref 98–107)
CO2 SERPL-SCNC: 28 MMOL/L — SIGNIFICANT CHANGE UP (ref 22–31)
CREAT SERPL-MCNC: 0.93 MG/DL — SIGNIFICANT CHANGE UP (ref 0.5–1.3)
EGFR: 56 ML/MIN/1.73M2 — LOW
GLUCOSE SERPL-MCNC: 154 MG/DL — HIGH (ref 70–99)
HCT VFR BLD CALC: 40.3 % — SIGNIFICANT CHANGE UP (ref 34.5–45)
HGB BLD-MCNC: 12.6 G/DL — SIGNIFICANT CHANGE UP (ref 11.5–15.5)
MAGNESIUM SERPL-MCNC: 2.7 MG/DL — HIGH (ref 1.6–2.6)
MCHC RBC-ENTMCNC: 31.3 GM/DL — LOW (ref 32–36)
MCHC RBC-ENTMCNC: 33.2 PG — SIGNIFICANT CHANGE UP (ref 27–34)
MCV RBC AUTO: 106.3 FL — HIGH (ref 80–100)
MRSA PCR RESULT.: SIGNIFICANT CHANGE UP
NRBC # BLD: 0 /100 WBCS — SIGNIFICANT CHANGE UP (ref 0–0)
NRBC # FLD: 0 K/UL — SIGNIFICANT CHANGE UP (ref 0–0)
PHOSPHATE SERPL-MCNC: 3.6 MG/DL — SIGNIFICANT CHANGE UP (ref 2.5–4.5)
PLATELET # BLD AUTO: 201 K/UL — SIGNIFICANT CHANGE UP (ref 150–400)
POTASSIUM SERPL-MCNC: 4.4 MMOL/L — SIGNIFICANT CHANGE UP (ref 3.5–5.3)
POTASSIUM SERPL-SCNC: 4.4 MMOL/L — SIGNIFICANT CHANGE UP (ref 3.5–5.3)
PROT SERPL-MCNC: 6.7 G/DL — SIGNIFICANT CHANGE UP (ref 6–8.3)
RBC # BLD: 3.79 M/UL — LOW (ref 3.8–5.2)
RBC # FLD: 14 % — SIGNIFICANT CHANGE UP (ref 10.3–14.5)
S AUREUS DNA NOSE QL NAA+PROBE: SIGNIFICANT CHANGE UP
SODIUM SERPL-SCNC: 145 MMOL/L — SIGNIFICANT CHANGE UP (ref 135–145)
WBC # BLD: 12.51 K/UL — HIGH (ref 3.8–10.5)
WBC # FLD AUTO: 12.51 K/UL — HIGH (ref 3.8–10.5)

## 2023-06-11 RX ORDER — ACETAMINOPHEN 500 MG
1000 TABLET ORAL ONCE
Refills: 0 | Status: COMPLETED | OUTPATIENT
Start: 2023-06-11 | End: 2023-06-11

## 2023-06-11 RX ADMIN — PANTOPRAZOLE SODIUM 20 MILLIGRAM(S): 20 TABLET, DELAYED RELEASE ORAL at 11:05

## 2023-06-11 RX ADMIN — SENNA PLUS 2 TABLET(S): 8.6 TABLET ORAL at 21:46

## 2023-06-11 RX ADMIN — Medication 1 DROP(S): at 05:32

## 2023-06-11 RX ADMIN — CHLORHEXIDINE GLUCONATE 1 APPLICATION(S): 213 SOLUTION TOPICAL at 11:06

## 2023-06-11 RX ADMIN — Medication 1 DROP(S): at 17:21

## 2023-06-11 RX ADMIN — Medication 400 MILLIGRAM(S): at 11:05

## 2023-06-11 RX ADMIN — BRIMONIDINE TARTRATE 1 DROP(S): 2 SOLUTION/ DROPS OPHTHALMIC at 05:32

## 2023-06-11 RX ADMIN — BRIMONIDINE TARTRATE 1 DROP(S): 2 SOLUTION/ DROPS OPHTHALMIC at 17:21

## 2023-06-11 RX ADMIN — Medication 20 MILLIGRAM(S): at 05:35

## 2023-06-11 RX ADMIN — RIVAROXABAN 10 MILLIGRAM(S): KIT at 11:05

## 2023-06-11 RX ADMIN — Medication 1000 MILLIGRAM(S): at 11:35

## 2023-06-11 RX ADMIN — ATORVASTATIN CALCIUM 10 MILLIGRAM(S): 80 TABLET, FILM COATED ORAL at 21:46

## 2023-06-11 RX ADMIN — Medication 1 TABLET(S): at 11:06

## 2023-06-11 RX ADMIN — POLYETHYLENE GLYCOL 3350 17 GRAM(S): 17 POWDER, FOR SOLUTION ORAL at 11:06

## 2023-06-12 LAB
ALBUMIN SERPL ELPH-MCNC: 3.5 G/DL — SIGNIFICANT CHANGE UP (ref 3.3–5)
ALP SERPL-CCNC: 113 U/L — SIGNIFICANT CHANGE UP (ref 40–120)
ALT FLD-CCNC: 37 U/L — HIGH (ref 4–33)
ANION GAP SERPL CALC-SCNC: 11 MMOL/L — SIGNIFICANT CHANGE UP (ref 7–14)
AST SERPL-CCNC: 23 U/L — SIGNIFICANT CHANGE UP (ref 4–32)
BASE EXCESS BLDV CALC-SCNC: 5.3 MMOL/L — HIGH (ref -2–3)
BILIRUB SERPL-MCNC: 0.6 MG/DL — SIGNIFICANT CHANGE UP (ref 0.2–1.2)
BUN SERPL-MCNC: 47 MG/DL — HIGH (ref 7–23)
CA-I SERPL-SCNC: 1.23 MMOL/L — SIGNIFICANT CHANGE UP (ref 1.15–1.33)
CALCIUM SERPL-MCNC: 8.9 MG/DL — SIGNIFICANT CHANGE UP (ref 8.4–10.5)
CHLORIDE BLDV-SCNC: 103 MMOL/L — SIGNIFICANT CHANGE UP (ref 96–108)
CHLORIDE SERPL-SCNC: 102 MMOL/L — SIGNIFICANT CHANGE UP (ref 98–107)
CO2 BLDV-SCNC: 36.1 MMOL/L — HIGH (ref 22–26)
CO2 SERPL-SCNC: 30 MMOL/L — SIGNIFICANT CHANGE UP (ref 22–31)
CREAT SERPL-MCNC: 0.85 MG/DL — SIGNIFICANT CHANGE UP (ref 0.5–1.3)
EGFR: 62 ML/MIN/1.73M2 — SIGNIFICANT CHANGE UP
GAS PNL BLDV: 139 MMOL/L — SIGNIFICANT CHANGE UP (ref 136–145)
GAS PNL BLDV: SIGNIFICANT CHANGE UP
GAS PNL BLDV: SIGNIFICANT CHANGE UP
GLUCOSE BLDV-MCNC: 217 MG/DL — HIGH (ref 70–99)
GLUCOSE SERPL-MCNC: 210 MG/DL — HIGH (ref 70–99)
HCO3 BLDV-SCNC: 34 MMOL/L — HIGH (ref 22–29)
HCT VFR BLD CALC: 41.6 % — SIGNIFICANT CHANGE UP (ref 34.5–45)
HCT VFR BLDA CALC: 40 % — SIGNIFICANT CHANGE UP (ref 34.5–46.5)
HGB BLD CALC-MCNC: 13.3 G/DL — SIGNIFICANT CHANGE UP (ref 11.7–16.1)
HGB BLD-MCNC: 12.8 G/DL — SIGNIFICANT CHANGE UP (ref 11.5–15.5)
LACTATE BLDV-MCNC: 2.2 MMOL/L — HIGH (ref 0.5–2)
LACTATE SERPL-SCNC: 1.9 MMOL/L — SIGNIFICANT CHANGE UP (ref 0.5–2)
LEGIONELLA AG UR QL: NEGATIVE — SIGNIFICANT CHANGE UP
MAGNESIUM SERPL-MCNC: 2.6 MG/DL — SIGNIFICANT CHANGE UP (ref 1.6–2.6)
MCHC RBC-ENTMCNC: 30.8 GM/DL — LOW (ref 32–36)
MCHC RBC-ENTMCNC: 32.8 PG — SIGNIFICANT CHANGE UP (ref 27–34)
MCV RBC AUTO: 106.7 FL — HIGH (ref 80–100)
NRBC # BLD: 0 /100 WBCS — SIGNIFICANT CHANGE UP (ref 0–0)
NRBC # FLD: 0 K/UL — SIGNIFICANT CHANGE UP (ref 0–0)
PCO2 BLDV: 69 MMHG — HIGH (ref 39–52)
PH BLDV: 7.3 — LOW (ref 7.32–7.43)
PHOSPHATE SERPL-MCNC: 3.6 MG/DL — SIGNIFICANT CHANGE UP (ref 2.5–4.5)
PLATELET # BLD AUTO: 202 K/UL — SIGNIFICANT CHANGE UP (ref 150–400)
PO2 BLDV: 78 MMHG — HIGH (ref 25–45)
POTASSIUM BLDV-SCNC: 3.9 MMOL/L — SIGNIFICANT CHANGE UP (ref 3.5–5.1)
POTASSIUM SERPL-MCNC: 4.1 MMOL/L — SIGNIFICANT CHANGE UP (ref 3.5–5.3)
POTASSIUM SERPL-SCNC: 4.1 MMOL/L — SIGNIFICANT CHANGE UP (ref 3.5–5.3)
PROCALCITONIN SERPL-MCNC: 0.1 NG/ML — SIGNIFICANT CHANGE UP (ref 0.02–0.1)
PROT SERPL-MCNC: 6.8 G/DL — SIGNIFICANT CHANGE UP (ref 6–8.3)
RBC # BLD: 3.9 M/UL — SIGNIFICANT CHANGE UP (ref 3.8–5.2)
RBC # FLD: 14.4 % — SIGNIFICANT CHANGE UP (ref 10.3–14.5)
S PNEUM AG UR QL: NEGATIVE — SIGNIFICANT CHANGE UP
SAO2 % BLDV: 94.4 % — HIGH (ref 67–88)
SODIUM SERPL-SCNC: 143 MMOL/L — SIGNIFICANT CHANGE UP (ref 135–145)
WBC # BLD: 12.59 K/UL — HIGH (ref 3.8–10.5)
WBC # FLD AUTO: 12.59 K/UL — HIGH (ref 3.8–10.5)

## 2023-06-12 RX ADMIN — ATORVASTATIN CALCIUM 10 MILLIGRAM(S): 80 TABLET, FILM COATED ORAL at 23:03

## 2023-06-12 RX ADMIN — BRIMONIDINE TARTRATE 1 DROP(S): 2 SOLUTION/ DROPS OPHTHALMIC at 17:05

## 2023-06-12 RX ADMIN — RIVAROXABAN 10 MILLIGRAM(S): KIT at 11:55

## 2023-06-12 RX ADMIN — Medication 1 DROP(S): at 17:05

## 2023-06-12 RX ADMIN — Medication 1 TABLET(S): at 11:55

## 2023-06-12 RX ADMIN — CHLORHEXIDINE GLUCONATE 1 APPLICATION(S): 213 SOLUTION TOPICAL at 11:56

## 2023-06-12 RX ADMIN — Medication 1 DROP(S): at 05:19

## 2023-06-12 RX ADMIN — Medication 20 MILLIGRAM(S): at 07:30

## 2023-06-12 RX ADMIN — POLYETHYLENE GLYCOL 3350 17 GRAM(S): 17 POWDER, FOR SOLUTION ORAL at 11:56

## 2023-06-12 RX ADMIN — PANTOPRAZOLE SODIUM 20 MILLIGRAM(S): 20 TABLET, DELAYED RELEASE ORAL at 11:55

## 2023-06-12 RX ADMIN — BRIMONIDINE TARTRATE 1 DROP(S): 2 SOLUTION/ DROPS OPHTHALMIC at 05:19

## 2023-06-12 NOTE — DIETITIAN INITIAL EVALUATION ADULT - PERTINENT MEDS FT
MEDICATIONS  (STANDING):  atorvastatin 10 milliGRAM(s) Oral at bedtime  brimonidine 0.2% Ophthalmic Solution 1 Drop(s) Both EYES two times a day  chlorhexidine 2% Cloths 1 Application(s) Topical daily  furosemide    Tablet 20 milliGRAM(s) Oral daily  levoFLOXacin IVPB 750 milliGRAM(s) IV Intermittent every 48 hours  multivitamin 1 Tablet(s) Oral daily  pantoprazole   Suspension 20 milliGRAM(s) Oral daily  polyethylene glycol 3350 17 Gram(s) Oral daily  rivaroxaban 10 milliGRAM(s) Enteral Tube daily  senna 2 Tablet(s) Oral at bedtime  timolol 0.5% Solution 1 Drop(s) Both EYES two times a day    MEDICATIONS  (PRN):

## 2023-06-12 NOTE — DIETITIAN INITIAL EVALUATION ADULT - ORAL INTAKE PTA/DIET HISTORY
Per transfer paper, patient is receiving 95cc/hr of Jevity1.2cal total volume/d =1000mL, 1200kcal, 55.5g pro and 807mL free water.

## 2023-06-12 NOTE — DIETITIAN INITIAL EVALUATION ADULT - ADD RECOMMEND
1. Monitor weights, labs, BM's, skin integrity, tolerance to EN.   2. Further adjustments to rate/volume/duration/free water provision of enteral feeds dependant on long term monitoring of patient's tolerance, weight trends and needs.

## 2023-06-12 NOTE — PROGRESS NOTE ADULT - SUBJECTIVE AND OBJECTIVE BOX
pt resting  Vital Signs Last 24 Hrs  T(C): 36.9 (12 Jun 2023 20:59), Max: 36.9 (12 Jun 2023 20:59)  T(F): 98.4 (12 Jun 2023 20:59), Max: 98.4 (12 Jun 2023 20:59)  HR: 69 (12 Jun 2023 20:59) (69 - 81)  BP: 112/58 (12 Jun 2023 21:32) (112/58 - 152/67)  BP(mean): --  RR: 17 (12 Jun 2023 20:59) (17 - 18)  SpO2: 99% (12 Jun 2023 20:59) (95% - 99%)    Parameters below as of 12 Jun 2023 20:59  Patient On (Oxygen Delivery Method): nasal cannula  MEDICATIONS  (STANDING):  atorvastatin 10 milliGRAM(s) Oral at bedtime  brimonidine 0.2% Ophthalmic Solution 1 Drop(s) Both EYES two times a day  chlorhexidine 2% Cloths 1 Application(s) Topical daily  furosemide    Tablet 20 milliGRAM(s) Oral daily  levoFLOXacin IVPB 750 milliGRAM(s) IV Intermittent every 48 hours  multivitamin 1 Tablet(s) Oral daily  pantoprazole   Suspension 20 milliGRAM(s) Oral daily  polyethylene glycol 3350 17 Gram(s) Oral daily  rivaroxaban 10 milliGRAM(s) Enteral Tube daily  senna 2 Tablet(s) Oral at bedtime  timolol 0.5% Solution 1 Drop(s) Both EYES two times a day                        12.8   12.59 )-----------( 202      ( 12 Jun 2023 06:30 )             41.6   06-12    143  |  102  |  47<H>  ----------------------------<  210<H>  4.1   |  30  |  0.85    Ca    8.9      12 Jun 2023 06:30  Phos  3.6     06-12  Mg     2.60     06-12    TPro  6.8  /  Alb  3.5  /  TBili  0.6  /  DBili  x   /  AST  23  /  ALT  37<H>  /  AlkPhos  113  06-12    
[facility] admitted withhypoxic respiratory failure secondary tolikely aspiration pneumonia      Puem[place] sec to aspiration  continue levaquin    s/ppercutaneous endoscopic gastrostomy PEG tubeseems functioning  continue feeds    Chronic AFIB  continue xarelto      Discharge planning

## 2023-06-12 NOTE — DIETITIAN INITIAL EVALUATION ADULT - REASON FOR ADMISSION
Pneumonia due to infectious organism    History of Present Illness:   89y/o F dementia (nonverbal baseline) afib on xarelto, COPD (normally no O2) GERD hx CVA glaucoma sent from [facility] for evaluation of PEg tube suspected dislodged per chart review.

## 2023-06-12 NOTE — DIETITIAN INITIAL EVALUATION ADULT - PERTINENT LABORATORY DATA
06-12    143  |  102  |  47<H>  ----------------------------<  210<H>  4.1   |  30  |  0.85    Ca    8.9      12 Jun 2023 06:30  Phos  3.6     06-12  Mg     2.60     06-12    TPro  6.8  /  Alb  3.5  /  TBili  0.6  /  DBili  x   /  AST  23  /  ALT  37<H>  /  AlkPhos  113  06-12

## 2023-06-12 NOTE — DIETITIAN INITIAL EVALUATION ADULT - OTHER INFO
Patient currently ordered for Jevity 1.2cal at rate of 95cc/hr x 11hr equates to total volume of 1045mL, 1254kcal, 58g pro and 843ml free water. Patient tolerating tube feeds. No nausea/vomiting/diarrhea/constipation reported. Last RD note 4/15/22- 67.5kg/149lbs and now this admission 6/10/23- 67.1kg. Weight relatively stable.

## 2023-06-12 NOTE — PROGRESS NOTE ADULT - ASSESSMENT
89y/o dementia afib on xareltoCOPD GERD HX CVA glaucoma sent for evaluation ofPEG tube admitted with hypoxic respiratory failure seclikely  toaspiration pneumonia    Pneumonia aspiration  continue levaquin    chronic  atrial  Fibrillation  continue xarelto    PEG  tube in place    Dischargeplanning

## 2023-06-12 NOTE — DIETITIAN INITIAL EVALUATION ADULT - ENTERAL
Recommend increase TF of Jevity1.2cal at goal rate 95cc/hr x 13hrs provides total volume of 1235mL, 1482kcal, 69g pro and 997mL free water.

## 2023-06-13 ENCOUNTER — TRANSCRIPTION ENCOUNTER (OUTPATIENT)
Age: 88
End: 2023-06-13

## 2023-06-13 VITALS — OXYGEN SATURATION: 90 % | HEART RATE: 80 BPM

## 2023-06-13 LAB
ALBUMIN SERPL ELPH-MCNC: 3.3 G/DL — SIGNIFICANT CHANGE UP (ref 3.3–5)
ALP SERPL-CCNC: 110 U/L — SIGNIFICANT CHANGE UP (ref 40–120)
ALT FLD-CCNC: 30 U/L — SIGNIFICANT CHANGE UP (ref 4–33)
ANION GAP SERPL CALC-SCNC: 10 MMOL/L — SIGNIFICANT CHANGE UP (ref 7–14)
AST SERPL-CCNC: 20 U/L — SIGNIFICANT CHANGE UP (ref 4–32)
BASOPHILS # BLD AUTO: 0.02 K/UL — SIGNIFICANT CHANGE UP (ref 0–0.2)
BASOPHILS NFR BLD AUTO: 0.2 % — SIGNIFICANT CHANGE UP (ref 0–2)
BILIRUB SERPL-MCNC: 0.5 MG/DL — SIGNIFICANT CHANGE UP (ref 0.2–1.2)
BUN SERPL-MCNC: 39 MG/DL — HIGH (ref 7–23)
CALCIUM SERPL-MCNC: 8.4 MG/DL — SIGNIFICANT CHANGE UP (ref 8.4–10.5)
CHLORIDE SERPL-SCNC: 100 MMOL/L — SIGNIFICANT CHANGE UP (ref 98–107)
CO2 SERPL-SCNC: 29 MMOL/L — SIGNIFICANT CHANGE UP (ref 22–31)
CREAT SERPL-MCNC: 0.75 MG/DL — SIGNIFICANT CHANGE UP (ref 0.5–1.3)
EGFR: 72 ML/MIN/1.73M2 — SIGNIFICANT CHANGE UP
EOSINOPHIL # BLD AUTO: 0.14 K/UL — SIGNIFICANT CHANGE UP (ref 0–0.5)
EOSINOPHIL NFR BLD AUTO: 1.2 % — SIGNIFICANT CHANGE UP (ref 0–6)
GLUCOSE SERPL-MCNC: 161 MG/DL — HIGH (ref 70–99)
HCT VFR BLD CALC: 39.9 % — SIGNIFICANT CHANGE UP (ref 34.5–45)
HGB BLD-MCNC: 12.7 G/DL — SIGNIFICANT CHANGE UP (ref 11.5–15.5)
IANC: 9.89 K/UL — HIGH (ref 1.8–7.4)
IMM GRANULOCYTES NFR BLD AUTO: 0.9 % — SIGNIFICANT CHANGE UP (ref 0–0.9)
LYMPHOCYTES # BLD AUTO: 0.81 K/UL — LOW (ref 1–3.3)
LYMPHOCYTES # BLD AUTO: 7 % — LOW (ref 13–44)
MCHC RBC-ENTMCNC: 31.8 GM/DL — LOW (ref 32–36)
MCHC RBC-ENTMCNC: 32.8 PG — SIGNIFICANT CHANGE UP (ref 27–34)
MCV RBC AUTO: 103.1 FL — HIGH (ref 80–100)
MONOCYTES # BLD AUTO: 0.68 K/UL — SIGNIFICANT CHANGE UP (ref 0–0.9)
MONOCYTES NFR BLD AUTO: 5.8 % — SIGNIFICANT CHANGE UP (ref 2–14)
NEUTROPHILS # BLD AUTO: 9.89 K/UL — HIGH (ref 1.8–7.4)
NEUTROPHILS NFR BLD AUTO: 84.9 % — HIGH (ref 43–77)
NRBC # BLD: 0 /100 WBCS — SIGNIFICANT CHANGE UP (ref 0–0)
NRBC # FLD: 0 K/UL — SIGNIFICANT CHANGE UP (ref 0–0)
PLATELET # BLD AUTO: 184 K/UL — SIGNIFICANT CHANGE UP (ref 150–400)
POTASSIUM SERPL-MCNC: 4.9 MMOL/L — SIGNIFICANT CHANGE UP (ref 3.5–5.3)
POTASSIUM SERPL-SCNC: 4.9 MMOL/L — SIGNIFICANT CHANGE UP (ref 3.5–5.3)
PROT SERPL-MCNC: 6.4 G/DL — SIGNIFICANT CHANGE UP (ref 6–8.3)
RBC # BLD: 3.87 M/UL — SIGNIFICANT CHANGE UP (ref 3.8–5.2)
RBC # FLD: 13.8 % — SIGNIFICANT CHANGE UP (ref 10.3–14.5)
SARS-COV-2 RNA SPEC QL NAA+PROBE: SIGNIFICANT CHANGE UP
SODIUM SERPL-SCNC: 139 MMOL/L — SIGNIFICANT CHANGE UP (ref 135–145)
WBC # BLD: 11.64 K/UL — HIGH (ref 3.8–10.5)
WBC # FLD AUTO: 11.64 K/UL — HIGH (ref 3.8–10.5)

## 2023-06-13 RX ORDER — LEVOFLOXACIN 5 MG/ML
1 INJECTION, SOLUTION INTRAVENOUS
Qty: 0 | Refills: 0 | DISCHARGE

## 2023-06-13 RX ADMIN — Medication 1 TABLET(S): at 12:22

## 2023-06-13 RX ADMIN — PANTOPRAZOLE SODIUM 20 MILLIGRAM(S): 20 TABLET, DELAYED RELEASE ORAL at 12:21

## 2023-06-13 RX ADMIN — CHLORHEXIDINE GLUCONATE 1 APPLICATION(S): 213 SOLUTION TOPICAL at 12:16

## 2023-06-13 RX ADMIN — POLYETHYLENE GLYCOL 3350 17 GRAM(S): 17 POWDER, FOR SOLUTION ORAL at 12:17

## 2023-06-13 RX ADMIN — Medication 20 MILLIGRAM(S): at 07:00

## 2023-06-13 RX ADMIN — RIVAROXABAN 10 MILLIGRAM(S): KIT at 12:22

## 2023-06-13 RX ADMIN — Medication 1 DROP(S): at 07:00

## 2023-06-13 RX ADMIN — BRIMONIDINE TARTRATE 1 DROP(S): 2 SOLUTION/ DROPS OPHTHALMIC at 07:00

## 2023-06-13 NOTE — DISCHARGE NOTE PROVIDER - HOSPITAL COURSE
89 y/o F emale with PMH of dementia (non-verbal baseline), afib on xarelto, COPD (normally no O2), GERD, hx CVA, glaucoma sent from [facility] for evaluation of PEG tube malfunction, admitted with hypoxic respiratory failure secondary to pneumonia. Pt had CT A/P, peg tube noted to be in place, and functional. Chest xray & CT chest notable for LLL PNA, Pt started on Levaquin IV, completed 2 doses in the hospital, transition to PO Levaquin to be given via peg for total 5 doses. Pt is w/o fever, VSS.  Pt continued on supplemental o2, 2LNC.       Pt is medically stable for discharge as discussed w/Dr. Cruz. 89 y/o F emale with PMH of dementia (non-verbal baseline), afib on xarelto, COPD (normally no O2), GERD, hx CVA, glaucoma sent from [facility] for evaluation of PEG tube malfunction, admitted with hypoxic respiratory failure secondary to pneumonia. Pt had CT A/P, peg tube noted to be in place, and functional. Chest xray & CT chest notable for LLL PNA, Pt started on Levaquin IV, completed 2 doses in the hospital, transition to PO Levaquin to be given via peg for total 5 doses. Pt is w/o fever, VSS.  Pt continued on supplemental o2, 2LNC.       Pt is medically stable for discharge as discussed w/Dr. Cruz.  Pt peter Cervantes was updated on discharge plan.

## 2023-06-13 NOTE — DISCHARGE NOTE NURSING/CASE MANAGEMENT/SOCIAL WORK - NSDCVIVACCINE_GEN_ALL_CORE_FT
Tdap; 06-Apr-2017 19:04; Lavinia Dexter (RN); [employer]; [id number]; IntraMuscular; Deltoid Left.; 0.5 milliLiter(s); VIS (VIS Published: 09-May-2013, VIS Presented: 06-Apr-2017);   Tdap; 12-Jun-2020 11:32; Benigno Miller); [employer]; [id number] (Exp. Date: 15-Aug-2021); IntraMuscular; Deltoid Left.; 0.5 milliLiter(s); VIS (VIS Published: 09-May-2013, VIS Presented: 12-Jun-2020);

## 2023-06-13 NOTE — DISCHARGE NOTE PROVIDER - NSDCCPCAREPLAN_GEN_ALL_CORE_FT
PRINCIPAL DISCHARGE DIAGNOSIS  Diagnosis: Pneumonia  Assessment and Plan of Treatment: complete Levaquin as prescribed  follow up with Primary care provider at [facility]      SECONDARY DISCHARGE DIAGNOSES  Diagnosis: Chronic atrial fibrillation  Assessment and Plan of Treatment: continue Xarelto as prescribed

## 2023-06-13 NOTE — DISCHARGE NOTE PROVIDER - NSDCMRMEDTOKEN_GEN_ALL_CORE_FT
brimonidine 0.2% ophthalmic solution: in each eye 2 times a day  Lasix 20 mg oral tablet: 1 tab(s) orally once a day  Levaquin 750 mg oral tablet: 1 tab(s) by gastrostomy tube every other day x 3 doses, starting 6/14  Lipitor 10 mg oral tablet: 1 tab(s) orally once a day  MiraLax oral powder for reconstitution: 17 gram(s) orally once a day  Multiple Vitamins oral tablet: 1 tab(s) orally once a day  pantoprazole: 20mL via G tube once daily  senna oral tablet: 2 tab(s) by gastrostomy tube once a day (at bedtime)  timolol maleate 0.5% ophthalmic gel forming solution: 1 drop(s) to both eyes 2 times a day  Xarelto 10 mg oral tablet: 1 tab(s) by gastrostomy tube once a day

## 2023-06-13 NOTE — DISCHARGE NOTE NURSING/CASE MANAGEMENT/SOCIAL WORK - PATIENT PORTAL LINK FT
You can access the FollowMyHealth Patient Portal offered by [facility] by registering at the following website: [email or web address]. By joining [employer]’s FollowMyHealth portal, you will also be able to view your health information using other applications (apps) compatible with our system.

## 2023-06-13 NOTE — DISCHARGE NOTE NURSING/CASE MANAGEMENT/SOCIAL WORK - NSDCPEFALRISK_GEN_ALL_CORE
For information on Fall & Injury Prevention, visit: [email or web address] OR  [email or web address] OR  https://www.cdc.gov/steadi/patient.html

## 2023-06-24 NOTE — DISCHARGE NOTE PROVIDER - CARE PROVIDER_API CALL
Po [place])  [place] [address]  Phone: [phone]  Fax: [phone]  Established Patient  Follow Up Time: 1 week no

## 2023-07-02 ENCOUNTER — EMERGENCY (EMERGENCY)
Facility: HOSPITAL | Age: 88
LOS: 1 days | Discharge: NURSING, REHAB OR LONG-TERM CARE | End: 2023-07-02
Attending: STUDENT IN AN ORGANIZED HEALTH CARE EDUCATION/TRAINING PROGRAM | Admitting: STUDENT IN AN ORGANIZED HEALTH CARE EDUCATION/TRAINING PROGRAM
Payer: MEDICARE

## 2023-07-02 VITALS
DIASTOLIC BLOOD PRESSURE: 58 MMHG | SYSTOLIC BLOOD PRESSURE: 127 MMHG | HEART RATE: 86 BPM | TEMPERATURE: 96 F | RESPIRATION RATE: 18 BRPM | OXYGEN SATURATION: 95 %

## 2023-07-02 DIAGNOSIS — Z93.1 GASTROSTOMY STATUS: Chronic | ICD-10-CM

## 2023-07-02 PROCEDURE — 71045 X-RAY EXAM CHEST 1 VIEW: CPT | Mod: 26

## 2023-07-02 PROCEDURE — 74018 RADEX ABDOMEN 1 VIEW: CPT | Mod: 26

## 2023-07-02 PROCEDURE — 99284 EMERGENCY DEPT VISIT MOD MDM: CPT | Mod: 25,GC

## 2023-07-02 PROCEDURE — 43762 RPLC GTUBE NO REVJ TRC: CPT | Mod: GC

## 2023-07-02 RX ORDER — IOHEXOL 300 MG/ML
30 INJECTION, SOLUTION INTRAVENOUS ONCE
Refills: 0 | Status: COMPLETED | OUTPATIENT
Start: 2023-07-02 | End: 2023-07-03

## 2023-07-02 NOTE — ED PROCEDURE NOTE - ATTENDING CONTRIBUTION TO CARE
I have personally performed a history and physical examination of the patient and discussed management with the resident as well as the patient.  I reviewed the resident's note and agree with the documented findings and plan of care.  I have authored and modified critical sections of the Provider Note, including but not limited to HPI, Physical Exam and MDM.    Uncomplicated peg tube placement.

## 2023-07-02 NOTE — ED ADULT NURSE NOTE - NSFALLRISKINTERV_ED_ALL_ED

## 2023-07-02 NOTE — ED ADULT NURSE NOTE - OBJECTIVE STATEMENT
Pt presents from nursing home for peg tube replacement. Pt has hx cva, bedbound, nonverbal, afebrile rectally, skin intact, respirations even and unlabored. VSS, will await further orders and continue to monitor.

## 2023-07-02 NOTE — ED PROVIDER NOTE - ATTENDING CONTRIBUTION TO CARE
I have personally performed a history and physical examination of the patient and discussed management with the resident as well as the patient.  I reviewed the resident's note and agree with the documented findings and plan of care.  I have authored and modified critical sections of the Provider Note, including but not limited to HPI, Physical Exam and MDM.    89-year-old female history of CVA, COPD, A-fib on Xarelto, dementia, GERD, hyperlipidemia, s/p G-tube in place presenting sent in from nursing home for evaluation of PEG tube dislodgment.  Patient recently completed a course of Levaquin on 6/19 for left lower lobe pneumonia.  Per nursing home staff, patient is at baseline with no recent fevers, difficulty breathing, changes in activity or mental status.  Collateral information obtained from nursing home for PEG tube sizing.  Will replace PEG tube and confirmed with abdominal x-ray.  Given recent pneumonia and dry cough appreciated while in the emergency department will obtain CXR to rule out persistent PNA.

## 2023-07-02 NOTE — ED PROVIDER NOTE - OBJECTIVE STATEMENT
89-year-old female history of CVA, COPD, A-fib on Xarelto, dementia, GERD, hyperlipidemia, s/p G-tube in place presenting sent in from nursing home for evaluation of PEG tube dislodgment.  Patient recently completed a course of Levaquin on 6/19 for left lower lobe pneumonia.  Per nursing home staff, patient is at baseline with no recent fevers, difficulty breathing, changes in activity or mental status.

## 2023-07-02 NOTE — ED PROVIDER NOTE - PHYSICAL EXAMINATION
GENERAL:  no acute distress, non-toxic appearing  HEAD: normocephalic, atraumatic  HEENT:  neck supple, airway patent   CARDIAC: regular rate and rhythm, no appreciable murmurs, 2+ pulses in UE/LE b/l  PULM: nonlabored respirations, 1 episode of coughing on exam, slight decrease in breath sounds on L base.   GI: abdomen nondistended, soft, nontender, no guarding, rebound tenderness, 12 [place] quinones catheter in place in left quadrant of abdomen  NEURO: AAOx0   MSK: no peripheral edema, no calf tenderness b/l  SKIN: well-perfused, extremities warm

## 2023-07-02 NOTE — ED PROVIDER NOTE - CLINICAL SUMMARY MEDICAL DECISION MAKING FREE TEXT BOX
89-year-old female history of CVA, COPD, A-fib on Xarelto, dementia, GERD, hyperlipidemia, s/p G-tube in place presenting sent in from nursing home for evaluation of PEG tube dislodgment.  Patient recently completed a course of Levaquin on 6/19 for left lower lobe pneumonia.  Afebrile rectally, HDS, with come coughing on exam and decreased breath sounds in left base, quinones catheter in place in peg tube site.    will get CXR to ensure appropriate recovery of pt's LLL PNA, will replace PEG tube.

## 2023-07-02 NOTE — ED PROVIDER NOTE - PROGRESS NOTE DETAILS
Odalys Oquendo PGY-3: Initial abdominal x-ray contrast study to evaluate for PEG tube placement was equivocal.  Repeat 2 view of abdomen showed contrast in loops of small bowel.  No evidence of contrast extravasation.  Chest x-ray consistent with known history of recent pneumonia.  Patient is status post levofloxacin for treatment.  Has been afebrile here.  To send patient back to facility.

## 2023-07-02 NOTE — ED PROVIDER NOTE - PATIENT PORTAL LINK FT
You can access the FollowMyHealth Patient Portal offered by [facility] by registering at the following website: [email or web address]. By joining [employer]’s FollowMyHealth portal, you will also be able to view your health information using other applications (apps) compatible with our system.

## 2023-07-02 NOTE — ED ADULT NURSE NOTE - COVID-19 RESULT
H&P signed by Rip Douglas MD at 01/10/15 06:32 PM      Author:  Rip Douglas MD Service:  (none) Author Type:  Physician     Filed:  01/10/15 06:33 PM Encounter Date:  1/9/2015 Status:  Signed     :  Rip Douglas MD (Physician)                                                               [address] [facility]  [address]                             [address] [facility], [address] [place] [address]         NAME:  Kathleen Noriega     ADMISSION DATE:  01/09/2015         PHYSICIAN:  Abilio Viveros M.D.     Mukesh Bingham:  [id number]         YOB: 1962     MED REC#:  [id number]         ACCT #:  [de-identified]                                   HISTORY AND PHYSICAL         Reyes Clever MRN:  [id number]       The patient has not seen a primary care doctor regularly, but sees her  OB care with Dr. Humaira Lomeli as well as care through our walk-in clinic. History is obtained from the patient, her , and the [address] [place] [address] record. HISTORY OF PRESENT ILLNESS:  The patient is a 80-year-old woman with a  history of frequent UTIs who is presenting with UTI symptoms that did  not improve after oral antibiotics. The patient noted dysuria,  frequency and urgency starting about 5 days ago. A UA showed possible  UTI and the patient was prescribed Bactrim with a culture pending. Her  culture did grow Escherichia coli with extended spectrum beta lactamase  resistance, sensitive to gentamicin and the penems. She had no  improvement in her symptoms, and so was referred to the emergency room. She denies any fevers or chills, nausea or vomiting, but does report  ongoing urinary symptoms. She does describes some left-sided low back  aching since Tuesday, but this has not been very severe. Otherwise  review of systems is negative. PAST MEDICAL HISTORY:  1. Frequent UTIs. 2.     History of appendectomy. SOCIAL HISTORY:  The patient denies tobacco or illicits. She drinks  socially.   She is active with her daily activities. She is  and  has children. FAMILY HISTORY:  The patient's mother had ovarian cancer at the age of  72 and father had liver cancer. ALLERGIES:  No known medication allergies. MEDICATIONS:  Bactrim double strength twice daily prescribed earlier  this week and Imitrex as needed for headache. OBJECTIVE:  Vital Signs:  Temperature is 97.3, heart rate 91,  respiratory rate 18, blood pressure 154/110 and oxygen saturation is  98%. General:  The patient is alert, oriented x3, and in no apparent  distress. HEENT:  Pupils are equally round and reactive to light. Extraocular  movements are intact. Mucous membranes are moist.  Oropharynx clear. There is no scleral icterus or pallor. Neck:  There is no cervical lymphadenopathy or thyromegaly. Neck is  supple. Cardiovascular:  Patient has a regular rate and rhythm with no murmurs. Lungs:  Clear to auscultation bilaterally. Abdomen:  Soft, nontender, nondistended with normoactive bowel sounds. :  No suprapubic or CVA tenderness. Lower extremities:  No edema or calf tenderness. Skin:  No rashes or petechiae. Neurologic:  Cranial nerves 2-12 within normal limits and 5/5 strength  in extremities. LABS:  White blood cell count is 6.4, hemoglobin is 13.4 and platelets  are 697. Sodium 139, potassium 4.2, chloride 104, bicarb 21, BUN 18,  creatinine 0.79. Urinalysis shows small leuk esterase, only 2-4 white  blood cells, and negative nitrites as well as no bacteria, however, her  urinalysis from earlier this week did show greater than 25 white blood  cells and trace leuk esterase, and the urine culture grew Escherichia  coli that was ESBL  positive and only sensitive to ertapenem,  gentamicin, and imipenem. Blood cultures and urine culture are pending.          ASSESSMENT AND PLAN:  The patient is a 70-year-old woman who presented  with a urinary tract infection, found to have E coli that was ESBL  positive. 1.     Urinary tract infection with multi-drug resistant organism      sensitive to the penems and gentamicin. We will treat the patient      with ertapenem and Dr. Rasta Pack from [phone] was consulted. Follow up      results of cultures in-house. The patient shows no signs of      systemic infection or pyelonephritis at this time. 2.     Deep venous thrombosis prophylaxis:  Lovenox daily. 3.     Code status:  Full code.                 ______________________________       YOUSUF MONSALVE: Umang Tipton  D:  01/09/2015 22:06:57  T:  01/09/2015 23:11:34  Document #: [id number]  Electronic Signatures:    CAMERON MARTÍNEZ) (Signed on 10-Miguel-15 18:32)  Authored  services (Other) (Entered on 09-Miguel-15 23:11)  Entered       Last Updated: 10-Miguel-15 18:32 by CAMERON MARTÍNEZ)           Revision History        Date/Time User Provider Type Action    > 01/10/15 06:33 PM Daisy Contreras MD Physician Sign     01/09/15 11:22 PM Daisy Contreras MD Physician Edit    Attribution information within the note text is not available. NEGATIVE

## 2023-07-02 NOTE — ED PROVIDER NOTE - NSFOLLOWUPINSTRUCTIONS_ED_ALL_ED_FT
You are seen the emergency department to have your PEG tube replaced.  Results are attached.    Continue all medications as prescribed you.  See below information on PEG tube care.     Please return to the emergency department with any new or worsening symptoms, including but not limited to:  -Abdominal pain  -High fevers  -Recurrent PEG tube dysfunction  -Lethargy or decreased level of alertness    PEG Tube Home Guide  A PEG tube is used to put food, medicine, and fluids into the stomach. Before you leave the hospital, make sure that you know:  How to care for your PEG tube.  How to care for the opening (stoma) in your belly.  How to give yourself a feeding.  How to give yourself medicines.  When to call your doctor for help.  Supplies needed:  Soapy water.  Clean, plain water.  Clean washcloth.  Bandage (dressing). This is optional.  Syringe.  How to care for a PEG tube  Check your PEG tube every day. Make sure:  It is not too tight.  It is in the correct place. There is a amanda on the tube that shows when the tube is in the correct place. Adjust the tube if you need to.  Cleaning your stoma    An abdomen with a PEG tube positioned in a stoma.  Clean your stoma every day. Follow these steps:  Wash your hands with soap and water for at least 20 seconds. If you cannot use soap and water, use hand .  Check your stoma. Let your doctor know if there is:  Redness.  Swelling.  Leaking.  Skin irritation.  Wash the stoma gently with warm, soapy water.  Rinse the stoma with plain water.  Pat the stoma area dry.  Place a bandage over the stoma if your doctor told you to do that.  Giving yourself a feeding    A person pushing water out of a syringe into a feeding tube to clean, or flush, the tube.  Your doctor will tell you:  How much nutrition and fluid you will need for each feeding.  How often to have a feeding.  Whether you should take medicine in the tube by itself or with a feeding.  To give yourself a feeding, follow these steps:  Lay out all of the things that you will need.  Make sure that the nutritional formula is at room temperature.  Wash your hands with soap and water for at least 20 seconds. If you cannot use soap and water, use hand .  Sit up or stand up straight. You will need to stay sitting up or standing up while you give yourself a feeding.  Make sure the syringe plunger is pushed in. Place the tip of the syringe in clean water, and slowly pull the plunger to bring (draw up) the water into the syringe.  Remove the clamp and the cap from the PEG tube.  Push the water out of the syringe to clean (flush) the tube.  If the tube is clear, draw up the formula into the syringe. Make sure to use the right amount for each feeding. Add water if you need to.  Slowly push the formula from the syringe through the tube.  After the feeding, flush the tube with water.  Put the clamp and the cap on the tube.  Stay sitting up or standing up straight for at least 30 minutes.  Use the feeding tube equipment, such as syringes and connectors, as told by your doctor.    Giving yourself medicine    Washing hands with soap and water.  To give yourself medicine, follow these steps:  Lay out all of the things that you will need.  If your medicine is in tablet form, crush it and dissolve it in water.  Wash your hands with soap and water for at least 20 seconds. If you cannot use soap and water, use hand .  Sit up or stand up straight. You will need to stay sitting up or standing up while you give yourself medicine.  Make sure the syringe plunger is pushed in. Place the tip of the syringe in clean water, and slowly pull the plunger to bring (draw up) the water into the syringe.  Remove the clamp and the cap from the PEG tube.  Push the water out of the syringe to clean (flush) the tube.  If the tube is clear, draw up the medicine into the syringe.  Slowly push the medicine from the syringe through the tube.  Flush the tube with water.  Put the clamp and the cap on the tube.  Stay sitting up or standing up straight for at least 30 minutes.  Do not take sustained release (SR) medicines through your tube. If you are not sure if your medicine is an SR medicine, ask your doctor.    Contact a doctor if:  The area around your stoma is sore, irritated, or red.  You have belly pain or bloating while you are feeding or after you feed.  You feel like you may vomit (nauseous) and the feeling will not go away.  You have trouble pooping (constipation) or you have watery poop (diarrhea) for a long time.  You have a fever.  You have problems with your PEG tube.  Get help right away if:  Your tube is blocked.  Your tube falls out.  You have pain around your stoma.  You are bleeding from your stoma.  Your tube is leaking.  You choke or you have trouble breathing while you are feeding or after you feed.  Summary  A PEG tube is used to put food and fluids into the stomach.  Before you leave the hospital, you will be taught how to use and care for your PEG tube.  Your doctor will give you instructions on how to give yourself feedings and medicines.  Contact your doctor if you have a fever or soreness, redness, or irritation around your stoma.  Get help right away if your tube leaks, is blocked, or falls out. Also, get help right away if you have pain or bleeding around your stoma.  This information is not intended to replace advice given to you by your health care provider. Make sure you discuss any questions you have with your health care provider.

## 2023-07-02 NOTE — ED PROCEDURE NOTE - ATTENDING WITH...
Subjective:     Chief Complaint:  Pelvic relaxation  History of Present Illness: Thu Cr is a 83 y.o. female who presents for pelvic relaxation and pessary management.  When she wore 7.  Ring pessary she had abrasions and bleeding.  She did not tolerated very long with it out and had to reinsert.  We went to smaller pessary since she is now using a 5.  She feels that the bladder protrudes around it although it is comfortable and she has no bleeding or discharge.  She has been removing it every other day to clean it    Review of Systems    Constitutional: No acute distress. No weight gain/loss.  Eyes:  Cataracts  ENT: No headaches.   Respiratory: COPD  Cardiovascular: No chest pain. No edema. Hypertension  Gastrointestinal:  GERD.   Genitourinary: No vaginal bleeding or discharge.  Integument/Breast: Negative  Hematologic/Lymphatic: No history of anemia.  Musculoskeletal:  Chronic back pain. No abdominal pain.  Neurological: No known disc problems. No paresthesias.  Behavioral/Psych:  Insomnia  Endocrine:  Hypothyroid  Allergy/Immune: no recent reactions     Objective:   General Exam:  General appearance: WDNF. NAD.   HEENT: Sarah. EOM's intact.  Neck: Normal thyroid.   Back: No CVA tenderness.  RESP: No SOB.  Breasts: deferred  Abdomen: Benign without localizing signs.  Extremities: No edema. No varices.  Lymphatic: noncontributory  Skin: No rashes. No lesions.  Neurologic: Intact.   Psych: Oriented.   Pelvic Exam:  V:  No lesions. No palpable nodes.   Va:  Diffuse relaxation.  Mild apical erythema but no ulcerations or bleeding.  .Meatus:No caruncle or stenosis  Urethra: Non tender. No suburethral masses.  Cx/Cuff: Normal   Uterus: Surgically absent.  Ad: No mass or tenderness.  Levators :Symmetrical. Normal tone. Non tender.  Minimal contraction  BL: Non tender  RV: No hemorrhoids.  Assessment:   6.  Ring pessary without diaphragm seems to fit well and hopefully will cause less irritation     Procedure  note; pessary fitting; while the 5.  Pessary seemed to fit fairly well a 6 ring pessary without diaphragm was placed and this seems to fit well.  Hopefully being a little larger it will keep the bladder from protruding around and without the diaphragm it may lessen some of the pressure on the vaginal mucosa  Plan:      Remove and clean pessary every other night     Resident

## 2023-07-03 ENCOUNTER — TRANSCRIPTION ENCOUNTER (OUTPATIENT)
Age: 88
End: 2023-07-03

## 2023-07-03 VITALS
HEART RATE: 75 BPM | SYSTOLIC BLOOD PRESSURE: 145 MMHG | TEMPERATURE: 98 F | DIASTOLIC BLOOD PRESSURE: 67 MMHG | RESPIRATION RATE: 16 BRPM | OXYGEN SATURATION: 95 %

## 2023-07-03 PROCEDURE — 74019 RADEX ABDOMEN 2 VIEWS: CPT | Mod: 26

## 2023-07-03 RX ORDER — IOHEXOL 300 MG/ML
30 INJECTION, SOLUTION INTRAVENOUS ONCE
Refills: 0 | Status: COMPLETED | OUTPATIENT
Start: 2023-07-03 | End: 2023-07-03

## 2023-07-03 RX ADMIN — IOHEXOL 30 MILLILITER(S): 300 INJECTION, SOLUTION INTRAVENOUS at 08:12

## 2023-07-03 NOTE — ED ADULT NURSE REASSESSMENT NOTE - GENERAL PATIENT STATE
comfortable appearance/no change observed/resting/sleeping
comfortable appearance/no change observed/resting/sleeping

## 2023-07-03 NOTE — ED ADULT NURSE REASSESSMENT NOTE - NS ED NURSE REASSESS COMMENT FT1
Pt resting in bed, non verbal. no s/s of acute distress, NAD. awaiting xray.
BREAK RN: Pt A&Oxo, nonverbal resting in stretcher. RR even and unlabored. VSS and noted. Safety measures in place. Pending dispo status

## 2023-07-03 NOTE — CHART NOTE - NSCHARTNOTEFT_GEN_A_CORE
BENOIT alerted by medical provider that pt is cleared for DC back to prior residence, [facility]. BENOIT contacted facility and spoke with Heydi who confirmed pt is able to return.     BENOIT contacted [facility] and arranged for next available pickup (trip#[id number]).     No other SW needs identified at this time.

## 2023-07-06 ENCOUNTER — TRANSCRIPTION ENCOUNTER (OUTPATIENT)
Age: 88
End: 2023-07-06

## 2023-07-06 ENCOUNTER — EMERGENCY (EMERGENCY)
Facility: HOSPITAL | Age: 88
LOS: 1 days | Discharge: HOME | End: 2023-07-06
Attending: STUDENT IN AN ORGANIZED HEALTH CARE EDUCATION/TRAINING PROGRAM | Admitting: EMERGENCY MEDICINE
Payer: MEDICARE

## 2023-07-06 VITALS
TEMPERATURE: 98 F | HEART RATE: 101 BPM | OXYGEN SATURATION: 97 % | DIASTOLIC BLOOD PRESSURE: 68 MMHG | SYSTOLIC BLOOD PRESSURE: 114 MMHG | RESPIRATION RATE: 18 BRPM

## 2023-07-06 DIAGNOSIS — Z93.1 GASTROSTOMY STATUS: Chronic | ICD-10-CM

## 2023-07-06 LAB
ALBUMIN SERPL ELPH-MCNC: 4 G/DL — SIGNIFICANT CHANGE UP (ref 3.3–5)
ALP SERPL-CCNC: 101 U/L — SIGNIFICANT CHANGE UP (ref 40–120)
ALT FLD-CCNC: 24 U/L — SIGNIFICANT CHANGE UP (ref 4–33)
ANION GAP SERPL CALC-SCNC: 14 MMOL/L — SIGNIFICANT CHANGE UP (ref 7–14)
APPEARANCE UR: CLEAR — SIGNIFICANT CHANGE UP
APTT BLD: 28.2 SEC — SIGNIFICANT CHANGE UP (ref 27–36.3)
AST SERPL-CCNC: 25 U/L — SIGNIFICANT CHANGE UP (ref 4–32)
BASE EXCESS BLDV CALC-SCNC: 7.7 MMOL/L — HIGH (ref -2–3)
BASOPHILS # BLD AUTO: 0.03 K/UL — SIGNIFICANT CHANGE UP (ref 0–0.2)
BASOPHILS NFR BLD AUTO: 0.3 % — SIGNIFICANT CHANGE UP (ref 0–2)
BILIRUB SERPL-MCNC: 1.9 MG/DL — HIGH (ref 0.2–1.2)
BILIRUB UR-MCNC: NEGATIVE — SIGNIFICANT CHANGE UP
BLOOD GAS VENOUS COMPREHENSIVE RESULT: SIGNIFICANT CHANGE UP
BUN SERPL-MCNC: 20 MG/DL — SIGNIFICANT CHANGE UP (ref 7–23)
CALCIUM SERPL-MCNC: 9 MG/DL — SIGNIFICANT CHANGE UP (ref 8.4–10.5)
CHLORIDE BLDV-SCNC: 102 MMOL/L — SIGNIFICANT CHANGE UP (ref 96–108)
CHLORIDE SERPL-SCNC: 100 MMOL/L — SIGNIFICANT CHANGE UP (ref 98–107)
CO2 BLDV-SCNC: 34.1 MMOL/L — HIGH (ref 22–26)
CO2 SERPL-SCNC: 26 MMOL/L — SIGNIFICANT CHANGE UP (ref 22–31)
COLOR SPEC: YELLOW — SIGNIFICANT CHANGE UP
CREAT SERPL-MCNC: 0.8 MG/DL — SIGNIFICANT CHANGE UP (ref 0.5–1.3)
DIFF PNL FLD: NEGATIVE — SIGNIFICANT CHANGE UP
EGFR: 67 ML/MIN/1.73M2 — SIGNIFICANT CHANGE UP
EOSINOPHIL # BLD AUTO: 0.04 K/UL — SIGNIFICANT CHANGE UP (ref 0–0.5)
EOSINOPHIL NFR BLD AUTO: 0.4 % — SIGNIFICANT CHANGE UP (ref 0–6)
GAS PNL BLDV: 135 MMOL/L — LOW (ref 136–145)
GLUCOSE BLDV-MCNC: 104 MG/DL — HIGH (ref 70–99)
GLUCOSE SERPL-MCNC: 102 MG/DL — HIGH (ref 70–99)
GLUCOSE UR QL: NEGATIVE MG/DL — SIGNIFICANT CHANGE UP
HCO3 BLDV-SCNC: 33 MMOL/L — HIGH (ref 22–29)
HCT VFR BLD CALC: 40.9 % — SIGNIFICANT CHANGE UP (ref 34.5–45)
HCT VFR BLDA CALC: 41 % — SIGNIFICANT CHANGE UP (ref 34.5–46.5)
HGB BLD CALC-MCNC: 13.8 G/DL — SIGNIFICANT CHANGE UP (ref 11.7–16.1)
HGB BLD-MCNC: 13.4 G/DL — SIGNIFICANT CHANGE UP (ref 11.5–15.5)
IANC: 6.75 K/UL — SIGNIFICANT CHANGE UP (ref 1.8–7.4)
IMM GRANULOCYTES NFR BLD AUTO: 0.3 % — SIGNIFICANT CHANGE UP (ref 0–0.9)
INR BLD: 1.25 RATIO — HIGH (ref 0.88–1.16)
KETONES UR-MCNC: NEGATIVE MG/DL — SIGNIFICANT CHANGE UP
LACTATE BLDV-MCNC: 1.9 MMOL/L — SIGNIFICANT CHANGE UP (ref 0.5–2)
LEUKOCYTE ESTERASE UR-ACNC: NEGATIVE — SIGNIFICANT CHANGE UP
LIDOCAIN IGE QN: 16 U/L — SIGNIFICANT CHANGE UP (ref 7–60)
LYMPHOCYTES # BLD AUTO: 1.6 K/UL — SIGNIFICANT CHANGE UP (ref 1–3.3)
LYMPHOCYTES # BLD AUTO: 17.7 % — SIGNIFICANT CHANGE UP (ref 13–44)
MAGNESIUM SERPL-MCNC: 2.2 MG/DL — SIGNIFICANT CHANGE UP (ref 1.6–2.6)
MCHC RBC-ENTMCNC: 32.4 PG — SIGNIFICANT CHANGE UP (ref 27–34)
MCHC RBC-ENTMCNC: 32.8 GM/DL — SIGNIFICANT CHANGE UP (ref 32–36)
MCV RBC AUTO: 99 FL — SIGNIFICANT CHANGE UP (ref 80–100)
MONOCYTES # BLD AUTO: 0.61 K/UL — SIGNIFICANT CHANGE UP (ref 0–0.9)
MONOCYTES NFR BLD AUTO: 6.7 % — SIGNIFICANT CHANGE UP (ref 2–14)
NEUTROPHILS # BLD AUTO: 6.75 K/UL — SIGNIFICANT CHANGE UP (ref 1.8–7.4)
NEUTROPHILS NFR BLD AUTO: 74.6 % — SIGNIFICANT CHANGE UP (ref 43–77)
NITRITE UR-MCNC: NEGATIVE — SIGNIFICANT CHANGE UP
NRBC # BLD: 0 /100 WBCS — SIGNIFICANT CHANGE UP (ref 0–0)
NRBC # FLD: 0 K/UL — SIGNIFICANT CHANGE UP (ref 0–0)
PCO2 BLDV: 46 MMHG — SIGNIFICANT CHANGE UP (ref 39–52)
PH BLDV: 7.46 — HIGH (ref 7.32–7.43)
PH UR: 6 — SIGNIFICANT CHANGE UP (ref 5–8)
PLATELET # BLD AUTO: 165 K/UL — SIGNIFICANT CHANGE UP (ref 150–400)
PO2 BLDV: 46 MMHG — HIGH (ref 25–45)
POTASSIUM BLDV-SCNC: 4.1 MMOL/L — SIGNIFICANT CHANGE UP (ref 3.5–5.1)
POTASSIUM SERPL-MCNC: 4.2 MMOL/L — SIGNIFICANT CHANGE UP (ref 3.5–5.3)
POTASSIUM SERPL-SCNC: 4.2 MMOL/L — SIGNIFICANT CHANGE UP (ref 3.5–5.3)
PROT SERPL-MCNC: 7.2 G/DL — SIGNIFICANT CHANGE UP (ref 6–8.3)
PROT UR-MCNC: NEGATIVE MG/DL — SIGNIFICANT CHANGE UP
PROTHROM AB SERPL-ACNC: 14.5 SEC — HIGH (ref 10.5–13.4)
RBC # BLD: 4.13 M/UL — SIGNIFICANT CHANGE UP (ref 3.8–5.2)
RBC # FLD: 14.9 % — HIGH (ref 10.3–14.5)
SAO2 % BLDV: 77.3 % — SIGNIFICANT CHANGE UP (ref 67–88)
SODIUM SERPL-SCNC: 140 MMOL/L — SIGNIFICANT CHANGE UP (ref 135–145)
SP GR SPEC: 1.01 — SIGNIFICANT CHANGE UP (ref 1–1.03)
TROPONIN T, HIGH SENSITIVITY RESULT: 34 NG/L — SIGNIFICANT CHANGE UP
UROBILINOGEN FLD QL: 1 MG/DL — SIGNIFICANT CHANGE UP (ref 0.2–1)
WBC # BLD: 9.06 K/UL — SIGNIFICANT CHANGE UP (ref 3.8–10.5)
WBC # FLD AUTO: 9.06 K/UL — SIGNIFICANT CHANGE UP (ref 3.8–10.5)

## 2023-07-06 PROCEDURE — 99285 EMERGENCY DEPT VISIT HI MDM: CPT | Mod: GC

## 2023-07-06 RX ORDER — ACETAMINOPHEN 500 MG
1000 TABLET ORAL ONCE
Refills: 0 | Status: COMPLETED | OUTPATIENT
Start: 2023-07-06 | End: 2023-07-06

## 2023-07-06 RX ORDER — SODIUM CHLORIDE 9 MG/ML
500 INJECTION, SOLUTION INTRAVENOUS ONCE
Refills: 0 | Status: COMPLETED | OUTPATIENT
Start: 2023-07-06 | End: 2023-07-06

## 2023-07-06 RX ORDER — DIATRIZOATE MEGLUMINE 180 MG/ML
30 INJECTION, SOLUTION INTRAVESICAL ONCE
Refills: 0 | Status: COMPLETED | OUTPATIENT
Start: 2023-07-06 | End: 2023-07-06

## 2023-07-06 RX ADMIN — Medication 400 MILLIGRAM(S): at 21:16

## 2023-07-06 RX ADMIN — SODIUM CHLORIDE 500 MILLILITER(S): 9 INJECTION, SOLUTION INTRAVENOUS at 21:16

## 2023-07-06 NOTE — ED PROVIDER NOTE - PROGRESS NOTE DETAILS
Ruel Rosario MD (PGY-2): Serology is unremarkable.  She is pending her CT scan.  Her PEG tube appears generally in place, contrast loaded and contour of the stomach was appreciated somewhat.  We will corroborate on CT imaging. Ruel Rosario MD (PGY-2): Opponent stable.  CT unremarkable.  PEG tube in place.  Patient resting comfortably and sleeping.  Will discharge at this time back to nursing facility.  Social work aware.

## 2023-07-06 NOTE — ED PROVIDER NOTE - PHYSICAL EXAMINATION
Gen:  appears uncomfortable and mildly diaphoretic  Head: normal appearing  HEENT: normal conjunctiva  Lung: no respiratory distress, clear to auscultation bilaterally  CV: regular rate and rhythm, no murmurs  Abd:  abdomen is mildly distended and diffusely tender, PEG tube site with a Hernandez hanging out of this, 14 German PEG tube which appears old is bedside.  MSK: no visible deformities  Neuro: alert,  Keeps eyes closed, appears to react in pain when I press on her abdomen  Skin: No alicia rashes

## 2023-07-06 NOTE — ED ADULT NURSE NOTE - OBJECTIVE STATEMENT
pt received in rm 10 AAO x 0 at baseline. pt with hx of dementia, cva, peg tube. pt sent from NH for dislodged pegtube. pt with distended abdomen, groaning and appears in discomfort. unable to obtain further collateral. MD at bedside evaluating patient/ awaiting Md orders. will continue to monitor.

## 2023-07-06 NOTE — ED CLERICAL - NS ED CLERK NOTE PRE-ARRIVAL INFORMATION; ADDITIONAL PRE-ARRIVAL INFORMATION
This patient is enrolled in the COPD or PNA STARS readmission program and has active care navigation.      -This patient can be followed up by the care navigation team within 24 hours. To arrange close follow-up or to obtain additional clinical information about this patient, please call the contact number above.     -For patients previously on the faculty hospitalist or pulmonary service, please call the hospitalist or pulmonary fellow (7a-5p for patients previously admitted to the pulmonary service) for consultation PRIOR to admission or observation.  The hospitalist can be reached at [phone] and pulmonary fellow at [phone].     -Consider CDU for management of COPD exacerbation or PNA per guidelines.

## 2023-07-06 NOTE — ED ADULT NURSE NOTE - NSFALLRISKINTERV_ED_ALL_ED
Assistance OOB with selected safe patient handling equipment if applicable/Assistance with ambulation/Communicate fall risk and risk factors to all staff, patient, and family/Monitor gait and stability/Monitor for mental status changes and reorient to person, place, and time, as needed/Provide patient with walking aids/Provide visual cue: yellow wristband, yellow gown, etc/Reinforce activity limits and safety measures with patient and family/Toileting schedule using arm’s reach rule for commode and bathroom/Use of alarms - bed, stretcher, chair and/or video monitoring/Call bell, personal items and telephone in reach/Instruct patient to call for assistance before getting out of bed/chair/stretcher/Non-slip footwear applied when patient is off stretcher/[place] to call system/Physically safe environment - no spills, clutter or unnecessary equipment/Purposeful Proactive Rounding/Room/bathroom lighting operational, light cord in reach

## 2023-07-06 NOTE — ED PROVIDER NOTE - NSFOLLOWUPINSTRUCTIONS_ED_ALL_ED_FT
How to Use and Care for Your PEG Tube    WHAT YOU NEED TO KNOW:    Healthcare providers will teach you how to put liquid food and certain medicines through the tube. You will also be taught how to care for the PEG tube and the skin where the tube enters your body.    DISCHARGE INSTRUCTIONS:    Call your doctor or gastroenterologist if:    You start coughing or vomiting during or after a feeding.    You have severe abdominal pain.    Blood or tube feeding fluid leaks from the PEG tube site.    Your PEG tube is shorter than it was when it was put in.    Your PEG tube comes out.    Your mouth feels dry, your heart feels like it is beating too fast, or you feel weak.    You have nausea, diarrhea, or abdominal bloating or discomfort.    You have stomach pain after each feeding or when you move around.    You have discomfort or pain around your PEG tube site.    The skin around your PEG tube is red, swollen, or draining pus.    You weigh less than your healthcare provider says you should.    Your PEG tube is longer than it was when it was put in.    You have questions or concerns about your condition or care.  How to use a PEG tube for feedings: Your healthcare provider will tell you when and how often to use your PEG tube for feedings. A bolus feeding means nutrition is given over a short period of time. An intermittent feeding is scheduled for certain times throughout the day. Continuous feedings run all the time. The following are types of PEG tube systems:    A feeding syringe helps liquid food to flow steadily into the PEG tube. The syringe is connected to the end of the PEG tube. You will pour the liquid into the syringe and hold it up high. The syringe plunger may be used to gently push the last of the liquid through the PEG tube.    A gravity drip bag allows liquid food to drip more slowly into the PEG tube. The tubing from the gravity drip bag is connected to the end of the PEG tube. You will pour the liquid into the bag. The bag hangs on a medical pole or similar device. You can adjust the flow rate on the tubing according to your healthcare provider's instructions.    An electric feeding pump controls the flow of the liquid food into your PEG tube. Your healthcare provider will teach you how to set up and use the pump.  How to care for your PEG tube:    Always wash your hands before and after each use. This helps prevent infections. Use soap and water to wash your hands.  Handwashing      Always flush your PEG tube before and after each use. This helps prevent blockage from formula or medicine. Use at least 30 milliliters (mL) of water to flush the tube. Follow directions for flushing your PEG tube.    If your PEG tube becomes clogged, try to unclog it as soon as you can. Flush your PEG tube with a 60 mL syringe filled with warm water. Never use a wire to unclog the tube. A wire can poke a hole in the tube. Your healthcare provider may have you use a medicine or a plastic brush to help unclog your tube.    Check the PEG tube daily:  Check the length of the tube from the end to where it goes into your body. If it gets longer, it may be at risk for coming out. If it gets shorter, let your healthcare provider know right away.    Check the bumper. The bumper is a piece that goes around the tube, next to your skin. It should be snug against your skin. Tell your healthcare provider if the bumper seems too tight or too loose.    Use an alcohol pad to clean the end of your PEG tube. Clean before you connect tubing or a syringe to your PEG tube and after you remove it. Do not let the end of the PEG tube touch anything.  How do I care for the skin around my PEG tube?    Do not remove the stitches or medical tape. Stitches or medical tape hold your PEG tube in place when you first get it. Your healthcare provider will take them off once the skin around your tube heals. Leave clean bandages over the tube area for the first 24 hours after the tube is put in. You may not need to use bandages after 24 hours if the skin around the tube looks dry. Ask when you can shower or bathe.    Routine skin care:  Clean the skin around your tube 1 to 2 times each day. Ask your healthcare provider what you should use to clean your skin. Check for redness, swelling, or pus in the area where the tube goes into your body. Check for fluid draining from your stoma (the hole where the tube was put in).    Gently turn your tube daily after your stitches come out. This may decrease pressure on your skin under the bumper. It may also help prevent an infection.    Keep the skin around your PEG tube dry. This will help prevent skin irritation and infection.    Use topical medicines as directed. You may need to put antibiotic cream on the skin around your tube after you are done cleaning it.  Other tips to know about a PEG tube:    Keep a record of liquids you have each day. You may also need to keep a record of how much you urinate and how many times you have a bowel movement each day. Bring this record to your follow-up visits.    Check your weight as directed. Keep a record of your weights and bring it to your follow-up visits. Your healthcare provider may need to change your feedings if your weight changes too quickly.  Weight Checks VICKY      Take your medicines as directed. Learn which of your medicines can be crushed, mixed with water, and given through the PEG tube. Certain medicines should not be crushed or may clog the PEG tube.    Go to all follow-up appointments. You may need to have blood tests and other tests when you see your healthcare provider.  Follow up with your doctor or gastroenterologist as directed: Write down your questions so you remember to ask them during your visits.

## 2023-07-06 NOTE — ED PROVIDER NOTE - OBJECTIVE STATEMENT
Patient nonverbal at baseline.  History primarily obtained from the chart  This is a 89-year-old female, nonverbal at baseline, A-fib on Xarelto, COPD, reflux disease, prior CVA, chronic PEG tube with multiple episodes of dislodgment, presenting for PEG tube displacement after she pulled on this earlier this afternoon.  Unclear exactly when her PEG tube was placed, however prior notes show that has been there for at least a year.  Limited history secondary to nonverbal baseline review of the chart notes that her current medications include her Xarelto as well as low-dose Lasix and high cholesterol pill.  Allergies to penicillin and aspirin, reaction unknown.

## 2023-07-06 NOTE — ED PROVIDER NOTE - CARE PROVIDER_API CALL
Simeon Swanson  Gastroenterology  [address] [place] [id number]  Phone: [phone]  Fax: [phone]  Established Patient  Follow Up Time: 1-3 Days

## 2023-07-06 NOTE — ED PROVIDER NOTE - ATTENDING CONTRIBUTION TO CARE
89 yo F, hx dementia, non-verbal at baseline, CVA with PEG, HLD, hx Afib on Xarelto, COPD, GERD, presenting after PEG tube was dislodged. Limited history given patient's baseline, but pt uncomfortable appearing on exam with distended and tender abdomen, otherwise heart rrr, lungs ctab, skin warm to touch. PEG replaced, plan for labs, CT abd/pelv and reassess

## 2023-07-06 NOTE — ED PROVIDER NOTE - PATIENT PORTAL LINK FT
You can access the FollowMyHealth Patient Portal offered by [facility] by registering at the following website: [email or web address]. By joining [employer]’s FollowMyHealth portal, you will also be able to view your health information using other applications (apps) compatible with our system.

## 2023-07-06 NOTE — ED ADULT TRIAGE NOTE - CHIEF COMPLAINT QUOTE
Pt coming from [facility], c/o pulling out PEG tube X 2 hours ago. Pt A&OX0 at baseline. Hx of dementia, Afib, COPD, CVA, GERD, HLD, glaucoma, UTI

## 2023-07-06 NOTE — ED PROVIDER NOTE - CLINICAL SUMMARY MEDICAL DECISION MAKING FREE TEXT BOX
Elderly female with nonverbal baseline presenting after she inadvertently pulled her PEG tube out.  Physical exam notes that she is uncomfortable appearing and mildly diaphoretic.  Given the limited history, will work-up broadly, CT abdomen pelvis with IV contrast to assess for abdominal obstruction versus luminal pathology versus infectious pathology versus other acute surgical abdomen.  Given diaphoresis and advanced age and limited history, will rule out ACS, EKG and Trope.  Assess for evidence of endorgan hypoperfusion, VBG.  Gentle fluid resuscitation and pain control.  Close reassessment.  PEG tube replaced at bedside, uncomplicated, will obtain Gastrografin enhanced x-ray for placement confirmation.

## 2023-07-06 NOTE — ED ADULT NURSE NOTE - CHIEF COMPLAINT QUOTE
Pt coming from [facility], c/o pulling out PEG tube X 2 hours ago. Pt A&OX0 at baseline. Hx of dementia, Afib, COPD, CVA, GERD, HLD, glaucoma, UTI

## 2023-07-07 VITALS
OXYGEN SATURATION: 99 % | DIASTOLIC BLOOD PRESSURE: 61 MMHG | HEART RATE: 75 BPM | TEMPERATURE: 98 F | RESPIRATION RATE: 17 BRPM | SYSTOLIC BLOOD PRESSURE: 114 MMHG

## 2023-07-07 LAB — TROPONIN T, HIGH SENSITIVITY RESULT: 37 NG/L — SIGNIFICANT CHANGE UP

## 2023-07-07 PROCEDURE — 74177 CT ABD & PELVIS W/CONTRAST: CPT | Mod: 26,MA

## 2023-07-07 PROCEDURE — 74018 RADEX ABDOMEN 1 VIEW: CPT | Mod: 26

## 2023-07-07 NOTE — ED ADULT NURSE REASSESSMENT NOTE - NS ED NURSE REASSESS COMMENT FT1
Break coverage RN: pt sleeping at this time. Respirations even and unlabored. pending CT. No acute distress noted. Bed in lowest, safety maintained.

## 2023-07-07 NOTE — PROVIDER CONTACT NOTE (OTHER) - BACKGROUND
Writer informed by MD pt cleared for discharge from [facility]. Writer contacted SNF ([phone]) and spoke with staff member who was informed of pt's return. S [facility] arranged

## 2023-07-07 NOTE — PROVIDER CONTACT NOTE (OTHER) - ASSESSMENT
for pt with [facility] #[phone]. Trip #[id number] arranged for  within the next 60-90 minutes. Non emergent transportation form to be placed in pt's chart.

## 2023-07-08 LAB
CULTURE RESULTS: NO GROWTH — SIGNIFICANT CHANGE UP
SPECIMEN SOURCE: SIGNIFICANT CHANGE UP

## 2023-07-18 NOTE — ED ADULT TRIAGE NOTE - STATUS:
Applied Ftsg Text: The defect edges were debeveled with a #15 scalpel blade.  Given the location of the defect, shape of the defect and the proximity to free margins a full thickness skin graft was deemed most appropriate.  Using a sterile surgical marker, the primary defect shape was transferred to the donor site. The area thus outlined was incised deep to adipose tissue with a #15 scalpel blade.  The harvested graft was then trimmed of adipose tissue until only dermis and epidermis was left.  The skin margins of the secondary defect were undermined to an appropriate distance in all directions utilizing iris scissors.  The secondary defect was closed with interrupted buried subcutaneous sutures.  The skin edges were then re-apposed with running  sutures.  The skin graft was then placed in the primary defect and oriented appropriately.

## 2023-08-04 NOTE — ED PROVIDER NOTE - CARE PLAN
Updated pt that Dr. Webb is out of the office today. Plan to further address with him Monday as to how to proceed next. Will return call to pt once he has reviewed and advised on. Pt verbalized understanding.    1 Principal Discharge DX:	PEG tube malfunction

## 2023-09-05 NOTE — PHYSICAL THERAPY INITIAL EVALUATION ADULT - LEVEL OF INDEPENDENCE: SIT/STAND, REHAB EVAL
Patient's mother called given time and prep for MRI abdomen to be done on 9/16/23 [facility] arrival at 8:15 AM.
minimum assist (75% patients effort)

## 2023-11-28 NOTE — ED ADULT NURSE NOTE - CADM POA CENTRAL LINE
-- DO NOT REPLY / DO NOT REPLY ALL --  -- Message is from [facility] [employer] ([facility]) --      FYI, patient current has Rx scripts in process for 30 days supply.  However, there is not enough refill left on file for him until next appointment date with Dr. Jules Virk.  Patient had refused to schedule with other provider and insists to only see Dr. Jules Virk.  Please notify patient at [phone] if there is any problem getting the medication.  Thank you.      Medication refill requested for lisinopril (ZESTRIL) 40 MG tablet  Last Refill/Prescribed: Date 08/03/2023, # of tablets: 90, # of refills approved:1  According to the pharmacy, schedule next fill: In process for 30 tablets- $6.86, 30 tablets remaining refills   Medication: lisinopril (ZESTRIL) 40 MG tablet  Last office visit date: 09/18/2023 with Audrey Peña DO and Yomaira Farley MD (loida)  Instructions: Schedule an annual visit appointment 11/2023.  Next Scheduled Appointment: 06/21/2024 at 9:00am with provider Jules Virk MD  Refill Request Approved by provider - Patient has current refills on file at pharmacy.  Caller notified and encouraged to follow up with provider.        Medication refill requested for indapamide (LOZOL) 2.5 MG tablet  Last Refill/Prescribed: Date 06/06/2023 # of tablets: 90, # of refills approved:1   According to the pharmacy, schedule next fill: In process for 30 tablets- $14.73, 0 remaining refills  Medication: indapamide (LOZOL) 2.5 MG tablet  Last office visit date: 09/18/2023 with Audrey Peña DO and Yomaira Farley MD (loida)  Instructions: Schedule an annual visit appointment 11/2023.  Refill Request Approved by provider - Patient has current refills on file at pharmacy.  Caller notified and encouraged to follow up with provider.         No

## 2023-12-03 NOTE — PATIENT PROFILE ADULT. - HEALTHCARE QUESTIONS, PROFILE
INTERVAL HPI/OVERNIGHT EVENTS:  Patient S&E at bedside.   s/p biopsy on Friday 12/1  pt had hallucinations yesterday after antesthesia likely related to medication, no further hallucinations last night   reports more back pain this morning as he did not take the ms contin yesterday   awaiting path   discussed w/ Dr. Dorantes     PAST MEDICAL & SURGICAL HISTORY:  [place] syndrome      Alcoholism      H/O hypercholesterolemia      H/O prostate cancer      GIOVANNI (obstructive sleep apnea)      Afib  chronic      GERD (gastroesophageal reflux disease)      HTN (hypertension)      Rib fractures      Sternal fracture      T7 vertebral fracture      H/O right inguinal hernia repair      H/O radical prostatectomy          FAMILY HISTORY:  Known health problems: none (Father, Mother)        VITAL SIGNS:  T(F): 98.2 (12-03-23 @ 08:45)  HR: 68 (12-03-23 @ 08:45)  BP: 103/60 (12-03-23 @ 08:45)  RR: 18 (12-03-23 @ 08:45)  SpO2: 96% (12-03-23 @ 08:45)  Wt(kg): --    PHYSICAL EXAM:    Constitutional: NAD  Eyes: EOMI,   Neck: flexion  Respiratory: CTA b/l,   Cardiovascular: RRR,   Gastrointestinal: soft, NTND,  Neurological: AAOx3  skin- fungal skin chnges in abdominal folds      MEDICATIONS  (STANDING):  acetaminophen     Tablet .. 975 milliGRAM(s) Oral every 8 hours  aMIOdarone    Tablet 200 milliGRAM(s) Oral daily  cyanocobalamin 1000 MICROGram(s) Oral daily  diltiazem    milliGRAM(s) Oral daily  heparin  Infusion.  Unit(s)/Hr (17 mL/Hr) IV Continuous <Continuous>  influenza  Vaccine (HIGH DOSE) 0.7 milliLiter(s) IntraMuscular once  metoprolol succinate ER 50 milliGRAM(s) Oral daily  morphine ER Tablet 15 milliGRAM(s) Oral three times a day  multivitamin 1 Tablet(s) Oral daily  naloxone Injectable 0.4 milliGRAM(s) IV Push once  nystatin Powder 1 Application(s) Topical two times a day  oxybutynin 5 milliGRAM(s) Oral two times a day  polyethylene glycol 3350 17 Gram(s) Oral daily  senna 2 Tablet(s) Oral at bedtime  vancomycin  IVPB      vancomycin  IVPB 1250 milliGRAM(s) IV Intermittent every 12 hours    MEDICATIONS  (PRN):  aluminum hydroxide/magnesium hydroxide/simethicone Suspension 30 milliLiter(s) Oral every 4 hours PRN Dyspepsia  bisacodyl 5 milliGRAM(s) Oral daily PRN Constipation  heparin   Injectable 7500 Unit(s) IV Push every 6 hours PRN For aPTT less than 40  heparin   Injectable 3500 Unit(s) IV Push every 6 hours PRN For aPTT between 40 - 57  ibuprofen  Tablet. 400 milliGRAM(s) Oral every 6 hours PRN Temp greater or equal to 38C (100.4F)  lidocaine   4% Patch 1 Patch Transdermal daily PRN back pain  melatonin 3 milliGRAM(s) Oral at bedtime PRN Insomnia  morphine  - Injectable 4 milliGRAM(s) IV Push every 3 hours PRN Moderate Pain (4 - 6)  morphine  - Injectable 6 milliGRAM(s) IV Push every 4 hours PRN Severe Pain (7 - 10)  ondansetron Injectable 4 milliGRAM(s) IV Push every 8 hours PRN Nausea and/or Vomiting      Allergies    No Known Allergies    Intolerances        LABS:                        7.6    14.59 )-----------( 617      ( 03 Dec 2023 04:16 )             23.5     12-03    138  |  110<H>  |  35<H>  ----------------------------<  106<H>  4.4   |  26  |  1.15    Ca    7.8<L>      03 Dec 2023 04:16      PT/INR - ( 03 Dec 2023 04:16 )   PT: 17.1 sec;   INR: 1.53 ratio         PTT - ( 03 Dec 2023 04:16 )  PTT:61.3 sec  Urinalysis Basic - ( 03 Dec 2023 04:16 )    Color: x / Appearance: x / SG: x / pH: x  Gluc: 106 mg/dL / Ketone: x  / Bili: x / Urobili: x   Blood: x / Protein: x / Nitrite: x   Leuk Esterase: x / RBC: x / WBC x   Sq Epi: x / Non Sq Epi: x / Bacteria: x        RADIOLOGY & ADDITIONAL TESTS:  Studies reviewed.   INTERVAL HPI/OVERNIGHT EVENTS:  Patient S&E at bedside.   s/p biopsy on Friday 12/1  pt had hallucinations yesterday after antesthesia likely related to medication, no further hallucinations last night   reports more back pain this morning as he did not take the ms contin yesterday   awaiting path   discussed w/ Dr. Dorantes     PAST MEDICAL & SURGICAL HISTORY:  [place] syndrome      Alcoholism      H/O hypercholesterolemia      H/O prostate cancer      GIOVANNI (obstructive sleep apnea)      Afib  chronic      GERD (gastroesophageal reflux disease)      HTN (hypertension)      Rib fractures      Sternal fracture      T7 vertebral fracture      H/O right inguinal hernia repair      H/O radical prostatectomy          FAMILY HISTORY:  Known health problems: none (Father, Mother)        VITAL SIGNS:  T(F): 98.2 (12-03-23 @ 08:45)  HR: 68 (12-03-23 @ 08:45)  BP: 103/60 (12-03-23 @ 08:45)  RR: 18 (12-03-23 @ 08:45)  SpO2: 96% (12-03-23 @ 08:45)  Wt(kg): --    PHYSICAL EXAM:    Constitutional: NAD  Eyes: EOMI,   Neck: flexion  Respiratory: CTA b/l,   Cardiovascular: RRR,   Gastrointestinal: soft, NTND,  Neurological: AAOx3  skin- fungal skin chnges in abdominal folds      MEDICATIONS  (STANDING):  acetaminophen     Tablet .. 975 milliGRAM(s) Oral every 8 hours  aMIOdarone    Tablet 200 milliGRAM(s) Oral daily  cyanocobalamin 1000 MICROGram(s) Oral daily  diltiazem    milliGRAM(s) Oral daily  heparin  Infusion.  Unit(s)/Hr (17 mL/Hr) IV Continuous <Continuous>  influenza  Vaccine (HIGH DOSE) 0.7 milliLiter(s) IntraMuscular once  metoprolol succinate ER 50 milliGRAM(s) Oral daily  morphine ER Tablet 15 milliGRAM(s) Oral three times a day  multivitamin 1 Tablet(s) Oral daily  naloxone Injectable 0.4 milliGRAM(s) IV Push once  nystatin Powder 1 Application(s) Topical two times a day  oxybutynin 5 milliGRAM(s) Oral two times a day  polyethylene glycol 3350 17 Gram(s) Oral daily  senna 2 Tablet(s) Oral at bedtime  vancomycin  IVPB      vancomycin  IVPB 1250 milliGRAM(s) IV Intermittent every 12 hours    MEDICATIONS  (PRN):  aluminum hydroxide/magnesium hydroxide/simethicone Suspension 30 milliLiter(s) Oral every 4 hours PRN Dyspepsia  bisacodyl 5 milliGRAM(s) Oral daily PRN Constipation  heparin   Injectable 7500 Unit(s) IV Push every 6 hours PRN For aPTT less than 40  heparin   Injectable 3500 Unit(s) IV Push every 6 hours PRN For aPTT between 40 - 57  ibuprofen  Tablet. 400 milliGRAM(s) Oral every 6 hours PRN Temp greater or equal to 38C (100.4F)  lidocaine   4% Patch 1 Patch Transdermal daily PRN back pain  melatonin 3 milliGRAM(s) Oral at bedtime PRN Insomnia  morphine  - Injectable 4 milliGRAM(s) IV Push every 3 hours PRN Moderate Pain (4 - 6)  morphine  - Injectable 6 milliGRAM(s) IV Push every 4 hours PRN Severe Pain (7 - 10)  ondansetron Injectable 4 milliGRAM(s) IV Push every 8 hours PRN Nausea and/or Vomiting      Allergies    No Known Allergies    Intolerances        LABS:                        7.6    14.59 )-----------( 617      ( 03 Dec 2023 04:16 )             23.5     12-03    138  |  110<H>  |  35<H>  ----------------------------<  106<H>  4.4   |  26  |  1.15    Ca    7.8<L>      03 Dec 2023 04:16      PT/INR - ( 03 Dec 2023 04:16 )   PT: 17.1 sec;   INR: 1.53 ratio         PTT - ( 03 Dec 2023 04:16 )  PTT:61.3 sec  Urinalysis Basic - ( 03 Dec 2023 04:16 )    Color: x / Appearance: x / SG: x / pH: x  Gluc: 106 mg/dL / Ketone: x  / Bili: x / Urobili: x   Blood: x / Protein: x / Nitrite: x   Leuk Esterase: x / RBC: x / WBC x   Sq Epi: x / Non Sq Epi: x / Bacteria: x        RADIOLOGY & ADDITIONAL TESTS:  Studies reviewed.   INTERVAL HPI/OVERNIGHT EVENTS:  Patient S&E at bedside.   s/p biopsy on Friday 12/1  pt had hallucinations yesterday after antesthesia likely related to medication, no further hallucinations last night   reports more back pain this morning as he did not take the ms contin yesterday   awaiting path   discussed w/ Dr. Dorantes     PAST MEDICAL & SURGICAL HISTORY:  [place] syndrome      Alcoholism      H/O hypercholesterolemia      H/O prostate cancer      GIOVANNI (obstructive sleep apnea)      Afib  chronic      GERD (gastroesophageal reflux disease)      HTN (hypertension)      Rib fractures      Sternal fracture      T7 vertebral fracture      H/O right inguinal hernia repair      H/O radical prostatectomy          FAMILY HISTORY:  Known health problems: none (Father, Mother)        VITAL SIGNS:  T(F): 98.2 (12-03-23 @ 08:45)  HR: 68 (12-03-23 @ 08:45)  BP: 103/60 (12-03-23 @ 08:45)  RR: 18 (12-03-23 @ 08:45)  SpO2: 96% (12-03-23 @ 08:45)  Wt(kg): --    PHYSICAL EXAM:    Constitutional: NAD  Eyes: EOMI,   Neck: flexion  Respiratory: CTA b/l,   Cardiovascular: RRR,   Gastrointestinal: soft, NTND,  Neurological: AAOx3  skin- fungal skin chnges in abdominal folds      MEDICATIONS  (STANDING):  acetaminophen     Tablet .. 975 milliGRAM(s) Oral every 8 hours  aMIOdarone    Tablet 200 milliGRAM(s) Oral daily  cyanocobalamin 1000 MICROGram(s) Oral daily  diltiazem    milliGRAM(s) Oral daily  heparin  Infusion.  Unit(s)/Hr (17 mL/Hr) IV Continuous <Continuous>  influenza  Vaccine (HIGH DOSE) 0.7 milliLiter(s) IntraMuscular once  metoprolol succinate ER 50 milliGRAM(s) Oral daily  morphine ER Tablet 15 milliGRAM(s) Oral three times a day  multivitamin 1 Tablet(s) Oral daily  naloxone Injectable 0.4 milliGRAM(s) IV Push once  nystatin Powder 1 Application(s) Topical two times a day  oxybutynin 5 milliGRAM(s) Oral two times a day  polyethylene glycol 3350 17 Gram(s) Oral daily  senna 2 Tablet(s) Oral at bedtime  vancomycin  IVPB      vancomycin  IVPB 1250 milliGRAM(s) IV Intermittent every 12 hours    MEDICATIONS  (PRN):  aluminum hydroxide/magnesium hydroxide/simethicone Suspension 30 milliLiter(s) Oral every 4 hours PRN Dyspepsia  bisacodyl 5 milliGRAM(s) Oral daily PRN Constipation  heparin   Injectable 7500 Unit(s) IV Push every 6 hours PRN For aPTT less than 40  heparin   Injectable 3500 Unit(s) IV Push every 6 hours PRN For aPTT between 40 - 57  ibuprofen  Tablet. 400 milliGRAM(s) Oral every 6 hours PRN Temp greater or equal to 38C (100.4F)  lidocaine   4% Patch 1 Patch Transdermal daily PRN back pain  melatonin 3 milliGRAM(s) Oral at bedtime PRN Insomnia  morphine  - Injectable 4 milliGRAM(s) IV Push every 3 hours PRN Moderate Pain (4 - 6)  morphine  - Injectable 6 milliGRAM(s) IV Push every 4 hours PRN Severe Pain (7 - 10)  ondansetron Injectable 4 milliGRAM(s) IV Push every 8 hours PRN Nausea and/or Vomiting      Allergies    No Known Allergies    Intolerances        LABS:                        7.6    14.59 )-----------( 617      ( 03 Dec 2023 04:16 )             23.5     12-03    138  |  110<H>  |  35<H>  ----------------------------<  106<H>  4.4   |  26  |  1.15    Ca    7.8<L>      03 Dec 2023 04:16      PT/INR - ( 03 Dec 2023 04:16 )   PT: 17.1 sec;   INR: 1.53 ratio         PTT - ( 03 Dec 2023 04:16 )  PTT:61.3 sec  Urinalysis Basic - ( 03 Dec 2023 04:16 )    Color: x / Appearance: x / SG: x / pH: x  Gluc: 106 mg/dL / Ketone: x  / Bili: x / Urobili: x   Blood: x / Protein: x / Nitrite: x   Leuk Esterase: x / RBC: x / WBC x   Sq Epi: x / Non Sq Epi: x / Bacteria: x        RADIOLOGY & ADDITIONAL TESTS:  Studies reviewed.   none

## 2023-12-12 NOTE — H&P ADULT. - PROBLEM SELECTOR PROBLEM 2
Patient no show for scheduled FUV.  Primary number not in service.  Secondary number contacted.  No answer.  Message left on identifiable voicemail including call back instructions.    Gastroesophageal reflux disease without esophagitis

## 2023-12-13 NOTE — SWALLOW BEDSIDE ASSESSMENT ADULT - SWALLOW EVAL: CURRENT DIET
mechanical soft diet
Treatment Number (Will Not Render If 0): 0
Include Z78.9 (Other Specified Conditions Influencing Health Status) As An Associated Diagnosis?: No
Detail Level: Simple
Anesthesia Volume In Cc: 0.4
Medical Necessity Information: It is in your best interest to select a reason for this procedure from the list below. All of these items fulfill various CMS LCD requirements except the new and changing color options.
Post-Care Instructions: I reviewed with the patient in detail post-care instructions. Patient is to avoid picking at any of the treated lesions. Pt may apply Vaseline or Polysporin to crusted or scabbing areas.
Medical Necessity Clause: This procedure was medically necessary because the lesions that were treated were:
Consent: The patient's consent was obtained including but not limited to risks of crusting, scabbing, blistering, scarring, darker or lighter pigmentary change, recurrence, incomplete removal and infection.

## 2024-02-26 NOTE — ED ADULT NURSE NOTE - PAIN RATING/NUMBER SCALE (0-10): ACTIVITY
WBC higher than baseline  Improving = 13 lower than has been for a while  Continue iv Zosyn for suspected UTI  Wounds vs urine vs blood  Urine cultures yeast less than 99K  Blood cultures - NGTD  waiting wound consult     Latest Reference Range & Units 12/14/23 05:00 12/15/23 05:51 12/16/23 05:07 12/17/23 04:57 12/18/23 05:03 12/19/23 04:09 02/23/24 14:11   WBC 3.90 - 12.70 K/uL 18.11 (H) 18.90 (H) 17.53 (H) 19.72 (H) 17.48 (H) 15.27 (H) 22.38 (H)   (H): Data is abnormally high     0

## 2024-03-25 NOTE — PATIENT PROFILE ADULT - NSPROMEDSBROUGHTTOHOSP_GEN_A_NUR
no MEDICATIONS  (PRN):  acetaminophen     Tablet .. 650 milliGRAM(s) Oral every 6 hours PRN Temp greater or equal to 38C (100.4F), Mild Pain (1 - 3)  aluminum hydroxide/magnesium hydroxide/simethicone Suspension 30 milliLiter(s) Oral every 6 hours PRN Dyspepsia  diphenhydrAMINE 50 milliGRAM(s) Oral every 6 hours PRN Extrapyramidal symptoms or prophylaxis  diphenhydrAMINE Injectable 50 milliGRAM(s) IntraMuscular once PRN Extrapyramidal prophylaxis  haloperidol     Tablet 5 milliGRAM(s) Oral every 6 hours PRN agitation  haloperidol    Injectable 5 milliGRAM(s) IntraMuscular once PRN aggression  LORazepam     Tablet 2 milliGRAM(s) Oral every 6 hours PRN severe anxiety, agitation  LORazepam   Injectable 2 milliGRAM(s) IntraMuscular once PRN severe agitation  magnesium hydroxide Suspension 30 milliLiter(s) Oral daily PRN Constipation  traZODone 50 milliGRAM(s) Oral at bedtime PRN insomnia

## 2024-05-09 NOTE — PATIENT PROFILE ADULT - NSPROCHRONICPAIN_GEN_A_NUR
Detail Level: Detailed Add 61904 Cpt? (Important Note: In 2017 The Use Of 31898 Is Being Tracked By Cms To Determine Future Global Period Reimbursement For Global Periods): yes Wound Evaluated By (Optional): Minnie Wound Diameter In Cm(Optional): 0 Wound Crusting?: clean Wound Color?: pink no

## 2024-06-04 NOTE — ED ADULT TRIAGE NOTE - HISTORY OF COVID-19 VACCINATION
MEDICARE WELLNESS VISIT NOTE    HISTORY OF PRESENT ILLNESS:   Nevaeh presents for her Subsequent Annual Medicare Wellness Visit.   She has no current complaints or concerns.      Patient Care Team:  Mone Estevez NP as PCP - General (Nurse Practitioner)  Corinna Sanches RN as Registered Nurse (Registered Nurse)  Robert Hays MD (Hematology & Oncology)  Erika Miller DO (Physical Medicine - Rehabilitation)  Osvaldo Mederos DC (Chiropractor - Rehabilitation)        Patient Active Problem List   Diagnosis    Right knee pain    Osteoarthrosis, unspecified whether generalized or localized, lower leg    Moderate obstructive sleep apnea    Hypertension    DMII (diabetes mellitus, type 2)  (CMD)    Hyperlipemia    History of left breast cancer    Port-A-Cath in place         Past Medical History:   Diagnosis Date    Arthritis     Cancer of left breast  (CMD) 02/18/2019         Colon polyp 11/16/2022    dr ortez, 2 tubular adenomas recall 5 years    Diabetes Mellitus     8 years    Diverticulosis of colon 11/16/2022    dr ortez    Essential (primary) hypertension     Fibroids     GERD (gastroesophageal reflux disease)     Hypertension     20 years    Internal hemorrhoids 11/16/2022    dr. ortez    Port-A-Cath in place 04/04/2019    has been removed    Sleep apnea     CPAP         Past Surgical History:   Procedure Laterality Date    Bx/remv,lymph node,deep axill Left 03/06/2019    Dr. Lao    Cholecystectomy  age 20    open    Colonoscopy  06/21/2012    10 year recall/Normal/Dr. French    Colonoscopy w/ polypectomy  11/16/2022    dr ortez    D and c  1980    Epidural      injection spinal stenosis    Epidural steroid injection   01/06/2023    Hysteroscopy  2000    Intraoperative id mapping sentinel lymph node w dye non rad Left 03/06/2019    Dr. Lao    Ir implantable port vein access  03/25/2019    has been removed    Knee arthroscopy w/ meniscectomy Right 11/07/2013    Mastectomy partial Left 03/06/2019     Vaccine status unknown Dr. Lao    Tendon transfer Right     R arm    Us guided breast core biopsy Left 2019    Left Breast Biopsy with Clip Placement         Social History     Tobacco Use    Smoking status: Never    Smokeless tobacco: Never   Vaping Use    Vaping status: never used   Substance Use Topics    Alcohol use: Not Currently     Comment: very rarely    Drug use: Never     Drug use:    Drug Use:    Never           Family History   Problem Relation Age of Onset    Cancer Mother         lung, smoker    Hypertension Mother     Diabetes Father         Type 1 DM, , had lower extremity amputation,  from pneumonia    Heart disease Brother     Diabetes Brother     Hypertension Brother     Hyperlipidemia Brother     Heart disease Brother     Diabetes Brother     Hypertension Brother     Hyperlipidemia Brother     Diabetes Daughter     Other Maternal Grandmother 97        CVA    Cancer, Colon Maternal Grandfather 72    Aneurysm Paternal Grandfather 60       Current Outpatient Medications   Medication Sig Dispense Refill    amLODIPine (NORVASC) 10 MG tablet Take 1 tablet by mouth daily. 90 tablet 3    atenolol (TENORMIN) 50 MG tablet Take 1 tablet by mouth daily. 90 tablet 3    lisinopril-hydroCHLOROthiazide (ZESTORETIC) 20-25 MG per tablet Take 1 tablet by mouth daily. 90 tablet 3    omeprazole (PriLOSEC) 10 MG capsule Take 1 capsule by mouth daily. 90 capsule 3    rosuvastatin (CRESTOR) 20 MG tablet Take 1 tablet by mouth daily. 90 tablet 3    gabapentin (NEURONTIN) 300 MG capsule Take 1 capsule by mouth at bedtime until seen for follow up. 90 capsule 1    Semaglutide, 1 MG/DOSE, 4 MG/3ML Solution Pen-injector Inject 1 mg into the skin every 7 days. Indications: Type 2 Diabetes 9 mL 3    tizanidine (ZANAFLEX) 2 MG capsule Take 1 capsule by mouth at bedtime. 90 capsule 3    acetaminophen (TYLENOL) 500 MG tablet Take by mouth as needed for Pain.      ketoconazole (NIZORAL) 2 % cream Apply to affected areas chest, abdomen  twice daily until resolved. (Patient taking differently: Apply topically as needed. Apply to affected areas chest, abdomen twice daily until resolved.) 60 g 2    hydroCORTisone (CORTIZONE) 2.5 % cream Apply to affected areas chest, abdomen twice daily until resolved. 30 g 1    Cyanocobalamin (B-12) 1000 MCG Tab Take 1,000 mcg by mouth daily. 30 tablet     albuterol 108 (90 Base) MCG/ACT inhaler Inhale 2 puffs into the lungs every 4 hours as needed for Shortness of Breath or Wheezing. (Patient not taking: Reported on 6/4/2024) 18 g 0    loratadine (CLARITIN) 10 MG tablet Take 1 tablet by mouth as needed. 90 tablet 0    Magnesium Oxide 400 (241.3 Mg) MG Tab Take 1 tablet by mouth daily. 90 tablet 3    aspirin 81 MG EC tablet Take 1 tablet by mouth daily. 90 tablet 3     No current facility-administered medications for this visit.        The following items on the Medicare Health Risk Assessment were found to be positive  11d.) Bodily pain: Always     11e.) Tiredness or Fatigue: Often     15.) How confident are you that you can control and manage most of your health problems?: Somewhat confident         Vision and Hearing screens: Not performed  Has an appt with eye MD   No concerns with hearing at this time    Advance care planning documents on file - yes     Cognitive/Functional Status: no evidence of cognitive dysfunction by direct observation    Recent PHQ 2/9 Score:    PHQ 2:  PHQ 2 Score Adult PHQ 2 Score Adult PHQ 2 Interpretation Little interest or pleasure in activity?   5/30/2024  11:57 AM 0 No further screening needed 0       PHQ 9:       DEPRESSION ASSESSMENT/PLAN:  Depression screening is negative no further plan needed.     Body mass index is 38.62 kg/m².      Needed Screening/Treatment:   None  Needed follow up:  None, 6 months per PCP    See orders.   See Patient Instructions section.   No follow-ups on file.

## 2024-06-10 NOTE — PATIENT PROFILE ADULT. - PAIN SCALE PREFERRED, PROFILE
Continue Regimen: Spironolactone 50 mg, oral birth control.\\n\\nAczone 7.5 % topical gel with pump: Apply to face once daily
Detail Level: Zone
Plan: Patient to cover lesions that she wants to pick with Vaseline. She can apply Vaseline to the under eyes if desired.
Initiate Treatment: pharmacy compounding accessory(tretinoin 0.05%): Apply a pea sized amount to the face once daily at bedtime to face; dot areas of face and rub in. Ok to apply moisturizer 10 min afterwards.
numerical 0-10

## 2024-08-07 NOTE — ED POST DISCHARGE NOTE - RESULT SUMMARY
UCx prelim + >100,000 CFU/mL proteus mirabilis. Patient d/c with prescription for cefpodoxime. Will f/u final culture results and sensitivities. - Carey Niño PA-C
(4) rarely moist

## 2024-09-20 NOTE — ED ADULT NURSE NOTE - CCCP TRG CHIEF CMPLNT
jaw swelling and redness
What Type Of Note Output Would You Prefer (Optional)?: Standard Output
How Severe Are Your Spot(S)?: moderate
Have Your Spot(S) Been Treated In The Past?: has not been treated
Hpi Title: Evaluation of Skin Lesions

## 2025-03-27 NOTE — DIETITIAN INITIAL EVALUATION ADULT. - PROBLEM SELECTOR PROBLEM 3
Please call Vanessa and tell her the thyroid blood test shows she is overmedicated   I will reduce her dosage and I would like her to recheck her thyroid level in 6 weeks  Dementia without behavioral disturbance, unspecified dementia type

## 2025-04-07 NOTE — ED PROVIDER NOTE - TOBACCO USE
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 Please call to discuss. Thank you.      Action Taken: Other: [facility]    Travel Screening: Not Applicable                                                                        Unknown if ever smoked

## 2025-06-23 NOTE — ED ADULT NURSE NOTE - FINAL NURSING ELECTRONIC SIGNATURE
26-Jul-2017 21:03